# Patient Record
Sex: MALE | Race: OTHER | HISPANIC OR LATINO
[De-identification: names, ages, dates, MRNs, and addresses within clinical notes are randomized per-mention and may not be internally consistent; named-entity substitution may affect disease eponyms.]

---

## 2022-01-01 ENCOUNTER — NON-APPOINTMENT (OUTPATIENT)
Age: 82
End: 2022-01-01

## 2022-01-01 ENCOUNTER — APPOINTMENT (OUTPATIENT)
Dept: CT IMAGING | Facility: IMAGING CENTER | Age: 82
End: 2022-01-01

## 2022-01-01 ENCOUNTER — RESULT REVIEW (OUTPATIENT)
Age: 82
End: 2022-01-01

## 2022-01-01 ENCOUNTER — TRANSCRIPTION ENCOUNTER (OUTPATIENT)
Age: 82
End: 2022-01-01

## 2022-01-01 ENCOUNTER — APPOINTMENT (OUTPATIENT)
Dept: INTERNAL MEDICINE | Facility: CLINIC | Age: 82
End: 2022-01-01

## 2022-01-01 ENCOUNTER — APPOINTMENT (OUTPATIENT)
Dept: NEUROSURGERY | Facility: CLINIC | Age: 82
End: 2022-01-01

## 2022-01-01 ENCOUNTER — APPOINTMENT (OUTPATIENT)
Dept: NEUROLOGY | Facility: CLINIC | Age: 82
End: 2022-01-01

## 2022-01-01 ENCOUNTER — OUTPATIENT (OUTPATIENT)
Dept: OUTPATIENT SERVICES | Facility: HOSPITAL | Age: 82
LOS: 1 days | End: 2022-01-01
Payer: MEDICARE

## 2022-01-01 ENCOUNTER — INPATIENT (INPATIENT)
Facility: HOSPITAL | Age: 82
LOS: 4 days | DRG: 100 | End: 2022-11-04
Attending: INTERNAL MEDICINE | Admitting: PSYCHIATRY & NEUROLOGY
Payer: COMMERCIAL

## 2022-01-01 ENCOUNTER — INPATIENT (INPATIENT)
Facility: HOSPITAL | Age: 82
LOS: 11 days | Discharge: HOME | DRG: 25 | End: 2022-08-03
Attending: NEUROLOGICAL SURGERY | Admitting: NEUROLOGICAL SURGERY
Payer: COMMERCIAL

## 2022-01-01 ENCOUNTER — OUTPATIENT (OUTPATIENT)
Dept: OUTPATIENT SERVICES | Facility: HOSPITAL | Age: 82
LOS: 1 days | Discharge: HOME | End: 2022-01-01

## 2022-01-01 ENCOUNTER — APPOINTMENT (OUTPATIENT)
Dept: HEMATOLOGY ONCOLOGY | Facility: CLINIC | Age: 82
End: 2022-01-01

## 2022-01-01 ENCOUNTER — INPATIENT (INPATIENT)
Facility: HOSPITAL | Age: 82
LOS: 19 days | Discharge: NURSING, REHAB OR LONG-TERM CARE | End: 2022-10-28
Attending: HOSPITALIST | Admitting: HOSPITALIST

## 2022-01-01 ENCOUNTER — APPOINTMENT (OUTPATIENT)
Dept: ULTRASOUND IMAGING | Facility: IMAGING CENTER | Age: 82
End: 2022-01-01

## 2022-01-01 ENCOUNTER — APPOINTMENT (OUTPATIENT)
Dept: INFECTIOUS DISEASE | Facility: CLINIC | Age: 82
End: 2022-01-01

## 2022-01-01 ENCOUNTER — APPOINTMENT (OUTPATIENT)
Dept: MRI IMAGING | Facility: CLINIC | Age: 82
End: 2022-01-01

## 2022-01-01 ENCOUNTER — INPATIENT (INPATIENT)
Facility: HOSPITAL | Age: 82
LOS: 11 days | Discharge: ANOTHER HOSPITAL | DRG: 813 | End: 2022-10-08
Attending: HOSPITALIST | Admitting: STUDENT IN AN ORGANIZED HEALTH CARE EDUCATION/TRAINING PROGRAM
Payer: COMMERCIAL

## 2022-01-01 ENCOUNTER — APPOINTMENT (OUTPATIENT)
Dept: RADIATION ONCOLOGY | Facility: CLINIC | Age: 82
End: 2022-01-01

## 2022-01-01 VITALS
DIASTOLIC BLOOD PRESSURE: 87 MMHG | OXYGEN SATURATION: 99 % | HEART RATE: 80 BPM | TEMPERATURE: 98 F | RESPIRATION RATE: 19 BRPM | SYSTOLIC BLOOD PRESSURE: 138 MMHG

## 2022-01-01 VITALS
WEIGHT: 190.92 LBS | DIASTOLIC BLOOD PRESSURE: 76 MMHG | OXYGEN SATURATION: 96 % | RESPIRATION RATE: 20 BRPM | SYSTOLIC BLOOD PRESSURE: 132 MMHG | HEIGHT: 65 IN | HEART RATE: 78 BPM | TEMPERATURE: 98 F

## 2022-01-01 VITALS
HEART RATE: 76 BPM | BODY MASS INDEX: 31.82 KG/M2 | HEIGHT: 65 IN | WEIGHT: 191 LBS | OXYGEN SATURATION: 97 % | SYSTOLIC BLOOD PRESSURE: 99 MMHG | RESPIRATION RATE: 17 BRPM | TEMPERATURE: 98 F | DIASTOLIC BLOOD PRESSURE: 65 MMHG

## 2022-01-01 VITALS
DIASTOLIC BLOOD PRESSURE: 85 MMHG | OXYGEN SATURATION: 99 % | TEMPERATURE: 98.3 F | SYSTOLIC BLOOD PRESSURE: 127 MMHG | HEART RATE: 79 BPM | HEIGHT: 64 IN | WEIGHT: 162 LBS | BODY MASS INDEX: 27.66 KG/M2

## 2022-01-01 VITALS
DIASTOLIC BLOOD PRESSURE: 78 MMHG | HEART RATE: 76 BPM | OXYGEN SATURATION: 99 % | SYSTOLIC BLOOD PRESSURE: 124 MMHG | RESPIRATION RATE: 16 BRPM

## 2022-01-01 VITALS
TEMPERATURE: 98 F | OXYGEN SATURATION: 98 % | HEIGHT: 66 IN | SYSTOLIC BLOOD PRESSURE: 126 MMHG | HEART RATE: 62 BPM | DIASTOLIC BLOOD PRESSURE: 75 MMHG | WEIGHT: 156.53 LBS | RESPIRATION RATE: 18 BRPM

## 2022-01-01 VITALS
HEIGHT: 64.96 IN | OXYGEN SATURATION: 99 % | RESPIRATION RATE: 16 BRPM | TEMPERATURE: 98.3 F | WEIGHT: 172.62 LBS | SYSTOLIC BLOOD PRESSURE: 121 MMHG | DIASTOLIC BLOOD PRESSURE: 78 MMHG | HEART RATE: 67 BPM | BODY MASS INDEX: 28.76 KG/M2

## 2022-01-01 VITALS
SYSTOLIC BLOOD PRESSURE: 111 MMHG | HEART RATE: 84 BPM | TEMPERATURE: 99.1 F | OXYGEN SATURATION: 97 % | RESPIRATION RATE: 18 BRPM | DIASTOLIC BLOOD PRESSURE: 74 MMHG

## 2022-01-01 VITALS
HEART RATE: 82 BPM | DIASTOLIC BLOOD PRESSURE: 60 MMHG | SYSTOLIC BLOOD PRESSURE: 112 MMHG | OXYGEN SATURATION: 98 % | RESPIRATION RATE: 16 BRPM | HEIGHT: 64 IN

## 2022-01-01 VITALS
RESPIRATION RATE: 17 BRPM | DIASTOLIC BLOOD PRESSURE: 67 MMHG | TEMPERATURE: 98 F | OXYGEN SATURATION: 93 % | HEART RATE: 73 BPM | SYSTOLIC BLOOD PRESSURE: 110 MMHG

## 2022-01-01 VITALS
SYSTOLIC BLOOD PRESSURE: 77 MMHG | DIASTOLIC BLOOD PRESSURE: 30 MMHG | TEMPERATURE: 97 F | RESPIRATION RATE: 24 BRPM | HEART RATE: 107 BPM | OXYGEN SATURATION: 94 %

## 2022-01-01 VITALS
OXYGEN SATURATION: 99 % | DIASTOLIC BLOOD PRESSURE: 71 MMHG | SYSTOLIC BLOOD PRESSURE: 106 MMHG | TEMPERATURE: 98.6 F | HEART RATE: 94 BPM | RESPIRATION RATE: 18 BRPM

## 2022-01-01 VITALS
DIASTOLIC BLOOD PRESSURE: 77 MMHG | TEMPERATURE: 99 F | HEART RATE: 100 BPM | OXYGEN SATURATION: 99 % | SYSTOLIC BLOOD PRESSURE: 125 MMHG | RESPIRATION RATE: 18 BRPM

## 2022-01-01 VITALS
HEART RATE: 90 BPM | RESPIRATION RATE: 16 BRPM | HEIGHT: 65 IN | OXYGEN SATURATION: 97 % | DIASTOLIC BLOOD PRESSURE: 84 MMHG | SYSTOLIC BLOOD PRESSURE: 124 MMHG | WEIGHT: 149.91 LBS | TEMPERATURE: 99 F

## 2022-01-01 VITALS — HEIGHT: 66 IN

## 2022-01-01 DIAGNOSIS — E09.9 DRUG OR CHEMICAL INDUCED DIABETES MELLITUS WITHOUT COMPLICATIONS: ICD-10-CM

## 2022-01-01 DIAGNOSIS — Z29.9 ENCOUNTER FOR PROPHYLACTIC MEASURES, UNSPECIFIED: ICD-10-CM

## 2022-01-01 DIAGNOSIS — F41.9 ANXIETY DISORDER, UNSPECIFIED: ICD-10-CM

## 2022-01-01 DIAGNOSIS — Z71.89 OTHER SPECIFIED COUNSELING: ICD-10-CM

## 2022-01-01 DIAGNOSIS — G40.901 EPILEPSY, UNSPECIFIED, NOT INTRACTABLE, WITH STATUS EPILEPTICUS: ICD-10-CM

## 2022-01-01 DIAGNOSIS — R53.83 OTHER MALAISE: ICD-10-CM

## 2022-01-01 DIAGNOSIS — G40.909 EPILEPSY, UNSPECIFIED, NOT INTRACTABLE, WITHOUT STATUS EPILEPTICUS: ICD-10-CM

## 2022-01-01 DIAGNOSIS — Z98.890 OTHER SPECIFIED POSTPROCEDURAL STATES: Chronic | ICD-10-CM

## 2022-01-01 DIAGNOSIS — Z86.2 PERSONAL HISTORY OF DISEASES OF THE BLOOD AND BLOOD-FORMING ORGANS AND CERTAIN DISORDERS INVOLVING THE IMMUNE MECHANISM: ICD-10-CM

## 2022-01-01 DIAGNOSIS — Z78.9 OTHER SPECIFIED HEALTH STATUS: ICD-10-CM

## 2022-01-01 DIAGNOSIS — R26.2 DIFFICULTY IN WALKING, NOT ELSEWHERE CLASSIFIED: ICD-10-CM

## 2022-01-01 DIAGNOSIS — Z79.899 OTHER LONG TERM (CURRENT) DRUG THERAPY: ICD-10-CM

## 2022-01-01 DIAGNOSIS — C71.9 MALIGNANT NEOPLASM OF BRAIN, UNSPECIFIED: ICD-10-CM

## 2022-01-01 DIAGNOSIS — R29.898 OTHER SYMPTOMS AND SIGNS INVOLVING THE MUSCULOSKELETAL SYSTEM: ICD-10-CM

## 2022-01-01 DIAGNOSIS — N28.89 OTHER SPECIFIED DISORDERS OF KIDNEY AND URETER: ICD-10-CM

## 2022-01-01 DIAGNOSIS — D69.6 THROMBOCYTOPENIA, UNSPECIFIED: ICD-10-CM

## 2022-01-01 DIAGNOSIS — R53.81 OTHER MALAISE: ICD-10-CM

## 2022-01-01 DIAGNOSIS — B37.81 CANDIDAL ESOPHAGITIS: ICD-10-CM

## 2022-01-01 DIAGNOSIS — D17.1 BENIGN LIPOMATOUS NEOPLASM OF SKIN AND SUBCUTANEOUS TISSUE OF TRUNK: ICD-10-CM

## 2022-01-01 DIAGNOSIS — R77.8 OTHER SPECIFIED ABNORMALITIES OF PLASMA PROTEINS: ICD-10-CM

## 2022-01-01 DIAGNOSIS — I82.431 ACUTE EMBOLISM AND THROMBOSIS OF RIGHT POPLITEAL VEIN: ICD-10-CM

## 2022-01-01 DIAGNOSIS — R06.03 ACUTE RESPIRATORY DISTRESS: ICD-10-CM

## 2022-01-01 DIAGNOSIS — R78.81 BACTEREMIA: ICD-10-CM

## 2022-01-01 DIAGNOSIS — E78.5 HYPERLIPIDEMIA, UNSPECIFIED: ICD-10-CM

## 2022-01-01 DIAGNOSIS — K92.2 GASTROINTESTINAL HEMORRHAGE, UNSPECIFIED: ICD-10-CM

## 2022-01-01 DIAGNOSIS — R79.89 OTHER SPECIFIED ABNORMAL FINDINGS OF BLOOD CHEMISTRY: ICD-10-CM

## 2022-01-01 DIAGNOSIS — E11.9 TYPE 2 DIABETES MELLITUS WITHOUT COMPLICATIONS: ICD-10-CM

## 2022-01-01 DIAGNOSIS — I82.409 ACUTE EMBOLISM AND THROMBOSIS OF UNSPECIFIED DEEP VEINS OF UNSPECIFIED LOWER EXTREMITY: ICD-10-CM

## 2022-01-01 DIAGNOSIS — I10 ESSENTIAL (PRIMARY) HYPERTENSION: ICD-10-CM

## 2022-01-01 DIAGNOSIS — K42.9 UMBILICAL HERNIA W/OUT OBSTRUCTION OR GANGRENE: ICD-10-CM

## 2022-01-01 DIAGNOSIS — Z91.89 OTHER SPECIFIED PERSONAL RISK FACTORS, NOT ELSEWHERE CLASSIFIED: ICD-10-CM

## 2022-01-01 DIAGNOSIS — Z51.5 ENCOUNTER FOR PALLIATIVE CARE: ICD-10-CM

## 2022-01-01 DIAGNOSIS — Z82.49 FAMILY HISTORY OF ISCHEMIC HEART DISEASE AND OTHER DISEASES OF THE CIRCULATORY SYSTEM: ICD-10-CM

## 2022-01-01 DIAGNOSIS — Z00.00 ENCOUNTER FOR GENERAL ADULT MEDICAL EXAMINATION W/OUT ABNORMAL FINDINGS: ICD-10-CM

## 2022-01-01 DIAGNOSIS — U07.1 COVID-19: ICD-10-CM

## 2022-01-01 DIAGNOSIS — R63.4 ABNORMAL WEIGHT LOSS: ICD-10-CM

## 2022-01-01 DIAGNOSIS — R73.03 PREDIABETES: ICD-10-CM

## 2022-01-01 DIAGNOSIS — F32.A ANXIETY DISORDER, UNSPECIFIED: ICD-10-CM

## 2022-01-01 DIAGNOSIS — M79.89 OTHER SPECIFIED SOFT TISSUE DISORDERS: ICD-10-CM

## 2022-01-01 DIAGNOSIS — G93.40 ENCEPHALOPATHY, UNSPECIFIED: ICD-10-CM

## 2022-01-01 DIAGNOSIS — Z01.818 ENCOUNTER FOR OTHER PREPROCEDURAL EXAMINATION: ICD-10-CM

## 2022-01-01 DIAGNOSIS — E11.65 TYPE 2 DIABETES MELLITUS WITH HYPERGLYCEMIA: ICD-10-CM

## 2022-01-01 DIAGNOSIS — A41.81 SEPSIS DUE TO ENTEROCOCCUS: ICD-10-CM

## 2022-01-01 DIAGNOSIS — E53.8 DEFICIENCY OF OTHER SPECIFIED B GROUP VITAMINS: ICD-10-CM

## 2022-01-01 DIAGNOSIS — R32 UNSPECIFIED URINARY INCONTINENCE: ICD-10-CM

## 2022-01-01 DIAGNOSIS — G93.89 OTHER SPECIFIED DISORDERS OF BRAIN: ICD-10-CM

## 2022-01-01 DIAGNOSIS — R00.0 TACHYCARDIA, UNSPECIFIED: ICD-10-CM

## 2022-01-01 DIAGNOSIS — Z83.3 FAMILY HISTORY OF DIABETES MELLITUS: ICD-10-CM

## 2022-01-01 DIAGNOSIS — F32.2 MAJOR DEPRESSIVE DISORDER, SINGLE EPISODE, SEVERE W/OUT PSYCHOTIC FEATURES: ICD-10-CM

## 2022-01-01 DIAGNOSIS — Z00.8 ENCOUNTER FOR OTHER GENERAL EXAMINATION: ICD-10-CM

## 2022-01-01 DIAGNOSIS — C80.1 MALIGNANT (PRIMARY) NEOPLASM, UNSPECIFIED: ICD-10-CM

## 2022-01-01 DIAGNOSIS — R34 ANURIA AND OLIGURIA: ICD-10-CM

## 2022-01-01 DIAGNOSIS — N17.9 ACUTE KIDNEY FAILURE, UNSPECIFIED: ICD-10-CM

## 2022-01-01 DIAGNOSIS — I80.229 PHLEBITIS AND THROMBOPHLEBITIS OF UNSPECIFIED POPLITEAL VEIN: ICD-10-CM

## 2022-01-01 DIAGNOSIS — Z86.19 PERSONAL HISTORY OF OTHER INFECTIOUS AND PARASITIC DISEASES: ICD-10-CM

## 2022-01-01 DIAGNOSIS — R06.02 SHORTNESS OF BREATH: ICD-10-CM

## 2022-01-01 LAB
-  AMIKACIN: SIGNIFICANT CHANGE UP
-  AMOXICILLIN/CLAVULANIC ACID: SIGNIFICANT CHANGE UP
-  AMPICILLIN/SULBACTAM: SIGNIFICANT CHANGE UP
-  AMPICILLIN: SIGNIFICANT CHANGE UP
-  AZTREONAM: SIGNIFICANT CHANGE UP
-  CEFAZOLIN: SIGNIFICANT CHANGE UP
-  CEFEPIME: SIGNIFICANT CHANGE UP
-  CEFOXITIN: SIGNIFICANT CHANGE UP
-  CEFTRIAXONE: SIGNIFICANT CHANGE UP
-  CIPROFLOXACIN: SIGNIFICANT CHANGE UP
-  ERTAPENEM: SIGNIFICANT CHANGE UP
-  GENTAMICIN SYNERGY: SIGNIFICANT CHANGE UP
-  GENTAMICIN: SIGNIFICANT CHANGE UP
-  IMIPENEM: SIGNIFICANT CHANGE UP
-  LEVOFLOXACIN: SIGNIFICANT CHANGE UP
-  MEROPENEM: SIGNIFICANT CHANGE UP
-  NITROFURANTOIN: SIGNIFICANT CHANGE UP
-  PIPERACILLIN/TAZOBACTAM: SIGNIFICANT CHANGE UP
-  STAPHYLOCOCCUS EPIDERMIDIS, METHICILLIN RESISTANT: SIGNIFICANT CHANGE UP
-  STREPTOMYCIN SYNERGY: SIGNIFICANT CHANGE UP
-  TETRACYCLINE: SIGNIFICANT CHANGE UP
-  TETRACYCLINE: SIGNIFICANT CHANGE UP
-  TIGECYCLINE: SIGNIFICANT CHANGE UP
-  TOBRAMYCIN: SIGNIFICANT CHANGE UP
-  TRIMETHOPRIM/SULFAMETHOXAZOLE: SIGNIFICANT CHANGE UP
-  VANCOMYCIN: SIGNIFICANT CHANGE UP
25(OH)D3 SERPL-MCNC: 23.4 NG/ML
A1C WITH ESTIMATED AVERAGE GLUCOSE RESULT: 7 % — HIGH (ref 4–5.6)
A1C WITH ESTIMATED AVERAGE GLUCOSE RESULT: 9.3 % — HIGH (ref 4–5.6)
ALBUMIN SERPL ELPH-MCNC: 2 G/DL — LOW (ref 3.3–5)
ALBUMIN SERPL ELPH-MCNC: 2 G/DL — LOW (ref 3.3–5)
ALBUMIN SERPL ELPH-MCNC: 2.2 G/DL — LOW (ref 3.3–5)
ALBUMIN SERPL ELPH-MCNC: 2.4 G/DL — LOW (ref 3.3–5)
ALBUMIN SERPL ELPH-MCNC: 2.5 G/DL — LOW (ref 3.3–5)
ALBUMIN SERPL ELPH-MCNC: 2.5 G/DL — LOW (ref 3.3–5)
ALBUMIN SERPL ELPH-MCNC: 2.6 G/DL — LOW (ref 3.3–5)
ALBUMIN SERPL ELPH-MCNC: 2.7 G/DL — LOW (ref 3.3–5)
ALBUMIN SERPL ELPH-MCNC: 2.8 G/DL — LOW (ref 3.3–5)
ALBUMIN SERPL ELPH-MCNC: 2.8 G/DL — LOW (ref 3.3–5)
ALBUMIN SERPL ELPH-MCNC: 2.9 G/DL — LOW (ref 3.3–5)
ALBUMIN SERPL ELPH-MCNC: 3 G/DL — LOW (ref 3.3–5)
ALBUMIN SERPL ELPH-MCNC: 3 G/DL — LOW (ref 3.3–5)
ALBUMIN SERPL ELPH-MCNC: 3.1 G/DL — LOW (ref 3.3–5)
ALBUMIN SERPL ELPH-MCNC: 3.1 G/DL — LOW (ref 3.3–5)
ALBUMIN SERPL ELPH-MCNC: 3.2 G/DL — LOW (ref 3.3–5)
ALBUMIN SERPL ELPH-MCNC: 3.4 G/DL — SIGNIFICANT CHANGE UP (ref 3.3–5)
ALBUMIN SERPL ELPH-MCNC: 3.7 G/DL
ALBUMIN SERPL ELPH-MCNC: 3.8 G/DL
ALBUMIN SERPL ELPH-MCNC: 4 G/DL — SIGNIFICANT CHANGE UP (ref 3.3–5)
ALP BLD-CCNC: 119 U/L
ALP BLD-CCNC: 160 U/L
ALP SERPL-CCNC: 100 U/L — SIGNIFICANT CHANGE UP (ref 40–120)
ALP SERPL-CCNC: 104 U/L — SIGNIFICANT CHANGE UP (ref 40–120)
ALP SERPL-CCNC: 44 U/L — SIGNIFICANT CHANGE UP (ref 40–120)
ALP SERPL-CCNC: 67 U/L — SIGNIFICANT CHANGE UP (ref 40–120)
ALP SERPL-CCNC: 71 U/L — SIGNIFICANT CHANGE UP (ref 40–120)
ALP SERPL-CCNC: 74 U/L — SIGNIFICANT CHANGE UP (ref 40–120)
ALP SERPL-CCNC: 75 U/L — SIGNIFICANT CHANGE UP (ref 40–120)
ALP SERPL-CCNC: 76 U/L — SIGNIFICANT CHANGE UP (ref 40–120)
ALP SERPL-CCNC: 77 U/L — SIGNIFICANT CHANGE UP (ref 40–120)
ALP SERPL-CCNC: 77 U/L — SIGNIFICANT CHANGE UP (ref 40–120)
ALP SERPL-CCNC: 79 U/L — SIGNIFICANT CHANGE UP (ref 40–120)
ALP SERPL-CCNC: 80 U/L — SIGNIFICANT CHANGE UP (ref 40–120)
ALP SERPL-CCNC: 84 U/L — SIGNIFICANT CHANGE UP (ref 40–120)
ALP SERPL-CCNC: 85 U/L — SIGNIFICANT CHANGE UP (ref 40–120)
ALP SERPL-CCNC: 87 U/L — SIGNIFICANT CHANGE UP (ref 40–120)
ALP SERPL-CCNC: 92 U/L — SIGNIFICANT CHANGE UP (ref 40–120)
ALP SERPL-CCNC: 93 U/L — SIGNIFICANT CHANGE UP (ref 40–120)
ALP SERPL-CCNC: 98 U/L — SIGNIFICANT CHANGE UP (ref 40–120)
ALP SERPL-CCNC: 99 U/L — SIGNIFICANT CHANGE UP (ref 40–120)
ALP SERPL-CCNC: 99 U/L — SIGNIFICANT CHANGE UP (ref 40–120)
ALT FLD-CCNC: 30 U/L — SIGNIFICANT CHANGE UP (ref 4–41)
ALT FLD-CCNC: 37 U/L — SIGNIFICANT CHANGE UP (ref 4–41)
ALT FLD-CCNC: 38 U/L — SIGNIFICANT CHANGE UP (ref 10–45)
ALT FLD-CCNC: 40 U/L — SIGNIFICANT CHANGE UP (ref 10–45)
ALT FLD-CCNC: 40 U/L — SIGNIFICANT CHANGE UP (ref 4–41)
ALT FLD-CCNC: 41 U/L — SIGNIFICANT CHANGE UP (ref 10–45)
ALT FLD-CCNC: 41 U/L — SIGNIFICANT CHANGE UP (ref 4–41)
ALT FLD-CCNC: 42 U/L — SIGNIFICANT CHANGE UP (ref 10–45)
ALT FLD-CCNC: 44 U/L — SIGNIFICANT CHANGE UP (ref 10–45)
ALT FLD-CCNC: 46 U/L — HIGH (ref 10–45)
ALT FLD-CCNC: 46 U/L — HIGH (ref 10–45)
ALT FLD-CCNC: 48 U/L — HIGH (ref 10–45)
ALT FLD-CCNC: 51 U/L — HIGH (ref 10–45)
ALT FLD-CCNC: 53 U/L — HIGH (ref 10–45)
ALT FLD-CCNC: 54 U/L — HIGH (ref 10–45)
ALT FLD-CCNC: 57 U/L — HIGH (ref 4–41)
ALT FLD-CCNC: 60 U/L — HIGH (ref 10–45)
ALT FLD-CCNC: 61 U/L — HIGH (ref 4–41)
ALT FLD-CCNC: 62 U/L — HIGH (ref 10–45)
ALT FLD-CCNC: 62 U/L — HIGH (ref 10–45)
ALT FLD-CCNC: 74 U/L — HIGH (ref 10–45)
ALT FLD-CCNC: 9 U/L — SIGNIFICANT CHANGE UP (ref 4–41)
ALT SERPL-CCNC: 32 U/L
ALT SERPL-CCNC: 41 U/L
ANION GAP SERPL CALC-SCNC: 10 MMOL/L — SIGNIFICANT CHANGE UP (ref 5–17)
ANION GAP SERPL CALC-SCNC: 10 MMOL/L — SIGNIFICANT CHANGE UP (ref 7–14)
ANION GAP SERPL CALC-SCNC: 11 MMOL/L — SIGNIFICANT CHANGE UP (ref 5–17)
ANION GAP SERPL CALC-SCNC: 11 MMOL/L — SIGNIFICANT CHANGE UP (ref 7–14)
ANION GAP SERPL CALC-SCNC: 12 MMOL/L — SIGNIFICANT CHANGE UP (ref 5–17)
ANION GAP SERPL CALC-SCNC: 12 MMOL/L — SIGNIFICANT CHANGE UP (ref 5–17)
ANION GAP SERPL CALC-SCNC: 13 MMOL/L — SIGNIFICANT CHANGE UP (ref 5–17)
ANION GAP SERPL CALC-SCNC: 14 MMOL/L — SIGNIFICANT CHANGE UP (ref 5–17)
ANION GAP SERPL CALC-SCNC: 14 MMOL/L — SIGNIFICANT CHANGE UP (ref 5–17)
ANION GAP SERPL CALC-SCNC: 15 MMOL/L
ANION GAP SERPL CALC-SCNC: 15 MMOL/L — HIGH (ref 7–14)
ANION GAP SERPL CALC-SCNC: 16 MMOL/L — SIGNIFICANT CHANGE UP (ref 5–17)
ANION GAP SERPL CALC-SCNC: 17 MMOL/L
ANION GAP SERPL CALC-SCNC: 20 MMOL/L — HIGH (ref 5–17)
ANION GAP SERPL CALC-SCNC: 7 MMOL/L — SIGNIFICANT CHANGE UP (ref 5–17)
ANION GAP SERPL CALC-SCNC: 7 MMOL/L — SIGNIFICANT CHANGE UP (ref 7–14)
ANION GAP SERPL CALC-SCNC: 7 MMOL/L — SIGNIFICANT CHANGE UP (ref 7–14)
ANION GAP SERPL CALC-SCNC: 8 MMOL/L — SIGNIFICANT CHANGE UP (ref 5–17)
ANION GAP SERPL CALC-SCNC: 9 MMOL/L — SIGNIFICANT CHANGE UP (ref 5–17)
ANION GAP SERPL CALC-SCNC: 9 MMOL/L — SIGNIFICANT CHANGE UP (ref 7–14)
ANISOCYTOSIS BLD QL: SLIGHT — SIGNIFICANT CHANGE UP
APPEARANCE UR: CLEAR — SIGNIFICANT CHANGE UP
APPEARANCE: CLEAR
APTT BLD: 20.6 SEC — LOW (ref 27.5–35.5)
APTT BLD: 24.1 SEC — LOW (ref 27.5–35.5)
APTT BLD: 24.2 SEC — LOW (ref 27.5–35.5)
APTT BLD: 25.2 SEC — LOW (ref 27.5–35.5)
AST SERPL-CCNC: 17 U/L — SIGNIFICANT CHANGE UP (ref 4–40)
AST SERPL-CCNC: 19 U/L
AST SERPL-CCNC: 19 U/L — SIGNIFICANT CHANGE UP (ref 4–40)
AST SERPL-CCNC: 21 U/L — SIGNIFICANT CHANGE UP (ref 10–40)
AST SERPL-CCNC: 21 U/L — SIGNIFICANT CHANGE UP (ref 10–40)
AST SERPL-CCNC: 21 U/L — SIGNIFICANT CHANGE UP (ref 4–40)
AST SERPL-CCNC: 22 U/L — SIGNIFICANT CHANGE UP (ref 10–40)
AST SERPL-CCNC: 22 U/L — SIGNIFICANT CHANGE UP (ref 10–40)
AST SERPL-CCNC: 22 U/L — SIGNIFICANT CHANGE UP (ref 4–40)
AST SERPL-CCNC: 24 U/L
AST SERPL-CCNC: 25 U/L — SIGNIFICANT CHANGE UP (ref 10–40)
AST SERPL-CCNC: 25 U/L — SIGNIFICANT CHANGE UP (ref 10–40)
AST SERPL-CCNC: 28 U/L — SIGNIFICANT CHANGE UP (ref 10–40)
AST SERPL-CCNC: 29 U/L — SIGNIFICANT CHANGE UP (ref 10–40)
AST SERPL-CCNC: 30 U/L — SIGNIFICANT CHANGE UP (ref 4–40)
AST SERPL-CCNC: 32 U/L — SIGNIFICANT CHANGE UP (ref 10–40)
AST SERPL-CCNC: 34 U/L — SIGNIFICANT CHANGE UP (ref 10–40)
AST SERPL-CCNC: 34 U/L — SIGNIFICANT CHANGE UP (ref 4–40)
AST SERPL-CCNC: 35 U/L — SIGNIFICANT CHANGE UP (ref 10–40)
AST SERPL-CCNC: 35 U/L — SIGNIFICANT CHANGE UP (ref 4–40)
AST SERPL-CCNC: 43 U/L — HIGH (ref 10–40)
AST SERPL-CCNC: 44 U/L — HIGH (ref 10–40)
BACTERIA # UR AUTO: NEGATIVE — SIGNIFICANT CHANGE UP
BACTERIA: ABNORMAL
BASE EXCESS BLDV CALC-SCNC: -2.1 MMOL/L — LOW (ref -2–3)
BASE EXCESS BLDV CALC-SCNC: -6 MMOL/L — LOW (ref -2–3)
BASE EXCESS BLDV CALC-SCNC: 1.4 MMOL/L — SIGNIFICANT CHANGE UP (ref -2–3)
BASE EXCESS BLDV CALC-SCNC: 1.8 MMOL/L — SIGNIFICANT CHANGE UP (ref -2–3)
BASE EXCESS BLDV CALC-SCNC: 2.4 MMOL/L — SIGNIFICANT CHANGE UP (ref -2–3)
BASE EXCESS BLDV CALC-SCNC: 2.4 MMOL/L — SIGNIFICANT CHANGE UP (ref -2–3)
BASE EXCESS BLDV CALC-SCNC: 3 MMOL/L — SIGNIFICANT CHANGE UP (ref -2–3)
BASE EXCESS BLDV CALC-SCNC: 6 MMOL/L — HIGH (ref -2–2)
BASOPHILS # BLD AUTO: 0 K/UL — SIGNIFICANT CHANGE UP (ref 0–0.2)
BASOPHILS # BLD AUTO: 0.01 K/UL
BASOPHILS # BLD AUTO: 0.01 K/UL — SIGNIFICANT CHANGE UP (ref 0–0.2)
BASOPHILS # BLD AUTO: 0.02 K/UL — SIGNIFICANT CHANGE UP (ref 0–0.2)
BASOPHILS # BLD AUTO: 0.03 K/UL — SIGNIFICANT CHANGE UP (ref 0–0.2)
BASOPHILS # BLD AUTO: 0.03 K/UL — SIGNIFICANT CHANGE UP (ref 0–0.2)
BASOPHILS # BLD AUTO: 0.05 K/UL — SIGNIFICANT CHANGE UP (ref 0–0.2)
BASOPHILS NFR BLD AUTO: 0 % — SIGNIFICANT CHANGE UP (ref 0–2)
BASOPHILS NFR BLD AUTO: 0.1 %
BASOPHILS NFR BLD AUTO: 0.1 % — SIGNIFICANT CHANGE UP (ref 0–2)
BASOPHILS NFR BLD AUTO: 0.2 % — SIGNIFICANT CHANGE UP (ref 0–2)
BASOPHILS NFR BLD AUTO: 0.3 % — SIGNIFICANT CHANGE UP (ref 0–2)
BASOPHILS NFR BLD AUTO: 0.3 % — SIGNIFICANT CHANGE UP (ref 0–2)
BASOPHILS NFR BLD AUTO: 0.4 % — SIGNIFICANT CHANGE UP (ref 0–2)
BASOPHILS NFR BLD AUTO: 0.5 % — SIGNIFICANT CHANGE UP (ref 0–2)
BILIRUB DIRECT SERPL-MCNC: <0.2 MG/DL — SIGNIFICANT CHANGE UP (ref 0–0.3)
BILIRUB INDIRECT FLD-MCNC: >0.3 MG/DL — SIGNIFICANT CHANGE UP (ref 0–1)
BILIRUB SERPL-MCNC: 0.3 MG/DL — SIGNIFICANT CHANGE UP (ref 0.2–1.2)
BILIRUB SERPL-MCNC: 0.4 MG/DL
BILIRUB SERPL-MCNC: 0.4 MG/DL
BILIRUB SERPL-MCNC: 0.4 MG/DL — SIGNIFICANT CHANGE UP (ref 0.2–1.2)
BILIRUB SERPL-MCNC: 0.5 MG/DL — SIGNIFICANT CHANGE UP (ref 0.2–1.2)
BILIRUB SERPL-MCNC: 0.6 MG/DL — SIGNIFICANT CHANGE UP (ref 0.2–1.2)
BILIRUB SERPL-MCNC: 0.7 MG/DL — SIGNIFICANT CHANGE UP (ref 0.2–1.2)
BILIRUB SERPL-MCNC: 0.8 MG/DL — SIGNIFICANT CHANGE UP (ref 0.2–1.2)
BILIRUB UR-MCNC: NEGATIVE — SIGNIFICANT CHANGE UP
BILIRUBIN URINE: NEGATIVE
BLD GP AB SCN SERPL QL: NEGATIVE — SIGNIFICANT CHANGE UP
BLOOD GAS VENOUS - CREATININE: SIGNIFICANT CHANGE UP MG/DL (ref 0.5–1.3)
BLOOD GAS VENOUS - CREATININE: SIGNIFICANT CHANGE UP MG/DL (ref 0.5–1.3)
BLOOD URINE: NORMAL
BUN SERPL-MCNC: 10 MG/DL — SIGNIFICANT CHANGE UP (ref 7–23)
BUN SERPL-MCNC: 11 MG/DL — SIGNIFICANT CHANGE UP (ref 7–23)
BUN SERPL-MCNC: 12 MG/DL — SIGNIFICANT CHANGE UP (ref 7–23)
BUN SERPL-MCNC: 13 MG/DL — SIGNIFICANT CHANGE UP (ref 7–23)
BUN SERPL-MCNC: 14 MG/DL — SIGNIFICANT CHANGE UP (ref 7–23)
BUN SERPL-MCNC: 15 MG/DL — SIGNIFICANT CHANGE UP (ref 7–23)
BUN SERPL-MCNC: 16 MG/DL — SIGNIFICANT CHANGE UP (ref 7–23)
BUN SERPL-MCNC: 17 MG/DL — SIGNIFICANT CHANGE UP (ref 7–23)
BUN SERPL-MCNC: 17 MG/DL — SIGNIFICANT CHANGE UP (ref 7–23)
BUN SERPL-MCNC: 18 MG/DL — SIGNIFICANT CHANGE UP (ref 7–23)
BUN SERPL-MCNC: 18 MG/DL — SIGNIFICANT CHANGE UP (ref 7–23)
BUN SERPL-MCNC: 19 MG/DL — SIGNIFICANT CHANGE UP (ref 7–23)
BUN SERPL-MCNC: 19 MG/DL — SIGNIFICANT CHANGE UP (ref 7–23)
BUN SERPL-MCNC: 20 MG/DL — SIGNIFICANT CHANGE UP (ref 7–23)
BUN SERPL-MCNC: 20 MG/DL — SIGNIFICANT CHANGE UP (ref 7–23)
BUN SERPL-MCNC: 21 MG/DL — SIGNIFICANT CHANGE UP (ref 7–23)
BUN SERPL-MCNC: 22 MG/DL — SIGNIFICANT CHANGE UP (ref 7–23)
BUN SERPL-MCNC: 23 MG/DL
BUN SERPL-MCNC: 23 MG/DL — SIGNIFICANT CHANGE UP (ref 7–23)
BUN SERPL-MCNC: 24 MG/DL — HIGH (ref 7–23)
BUN SERPL-MCNC: 24 MG/DL — HIGH (ref 7–23)
BUN SERPL-MCNC: 26 MG/DL — HIGH (ref 7–23)
BUN SERPL-MCNC: 28 MG/DL — HIGH (ref 7–23)
BUN SERPL-MCNC: 29 MG/DL — HIGH (ref 7–23)
BUN SERPL-MCNC: 30 MG/DL — HIGH (ref 7–23)
BUN SERPL-MCNC: 32 MG/DL
BUN SERPL-MCNC: 32 MG/DL — HIGH (ref 7–23)
BUN SERPL-MCNC: 32 MG/DL — HIGH (ref 7–23)
BUN SERPL-MCNC: 33 MG/DL — HIGH (ref 7–23)
BUN SERPL-MCNC: 34 MG/DL — HIGH (ref 7–23)
BUN SERPL-MCNC: 5 MG/DL — LOW (ref 7–23)
BUN SERPL-MCNC: 9 MG/DL — SIGNIFICANT CHANGE UP (ref 7–23)
BURR CELLS BLD QL SMEAR: PRESENT — SIGNIFICANT CHANGE UP
BURR CELLS BLD QL SMEAR: PRESENT — SIGNIFICANT CHANGE UP
CA-I BLD-SCNC: 1.13 MMOL/L — LOW (ref 1.15–1.33)
CA-I SERPL-SCNC: 0.97 MMOL/L — LOW (ref 1.15–1.33)
CA-I SERPL-SCNC: 1.07 MMOL/L — LOW (ref 1.15–1.33)
CA-I SERPL-SCNC: 1.14 MMOL/L — LOW (ref 1.15–1.33)
CA-I SERPL-SCNC: 1.15 MMOL/L — SIGNIFICANT CHANGE UP (ref 1.15–1.33)
CA-I SERPL-SCNC: 1.17 MMOL/L — SIGNIFICANT CHANGE UP (ref 1.15–1.33)
CA-I SERPL-SCNC: 1.17 MMOL/L — SIGNIFICANT CHANGE UP (ref 1.15–1.33)
CA-I SERPL-SCNC: 1.21 MMOL/L — SIGNIFICANT CHANGE UP (ref 1.15–1.33)
CA-I SERPL-SCNC: 1.22 MMOL/L — SIGNIFICANT CHANGE UP (ref 1.15–1.33)
CALCIUM OXALATE CRYSTALS: ABNORMAL
CALCIUM SERPL-MCNC: 10.8 MG/DL
CALCIUM SERPL-MCNC: 4.8 MG/DL — CRITICAL LOW (ref 8.4–10.5)
CALCIUM SERPL-MCNC: 6.7 MG/DL — LOW (ref 8.4–10.5)
CALCIUM SERPL-MCNC: 6.7 MG/DL — LOW (ref 8.4–10.5)
CALCIUM SERPL-MCNC: 6.9 MG/DL — LOW (ref 8.4–10.5)
CALCIUM SERPL-MCNC: 7.3 MG/DL — LOW (ref 8.4–10.5)
CALCIUM SERPL-MCNC: 7.7 MG/DL — LOW (ref 8.4–10.5)
CALCIUM SERPL-MCNC: 7.8 MG/DL — LOW (ref 8.4–10.5)
CALCIUM SERPL-MCNC: 7.9 MG/DL — LOW (ref 8.4–10.5)
CALCIUM SERPL-MCNC: 8 MG/DL — LOW (ref 8.4–10.5)
CALCIUM SERPL-MCNC: 8 MG/DL — LOW (ref 8.4–10.5)
CALCIUM SERPL-MCNC: 8.1 MG/DL — LOW (ref 8.4–10.5)
CALCIUM SERPL-MCNC: 8.2 MG/DL — LOW (ref 8.4–10.5)
CALCIUM SERPL-MCNC: 8.3 MG/DL — LOW (ref 8.4–10.5)
CALCIUM SERPL-MCNC: 8.4 MG/DL — SIGNIFICANT CHANGE UP (ref 8.4–10.5)
CALCIUM SERPL-MCNC: 8.4 MG/DL — SIGNIFICANT CHANGE UP (ref 8.4–10.5)
CALCIUM SERPL-MCNC: 8.5 MG/DL — SIGNIFICANT CHANGE UP (ref 8.4–10.5)
CALCIUM SERPL-MCNC: 8.6 MG/DL — SIGNIFICANT CHANGE UP (ref 8.4–10.5)
CALCIUM SERPL-MCNC: 8.7 MG/DL — SIGNIFICANT CHANGE UP (ref 8.4–10.5)
CALCIUM SERPL-MCNC: 9.1 MG/DL — SIGNIFICANT CHANGE UP (ref 8.4–10.5)
CALCIUM SERPL-MCNC: 9.1 MG/DL — SIGNIFICANT CHANGE UP (ref 8.4–10.5)
CALCIUM SERPL-MCNC: 9.8 MG/DL
CHLORIDE BLDV-SCNC: 100 MMOL/L — SIGNIFICANT CHANGE UP (ref 96–108)
CHLORIDE BLDV-SCNC: 101 MMOL/L — SIGNIFICANT CHANGE UP (ref 96–108)
CHLORIDE BLDV-SCNC: 102 MMOL/L — SIGNIFICANT CHANGE UP (ref 96–108)
CHLORIDE BLDV-SCNC: 102 MMOL/L — SIGNIFICANT CHANGE UP (ref 96–108)
CHLORIDE BLDV-SCNC: 103 MMOL/L — SIGNIFICANT CHANGE UP (ref 96–108)
CHLORIDE BLDV-SCNC: 97 MMOL/L — SIGNIFICANT CHANGE UP (ref 96–108)
CHLORIDE BLDV-SCNC: 98 MMOL/L — SIGNIFICANT CHANGE UP (ref 96–108)
CHLORIDE BLDV-SCNC: 99 MMOL/L — SIGNIFICANT CHANGE UP (ref 96–108)
CHLORIDE SERPL-SCNC: 100 MMOL/L — SIGNIFICANT CHANGE UP (ref 96–108)
CHLORIDE SERPL-SCNC: 101 MMOL/L — SIGNIFICANT CHANGE UP (ref 96–108)
CHLORIDE SERPL-SCNC: 101 MMOL/L — SIGNIFICANT CHANGE UP (ref 98–107)
CHLORIDE SERPL-SCNC: 102 MMOL/L — SIGNIFICANT CHANGE UP (ref 96–108)
CHLORIDE SERPL-SCNC: 102 MMOL/L — SIGNIFICANT CHANGE UP (ref 98–107)
CHLORIDE SERPL-SCNC: 103 MMOL/L — SIGNIFICANT CHANGE UP (ref 96–108)
CHLORIDE SERPL-SCNC: 103 MMOL/L — SIGNIFICANT CHANGE UP (ref 98–107)
CHLORIDE SERPL-SCNC: 104 MMOL/L — SIGNIFICANT CHANGE UP (ref 96–108)
CHLORIDE SERPL-SCNC: 104 MMOL/L — SIGNIFICANT CHANGE UP (ref 96–108)
CHLORIDE SERPL-SCNC: 104 MMOL/L — SIGNIFICANT CHANGE UP (ref 98–107)
CHLORIDE SERPL-SCNC: 105 MMOL/L
CHLORIDE SERPL-SCNC: 105 MMOL/L — SIGNIFICANT CHANGE UP (ref 96–108)
CHLORIDE SERPL-SCNC: 105 MMOL/L — SIGNIFICANT CHANGE UP (ref 98–107)
CHLORIDE SERPL-SCNC: 106 MMOL/L — SIGNIFICANT CHANGE UP (ref 96–108)
CHLORIDE SERPL-SCNC: 106 MMOL/L — SIGNIFICANT CHANGE UP (ref 98–107)
CHLORIDE SERPL-SCNC: 107 MMOL/L — SIGNIFICANT CHANGE UP (ref 96–108)
CHLORIDE SERPL-SCNC: 107 MMOL/L — SIGNIFICANT CHANGE UP (ref 96–108)
CHLORIDE SERPL-SCNC: 107 MMOL/L — SIGNIFICANT CHANGE UP (ref 98–107)
CHLORIDE SERPL-SCNC: 108 MMOL/L — HIGH (ref 98–107)
CHLORIDE SERPL-SCNC: 108 MMOL/L — SIGNIFICANT CHANGE UP (ref 96–108)
CHLORIDE SERPL-SCNC: 108 MMOL/L — SIGNIFICANT CHANGE UP (ref 96–108)
CHLORIDE SERPL-SCNC: 109 MMOL/L — HIGH (ref 96–108)
CHLORIDE SERPL-SCNC: 110 MMOL/L — HIGH (ref 96–108)
CHLORIDE SERPL-SCNC: 110 MMOL/L — HIGH (ref 96–108)
CHLORIDE SERPL-SCNC: 111 MMOL/L — HIGH (ref 96–108)
CHLORIDE SERPL-SCNC: 118 MMOL/L — HIGH (ref 96–108)
CHLORIDE SERPL-SCNC: 97 MMOL/L — SIGNIFICANT CHANGE UP (ref 96–108)
CHLORIDE SERPL-SCNC: 99 MMOL/L
CHLORIDE SERPL-SCNC: 99 MMOL/L — SIGNIFICANT CHANGE UP (ref 98–107)
CHOLEST SERPL-MCNC: 218 MG/DL — HIGH
CHOLEST SERPL-MCNC: 276 MG/DL
CK MB CFR SERPL CALC: 2 NG/ML — SIGNIFICANT CHANGE UP (ref 0–6.7)
CK SERPL-CCNC: 104 U/L — SIGNIFICANT CHANGE UP (ref 30–200)
CK SERPL-CCNC: 36 U/L — SIGNIFICANT CHANGE UP (ref 30–200)
CK SERPL-CCNC: 88 U/L — SIGNIFICANT CHANGE UP (ref 30–200)
CLOSURE TME COLL+EPINEP BLD: 51 K/UL — LOW (ref 150–400)
CLOSURE TME COLL+EPINEP BLD: 66 K/UL — LOW (ref 150–400)
CLOSURE TME COLL+EPINEP BLD: 79 K/UL — LOW (ref 150–400)
CO2 BLDV-SCNC: 22 MMOL/L — SIGNIFICANT CHANGE UP (ref 22–26)
CO2 BLDV-SCNC: 26 MMOL/L — SIGNIFICANT CHANGE UP (ref 22–26)
CO2 BLDV-SCNC: 28 MMOL/L — HIGH (ref 22–26)
CO2 BLDV-SCNC: 29 MMOL/L — HIGH (ref 22–26)
CO2 BLDV-SCNC: 29 MMOL/L — HIGH (ref 22–26)
CO2 BLDV-SCNC: 30 MMOL/L — HIGH (ref 22–26)
CO2 BLDV-SCNC: 30 MMOL/L — HIGH (ref 22–26)
CO2 BLDV-SCNC: 34 MMOL/L — HIGH (ref 22–26)
CO2 SERPL-SCNC: 15 MMOL/L — LOW (ref 22–31)
CO2 SERPL-SCNC: 15 MMOL/L — LOW (ref 22–31)
CO2 SERPL-SCNC: 19 MMOL/L — LOW (ref 22–31)
CO2 SERPL-SCNC: 20 MMOL/L — LOW (ref 22–31)
CO2 SERPL-SCNC: 21 MMOL/L — LOW (ref 22–31)
CO2 SERPL-SCNC: 22 MMOL/L — SIGNIFICANT CHANGE UP (ref 22–31)
CO2 SERPL-SCNC: 23 MMOL/L
CO2 SERPL-SCNC: 23 MMOL/L — SIGNIFICANT CHANGE UP (ref 22–31)
CO2 SERPL-SCNC: 24 MMOL/L
CO2 SERPL-SCNC: 24 MMOL/L — SIGNIFICANT CHANGE UP (ref 22–31)
CO2 SERPL-SCNC: 25 MMOL/L — SIGNIFICANT CHANGE UP (ref 22–31)
CO2 SERPL-SCNC: 26 MMOL/L — SIGNIFICANT CHANGE UP (ref 22–31)
CO2 SERPL-SCNC: 28 MMOL/L — SIGNIFICANT CHANGE UP (ref 22–31)
COLOR SPEC: SIGNIFICANT CHANGE UP
COLOR SPEC: SIGNIFICANT CHANGE UP
COLOR SPEC: YELLOW — SIGNIFICANT CHANGE UP
COLOR: YELLOW
CREAT SERPL-MCNC: 0.35 MG/DL — LOW (ref 0.5–1.3)
CREAT SERPL-MCNC: 0.37 MG/DL — LOW (ref 0.5–1.3)
CREAT SERPL-MCNC: 0.37 MG/DL — LOW (ref 0.5–1.3)
CREAT SERPL-MCNC: 0.38 MG/DL — LOW (ref 0.5–1.3)
CREAT SERPL-MCNC: 0.4 MG/DL — LOW (ref 0.5–1.3)
CREAT SERPL-MCNC: 0.4 MG/DL — LOW (ref 0.5–1.3)
CREAT SERPL-MCNC: 0.42 MG/DL — LOW (ref 0.5–1.3)
CREAT SERPL-MCNC: 0.43 MG/DL — LOW (ref 0.5–1.3)
CREAT SERPL-MCNC: 0.43 MG/DL — LOW (ref 0.5–1.3)
CREAT SERPL-MCNC: 0.45 MG/DL — LOW (ref 0.5–1.3)
CREAT SERPL-MCNC: 0.45 MG/DL — LOW (ref 0.5–1.3)
CREAT SERPL-MCNC: 0.48 MG/DL — LOW (ref 0.5–1.3)
CREAT SERPL-MCNC: 0.49 MG/DL — LOW (ref 0.5–1.3)
CREAT SERPL-MCNC: 0.51 MG/DL — SIGNIFICANT CHANGE UP (ref 0.5–1.3)
CREAT SERPL-MCNC: 0.52 MG/DL — SIGNIFICANT CHANGE UP (ref 0.5–1.3)
CREAT SERPL-MCNC: 0.53 MG/DL — SIGNIFICANT CHANGE UP (ref 0.5–1.3)
CREAT SERPL-MCNC: 0.53 MG/DL — SIGNIFICANT CHANGE UP (ref 0.5–1.3)
CREAT SERPL-MCNC: 0.56 MG/DL — SIGNIFICANT CHANGE UP (ref 0.5–1.3)
CREAT SERPL-MCNC: 0.58 MG/DL — SIGNIFICANT CHANGE UP (ref 0.5–1.3)
CREAT SERPL-MCNC: 0.59 MG/DL — SIGNIFICANT CHANGE UP (ref 0.5–1.3)
CREAT SERPL-MCNC: 0.59 MG/DL — SIGNIFICANT CHANGE UP (ref 0.5–1.3)
CREAT SERPL-MCNC: 0.6 MG/DL — SIGNIFICANT CHANGE UP (ref 0.5–1.3)
CREAT SERPL-MCNC: 0.61 MG/DL — SIGNIFICANT CHANGE UP (ref 0.5–1.3)
CREAT SERPL-MCNC: 0.62 MG/DL — SIGNIFICANT CHANGE UP (ref 0.5–1.3)
CREAT SERPL-MCNC: 0.65 MG/DL — SIGNIFICANT CHANGE UP (ref 0.5–1.3)
CREAT SERPL-MCNC: 0.66 MG/DL — SIGNIFICANT CHANGE UP (ref 0.5–1.3)
CREAT SERPL-MCNC: 0.67 MG/DL — SIGNIFICANT CHANGE UP (ref 0.5–1.3)
CREAT SERPL-MCNC: 0.67 MG/DL — SIGNIFICANT CHANGE UP (ref 0.5–1.3)
CREAT SERPL-MCNC: 0.71 MG/DL — SIGNIFICANT CHANGE UP (ref 0.5–1.3)
CREAT SERPL-MCNC: 0.71 MG/DL — SIGNIFICANT CHANGE UP (ref 0.5–1.3)
CREAT SERPL-MCNC: 0.72 MG/DL — SIGNIFICANT CHANGE UP (ref 0.5–1.3)
CREAT SERPL-MCNC: 0.73 MG/DL — SIGNIFICANT CHANGE UP (ref 0.5–1.3)
CREAT SERPL-MCNC: 0.79 MG/DL — SIGNIFICANT CHANGE UP (ref 0.5–1.3)
CREAT SERPL-MCNC: 0.82 MG/DL — SIGNIFICANT CHANGE UP (ref 0.5–1.3)
CREAT SERPL-MCNC: 0.83 MG/DL — SIGNIFICANT CHANGE UP (ref 0.5–1.3)
CREAT SERPL-MCNC: 0.83 MG/DL — SIGNIFICANT CHANGE UP (ref 0.5–1.3)
CREAT SERPL-MCNC: 0.86 MG/DL — SIGNIFICANT CHANGE UP (ref 0.5–1.3)
CREAT SERPL-MCNC: 0.92 MG/DL — SIGNIFICANT CHANGE UP (ref 0.5–1.3)
CREAT SERPL-MCNC: 0.95 MG/DL — SIGNIFICANT CHANGE UP (ref 0.5–1.3)
CREAT SERPL-MCNC: 0.98 MG/DL — SIGNIFICANT CHANGE UP (ref 0.5–1.3)
CREAT SERPL-MCNC: 1.01 MG/DL — SIGNIFICANT CHANGE UP (ref 0.5–1.3)
CREAT SERPL-MCNC: 1.07 MG/DL
CREAT SERPL-MCNC: 1.08 MG/DL — SIGNIFICANT CHANGE UP (ref 0.5–1.3)
CREAT SERPL-MCNC: 1.12 MG/DL — SIGNIFICANT CHANGE UP (ref 0.5–1.3)
CREAT SERPL-MCNC: 1.14 MG/DL
CREAT SPEC-SCNC: 56 MG/DL
CULTURE RESULTS: NO GROWTH — SIGNIFICANT CHANGE UP
CULTURE RESULTS: SIGNIFICANT CHANGE UP
D DIMER BLD IA.RAPID-MCNC: 518 NG/ML DDU — HIGH
DIFF PNL FLD: ABNORMAL
DIFF PNL FLD: ABNORMAL
DIFF PNL FLD: NEGATIVE — SIGNIFICANT CHANGE UP
E FAECALIS DNA BLD POS QL NAA+NON-PROBE: SIGNIFICANT CHANGE UP
EGFR: 100 ML/MIN/1.73M2 — SIGNIFICANT CHANGE UP
EGFR: 101 ML/MIN/1.73M2 — SIGNIFICANT CHANGE UP
EGFR: 102 ML/MIN/1.73M2 — SIGNIFICANT CHANGE UP
EGFR: 104 ML/MIN/1.73M2 — SIGNIFICANT CHANGE UP
EGFR: 104 ML/MIN/1.73M2 — SIGNIFICANT CHANGE UP
EGFR: 106 ML/MIN/1.73M2 — SIGNIFICANT CHANGE UP
EGFR: 106 ML/MIN/1.73M2 — SIGNIFICANT CHANGE UP
EGFR: 107 ML/MIN/1.73M2 — SIGNIFICANT CHANGE UP
EGFR: 108 ML/MIN/1.73M2 — SIGNIFICANT CHANGE UP
EGFR: 108 ML/MIN/1.73M2 — SIGNIFICANT CHANGE UP
EGFR: 110 ML/MIN/1.73M2 — SIGNIFICANT CHANGE UP
EGFR: 111 ML/MIN/1.73M2 — SIGNIFICANT CHANGE UP
EGFR: 111 ML/MIN/1.73M2 — SIGNIFICANT CHANGE UP
EGFR: 113 ML/MIN/1.73M2 — SIGNIFICANT CHANGE UP
EGFR: 64 ML/MIN/1.73M2
EGFR: 65 ML/MIN/1.73M2 — SIGNIFICANT CHANGE UP
EGFR: 68 ML/MIN/1.73M2 — SIGNIFICANT CHANGE UP
EGFR: 69 ML/MIN/1.73M2
EGFR: 74 ML/MIN/1.73M2 — SIGNIFICANT CHANGE UP
EGFR: 77 ML/MIN/1.73M2 — SIGNIFICANT CHANGE UP
EGFR: 79 ML/MIN/1.73M2 — SIGNIFICANT CHANGE UP
EGFR: 83 ML/MIN/1.73M2 — SIGNIFICANT CHANGE UP
EGFR: 86 ML/MIN/1.73M2 — SIGNIFICANT CHANGE UP
EGFR: 87 ML/MIN/1.73M2 — SIGNIFICANT CHANGE UP
EGFR: 88 ML/MIN/1.73M2 — SIGNIFICANT CHANGE UP
EGFR: 90 ML/MIN/1.73M2 — SIGNIFICANT CHANGE UP
EGFR: 91 ML/MIN/1.73M2 — SIGNIFICANT CHANGE UP
EGFR: 93 ML/MIN/1.73M2 — SIGNIFICANT CHANGE UP
EGFR: 95 ML/MIN/1.73M2 — SIGNIFICANT CHANGE UP
EGFR: 95 ML/MIN/1.73M2 — SIGNIFICANT CHANGE UP
EGFR: 96 ML/MIN/1.73M2 — SIGNIFICANT CHANGE UP
EGFR: 97 ML/MIN/1.73M2 — SIGNIFICANT CHANGE UP
EGFR: 98 ML/MIN/1.73M2 — SIGNIFICANT CHANGE UP
EGFR: 99 ML/MIN/1.73M2 — SIGNIFICANT CHANGE UP
EGFR: 99 ML/MIN/1.73M2 — SIGNIFICANT CHANGE UP
EOSINOPHIL # BLD AUTO: 0 K/UL — SIGNIFICANT CHANGE UP (ref 0–0.5)
EOSINOPHIL # BLD AUTO: 0.01 K/UL
EOSINOPHIL # BLD AUTO: 0.01 K/UL — SIGNIFICANT CHANGE UP (ref 0–0.5)
EOSINOPHIL # BLD AUTO: 0.03 K/UL — SIGNIFICANT CHANGE UP (ref 0–0.5)
EOSINOPHIL # BLD AUTO: 0.04 K/UL — SIGNIFICANT CHANGE UP (ref 0–0.5)
EOSINOPHIL # BLD AUTO: 0.05 K/UL — SIGNIFICANT CHANGE UP (ref 0–0.5)
EOSINOPHIL # BLD AUTO: 0.15 K/UL — SIGNIFICANT CHANGE UP (ref 0–0.5)
EOSINOPHIL NFR BLD AUTO: 0 % — SIGNIFICANT CHANGE UP (ref 0–6)
EOSINOPHIL NFR BLD AUTO: 0.1 %
EOSINOPHIL NFR BLD AUTO: 0.1 % — SIGNIFICANT CHANGE UP (ref 0–6)
EOSINOPHIL NFR BLD AUTO: 0.4 % — SIGNIFICANT CHANGE UP (ref 0–6)
EOSINOPHIL NFR BLD AUTO: 0.5 % — SIGNIFICANT CHANGE UP (ref 0–6)
EOSINOPHIL NFR BLD AUTO: 0.5 % — SIGNIFICANT CHANGE UP (ref 0–6)
EOSINOPHIL NFR BLD AUTO: 0.7 % — SIGNIFICANT CHANGE UP (ref 0–6)
EOSINOPHIL NFR BLD AUTO: 1 % — SIGNIFICANT CHANGE UP (ref 0–6)
EOSINOPHIL NFR BLD AUTO: 3 % — SIGNIFICANT CHANGE UP (ref 0–6)
EPI CELLS # UR: 0 /HPF — SIGNIFICANT CHANGE UP
EPI CELLS # UR: 1 /HPF — SIGNIFICANT CHANGE UP
EPI CELLS # UR: 2 /HPF — SIGNIFICANT CHANGE UP
ESTIMATED AVERAGE GLUCOSE: 154 MG/DL — HIGH (ref 68–114)
ESTIMATED AVERAGE GLUCOSE: 200 MG/DL
ESTIMATED AVERAGE GLUCOSE: 220 MG/DL — HIGH (ref 68–114)
FLUAV AG NPH QL: SIGNIFICANT CHANGE UP
FLUBV AG NPH QL: SIGNIFICANT CHANGE UP
FOLATE SERPL-MCNC: 5.8 NG/ML
GAS PNL BLDA: SIGNIFICANT CHANGE UP
GAS PNL BLDA: SIGNIFICANT CHANGE UP
GAS PNL BLDV: 127 MMOL/L — LOW (ref 136–145)
GAS PNL BLDV: 128 MMOL/L — LOW (ref 136–145)
GAS PNL BLDV: 129 MMOL/L — LOW (ref 136–145)
GAS PNL BLDV: 133 MMOL/L — LOW (ref 136–145)
GAS PNL BLDV: 134 MMOL/L — LOW (ref 136–145)
GAS PNL BLDV: 135 MMOL/L — LOW (ref 136–145)
GAS PNL BLDV: SIGNIFICANT CHANGE UP
GLUCOSE BLDC GLUCOMTR-MCNC: 115 MG/DL — HIGH (ref 70–99)
GLUCOSE BLDC GLUCOMTR-MCNC: 120 MG/DL — HIGH (ref 70–99)
GLUCOSE BLDC GLUCOMTR-MCNC: 120 MG/DL — HIGH (ref 70–99)
GLUCOSE BLDC GLUCOMTR-MCNC: 122 MG/DL — HIGH (ref 70–99)
GLUCOSE BLDC GLUCOMTR-MCNC: 123 MG/DL — HIGH (ref 70–99)
GLUCOSE BLDC GLUCOMTR-MCNC: 127 MG/DL — HIGH (ref 70–99)
GLUCOSE BLDC GLUCOMTR-MCNC: 127 MG/DL — HIGH (ref 70–99)
GLUCOSE BLDC GLUCOMTR-MCNC: 128 MG/DL — HIGH (ref 70–99)
GLUCOSE BLDC GLUCOMTR-MCNC: 133 MG/DL — HIGH (ref 70–99)
GLUCOSE BLDC GLUCOMTR-MCNC: 137 MG/DL — HIGH (ref 70–99)
GLUCOSE BLDC GLUCOMTR-MCNC: 138 MG/DL — HIGH (ref 70–99)
GLUCOSE BLDC GLUCOMTR-MCNC: 138 MG/DL — HIGH (ref 70–99)
GLUCOSE BLDC GLUCOMTR-MCNC: 141 MG/DL — HIGH (ref 70–99)
GLUCOSE BLDC GLUCOMTR-MCNC: 143 MG/DL — HIGH (ref 70–99)
GLUCOSE BLDC GLUCOMTR-MCNC: 144 MG/DL — HIGH (ref 70–99)
GLUCOSE BLDC GLUCOMTR-MCNC: 144 MG/DL — HIGH (ref 70–99)
GLUCOSE BLDC GLUCOMTR-MCNC: 145 MG/DL — HIGH (ref 70–99)
GLUCOSE BLDC GLUCOMTR-MCNC: 147 MG/DL — HIGH (ref 70–99)
GLUCOSE BLDC GLUCOMTR-MCNC: 147 MG/DL — HIGH (ref 70–99)
GLUCOSE BLDC GLUCOMTR-MCNC: 148 MG/DL — HIGH (ref 70–99)
GLUCOSE BLDC GLUCOMTR-MCNC: 149 MG/DL — HIGH (ref 70–99)
GLUCOSE BLDC GLUCOMTR-MCNC: 152 MG/DL — HIGH (ref 70–99)
GLUCOSE BLDC GLUCOMTR-MCNC: 153 MG/DL — HIGH (ref 70–99)
GLUCOSE BLDC GLUCOMTR-MCNC: 154 MG/DL — HIGH (ref 70–99)
GLUCOSE BLDC GLUCOMTR-MCNC: 156 MG/DL — HIGH (ref 70–99)
GLUCOSE BLDC GLUCOMTR-MCNC: 156 MG/DL — HIGH (ref 70–99)
GLUCOSE BLDC GLUCOMTR-MCNC: 157 MG/DL — HIGH (ref 70–99)
GLUCOSE BLDC GLUCOMTR-MCNC: 158 MG/DL — HIGH (ref 70–99)
GLUCOSE BLDC GLUCOMTR-MCNC: 159 MG/DL — HIGH (ref 70–99)
GLUCOSE BLDC GLUCOMTR-MCNC: 160 MG/DL — HIGH (ref 70–99)
GLUCOSE BLDC GLUCOMTR-MCNC: 164 MG/DL — HIGH (ref 70–99)
GLUCOSE BLDC GLUCOMTR-MCNC: 164 MG/DL — HIGH (ref 70–99)
GLUCOSE BLDC GLUCOMTR-MCNC: 165 MG/DL — HIGH (ref 70–99)
GLUCOSE BLDC GLUCOMTR-MCNC: 166 MG/DL — HIGH (ref 70–99)
GLUCOSE BLDC GLUCOMTR-MCNC: 167 MG/DL — HIGH (ref 70–99)
GLUCOSE BLDC GLUCOMTR-MCNC: 167 MG/DL — HIGH (ref 70–99)
GLUCOSE BLDC GLUCOMTR-MCNC: 168 MG/DL — HIGH (ref 70–99)
GLUCOSE BLDC GLUCOMTR-MCNC: 169 MG/DL — HIGH (ref 70–99)
GLUCOSE BLDC GLUCOMTR-MCNC: 169 MG/DL — HIGH (ref 70–99)
GLUCOSE BLDC GLUCOMTR-MCNC: 171 MG/DL — HIGH (ref 70–99)
GLUCOSE BLDC GLUCOMTR-MCNC: 171 MG/DL — HIGH (ref 70–99)
GLUCOSE BLDC GLUCOMTR-MCNC: 172 MG/DL — HIGH (ref 70–99)
GLUCOSE BLDC GLUCOMTR-MCNC: 175 MG/DL — HIGH (ref 70–99)
GLUCOSE BLDC GLUCOMTR-MCNC: 176 MG/DL — HIGH (ref 70–99)
GLUCOSE BLDC GLUCOMTR-MCNC: 177 MG/DL — HIGH (ref 70–99)
GLUCOSE BLDC GLUCOMTR-MCNC: 178 MG/DL — HIGH (ref 70–99)
GLUCOSE BLDC GLUCOMTR-MCNC: 178 MG/DL — HIGH (ref 70–99)
GLUCOSE BLDC GLUCOMTR-MCNC: 179 MG/DL — HIGH (ref 70–99)
GLUCOSE BLDC GLUCOMTR-MCNC: 179 MG/DL — HIGH (ref 70–99)
GLUCOSE BLDC GLUCOMTR-MCNC: 181 MG/DL — HIGH (ref 70–99)
GLUCOSE BLDC GLUCOMTR-MCNC: 183 MG/DL — HIGH (ref 70–99)
GLUCOSE BLDC GLUCOMTR-MCNC: 184 MG/DL — HIGH (ref 70–99)
GLUCOSE BLDC GLUCOMTR-MCNC: 184 MG/DL — HIGH (ref 70–99)
GLUCOSE BLDC GLUCOMTR-MCNC: 186 MG/DL — HIGH (ref 70–99)
GLUCOSE BLDC GLUCOMTR-MCNC: 187 MG/DL — HIGH (ref 70–99)
GLUCOSE BLDC GLUCOMTR-MCNC: 189 MG/DL — HIGH (ref 70–99)
GLUCOSE BLDC GLUCOMTR-MCNC: 190 MG/DL — HIGH (ref 70–99)
GLUCOSE BLDC GLUCOMTR-MCNC: 191 MG/DL — HIGH (ref 70–99)
GLUCOSE BLDC GLUCOMTR-MCNC: 194 MG/DL — HIGH (ref 70–99)
GLUCOSE BLDC GLUCOMTR-MCNC: 194 MG/DL — HIGH (ref 70–99)
GLUCOSE BLDC GLUCOMTR-MCNC: 195 MG/DL — HIGH (ref 70–99)
GLUCOSE BLDC GLUCOMTR-MCNC: 195 MG/DL — HIGH (ref 70–99)
GLUCOSE BLDC GLUCOMTR-MCNC: 197 MG/DL — HIGH (ref 70–99)
GLUCOSE BLDC GLUCOMTR-MCNC: 200 MG/DL — HIGH (ref 70–99)
GLUCOSE BLDC GLUCOMTR-MCNC: 201 MG/DL — HIGH (ref 70–99)
GLUCOSE BLDC GLUCOMTR-MCNC: 201 MG/DL — HIGH (ref 70–99)
GLUCOSE BLDC GLUCOMTR-MCNC: 203 MG/DL — HIGH (ref 70–99)
GLUCOSE BLDC GLUCOMTR-MCNC: 204 MG/DL — HIGH (ref 70–99)
GLUCOSE BLDC GLUCOMTR-MCNC: 206 MG/DL — HIGH (ref 70–99)
GLUCOSE BLDC GLUCOMTR-MCNC: 208 MG/DL — HIGH (ref 70–99)
GLUCOSE BLDC GLUCOMTR-MCNC: 210 MG/DL — HIGH (ref 70–99)
GLUCOSE BLDC GLUCOMTR-MCNC: 214 MG/DL — HIGH (ref 70–99)
GLUCOSE BLDC GLUCOMTR-MCNC: 214 MG/DL — HIGH (ref 70–99)
GLUCOSE BLDC GLUCOMTR-MCNC: 215 MG/DL — HIGH (ref 70–99)
GLUCOSE BLDC GLUCOMTR-MCNC: 216 MG/DL — HIGH (ref 70–99)
GLUCOSE BLDC GLUCOMTR-MCNC: 217 MG/DL — HIGH (ref 70–99)
GLUCOSE BLDC GLUCOMTR-MCNC: 217 MG/DL — HIGH (ref 70–99)
GLUCOSE BLDC GLUCOMTR-MCNC: 218 MG/DL — HIGH (ref 70–99)
GLUCOSE BLDC GLUCOMTR-MCNC: 219 MG/DL — HIGH (ref 70–99)
GLUCOSE BLDC GLUCOMTR-MCNC: 220 MG/DL — HIGH (ref 70–99)
GLUCOSE BLDC GLUCOMTR-MCNC: 221 MG/DL — HIGH (ref 70–99)
GLUCOSE BLDC GLUCOMTR-MCNC: 223 MG/DL — HIGH (ref 70–99)
GLUCOSE BLDC GLUCOMTR-MCNC: 224 MG/DL — HIGH (ref 70–99)
GLUCOSE BLDC GLUCOMTR-MCNC: 224 MG/DL — HIGH (ref 70–99)
GLUCOSE BLDC GLUCOMTR-MCNC: 226 MG/DL — HIGH (ref 70–99)
GLUCOSE BLDC GLUCOMTR-MCNC: 227 MG/DL — HIGH (ref 70–99)
GLUCOSE BLDC GLUCOMTR-MCNC: 230 MG/DL — HIGH (ref 70–99)
GLUCOSE BLDC GLUCOMTR-MCNC: 231 MG/DL — HIGH (ref 70–99)
GLUCOSE BLDC GLUCOMTR-MCNC: 232 MG/DL — HIGH (ref 70–99)
GLUCOSE BLDC GLUCOMTR-MCNC: 233 MG/DL — HIGH (ref 70–99)
GLUCOSE BLDC GLUCOMTR-MCNC: 233 MG/DL — HIGH (ref 70–99)
GLUCOSE BLDC GLUCOMTR-MCNC: 234 MG/DL — HIGH (ref 70–99)
GLUCOSE BLDC GLUCOMTR-MCNC: 235 MG/DL — HIGH (ref 70–99)
GLUCOSE BLDC GLUCOMTR-MCNC: 235 MG/DL — HIGH (ref 70–99)
GLUCOSE BLDC GLUCOMTR-MCNC: 236 MG/DL — HIGH (ref 70–99)
GLUCOSE BLDC GLUCOMTR-MCNC: 236 MG/DL — HIGH (ref 70–99)
GLUCOSE BLDC GLUCOMTR-MCNC: 238 MG/DL — HIGH (ref 70–99)
GLUCOSE BLDC GLUCOMTR-MCNC: 239 MG/DL — HIGH (ref 70–99)
GLUCOSE BLDC GLUCOMTR-MCNC: 239 MG/DL — HIGH (ref 70–99)
GLUCOSE BLDC GLUCOMTR-MCNC: 240 MG/DL — HIGH (ref 70–99)
GLUCOSE BLDC GLUCOMTR-MCNC: 242 MG/DL — HIGH (ref 70–99)
GLUCOSE BLDC GLUCOMTR-MCNC: 243 MG/DL — HIGH (ref 70–99)
GLUCOSE BLDC GLUCOMTR-MCNC: 243 MG/DL — HIGH (ref 70–99)
GLUCOSE BLDC GLUCOMTR-MCNC: 245 MG/DL — HIGH (ref 70–99)
GLUCOSE BLDC GLUCOMTR-MCNC: 245 MG/DL — HIGH (ref 70–99)
GLUCOSE BLDC GLUCOMTR-MCNC: 249 MG/DL — HIGH (ref 70–99)
GLUCOSE BLDC GLUCOMTR-MCNC: 250 MG/DL — HIGH (ref 70–99)
GLUCOSE BLDC GLUCOMTR-MCNC: 251 MG/DL — HIGH (ref 70–99)
GLUCOSE BLDC GLUCOMTR-MCNC: 253 MG/DL — HIGH (ref 70–99)
GLUCOSE BLDC GLUCOMTR-MCNC: 255 MG/DL — HIGH (ref 70–99)
GLUCOSE BLDC GLUCOMTR-MCNC: 255 MG/DL — HIGH (ref 70–99)
GLUCOSE BLDC GLUCOMTR-MCNC: 257 MG/DL — HIGH (ref 70–99)
GLUCOSE BLDC GLUCOMTR-MCNC: 259 MG/DL — HIGH (ref 70–99)
GLUCOSE BLDC GLUCOMTR-MCNC: 259 MG/DL — HIGH (ref 70–99)
GLUCOSE BLDC GLUCOMTR-MCNC: 261 MG/DL — HIGH (ref 70–99)
GLUCOSE BLDC GLUCOMTR-MCNC: 264 MG/DL — HIGH (ref 70–99)
GLUCOSE BLDC GLUCOMTR-MCNC: 265 MG/DL — HIGH (ref 70–99)
GLUCOSE BLDC GLUCOMTR-MCNC: 266 MG/DL — HIGH (ref 70–99)
GLUCOSE BLDC GLUCOMTR-MCNC: 270 MG/DL — HIGH (ref 70–99)
GLUCOSE BLDC GLUCOMTR-MCNC: 270 MG/DL — HIGH (ref 70–99)
GLUCOSE BLDC GLUCOMTR-MCNC: 271 MG/DL — HIGH (ref 70–99)
GLUCOSE BLDC GLUCOMTR-MCNC: 272 MG/DL — HIGH (ref 70–99)
GLUCOSE BLDC GLUCOMTR-MCNC: 278 MG/DL — HIGH (ref 70–99)
GLUCOSE BLDC GLUCOMTR-MCNC: 278 MG/DL — HIGH (ref 70–99)
GLUCOSE BLDC GLUCOMTR-MCNC: 283 MG/DL — HIGH (ref 70–99)
GLUCOSE BLDC GLUCOMTR-MCNC: 283 MG/DL — HIGH (ref 70–99)
GLUCOSE BLDC GLUCOMTR-MCNC: 284 MG/DL — HIGH (ref 70–99)
GLUCOSE BLDC GLUCOMTR-MCNC: 286 MG/DL — HIGH (ref 70–99)
GLUCOSE BLDC GLUCOMTR-MCNC: 286 MG/DL — HIGH (ref 70–99)
GLUCOSE BLDC GLUCOMTR-MCNC: 288 MG/DL — HIGH (ref 70–99)
GLUCOSE BLDC GLUCOMTR-MCNC: 289 MG/DL — HIGH (ref 70–99)
GLUCOSE BLDC GLUCOMTR-MCNC: 291 MG/DL — HIGH (ref 70–99)
GLUCOSE BLDC GLUCOMTR-MCNC: 323 MG/DL — HIGH (ref 70–99)
GLUCOSE BLDC GLUCOMTR-MCNC: 341 MG/DL — HIGH (ref 70–99)
GLUCOSE BLDV-MCNC: 149 MG/DL — HIGH (ref 70–99)
GLUCOSE BLDV-MCNC: 172 MG/DL — HIGH (ref 70–99)
GLUCOSE BLDV-MCNC: 175 MG/DL — HIGH (ref 70–99)
GLUCOSE BLDV-MCNC: 213 MG/DL — HIGH (ref 70–99)
GLUCOSE BLDV-MCNC: 226 MG/DL — HIGH (ref 70–99)
GLUCOSE BLDV-MCNC: 257 MG/DL — HIGH (ref 70–99)
GLUCOSE BLDV-MCNC: 270 MG/DL — HIGH (ref 70–99)
GLUCOSE BLDV-MCNC: 74 MG/DL — SIGNIFICANT CHANGE UP (ref 70–99)
GLUCOSE QUALITATIVE U: ABNORMAL
GLUCOSE SERPL-MCNC: 100 MG/DL — HIGH (ref 70–99)
GLUCOSE SERPL-MCNC: 103 MG/DL — HIGH (ref 70–99)
GLUCOSE SERPL-MCNC: 105 MG/DL — HIGH (ref 70–99)
GLUCOSE SERPL-MCNC: 119 MG/DL — HIGH (ref 70–99)
GLUCOSE SERPL-MCNC: 126 MG/DL — HIGH (ref 70–99)
GLUCOSE SERPL-MCNC: 130 MG/DL — HIGH (ref 70–99)
GLUCOSE SERPL-MCNC: 132 MG/DL — HIGH (ref 70–99)
GLUCOSE SERPL-MCNC: 135 MG/DL — HIGH (ref 70–99)
GLUCOSE SERPL-MCNC: 141 MG/DL — HIGH (ref 70–99)
GLUCOSE SERPL-MCNC: 146 MG/DL — HIGH (ref 70–99)
GLUCOSE SERPL-MCNC: 157 MG/DL — HIGH (ref 70–99)
GLUCOSE SERPL-MCNC: 160 MG/DL — HIGH (ref 70–99)
GLUCOSE SERPL-MCNC: 166 MG/DL — HIGH (ref 70–99)
GLUCOSE SERPL-MCNC: 175 MG/DL — HIGH (ref 70–99)
GLUCOSE SERPL-MCNC: 177 MG/DL — HIGH (ref 70–99)
GLUCOSE SERPL-MCNC: 178 MG/DL — HIGH (ref 70–99)
GLUCOSE SERPL-MCNC: 183 MG/DL — HIGH (ref 70–99)
GLUCOSE SERPL-MCNC: 187 MG/DL — HIGH (ref 70–99)
GLUCOSE SERPL-MCNC: 194 MG/DL — HIGH (ref 70–99)
GLUCOSE SERPL-MCNC: 194 MG/DL — HIGH (ref 70–99)
GLUCOSE SERPL-MCNC: 197 MG/DL — HIGH (ref 70–99)
GLUCOSE SERPL-MCNC: 201 MG/DL — HIGH (ref 70–99)
GLUCOSE SERPL-MCNC: 201 MG/DL — HIGH (ref 70–99)
GLUCOSE SERPL-MCNC: 202 MG/DL — HIGH (ref 70–99)
GLUCOSE SERPL-MCNC: 205 MG/DL — HIGH (ref 70–99)
GLUCOSE SERPL-MCNC: 212 MG/DL — HIGH (ref 70–99)
GLUCOSE SERPL-MCNC: 221 MG/DL — HIGH (ref 70–99)
GLUCOSE SERPL-MCNC: 222 MG/DL — HIGH (ref 70–99)
GLUCOSE SERPL-MCNC: 232 MG/DL — HIGH (ref 70–99)
GLUCOSE SERPL-MCNC: 242 MG/DL — HIGH (ref 70–99)
GLUCOSE SERPL-MCNC: 245 MG/DL — HIGH (ref 70–99)
GLUCOSE SERPL-MCNC: 246 MG/DL — HIGH (ref 70–99)
GLUCOSE SERPL-MCNC: 249 MG/DL — HIGH (ref 70–99)
GLUCOSE SERPL-MCNC: 267 MG/DL
GLUCOSE SERPL-MCNC: 271 MG/DL — HIGH (ref 70–99)
GLUCOSE SERPL-MCNC: 275 MG/DL — HIGH (ref 70–99)
GLUCOSE SERPL-MCNC: 289 MG/DL — HIGH (ref 70–99)
GLUCOSE SERPL-MCNC: 291 MG/DL — HIGH (ref 70–99)
GLUCOSE SERPL-MCNC: 294 MG/DL — HIGH (ref 70–99)
GLUCOSE SERPL-MCNC: 305 MG/DL — HIGH (ref 70–99)
GLUCOSE SERPL-MCNC: 341 MG/DL
GLUCOSE SERPL-MCNC: 77 MG/DL — SIGNIFICANT CHANGE UP (ref 70–99)
GLUCOSE SERPL-MCNC: 96 MG/DL — SIGNIFICANT CHANGE UP (ref 70–99)
GLUCOSE UR QL: ABNORMAL
GLUCOSE UR QL: NEGATIVE — SIGNIFICANT CHANGE UP
GLUCOSE UR QL: NEGATIVE — SIGNIFICANT CHANGE UP
GRAM STN FLD: SIGNIFICANT CHANGE UP
HBA1C MFR BLD HPLC: 8.6 %
HCO3 BLDV-SCNC: 20 MMOL/L — LOW (ref 22–29)
HCO3 BLDV-SCNC: 24 MMOL/L — SIGNIFICANT CHANGE UP (ref 22–29)
HCO3 BLDV-SCNC: 27 MMOL/L — SIGNIFICANT CHANGE UP (ref 22–29)
HCO3 BLDV-SCNC: 27 MMOL/L — SIGNIFICANT CHANGE UP (ref 22–29)
HCO3 BLDV-SCNC: 28 MMOL/L — SIGNIFICANT CHANGE UP (ref 22–29)
HCO3 BLDV-SCNC: 29 MMOL/L — SIGNIFICANT CHANGE UP (ref 22–29)
HCO3 BLDV-SCNC: 29 MMOL/L — SIGNIFICANT CHANGE UP (ref 22–29)
HCO3 BLDV-SCNC: 32 MMOL/L — HIGH (ref 22–29)
HCT VFR BLD CALC: 31.4 % — LOW (ref 39–50)
HCT VFR BLD CALC: 35.1 % — LOW (ref 39–50)
HCT VFR BLD CALC: 35.3 % — LOW (ref 39–50)
HCT VFR BLD CALC: 35.7 % — LOW (ref 39–50)
HCT VFR BLD CALC: 35.8 % — LOW (ref 39–50)
HCT VFR BLD CALC: 35.9 % — LOW (ref 39–50)
HCT VFR BLD CALC: 35.9 % — LOW (ref 39–50)
HCT VFR BLD CALC: 36.1 % — LOW (ref 39–50)
HCT VFR BLD CALC: 36.3 % — LOW (ref 39–50)
HCT VFR BLD CALC: 36.6 % — LOW (ref 39–50)
HCT VFR BLD CALC: 37 % — LOW (ref 39–50)
HCT VFR BLD CALC: 37 % — LOW (ref 39–50)
HCT VFR BLD CALC: 37.4 % — LOW (ref 39–50)
HCT VFR BLD CALC: 37.8 % — LOW (ref 39–50)
HCT VFR BLD CALC: 37.9 % — LOW (ref 39–50)
HCT VFR BLD CALC: 37.9 % — LOW (ref 39–50)
HCT VFR BLD CALC: 38.2 % — LOW (ref 39–50)
HCT VFR BLD CALC: 38.3 % — LOW (ref 39–50)
HCT VFR BLD CALC: 38.6 % — LOW (ref 39–50)
HCT VFR BLD CALC: 38.8 % — LOW (ref 39–50)
HCT VFR BLD CALC: 38.8 % — LOW (ref 39–50)
HCT VFR BLD CALC: 39 % — SIGNIFICANT CHANGE UP (ref 39–50)
HCT VFR BLD CALC: 39.3 % — SIGNIFICANT CHANGE UP (ref 39–50)
HCT VFR BLD CALC: 39.4 % — SIGNIFICANT CHANGE UP (ref 39–50)
HCT VFR BLD CALC: 39.5 % — SIGNIFICANT CHANGE UP (ref 39–50)
HCT VFR BLD CALC: 39.6 % — SIGNIFICANT CHANGE UP (ref 39–50)
HCT VFR BLD CALC: 40.2 % — SIGNIFICANT CHANGE UP (ref 39–50)
HCT VFR BLD CALC: 40.9 % — SIGNIFICANT CHANGE UP (ref 39–50)
HCT VFR BLD CALC: 41.3 % — SIGNIFICANT CHANGE UP (ref 39–50)
HCT VFR BLD CALC: 41.9 % — SIGNIFICANT CHANGE UP (ref 39–50)
HCT VFR BLD CALC: 42 % — SIGNIFICANT CHANGE UP (ref 39–50)
HCT VFR BLD CALC: 43 % — SIGNIFICANT CHANGE UP (ref 39–50)
HCT VFR BLD CALC: 45.1 % — SIGNIFICANT CHANGE UP (ref 39–50)
HCT VFR BLD CALC: 45.5 % — SIGNIFICANT CHANGE UP (ref 39–50)
HCT VFR BLD CALC: 45.5 % — SIGNIFICANT CHANGE UP (ref 39–50)
HCT VFR BLD CALC: 46.8 % — SIGNIFICANT CHANGE UP (ref 39–50)
HCT VFR BLD CALC: 47.8 % — SIGNIFICANT CHANGE UP (ref 39–50)
HCT VFR BLD CALC: 48.4 % — SIGNIFICANT CHANGE UP (ref 39–50)
HCT VFR BLD CALC: 48.7 % — SIGNIFICANT CHANGE UP (ref 39–50)
HCT VFR BLD CALC: 48.7 % — SIGNIFICANT CHANGE UP (ref 39–50)
HCT VFR BLD CALC: 49.6 % — SIGNIFICANT CHANGE UP (ref 39–50)
HCT VFR BLD CALC: 50.2 % — HIGH (ref 39–50)
HCT VFR BLD CALC: 51.8 % — HIGH (ref 39–50)
HCT VFR BLD CALC: 54.6 %
HCT VFR BLDA CALC: 27 % — LOW (ref 39–51)
HCT VFR BLDA CALC: 33 % — LOW (ref 39–51)
HCT VFR BLDA CALC: 36 % — LOW (ref 39–51)
HCT VFR BLDA CALC: 38 % — LOW (ref 39–51)
HCT VFR BLDA CALC: 38 % — LOW (ref 39–51)
HCT VFR BLDA CALC: 40 % — SIGNIFICANT CHANGE UP (ref 39–51)
HCT VFR BLDA CALC: 42 % — SIGNIFICANT CHANGE UP (ref 39–51)
HCT VFR BLDA CALC: 48 % — SIGNIFICANT CHANGE UP (ref 39–51)
HCV RNA SERPL NAA+PROBE-LOG IU: NOT DETECTED LOGIU/ML
HDLC SERPL-MCNC: 57 MG/DL — SIGNIFICANT CHANGE UP
HDLC SERPL-MCNC: 67 MG/DL
HEPARIN-PF4 AB RESULT: 1 U/ML — HIGH (ref 0–0.9)
HEPARIN-PF4 AB RESULT: <0.6 U/ML — SIGNIFICANT CHANGE UP (ref 0–0.9)
HEPC RNA INTERP: NOT DETECTED
HGB BLD CALC-MCNC: 10.9 G/DL — LOW (ref 12.6–17.4)
HGB BLD CALC-MCNC: 12 G/DL — LOW (ref 12.6–17.4)
HGB BLD CALC-MCNC: 12.6 G/DL — SIGNIFICANT CHANGE UP (ref 12.6–17.4)
HGB BLD CALC-MCNC: 12.6 G/DL — SIGNIFICANT CHANGE UP (ref 12.6–17.4)
HGB BLD CALC-MCNC: 13.3 G/DL — SIGNIFICANT CHANGE UP (ref 12.6–17.4)
HGB BLD CALC-MCNC: 13.9 G/DL — SIGNIFICANT CHANGE UP (ref 12.6–17.4)
HGB BLD CALC-MCNC: 16.1 G/DL — SIGNIFICANT CHANGE UP (ref 12.6–17.4)
HGB BLD CALC-MCNC: 9.1 G/DL — LOW (ref 12.6–17.4)
HGB BLD-MCNC: 11.5 G/DL — LOW (ref 13–17)
HGB BLD-MCNC: 11.6 G/DL — LOW (ref 13–17)
HGB BLD-MCNC: 11.7 G/DL — LOW (ref 13–17)
HGB BLD-MCNC: 11.7 G/DL — LOW (ref 13–17)
HGB BLD-MCNC: 11.8 G/DL — LOW (ref 13–17)
HGB BLD-MCNC: 11.9 G/DL — LOW (ref 13–17)
HGB BLD-MCNC: 12.3 G/DL — LOW (ref 13–17)
HGB BLD-MCNC: 12.4 G/DL — LOW (ref 13–17)
HGB BLD-MCNC: 12.4 G/DL — LOW (ref 13–17)
HGB BLD-MCNC: 12.5 G/DL — LOW (ref 13–17)
HGB BLD-MCNC: 12.5 G/DL — LOW (ref 13–17)
HGB BLD-MCNC: 12.6 G/DL — LOW (ref 13–17)
HGB BLD-MCNC: 12.6 G/DL — LOW (ref 13–17)
HGB BLD-MCNC: 12.7 G/DL — LOW (ref 13–17)
HGB BLD-MCNC: 12.7 G/DL — LOW (ref 13–17)
HGB BLD-MCNC: 12.8 G/DL — LOW (ref 13–17)
HGB BLD-MCNC: 12.9 G/DL — LOW (ref 13–17)
HGB BLD-MCNC: 13 G/DL — SIGNIFICANT CHANGE UP (ref 13–17)
HGB BLD-MCNC: 13 G/DL — SIGNIFICANT CHANGE UP (ref 13–17)
HGB BLD-MCNC: 13.1 G/DL — SIGNIFICANT CHANGE UP (ref 13–17)
HGB BLD-MCNC: 13.4 G/DL — SIGNIFICANT CHANGE UP (ref 13–17)
HGB BLD-MCNC: 13.6 G/DL — SIGNIFICANT CHANGE UP (ref 13–17)
HGB BLD-MCNC: 13.7 G/DL — SIGNIFICANT CHANGE UP (ref 13–17)
HGB BLD-MCNC: 13.7 G/DL — SIGNIFICANT CHANGE UP (ref 13–17)
HGB BLD-MCNC: 13.8 G/DL — SIGNIFICANT CHANGE UP (ref 13–17)
HGB BLD-MCNC: 14 G/DL — SIGNIFICANT CHANGE UP (ref 13–17)
HGB BLD-MCNC: 14.2 G/DL — SIGNIFICANT CHANGE UP (ref 13–17)
HGB BLD-MCNC: 14.7 G/DL — SIGNIFICANT CHANGE UP (ref 13–17)
HGB BLD-MCNC: 14.8 G/DL — SIGNIFICANT CHANGE UP (ref 13–17)
HGB BLD-MCNC: 15 G/DL — SIGNIFICANT CHANGE UP (ref 13–17)
HGB BLD-MCNC: 15.2 G/DL — SIGNIFICANT CHANGE UP (ref 13–17)
HGB BLD-MCNC: 15.6 G/DL — SIGNIFICANT CHANGE UP (ref 13–17)
HGB BLD-MCNC: 15.6 G/DL — SIGNIFICANT CHANGE UP (ref 13–17)
HGB BLD-MCNC: 15.9 G/DL — SIGNIFICANT CHANGE UP (ref 13–17)
HGB BLD-MCNC: 16.1 G/DL — SIGNIFICANT CHANGE UP (ref 13–17)
HGB BLD-MCNC: 16.5 G/DL — SIGNIFICANT CHANGE UP (ref 13–17)
HGB BLD-MCNC: 16.7 G/DL — SIGNIFICANT CHANGE UP (ref 13–17)
HGB BLD-MCNC: 16.8 G/DL
HGB BLD-MCNC: 17 G/DL — SIGNIFICANT CHANGE UP (ref 13–17)
HGB BLD-MCNC: 9.8 G/DL — LOW (ref 13–17)
HYALINE CASTS # UR AUTO: 1 /LPF — SIGNIFICANT CHANGE UP (ref 0–2)
HYALINE CASTS # UR AUTO: 3 /LPF — HIGH (ref 0–2)
HYALINE CASTS # UR AUTO: ABNORMAL /LPF
HYALINE CASTS: 1 /LPF
IANC: 2.92 K/UL — SIGNIFICANT CHANGE UP (ref 1.8–7.4)
IANC: 4.45 K/UL — SIGNIFICANT CHANGE UP (ref 1.8–7.4)
IANC: 4.97 K/UL — SIGNIFICANT CHANGE UP (ref 1.8–7.4)
IANC: 5.28 K/UL — SIGNIFICANT CHANGE UP (ref 1.8–7.4)
IANC: 5.44 K/UL — SIGNIFICANT CHANGE UP (ref 1.8–7.4)
IANC: 6.04 K/UL — SIGNIFICANT CHANGE UP (ref 1.8–7.4)
IANC: 7.64 K/UL — HIGH (ref 1.8–7.4)
IANC: 9.2 K/UL — HIGH (ref 1.8–7.4)
IMM GRANULOCYTES NFR BLD AUTO: 0.4 %
IMM GRANULOCYTES NFR BLD AUTO: 1 % — HIGH (ref 0–0.9)
IMM GRANULOCYTES NFR BLD AUTO: 1.2 % — HIGH (ref 0–0.9)
IMM GRANULOCYTES NFR BLD AUTO: 1.2 % — HIGH (ref 0–0.9)
IMM GRANULOCYTES NFR BLD AUTO: 1.3 % — HIGH (ref 0–0.9)
IMM GRANULOCYTES NFR BLD AUTO: 1.3 % — HIGH (ref 0–0.9)
IMM GRANULOCYTES NFR BLD AUTO: 1.3 % — SIGNIFICANT CHANGE UP (ref 0–1.5)
IMM GRANULOCYTES NFR BLD AUTO: 1.7 % — HIGH (ref 0–0.9)
IMM GRANULOCYTES NFR BLD AUTO: 1.8 % — HIGH (ref 0–0.9)
IMM GRANULOCYTES NFR BLD AUTO: 2.2 % — HIGH (ref 0–0.9)
IMM GRANULOCYTES NFR BLD AUTO: 4.3 % — HIGH (ref 0–1.5)
INR BLD: 1.13 RATIO — SIGNIFICANT CHANGE UP (ref 0.88–1.16)
INR BLD: 1.15 RATIO — SIGNIFICANT CHANGE UP (ref 0.88–1.16)
INR BLD: 1.19 RATIO — HIGH (ref 0.88–1.16)
INR BLD: 1.6 RATIO — HIGH (ref 0.88–1.16)
KETONES UR-MCNC: NEGATIVE — SIGNIFICANT CHANGE UP
KETONES URINE: NORMAL
LACTATE BLDV-MCNC: 1.7 MMOL/L — SIGNIFICANT CHANGE UP (ref 0.5–2)
LACTATE BLDV-MCNC: 1.8 MMOL/L — SIGNIFICANT CHANGE UP (ref 0.7–2)
LACTATE BLDV-MCNC: 1.9 MMOL/L — SIGNIFICANT CHANGE UP (ref 0.5–2)
LACTATE BLDV-MCNC: 2.1 MMOL/L — HIGH (ref 0.5–2)
LACTATE BLDV-MCNC: 2.3 MMOL/L — HIGH (ref 0.5–2)
LACTATE BLDV-MCNC: 2.3 MMOL/L — HIGH (ref 0.5–2)
LACTATE BLDV-MCNC: 2.7 MMOL/L — HIGH (ref 0.5–2)
LACTATE BLDV-MCNC: 4.5 MMOL/L — CRITICAL HIGH (ref 0.5–2)
LACTATE SERPL-SCNC: 1.2 MMOL/L — SIGNIFICANT CHANGE UP (ref 0.5–2)
LACTATE SERPL-SCNC: 2.6 MMOL/L — HIGH (ref 0.5–2)
LDLC SERPL CALC-MCNC: 169 MG/DL
LEUKOCYTE ESTERASE UR-ACNC: NEGATIVE — SIGNIFICANT CHANGE UP
LEUKOCYTE ESTERASE URINE: NEGATIVE
LEVETIRACETAM SERPL-MCNC: 12.4 UG/ML — SIGNIFICANT CHANGE UP (ref 10–40)
LIPID PNL WITH DIRECT LDL SERPL: 145 MG/DL — HIGH
LYMPHOCYTES # BLD AUTO: 0.09 K/UL — LOW (ref 1–3.3)
LYMPHOCYTES # BLD AUTO: 0.34 K/UL — LOW (ref 1–3.3)
LYMPHOCYTES # BLD AUTO: 0.44 K/UL — LOW (ref 1–3.3)
LYMPHOCYTES # BLD AUTO: 0.46 K/UL — LOW (ref 1–3.3)
LYMPHOCYTES # BLD AUTO: 0.49 K/UL — LOW (ref 1–3.3)
LYMPHOCYTES # BLD AUTO: 0.49 K/UL — LOW (ref 1–3.3)
LYMPHOCYTES # BLD AUTO: 0.5 K/UL — LOW (ref 1–3.3)
LYMPHOCYTES # BLD AUTO: 0.6 K/UL — LOW (ref 1–3.3)
LYMPHOCYTES # BLD AUTO: 0.66 K/UL — LOW (ref 1–3.3)
LYMPHOCYTES # BLD AUTO: 0.82 K/UL — LOW (ref 1–3.3)
LYMPHOCYTES # BLD AUTO: 0.9 % — LOW (ref 13–44)
LYMPHOCYTES # BLD AUTO: 0.91 K/UL — LOW (ref 1–3.3)
LYMPHOCYTES # BLD AUTO: 1 K/UL — SIGNIFICANT CHANGE UP (ref 1–3.3)
LYMPHOCYTES # BLD AUTO: 1.02 K/UL — SIGNIFICANT CHANGE UP (ref 1–3.3)
LYMPHOCYTES # BLD AUTO: 1.14 K/UL
LYMPHOCYTES # BLD AUTO: 1.19 K/UL — SIGNIFICANT CHANGE UP (ref 1–3.3)
LYMPHOCYTES # BLD AUTO: 1.34 K/UL — SIGNIFICANT CHANGE UP (ref 1–3.3)
LYMPHOCYTES # BLD AUTO: 10.3 % — LOW (ref 13–44)
LYMPHOCYTES # BLD AUTO: 10.6 % — LOW (ref 13–44)
LYMPHOCYTES # BLD AUTO: 10.6 % — LOW (ref 13–44)
LYMPHOCYTES # BLD AUTO: 10.9 % — LOW (ref 13–44)
LYMPHOCYTES # BLD AUTO: 2.5 % — LOW (ref 13–44)
LYMPHOCYTES # BLD AUTO: 27 % — SIGNIFICANT CHANGE UP (ref 13–44)
LYMPHOCYTES # BLD AUTO: 5.6 % — LOW (ref 13–44)
LYMPHOCYTES # BLD AUTO: 5.9 % — LOW (ref 13–44)
LYMPHOCYTES # BLD AUTO: 6.6 % — LOW (ref 13–44)
LYMPHOCYTES # BLD AUTO: 7.6 % — LOW (ref 13–44)
LYMPHOCYTES # BLD AUTO: 7.6 % — LOW (ref 13–44)
LYMPHOCYTES # BLD AUTO: 8.5 % — LOW (ref 13–44)
LYMPHOCYTES # BLD AUTO: 8.7 % — LOW (ref 13–44)
LYMPHOCYTES # BLD AUTO: 9.6 % — LOW (ref 13–44)
LYMPHOCYTES NFR BLD AUTO: 8.3 %
MACROCYTES BLD QL: SLIGHT — SIGNIFICANT CHANGE UP
MAGNESIUM SERPL-MCNC: 1.4 MG/DL — LOW (ref 1.6–2.6)
MAGNESIUM SERPL-MCNC: 1.6 MG/DL — SIGNIFICANT CHANGE UP (ref 1.6–2.6)
MAGNESIUM SERPL-MCNC: 1.7 MG/DL — SIGNIFICANT CHANGE UP (ref 1.6–2.6)
MAGNESIUM SERPL-MCNC: 1.8 MG/DL — SIGNIFICANT CHANGE UP (ref 1.6–2.6)
MAGNESIUM SERPL-MCNC: 1.9 MG/DL — SIGNIFICANT CHANGE UP (ref 1.6–2.6)
MAGNESIUM SERPL-MCNC: 2 MG/DL — SIGNIFICANT CHANGE UP (ref 1.6–2.6)
MAGNESIUM SERPL-MCNC: 2.1 MG/DL — SIGNIFICANT CHANGE UP (ref 1.6–2.6)
MAGNESIUM SERPL-MCNC: 2.3 MG/DL — SIGNIFICANT CHANGE UP (ref 1.6–2.6)
MAN DIFF?: NORMAL
MANUAL SMEAR VERIFICATION: SIGNIFICANT CHANGE UP
MANUAL SMEAR VERIFICATION: SIGNIFICANT CHANGE UP
MCHC RBC-ENTMCNC: 28.2 PG — SIGNIFICANT CHANGE UP (ref 27–34)
MCHC RBC-ENTMCNC: 29.9 PG — SIGNIFICANT CHANGE UP (ref 27–34)
MCHC RBC-ENTMCNC: 30.4 PG — SIGNIFICANT CHANGE UP (ref 27–34)
MCHC RBC-ENTMCNC: 30.4 PG — SIGNIFICANT CHANGE UP (ref 27–34)
MCHC RBC-ENTMCNC: 30.5 PG — SIGNIFICANT CHANGE UP (ref 27–34)
MCHC RBC-ENTMCNC: 30.8 GM/DL
MCHC RBC-ENTMCNC: 30.8 GM/DL — LOW (ref 32–36)
MCHC RBC-ENTMCNC: 30.8 PG — SIGNIFICANT CHANGE UP (ref 27–34)
MCHC RBC-ENTMCNC: 30.9 PG — SIGNIFICANT CHANGE UP (ref 27–34)
MCHC RBC-ENTMCNC: 31 PG — SIGNIFICANT CHANGE UP (ref 27–34)
MCHC RBC-ENTMCNC: 31.2 GM/DL — LOW (ref 32–36)
MCHC RBC-ENTMCNC: 31.4 PG
MCHC RBC-ENTMCNC: 31.4 PG — SIGNIFICANT CHANGE UP (ref 27–34)
MCHC RBC-ENTMCNC: 31.4 PG — SIGNIFICANT CHANGE UP (ref 27–34)
MCHC RBC-ENTMCNC: 31.6 PG — SIGNIFICANT CHANGE UP (ref 27–34)
MCHC RBC-ENTMCNC: 31.7 PG — SIGNIFICANT CHANGE UP (ref 27–34)
MCHC RBC-ENTMCNC: 31.7 PG — SIGNIFICANT CHANGE UP (ref 27–34)
MCHC RBC-ENTMCNC: 31.8 PG — SIGNIFICANT CHANGE UP (ref 27–34)
MCHC RBC-ENTMCNC: 31.9 PG — SIGNIFICANT CHANGE UP (ref 27–34)
MCHC RBC-ENTMCNC: 32 GM/DL — SIGNIFICANT CHANGE UP (ref 32–36)
MCHC RBC-ENTMCNC: 32 PG — SIGNIFICANT CHANGE UP (ref 27–34)
MCHC RBC-ENTMCNC: 32.1 PG — SIGNIFICANT CHANGE UP (ref 27–34)
MCHC RBC-ENTMCNC: 32.2 GM/DL — SIGNIFICANT CHANGE UP (ref 32–36)
MCHC RBC-ENTMCNC: 32.2 GM/DL — SIGNIFICANT CHANGE UP (ref 32–36)
MCHC RBC-ENTMCNC: 32.2 PG — SIGNIFICANT CHANGE UP (ref 27–34)
MCHC RBC-ENTMCNC: 32.2 PG — SIGNIFICANT CHANGE UP (ref 27–34)
MCHC RBC-ENTMCNC: 32.3 GM/DL — SIGNIFICANT CHANGE UP (ref 32–36)
MCHC RBC-ENTMCNC: 32.3 PG — SIGNIFICANT CHANGE UP (ref 27–34)
MCHC RBC-ENTMCNC: 32.4 GM/DL — SIGNIFICANT CHANGE UP (ref 32–36)
MCHC RBC-ENTMCNC: 32.4 PG — SIGNIFICANT CHANGE UP (ref 27–34)
MCHC RBC-ENTMCNC: 32.5 G/DL — SIGNIFICANT CHANGE UP (ref 32–36)
MCHC RBC-ENTMCNC: 32.5 GM/DL — SIGNIFICANT CHANGE UP (ref 32–36)
MCHC RBC-ENTMCNC: 32.5 PG — SIGNIFICANT CHANGE UP (ref 27–34)
MCHC RBC-ENTMCNC: 32.6 GM/DL — SIGNIFICANT CHANGE UP (ref 32–36)
MCHC RBC-ENTMCNC: 32.6 PG — SIGNIFICANT CHANGE UP (ref 27–34)
MCHC RBC-ENTMCNC: 32.6 PG — SIGNIFICANT CHANGE UP (ref 27–34)
MCHC RBC-ENTMCNC: 32.7 PG — SIGNIFICANT CHANGE UP (ref 27–34)
MCHC RBC-ENTMCNC: 32.7 PG — SIGNIFICANT CHANGE UP (ref 27–34)
MCHC RBC-ENTMCNC: 32.8 GM/DL — SIGNIFICANT CHANGE UP (ref 32–36)
MCHC RBC-ENTMCNC: 32.9 GM/DL — SIGNIFICANT CHANGE UP (ref 32–36)
MCHC RBC-ENTMCNC: 33 GM/DL — SIGNIFICANT CHANGE UP (ref 32–36)
MCHC RBC-ENTMCNC: 33.1 GM/DL — SIGNIFICANT CHANGE UP (ref 32–36)
MCHC RBC-ENTMCNC: 33.2 GM/DL — SIGNIFICANT CHANGE UP (ref 32–36)
MCHC RBC-ENTMCNC: 33.3 GM/DL — SIGNIFICANT CHANGE UP (ref 32–36)
MCHC RBC-ENTMCNC: 33.5 GM/DL — SIGNIFICANT CHANGE UP (ref 32–36)
MCHC RBC-ENTMCNC: 33.5 GM/DL — SIGNIFICANT CHANGE UP (ref 32–36)
MCHC RBC-ENTMCNC: 33.8 GM/DL — SIGNIFICANT CHANGE UP (ref 32–36)
MCHC RBC-ENTMCNC: 33.8 GM/DL — SIGNIFICANT CHANGE UP (ref 32–36)
MCHC RBC-ENTMCNC: 33.9 G/DL — SIGNIFICANT CHANGE UP (ref 32–36)
MCHC RBC-ENTMCNC: 33.9 GM/DL — SIGNIFICANT CHANGE UP (ref 32–36)
MCHC RBC-ENTMCNC: 34.5 GM/DL — SIGNIFICANT CHANGE UP (ref 32–36)
MCHC RBC-ENTMCNC: 34.6 GM/DL — SIGNIFICANT CHANGE UP (ref 32–36)
MCV RBC AUTO: 100 FL — SIGNIFICANT CHANGE UP (ref 80–100)
MCV RBC AUTO: 100.3 FL — HIGH (ref 80–100)
MCV RBC AUTO: 100.8 FL — HIGH (ref 80–100)
MCV RBC AUTO: 102.1 FL
MCV RBC AUTO: 105.5 FL — HIGH (ref 80–100)
MCV RBC AUTO: 90.5 FL — SIGNIFICANT CHANGE UP (ref 80–100)
MCV RBC AUTO: 91.6 FL — SIGNIFICANT CHANGE UP (ref 80–100)
MCV RBC AUTO: 92.3 FL — SIGNIFICANT CHANGE UP (ref 80–100)
MCV RBC AUTO: 92.5 FL — SIGNIFICANT CHANGE UP (ref 80–100)
MCV RBC AUTO: 92.9 FL — SIGNIFICANT CHANGE UP (ref 80–100)
MCV RBC AUTO: 92.9 FL — SIGNIFICANT CHANGE UP (ref 80–100)
MCV RBC AUTO: 93 FL — SIGNIFICANT CHANGE UP (ref 80–100)
MCV RBC AUTO: 93 FL — SIGNIFICANT CHANGE UP (ref 80–100)
MCV RBC AUTO: 93.4 FL — SIGNIFICANT CHANGE UP (ref 80–100)
MCV RBC AUTO: 93.5 FL — SIGNIFICANT CHANGE UP (ref 80–100)
MCV RBC AUTO: 93.6 FL — SIGNIFICANT CHANGE UP (ref 80–100)
MCV RBC AUTO: 93.6 FL — SIGNIFICANT CHANGE UP (ref 80–100)
MCV RBC AUTO: 93.8 FL — SIGNIFICANT CHANGE UP (ref 80–100)
MCV RBC AUTO: 93.8 FL — SIGNIFICANT CHANGE UP (ref 80–100)
MCV RBC AUTO: 94.1 FL — SIGNIFICANT CHANGE UP (ref 80–100)
MCV RBC AUTO: 94.5 FL — SIGNIFICANT CHANGE UP (ref 80–100)
MCV RBC AUTO: 94.9 FL — SIGNIFICANT CHANGE UP (ref 80–100)
MCV RBC AUTO: 95.4 FL — SIGNIFICANT CHANGE UP (ref 80–100)
MCV RBC AUTO: 95.6 FL — SIGNIFICANT CHANGE UP (ref 80–100)
MCV RBC AUTO: 96.3 FL — SIGNIFICANT CHANGE UP (ref 80–100)
MCV RBC AUTO: 96.4 FL — SIGNIFICANT CHANGE UP (ref 80–100)
MCV RBC AUTO: 96.5 FL — SIGNIFICANT CHANGE UP (ref 80–100)
MCV RBC AUTO: 96.7 FL — SIGNIFICANT CHANGE UP (ref 80–100)
MCV RBC AUTO: 96.7 FL — SIGNIFICANT CHANGE UP (ref 80–100)
MCV RBC AUTO: 96.8 FL — SIGNIFICANT CHANGE UP (ref 80–100)
MCV RBC AUTO: 97 FL — SIGNIFICANT CHANGE UP (ref 80–100)
MCV RBC AUTO: 97.1 FL — SIGNIFICANT CHANGE UP (ref 80–100)
MCV RBC AUTO: 97.2 FL — SIGNIFICANT CHANGE UP (ref 80–100)
MCV RBC AUTO: 97.5 FL — SIGNIFICANT CHANGE UP (ref 80–100)
MCV RBC AUTO: 97.7 FL — SIGNIFICANT CHANGE UP (ref 80–100)
MCV RBC AUTO: 98.2 FL — SIGNIFICANT CHANGE UP (ref 80–100)
MCV RBC AUTO: 98.4 FL — SIGNIFICANT CHANGE UP (ref 80–100)
MCV RBC AUTO: 98.6 FL — SIGNIFICANT CHANGE UP (ref 80–100)
MCV RBC AUTO: 98.6 FL — SIGNIFICANT CHANGE UP (ref 80–100)
MCV RBC AUTO: 98.8 FL — SIGNIFICANT CHANGE UP (ref 80–100)
MCV RBC AUTO: 99.1 FL — SIGNIFICANT CHANGE UP (ref 80–100)
MCV RBC AUTO: 99.5 FL — SIGNIFICANT CHANGE UP (ref 80–100)
MCV RBC AUTO: 99.5 FL — SIGNIFICANT CHANGE UP (ref 80–100)
MCV RBC AUTO: 99.8 FL — SIGNIFICANT CHANGE UP (ref 80–100)
METAMYELOCYTES # FLD: 0.8 % — HIGH (ref 0–0)
METHOD TYPE: SIGNIFICANT CHANGE UP
MICROALBUMIN 24H UR DL<=1MG/L-MCNC: 4 MG/DL
MICROALBUMIN/CREAT 24H UR-RTO: 72 MG/G
MICROCYTES BLD QL: SLIGHT — SIGNIFICANT CHANGE UP
MICROSCOPIC-UA: NORMAL
MONOCYTES # BLD AUTO: 0.11 K/UL — SIGNIFICANT CHANGE UP (ref 0–0.9)
MONOCYTES # BLD AUTO: 0.11 K/UL — SIGNIFICANT CHANGE UP (ref 0–0.9)
MONOCYTES # BLD AUTO: 0.12 K/UL — SIGNIFICANT CHANGE UP (ref 0–0.9)
MONOCYTES # BLD AUTO: 0.14 K/UL — SIGNIFICANT CHANGE UP (ref 0–0.9)
MONOCYTES # BLD AUTO: 0.14 K/UL — SIGNIFICANT CHANGE UP (ref 0–0.9)
MONOCYTES # BLD AUTO: 0.16 K/UL — SIGNIFICANT CHANGE UP (ref 0–0.9)
MONOCYTES # BLD AUTO: 0.17 K/UL — SIGNIFICANT CHANGE UP (ref 0–0.9)
MONOCYTES # BLD AUTO: 0.2 K/UL — SIGNIFICANT CHANGE UP (ref 0–0.9)
MONOCYTES # BLD AUTO: 0.42 K/UL — SIGNIFICANT CHANGE UP (ref 0–0.9)
MONOCYTES # BLD AUTO: 0.45 K/UL — SIGNIFICANT CHANGE UP (ref 0–0.9)
MONOCYTES # BLD AUTO: 0.56 K/UL — SIGNIFICANT CHANGE UP (ref 0–0.9)
MONOCYTES # BLD AUTO: 0.58 K/UL — SIGNIFICANT CHANGE UP (ref 0–0.9)
MONOCYTES # BLD AUTO: 0.64 K/UL — SIGNIFICANT CHANGE UP (ref 0–0.9)
MONOCYTES # BLD AUTO: 0.74 K/UL
MONOCYTES # BLD AUTO: 0.94 K/UL — HIGH (ref 0–0.9)
MONOCYTES # BLD AUTO: 1.09 K/UL — HIGH (ref 0–0.9)
MONOCYTES NFR BLD AUTO: 1.3 % — LOW (ref 2–14)
MONOCYTES NFR BLD AUTO: 1.6 % — LOW (ref 2–14)
MONOCYTES NFR BLD AUTO: 1.7 % — LOW (ref 2–14)
MONOCYTES NFR BLD AUTO: 2 % — SIGNIFICANT CHANGE UP (ref 2–14)
MONOCYTES NFR BLD AUTO: 2.4 % — SIGNIFICANT CHANGE UP (ref 2–14)
MONOCYTES NFR BLD AUTO: 2.5 % — SIGNIFICANT CHANGE UP (ref 2–14)
MONOCYTES NFR BLD AUTO: 2.6 % — SIGNIFICANT CHANGE UP (ref 2–14)
MONOCYTES NFR BLD AUTO: 2.7 % — SIGNIFICANT CHANGE UP (ref 2–14)
MONOCYTES NFR BLD AUTO: 5.1 % — SIGNIFICANT CHANGE UP (ref 2–14)
MONOCYTES NFR BLD AUTO: 5.1 % — SIGNIFICANT CHANGE UP (ref 2–14)
MONOCYTES NFR BLD AUTO: 5.4 %
MONOCYTES NFR BLD AUTO: 5.4 % — SIGNIFICANT CHANGE UP (ref 2–14)
MONOCYTES NFR BLD AUTO: 5.6 % — SIGNIFICANT CHANGE UP (ref 2–14)
MONOCYTES NFR BLD AUTO: 6.5 % — SIGNIFICANT CHANGE UP (ref 2–14)
MONOCYTES NFR BLD AUTO: 8.2 % — SIGNIFICANT CHANGE UP (ref 2–14)
MONOCYTES NFR BLD AUTO: 9.1 % — SIGNIFICANT CHANGE UP (ref 2–14)
MRSA PCR RESULT.: SIGNIFICANT CHANGE UP
NEUTROPHILS # BLD AUTO: 10.94 K/UL — HIGH (ref 1.8–7.4)
NEUTROPHILS # BLD AUTO: 11.75 K/UL
NEUTROPHILS # BLD AUTO: 17.01 K/UL — HIGH (ref 1.8–7.4)
NEUTROPHILS # BLD AUTO: 2.92 K/UL — SIGNIFICANT CHANGE UP (ref 1.8–7.4)
NEUTROPHILS # BLD AUTO: 4.45 K/UL — SIGNIFICANT CHANGE UP (ref 1.8–7.4)
NEUTROPHILS # BLD AUTO: 4.97 K/UL — SIGNIFICANT CHANGE UP (ref 1.8–7.4)
NEUTROPHILS # BLD AUTO: 5.28 K/UL — SIGNIFICANT CHANGE UP (ref 1.8–7.4)
NEUTROPHILS # BLD AUTO: 5.44 K/UL — SIGNIFICANT CHANGE UP (ref 1.8–7.4)
NEUTROPHILS # BLD AUTO: 6.04 K/UL — SIGNIFICANT CHANGE UP (ref 1.8–7.4)
NEUTROPHILS # BLD AUTO: 6.32 K/UL — SIGNIFICANT CHANGE UP (ref 1.8–7.4)
NEUTROPHILS # BLD AUTO: 6.99 K/UL — SIGNIFICANT CHANGE UP (ref 1.8–7.4)
NEUTROPHILS # BLD AUTO: 7.64 K/UL — HIGH (ref 1.8–7.4)
NEUTROPHILS # BLD AUTO: 8.01 K/UL — HIGH (ref 1.8–7.4)
NEUTROPHILS # BLD AUTO: 8.62 K/UL — HIGH (ref 1.8–7.4)
NEUTROPHILS # BLD AUTO: 8.75 K/UL — HIGH (ref 1.8–7.4)
NEUTROPHILS # BLD AUTO: 9.84 K/UL — HIGH (ref 1.8–7.4)
NEUTROPHILS NFR BLD AUTO: 58.7 % — SIGNIFICANT CHANGE UP (ref 43–77)
NEUTROPHILS NFR BLD AUTO: 79.2 % — HIGH (ref 43–77)
NEUTROPHILS NFR BLD AUTO: 81.1 % — HIGH (ref 43–77)
NEUTROPHILS NFR BLD AUTO: 81.6 % — HIGH (ref 43–77)
NEUTROPHILS NFR BLD AUTO: 82.7 % — HIGH (ref 43–77)
NEUTROPHILS NFR BLD AUTO: 83.8 % — HIGH (ref 43–77)
NEUTROPHILS NFR BLD AUTO: 84.7 % — HIGH (ref 43–77)
NEUTROPHILS NFR BLD AUTO: 85.5 % — HIGH (ref 43–77)
NEUTROPHILS NFR BLD AUTO: 85.7 %
NEUTROPHILS NFR BLD AUTO: 86.5 % — HIGH (ref 43–77)
NEUTROPHILS NFR BLD AUTO: 89.1 % — HIGH (ref 43–77)
NEUTROPHILS NFR BLD AUTO: 90.4 % — HIGH (ref 43–77)
NEUTROPHILS NFR BLD AUTO: 90.4 % — HIGH (ref 43–77)
NEUTROPHILS NFR BLD AUTO: 91.3 % — HIGH (ref 43–77)
NEUTROPHILS NFR BLD AUTO: 92.4 % — HIGH (ref 43–77)
NEUTROPHILS NFR BLD AUTO: 95.6 % — HIGH (ref 43–77)
NITRITE UR-MCNC: NEGATIVE — SIGNIFICANT CHANGE UP
NITRITE URINE: NEGATIVE
NON HDL CHOLESTEROL: 160 MG/DL — HIGH
NONHDLC SERPL-MCNC: 209 MG/DL
NRBC # BLD: 0 /100 WBCS — SIGNIFICANT CHANGE UP (ref 0–0)
NRBC # BLD: 1 /100 WBCS — HIGH (ref 0–0)
NRBC # BLD: 1 /100 WBCS — HIGH (ref 0–0)
NRBC # BLD: 1 /100 — HIGH (ref 0–0)
NRBC # FLD: 0 K/UL — SIGNIFICANT CHANGE UP (ref 0–0)
NRBC # FLD: 0.02 K/UL — HIGH (ref 0–0)
NRBC # FLD: 0.03 K/UL — HIGH (ref 0–0)
NRBC # FLD: 0.03 K/UL — HIGH (ref 0–0)
NRBC # FLD: 0.04 K/UL — HIGH (ref 0–0)
NRBC # FLD: 0.07 K/UL — HIGH (ref 0–0)
NT-PROBNP SERPL-SCNC: 162 PG/ML — SIGNIFICANT CHANGE UP (ref 0–300)
NT-PROBNP SERPL-SCNC: 300 PG/ML — SIGNIFICANT CHANGE UP (ref 0–300)
OB PNL STL: POSITIVE
ORGANISM # SPEC MICROSCOPIC CNT: SIGNIFICANT CHANGE UP
OVALOCYTES BLD QL SMEAR: SLIGHT — SIGNIFICANT CHANGE UP
PCO2 BLDV: 42 MMHG — SIGNIFICANT CHANGE UP (ref 42–55)
PCO2 BLDV: 42 MMHG — SIGNIFICANT CHANGE UP (ref 42–55)
PCO2 BLDV: 45 MMHG — SIGNIFICANT CHANGE UP (ref 42–55)
PCO2 BLDV: 47 MMHG — SIGNIFICANT CHANGE UP (ref 42–55)
PCO2 BLDV: 49 MMHG — SIGNIFICANT CHANGE UP (ref 42–55)
PCO2 BLDV: 49 MMHG — SIGNIFICANT CHANGE UP (ref 42–55)
PCO2 BLDV: 51 MMHG — SIGNIFICANT CHANGE UP (ref 42–55)
PCO2 BLDV: 51 MMHG — SIGNIFICANT CHANGE UP (ref 42–55)
PF4 HEPARIN CMPLX AB SER-ACNC: NEGATIVE — SIGNIFICANT CHANGE UP
PF4 HEPARIN CMPLX AB SER-ACNC: POSITIVE — SIGNIFICANT CHANGE UP
PH BLDV: 7.29 — LOW (ref 7.32–7.43)
PH BLDV: 7.32 — SIGNIFICANT CHANGE UP (ref 7.32–7.43)
PH BLDV: 7.36 — SIGNIFICANT CHANGE UP (ref 7.32–7.43)
PH BLDV: 7.36 — SIGNIFICANT CHANGE UP (ref 7.32–7.43)
PH BLDV: 7.38 — SIGNIFICANT CHANGE UP (ref 7.32–7.43)
PH BLDV: 7.39 — SIGNIFICANT CHANGE UP (ref 7.32–7.43)
PH BLDV: 7.41 — SIGNIFICANT CHANGE UP (ref 7.32–7.43)
PH BLDV: 7.42 — SIGNIFICANT CHANGE UP (ref 7.32–7.43)
PH UR: 5 — SIGNIFICANT CHANGE UP (ref 5–8)
PH UR: 6 — SIGNIFICANT CHANGE UP (ref 5–8)
PH UR: 7.5 — SIGNIFICANT CHANGE UP (ref 5–8)
PH URINE: 6
PHOSPHATE SERPL-MCNC: 1.7 MG/DL — LOW (ref 2.5–4.5)
PHOSPHATE SERPL-MCNC: 1.8 MG/DL — LOW (ref 2.5–4.5)
PHOSPHATE SERPL-MCNC: 1.9 MG/DL — LOW (ref 2.5–4.5)
PHOSPHATE SERPL-MCNC: 1.9 MG/DL — LOW (ref 2.5–4.5)
PHOSPHATE SERPL-MCNC: 17.3 MG/DL — HIGH (ref 2.5–4.5)
PHOSPHATE SERPL-MCNC: 2 MG/DL — LOW (ref 2.5–4.5)
PHOSPHATE SERPL-MCNC: 2.1 MG/DL — LOW (ref 2.5–4.5)
PHOSPHATE SERPL-MCNC: 2.2 MG/DL — LOW (ref 2.5–4.5)
PHOSPHATE SERPL-MCNC: 2.3 MG/DL — LOW (ref 2.5–4.5)
PHOSPHATE SERPL-MCNC: 2.4 MG/DL — LOW (ref 2.5–4.5)
PHOSPHATE SERPL-MCNC: 2.4 MG/DL — LOW (ref 2.5–4.5)
PHOSPHATE SERPL-MCNC: 2.5 MG/DL — SIGNIFICANT CHANGE UP (ref 2.5–4.5)
PHOSPHATE SERPL-MCNC: 2.5 MG/DL — SIGNIFICANT CHANGE UP (ref 2.5–4.5)
PHOSPHATE SERPL-MCNC: 2.6 MG/DL — SIGNIFICANT CHANGE UP (ref 2.5–4.5)
PHOSPHATE SERPL-MCNC: 2.7 MG/DL — SIGNIFICANT CHANGE UP (ref 2.5–4.5)
PHOSPHATE SERPL-MCNC: 2.8 MG/DL — SIGNIFICANT CHANGE UP (ref 2.5–4.5)
PHOSPHATE SERPL-MCNC: 2.9 MG/DL — SIGNIFICANT CHANGE UP (ref 2.5–4.5)
PHOSPHATE SERPL-MCNC: 2.9 MG/DL — SIGNIFICANT CHANGE UP (ref 2.5–4.5)
PHOSPHATE SERPL-MCNC: 3 MG/DL — SIGNIFICANT CHANGE UP (ref 2.5–4.5)
PHOSPHATE SERPL-MCNC: 3 MG/DL — SIGNIFICANT CHANGE UP (ref 2.5–4.5)
PHOSPHATE SERPL-MCNC: 3.1 MG/DL — SIGNIFICANT CHANGE UP (ref 2.5–4.5)
PHOSPHATE SERPL-MCNC: 3.1 MG/DL — SIGNIFICANT CHANGE UP (ref 2.5–4.5)
PHOSPHATE SERPL-MCNC: 3.2 MG/DL — SIGNIFICANT CHANGE UP (ref 2.5–4.5)
PHOSPHATE SERPL-MCNC: 3.6 MG/DL — SIGNIFICANT CHANGE UP (ref 2.5–4.5)
PHOSPHATE SERPL-MCNC: 4.3 MG/DL — SIGNIFICANT CHANGE UP (ref 2.5–4.5)
PHOSPHATE SERPL-MCNC: 5.8 MG/DL — HIGH (ref 2.5–4.5)
PLAT MORPH BLD: NORMAL — SIGNIFICANT CHANGE UP
PLAT MORPH BLD: NORMAL — SIGNIFICANT CHANGE UP
PLATELET # BLD AUTO: 101 K/UL — LOW (ref 150–400)
PLATELET # BLD AUTO: 109 K/UL — LOW (ref 150–400)
PLATELET # BLD AUTO: 110 K/UL — LOW (ref 150–400)
PLATELET # BLD AUTO: 111 K/UL — LOW (ref 150–400)
PLATELET # BLD AUTO: 112 K/UL — LOW (ref 150–400)
PLATELET # BLD AUTO: 112 K/UL — LOW (ref 150–400)
PLATELET # BLD AUTO: 116 K/UL — LOW (ref 150–400)
PLATELET # BLD AUTO: 118 K/UL — LOW (ref 150–400)
PLATELET # BLD AUTO: 121 K/UL — LOW (ref 150–400)
PLATELET # BLD AUTO: 129 K/UL
PLATELET # BLD AUTO: 133 K/UL — LOW (ref 150–400)
PLATELET # BLD AUTO: 137 K/UL — LOW (ref 150–400)
PLATELET # BLD AUTO: 146 K/UL — LOW (ref 150–400)
PLATELET # BLD AUTO: 172 K/UL — SIGNIFICANT CHANGE UP (ref 150–400)
PLATELET # BLD AUTO: 209 K/UL — SIGNIFICANT CHANGE UP (ref 150–400)
PLATELET # BLD AUTO: 41 K/UL — LOW (ref 150–400)
PLATELET # BLD AUTO: 51 K/UL — LOW (ref 150–400)
PLATELET # BLD AUTO: 54 K/UL — LOW (ref 150–400)
PLATELET # BLD AUTO: 55 K/UL — LOW (ref 150–400)
PLATELET # BLD AUTO: 58 K/UL — LOW (ref 150–400)
PLATELET # BLD AUTO: 63 K/UL — LOW (ref 150–400)
PLATELET # BLD AUTO: 64 K/UL — LOW (ref 150–400)
PLATELET # BLD AUTO: 64 K/UL — LOW (ref 150–400)
PLATELET # BLD AUTO: 68 K/UL — LOW (ref 150–400)
PLATELET # BLD AUTO: 71 K/UL — LOW (ref 150–400)
PLATELET # BLD AUTO: 74 K/UL — LOW (ref 150–400)
PLATELET # BLD AUTO: 76 K/UL — LOW (ref 150–400)
PLATELET # BLD AUTO: 77 K/UL — LOW (ref 150–400)
PLATELET # BLD AUTO: 77 K/UL — LOW (ref 150–400)
PLATELET # BLD AUTO: 78 K/UL — LOW (ref 150–400)
PLATELET # BLD AUTO: 80 K/UL — LOW (ref 150–400)
PLATELET # BLD AUTO: 81 K/UL — LOW (ref 150–400)
PLATELET # BLD AUTO: 81 K/UL — LOW (ref 150–400)
PLATELET # BLD AUTO: 82 K/UL — LOW (ref 150–400)
PLATELET # BLD AUTO: 87 K/UL — LOW (ref 150–400)
PLATELET # BLD AUTO: 87 K/UL — LOW (ref 150–400)
PLATELET # BLD AUTO: 88 K/UL — LOW (ref 150–400)
PLATELET # BLD AUTO: 88 K/UL — LOW (ref 150–400)
PLATELET # BLD AUTO: 90 K/UL — LOW (ref 150–400)
PLATELET # BLD AUTO: 93 K/UL — LOW (ref 150–400)
PLATELET # BLD AUTO: 97 K/UL — LOW (ref 150–400)
PLATELET # BLD AUTO: 97 K/UL — LOW (ref 150–400)
PLATELET # BLD AUTO: 99 K/UL — LOW (ref 150–400)
PO2 BLDV: 11 MMHG — LOW (ref 25–45)
PO2 BLDV: 23 MMHG — LOW (ref 25–45)
PO2 BLDV: 25 MMHG — SIGNIFICANT CHANGE UP (ref 25–45)
PO2 BLDV: 27 MMHG — SIGNIFICANT CHANGE UP (ref 25–45)
PO2 BLDV: 27 MMHG — SIGNIFICANT CHANGE UP (ref 25–45)
PO2 BLDV: 28 MMHG — SIGNIFICANT CHANGE UP (ref 25–45)
PO2 BLDV: 35 MMHG — SIGNIFICANT CHANGE UP (ref 25–45)
PO2 BLDV: 56 MMHG — HIGH (ref 25–45)
POIKILOCYTOSIS BLD QL AUTO: SIGNIFICANT CHANGE UP
POIKILOCYTOSIS BLD QL AUTO: SLIGHT — SIGNIFICANT CHANGE UP
POLYCHROMASIA BLD QL SMEAR: SLIGHT — SIGNIFICANT CHANGE UP
POTASSIUM BLDV-SCNC: 3 MMOL/L — LOW (ref 3.5–5.1)
POTASSIUM BLDV-SCNC: 3.2 MMOL/L — LOW (ref 3.5–5.1)
POTASSIUM BLDV-SCNC: 3.7 MMOL/L — SIGNIFICANT CHANGE UP (ref 3.5–5.1)
POTASSIUM BLDV-SCNC: 4 MMOL/L — SIGNIFICANT CHANGE UP (ref 3.5–5.1)
POTASSIUM BLDV-SCNC: 4.1 MMOL/L — SIGNIFICANT CHANGE UP (ref 3.5–5.1)
POTASSIUM BLDV-SCNC: 4.3 MMOL/L — SIGNIFICANT CHANGE UP (ref 3.5–5.1)
POTASSIUM BLDV-SCNC: 4.5 MMOL/L — SIGNIFICANT CHANGE UP (ref 3.5–5.1)
POTASSIUM BLDV-SCNC: 4.6 MMOL/L — SIGNIFICANT CHANGE UP (ref 3.5–5.1)
POTASSIUM SERPL-MCNC: 3 MMOL/L — LOW (ref 3.5–5.3)
POTASSIUM SERPL-MCNC: 3.4 MMOL/L — LOW (ref 3.5–5.3)
POTASSIUM SERPL-MCNC: 3.5 MMOL/L — SIGNIFICANT CHANGE UP (ref 3.5–5.3)
POTASSIUM SERPL-MCNC: 3.6 MMOL/L — SIGNIFICANT CHANGE UP (ref 3.5–5.3)
POTASSIUM SERPL-MCNC: 3.8 MMOL/L — SIGNIFICANT CHANGE UP (ref 3.5–5.3)
POTASSIUM SERPL-MCNC: 3.9 MMOL/L — SIGNIFICANT CHANGE UP (ref 3.5–5.3)
POTASSIUM SERPL-MCNC: 4 MMOL/L — SIGNIFICANT CHANGE UP (ref 3.5–5.3)
POTASSIUM SERPL-MCNC: 4.1 MMOL/L — SIGNIFICANT CHANGE UP (ref 3.5–5.3)
POTASSIUM SERPL-MCNC: 4.2 MMOL/L — SIGNIFICANT CHANGE UP (ref 3.5–5.3)
POTASSIUM SERPL-MCNC: 4.3 MMOL/L — SIGNIFICANT CHANGE UP (ref 3.5–5.3)
POTASSIUM SERPL-MCNC: 4.4 MMOL/L — SIGNIFICANT CHANGE UP (ref 3.5–5.3)
POTASSIUM SERPL-MCNC: 4.5 MMOL/L — SIGNIFICANT CHANGE UP (ref 3.5–5.3)
POTASSIUM SERPL-MCNC: 4.6 MMOL/L — SIGNIFICANT CHANGE UP (ref 3.5–5.3)
POTASSIUM SERPL-MCNC: 4.7 MMOL/L — SIGNIFICANT CHANGE UP (ref 3.5–5.3)
POTASSIUM SERPL-MCNC: 4.8 MMOL/L — SIGNIFICANT CHANGE UP (ref 3.5–5.3)
POTASSIUM SERPL-MCNC: 4.9 MMOL/L — SIGNIFICANT CHANGE UP (ref 3.5–5.3)
POTASSIUM SERPL-SCNC: 3 MMOL/L — LOW (ref 3.5–5.3)
POTASSIUM SERPL-SCNC: 3.4 MMOL/L — LOW (ref 3.5–5.3)
POTASSIUM SERPL-SCNC: 3.5 MMOL/L — SIGNIFICANT CHANGE UP (ref 3.5–5.3)
POTASSIUM SERPL-SCNC: 3.6 MMOL/L — SIGNIFICANT CHANGE UP (ref 3.5–5.3)
POTASSIUM SERPL-SCNC: 3.8 MMOL/L — SIGNIFICANT CHANGE UP (ref 3.5–5.3)
POTASSIUM SERPL-SCNC: 3.9 MMOL/L — SIGNIFICANT CHANGE UP (ref 3.5–5.3)
POTASSIUM SERPL-SCNC: 4 MMOL/L — SIGNIFICANT CHANGE UP (ref 3.5–5.3)
POTASSIUM SERPL-SCNC: 4.1 MMOL/L — SIGNIFICANT CHANGE UP (ref 3.5–5.3)
POTASSIUM SERPL-SCNC: 4.2 MMOL/L — SIGNIFICANT CHANGE UP (ref 3.5–5.3)
POTASSIUM SERPL-SCNC: 4.3 MMOL/L — SIGNIFICANT CHANGE UP (ref 3.5–5.3)
POTASSIUM SERPL-SCNC: 4.4 MMOL/L — SIGNIFICANT CHANGE UP (ref 3.5–5.3)
POTASSIUM SERPL-SCNC: 4.5 MMOL/L — SIGNIFICANT CHANGE UP (ref 3.5–5.3)
POTASSIUM SERPL-SCNC: 4.6 MMOL/L — SIGNIFICANT CHANGE UP (ref 3.5–5.3)
POTASSIUM SERPL-SCNC: 4.7 MMOL/L — SIGNIFICANT CHANGE UP (ref 3.5–5.3)
POTASSIUM SERPL-SCNC: 4.8 MMOL/L
POTASSIUM SERPL-SCNC: 4.8 MMOL/L — SIGNIFICANT CHANGE UP (ref 3.5–5.3)
POTASSIUM SERPL-SCNC: 4.9 MMOL/L
POTASSIUM SERPL-SCNC: 4.9 MMOL/L — SIGNIFICANT CHANGE UP (ref 3.5–5.3)
PROCALCITONIN SERPL-MCNC: 0.1 NG/ML — SIGNIFICANT CHANGE UP (ref 0.02–0.1)
PROT SERPL-MCNC: 4.1 G/DL — LOW (ref 6–8.3)
PROT SERPL-MCNC: 4.3 G/DL — LOW (ref 6–8.3)
PROT SERPL-MCNC: 4.3 G/DL — LOW (ref 6–8.3)
PROT SERPL-MCNC: 4.4 G/DL — LOW (ref 6–8.3)
PROT SERPL-MCNC: 4.4 G/DL — LOW (ref 6–8.3)
PROT SERPL-MCNC: 4.5 G/DL — LOW (ref 6–8.3)
PROT SERPL-MCNC: 4.6 G/DL — LOW (ref 6–8.3)
PROT SERPL-MCNC: 4.7 G/DL — LOW (ref 6–8.3)
PROT SERPL-MCNC: 4.7 G/DL — LOW (ref 6–8.3)
PROT SERPL-MCNC: 4.8 G/DL — LOW (ref 6–8.3)
PROT SERPL-MCNC: 4.9 G/DL — LOW (ref 6–8.3)
PROT SERPL-MCNC: 5 G/DL — LOW (ref 6–8.3)
PROT SERPL-MCNC: 5.1 G/DL — LOW (ref 6–8.3)
PROT SERPL-MCNC: 5.3 G/DL — LOW (ref 6–8.3)
PROT SERPL-MCNC: 5.7 G/DL — LOW (ref 6–8.3)
PROT SERPL-MCNC: 6 G/DL — SIGNIFICANT CHANGE UP (ref 6–8.3)
PROT SERPL-MCNC: 6.4 G/DL
PROT SERPL-MCNC: 6.6 G/DL
PROT SERPL-MCNC: 6.8 G/DL — SIGNIFICANT CHANGE UP (ref 6–8.3)
PROT UR-MCNC: ABNORMAL
PROT UR-MCNC: NEGATIVE — SIGNIFICANT CHANGE UP
PROT UR-MCNC: SIGNIFICANT CHANGE UP
PROTEIN URINE: NORMAL
PROTHROM AB SERPL-ACNC: 13 SEC — SIGNIFICANT CHANGE UP (ref 10.5–13.4)
PROTHROM AB SERPL-ACNC: 13.2 SEC — SIGNIFICANT CHANGE UP (ref 10.5–13.4)
PROTHROM AB SERPL-ACNC: 13.7 SEC — HIGH (ref 10.5–13.4)
PROTHROM AB SERPL-ACNC: 18.5 SEC — HIGH (ref 10.5–13.4)
PSA SERPL-MCNC: 0.45 NG/ML
RAPID RVP RESULT: DETECTED
RAPID RVP RESULT: SIGNIFICANT CHANGE UP
RBC # BLD: 3.47 M/UL — LOW (ref 4.2–5.8)
RBC # BLD: 3.56 M/UL — LOW (ref 4.2–5.8)
RBC # BLD: 3.58 M/UL — LOW (ref 4.2–5.8)
RBC # BLD: 3.6 M/UL — LOW (ref 4.2–5.8)
RBC # BLD: 3.65 M/UL — LOW (ref 4.2–5.8)
RBC # BLD: 3.68 M/UL — LOW (ref 4.2–5.8)
RBC # BLD: 3.68 M/UL — LOW (ref 4.2–5.8)
RBC # BLD: 3.72 M/UL — LOW (ref 4.2–5.8)
RBC # BLD: 3.72 M/UL — LOW (ref 4.2–5.8)
RBC # BLD: 3.81 M/UL — LOW (ref 4.2–5.8)
RBC # BLD: 3.84 M/UL — LOW (ref 4.2–5.8)
RBC # BLD: 3.85 M/UL — LOW (ref 4.2–5.8)
RBC # BLD: 3.86 M/UL — LOW (ref 4.2–5.8)
RBC # BLD: 3.88 M/UL — LOW (ref 4.2–5.8)
RBC # BLD: 3.93 M/UL — LOW (ref 4.2–5.8)
RBC # BLD: 3.93 M/UL — LOW (ref 4.2–5.8)
RBC # BLD: 3.95 M/UL — LOW (ref 4.2–5.8)
RBC # BLD: 3.96 M/UL — LOW (ref 4.2–5.8)
RBC # BLD: 3.97 M/UL — LOW (ref 4.2–5.8)
RBC # BLD: 4 M/UL — LOW (ref 4.2–5.8)
RBC # BLD: 4 M/UL — LOW (ref 4.2–5.8)
RBC # BLD: 4.05 M/UL — LOW (ref 4.2–5.8)
RBC # BLD: 4.06 M/UL — LOW (ref 4.2–5.8)
RBC # BLD: 4.07 M/UL — LOW (ref 4.2–5.8)
RBC # BLD: 4.08 M/UL — LOW (ref 4.2–5.8)
RBC # BLD: 4.1 M/UL — LOW (ref 4.2–5.8)
RBC # BLD: 4.11 M/UL — LOW (ref 4.2–5.8)
RBC # BLD: 4.2 M/UL — SIGNIFICANT CHANGE UP (ref 4.2–5.8)
RBC # BLD: 4.21 M/UL — SIGNIFICANT CHANGE UP (ref 4.2–5.8)
RBC # BLD: 4.23 M/UL — SIGNIFICANT CHANGE UP (ref 4.2–5.8)
RBC # BLD: 4.31 M/UL — SIGNIFICANT CHANGE UP (ref 4.2–5.8)
RBC # BLD: 4.33 M/UL — SIGNIFICANT CHANGE UP (ref 4.2–5.8)
RBC # BLD: 4.33 M/UL — SIGNIFICANT CHANGE UP (ref 4.2–5.8)
RBC # BLD: 4.34 M/UL — SIGNIFICANT CHANGE UP (ref 4.2–5.8)
RBC # BLD: 4.77 M/UL — SIGNIFICANT CHANGE UP (ref 4.2–5.8)
RBC # BLD: 4.86 M/UL — SIGNIFICANT CHANGE UP (ref 4.2–5.8)
RBC # BLD: 4.86 M/UL — SIGNIFICANT CHANGE UP (ref 4.2–5.8)
RBC # BLD: 4.99 M/UL — SIGNIFICANT CHANGE UP (ref 4.2–5.8)
RBC # BLD: 5.13 M/UL — SIGNIFICANT CHANGE UP (ref 4.2–5.8)
RBC # BLD: 5.14 M/UL — SIGNIFICANT CHANGE UP (ref 4.2–5.8)
RBC # BLD: 5.19 M/UL — SIGNIFICANT CHANGE UP (ref 4.2–5.8)
RBC # BLD: 5.21 M/UL — SIGNIFICANT CHANGE UP (ref 4.2–5.8)
RBC # BLD: 5.21 M/UL — SIGNIFICANT CHANGE UP (ref 4.2–5.8)
RBC # BLD: 5.35 M/UL
RBC # BLD: 5.48 M/UL — SIGNIFICANT CHANGE UP (ref 4.2–5.8)
RBC # BLD: 5.6 M/UL — SIGNIFICANT CHANGE UP (ref 4.2–5.8)
RBC # FLD: 12.4 % — SIGNIFICANT CHANGE UP (ref 10.3–14.5)
RBC # FLD: 13.2 % — SIGNIFICANT CHANGE UP (ref 10.3–14.5)
RBC # FLD: 13.2 % — SIGNIFICANT CHANGE UP (ref 10.3–14.5)
RBC # FLD: 13.8 % — SIGNIFICANT CHANGE UP (ref 10.3–14.5)
RBC # FLD: 13.9 % — SIGNIFICANT CHANGE UP (ref 10.3–14.5)
RBC # FLD: 14.1 % — SIGNIFICANT CHANGE UP (ref 10.3–14.5)
RBC # FLD: 14.2 % — SIGNIFICANT CHANGE UP (ref 10.3–14.5)
RBC # FLD: 14.3 % — SIGNIFICANT CHANGE UP (ref 10.3–14.5)
RBC # FLD: 14.4 % — SIGNIFICANT CHANGE UP (ref 10.3–14.5)
RBC # FLD: 14.4 % — SIGNIFICANT CHANGE UP (ref 10.3–14.5)
RBC # FLD: 14.5 % — SIGNIFICANT CHANGE UP (ref 10.3–14.5)
RBC # FLD: 14.6 % — HIGH (ref 10.3–14.5)
RBC # FLD: 14.7 % — HIGH (ref 10.3–14.5)
RBC # FLD: 15.1 % — HIGH (ref 10.3–14.5)
RBC # FLD: 15.3 % — HIGH (ref 10.3–14.5)
RBC # FLD: 15.6 % — HIGH (ref 10.3–14.5)
RBC # FLD: 15.9 % — HIGH (ref 10.3–14.5)
RBC # FLD: 15.9 % — HIGH (ref 10.3–14.5)
RBC # FLD: 16 % — HIGH (ref 10.3–14.5)
RBC # FLD: 16.1 % — HIGH (ref 10.3–14.5)
RBC # FLD: 16.2 % — HIGH (ref 10.3–14.5)
RBC # FLD: 16.3 % — HIGH (ref 10.3–14.5)
RBC # FLD: 16.4 % — HIGH (ref 10.3–14.5)
RBC # FLD: 16.5 % — HIGH (ref 10.3–14.5)
RBC # FLD: 16.6 % — HIGH (ref 10.3–14.5)
RBC # FLD: 16.7 % — HIGH (ref 10.3–14.5)
RBC # FLD: 16.7 % — HIGH (ref 10.3–14.5)
RBC # FLD: 16.8 %
RBC # FLD: 17 % — HIGH (ref 10.3–14.5)
RBC BLD AUTO: ABNORMAL
RBC BLD AUTO: ABNORMAL
RBC CASTS # UR COMP ASSIST: 11 /HPF — HIGH (ref 0–4)
RBC CASTS # UR COMP ASSIST: 2 /HPF — SIGNIFICANT CHANGE UP (ref 0–4)
RBC CASTS # UR COMP ASSIST: 5 /HPF — HIGH (ref 0–4)
RED BLOOD CELLS URINE: 1 /HPF
RH IG SCN BLD-IMP: POSITIVE — SIGNIFICANT CHANGE UP
RSV RNA NPH QL NAA+NON-PROBE: SIGNIFICANT CHANGE UP
S AUREUS DNA NOSE QL NAA+PROBE: SIGNIFICANT CHANGE UP
SAO2 % BLDV: 26 % — LOW (ref 67–88)
SAO2 % BLDV: 33 % — LOW (ref 67–88)
SAO2 % BLDV: 34 % — LOW (ref 67–88)
SAO2 % BLDV: 34.6 % — LOW (ref 67–88)
SAO2 % BLDV: 35.9 % — LOW (ref 67–88)
SAO2 % BLDV: 38.7 % — LOW (ref 67–88)
SAO2 % BLDV: 54.5 % — LOW (ref 67–88)
SAO2 % BLDV: 85.6 % — SIGNIFICANT CHANGE UP (ref 67–88)
SARS-COV-2 RNA SPEC QL NAA+PROBE: DETECTED
SARS-COV-2 RNA SPEC QL NAA+PROBE: SIGNIFICANT CHANGE UP
SCHISTOCYTES BLD QL AUTO: SLIGHT — SIGNIFICANT CHANGE UP
SODIUM SERPL-SCNC: 131 MMOL/L — LOW (ref 135–145)
SODIUM SERPL-SCNC: 132 MMOL/L — LOW (ref 135–145)
SODIUM SERPL-SCNC: 133 MMOL/L — LOW (ref 135–145)
SODIUM SERPL-SCNC: 133 MMOL/L — LOW (ref 135–145)
SODIUM SERPL-SCNC: 134 MMOL/L — LOW (ref 135–145)
SODIUM SERPL-SCNC: 136 MMOL/L — SIGNIFICANT CHANGE UP (ref 135–145)
SODIUM SERPL-SCNC: 137 MMOL/L — SIGNIFICANT CHANGE UP (ref 135–145)
SODIUM SERPL-SCNC: 138 MMOL/L
SODIUM SERPL-SCNC: 138 MMOL/L — SIGNIFICANT CHANGE UP (ref 135–145)
SODIUM SERPL-SCNC: 139 MMOL/L — SIGNIFICANT CHANGE UP (ref 135–145)
SODIUM SERPL-SCNC: 140 MMOL/L — SIGNIFICANT CHANGE UP (ref 135–145)
SODIUM SERPL-SCNC: 141 MMOL/L — SIGNIFICANT CHANGE UP (ref 135–145)
SODIUM SERPL-SCNC: 142 MMOL/L — SIGNIFICANT CHANGE UP (ref 135–145)
SODIUM SERPL-SCNC: 143 MMOL/L — SIGNIFICANT CHANGE UP (ref 135–145)
SODIUM SERPL-SCNC: 143 MMOL/L — SIGNIFICANT CHANGE UP (ref 135–145)
SODIUM SERPL-SCNC: 144 MMOL/L — SIGNIFICANT CHANGE UP (ref 135–145)
SODIUM SERPL-SCNC: 145 MMOL/L
SODIUM SERPL-SCNC: 153 MMOL/L — HIGH (ref 135–145)
SP GR SPEC: 1.02 — SIGNIFICANT CHANGE UP (ref 1.01–1.02)
SP GR SPEC: 1.02 — SIGNIFICANT CHANGE UP (ref 1.01–1.02)
SP GR SPEC: 1.03 — HIGH (ref 1.01–1.02)
SPECIFIC GRAVITY URINE: 1.03
SPECIMEN SOURCE: SIGNIFICANT CHANGE UP
SQUAMOUS EPITHELIAL CELLS: 1 /HPF
SRA INTERP SER-IMP: SIGNIFICANT CHANGE UP
SRA INTERP SER-IMP: SIGNIFICANT CHANGE UP
SURGICAL PATHOLOGY STUDY: SIGNIFICANT CHANGE UP
SURGICAL PATHOLOGY STUDY: SIGNIFICANT CHANGE UP
TRIGL SERPL-MCNC: 201 MG/DL
TRIGL SERPL-MCNC: 80 MG/DL — SIGNIFICANT CHANGE UP
TROPONIN T, HIGH SENSITIVITY RESULT: 65 NG/L — HIGH (ref 0–51)
TROPONIN T, HIGH SENSITIVITY RESULT: 68 NG/L — HIGH (ref 0–51)
TROPONIN T, HIGH SENSITIVITY RESULT: 69 NG/L — HIGH (ref 0–51)
TROPONIN T, HIGH SENSITIVITY RESULT: 71 NG/L — HIGH (ref 0–51)
TSH SERPL-ACNC: 0.74 UIU/ML
UROBILINOGEN FLD QL: ABNORMAL
UROBILINOGEN FLD QL: NEGATIVE — SIGNIFICANT CHANGE UP
UROBILINOGEN FLD QL: NEGATIVE — SIGNIFICANT CHANGE UP
UROBILINOGEN URINE: NORMAL
VIT B12 SERPL-MCNC: 468 PG/ML
WBC # BLD: 10.1 K/UL — SIGNIFICANT CHANGE UP (ref 3.8–10.5)
WBC # BLD: 10.39 K/UL — SIGNIFICANT CHANGE UP (ref 3.8–10.5)
WBC # BLD: 10.44 K/UL — SIGNIFICANT CHANGE UP (ref 3.8–10.5)
WBC # BLD: 10.64 K/UL — HIGH (ref 3.8–10.5)
WBC # BLD: 10.68 K/UL — HIGH (ref 3.8–10.5)
WBC # BLD: 10.89 K/UL — HIGH (ref 3.8–10.5)
WBC # BLD: 11.79 K/UL — HIGH (ref 3.8–10.5)
WBC # BLD: 12.05 K/UL — HIGH (ref 3.8–10.5)
WBC # BLD: 13.23 K/UL — HIGH (ref 3.8–10.5)
WBC # BLD: 13.89 K/UL — HIGH (ref 3.8–10.5)
WBC # BLD: 14.78 K/UL — HIGH (ref 3.8–10.5)
WBC # BLD: 15.1 K/UL — HIGH (ref 3.8–10.5)
WBC # BLD: 15.72 K/UL — HIGH (ref 3.8–10.5)
WBC # BLD: 17.47 K/UL — HIGH (ref 3.8–10.5)
WBC # BLD: 18.41 K/UL — HIGH (ref 3.8–10.5)
WBC # BLD: 4.97 K/UL — SIGNIFICANT CHANGE UP (ref 3.8–10.5)
WBC # BLD: 5.2 K/UL — SIGNIFICANT CHANGE UP (ref 3.8–10.5)
WBC # BLD: 5.66 K/UL — SIGNIFICANT CHANGE UP (ref 3.8–10.5)
WBC # BLD: 5.75 K/UL — SIGNIFICANT CHANGE UP (ref 3.8–10.5)
WBC # BLD: 6.02 K/UL — SIGNIFICANT CHANGE UP (ref 3.8–10.5)
WBC # BLD: 6.23 K/UL — SIGNIFICANT CHANGE UP (ref 3.8–10.5)
WBC # BLD: 6.23 K/UL — SIGNIFICANT CHANGE UP (ref 3.8–10.5)
WBC # BLD: 6.58 K/UL — SIGNIFICANT CHANGE UP (ref 3.8–10.5)
WBC # BLD: 6.69 K/UL — SIGNIFICANT CHANGE UP (ref 3.8–10.5)
WBC # BLD: 6.81 K/UL — SIGNIFICANT CHANGE UP (ref 3.8–10.5)
WBC # BLD: 7.08 K/UL — SIGNIFICANT CHANGE UP (ref 3.8–10.5)
WBC # BLD: 7.63 K/UL — SIGNIFICANT CHANGE UP (ref 3.8–10.5)
WBC # BLD: 7.75 K/UL — SIGNIFICANT CHANGE UP (ref 3.8–10.5)
WBC # BLD: 7.85 K/UL — SIGNIFICANT CHANGE UP (ref 3.8–10.5)
WBC # BLD: 8.36 K/UL — SIGNIFICANT CHANGE UP (ref 3.8–10.5)
WBC # BLD: 8.49 K/UL — SIGNIFICANT CHANGE UP (ref 3.8–10.5)
WBC # BLD: 8.61 K/UL — SIGNIFICANT CHANGE UP (ref 3.8–10.5)
WBC # BLD: 8.77 K/UL — SIGNIFICANT CHANGE UP (ref 3.8–10.5)
WBC # BLD: 8.82 K/UL — SIGNIFICANT CHANGE UP (ref 3.8–10.5)
WBC # BLD: 8.87 K/UL — SIGNIFICANT CHANGE UP (ref 3.8–10.5)
WBC # BLD: 8.87 K/UL — SIGNIFICANT CHANGE UP (ref 3.8–10.5)
WBC # BLD: 9.03 K/UL — SIGNIFICANT CHANGE UP (ref 3.8–10.5)
WBC # BLD: 9.09 K/UL — SIGNIFICANT CHANGE UP (ref 3.8–10.5)
WBC # BLD: 9.18 K/UL — SIGNIFICANT CHANGE UP (ref 3.8–10.5)
WBC # BLD: 9.2 K/UL — SIGNIFICANT CHANGE UP (ref 3.8–10.5)
WBC # BLD: 9.29 K/UL — SIGNIFICANT CHANGE UP (ref 3.8–10.5)
WBC # BLD: 9.33 K/UL — SIGNIFICANT CHANGE UP (ref 3.8–10.5)
WBC # BLD: 9.52 K/UL — SIGNIFICANT CHANGE UP (ref 3.8–10.5)
WBC # BLD: 9.88 K/UL — SIGNIFICANT CHANGE UP (ref 3.8–10.5)
WBC # BLD: 9.93 K/UL — SIGNIFICANT CHANGE UP (ref 3.8–10.5)
WBC # FLD AUTO: 10.1 K/UL — SIGNIFICANT CHANGE UP (ref 3.8–10.5)
WBC # FLD AUTO: 10.39 K/UL — SIGNIFICANT CHANGE UP (ref 3.8–10.5)
WBC # FLD AUTO: 10.44 K/UL — SIGNIFICANT CHANGE UP (ref 3.8–10.5)
WBC # FLD AUTO: 10.64 K/UL — HIGH (ref 3.8–10.5)
WBC # FLD AUTO: 10.68 K/UL — HIGH (ref 3.8–10.5)
WBC # FLD AUTO: 10.89 K/UL — HIGH (ref 3.8–10.5)
WBC # FLD AUTO: 11.79 K/UL — HIGH (ref 3.8–10.5)
WBC # FLD AUTO: 12.05 K/UL — HIGH (ref 3.8–10.5)
WBC # FLD AUTO: 13.23 K/UL — HIGH (ref 3.8–10.5)
WBC # FLD AUTO: 13.71 K/UL
WBC # FLD AUTO: 13.89 K/UL — HIGH (ref 3.8–10.5)
WBC # FLD AUTO: 14.78 K/UL — HIGH (ref 3.8–10.5)
WBC # FLD AUTO: 15.1 K/UL — HIGH (ref 3.8–10.5)
WBC # FLD AUTO: 15.72 K/UL — HIGH (ref 3.8–10.5)
WBC # FLD AUTO: 17.47 K/UL — HIGH (ref 3.8–10.5)
WBC # FLD AUTO: 18.41 K/UL — HIGH (ref 3.8–10.5)
WBC # FLD AUTO: 4.97 K/UL — SIGNIFICANT CHANGE UP (ref 3.8–10.5)
WBC # FLD AUTO: 5.2 K/UL — SIGNIFICANT CHANGE UP (ref 3.8–10.5)
WBC # FLD AUTO: 5.66 K/UL — SIGNIFICANT CHANGE UP (ref 3.8–10.5)
WBC # FLD AUTO: 5.75 K/UL — SIGNIFICANT CHANGE UP (ref 3.8–10.5)
WBC # FLD AUTO: 6.02 K/UL — SIGNIFICANT CHANGE UP (ref 3.8–10.5)
WBC # FLD AUTO: 6.23 K/UL — SIGNIFICANT CHANGE UP (ref 3.8–10.5)
WBC # FLD AUTO: 6.23 K/UL — SIGNIFICANT CHANGE UP (ref 3.8–10.5)
WBC # FLD AUTO: 6.58 K/UL — SIGNIFICANT CHANGE UP (ref 3.8–10.5)
WBC # FLD AUTO: 6.69 K/UL — SIGNIFICANT CHANGE UP (ref 3.8–10.5)
WBC # FLD AUTO: 6.81 K/UL — SIGNIFICANT CHANGE UP (ref 3.8–10.5)
WBC # FLD AUTO: 7.08 K/UL — SIGNIFICANT CHANGE UP (ref 3.8–10.5)
WBC # FLD AUTO: 7.63 K/UL — SIGNIFICANT CHANGE UP (ref 3.8–10.5)
WBC # FLD AUTO: 7.75 K/UL — SIGNIFICANT CHANGE UP (ref 3.8–10.5)
WBC # FLD AUTO: 7.85 K/UL — SIGNIFICANT CHANGE UP (ref 3.8–10.5)
WBC # FLD AUTO: 8.36 K/UL — SIGNIFICANT CHANGE UP (ref 3.8–10.5)
WBC # FLD AUTO: 8.49 K/UL — SIGNIFICANT CHANGE UP (ref 3.8–10.5)
WBC # FLD AUTO: 8.61 K/UL — SIGNIFICANT CHANGE UP (ref 3.8–10.5)
WBC # FLD AUTO: 8.77 K/UL — SIGNIFICANT CHANGE UP (ref 3.8–10.5)
WBC # FLD AUTO: 8.82 K/UL — SIGNIFICANT CHANGE UP (ref 3.8–10.5)
WBC # FLD AUTO: 8.87 K/UL — SIGNIFICANT CHANGE UP (ref 3.8–10.5)
WBC # FLD AUTO: 8.87 K/UL — SIGNIFICANT CHANGE UP (ref 3.8–10.5)
WBC # FLD AUTO: 9.03 K/UL — SIGNIFICANT CHANGE UP (ref 3.8–10.5)
WBC # FLD AUTO: 9.09 K/UL — SIGNIFICANT CHANGE UP (ref 3.8–10.5)
WBC # FLD AUTO: 9.18 K/UL — SIGNIFICANT CHANGE UP (ref 3.8–10.5)
WBC # FLD AUTO: 9.2 K/UL — SIGNIFICANT CHANGE UP (ref 3.8–10.5)
WBC # FLD AUTO: 9.29 K/UL — SIGNIFICANT CHANGE UP (ref 3.8–10.5)
WBC # FLD AUTO: 9.33 K/UL — SIGNIFICANT CHANGE UP (ref 3.8–10.5)
WBC # FLD AUTO: 9.52 K/UL — SIGNIFICANT CHANGE UP (ref 3.8–10.5)
WBC # FLD AUTO: 9.88 K/UL — SIGNIFICANT CHANGE UP (ref 3.8–10.5)
WBC # FLD AUTO: 9.93 K/UL — SIGNIFICANT CHANGE UP (ref 3.8–10.5)
WBC UR QL: 1 /HPF — SIGNIFICANT CHANGE UP (ref 0–5)
WBC UR QL: 2 /HPF — SIGNIFICANT CHANGE UP (ref 0–5)
WBC UR QL: 3 /HPF — SIGNIFICANT CHANGE UP (ref 0–5)
WHITE BLOOD CELLS URINE: 3 /HPF

## 2022-01-01 PROCEDURE — 83880 ASSAY OF NATRIURETIC PEPTIDE: CPT

## 2022-01-01 PROCEDURE — 36415 COLL VENOUS BLD VENIPUNCTURE: CPT

## 2022-01-01 PROCEDURE — 95720 EEG PHY/QHP EA INCR W/VEEG: CPT

## 2022-01-01 PROCEDURE — 71045 X-RAY EXAM CHEST 1 VIEW: CPT | Mod: 26

## 2022-01-01 PROCEDURE — 99232 SBSQ HOSP IP/OBS MODERATE 35: CPT

## 2022-01-01 PROCEDURE — 70450 CT HEAD/BRAIN W/O DYE: CPT | Mod: 26

## 2022-01-01 PROCEDURE — 96375 TX/PRO/DX INJ NEW DRUG ADDON: CPT

## 2022-01-01 PROCEDURE — 76770 US EXAM ABDO BACK WALL COMP: CPT | Mod: 26

## 2022-01-01 PROCEDURE — 82947 ASSAY GLUCOSE BLOOD QUANT: CPT

## 2022-01-01 PROCEDURE — 93005 ELECTROCARDIOGRAM TRACING: CPT

## 2022-01-01 PROCEDURE — 99205 OFFICE O/P NEW HI 60 MIN: CPT

## 2022-01-01 PROCEDURE — 77427 RADIATION TX MANAGEMENT X5: CPT

## 2022-01-01 PROCEDURE — 99497 ADVNCD CARE PLAN 30 MIN: CPT | Mod: 25

## 2022-01-01 PROCEDURE — 37191 INS ENDOVAS VENA CAVA FILTR: CPT

## 2022-01-01 PROCEDURE — 88305 TISSUE EXAM BY PATHOLOGIST: CPT | Mod: 26

## 2022-01-01 PROCEDURE — 99233 SBSQ HOSP IP/OBS HIGH 50: CPT

## 2022-01-01 PROCEDURE — 99239 HOSP IP/OBS DSCHRG MGMT >30: CPT | Mod: GC

## 2022-01-01 PROCEDURE — 99231 SBSQ HOSP IP/OBS SF/LOW 25: CPT

## 2022-01-01 PROCEDURE — 70553 MRI BRAIN STEM W/O & W/DYE: CPT | Mod: 26

## 2022-01-01 PROCEDURE — 80177 DRUG SCRN QUAN LEVETIRACETAM: CPT

## 2022-01-01 PROCEDURE — 84132 ASSAY OF SERUM POTASSIUM: CPT

## 2022-01-01 PROCEDURE — 99285 EMERGENCY DEPT VISIT HI MDM: CPT | Mod: 25

## 2022-01-01 PROCEDURE — 99223 1ST HOSP IP/OBS HIGH 75: CPT | Mod: GC

## 2022-01-01 PROCEDURE — 45378 DIAGNOSTIC COLONOSCOPY: CPT | Mod: GC

## 2022-01-01 PROCEDURE — 93970 EXTREMITY STUDY: CPT | Mod: 26

## 2022-01-01 PROCEDURE — 99232 SBSQ HOSP IP/OBS MODERATE 35: CPT | Mod: GC

## 2022-01-01 PROCEDURE — 87641 MR-STAPH DNA AMP PROBE: CPT

## 2022-01-01 PROCEDURE — 84145 PROCALCITONIN (PCT): CPT

## 2022-01-01 PROCEDURE — U0005: CPT

## 2022-01-01 PROCEDURE — 82962 GLUCOSE BLOOD TEST: CPT

## 2022-01-01 PROCEDURE — 95816 EEG AWAKE AND DROWSY: CPT

## 2022-01-01 PROCEDURE — 99233 SBSQ HOSP IP/OBS HIGH 50: CPT | Mod: GC

## 2022-01-01 PROCEDURE — 99285 EMERGENCY DEPT VISIT HI MDM: CPT

## 2022-01-01 PROCEDURE — 88342 IMHCHEM/IMCYTCHM 1ST ANTB: CPT | Mod: 26,59

## 2022-01-01 PROCEDURE — 87040 BLOOD CULTURE FOR BACTERIA: CPT

## 2022-01-01 PROCEDURE — 77334 RADIATION TREATMENT AID(S): CPT | Mod: 26

## 2022-01-01 PROCEDURE — 86900 BLOOD TYPING SEROLOGIC ABO: CPT

## 2022-01-01 PROCEDURE — 85379 FIBRIN DEGRADATION QUANT: CPT

## 2022-01-01 PROCEDURE — U0003: CPT

## 2022-01-01 PROCEDURE — 93306 TTE W/DOPPLER COMPLETE: CPT | Mod: 26

## 2022-01-01 PROCEDURE — 88331 PATH CONSLTJ SURG 1 BLK 1SPC: CPT

## 2022-01-01 PROCEDURE — 87086 URINE CULTURE/COLONY COUNT: CPT

## 2022-01-01 PROCEDURE — 85014 HEMATOCRIT: CPT

## 2022-01-01 PROCEDURE — G6002: CPT | Mod: 26

## 2022-01-01 PROCEDURE — 88334 PATH CONSLTJ SURG CYTO XM EA: CPT

## 2022-01-01 PROCEDURE — 85018 HEMOGLOBIN: CPT

## 2022-01-01 PROCEDURE — 99498 ADVNCD CARE PLAN ADDL 30 MIN: CPT

## 2022-01-01 PROCEDURE — 93306 TTE W/DOPPLER COMPLETE: CPT

## 2022-01-01 PROCEDURE — 85027 COMPLETE CBC AUTOMATED: CPT

## 2022-01-01 PROCEDURE — 70450 CT HEAD/BRAIN W/O DYE: CPT | Mod: 26,MA

## 2022-01-01 PROCEDURE — 99239 HOSP IP/OBS DSCHRG MGMT >30: CPT

## 2022-01-01 PROCEDURE — 88360 TUMOR IMMUNOHISTOCHEM/MANUAL: CPT

## 2022-01-01 PROCEDURE — 43239 EGD BIOPSY SINGLE/MULTIPLE: CPT | Mod: GC

## 2022-01-01 PROCEDURE — 83735 ASSAY OF MAGNESIUM: CPT

## 2022-01-01 PROCEDURE — 82330 ASSAY OF CALCIUM: CPT

## 2022-01-01 PROCEDURE — 77300 RADIATION THERAPY DOSE PLAN: CPT | Mod: 26

## 2022-01-01 PROCEDURE — 87640 STAPH A DNA AMP PROBE: CPT

## 2022-01-01 PROCEDURE — 70450 CT HEAD/BRAIN W/O DYE: CPT | Mod: MA

## 2022-01-01 PROCEDURE — 99215 OFFICE O/P EST HI 40 MIN: CPT

## 2022-01-01 PROCEDURE — 82553 CREATINE MB FRACTION: CPT

## 2022-01-01 PROCEDURE — 93010 ELECTROCARDIOGRAM REPORT: CPT | Mod: 76

## 2022-01-01 PROCEDURE — C9254: CPT

## 2022-01-01 PROCEDURE — 0225U NFCT DS DNA&RNA 21 SARSCOV2: CPT

## 2022-01-01 PROCEDURE — C1713: CPT

## 2022-01-01 PROCEDURE — 95711 VEEG 2-12 HR UNMONITORED: CPT

## 2022-01-01 PROCEDURE — 85610 PROTHROMBIN TIME: CPT

## 2022-01-01 PROCEDURE — 99291 CRITICAL CARE FIRST HOUR: CPT

## 2022-01-01 PROCEDURE — 97530 THERAPEUTIC ACTIVITIES: CPT

## 2022-01-01 PROCEDURE — 99221 1ST HOSP IP/OBS SF/LOW 40: CPT

## 2022-01-01 PROCEDURE — 88331 PATH CONSLTJ SURG 1 BLK 1SPC: CPT | Mod: 26

## 2022-01-01 PROCEDURE — 84100 ASSAY OF PHOSPHORUS: CPT

## 2022-01-01 PROCEDURE — C1889: CPT

## 2022-01-01 PROCEDURE — 93308 TTE F-UP OR LMTD: CPT

## 2022-01-01 PROCEDURE — 99024 POSTOP FOLLOW-UP VISIT: CPT

## 2022-01-01 PROCEDURE — 88341 IMHCHEM/IMCYTCHM EA ADD ANTB: CPT

## 2022-01-01 PROCEDURE — 84295 ASSAY OF SERUM SODIUM: CPT

## 2022-01-01 PROCEDURE — 82435 ASSAY OF BLOOD CHLORIDE: CPT

## 2022-01-01 PROCEDURE — 61715 MRGFUS STRTCTC ABLT TRGT ICR: CPT

## 2022-01-01 PROCEDURE — 95718 EEG PHYS/QHP 2-12 HR W/VEEG: CPT

## 2022-01-01 PROCEDURE — 80048 BASIC METABOLIC PNL TOTAL CA: CPT

## 2022-01-01 PROCEDURE — 36600 WITHDRAWAL OF ARTERIAL BLOOD: CPT

## 2022-01-01 PROCEDURE — 97166 OT EVAL MOD COMPLEX 45 MIN: CPT

## 2022-01-01 PROCEDURE — 99223 1ST HOSP IP/OBS HIGH 75: CPT

## 2022-01-01 PROCEDURE — 97165 OT EVAL LOW COMPLEX 30 MIN: CPT

## 2022-01-01 PROCEDURE — 99205 OFFICE O/P NEW HI 60 MIN: CPT | Mod: 25,GC

## 2022-01-01 PROCEDURE — 88342 IMHCHEM/IMCYTCHM 1ST ANTB: CPT | Mod: 26

## 2022-01-01 PROCEDURE — 87077 CULTURE AEROBIC IDENTIFY: CPT

## 2022-01-01 PROCEDURE — 61751 BRAIN BIOPSY W/CT/MR GUIDE: CPT

## 2022-01-01 PROCEDURE — 97161 PT EVAL LOW COMPLEX 20 MIN: CPT

## 2022-01-01 PROCEDURE — 74018 RADEX ABDOMEN 1 VIEW: CPT | Mod: 26

## 2022-01-01 PROCEDURE — 88342 IMHCHEM/IMCYTCHM 1ST ANTB: CPT

## 2022-01-01 PROCEDURE — 93000 ELECTROCARDIOGRAM COMPLETE: CPT

## 2022-01-01 PROCEDURE — 97535 SELF CARE MNGMENT TRAINING: CPT

## 2022-01-01 PROCEDURE — 80061 LIPID PANEL: CPT

## 2022-01-01 PROCEDURE — 80053 COMPREHEN METABOLIC PANEL: CPT

## 2022-01-01 PROCEDURE — 93010 ELECTROCARDIOGRAM REPORT: CPT

## 2022-01-01 PROCEDURE — 87150 DNA/RNA AMPLIFIED PROBE: CPT

## 2022-01-01 PROCEDURE — 85025 COMPLETE CBC W/AUTO DIFF WBC: CPT

## 2022-01-01 PROCEDURE — 71045 X-RAY EXAM CHEST 1 VIEW: CPT

## 2022-01-01 PROCEDURE — 77301 RADIOTHERAPY DOSE PLAN IMRT: CPT | Mod: 26

## 2022-01-01 PROCEDURE — 82803 BLOOD GASES ANY COMBINATION: CPT

## 2022-01-01 PROCEDURE — 97162 PT EVAL MOD COMPLEX 30 MIN: CPT

## 2022-01-01 PROCEDURE — 88334 PATH CONSLTJ SURG CYTO XM EA: CPT | Mod: 26,59

## 2022-01-01 PROCEDURE — 71260 CT THORAX DX C+: CPT | Mod: MA

## 2022-01-01 PROCEDURE — 71260 CT THORAX DX C+: CPT | Mod: 26,MA

## 2022-01-01 PROCEDURE — 95700 EEG CONT REC W/VID EEG TECH: CPT

## 2022-01-01 PROCEDURE — 99214 OFFICE O/P EST MOD 30 MIN: CPT | Mod: 25

## 2022-01-01 PROCEDURE — 88307 TISSUE EXAM BY PATHOLOGIST: CPT | Mod: 26

## 2022-01-01 PROCEDURE — 0398T: CPT | Mod: 26

## 2022-01-01 PROCEDURE — 85730 THROMBOPLASTIN TIME PARTIAL: CPT

## 2022-01-01 PROCEDURE — 88360 TUMOR IMMUNOHISTOCHEM/MANUAL: CPT | Mod: 26

## 2022-01-01 PROCEDURE — 96374 THER/PROPH/DIAG INJ IV PUSH: CPT

## 2022-01-01 PROCEDURE — 88307 TISSUE EXAM BY PATHOLOGIST: CPT

## 2022-01-01 PROCEDURE — 77014: CPT | Mod: 26

## 2022-01-01 PROCEDURE — 93970 EXTREMITY STUDY: CPT

## 2022-01-01 PROCEDURE — 84484 ASSAY OF TROPONIN QUANT: CPT

## 2022-01-01 PROCEDURE — 83605 ASSAY OF LACTIC ACID: CPT

## 2022-01-01 PROCEDURE — 97116 GAIT TRAINING THERAPY: CPT

## 2022-01-01 PROCEDURE — 81001 URINALYSIS AUTO W/SCOPE: CPT

## 2022-01-01 PROCEDURE — 61510 CRNEC TREPH EXC BRN TUM STTL: CPT | Mod: 22

## 2022-01-01 PROCEDURE — C9399: CPT

## 2022-01-01 PROCEDURE — 99497 ADVNCD CARE PLAN 30 MIN: CPT

## 2022-01-01 PROCEDURE — 61737 LITT ICR MLT TRJ MLT/CPLX LS: CPT | Mod: RT

## 2022-01-01 PROCEDURE — 88341 IMHCHEM/IMCYTCHM EA ADD ANTB: CPT | Mod: 26,59

## 2022-01-01 PROCEDURE — 74177 CT ABD & PELVIS W/CONTRAST: CPT | Mod: MA

## 2022-01-01 PROCEDURE — 70553 MRI BRAIN STEM W/O & W/DYE: CPT

## 2022-01-01 PROCEDURE — 85049 AUTOMATED PLATELET COUNT: CPT

## 2022-01-01 PROCEDURE — 86850 RBC ANTIBODY SCREEN: CPT

## 2022-01-01 PROCEDURE — 77338 DESIGN MLC DEVICE FOR IMRT: CPT | Mod: 26

## 2022-01-01 PROCEDURE — 93971 EXTREMITY STUDY: CPT | Mod: 26,LT

## 2022-01-01 PROCEDURE — 82565 ASSAY OF CREATININE: CPT

## 2022-01-01 PROCEDURE — 86022 PLATELET ANTIBODIES: CPT

## 2022-01-01 PROCEDURE — 70450 CT HEAD/BRAIN W/O DYE: CPT

## 2022-01-01 PROCEDURE — 83036 HEMOGLOBIN GLYCOSYLATED A1C: CPT

## 2022-01-01 PROCEDURE — 82550 ASSAY OF CK (CPK): CPT

## 2022-01-01 PROCEDURE — 74177 CT ABD & PELVIS W/CONTRAST: CPT | Mod: 26,MA

## 2022-01-01 PROCEDURE — 72131 CT LUMBAR SPINE W/O DYE: CPT | Mod: 26

## 2022-01-01 PROCEDURE — G0439: CPT

## 2022-01-01 PROCEDURE — 95816 EEG AWAKE AND DROWSY: CPT | Mod: 26

## 2022-01-01 PROCEDURE — 86901 BLOOD TYPING SEROLOGIC RH(D): CPT

## 2022-01-01 PROCEDURE — A9585: CPT

## 2022-01-01 PROCEDURE — C1769: CPT

## 2022-01-01 PROCEDURE — 95714 VEEG EA 12-26 HR UNMNTR: CPT

## 2022-01-01 PROCEDURE — 77263 THER RADIOLOGY TX PLNG CPLX: CPT

## 2022-01-01 DEVICE — SCREW UN3 AXS SELF DRILL 1.5X4MM 5/PK: Type: IMPLANTABLE DEVICE | Site: RIGHT | Status: FUNCTIONAL

## 2022-01-01 DEVICE — SURGICEL FIBRILLAR 2 X 4": Type: IMPLANTABLE DEVICE | Site: RIGHT | Status: FUNCTIONAL

## 2022-01-01 DEVICE — SURGIFOAM PAD 8CM X 12.5CM X 10MM (100): Type: IMPLANTABLE DEVICE | Site: RIGHT | Status: FUNCTIONAL

## 2022-01-01 DEVICE — PIN SKULL RADIOLUCENT: Type: IMPLANTABLE DEVICE | Site: RIGHT | Status: FUNCTIONAL

## 2022-01-01 DEVICE — SURGICEL 2 X 14": Type: IMPLANTABLE DEVICE | Site: RIGHT | Status: FUNCTIONAL

## 2022-01-01 DEVICE — KIT A-LINE 1LUM 20G X 12CM SAFE KIT: Type: IMPLANTABLE DEVICE | Site: RIGHT | Status: FUNCTIONAL

## 2022-01-01 DEVICE — PLATE BONE MICRO 10MM 2H: Type: IMPLANTABLE DEVICE | Site: RIGHT | Status: FUNCTIONAL

## 2022-01-01 DEVICE — IMPLANTABLE DEVICE: Type: IMPLANTABLE DEVICE | Site: RIGHT | Status: FUNCTIONAL

## 2022-01-01 DEVICE — SURGIFLO MATRIX WITH THROMBIN KIT: Type: IMPLANTABLE DEVICE | Site: RIGHT | Status: FUNCTIONAL

## 2022-01-01 DEVICE — LASER ABLATION CORE FIBER 0.4X10MM TIP: Type: IMPLANTABLE DEVICE | Site: RIGHT | Status: FUNCTIONAL

## 2022-01-01 DEVICE — PLATE COVER BURRHOLE UN3 W/TAB 14MM: Type: IMPLANTABLE DEVICE | Site: RIGHT | Status: FUNCTIONAL

## 2022-01-01 RX ORDER — ACETAMINOPHEN 500 MG
1000 TABLET ORAL ONCE
Refills: 0 | Status: COMPLETED | OUTPATIENT
Start: 2022-01-01 | End: 2022-01-01

## 2022-01-01 RX ORDER — INSULIN LISPRO 100/ML
VIAL (ML) SUBCUTANEOUS EVERY 6 HOURS
Refills: 0 | Status: DISCONTINUED | OUTPATIENT
Start: 2022-01-01 | End: 2022-01-01

## 2022-01-01 RX ORDER — SODIUM CHLORIDE 9 MG/ML
1000 INJECTION INTRAMUSCULAR; INTRAVENOUS; SUBCUTANEOUS
Refills: 0 | Status: DISCONTINUED | OUTPATIENT
Start: 2022-01-01 | End: 2022-01-01

## 2022-01-01 RX ORDER — METFORMIN HYDROCHLORIDE 850 MG/1
1 TABLET ORAL
Qty: 60 | Refills: 0
Start: 2022-01-01

## 2022-01-01 RX ORDER — METOCLOPRAMIDE HCL 10 MG
10 TABLET ORAL ONCE
Refills: 0 | Status: COMPLETED | OUTPATIENT
Start: 2022-01-01 | End: 2022-01-01

## 2022-01-01 RX ORDER — SODIUM CHLORIDE 9 MG/ML
1000 INJECTION, SOLUTION INTRAVENOUS
Refills: 0 | Status: DISCONTINUED | OUTPATIENT
Start: 2022-01-01 | End: 2022-01-01

## 2022-01-01 RX ORDER — DEXTROSE 50 % IN WATER 50 %
15 SYRINGE (ML) INTRAVENOUS
Qty: 0 | Refills: 0 | DISCHARGE
Start: 2022-01-01

## 2022-01-01 RX ORDER — INSULIN GLARGINE 100 [IU]/ML
4 INJECTION, SOLUTION SUBCUTANEOUS AT BEDTIME
Refills: 0 | Status: DISCONTINUED | OUTPATIENT
Start: 2022-01-01 | End: 2022-01-01

## 2022-01-01 RX ORDER — LEVETIRACETAM 250 MG/1
1000 TABLET, FILM COATED ORAL
Refills: 0 | Status: DISCONTINUED | OUTPATIENT
Start: 2022-01-01 | End: 2022-01-01

## 2022-01-01 RX ORDER — AMPICILLIN TRIHYDRATE 250 MG
2 CAPSULE ORAL ONCE
Refills: 0 | Status: COMPLETED | OUTPATIENT
Start: 2022-01-01 | End: 2022-01-01

## 2022-01-01 RX ORDER — HYDROCHLOROTHIAZIDE 25 MG
12.5 TABLET ORAL DAILY
Refills: 0 | Status: DISCONTINUED | OUTPATIENT
Start: 2022-01-01 | End: 2022-01-01

## 2022-01-01 RX ORDER — INSULIN LISPRO 100/ML
3 VIAL (ML) SUBCUTANEOUS
Refills: 0 | Status: DISCONTINUED | OUTPATIENT
Start: 2022-01-01 | End: 2022-01-01

## 2022-01-01 RX ORDER — PIPERACILLIN AND TAZOBACTAM 4; .5 G/20ML; G/20ML
3.38 INJECTION, POWDER, LYOPHILIZED, FOR SOLUTION INTRAVENOUS EVERY 8 HOURS
Refills: 0 | Status: DISCONTINUED | OUTPATIENT
Start: 2022-01-01 | End: 2022-01-01

## 2022-01-01 RX ORDER — FLUOXETINE HYDROCHLORIDE 20 MG/1
20 TABLET ORAL
Qty: 30 | Refills: 1 | Status: DISCONTINUED | COMMUNITY
Start: 2022-01-01 | End: 2022-01-01

## 2022-01-01 RX ORDER — ENOXAPARIN SODIUM 120 MG/.8ML
120 INJECTION, SOLUTION SUBCUTANEOUS
Qty: 30 | Refills: 6 | Status: ACTIVE | COMMUNITY
Start: 2022-01-01 | End: 1900-01-01

## 2022-01-01 RX ORDER — POTASSIUM PHOSPHATE, MONOBASIC POTASSIUM PHOSPHATE, DIBASIC 236; 224 MG/ML; MG/ML
30 INJECTION, SOLUTION INTRAVENOUS ONCE
Refills: 0 | Status: COMPLETED | OUTPATIENT
Start: 2022-01-01 | End: 2022-01-01

## 2022-01-01 RX ORDER — INSULIN LISPRO 100/ML
VIAL (ML) SUBCUTANEOUS
Refills: 0 | Status: DISCONTINUED | OUTPATIENT
Start: 2022-01-01 | End: 2022-01-01

## 2022-01-01 RX ORDER — INSULIN GLARGINE 100 [IU]/ML
6 INJECTION, SOLUTION SUBCUTANEOUS ONCE
Refills: 0 | Status: COMPLETED | OUTPATIENT
Start: 2022-01-01 | End: 2022-01-01

## 2022-01-01 RX ORDER — DEXAMETHASONE 0.5 MG/5ML
4 ELIXIR ORAL EVERY 12 HOURS
Refills: 0 | Status: DISCONTINUED | OUTPATIENT
Start: 2022-01-01 | End: 2022-01-01

## 2022-01-01 RX ORDER — SODIUM,POTASSIUM PHOSPHATES 278-250MG
1 POWDER IN PACKET (EA) ORAL ONCE
Refills: 0 | Status: COMPLETED | OUTPATIENT
Start: 2022-01-01 | End: 2022-01-01

## 2022-01-01 RX ORDER — SOD SULF/SODIUM/NAHCO3/KCL/PEG
4000 SOLUTION, RECONSTITUTED, ORAL ORAL ONCE
Refills: 0 | Status: COMPLETED | OUTPATIENT
Start: 2022-01-01 | End: 2022-01-01

## 2022-01-01 RX ORDER — PANTOPRAZOLE SODIUM 20 MG/1
40 TABLET, DELAYED RELEASE ORAL
Refills: 0 | Status: DISCONTINUED | OUTPATIENT
Start: 2022-01-01 | End: 2022-01-01

## 2022-01-01 RX ORDER — ASCORBIC ACID 60 MG
1 TABLET,CHEWABLE ORAL
Qty: 30 | Refills: 0
Start: 2022-01-01 | End: 2022-11-26

## 2022-01-01 RX ORDER — POTASSIUM PHOSPHATE, MONOBASIC POTASSIUM PHOSPHATE, DIBASIC 236; 224 MG/ML; MG/ML
15 INJECTION, SOLUTION INTRAVENOUS ONCE
Refills: 0 | Status: COMPLETED | OUTPATIENT
Start: 2022-01-01 | End: 2022-01-01

## 2022-01-01 RX ORDER — LEVETIRACETAM 250 MG/1
750 TABLET, FILM COATED ORAL
Refills: 0 | Status: DISCONTINUED | OUTPATIENT
Start: 2022-01-01 | End: 2022-01-01

## 2022-01-01 RX ORDER — INSULIN GLARGINE 100 [IU]/ML
5 INJECTION, SOLUTION SUBCUTANEOUS AT BEDTIME
Refills: 0 | Status: DISCONTINUED | OUTPATIENT
Start: 2022-01-01 | End: 2022-01-01

## 2022-01-01 RX ORDER — DEXAMETHASONE 0.5 MG/5ML
1 ELIXIR ORAL
Qty: 0 | Refills: 0 | DISCHARGE
Start: 2022-01-01

## 2022-01-01 RX ORDER — GLUCAGON INJECTION, SOLUTION 0.5 MG/.1ML
1 INJECTION, SOLUTION SUBCUTANEOUS ONCE
Refills: 0 | Status: DISCONTINUED | OUTPATIENT
Start: 2022-01-01 | End: 2022-01-01

## 2022-01-01 RX ORDER — DEXTROSE 50 % IN WATER 50 %
25 SYRINGE (ML) INTRAVENOUS ONCE
Refills: 0 | Status: DISCONTINUED | OUTPATIENT
Start: 2022-01-01 | End: 2022-01-01

## 2022-01-01 RX ORDER — ACETAMINOPHEN 500 MG
650 TABLET ORAL EVERY 6 HOURS
Refills: 0 | Status: DISCONTINUED | OUTPATIENT
Start: 2022-01-01 | End: 2022-01-01

## 2022-01-01 RX ORDER — INSULIN LISPRO 100/ML
VIAL (ML) SUBCUTANEOUS AT BEDTIME
Refills: 0 | Status: DISCONTINUED | OUTPATIENT
Start: 2022-01-01 | End: 2022-01-01

## 2022-01-01 RX ORDER — BLOOD-GLUCOSE,RECEIVER,CONT
EACH MISCELLANEOUS
Qty: 1 | Refills: 0 | Status: ACTIVE | COMMUNITY
Start: 2022-01-01 | End: 1900-01-01

## 2022-01-01 RX ORDER — ATORVASTATIN CALCIUM 80 MG/1
40 TABLET, FILM COATED ORAL AT BEDTIME
Refills: 0 | Status: DISCONTINUED | OUTPATIENT
Start: 2022-01-01 | End: 2022-01-01

## 2022-01-01 RX ORDER — FLUOXETINE HCL 10 MG
20 CAPSULE ORAL DAILY
Refills: 0 | Status: DISCONTINUED | OUTPATIENT
Start: 2022-01-01 | End: 2022-01-01

## 2022-01-01 RX ORDER — POLYETHYLENE GLYCOL 3350 17 G/17G
17 POWDER, FOR SOLUTION ORAL DAILY
Refills: 0 | Status: DISCONTINUED | OUTPATIENT
Start: 2022-01-01 | End: 2022-01-01

## 2022-01-01 RX ORDER — LOSARTAN POTASSIUM 100 MG/1
0 TABLET, FILM COATED ORAL
Qty: 0 | Refills: 1 | DISCHARGE

## 2022-01-01 RX ORDER — INSULIN GLARGINE 100 [IU]/ML
12 INJECTION, SOLUTION SUBCUTANEOUS
Qty: 0 | Refills: 0 | DISCHARGE
Start: 2022-01-01

## 2022-01-01 RX ORDER — HYDRALAZINE HCL 50 MG
5 TABLET ORAL ONCE
Refills: 0 | Status: DISCONTINUED | OUTPATIENT
Start: 2022-01-01 | End: 2022-01-01

## 2022-01-01 RX ORDER — LEVETIRACETAM 250 MG/1
500 TABLET, FILM COATED ORAL EVERY 12 HOURS
Refills: 0 | Status: DISCONTINUED | OUTPATIENT
Start: 2022-01-01 | End: 2022-01-01

## 2022-01-01 RX ORDER — FONDAPARINUX SODIUM 2.5 MG/.5ML
2.5 INJECTION, SOLUTION SUBCUTANEOUS DAILY
Refills: 0 | Status: DISCONTINUED | OUTPATIENT
Start: 2022-01-01 | End: 2022-01-01

## 2022-01-01 RX ORDER — CHLORHEXIDINE GLUCONATE 213 G/1000ML
1 SOLUTION TOPICAL
Refills: 0 | Status: COMPLETED | OUTPATIENT
Start: 2022-01-01 | End: 2022-01-01

## 2022-01-01 RX ORDER — INSULIN GLARGINE 100 [IU]/ML
6 INJECTION, SOLUTION SUBCUTANEOUS AT BEDTIME
Refills: 0 | Status: DISCONTINUED | OUTPATIENT
Start: 2022-01-01 | End: 2022-01-01

## 2022-01-01 RX ORDER — DEXTROSE 50 % IN WATER 50 %
12.5 SYRINGE (ML) INTRAVENOUS ONCE
Refills: 0 | Status: DISCONTINUED | OUTPATIENT
Start: 2022-01-01 | End: 2022-01-01

## 2022-01-01 RX ORDER — FLUCONAZOLE 150 MG/1
400 TABLET ORAL DAILY
Refills: 0 | Status: DISCONTINUED | OUTPATIENT
Start: 2022-01-01 | End: 2022-01-01

## 2022-01-01 RX ORDER — LEVETIRACETAM 250 MG/1
750 TABLET, FILM COATED ORAL EVERY 12 HOURS
Refills: 0 | Status: DISCONTINUED | OUTPATIENT
Start: 2022-01-01 | End: 2022-01-01

## 2022-01-01 RX ORDER — ROSUVASTATIN CALCIUM 5 MG/1
0 TABLET ORAL
Qty: 0 | Refills: 0 | DISCHARGE

## 2022-01-01 RX ORDER — BLOOD-GLUCOSE SENSOR
EACH MISCELLANEOUS
Qty: 1 | Refills: 0 | Status: ACTIVE | COMMUNITY
Start: 2022-01-01 | End: 1900-01-01

## 2022-01-01 RX ORDER — SODIUM,POTASSIUM PHOSPHATES 278-250MG
1 POWDER IN PACKET (EA) ORAL EVERY 6 HOURS
Refills: 0 | Status: COMPLETED | OUTPATIENT
Start: 2022-01-01 | End: 2022-01-01

## 2022-01-01 RX ORDER — CALCIUM GLUCONATE 100 MG/ML
2 VIAL (ML) INTRAVENOUS ONCE
Refills: 0 | Status: COMPLETED | OUTPATIENT
Start: 2022-01-01 | End: 2022-01-01

## 2022-01-01 RX ORDER — INSULIN GLARGINE 100 [IU]/ML
8 INJECTION, SOLUTION SUBCUTANEOUS AT BEDTIME
Refills: 0 | Status: DISCONTINUED | OUTPATIENT
Start: 2022-01-01 | End: 2022-01-01

## 2022-01-01 RX ORDER — OXYCODONE HYDROCHLORIDE 5 MG/1
5 TABLET ORAL EVERY 4 HOURS
Refills: 0 | Status: DISCONTINUED | OUTPATIENT
Start: 2022-01-01 | End: 2022-01-01

## 2022-01-01 RX ORDER — SODIUM CHLORIDE 9 MG/ML
1000 INJECTION INTRAMUSCULAR; INTRAVENOUS; SUBCUTANEOUS ONCE
Refills: 0 | Status: COMPLETED | OUTPATIENT
Start: 2022-01-01 | End: 2022-01-01

## 2022-01-01 RX ORDER — ACETAMINOPHEN 500 MG
2 TABLET ORAL
Qty: 0 | Refills: 0 | DISCHARGE
Start: 2022-01-01

## 2022-01-01 RX ORDER — FLUCONAZOLE 150 MG/1
400 TABLET ORAL ONCE
Refills: 0 | Status: COMPLETED | OUTPATIENT
Start: 2022-01-01 | End: 2022-01-01

## 2022-01-01 RX ORDER — ROBINUL 0.2 MG/ML
0.4 INJECTION INTRAMUSCULAR; INTRAVENOUS EVERY 4 HOURS
Refills: 0 | Status: DISCONTINUED | OUTPATIENT
Start: 2022-01-01 | End: 2022-01-01

## 2022-01-01 RX ORDER — FLUCONAZOLE 150 MG/1
TABLET ORAL
Refills: 0 | Status: DISCONTINUED | OUTPATIENT
Start: 2022-01-01 | End: 2022-01-01

## 2022-01-01 RX ORDER — ROSUVASTATIN CALCIUM 5 MG/1
1 TABLET ORAL
Qty: 0 | Refills: 0 | DISCHARGE
Start: 2022-01-01

## 2022-01-01 RX ORDER — LEVETIRACETAM 250 MG/1
500 TABLET, FILM COATED ORAL
Refills: 0 | Status: DISCONTINUED | OUTPATIENT
Start: 2022-01-01 | End: 2022-01-01

## 2022-01-01 RX ORDER — INSULIN LISPRO 100/ML
4 VIAL (ML) SUBCUTANEOUS
Refills: 0 | Status: DISCONTINUED | OUTPATIENT
Start: 2022-01-01 | End: 2022-01-01

## 2022-01-01 RX ORDER — DEXAMETHASONE 0.5 MG/5ML
1 ELIXIR ORAL
Qty: 14 | Refills: 0
Start: 2022-01-01 | End: 2022-01-01

## 2022-01-01 RX ORDER — ISOPROPYL ALCOHOL, BENZOCAINE .7; .06 ML/ML; ML/ML
1 SWAB TOPICAL
Qty: 100 | Refills: 1
Start: 2022-01-01 | End: 2022-01-01

## 2022-01-01 RX ORDER — PANTOPRAZOLE SODIUM 20 MG/1
40 TABLET, DELAYED RELEASE ORAL EVERY 12 HOURS
Refills: 0 | Status: DISCONTINUED | OUTPATIENT
Start: 2022-01-01 | End: 2022-01-01

## 2022-01-01 RX ORDER — POLYETHYLENE GLYCOL 3350 17 G/17G
17 POWDER, FOR SOLUTION ORAL EVERY 12 HOURS
Refills: 0 | Status: DISCONTINUED | OUTPATIENT
Start: 2022-01-01 | End: 2022-01-01

## 2022-01-01 RX ORDER — LEVETIRACETAM 250 MG/1
1000 TABLET, FILM COATED ORAL EVERY 12 HOURS
Refills: 0 | Status: DISCONTINUED | OUTPATIENT
Start: 2022-01-01 | End: 2022-01-01

## 2022-01-01 RX ORDER — OXYCODONE HYDROCHLORIDE 5 MG/1
10 TABLET ORAL EVERY 6 HOURS
Refills: 0 | Status: DISCONTINUED | OUTPATIENT
Start: 2022-01-01 | End: 2022-01-01

## 2022-01-01 RX ORDER — ENOXAPARIN SODIUM 100 MG/ML
40 INJECTION SUBCUTANEOUS
Refills: 0 | Status: DISCONTINUED | OUTPATIENT
Start: 2022-01-01 | End: 2022-01-01

## 2022-01-01 RX ORDER — DEXTROSE 50 % IN WATER 50 %
15 SYRINGE (ML) INTRAVENOUS ONCE
Refills: 0 | Status: DISCONTINUED | OUTPATIENT
Start: 2022-01-01 | End: 2022-01-01

## 2022-01-01 RX ORDER — PHENYLEPHRINE HYDROCHLORIDE 10 MG/ML
1 INJECTION INTRAVENOUS ONCE
Refills: 0 | Status: DISCONTINUED | OUTPATIENT
Start: 2022-01-01 | End: 2022-01-01

## 2022-01-01 RX ORDER — INSULIN LISPRO 100/ML
2 VIAL (ML) SUBCUTANEOUS
Refills: 0 | Status: DISCONTINUED | OUTPATIENT
Start: 2022-01-01 | End: 2022-01-01

## 2022-01-01 RX ORDER — LEVETIRACETAM 250 MG/1
2250 TABLET, FILM COATED ORAL ONCE
Refills: 0 | Status: COMPLETED | OUTPATIENT
Start: 2022-01-01 | End: 2022-01-01

## 2022-01-01 RX ORDER — ONDANSETRON 8 MG/1
4 TABLET, FILM COATED ORAL EVERY 6 HOURS
Refills: 0 | Status: DISCONTINUED | OUTPATIENT
Start: 2022-01-01 | End: 2022-01-01

## 2022-01-01 RX ORDER — METOCLOPRAMIDE HCL 10 MG
10 TABLET ORAL ONCE
Refills: 0 | Status: DISCONTINUED | OUTPATIENT
Start: 2022-01-01 | End: 2022-01-01

## 2022-01-01 RX ORDER — VALPROIC ACID (AS SODIUM SALT) 250 MG/5ML
1500 SOLUTION, ORAL ORAL ONCE
Refills: 0 | Status: COMPLETED | OUTPATIENT
Start: 2022-01-01 | End: 2022-01-01

## 2022-01-01 RX ORDER — ATORVASTATIN CALCIUM 80 MG/1
80 TABLET, FILM COATED ORAL AT BEDTIME
Refills: 0 | Status: DISCONTINUED | OUTPATIENT
Start: 2022-01-01 | End: 2022-01-01

## 2022-01-01 RX ORDER — CHLORHEXIDINE GLUCONATE 213 G/1000ML
1 SOLUTION TOPICAL
Refills: 0 | Status: DISCONTINUED | OUTPATIENT
Start: 2022-01-01 | End: 2022-01-01

## 2022-01-01 RX ORDER — CHLORHEXIDINE GLUCONATE 213 G/1000ML
1 SOLUTION TOPICAL DAILY
Refills: 0 | Status: DISCONTINUED | OUTPATIENT
Start: 2022-01-01 | End: 2022-01-01

## 2022-01-01 RX ORDER — BLOOD SUGAR DIAGNOSTIC
STRIP MISCELLANEOUS DAILY
Qty: 1 | Refills: 0 | Status: ACTIVE | COMMUNITY
Start: 2022-01-01 | End: 1900-01-01

## 2022-01-01 RX ORDER — NAFCILLIN 10 G/100ML
2 INJECTION, POWDER, FOR SOLUTION INTRAVENOUS EVERY 4 HOURS
Refills: 0 | Status: COMPLETED | OUTPATIENT
Start: 2022-01-01 | End: 2022-01-01

## 2022-01-01 RX ORDER — LACOSAMIDE 50 MG/1
200 TABLET ORAL EVERY 12 HOURS
Refills: 0 | Status: DISCONTINUED | OUTPATIENT
Start: 2022-01-01 | End: 2022-01-01

## 2022-01-01 RX ORDER — PREDNISOLONE SODIUM PHOSPHATE 1 %
0 DROPS OPHTHALMIC (EYE)
Qty: 0 | Refills: 0 | DISCHARGE

## 2022-01-01 RX ORDER — CEFEPIME 1 G/1
1000 INJECTION, POWDER, FOR SOLUTION INTRAMUSCULAR; INTRAVENOUS ONCE
Refills: 0 | Status: COMPLETED | OUTPATIENT
Start: 2022-01-01 | End: 2022-01-01

## 2022-01-01 RX ORDER — TRAMADOL HYDROCHLORIDE 50 MG/1
50 TABLET ORAL EVERY 6 HOURS
Refills: 0 | Status: DISCONTINUED | OUTPATIENT
Start: 2022-01-01 | End: 2022-01-01

## 2022-01-01 RX ORDER — PANTOPRAZOLE SODIUM 20 MG/1
40 TABLET, DELAYED RELEASE ORAL DAILY
Refills: 0 | Status: DISCONTINUED | OUTPATIENT
Start: 2022-01-01 | End: 2022-01-01

## 2022-01-01 RX ORDER — PREGABALIN 225 MG/1
1 CAPSULE ORAL
Qty: 0 | Refills: 0 | DISCHARGE

## 2022-01-01 RX ORDER — LOSARTAN POTASSIUM 25 MG/1
25 TABLET, FILM COATED ORAL DAILY
Qty: 30 | Refills: 0 | Status: DISCONTINUED | COMMUNITY
Start: 2022-01-01 | End: 2022-01-01

## 2022-01-01 RX ORDER — SODIUM CHLORIDE 9 MG/ML
1000 INJECTION INTRAMUSCULAR; INTRAVENOUS; SUBCUTANEOUS
Refills: 0 | Status: COMPLETED | OUTPATIENT
Start: 2022-01-01 | End: 2022-01-01

## 2022-01-01 RX ORDER — SODIUM CHLORIDE 9 MG/ML
500 INJECTION INTRAMUSCULAR; INTRAVENOUS; SUBCUTANEOUS ONCE
Refills: 0 | Status: COMPLETED | OUTPATIENT
Start: 2022-01-01 | End: 2022-01-01

## 2022-01-01 RX ORDER — FONDAPARINUX SODIUM 5 MG/.4ML
5 INJECTION, SOLUTION SUBCUTANEOUS
Refills: 0 | Status: DISCONTINUED | COMMUNITY
Start: 2022-01-01 | End: 2022-01-01

## 2022-01-01 RX ORDER — SODIUM CHLORIDE 5 G/100ML
1000 INJECTION, SOLUTION INTRAVENOUS
Refills: 0 | Status: DISCONTINUED | OUTPATIENT
Start: 2022-01-01 | End: 2022-01-01

## 2022-01-01 RX ORDER — METFORMIN HYDROCHLORIDE 850 MG/1
1 TABLET ORAL
Qty: 0 | Refills: 0 | DISCHARGE

## 2022-01-01 RX ORDER — CIPROFLOXACIN LACTATE 400MG/40ML
400 VIAL (ML) INTRAVENOUS EVERY 12 HOURS
Refills: 0 | Status: COMPLETED | OUTPATIENT
Start: 2022-01-01 | End: 2022-01-01

## 2022-01-01 RX ORDER — LEVETIRACETAM 250 MG/1
500 TABLET, FILM COATED ORAL ONCE
Refills: 0 | Status: DISCONTINUED | OUTPATIENT
Start: 2022-01-01 | End: 2022-01-01

## 2022-01-01 RX ORDER — BLOOD-GLUCOSE TRANSMITTER
EACH MISCELLANEOUS
Qty: 1 | Refills: 0 | Status: ACTIVE | COMMUNITY
Start: 2022-01-01 | End: 1900-01-01

## 2022-01-01 RX ORDER — BENZOCAINE AND MENTHOL 5; 1 G/100ML; G/100ML
1 LIQUID ORAL THREE TIMES A DAY
Refills: 0 | Status: DISCONTINUED | OUTPATIENT
Start: 2022-01-01 | End: 2022-01-01

## 2022-01-01 RX ORDER — PIPERACILLIN AND TAZOBACTAM 4; .5 G/20ML; G/20ML
3.38 INJECTION, POWDER, LYOPHILIZED, FOR SOLUTION INTRAVENOUS ONCE
Refills: 0 | Status: COMPLETED | OUTPATIENT
Start: 2022-01-01 | End: 2022-01-01

## 2022-01-01 RX ORDER — PHENYLEPHRINE HYDROCHLORIDE 10 MG/ML
100 INJECTION INTRAVENOUS ONCE
Refills: 0 | Status: COMPLETED | OUTPATIENT
Start: 2022-01-01 | End: 2022-01-01

## 2022-01-01 RX ORDER — ASCORBIC ACID 60 MG
500 TABLET,CHEWABLE ORAL DAILY
Refills: 0 | Status: DISCONTINUED | OUTPATIENT
Start: 2022-01-01 | End: 2022-01-01

## 2022-01-01 RX ORDER — POLYETHYLENE GLYCOL 3350 17 G/17G
17 POWDER, FOR SOLUTION ORAL
Qty: 0 | Refills: 0 | DISCHARGE
Start: 2022-01-01

## 2022-01-01 RX ORDER — FONDAPARINUX SODIUM 2.5 MG/.5ML
2.5 INJECTION, SOLUTION SUBCUTANEOUS
Qty: 7 | Refills: 0
Start: 2022-01-01 | End: 2022-01-01

## 2022-01-01 RX ORDER — METFORMIN HYDROCHLORIDE 500 MG/1
500 TABLET, COATED ORAL
Qty: 60 | Refills: 0 | Status: DISCONTINUED | COMMUNITY
Start: 2022-01-01 | End: 2022-01-01

## 2022-01-01 RX ORDER — DEXAMETHASONE 4 MG/1
4 TABLET ORAL
Qty: 90 | Refills: 0 | Status: ACTIVE | COMMUNITY
Start: 2022-01-01

## 2022-01-01 RX ORDER — VANCOMYCIN HCL 1 G
1000 VIAL (EA) INTRAVENOUS EVERY 8 HOURS
Refills: 0 | Status: DISCONTINUED | OUTPATIENT
Start: 2022-01-01 | End: 2022-01-01

## 2022-01-01 RX ORDER — LOSARTAN POTASSIUM 100 MG/1
50 TABLET, FILM COATED ORAL DAILY
Refills: 0 | Status: DISCONTINUED | OUTPATIENT
Start: 2022-01-01 | End: 2022-01-01

## 2022-01-01 RX ORDER — ONDANSETRON 8 MG/1
4 TABLET, FILM COATED ORAL EVERY 8 HOURS
Refills: 0 | Status: DISCONTINUED | OUTPATIENT
Start: 2022-01-01 | End: 2022-01-01

## 2022-01-01 RX ORDER — LANOLIN ALCOHOL/MO/W.PET/CERES
3 CREAM (GRAM) TOPICAL AT BEDTIME
Refills: 0 | Status: DISCONTINUED | OUTPATIENT
Start: 2022-01-01 | End: 2022-01-01

## 2022-01-01 RX ORDER — DEXAMETHASONE 0.5 MG/5ML
4 ELIXIR ORAL EVERY 8 HOURS
Refills: 0 | Status: DISCONTINUED | OUTPATIENT
Start: 2022-01-01 | End: 2022-01-01

## 2022-01-01 RX ORDER — FLUCONAZOLE 150 MG/1
400 TABLET ORAL EVERY 24 HOURS
Refills: 0 | Status: DISCONTINUED | OUTPATIENT
Start: 2022-01-01 | End: 2022-01-01

## 2022-01-01 RX ORDER — FLUOXETINE HCL 10 MG
1 CAPSULE ORAL
Qty: 0 | Refills: 0 | DISCHARGE

## 2022-01-01 RX ORDER — MAGNESIUM SULFATE 500 MG/ML
1 VIAL (ML) INJECTION ONCE
Refills: 0 | Status: COMPLETED | OUTPATIENT
Start: 2022-01-01 | End: 2022-01-01

## 2022-01-01 RX ORDER — LEVETIRACETAM 250 MG/1
1 TABLET, FILM COATED ORAL
Qty: 0 | Refills: 0 | DISCHARGE
Start: 2022-01-01

## 2022-01-01 RX ORDER — DEXAMETHASONE 0.5 MG/5ML
4 ELIXIR ORAL EVERY 6 HOURS
Refills: 0 | Status: DISCONTINUED | OUTPATIENT
Start: 2022-01-01 | End: 2022-01-01

## 2022-01-01 RX ORDER — AMPICILLIN TRIHYDRATE 250 MG
2 CAPSULE ORAL EVERY 4 HOURS
Refills: 0 | Status: DISCONTINUED | OUTPATIENT
Start: 2022-01-01 | End: 2022-01-01

## 2022-01-01 RX ORDER — METFORMIN ER 500 MG 500 MG/1
500 TABLET ORAL
Qty: 360 | Refills: 1 | Status: ACTIVE | COMMUNITY
Start: 2022-01-01 | End: 1900-01-01

## 2022-01-01 RX ORDER — ACETAMINOPHEN 500 MG
1000 TABLET ORAL ONCE
Refills: 0 | Status: DISCONTINUED | OUTPATIENT
Start: 2022-01-01 | End: 2022-01-01

## 2022-01-01 RX ORDER — LEVETIRACETAM 250 MG/1
2250 TABLET, FILM COATED ORAL ONCE
Refills: 0 | Status: DISCONTINUED | OUTPATIENT
Start: 2022-01-01 | End: 2022-01-01

## 2022-01-01 RX ORDER — DEXAMETHASONE 0.5 MG/5ML
10 ELIXIR ORAL ONCE
Refills: 0 | Status: COMPLETED | OUTPATIENT
Start: 2022-01-01 | End: 2022-01-01

## 2022-01-01 RX ORDER — PANTOPRAZOLE SODIUM 20 MG/1
80 TABLET, DELAYED RELEASE ORAL ONCE
Refills: 0 | Status: COMPLETED | OUTPATIENT
Start: 2022-01-01 | End: 2022-01-01

## 2022-01-01 RX ORDER — FUROSEMIDE 40 MG
0 TABLET ORAL
Qty: 0 | Refills: 1 | DISCHARGE

## 2022-01-01 RX ORDER — SITAGLIPTIN 100 MG/1
100 TABLET, FILM COATED ORAL DAILY
Qty: 1 | Refills: 1 | Status: ACTIVE | COMMUNITY
Start: 2022-01-01 | End: 1900-01-01

## 2022-01-01 RX ORDER — SENNA PLUS 8.6 MG/1
2 TABLET ORAL AT BEDTIME
Refills: 0 | Status: DISCONTINUED | OUTPATIENT
Start: 2022-01-01 | End: 2022-01-01

## 2022-01-01 RX ORDER — HYDRALAZINE HCL 50 MG
5 TABLET ORAL ONCE
Refills: 0 | Status: COMPLETED | OUTPATIENT
Start: 2022-01-01 | End: 2022-01-01

## 2022-01-01 RX ORDER — MULTIVIT WITH MIN/MFOLATE/K2 340-15/3 G
1 POWDER (GRAM) ORAL ONCE
Refills: 0 | Status: COMPLETED | OUTPATIENT
Start: 2022-01-01 | End: 2022-01-01

## 2022-01-01 RX ORDER — ROSUVASTATIN CALCIUM 40 MG/1
40 TABLET, FILM COATED ORAL DAILY
Qty: 30 | Refills: 0 | Status: ACTIVE | COMMUNITY
Start: 2022-01-01

## 2022-01-01 RX ORDER — FUROSEMIDE 40 MG
10 TABLET ORAL DAILY
Refills: 0 | Status: DISCONTINUED | OUTPATIENT
Start: 2022-01-01 | End: 2022-01-01

## 2022-01-01 RX ORDER — INSULIN GLARGINE 100 [IU]/ML
10 INJECTION, SOLUTION SUBCUTANEOUS AT BEDTIME
Refills: 0 | Status: DISCONTINUED | OUTPATIENT
Start: 2022-01-01 | End: 2022-01-01

## 2022-01-01 RX ORDER — PANTOPRAZOLE 40 MG/1
40 TABLET, DELAYED RELEASE ORAL DAILY
Qty: 30 | Refills: 2 | Status: ACTIVE | COMMUNITY
Start: 2022-01-01 | End: 1900-01-01

## 2022-01-01 RX ORDER — PANTOPRAZOLE SODIUM 20 MG/1
1 TABLET, DELAYED RELEASE ORAL
Qty: 30 | Refills: 0
Start: 2022-01-01 | End: 2022-01-01

## 2022-01-01 RX ORDER — TRAMADOL HYDROCHLORIDE 50 MG/1
25 TABLET ORAL EVERY 6 HOURS
Refills: 0 | Status: DISCONTINUED | OUTPATIENT
Start: 2022-01-01 | End: 2022-01-01

## 2022-01-01 RX ORDER — SITAGLIPTIN 50 MG/1
1 TABLET, FILM COATED ORAL
Qty: 0 | Refills: 0 | DISCHARGE

## 2022-01-01 RX ORDER — POLYETHYLENE GLYCOL 3350 17 G/17G
17 POWDER, FOR SOLUTION ORAL
Refills: 0 | Status: COMPLETED | OUTPATIENT
Start: 2022-01-01 | End: 2022-01-01

## 2022-01-01 RX ORDER — INSULIN GLARGINE 100 [IU]/ML
12 INJECTION, SOLUTION SUBCUTANEOUS AT BEDTIME
Refills: 0 | Status: DISCONTINUED | OUTPATIENT
Start: 2022-01-01 | End: 2022-01-01

## 2022-01-01 RX ORDER — HYDROMORPHONE HYDROCHLORIDE 2 MG/ML
0.2 INJECTION INTRAMUSCULAR; INTRAVENOUS; SUBCUTANEOUS
Refills: 0 | Status: DISCONTINUED | OUTPATIENT
Start: 2022-01-01 | End: 2022-01-01

## 2022-01-01 RX ORDER — INSULIN GLARGINE 100 [IU]/ML
3 INJECTION, SOLUTION SUBCUTANEOUS AT BEDTIME
Refills: 0 | Status: DISCONTINUED | OUTPATIENT
Start: 2022-01-01 | End: 2022-01-01

## 2022-01-01 RX ORDER — SENNA PLUS 8.6 MG/1
2 TABLET ORAL
Qty: 0 | Refills: 0 | DISCHARGE
Start: 2022-01-01

## 2022-01-01 RX ORDER — LEVETIRACETAM 250 MG/1
1 TABLET, FILM COATED ORAL
Qty: 60 | Refills: 0
Start: 2022-01-01 | End: 2022-01-01

## 2022-01-01 RX ORDER — DEXAMETHASONE 0.5 MG/5ML
6 ELIXIR ORAL EVERY 6 HOURS
Refills: 0 | Status: DISCONTINUED | OUTPATIENT
Start: 2022-01-01 | End: 2022-01-01

## 2022-01-01 RX ORDER — INSULIN LISPRO 100/ML
1 VIAL (ML) SUBCUTANEOUS
Refills: 0 | Status: DISCONTINUED | OUTPATIENT
Start: 2022-01-01 | End: 2022-01-01

## 2022-01-01 RX ORDER — FLUOXETINE HYDROCHLORIDE 20 MG/1
20 CAPSULE ORAL
Qty: 30 | Refills: 3 | Status: ACTIVE | COMMUNITY
Start: 2022-01-01 | End: 1900-01-01

## 2022-01-01 RX ORDER — BENZOCAINE AND MENTHOL 5; 1 G/100ML; G/100ML
1 LIQUID ORAL
Refills: 0 | Status: DISCONTINUED | OUTPATIENT
Start: 2022-01-01 | End: 2022-01-01

## 2022-01-01 RX ORDER — LOSARTAN POTASSIUM 100 MG/1
25 TABLET, FILM COATED ORAL DAILY
Refills: 0 | Status: DISCONTINUED | OUTPATIENT
Start: 2022-01-01 | End: 2022-01-01

## 2022-01-01 RX ORDER — LEVETIRACETAM 500 MG/1
500 TABLET, FILM COATED ORAL
Qty: 60 | Refills: 6 | Status: ACTIVE | COMMUNITY
Start: 2022-01-01 | End: 1900-01-01

## 2022-01-01 RX ORDER — ASPIRIN/CALCIUM CARB/MAGNESIUM 324 MG
0 TABLET ORAL
Qty: 0 | Refills: 1 | DISCHARGE

## 2022-01-01 RX ORDER — DEXAMETHASONE 0.5 MG/5ML
4 ELIXIR ORAL
Refills: 0 | Status: DISCONTINUED | OUTPATIENT
Start: 2022-01-01 | End: 2022-01-01

## 2022-01-01 RX ORDER — CEFTRIAXONE 500 MG/1
2000 INJECTION, POWDER, FOR SOLUTION INTRAMUSCULAR; INTRAVENOUS ONCE
Refills: 0 | Status: COMPLETED | OUTPATIENT
Start: 2022-01-01 | End: 2022-01-01

## 2022-01-01 RX ORDER — VANCOMYCIN HCL 1 G
1000 VIAL (EA) INTRAVENOUS ONCE
Refills: 0 | Status: COMPLETED | OUTPATIENT
Start: 2022-01-01 | End: 2022-01-01

## 2022-01-01 RX ORDER — LOSARTAN POTASSIUM 100 MG/1
1 TABLET, FILM COATED ORAL
Qty: 0 | Refills: 0 | DISCHARGE
Start: 2022-01-01

## 2022-01-01 RX ORDER — INSULIN LISPRO 100/ML
4 VIAL (ML) SUBCUTANEOUS
Qty: 0 | Refills: 0 | DISCHARGE
Start: 2022-01-01

## 2022-01-01 RX ORDER — TEMOZOLOMIDE 140 MG/1
140 CAPSULE ORAL AT BEDTIME
Qty: 21 | Refills: 0 | Status: ACTIVE | COMMUNITY
Start: 2022-01-01 | End: 1900-01-01

## 2022-01-01 RX ORDER — LEVETIRACETAM 250 MG/1
1000 TABLET, FILM COATED ORAL ONCE
Refills: 0 | Status: DISCONTINUED | OUTPATIENT
Start: 2022-01-01 | End: 2022-01-01

## 2022-01-01 RX ORDER — LEVETIRACETAM 250 MG/1
500 TABLET, FILM COATED ORAL ONCE
Refills: 0 | Status: COMPLETED | OUTPATIENT
Start: 2022-01-01 | End: 2022-01-01

## 2022-01-01 RX ORDER — HYDROCHLOROTHIAZIDE 25 MG
0 TABLET ORAL
Qty: 0 | Refills: 1 | DISCHARGE

## 2022-01-01 RX ORDER — POLYETHYLENE GLYCOL 3350 17 G/17G
17 POWDER, FOR SOLUTION ORAL AT BEDTIME
Refills: 0 | Status: DISCONTINUED | OUTPATIENT
Start: 2022-01-01 | End: 2022-01-01

## 2022-01-01 RX ORDER — NAFCILLIN 10 G/100ML
2 INJECTION, POWDER, FOR SOLUTION INTRAVENOUS EVERY 4 HOURS
Refills: 0 | Status: DISCONTINUED | OUTPATIENT
Start: 2022-01-01 | End: 2022-01-01

## 2022-01-01 RX ORDER — ONDANSETRON 8 MG/1
8 TABLET ORAL
Qty: 30 | Refills: 2 | Status: ACTIVE | COMMUNITY
Start: 2022-01-01 | End: 1900-01-01

## 2022-01-01 RX ADMIN — SENNA PLUS 2 TABLET(S): 8.6 TABLET ORAL at 22:10

## 2022-01-01 RX ADMIN — INSULIN GLARGINE 5 UNIT(S): 100 INJECTION, SOLUTION SUBCUTANEOUS at 22:11

## 2022-01-01 RX ADMIN — LACOSAMIDE 140 MILLIGRAM(S): 50 TABLET ORAL at 21:06

## 2022-01-01 RX ADMIN — Medication 4 MILLIGRAM(S): at 18:00

## 2022-01-01 RX ADMIN — Medication 4: at 18:10

## 2022-01-01 RX ADMIN — Medication 20 MILLIGRAM(S): at 14:34

## 2022-01-01 RX ADMIN — PANTOPRAZOLE SODIUM 40 MILLIGRAM(S): 20 TABLET, DELAYED RELEASE ORAL at 05:49

## 2022-01-01 RX ADMIN — TRAMADOL HYDROCHLORIDE 25 MILLIGRAM(S): 50 TABLET ORAL at 06:21

## 2022-01-01 RX ADMIN — INSULIN GLARGINE 8 UNIT(S): 100 INJECTION, SOLUTION SUBCUTANEOUS at 22:19

## 2022-01-01 RX ADMIN — INSULIN GLARGINE 4 UNIT(S): 100 INJECTION, SOLUTION SUBCUTANEOUS at 23:47

## 2022-01-01 RX ADMIN — FLUCONAZOLE 100 MILLIGRAM(S): 150 TABLET ORAL at 18:05

## 2022-01-01 RX ADMIN — Medication 650 MILLIGRAM(S): at 21:52

## 2022-01-01 RX ADMIN — LEVETIRACETAM 500 MILLIGRAM(S): 250 TABLET, FILM COATED ORAL at 05:55

## 2022-01-01 RX ADMIN — Medication 4 MILLIGRAM(S): at 00:16

## 2022-01-01 RX ADMIN — Medication 1 TABLET(S): at 12:18

## 2022-01-01 RX ADMIN — Medication 2: at 08:34

## 2022-01-01 RX ADMIN — LEVETIRACETAM 400 MILLIGRAM(S): 250 TABLET, FILM COATED ORAL at 20:50

## 2022-01-01 RX ADMIN — ATORVASTATIN CALCIUM 80 MILLIGRAM(S): 80 TABLET, FILM COATED ORAL at 22:48

## 2022-01-01 RX ADMIN — Medication 1 DROP(S): at 22:00

## 2022-01-01 RX ADMIN — ATORVASTATIN CALCIUM 80 MILLIGRAM(S): 80 TABLET, FILM COATED ORAL at 22:22

## 2022-01-01 RX ADMIN — SENNA PLUS 2 TABLET(S): 8.6 TABLET ORAL at 21:52

## 2022-01-01 RX ADMIN — Medication 1 PACKET(S): at 14:15

## 2022-01-01 RX ADMIN — LEVETIRACETAM 400 MILLIGRAM(S): 250 TABLET, FILM COATED ORAL at 01:00

## 2022-01-01 RX ADMIN — Medication 4 MILLIGRAM(S): at 05:28

## 2022-01-01 RX ADMIN — Medication 650 MILLIGRAM(S): at 23:58

## 2022-01-01 RX ADMIN — POLYETHYLENE GLYCOL 3350 17 GRAM(S): 17 POWDER, FOR SOLUTION ORAL at 05:47

## 2022-01-01 RX ADMIN — Medication 1: at 22:30

## 2022-01-01 RX ADMIN — Medication 4: at 23:16

## 2022-01-01 RX ADMIN — Medication 216 GRAM(S): at 01:46

## 2022-01-01 RX ADMIN — Medication 216 GRAM(S): at 16:16

## 2022-01-01 RX ADMIN — Medication 216 GRAM(S): at 04:56

## 2022-01-01 RX ADMIN — Medication 4 UNIT(S): at 17:47

## 2022-01-01 RX ADMIN — Medication 4 MILLIGRAM(S): at 17:24

## 2022-01-01 RX ADMIN — Medication 2 GRAM(S): at 18:09

## 2022-01-01 RX ADMIN — Medication 216 GRAM(S): at 00:53

## 2022-01-01 RX ADMIN — POTASSIUM PHOSPHATE, MONOBASIC POTASSIUM PHOSPHATE, DIBASIC 62.5 MILLIMOLE(S): 236; 224 INJECTION, SOLUTION INTRAVENOUS at 04:46

## 2022-01-01 RX ADMIN — POLYETHYLENE GLYCOL 3350 17 GRAM(S): 17 POWDER, FOR SOLUTION ORAL at 18:17

## 2022-01-01 RX ADMIN — PANTOPRAZOLE SODIUM 40 MILLIGRAM(S): 20 TABLET, DELAYED RELEASE ORAL at 11:13

## 2022-01-01 RX ADMIN — INSULIN GLARGINE 12 UNIT(S): 100 INJECTION, SOLUTION SUBCUTANEOUS at 22:36

## 2022-01-01 RX ADMIN — Medication 2: at 11:35

## 2022-01-01 RX ADMIN — ENOXAPARIN SODIUM 40 MILLIGRAM(S): 100 INJECTION SUBCUTANEOUS at 17:12

## 2022-01-01 RX ADMIN — PANTOPRAZOLE SODIUM 40 MILLIGRAM(S): 20 TABLET, DELAYED RELEASE ORAL at 18:05

## 2022-01-01 RX ADMIN — Medication 1 DROP(S): at 02:34

## 2022-01-01 RX ADMIN — SODIUM CHLORIDE 500 MILLILITER(S): 9 INJECTION INTRAMUSCULAR; INTRAVENOUS; SUBCUTANEOUS at 22:02

## 2022-01-01 RX ADMIN — Medication 1: at 17:11

## 2022-01-01 RX ADMIN — ATORVASTATIN CALCIUM 80 MILLIGRAM(S): 80 TABLET, FILM COATED ORAL at 21:55

## 2022-01-01 RX ADMIN — Medication 650 MILLIGRAM(S): at 06:10

## 2022-01-01 RX ADMIN — Medication 4 MILLIGRAM(S): at 04:56

## 2022-01-01 RX ADMIN — Medication 20 MILLIGRAM(S): at 12:44

## 2022-01-01 RX ADMIN — Medication 4: at 08:25

## 2022-01-01 RX ADMIN — Medication 4 MILLIGRAM(S): at 18:15

## 2022-01-01 RX ADMIN — Medication 1 TABLET(S): at 12:58

## 2022-01-01 RX ADMIN — Medication 1 DROP(S): at 11:28

## 2022-01-01 RX ADMIN — Medication 20 MILLIGRAM(S): at 12:07

## 2022-01-01 RX ADMIN — SODIUM CHLORIDE 50 MILLILITER(S): 9 INJECTION INTRAMUSCULAR; INTRAVENOUS; SUBCUTANEOUS at 00:16

## 2022-01-01 RX ADMIN — HYDROMORPHONE HYDROCHLORIDE 0.2 MILLIGRAM(S): 2 INJECTION INTRAMUSCULAR; INTRAVENOUS; SUBCUTANEOUS at 14:01

## 2022-01-01 RX ADMIN — Medication 4 MILLIGRAM(S): at 18:05

## 2022-01-01 RX ADMIN — Medication 4 MILLIGRAM(S): at 05:30

## 2022-01-01 RX ADMIN — Medication 1 TABLET(S): at 12:54

## 2022-01-01 RX ADMIN — INSULIN GLARGINE 6 UNIT(S): 100 INJECTION, SOLUTION SUBCUTANEOUS at 21:59

## 2022-01-01 RX ADMIN — FLUCONAZOLE 100 MILLIGRAM(S): 150 TABLET ORAL at 19:34

## 2022-01-01 RX ADMIN — Medication 10 MILLIGRAM(S): at 05:30

## 2022-01-01 RX ADMIN — Medication 2: at 13:06

## 2022-01-01 RX ADMIN — Medication 20 MILLIGRAM(S): at 11:58

## 2022-01-01 RX ADMIN — LEVETIRACETAM 500 MILLIGRAM(S): 250 TABLET, FILM COATED ORAL at 05:27

## 2022-01-01 RX ADMIN — CEFTRIAXONE 100 MILLIGRAM(S): 500 INJECTION, POWDER, FOR SOLUTION INTRAMUSCULAR; INTRAVENOUS at 16:06

## 2022-01-01 RX ADMIN — PANTOPRAZOLE SODIUM 40 MILLIGRAM(S): 20 TABLET, DELAYED RELEASE ORAL at 18:22

## 2022-01-01 RX ADMIN — Medication 216 GRAM(S): at 12:32

## 2022-01-01 RX ADMIN — Medication 4: at 16:42

## 2022-01-01 RX ADMIN — ATORVASTATIN CALCIUM 80 MILLIGRAM(S): 80 TABLET, FILM COATED ORAL at 21:07

## 2022-01-01 RX ADMIN — Medication 2: at 12:15

## 2022-01-01 RX ADMIN — HYDROMORPHONE HYDROCHLORIDE 0.2 MILLIGRAM(S): 2 INJECTION INTRAMUSCULAR; INTRAVENOUS; SUBCUTANEOUS at 11:53

## 2022-01-01 RX ADMIN — Medication 4 MILLIGRAM(S): at 11:49

## 2022-01-01 RX ADMIN — Medication 4 UNIT(S): at 09:10

## 2022-01-01 RX ADMIN — Medication 1 DROP(S): at 05:47

## 2022-01-01 RX ADMIN — Medication 650 MILLIGRAM(S): at 05:41

## 2022-01-01 RX ADMIN — Medication 4 MILLIGRAM(S): at 06:14

## 2022-01-01 RX ADMIN — PANTOPRAZOLE SODIUM 40 MILLIGRAM(S): 20 TABLET, DELAYED RELEASE ORAL at 05:28

## 2022-01-01 RX ADMIN — Medication 1000 MILLIGRAM(S): at 18:09

## 2022-01-01 RX ADMIN — LEVETIRACETAM 400 MILLIGRAM(S): 250 TABLET, FILM COATED ORAL at 05:03

## 2022-01-01 RX ADMIN — Medication 4 MILLIGRAM(S): at 17:41

## 2022-01-01 RX ADMIN — LEVETIRACETAM 500 MILLIGRAM(S): 250 TABLET, FILM COATED ORAL at 05:33

## 2022-01-01 RX ADMIN — Medication 4 MILLIGRAM(S): at 11:13

## 2022-01-01 RX ADMIN — LEVETIRACETAM 500 MILLIGRAM(S): 250 TABLET, FILM COATED ORAL at 05:24

## 2022-01-01 RX ADMIN — Medication 216 GRAM(S): at 05:35

## 2022-01-01 RX ADMIN — Medication 4 MILLIGRAM(S): at 05:01

## 2022-01-01 RX ADMIN — Medication 216 GRAM(S): at 10:18

## 2022-01-01 RX ADMIN — LEVETIRACETAM 400 MILLIGRAM(S): 250 TABLET, FILM COATED ORAL at 17:57

## 2022-01-01 RX ADMIN — PANTOPRAZOLE SODIUM 40 MILLIGRAM(S): 20 TABLET, DELAYED RELEASE ORAL at 05:31

## 2022-01-01 RX ADMIN — Medication 2: at 12:19

## 2022-01-01 RX ADMIN — POLYETHYLENE GLYCOL 3350 17 GRAM(S): 17 POWDER, FOR SOLUTION ORAL at 05:33

## 2022-01-01 RX ADMIN — SODIUM CHLORIDE 50 MILLILITER(S): 9 INJECTION INTRAMUSCULAR; INTRAVENOUS; SUBCUTANEOUS at 05:33

## 2022-01-01 RX ADMIN — Medication 216 GRAM(S): at 09:35

## 2022-01-01 RX ADMIN — POLYETHYLENE GLYCOL 3350 17 GRAM(S): 17 POWDER, FOR SOLUTION ORAL at 21:18

## 2022-01-01 RX ADMIN — ATORVASTATIN CALCIUM 80 MILLIGRAM(S): 80 TABLET, FILM COATED ORAL at 22:03

## 2022-01-01 RX ADMIN — Medication 500 MILLIGRAM(S): at 11:20

## 2022-01-01 RX ADMIN — Medication 2: at 08:33

## 2022-01-01 RX ADMIN — INSULIN GLARGINE 6 UNIT(S): 100 INJECTION, SOLUTION SUBCUTANEOUS at 19:00

## 2022-01-01 RX ADMIN — Medication 400 MILLIGRAM(S): at 12:00

## 2022-01-01 RX ADMIN — Medication 4: at 22:36

## 2022-01-01 RX ADMIN — PIPERACILLIN AND TAZOBACTAM 200 GRAM(S): 4; .5 INJECTION, POWDER, LYOPHILIZED, FOR SOLUTION INTRAVENOUS at 19:42

## 2022-01-01 RX ADMIN — Medication 1 PACKET(S): at 12:41

## 2022-01-01 RX ADMIN — PANTOPRAZOLE SODIUM 40 MILLIGRAM(S): 20 TABLET, DELAYED RELEASE ORAL at 17:40

## 2022-01-01 RX ADMIN — Medication 4 MILLIGRAM(S): at 23:00

## 2022-01-01 RX ADMIN — PIPERACILLIN AND TAZOBACTAM 25 GRAM(S): 4; .5 INJECTION, POWDER, LYOPHILIZED, FOR SOLUTION INTRAVENOUS at 00:23

## 2022-01-01 RX ADMIN — Medication 4 MILLIGRAM(S): at 11:38

## 2022-01-01 RX ADMIN — Medication 2: at 12:34

## 2022-01-01 RX ADMIN — INSULIN GLARGINE 10 UNIT(S): 100 INJECTION, SOLUTION SUBCUTANEOUS at 21:59

## 2022-01-01 RX ADMIN — Medication 20 MILLIGRAM(S): at 12:33

## 2022-01-01 RX ADMIN — LEVETIRACETAM 400 MILLIGRAM(S): 250 TABLET, FILM COATED ORAL at 05:11

## 2022-01-01 RX ADMIN — Medication 216 GRAM(S): at 04:05

## 2022-01-01 RX ADMIN — Medication 2: at 21:59

## 2022-01-01 RX ADMIN — INSULIN GLARGINE 4 UNIT(S): 100 INJECTION, SOLUTION SUBCUTANEOUS at 22:36

## 2022-01-01 RX ADMIN — HYDROMORPHONE HYDROCHLORIDE 0.2 MILLIGRAM(S): 2 INJECTION INTRAMUSCULAR; INTRAVENOUS; SUBCUTANEOUS at 10:13

## 2022-01-01 RX ADMIN — Medication 216 GRAM(S): at 08:59

## 2022-01-01 RX ADMIN — FLUCONAZOLE 400 MILLIGRAM(S): 150 TABLET ORAL at 22:31

## 2022-01-01 RX ADMIN — Medication 4 UNIT(S): at 08:49

## 2022-01-01 RX ADMIN — ENOXAPARIN SODIUM 40 MILLIGRAM(S): 100 INJECTION SUBCUTANEOUS at 16:43

## 2022-01-01 RX ADMIN — Medication 500 MILLIGRAM(S): at 12:30

## 2022-01-01 RX ADMIN — LEVETIRACETAM 500 MILLIGRAM(S): 250 TABLET, FILM COATED ORAL at 05:47

## 2022-01-01 RX ADMIN — Medication 4000 MILLILITER(S): at 18:23

## 2022-01-01 RX ADMIN — Medication 4 MILLIGRAM(S): at 12:02

## 2022-01-01 RX ADMIN — PANTOPRAZOLE SODIUM 40 MILLIGRAM(S): 20 TABLET, DELAYED RELEASE ORAL at 17:59

## 2022-01-01 RX ADMIN — Medication 4 MILLIGRAM(S): at 00:15

## 2022-01-01 RX ADMIN — POTASSIUM PHOSPHATE, MONOBASIC POTASSIUM PHOSPHATE, DIBASIC 83.33 MILLIMOLE(S): 236; 224 INJECTION, SOLUTION INTRAVENOUS at 12:34

## 2022-01-01 RX ADMIN — Medication 3: at 21:52

## 2022-01-01 RX ADMIN — LEVETIRACETAM 400 MILLIGRAM(S): 250 TABLET, FILM COATED ORAL at 17:56

## 2022-01-01 RX ADMIN — INSULIN GLARGINE 12 UNIT(S): 100 INJECTION, SOLUTION SUBCUTANEOUS at 23:06

## 2022-01-01 RX ADMIN — Medication 4 MILLIGRAM(S): at 18:22

## 2022-01-01 RX ADMIN — LOSARTAN POTASSIUM 50 MILLIGRAM(S): 100 TABLET, FILM COATED ORAL at 05:08

## 2022-01-01 RX ADMIN — Medication 6: at 12:21

## 2022-01-01 RX ADMIN — Medication 200 MILLIGRAM(S): at 17:22

## 2022-01-01 RX ADMIN — LOSARTAN POTASSIUM 50 MILLIGRAM(S): 100 TABLET, FILM COATED ORAL at 05:32

## 2022-01-01 RX ADMIN — Medication 4 MILLIGRAM(S): at 18:02

## 2022-01-01 RX ADMIN — Medication 3 UNIT(S): at 12:29

## 2022-01-01 RX ADMIN — SENNA PLUS 2 TABLET(S): 8.6 TABLET ORAL at 21:26

## 2022-01-01 RX ADMIN — LOSARTAN POTASSIUM 25 MILLIGRAM(S): 100 TABLET, FILM COATED ORAL at 05:41

## 2022-01-01 RX ADMIN — ATORVASTATIN CALCIUM 80 MILLIGRAM(S): 80 TABLET, FILM COATED ORAL at 22:15

## 2022-01-01 RX ADMIN — PHENYLEPHRINE HYDROCHLORIDE 100 MICROGRAM(S): 10 INJECTION INTRAVENOUS at 19:10

## 2022-01-01 RX ADMIN — ENOXAPARIN SODIUM 40 MILLIGRAM(S): 100 INJECTION SUBCUTANEOUS at 16:50

## 2022-01-01 RX ADMIN — Medication 216 GRAM(S): at 09:34

## 2022-01-01 RX ADMIN — Medication 4 UNIT(S): at 07:48

## 2022-01-01 RX ADMIN — LACOSAMIDE 140 MILLIGRAM(S): 50 TABLET ORAL at 20:32

## 2022-01-01 RX ADMIN — PANTOPRAZOLE SODIUM 40 MILLIGRAM(S): 20 TABLET, DELAYED RELEASE ORAL at 05:57

## 2022-01-01 RX ADMIN — PANTOPRAZOLE SODIUM 40 MILLIGRAM(S): 20 TABLET, DELAYED RELEASE ORAL at 17:38

## 2022-01-01 RX ADMIN — Medication 4 MILLIGRAM(S): at 21:12

## 2022-01-01 RX ADMIN — Medication 4 UNIT(S): at 14:32

## 2022-01-01 RX ADMIN — Medication 2: at 16:07

## 2022-01-01 RX ADMIN — Medication 1: at 12:45

## 2022-01-01 RX ADMIN — NAFCILLIN 200 GRAM(S): 10 INJECTION, POWDER, FOR SOLUTION INTRAVENOUS at 16:10

## 2022-01-01 RX ADMIN — Medication 1: at 09:08

## 2022-01-01 RX ADMIN — Medication 216 GRAM(S): at 18:48

## 2022-01-01 RX ADMIN — Medication 4 UNIT(S): at 16:52

## 2022-01-01 RX ADMIN — Medication 200 GRAM(S): at 12:29

## 2022-01-01 RX ADMIN — Medication 650 MILLIGRAM(S): at 21:49

## 2022-01-01 RX ADMIN — INSULIN GLARGINE 10 UNIT(S): 100 INJECTION, SOLUTION SUBCUTANEOUS at 22:23

## 2022-01-01 RX ADMIN — Medication 4 MILLIGRAM(S): at 18:13

## 2022-01-01 RX ADMIN — Medication 1 DROP(S): at 08:18

## 2022-01-01 RX ADMIN — Medication 4 MILLIGRAM(S): at 05:37

## 2022-01-01 RX ADMIN — Medication 2: at 12:26

## 2022-01-01 RX ADMIN — Medication 6 MILLIGRAM(S): at 23:56

## 2022-01-01 RX ADMIN — SODIUM CHLORIDE 75 MILLILITER(S): 9 INJECTION INTRAMUSCULAR; INTRAVENOUS; SUBCUTANEOUS at 11:59

## 2022-01-01 RX ADMIN — Medication 4 UNIT(S): at 08:36

## 2022-01-01 RX ADMIN — Medication 3 UNIT(S): at 07:52

## 2022-01-01 RX ADMIN — Medication 216 GRAM(S): at 02:25

## 2022-01-01 RX ADMIN — PANTOPRAZOLE SODIUM 40 MILLIGRAM(S): 20 TABLET, DELAYED RELEASE ORAL at 05:01

## 2022-01-01 RX ADMIN — Medication 4 MILLIGRAM(S): at 12:00

## 2022-01-01 RX ADMIN — Medication 10 MILLIGRAM(S): at 14:06

## 2022-01-01 RX ADMIN — Medication 4 MILLIGRAM(S): at 17:50

## 2022-01-01 RX ADMIN — Medication 20 MILLIGRAM(S): at 11:47

## 2022-01-01 RX ADMIN — ATORVASTATIN CALCIUM 80 MILLIGRAM(S): 80 TABLET, FILM COATED ORAL at 23:20

## 2022-01-01 RX ADMIN — Medication 4: at 09:08

## 2022-01-01 RX ADMIN — Medication 4: at 17:34

## 2022-01-01 RX ADMIN — Medication 1 TABLET(S): at 08:21

## 2022-01-01 RX ADMIN — Medication 2: at 21:55

## 2022-01-01 RX ADMIN — Medication 500 MILLIGRAM(S): at 12:40

## 2022-01-01 RX ADMIN — Medication 4: at 12:45

## 2022-01-01 RX ADMIN — Medication 216 GRAM(S): at 16:36

## 2022-01-01 RX ADMIN — Medication 216 GRAM(S): at 00:35

## 2022-01-01 RX ADMIN — Medication 4 MILLIGRAM(S): at 17:21

## 2022-01-01 RX ADMIN — Medication 4 UNIT(S): at 12:18

## 2022-01-01 RX ADMIN — Medication 6 MILLIGRAM(S): at 11:33

## 2022-01-01 RX ADMIN — ATORVASTATIN CALCIUM 80 MILLIGRAM(S): 80 TABLET, FILM COATED ORAL at 21:26

## 2022-01-01 RX ADMIN — Medication 2: at 18:03

## 2022-01-01 RX ADMIN — LEVETIRACETAM 500 MILLIGRAM(S): 250 TABLET, FILM COATED ORAL at 18:15

## 2022-01-01 RX ADMIN — LEVETIRACETAM 500 MILLIGRAM(S): 250 TABLET, FILM COATED ORAL at 18:25

## 2022-01-01 RX ADMIN — LEVETIRACETAM 400 MILLIGRAM(S): 250 TABLET, FILM COATED ORAL at 17:38

## 2022-01-01 RX ADMIN — POLYETHYLENE GLYCOL 3350 17 GRAM(S): 17 POWDER, FOR SOLUTION ORAL at 12:00

## 2022-01-01 RX ADMIN — Medication 200 MILLIGRAM(S): at 05:13

## 2022-01-01 RX ADMIN — Medication 4 UNIT(S): at 08:56

## 2022-01-01 RX ADMIN — Medication 4 UNIT(S): at 17:33

## 2022-01-01 RX ADMIN — Medication 4 UNIT(S): at 17:18

## 2022-01-01 RX ADMIN — Medication 216 GRAM(S): at 16:30

## 2022-01-01 RX ADMIN — Medication 2: at 11:59

## 2022-01-01 RX ADMIN — Medication 216 GRAM(S): at 05:28

## 2022-01-01 RX ADMIN — Medication 6: at 13:20

## 2022-01-01 RX ADMIN — PANTOPRAZOLE SODIUM 40 MILLIGRAM(S): 20 TABLET, DELAYED RELEASE ORAL at 12:35

## 2022-01-01 RX ADMIN — Medication 216 GRAM(S): at 18:20

## 2022-01-01 RX ADMIN — Medication 1 PACKET(S): at 17:45

## 2022-01-01 RX ADMIN — Medication 6: at 22:37

## 2022-01-01 RX ADMIN — INSULIN GLARGINE 8 UNIT(S): 100 INJECTION, SOLUTION SUBCUTANEOUS at 22:29

## 2022-01-01 RX ADMIN — POLYETHYLENE GLYCOL 3350 17 GRAM(S): 17 POWDER, FOR SOLUTION ORAL at 17:11

## 2022-01-01 RX ADMIN — Medication 4 MILLIGRAM(S): at 05:54

## 2022-01-01 RX ADMIN — Medication 250 MILLIGRAM(S): at 21:09

## 2022-01-01 RX ADMIN — POLYETHYLENE GLYCOL 3350 17 GRAM(S): 17 POWDER, FOR SOLUTION ORAL at 05:42

## 2022-01-01 RX ADMIN — Medication 3 UNIT(S): at 07:42

## 2022-01-01 RX ADMIN — Medication 2: at 12:05

## 2022-01-01 RX ADMIN — PANTOPRAZOLE SODIUM 40 MILLIGRAM(S): 20 TABLET, DELAYED RELEASE ORAL at 17:23

## 2022-01-01 RX ADMIN — LEVETIRACETAM 500 MILLIGRAM(S): 250 TABLET, FILM COATED ORAL at 16:43

## 2022-01-01 RX ADMIN — Medication 2: at 12:47

## 2022-01-01 RX ADMIN — Medication 4: at 16:48

## 2022-01-01 RX ADMIN — LEVETIRACETAM 500 MILLIGRAM(S): 250 TABLET, FILM COATED ORAL at 18:02

## 2022-01-01 RX ADMIN — Medication 216 GRAM(S): at 13:20

## 2022-01-01 RX ADMIN — POLYETHYLENE GLYCOL 3350 17 GRAM(S): 17 POWDER, FOR SOLUTION ORAL at 11:49

## 2022-01-01 RX ADMIN — LEVETIRACETAM 500 MILLIGRAM(S): 250 TABLET, FILM COATED ORAL at 18:23

## 2022-01-01 RX ADMIN — LEVETIRACETAM 500 MILLIGRAM(S): 250 TABLET, FILM COATED ORAL at 17:41

## 2022-01-01 RX ADMIN — LEVETIRACETAM 400 MILLIGRAM(S): 250 TABLET, FILM COATED ORAL at 01:03

## 2022-01-01 RX ADMIN — Medication 10 MILLIGRAM(S): at 05:09

## 2022-01-01 RX ADMIN — ATORVASTATIN CALCIUM 80 MILLIGRAM(S): 80 TABLET, FILM COATED ORAL at 21:20

## 2022-01-01 RX ADMIN — Medication 1 DROP(S): at 02:48

## 2022-01-01 RX ADMIN — Medication 200 MILLIGRAM(S): at 17:16

## 2022-01-01 RX ADMIN — Medication 4 MILLIGRAM(S): at 20:31

## 2022-01-01 RX ADMIN — Medication 5 MILLIGRAM(S): at 21:05

## 2022-01-01 RX ADMIN — NAFCILLIN 200 GRAM(S): 10 INJECTION, POWDER, FOR SOLUTION INTRAVENOUS at 07:43

## 2022-01-01 RX ADMIN — Medication 200 GRAM(S): at 16:48

## 2022-01-01 RX ADMIN — HYDROMORPHONE HYDROCHLORIDE 0.2 MILLIGRAM(S): 2 INJECTION INTRAMUSCULAR; INTRAVENOUS; SUBCUTANEOUS at 23:08

## 2022-01-01 RX ADMIN — FLUCONAZOLE 400 MILLIGRAM(S): 150 TABLET ORAL at 18:17

## 2022-01-01 RX ADMIN — Medication 4 MILLIGRAM(S): at 17:08

## 2022-01-01 RX ADMIN — Medication 2: at 07:59

## 2022-01-01 RX ADMIN — Medication 4 MILLIGRAM(S): at 05:11

## 2022-01-01 RX ADMIN — Medication 4 MILLIGRAM(S): at 17:14

## 2022-01-01 RX ADMIN — Medication 1 TABLET(S): at 10:19

## 2022-01-01 RX ADMIN — Medication 4 UNIT(S): at 17:13

## 2022-01-01 RX ADMIN — Medication 216 GRAM(S): at 01:57

## 2022-01-01 RX ADMIN — LEVETIRACETAM 400 MILLIGRAM(S): 250 TABLET, FILM COATED ORAL at 05:02

## 2022-01-01 RX ADMIN — Medication 20 MILLIGRAM(S): at 12:18

## 2022-01-01 RX ADMIN — ATORVASTATIN CALCIUM 80 MILLIGRAM(S): 80 TABLET, FILM COATED ORAL at 21:19

## 2022-01-01 RX ADMIN — Medication 3: at 17:29

## 2022-01-01 RX ADMIN — INSULIN GLARGINE 8 UNIT(S): 100 INJECTION, SOLUTION SUBCUTANEOUS at 22:27

## 2022-01-01 RX ADMIN — Medication 4 MILLIGRAM(S): at 05:43

## 2022-01-01 RX ADMIN — LEVETIRACETAM 500 MILLIGRAM(S): 250 TABLET, FILM COATED ORAL at 05:28

## 2022-01-01 RX ADMIN — FLUCONAZOLE 100 MILLIGRAM(S): 150 TABLET ORAL at 18:26

## 2022-01-01 RX ADMIN — POTASSIUM PHOSPHATE, MONOBASIC POTASSIUM PHOSPHATE, DIBASIC 62.5 MILLIMOLE(S): 236; 224 INJECTION, SOLUTION INTRAVENOUS at 09:41

## 2022-01-01 RX ADMIN — PANTOPRAZOLE SODIUM 40 MILLIGRAM(S): 20 TABLET, DELAYED RELEASE ORAL at 05:14

## 2022-01-01 RX ADMIN — PANTOPRAZOLE SODIUM 40 MILLIGRAM(S): 20 TABLET, DELAYED RELEASE ORAL at 05:21

## 2022-01-01 RX ADMIN — Medication 4: at 23:46

## 2022-01-01 RX ADMIN — Medication 216 GRAM(S): at 09:46

## 2022-01-01 RX ADMIN — Medication 4 MILLIGRAM(S): at 06:42

## 2022-01-01 RX ADMIN — Medication 1: at 12:58

## 2022-01-01 RX ADMIN — SODIUM CHLORIDE 333.33 MILLILITER(S): 9 INJECTION INTRAMUSCULAR; INTRAVENOUS; SUBCUTANEOUS at 19:42

## 2022-01-01 RX ADMIN — Medication 4 UNIT(S): at 11:32

## 2022-01-01 RX ADMIN — Medication 4 MILLIGRAM(S): at 12:10

## 2022-01-01 RX ADMIN — Medication 4 MILLIGRAM(S): at 06:29

## 2022-01-01 RX ADMIN — Medication 6 MILLIGRAM(S): at 18:25

## 2022-01-01 RX ADMIN — PANTOPRAZOLE SODIUM 40 MILLIGRAM(S): 20 TABLET, DELAYED RELEASE ORAL at 18:27

## 2022-01-01 RX ADMIN — LEVETIRACETAM 750 MILLIGRAM(S): 250 TABLET, FILM COATED ORAL at 05:05

## 2022-01-01 RX ADMIN — INSULIN GLARGINE 5 UNIT(S): 100 INJECTION, SOLUTION SUBCUTANEOUS at 21:07

## 2022-01-01 RX ADMIN — Medication 4: at 22:26

## 2022-01-01 RX ADMIN — Medication 4 MILLIGRAM(S): at 05:27

## 2022-01-01 RX ADMIN — Medication 4 MILLIGRAM(S): at 05:13

## 2022-01-01 RX ADMIN — Medication 216 GRAM(S): at 04:20

## 2022-01-01 RX ADMIN — Medication 4 UNIT(S): at 12:47

## 2022-01-01 RX ADMIN — INSULIN GLARGINE 12 UNIT(S): 100 INJECTION, SOLUTION SUBCUTANEOUS at 22:34

## 2022-01-01 RX ADMIN — Medication 1: at 08:36

## 2022-01-01 RX ADMIN — Medication 4 UNIT(S): at 17:29

## 2022-01-01 RX ADMIN — Medication 4 MILLIGRAM(S): at 16:42

## 2022-01-01 RX ADMIN — Medication 650 MILLIGRAM(S): at 01:30

## 2022-01-01 RX ADMIN — LEVETIRACETAM 400 MILLIGRAM(S): 250 TABLET, FILM COATED ORAL at 17:34

## 2022-01-01 RX ADMIN — Medication 4 MILLIGRAM(S): at 17:39

## 2022-01-01 RX ADMIN — PANTOPRAZOLE SODIUM 40 MILLIGRAM(S): 20 TABLET, DELAYED RELEASE ORAL at 17:35

## 2022-01-01 RX ADMIN — Medication 20 MILLIGRAM(S): at 12:49

## 2022-01-01 RX ADMIN — Medication 3: at 18:08

## 2022-01-01 RX ADMIN — Medication 216 GRAM(S): at 22:22

## 2022-01-01 RX ADMIN — Medication 5 MILLIGRAM(S): at 11:38

## 2022-01-01 RX ADMIN — Medication 4 UNIT(S): at 11:48

## 2022-01-01 RX ADMIN — Medication 6: at 23:40

## 2022-01-01 RX ADMIN — INSULIN GLARGINE 12 UNIT(S): 100 INJECTION, SOLUTION SUBCUTANEOUS at 22:47

## 2022-01-01 RX ADMIN — LEVETIRACETAM 400 MILLIGRAM(S): 250 TABLET, FILM COATED ORAL at 05:55

## 2022-01-01 RX ADMIN — Medication 4: at 13:03

## 2022-01-01 RX ADMIN — LEVETIRACETAM 500 MILLIGRAM(S): 250 TABLET, FILM COATED ORAL at 05:32

## 2022-01-01 RX ADMIN — Medication 3: at 22:20

## 2022-01-01 RX ADMIN — LEVETIRACETAM 500 MILLIGRAM(S): 250 TABLET, FILM COATED ORAL at 17:19

## 2022-01-01 RX ADMIN — LEVETIRACETAM 400 MILLIGRAM(S): 250 TABLET, FILM COATED ORAL at 05:00

## 2022-01-01 RX ADMIN — Medication 1: at 17:51

## 2022-01-01 RX ADMIN — Medication 4 UNIT(S): at 12:35

## 2022-01-01 RX ADMIN — Medication 1 DROP(S): at 03:00

## 2022-01-01 RX ADMIN — SODIUM CHLORIDE 2000 MILLILITER(S): 9 INJECTION INTRAMUSCULAR; INTRAVENOUS; SUBCUTANEOUS at 15:54

## 2022-01-01 RX ADMIN — ATORVASTATIN CALCIUM 80 MILLIGRAM(S): 80 TABLET, FILM COATED ORAL at 22:26

## 2022-01-01 RX ADMIN — LEVETIRACETAM 400 MILLIGRAM(S): 250 TABLET, FILM COATED ORAL at 17:12

## 2022-01-01 RX ADMIN — Medication 4: at 22:25

## 2022-01-01 RX ADMIN — Medication 216 GRAM(S): at 04:33

## 2022-01-01 RX ADMIN — PANTOPRAZOLE SODIUM 40 MILLIGRAM(S): 20 TABLET, DELAYED RELEASE ORAL at 05:05

## 2022-01-01 RX ADMIN — LEVETIRACETAM 500 MILLIGRAM(S): 250 TABLET, FILM COATED ORAL at 05:08

## 2022-01-01 RX ADMIN — Medication 1 DROP(S): at 21:13

## 2022-01-01 RX ADMIN — NAFCILLIN 200 GRAM(S): 10 INJECTION, POWDER, FOR SOLUTION INTRAVENOUS at 00:00

## 2022-01-01 RX ADMIN — PANTOPRAZOLE SODIUM 40 MILLIGRAM(S): 20 TABLET, DELAYED RELEASE ORAL at 07:24

## 2022-01-01 RX ADMIN — Medication 2: at 17:46

## 2022-01-01 RX ADMIN — Medication 2: at 12:46

## 2022-01-01 RX ADMIN — ATORVASTATIN CALCIUM 80 MILLIGRAM(S): 80 TABLET, FILM COATED ORAL at 21:40

## 2022-01-01 RX ADMIN — HYDROMORPHONE HYDROCHLORIDE 0.2 MILLIGRAM(S): 2 INJECTION INTRAMUSCULAR; INTRAVENOUS; SUBCUTANEOUS at 10:33

## 2022-01-01 RX ADMIN — Medication 400 MILLIGRAM(S): at 15:54

## 2022-01-01 RX ADMIN — Medication 650 MILLIGRAM(S): at 06:02

## 2022-01-01 RX ADMIN — Medication 1: at 06:24

## 2022-01-01 RX ADMIN — PANTOPRAZOLE SODIUM 40 MILLIGRAM(S): 20 TABLET, DELAYED RELEASE ORAL at 17:30

## 2022-01-01 RX ADMIN — LACOSAMIDE 140 MILLIGRAM(S): 50 TABLET ORAL at 17:16

## 2022-01-01 RX ADMIN — Medication 2: at 08:14

## 2022-01-01 RX ADMIN — Medication 4 MILLIGRAM(S): at 17:10

## 2022-01-01 RX ADMIN — PANTOPRAZOLE SODIUM 40 MILLIGRAM(S): 20 TABLET, DELAYED RELEASE ORAL at 17:26

## 2022-01-01 RX ADMIN — LEVETIRACETAM 500 MILLIGRAM(S): 250 TABLET, FILM COATED ORAL at 17:33

## 2022-01-01 RX ADMIN — PANTOPRAZOLE SODIUM 40 MILLIGRAM(S): 20 TABLET, DELAYED RELEASE ORAL at 18:10

## 2022-01-01 RX ADMIN — LEVETIRACETAM 500 MILLIGRAM(S): 250 TABLET, FILM COATED ORAL at 06:37

## 2022-01-01 RX ADMIN — Medication 4 MILLIGRAM(S): at 23:45

## 2022-01-01 RX ADMIN — Medication 4 MILLIGRAM(S): at 22:31

## 2022-01-01 RX ADMIN — LEVETIRACETAM 400 MILLIGRAM(S): 250 TABLET, FILM COATED ORAL at 11:03

## 2022-01-01 RX ADMIN — Medication 2: at 07:52

## 2022-01-01 RX ADMIN — Medication 1: at 23:19

## 2022-01-01 RX ADMIN — Medication 200 MILLIGRAM(S): at 05:19

## 2022-01-01 RX ADMIN — Medication 1 TABLET(S): at 07:40

## 2022-01-01 RX ADMIN — Medication 2: at 16:34

## 2022-01-01 RX ADMIN — LACOSAMIDE 140 MILLIGRAM(S): 50 TABLET ORAL at 11:20

## 2022-01-01 RX ADMIN — Medication 1: at 07:31

## 2022-01-01 RX ADMIN — CHLORHEXIDINE GLUCONATE 1 APPLICATION(S): 213 SOLUTION TOPICAL at 12:28

## 2022-01-01 RX ADMIN — LEVETIRACETAM 500 MILLIGRAM(S): 250 TABLET, FILM COATED ORAL at 05:20

## 2022-01-01 RX ADMIN — Medication 3 UNIT(S): at 16:34

## 2022-01-01 RX ADMIN — ATORVASTATIN CALCIUM 80 MILLIGRAM(S): 80 TABLET, FILM COATED ORAL at 22:01

## 2022-01-01 RX ADMIN — LEVETIRACETAM 500 MILLIGRAM(S): 250 TABLET, FILM COATED ORAL at 06:47

## 2022-01-01 RX ADMIN — Medication 2: at 21:51

## 2022-01-01 RX ADMIN — Medication 4 MILLIGRAM(S): at 05:23

## 2022-01-01 RX ADMIN — Medication 4 MILLIGRAM(S): at 05:07

## 2022-01-01 RX ADMIN — Medication 216 GRAM(S): at 04:03

## 2022-01-01 RX ADMIN — Medication 4 MILLIGRAM(S): at 05:14

## 2022-01-01 RX ADMIN — Medication 4 UNIT(S): at 12:21

## 2022-01-01 RX ADMIN — SODIUM CHLORIDE 500 MILLILITER(S): 9 INJECTION INTRAMUSCULAR; INTRAVENOUS; SUBCUTANEOUS at 20:48

## 2022-01-01 RX ADMIN — LEVETIRACETAM 500 MILLIGRAM(S): 250 TABLET, FILM COATED ORAL at 18:27

## 2022-01-01 RX ADMIN — Medication 1000 MILLIGRAM(S): at 12:15

## 2022-01-01 RX ADMIN — Medication 4 UNIT(S): at 12:39

## 2022-01-01 RX ADMIN — Medication 2: at 17:05

## 2022-01-01 RX ADMIN — Medication 200 MILLIGRAM(S): at 05:58

## 2022-01-01 RX ADMIN — Medication 4 MILLIGRAM(S): at 17:57

## 2022-01-01 RX ADMIN — Medication 4 MILLIGRAM(S): at 17:23

## 2022-01-01 RX ADMIN — LEVETIRACETAM 400 MILLIGRAM(S): 250 TABLET, FILM COATED ORAL at 01:18

## 2022-01-01 RX ADMIN — INSULIN GLARGINE 8 UNIT(S): 100 INJECTION, SOLUTION SUBCUTANEOUS at 21:55

## 2022-01-01 RX ADMIN — Medication 4 MILLIGRAM(S): at 05:17

## 2022-01-01 RX ADMIN — Medication 4 MILLIGRAM(S): at 05:31

## 2022-01-01 RX ADMIN — Medication 6: at 09:19

## 2022-01-01 RX ADMIN — Medication 500 MILLIGRAM(S): at 12:58

## 2022-01-01 RX ADMIN — PANTOPRAZOLE SODIUM 40 MILLIGRAM(S): 20 TABLET, DELAYED RELEASE ORAL at 17:11

## 2022-01-01 RX ADMIN — POLYETHYLENE GLYCOL 3350 17 GRAM(S): 17 POWDER, FOR SOLUTION ORAL at 12:38

## 2022-01-01 RX ADMIN — HYDROMORPHONE HYDROCHLORIDE 0.2 MILLIGRAM(S): 2 INJECTION INTRAMUSCULAR; INTRAVENOUS; SUBCUTANEOUS at 01:03

## 2022-01-01 RX ADMIN — PANTOPRAZOLE SODIUM 40 MILLIGRAM(S): 20 TABLET, DELAYED RELEASE ORAL at 06:42

## 2022-01-01 RX ADMIN — Medication 4 UNIT(S): at 13:00

## 2022-01-01 RX ADMIN — Medication 216 GRAM(S): at 20:03

## 2022-01-01 RX ADMIN — Medication 3 UNIT(S): at 12:00

## 2022-01-01 RX ADMIN — Medication 650 MILLIGRAM(S): at 16:14

## 2022-01-01 RX ADMIN — Medication 1 DROP(S): at 05:45

## 2022-01-01 RX ADMIN — Medication 216 GRAM(S): at 18:02

## 2022-01-01 RX ADMIN — Medication 20 MILLIGRAM(S): at 12:21

## 2022-01-01 RX ADMIN — Medication 2: at 22:00

## 2022-01-01 RX ADMIN — ATORVASTATIN CALCIUM 80 MILLIGRAM(S): 80 TABLET, FILM COATED ORAL at 21:56

## 2022-01-01 RX ADMIN — PANTOPRAZOLE SODIUM 40 MILLIGRAM(S): 20 TABLET, DELAYED RELEASE ORAL at 06:12

## 2022-01-01 RX ADMIN — Medication 2: at 00:43

## 2022-01-01 RX ADMIN — Medication 216 GRAM(S): at 04:52

## 2022-01-01 RX ADMIN — Medication 216 GRAM(S): at 17:27

## 2022-01-01 RX ADMIN — Medication 250 MILLIGRAM(S): at 12:23

## 2022-01-01 RX ADMIN — LEVETIRACETAM 500 MILLIGRAM(S): 250 TABLET, FILM COATED ORAL at 05:46

## 2022-01-01 RX ADMIN — Medication 6 MILLIGRAM(S): at 05:47

## 2022-01-01 RX ADMIN — Medication 6 MILLIGRAM(S): at 13:11

## 2022-01-01 RX ADMIN — Medication 1 DROP(S): at 21:52

## 2022-01-01 RX ADMIN — Medication 2: at 22:13

## 2022-01-01 RX ADMIN — Medication 216 GRAM(S): at 11:20

## 2022-01-01 RX ADMIN — PANTOPRAZOLE SODIUM 40 MILLIGRAM(S): 20 TABLET, DELAYED RELEASE ORAL at 05:20

## 2022-01-01 RX ADMIN — Medication 4 MILLIGRAM(S): at 05:05

## 2022-01-01 RX ADMIN — FONDAPARINUX SODIUM 2.5 MILLIGRAM(S): 2.5 INJECTION, SOLUTION SUBCUTANEOUS at 12:06

## 2022-01-01 RX ADMIN — INSULIN GLARGINE 8 UNIT(S): 100 INJECTION, SOLUTION SUBCUTANEOUS at 21:12

## 2022-01-01 RX ADMIN — Medication 1 DROP(S): at 05:33

## 2022-01-01 RX ADMIN — Medication 4: at 17:13

## 2022-01-01 RX ADMIN — LEVETIRACETAM 400 MILLIGRAM(S): 250 TABLET, FILM COATED ORAL at 11:31

## 2022-01-01 RX ADMIN — Medication 10 MILLIGRAM(S): at 19:05

## 2022-01-01 RX ADMIN — PANTOPRAZOLE SODIUM 40 MILLIGRAM(S): 20 TABLET, DELAYED RELEASE ORAL at 17:14

## 2022-01-01 RX ADMIN — Medication 500 MILLIGRAM(S): at 12:38

## 2022-01-01 RX ADMIN — ATORVASTATIN CALCIUM 80 MILLIGRAM(S): 80 TABLET, FILM COATED ORAL at 21:22

## 2022-01-01 RX ADMIN — Medication 4: at 11:34

## 2022-01-01 RX ADMIN — Medication 3 UNIT(S): at 16:49

## 2022-01-01 RX ADMIN — Medication 500 MILLIGRAM(S): at 12:19

## 2022-01-01 RX ADMIN — Medication 216 GRAM(S): at 21:19

## 2022-01-01 RX ADMIN — Medication 2 MILLIGRAM(S): at 00:30

## 2022-01-01 RX ADMIN — ATORVASTATIN CALCIUM 80 MILLIGRAM(S): 80 TABLET, FILM COATED ORAL at 21:34

## 2022-01-01 RX ADMIN — FLUCONAZOLE 100 MILLIGRAM(S): 150 TABLET ORAL at 18:27

## 2022-01-01 RX ADMIN — Medication 4 MILLIGRAM(S): at 00:06

## 2022-01-01 RX ADMIN — Medication 2: at 00:35

## 2022-01-01 RX ADMIN — Medication 4 UNIT(S): at 17:50

## 2022-01-01 RX ADMIN — Medication 4 UNIT(S): at 17:32

## 2022-01-01 RX ADMIN — LEVETIRACETAM 400 MILLIGRAM(S): 250 TABLET, FILM COATED ORAL at 05:35

## 2022-01-01 RX ADMIN — LEVETIRACETAM 400 MILLIGRAM(S): 250 TABLET, FILM COATED ORAL at 17:33

## 2022-01-01 RX ADMIN — ATORVASTATIN CALCIUM 80 MILLIGRAM(S): 80 TABLET, FILM COATED ORAL at 22:10

## 2022-01-01 RX ADMIN — LEVETIRACETAM 500 MILLIGRAM(S): 250 TABLET, FILM COATED ORAL at 05:03

## 2022-01-01 RX ADMIN — CEFEPIME 100 MILLIGRAM(S): 1 INJECTION, POWDER, FOR SOLUTION INTRAMUSCULAR; INTRAVENOUS at 20:41

## 2022-01-01 RX ADMIN — Medication 4: at 12:22

## 2022-01-01 RX ADMIN — Medication 10 MILLIGRAM(S): at 05:02

## 2022-01-01 RX ADMIN — Medication 4: at 11:59

## 2022-01-01 RX ADMIN — Medication 6 MILLIGRAM(S): at 05:17

## 2022-01-01 RX ADMIN — Medication 4: at 18:29

## 2022-01-01 RX ADMIN — PANTOPRAZOLE SODIUM 40 MILLIGRAM(S): 20 TABLET, DELAYED RELEASE ORAL at 05:36

## 2022-01-01 RX ADMIN — ATORVASTATIN CALCIUM 80 MILLIGRAM(S): 80 TABLET, FILM COATED ORAL at 21:11

## 2022-01-01 RX ADMIN — Medication 20 MILLIGRAM(S): at 12:50

## 2022-01-01 RX ADMIN — Medication 216 GRAM(S): at 22:32

## 2022-01-01 RX ADMIN — Medication 650 MILLIGRAM(S): at 05:01

## 2022-01-01 RX ADMIN — Medication 216 GRAM(S): at 17:48

## 2022-01-01 RX ADMIN — Medication 1 PACKET(S): at 10:57

## 2022-01-01 RX ADMIN — Medication 4 UNIT(S): at 10:46

## 2022-01-01 RX ADMIN — Medication 216 GRAM(S): at 21:00

## 2022-01-01 RX ADMIN — INSULIN GLARGINE 12 UNIT(S): 100 INJECTION, SOLUTION SUBCUTANEOUS at 22:01

## 2022-01-01 RX ADMIN — Medication 4 MILLIGRAM(S): at 17:33

## 2022-01-01 RX ADMIN — Medication 2: at 11:48

## 2022-01-01 RX ADMIN — Medication 4 UNIT(S): at 12:27

## 2022-01-01 RX ADMIN — Medication 6 MILLIGRAM(S): at 00:00

## 2022-01-01 RX ADMIN — Medication 216 GRAM(S): at 23:21

## 2022-01-01 RX ADMIN — LEVETIRACETAM 400 MILLIGRAM(S): 250 TABLET, FILM COATED ORAL at 20:32

## 2022-01-01 RX ADMIN — SODIUM CHLORIDE 1000 MILLILITER(S): 9 INJECTION INTRAMUSCULAR; INTRAVENOUS; SUBCUTANEOUS at 18:09

## 2022-01-01 RX ADMIN — Medication 216 GRAM(S): at 08:33

## 2022-01-01 RX ADMIN — Medication 4 MILLIGRAM(S): at 05:33

## 2022-01-01 RX ADMIN — PANTOPRAZOLE SODIUM 40 MILLIGRAM(S): 20 TABLET, DELAYED RELEASE ORAL at 05:29

## 2022-01-01 RX ADMIN — Medication 2 UNIT(S): at 17:14

## 2022-01-01 RX ADMIN — Medication 216 GRAM(S): at 21:52

## 2022-01-01 RX ADMIN — ATORVASTATIN CALCIUM 80 MILLIGRAM(S): 80 TABLET, FILM COATED ORAL at 21:53

## 2022-01-01 RX ADMIN — Medication 216 GRAM(S): at 13:00

## 2022-01-01 RX ADMIN — Medication 2: at 17:35

## 2022-01-01 RX ADMIN — Medication 250 MILLIGRAM(S): at 17:20

## 2022-01-01 RX ADMIN — Medication 216 GRAM(S): at 00:43

## 2022-01-01 RX ADMIN — LEVETIRACETAM 400 MILLIGRAM(S): 250 TABLET, FILM COATED ORAL at 08:19

## 2022-01-01 RX ADMIN — Medication 4 UNIT(S): at 12:45

## 2022-01-01 RX ADMIN — PIPERACILLIN AND TAZOBACTAM 25 GRAM(S): 4; .5 INJECTION, POWDER, LYOPHILIZED, FOR SOLUTION INTRAVENOUS at 07:06

## 2022-01-01 RX ADMIN — Medication 3: at 09:38

## 2022-01-01 RX ADMIN — Medication 2: at 09:19

## 2022-01-01 RX ADMIN — Medication 500 MILLIGRAM(S): at 12:44

## 2022-01-01 RX ADMIN — Medication 4 MILLIGRAM(S): at 17:38

## 2022-01-01 RX ADMIN — PIPERACILLIN AND TAZOBACTAM 25 GRAM(S): 4; .5 INJECTION, POWDER, LYOPHILIZED, FOR SOLUTION INTRAVENOUS at 21:22

## 2022-01-01 RX ADMIN — PANTOPRAZOLE SODIUM 40 MILLIGRAM(S): 20 TABLET, DELAYED RELEASE ORAL at 05:42

## 2022-01-01 RX ADMIN — PANTOPRAZOLE SODIUM 40 MILLIGRAM(S): 20 TABLET, DELAYED RELEASE ORAL at 17:20

## 2022-01-01 RX ADMIN — Medication 1: at 00:10

## 2022-01-01 RX ADMIN — POTASSIUM PHOSPHATE, MONOBASIC POTASSIUM PHOSPHATE, DIBASIC 83.33 MILLIMOLE(S): 236; 224 INJECTION, SOLUTION INTRAVENOUS at 15:10

## 2022-01-01 RX ADMIN — Medication 1: at 18:17

## 2022-01-01 RX ADMIN — PANTOPRAZOLE SODIUM 40 MILLIGRAM(S): 20 TABLET, DELAYED RELEASE ORAL at 06:45

## 2022-01-01 RX ADMIN — LEVETIRACETAM 500 MILLIGRAM(S): 250 TABLET, FILM COATED ORAL at 05:42

## 2022-01-01 RX ADMIN — ATORVASTATIN CALCIUM 80 MILLIGRAM(S): 80 TABLET, FILM COATED ORAL at 23:36

## 2022-01-01 RX ADMIN — Medication 4 UNIT(S): at 08:23

## 2022-01-01 RX ADMIN — SODIUM CHLORIDE 50 MILLILITER(S): 5 INJECTION, SOLUTION INTRAVENOUS at 00:49

## 2022-01-01 RX ADMIN — Medication 216 GRAM(S): at 11:01

## 2022-01-01 RX ADMIN — Medication 1500 MILLIGRAM(S): at 18:09

## 2022-01-01 RX ADMIN — Medication 2: at 09:25

## 2022-01-01 RX ADMIN — ROBINUL 0.4 MILLIGRAM(S): 0.2 INJECTION INTRAMUSCULAR; INTRAVENOUS at 13:49

## 2022-01-01 RX ADMIN — Medication 4 UNIT(S): at 07:54

## 2022-01-01 RX ADMIN — INSULIN GLARGINE 8 UNIT(S): 100 INJECTION, SOLUTION SUBCUTANEOUS at 21:52

## 2022-01-01 RX ADMIN — Medication 2: at 11:31

## 2022-01-01 RX ADMIN — ATORVASTATIN CALCIUM 80 MILLIGRAM(S): 80 TABLET, FILM COATED ORAL at 21:21

## 2022-01-01 RX ADMIN — Medication 4: at 17:19

## 2022-01-01 RX ADMIN — LEVETIRACETAM 500 MILLIGRAM(S): 250 TABLET, FILM COATED ORAL at 05:57

## 2022-01-01 RX ADMIN — Medication 1 DROP(S): at 17:13

## 2022-01-01 RX ADMIN — LEVETIRACETAM 400 MILLIGRAM(S): 250 TABLET, FILM COATED ORAL at 17:22

## 2022-01-01 RX ADMIN — Medication 1: at 18:16

## 2022-01-01 RX ADMIN — LEVETIRACETAM 500 MILLIGRAM(S): 250 TABLET, FILM COATED ORAL at 05:17

## 2022-01-01 RX ADMIN — Medication 10 MILLIGRAM(S): at 21:19

## 2022-01-01 RX ADMIN — Medication 4 MILLIGRAM(S): at 18:23

## 2022-01-01 RX ADMIN — Medication 4 MILLIGRAM(S): at 11:30

## 2022-01-01 RX ADMIN — POLYETHYLENE GLYCOL 3350 17 GRAM(S): 17 POWDER, FOR SOLUTION ORAL at 17:16

## 2022-01-01 RX ADMIN — SODIUM CHLORIDE 50 MILLILITER(S): 9 INJECTION, SOLUTION INTRAVENOUS at 05:00

## 2022-01-01 RX ADMIN — Medication 216 GRAM(S): at 16:51

## 2022-01-01 RX ADMIN — Medication 200 GRAM(S): at 09:48

## 2022-01-01 RX ADMIN — ATORVASTATIN CALCIUM 80 MILLIGRAM(S): 80 TABLET, FILM COATED ORAL at 21:52

## 2022-01-01 RX ADMIN — Medication 216 GRAM(S): at 07:39

## 2022-01-01 RX ADMIN — PANTOPRAZOLE SODIUM 40 MILLIGRAM(S): 20 TABLET, DELAYED RELEASE ORAL at 17:08

## 2022-01-01 RX ADMIN — LEVETIRACETAM 400 MILLIGRAM(S): 250 TABLET, FILM COATED ORAL at 17:25

## 2022-01-01 RX ADMIN — FLUCONAZOLE 100 MILLIGRAM(S): 150 TABLET ORAL at 18:44

## 2022-01-01 RX ADMIN — Medication 2 MILLIGRAM(S): at 05:13

## 2022-01-01 RX ADMIN — TRAMADOL HYDROCHLORIDE 50 MILLIGRAM(S): 50 TABLET ORAL at 11:20

## 2022-01-01 RX ADMIN — PANTOPRAZOLE SODIUM 40 MILLIGRAM(S): 20 TABLET, DELAYED RELEASE ORAL at 17:33

## 2022-01-01 RX ADMIN — LEVETIRACETAM 500 MILLIGRAM(S): 250 TABLET, FILM COATED ORAL at 17:15

## 2022-01-01 RX ADMIN — INSULIN GLARGINE 12 UNIT(S): 100 INJECTION, SOLUTION SUBCUTANEOUS at 22:41

## 2022-01-01 RX ADMIN — Medication 1 DROP(S): at 04:56

## 2022-01-01 RX ADMIN — LACOSAMIDE 140 MILLIGRAM(S): 50 TABLET ORAL at 06:12

## 2022-01-01 RX ADMIN — SODIUM CHLORIDE 50 MILLILITER(S): 9 INJECTION INTRAMUSCULAR; INTRAVENOUS; SUBCUTANEOUS at 03:06

## 2022-01-01 RX ADMIN — Medication 216 GRAM(S): at 01:45

## 2022-01-01 RX ADMIN — Medication 4 MILLIGRAM(S): at 05:09

## 2022-01-01 RX ADMIN — Medication 4 MILLIGRAM(S): at 17:11

## 2022-01-01 RX ADMIN — Medication 2: at 07:42

## 2022-01-01 RX ADMIN — Medication 4 MILLIGRAM(S): at 05:50

## 2022-01-01 RX ADMIN — Medication 3: at 09:11

## 2022-01-01 RX ADMIN — INSULIN GLARGINE 4 UNIT(S): 100 INJECTION, SOLUTION SUBCUTANEOUS at 23:39

## 2022-01-01 RX ADMIN — PANTOPRAZOLE SODIUM 40 MILLIGRAM(S): 20 TABLET, DELAYED RELEASE ORAL at 05:22

## 2022-01-01 RX ADMIN — Medication 4: at 10:39

## 2022-01-01 RX ADMIN — Medication 216 GRAM(S): at 20:22

## 2022-01-01 RX ADMIN — Medication 4 UNIT(S): at 08:24

## 2022-01-01 RX ADMIN — FONDAPARINUX SODIUM 2.5 MILLIGRAM(S): 2.5 INJECTION, SOLUTION SUBCUTANEOUS at 12:31

## 2022-01-01 RX ADMIN — Medication 2: at 14:32

## 2022-01-01 RX ADMIN — HYDROMORPHONE HYDROCHLORIDE 0.2 MILLIGRAM(S): 2 INJECTION INTRAMUSCULAR; INTRAVENOUS; SUBCUTANEOUS at 22:03

## 2022-01-01 RX ADMIN — INSULIN GLARGINE 5 UNIT(S): 100 INJECTION, SOLUTION SUBCUTANEOUS at 21:40

## 2022-01-01 RX ADMIN — Medication 3: at 17:32

## 2022-01-01 RX ADMIN — Medication 4 MILLIGRAM(S): at 17:09

## 2022-01-01 RX ADMIN — FLUCONAZOLE 100 MILLIGRAM(S): 150 TABLET ORAL at 17:32

## 2022-01-01 RX ADMIN — Medication 650 MILLIGRAM(S): at 22:46

## 2022-01-01 RX ADMIN — NAFCILLIN 200 GRAM(S): 10 INJECTION, POWDER, FOR SOLUTION INTRAVENOUS at 12:28

## 2022-01-01 RX ADMIN — Medication 2 UNIT(S): at 12:14

## 2022-01-01 RX ADMIN — PANTOPRAZOLE SODIUM 40 MILLIGRAM(S): 20 TABLET, DELAYED RELEASE ORAL at 05:38

## 2022-01-01 RX ADMIN — Medication 4 MILLIGRAM(S): at 05:35

## 2022-01-01 RX ADMIN — PANTOPRAZOLE SODIUM 40 MILLIGRAM(S): 20 TABLET, DELAYED RELEASE ORAL at 05:06

## 2022-01-01 RX ADMIN — PIPERACILLIN AND TAZOBACTAM 25 GRAM(S): 4; .5 INJECTION, POWDER, LYOPHILIZED, FOR SOLUTION INTRAVENOUS at 14:02

## 2022-01-01 RX ADMIN — Medication 216 GRAM(S): at 05:07

## 2022-01-01 RX ADMIN — Medication 216 GRAM(S): at 20:52

## 2022-01-01 RX ADMIN — LEVETIRACETAM 500 MILLIGRAM(S): 250 TABLET, FILM COATED ORAL at 05:29

## 2022-01-01 RX ADMIN — Medication 1 PACKET(S): at 21:34

## 2022-01-01 RX ADMIN — Medication 1 UNIT(S): at 09:19

## 2022-01-01 RX ADMIN — Medication 10 MILLIGRAM(S): at 05:42

## 2022-01-01 RX ADMIN — Medication 4 MILLIGRAM(S): at 05:02

## 2022-01-01 RX ADMIN — Medication 2: at 17:25

## 2022-01-01 RX ADMIN — Medication 216 GRAM(S): at 09:10

## 2022-01-01 RX ADMIN — Medication 3 UNIT(S): at 08:14

## 2022-01-01 RX ADMIN — LEVETIRACETAM 500 MILLIGRAM(S): 250 TABLET, FILM COATED ORAL at 04:56

## 2022-01-01 RX ADMIN — LEVETIRACETAM 500 MILLIGRAM(S): 250 TABLET, FILM COATED ORAL at 05:02

## 2022-01-01 RX ADMIN — Medication 1: at 01:09

## 2022-01-01 RX ADMIN — Medication 216 GRAM(S): at 21:38

## 2022-01-01 RX ADMIN — LEVETIRACETAM 750 MILLIGRAM(S): 250 TABLET, FILM COATED ORAL at 17:01

## 2022-01-01 RX ADMIN — PANTOPRAZOLE SODIUM 40 MILLIGRAM(S): 20 TABLET, DELAYED RELEASE ORAL at 17:24

## 2022-01-01 RX ADMIN — Medication 3: at 18:23

## 2022-01-01 RX ADMIN — LEVETIRACETAM 500 MILLIGRAM(S): 250 TABLET, FILM COATED ORAL at 18:04

## 2022-01-01 RX ADMIN — Medication 216 GRAM(S): at 01:14

## 2022-01-01 RX ADMIN — Medication 6 MILLIGRAM(S): at 23:36

## 2022-01-01 RX ADMIN — Medication 4: at 06:53

## 2022-01-01 RX ADMIN — PANTOPRAZOLE SODIUM 40 MILLIGRAM(S): 20 TABLET, DELAYED RELEASE ORAL at 17:48

## 2022-01-01 RX ADMIN — Medication 20 MILLIGRAM(S): at 13:11

## 2022-01-01 RX ADMIN — LEVETIRACETAM 400 MILLIGRAM(S): 250 TABLET, FILM COATED ORAL at 05:13

## 2022-01-01 RX ADMIN — Medication 6 MILLIGRAM(S): at 05:43

## 2022-01-01 RX ADMIN — Medication 20 MILLIGRAM(S): at 13:09

## 2022-01-01 RX ADMIN — CHLORHEXIDINE GLUCONATE 1 APPLICATION(S): 213 SOLUTION TOPICAL at 12:59

## 2022-01-01 RX ADMIN — Medication 216 GRAM(S): at 15:08

## 2022-01-01 RX ADMIN — ENOXAPARIN SODIUM 40 MILLIGRAM(S): 100 INJECTION SUBCUTANEOUS at 17:39

## 2022-01-01 RX ADMIN — Medication 216 GRAM(S): at 20:41

## 2022-01-01 RX ADMIN — Medication 216 GRAM(S): at 21:35

## 2022-01-01 RX ADMIN — HYDROMORPHONE HYDROCHLORIDE 0.2 MILLIGRAM(S): 2 INJECTION INTRAMUSCULAR; INTRAVENOUS; SUBCUTANEOUS at 02:00

## 2022-01-01 RX ADMIN — SODIUM CHLORIDE 75 MILLILITER(S): 9 INJECTION INTRAMUSCULAR; INTRAVENOUS; SUBCUTANEOUS at 12:34

## 2022-01-01 RX ADMIN — Medication 2: at 08:03

## 2022-01-01 RX ADMIN — INSULIN GLARGINE 5 UNIT(S): 100 INJECTION, SOLUTION SUBCUTANEOUS at 21:12

## 2022-01-01 RX ADMIN — Medication 3 UNIT(S): at 18:24

## 2022-01-01 RX ADMIN — LEVETIRACETAM 600 MILLIGRAM(S): 250 TABLET, FILM COATED ORAL at 18:04

## 2022-01-01 RX ADMIN — Medication 216 GRAM(S): at 11:45

## 2022-01-01 RX ADMIN — Medication 4: at 22:22

## 2022-01-01 RX ADMIN — PANTOPRAZOLE SODIUM 40 MILLIGRAM(S): 20 TABLET, DELAYED RELEASE ORAL at 18:13

## 2022-01-01 RX ADMIN — Medication 100 GRAM(S): at 10:34

## 2022-01-01 RX ADMIN — Medication 216 GRAM(S): at 05:13

## 2022-01-01 RX ADMIN — Medication 4 MILLIGRAM(S): at 05:29

## 2022-01-01 RX ADMIN — ATORVASTATIN CALCIUM 80 MILLIGRAM(S): 80 TABLET, FILM COATED ORAL at 21:39

## 2022-01-01 RX ADMIN — Medication 4 UNIT(S): at 08:59

## 2022-01-01 RX ADMIN — Medication 216 GRAM(S): at 07:47

## 2022-01-01 RX ADMIN — Medication 6 MILLIGRAM(S): at 17:11

## 2022-01-01 RX ADMIN — Medication 4 MILLIGRAM(S): at 05:20

## 2022-01-01 RX ADMIN — OXYCODONE HYDROCHLORIDE 10 MILLIGRAM(S): 5 TABLET ORAL at 08:18

## 2022-01-01 RX ADMIN — Medication 4 UNIT(S): at 17:12

## 2022-01-01 RX ADMIN — SODIUM CHLORIDE 50 MILLILITER(S): 9 INJECTION, SOLUTION INTRAVENOUS at 05:32

## 2022-01-01 RX ADMIN — Medication 10 MILLIGRAM(S): at 18:57

## 2022-01-01 RX ADMIN — PANTOPRAZOLE SODIUM 40 MILLIGRAM(S): 20 TABLET, DELAYED RELEASE ORAL at 05:11

## 2022-01-01 RX ADMIN — LEVETIRACETAM 400 MILLIGRAM(S): 250 TABLET, FILM COATED ORAL at 07:11

## 2022-01-01 RX ADMIN — Medication 4 MILLIGRAM(S): at 07:06

## 2022-01-01 RX ADMIN — SENNA PLUS 2 TABLET(S): 8.6 TABLET ORAL at 21:07

## 2022-01-01 RX ADMIN — Medication 400 MILLIGRAM(S): at 18:00

## 2022-01-01 RX ADMIN — PIPERACILLIN AND TAZOBACTAM 25 GRAM(S): 4; .5 INJECTION, POWDER, LYOPHILIZED, FOR SOLUTION INTRAVENOUS at 23:20

## 2022-01-01 RX ADMIN — Medication 216 GRAM(S): at 00:58

## 2022-01-01 RX ADMIN — LEVETIRACETAM 400 MILLIGRAM(S): 250 TABLET, FILM COATED ORAL at 05:30

## 2022-01-01 RX ADMIN — PANTOPRAZOLE SODIUM 40 MILLIGRAM(S): 20 TABLET, DELAYED RELEASE ORAL at 05:56

## 2022-01-01 RX ADMIN — Medication 2 MILLIGRAM(S): at 01:00

## 2022-01-01 RX ADMIN — CHLORHEXIDINE GLUCONATE 1 APPLICATION(S): 213 SOLUTION TOPICAL at 21:14

## 2022-01-01 RX ADMIN — LEVETIRACETAM 400 MILLIGRAM(S): 250 TABLET, FILM COATED ORAL at 18:20

## 2022-01-01 RX ADMIN — Medication 650 MILLIGRAM(S): at 02:00

## 2022-01-01 RX ADMIN — Medication 216 GRAM(S): at 05:31

## 2022-01-01 RX ADMIN — Medication 216 GRAM(S): at 09:04

## 2022-01-01 RX ADMIN — Medication 4: at 22:42

## 2022-01-01 RX ADMIN — Medication 4 UNIT(S): at 17:41

## 2022-01-01 RX ADMIN — Medication 20 MILLIGRAM(S): at 13:01

## 2022-01-01 RX ADMIN — Medication 2 UNIT(S): at 17:55

## 2022-01-01 RX ADMIN — PANTOPRAZOLE SODIUM 40 MILLIGRAM(S): 20 TABLET, DELAYED RELEASE ORAL at 06:27

## 2022-01-01 RX ADMIN — Medication 4 UNIT(S): at 18:17

## 2022-01-01 RX ADMIN — Medication 4 MILLIGRAM(S): at 23:56

## 2022-01-01 RX ADMIN — Medication 216 GRAM(S): at 00:21

## 2022-01-01 RX ADMIN — NAFCILLIN 200 GRAM(S): 10 INJECTION, POWDER, FOR SOLUTION INTRAVENOUS at 04:46

## 2022-01-01 RX ADMIN — Medication 4 MILLIGRAM(S): at 18:21

## 2022-01-01 RX ADMIN — Medication 4 MILLIGRAM(S): at 05:21

## 2022-01-01 RX ADMIN — Medication 20 MILLIGRAM(S): at 18:28

## 2022-01-01 RX ADMIN — POLYETHYLENE GLYCOL 3350 17 GRAM(S): 17 POWDER, FOR SOLUTION ORAL at 12:43

## 2022-01-01 RX ADMIN — PANTOPRAZOLE SODIUM 40 MILLIGRAM(S): 20 TABLET, DELAYED RELEASE ORAL at 18:15

## 2022-01-01 RX ADMIN — CHLORHEXIDINE GLUCONATE 1 APPLICATION(S): 213 SOLUTION TOPICAL at 14:40

## 2022-01-01 RX ADMIN — Medication 3: at 12:35

## 2022-01-01 RX ADMIN — INSULIN GLARGINE 4 UNIT(S): 100 INJECTION, SOLUTION SUBCUTANEOUS at 23:56

## 2022-01-01 RX ADMIN — LEVETIRACETAM 400 MILLIGRAM(S): 250 TABLET, FILM COATED ORAL at 10:21

## 2022-01-01 RX ADMIN — Medication 4 MILLIGRAM(S): at 06:45

## 2022-01-01 RX ADMIN — SODIUM CHLORIDE 75 MILLILITER(S): 9 INJECTION INTRAMUSCULAR; INTRAVENOUS; SUBCUTANEOUS at 07:48

## 2022-01-01 RX ADMIN — POLYETHYLENE GLYCOL 3350 17 GRAM(S): 17 POWDER, FOR SOLUTION ORAL at 12:44

## 2022-01-01 RX ADMIN — Medication 4 MILLIGRAM(S): at 13:30

## 2022-01-01 RX ADMIN — Medication 4 MILLIGRAM(S): at 17:19

## 2022-01-01 RX ADMIN — Medication 4 UNIT(S): at 12:32

## 2022-01-01 RX ADMIN — POLYETHYLENE GLYCOL 3350 17 GRAM(S): 17 POWDER, FOR SOLUTION ORAL at 17:38

## 2022-01-01 RX ADMIN — Medication 2: at 17:13

## 2022-01-01 RX ADMIN — Medication 4 MILLIGRAM(S): at 13:11

## 2022-01-01 RX ADMIN — Medication 500 MILLIGRAM(S): at 12:54

## 2022-01-01 RX ADMIN — Medication 4 MILLIGRAM(S): at 23:02

## 2022-01-01 RX ADMIN — LEVETIRACETAM 500 MILLIGRAM(S): 250 TABLET, FILM COATED ORAL at 05:05

## 2022-01-01 RX ADMIN — PANTOPRAZOLE SODIUM 40 MILLIGRAM(S): 20 TABLET, DELAYED RELEASE ORAL at 18:02

## 2022-01-01 RX ADMIN — PIPERACILLIN AND TAZOBACTAM 25 GRAM(S): 4; .5 INJECTION, POWDER, LYOPHILIZED, FOR SOLUTION INTRAVENOUS at 13:27

## 2022-01-01 RX ADMIN — Medication 20 MILLIGRAM(S): at 11:44

## 2022-01-01 RX ADMIN — Medication 216 GRAM(S): at 05:20

## 2022-01-01 RX ADMIN — Medication 4 MILLIGRAM(S): at 17:26

## 2022-01-01 RX ADMIN — Medication 500 MILLIGRAM(S): at 14:33

## 2022-01-01 RX ADMIN — PANTOPRAZOLE SODIUM 40 MILLIGRAM(S): 20 TABLET, DELAYED RELEASE ORAL at 18:20

## 2022-01-01 RX ADMIN — Medication 20 MILLIGRAM(S): at 12:38

## 2022-01-01 RX ADMIN — SODIUM CHLORIDE 50 MILLILITER(S): 9 INJECTION INTRAMUSCULAR; INTRAVENOUS; SUBCUTANEOUS at 19:01

## 2022-01-01 RX ADMIN — Medication 2: at 08:52

## 2022-01-01 RX ADMIN — PANTOPRAZOLE SODIUM 40 MILLIGRAM(S): 20 TABLET, DELAYED RELEASE ORAL at 17:27

## 2022-01-01 RX ADMIN — Medication 4 MILLIGRAM(S): at 21:59

## 2022-01-01 RX ADMIN — Medication 500 MILLIGRAM(S): at 12:49

## 2022-01-01 RX ADMIN — Medication 200 MILLIGRAM(S): at 18:36

## 2022-01-01 RX ADMIN — LOSARTAN POTASSIUM 50 MILLIGRAM(S): 100 TABLET, FILM COATED ORAL at 05:17

## 2022-01-01 RX ADMIN — LEVETIRACETAM 400 MILLIGRAM(S): 250 TABLET, FILM COATED ORAL at 05:19

## 2022-01-01 RX ADMIN — INSULIN GLARGINE 12 UNIT(S): 100 INJECTION, SOLUTION SUBCUTANEOUS at 23:16

## 2022-01-01 RX ADMIN — Medication 20 MILLIGRAM(S): at 12:34

## 2022-01-01 RX ADMIN — ATORVASTATIN CALCIUM 80 MILLIGRAM(S): 80 TABLET, FILM COATED ORAL at 21:13

## 2022-01-01 RX ADMIN — LEVETIRACETAM 500 MILLIGRAM(S): 250 TABLET, FILM COATED ORAL at 17:21

## 2022-01-01 RX ADMIN — Medication 2: at 12:29

## 2022-01-01 RX ADMIN — LOSARTAN POTASSIUM 50 MILLIGRAM(S): 100 TABLET, FILM COATED ORAL at 05:42

## 2022-01-01 RX ADMIN — PANTOPRAZOLE SODIUM 40 MILLIGRAM(S): 20 TABLET, DELAYED RELEASE ORAL at 05:00

## 2022-01-01 RX ADMIN — Medication 500 MILLIGRAM(S): at 12:26

## 2022-01-01 RX ADMIN — Medication 4 MILLIGRAM(S): at 05:00

## 2022-01-01 RX ADMIN — Medication 8: at 22:23

## 2022-01-01 RX ADMIN — LEVETIRACETAM 400 MILLIGRAM(S): 250 TABLET, FILM COATED ORAL at 02:14

## 2022-01-01 RX ADMIN — Medication 4 UNIT(S): at 12:19

## 2022-01-01 RX ADMIN — Medication 4 MILLIGRAM(S): at 05:44

## 2022-01-01 RX ADMIN — LEVETIRACETAM 500 MILLIGRAM(S): 250 TABLET, FILM COATED ORAL at 17:08

## 2022-01-01 RX ADMIN — Medication 2: at 18:01

## 2022-01-01 RX ADMIN — ATORVASTATIN CALCIUM 80 MILLIGRAM(S): 80 TABLET, FILM COATED ORAL at 00:24

## 2022-01-01 RX ADMIN — Medication 216 GRAM(S): at 12:39

## 2022-01-01 RX ADMIN — INSULIN GLARGINE 8 UNIT(S): 100 INJECTION, SOLUTION SUBCUTANEOUS at 22:30

## 2022-01-01 RX ADMIN — PANTOPRAZOLE SODIUM 40 MILLIGRAM(S): 20 TABLET, DELAYED RELEASE ORAL at 06:14

## 2022-01-01 RX ADMIN — Medication 216 GRAM(S): at 20:01

## 2022-01-01 RX ADMIN — Medication 4 MILLIGRAM(S): at 12:08

## 2022-01-01 RX ADMIN — LEVETIRACETAM 500 MILLIGRAM(S): 250 TABLET, FILM COATED ORAL at 16:48

## 2022-01-01 RX ADMIN — Medication 1 TABLET(S): at 14:33

## 2022-01-01 RX ADMIN — Medication 200 MILLIGRAM(S): at 18:09

## 2022-01-01 RX ADMIN — LEVETIRACETAM 400 MILLIGRAM(S): 250 TABLET, FILM COATED ORAL at 18:09

## 2022-01-01 RX ADMIN — SENNA PLUS 2 TABLET(S): 8.6 TABLET ORAL at 22:26

## 2022-01-01 RX ADMIN — LEVETIRACETAM 750 MILLIGRAM(S): 250 TABLET, FILM COATED ORAL at 17:44

## 2022-01-01 RX ADMIN — Medication 6: at 22:46

## 2022-01-01 RX ADMIN — Medication 1 TABLET(S): at 12:51

## 2022-01-01 RX ADMIN — LEVETIRACETAM 500 MILLIGRAM(S): 250 TABLET, FILM COATED ORAL at 20:52

## 2022-01-01 RX ADMIN — Medication 1: at 05:55

## 2022-01-01 RX ADMIN — Medication 4 UNIT(S): at 17:38

## 2022-01-01 RX ADMIN — ATORVASTATIN CALCIUM 80 MILLIGRAM(S): 80 TABLET, FILM COATED ORAL at 21:01

## 2022-01-01 RX ADMIN — Medication 20 MILLIGRAM(S): at 13:07

## 2022-01-01 RX ADMIN — Medication 216 GRAM(S): at 12:49

## 2022-01-01 RX ADMIN — Medication 20 MILLIGRAM(S): at 12:26

## 2022-01-01 RX ADMIN — LEVETIRACETAM 400 MILLIGRAM(S): 250 TABLET, FILM COATED ORAL at 11:12

## 2022-01-01 RX ADMIN — ATORVASTATIN CALCIUM 80 MILLIGRAM(S): 80 TABLET, FILM COATED ORAL at 21:12

## 2022-01-01 RX ADMIN — LEVETIRACETAM 500 MILLIGRAM(S): 250 TABLET, FILM COATED ORAL at 05:50

## 2022-01-01 RX ADMIN — Medication 1 BOTTLE: at 16:42

## 2022-01-01 RX ADMIN — Medication 4 MILLIGRAM(S): at 18:27

## 2022-01-01 RX ADMIN — Medication 4: at 12:46

## 2022-01-01 RX ADMIN — Medication 1 DROP(S): at 17:18

## 2022-01-01 RX ADMIN — PANTOPRAZOLE SODIUM 40 MILLIGRAM(S): 20 TABLET, DELAYED RELEASE ORAL at 05:58

## 2022-01-01 RX ADMIN — LEVETIRACETAM 500 MILLIGRAM(S): 250 TABLET, FILM COATED ORAL at 06:45

## 2022-01-01 RX ADMIN — Medication 2: at 12:23

## 2022-01-01 RX ADMIN — ATORVASTATIN CALCIUM 80 MILLIGRAM(S): 80 TABLET, FILM COATED ORAL at 21:27

## 2022-01-01 RX ADMIN — Medication 4 MILLIGRAM(S): at 12:35

## 2022-01-01 RX ADMIN — Medication 250 MILLIGRAM(S): at 05:08

## 2022-01-01 RX ADMIN — TRAMADOL HYDROCHLORIDE 25 MILLIGRAM(S): 50 TABLET ORAL at 06:44

## 2022-01-01 RX ADMIN — LOSARTAN POTASSIUM 50 MILLIGRAM(S): 100 TABLET, FILM COATED ORAL at 05:46

## 2022-01-01 RX ADMIN — Medication 200 MILLIGRAM(S): at 05:04

## 2022-01-01 RX ADMIN — Medication 20 MILLIGRAM(S): at 13:21

## 2022-01-01 RX ADMIN — ENOXAPARIN SODIUM 40 MILLIGRAM(S): 100 INJECTION SUBCUTANEOUS at 18:15

## 2022-01-01 RX ADMIN — Medication 6 MILLIGRAM(S): at 12:30

## 2022-01-01 RX ADMIN — Medication 4 UNIT(S): at 17:06

## 2022-01-01 RX ADMIN — Medication 4 MILLIGRAM(S): at 00:12

## 2022-01-01 RX ADMIN — BENZOCAINE AND MENTHOL 1 LOZENGE: 5; 1 LIQUID ORAL at 13:00

## 2022-01-01 RX ADMIN — Medication 4 MILLIGRAM(S): at 00:37

## 2022-01-01 RX ADMIN — PANTOPRAZOLE SODIUM 40 MILLIGRAM(S): 20 TABLET, DELAYED RELEASE ORAL at 13:12

## 2022-01-01 RX ADMIN — PANTOPRAZOLE SODIUM 40 MILLIGRAM(S): 20 TABLET, DELAYED RELEASE ORAL at 05:24

## 2022-01-01 RX ADMIN — LEVETIRACETAM 2250 MILLIGRAM(S): 250 TABLET, FILM COATED ORAL at 18:29

## 2022-01-01 RX ADMIN — PANTOPRAZOLE SODIUM 40 MILLIGRAM(S): 20 TABLET, DELAYED RELEASE ORAL at 07:07

## 2022-01-01 RX ADMIN — Medication 4 UNIT(S): at 12:36

## 2022-01-01 RX ADMIN — ATORVASTATIN CALCIUM 80 MILLIGRAM(S): 80 TABLET, FILM COATED ORAL at 21:48

## 2022-01-01 RX ADMIN — Medication 500 MILLIGRAM(S): at 11:45

## 2022-01-01 RX ADMIN — Medication 216 GRAM(S): at 18:09

## 2022-01-01 RX ADMIN — Medication 4: at 12:35

## 2022-01-01 RX ADMIN — LEVETIRACETAM 500 MILLIGRAM(S): 250 TABLET, FILM COATED ORAL at 18:13

## 2022-01-01 RX ADMIN — PIPERACILLIN AND TAZOBACTAM 25 GRAM(S): 4; .5 INJECTION, POWDER, LYOPHILIZED, FOR SOLUTION INTRAVENOUS at 14:13

## 2022-01-01 RX ADMIN — INSULIN GLARGINE 12 UNIT(S): 100 INJECTION, SOLUTION SUBCUTANEOUS at 22:24

## 2022-01-01 RX ADMIN — Medication 3: at 12:19

## 2022-01-01 RX ADMIN — Medication 4: at 18:02

## 2022-01-01 RX ADMIN — PANTOPRAZOLE SODIUM 80 MILLIGRAM(S): 20 TABLET, DELAYED RELEASE ORAL at 20:05

## 2022-01-01 RX ADMIN — POLYETHYLENE GLYCOL 3350 17 GRAM(S): 17 POWDER, FOR SOLUTION ORAL at 12:26

## 2022-01-01 RX ADMIN — LEVETIRACETAM 400 MILLIGRAM(S): 250 TABLET, FILM COATED ORAL at 18:31

## 2022-01-01 RX ADMIN — LOSARTAN POTASSIUM 50 MILLIGRAM(S): 100 TABLET, FILM COATED ORAL at 04:56

## 2022-01-01 RX ADMIN — Medication 2: at 17:23

## 2022-01-01 RX ADMIN — Medication 2: at 17:12

## 2022-01-01 RX ADMIN — LACOSAMIDE 140 MILLIGRAM(S): 50 TABLET ORAL at 05:17

## 2022-01-01 RX ADMIN — LEVETIRACETAM 400 MILLIGRAM(S): 250 TABLET, FILM COATED ORAL at 11:05

## 2022-01-01 RX ADMIN — Medication 2: at 18:21

## 2022-01-01 RX ADMIN — ATORVASTATIN CALCIUM 80 MILLIGRAM(S): 80 TABLET, FILM COATED ORAL at 21:37

## 2022-01-01 RX ADMIN — Medication 4 MILLIGRAM(S): at 16:49

## 2022-01-01 RX ADMIN — Medication 3: at 17:18

## 2022-01-01 RX ADMIN — Medication 20 MILLIGRAM(S): at 12:40

## 2022-01-01 RX ADMIN — Medication 216 GRAM(S): at 12:21

## 2022-01-01 RX ADMIN — Medication 4 MILLIGRAM(S): at 05:10

## 2022-01-01 RX ADMIN — Medication 1: at 17:20

## 2022-01-01 RX ADMIN — PANTOPRAZOLE SODIUM 40 MILLIGRAM(S): 20 TABLET, DELAYED RELEASE ORAL at 11:31

## 2022-01-01 RX ADMIN — Medication 4 UNIT(S): at 17:34

## 2022-01-01 RX ADMIN — Medication 20 MILLIGRAM(S): at 12:35

## 2022-01-01 RX ADMIN — Medication 1 DROP(S): at 09:48

## 2022-01-01 RX ADMIN — Medication 650 MILLIGRAM(S): at 06:44

## 2022-01-01 RX ADMIN — ATORVASTATIN CALCIUM 80 MILLIGRAM(S): 80 TABLET, FILM COATED ORAL at 22:40

## 2022-01-01 RX ADMIN — PIPERACILLIN AND TAZOBACTAM 25 GRAM(S): 4; .5 INJECTION, POWDER, LYOPHILIZED, FOR SOLUTION INTRAVENOUS at 05:46

## 2022-01-01 RX ADMIN — Medication 216 GRAM(S): at 15:22

## 2022-01-01 RX ADMIN — Medication 6: at 17:47

## 2022-01-01 RX ADMIN — Medication 6: at 12:19

## 2022-01-01 RX ADMIN — Medication 2: at 22:56

## 2022-01-01 RX ADMIN — LEVETIRACETAM 400 MILLIGRAM(S): 250 TABLET, FILM COATED ORAL at 17:20

## 2022-01-01 RX ADMIN — Medication 500 MILLIGRAM(S): at 17:36

## 2022-01-01 RX ADMIN — Medication 216 GRAM(S): at 01:01

## 2022-01-01 RX ADMIN — PANTOPRAZOLE SODIUM 40 MILLIGRAM(S): 20 TABLET, DELAYED RELEASE ORAL at 05:09

## 2022-01-01 RX ADMIN — Medication 2: at 07:31

## 2022-01-01 RX ADMIN — NAFCILLIN 200 GRAM(S): 10 INJECTION, POWDER, FOR SOLUTION INTRAVENOUS at 04:44

## 2022-01-01 RX ADMIN — PANTOPRAZOLE SODIUM 40 MILLIGRAM(S): 20 TABLET, DELAYED RELEASE ORAL at 06:36

## 2022-01-01 RX ADMIN — INSULIN GLARGINE 12 UNIT(S): 100 INJECTION, SOLUTION SUBCUTANEOUS at 22:57

## 2022-01-01 RX ADMIN — Medication 216 GRAM(S): at 20:12

## 2022-01-01 RX ADMIN — Medication 4 MILLIGRAM(S): at 05:57

## 2022-01-01 RX ADMIN — NAFCILLIN 200 GRAM(S): 10 INJECTION, POWDER, FOR SOLUTION INTRAVENOUS at 22:09

## 2022-01-01 RX ADMIN — Medication 2: at 17:55

## 2022-01-01 RX ADMIN — LEVETIRACETAM 400 MILLIGRAM(S): 250 TABLET, FILM COATED ORAL at 17:11

## 2022-01-01 RX ADMIN — Medication 2: at 11:47

## 2022-01-01 RX ADMIN — LEVETIRACETAM 500 MILLIGRAM(S): 250 TABLET, FILM COATED ORAL at 17:16

## 2022-01-01 RX ADMIN — ATORVASTATIN CALCIUM 80 MILLIGRAM(S): 80 TABLET, FILM COATED ORAL at 05:42

## 2022-01-01 RX ADMIN — LEVETIRACETAM 500 MILLIGRAM(S): 250 TABLET, FILM COATED ORAL at 18:20

## 2022-01-01 RX ADMIN — LEVETIRACETAM 500 MILLIGRAM(S): 250 TABLET, FILM COATED ORAL at 17:52

## 2022-01-01 RX ADMIN — Medication 216 GRAM(S): at 12:46

## 2022-01-01 RX ADMIN — INSULIN GLARGINE 4 UNIT(S): 100 INJECTION, SOLUTION SUBCUTANEOUS at 22:14

## 2022-01-01 RX ADMIN — Medication 4 MILLIGRAM(S): at 17:01

## 2022-01-01 RX ADMIN — Medication 4 MILLIGRAM(S): at 05:41

## 2022-01-01 RX ADMIN — Medication 4 UNIT(S): at 08:29

## 2022-01-01 RX ADMIN — Medication 1 TABLET(S): at 13:19

## 2022-01-01 RX ADMIN — Medication 4 UNIT(S): at 17:46

## 2022-01-01 RX ADMIN — Medication 20 MILLIGRAM(S): at 13:46

## 2022-01-01 RX ADMIN — Medication 216 GRAM(S): at 23:05

## 2022-01-01 RX ADMIN — INSULIN GLARGINE 12 UNIT(S): 100 INJECTION, SOLUTION SUBCUTANEOUS at 22:37

## 2022-01-01 RX ADMIN — Medication 216 GRAM(S): at 01:16

## 2022-01-01 RX ADMIN — Medication 650 MILLIGRAM(S): at 16:50

## 2022-01-01 RX ADMIN — Medication 2: at 22:11

## 2022-01-01 RX ADMIN — LEVETIRACETAM 400 MILLIGRAM(S): 250 TABLET, FILM COATED ORAL at 06:13

## 2022-01-01 RX ADMIN — Medication 216 GRAM(S): at 15:56

## 2022-01-01 RX ADMIN — Medication 3: at 08:34

## 2022-01-01 RX ADMIN — Medication 216 GRAM(S): at 16:52

## 2022-01-01 RX ADMIN — Medication 4 UNIT(S): at 16:51

## 2022-01-01 RX ADMIN — LOSARTAN POTASSIUM 25 MILLIGRAM(S): 100 TABLET, FILM COATED ORAL at 05:29

## 2022-01-01 RX ADMIN — LEVETIRACETAM 500 MILLIGRAM(S): 250 TABLET, FILM COATED ORAL at 17:38

## 2022-01-01 RX ADMIN — Medication 2: at 06:12

## 2022-01-01 RX ADMIN — Medication 3: at 12:29

## 2022-01-01 RX ADMIN — PANTOPRAZOLE SODIUM 40 MILLIGRAM(S): 20 TABLET, DELAYED RELEASE ORAL at 05:34

## 2022-01-01 RX ADMIN — INSULIN GLARGINE 5 UNIT(S): 100 INJECTION, SOLUTION SUBCUTANEOUS at 21:32

## 2022-01-01 RX ADMIN — Medication 4 MILLIGRAM(S): at 17:16

## 2022-01-01 RX ADMIN — Medication 216 GRAM(S): at 20:10

## 2022-01-01 RX ADMIN — SODIUM CHLORIDE 50 MILLILITER(S): 9 INJECTION, SOLUTION INTRAVENOUS at 19:02

## 2022-01-01 RX ADMIN — Medication 4 UNIT(S): at 12:22

## 2022-01-01 RX ADMIN — Medication 216 GRAM(S): at 12:44

## 2022-01-01 RX ADMIN — Medication 6: at 21:59

## 2022-01-01 RX ADMIN — Medication 2 UNIT(S): at 18:24

## 2022-01-01 RX ADMIN — LEVETIRACETAM 400 MILLIGRAM(S): 250 TABLET, FILM COATED ORAL at 17:17

## 2022-01-01 RX ADMIN — CHLORHEXIDINE GLUCONATE 1 APPLICATION(S): 213 SOLUTION TOPICAL at 22:06

## 2022-01-01 RX ADMIN — Medication 4 UNIT(S): at 08:48

## 2022-01-01 RX ADMIN — Medication 4: at 07:15

## 2022-01-01 RX ADMIN — Medication 200 MILLIGRAM(S): at 17:48

## 2022-01-01 RX ADMIN — Medication 4 UNIT(S): at 10:38

## 2022-01-01 RX ADMIN — Medication 1 DROP(S): at 17:14

## 2022-01-01 RX ADMIN — Medication 2: at 08:10

## 2022-01-01 RX ADMIN — Medication 2: at 17:28

## 2022-01-01 RX ADMIN — LEVETIRACETAM 500 MILLIGRAM(S): 250 TABLET, FILM COATED ORAL at 07:24

## 2022-01-01 RX ADMIN — Medication 4 MILLIGRAM(S): at 17:36

## 2022-01-01 RX ADMIN — Medication 1 TABLET(S): at 08:56

## 2022-01-01 RX ADMIN — Medication 1 DROP(S): at 02:53

## 2022-01-01 RX ADMIN — Medication 2 UNIT(S): at 10:41

## 2022-01-01 RX ADMIN — LEVETIRACETAM 500 MILLIGRAM(S): 250 TABLET, FILM COATED ORAL at 22:30

## 2022-01-01 RX ADMIN — Medication 216 GRAM(S): at 12:26

## 2022-01-01 RX ADMIN — POLYETHYLENE GLYCOL 3350 17 GRAM(S): 17 POWDER, FOR SOLUTION ORAL at 13:47

## 2022-01-01 RX ADMIN — SODIUM CHLORIDE 50 MILLILITER(S): 9 INJECTION, SOLUTION INTRAVENOUS at 19:00

## 2022-01-01 RX ADMIN — LEVETIRACETAM 400 MILLIGRAM(S): 250 TABLET, FILM COATED ORAL at 18:01

## 2022-01-01 RX ADMIN — ENOXAPARIN SODIUM 40 MILLIGRAM(S): 100 INJECTION SUBCUTANEOUS at 17:11

## 2022-01-01 RX ADMIN — LEVETIRACETAM 500 MILLIGRAM(S): 250 TABLET, FILM COATED ORAL at 17:10

## 2022-01-01 RX ADMIN — Medication 4 UNIT(S): at 18:18

## 2022-01-01 RX ADMIN — PANTOPRAZOLE SODIUM 40 MILLIGRAM(S): 20 TABLET, DELAYED RELEASE ORAL at 12:01

## 2022-01-01 RX ADMIN — Medication 20 MILLIGRAM(S): at 15:10

## 2022-01-01 RX ADMIN — LEVETIRACETAM 400 MILLIGRAM(S): 250 TABLET, FILM COATED ORAL at 05:32

## 2022-01-01 RX ADMIN — Medication 20 MILLIGRAM(S): at 11:26

## 2022-01-01 RX ADMIN — Medication 3: at 22:15

## 2022-01-01 RX ADMIN — ATORVASTATIN CALCIUM 40 MILLIGRAM(S): 80 TABLET, FILM COATED ORAL at 21:48

## 2022-01-01 RX ADMIN — Medication 50 MILLIGRAM(S): at 16:38

## 2022-01-01 RX ADMIN — SODIUM CHLORIDE 1000 MILLILITER(S): 9 INJECTION INTRAMUSCULAR; INTRAVENOUS; SUBCUTANEOUS at 15:07

## 2022-01-01 RX ADMIN — Medication 216 GRAM(S): at 09:24

## 2022-01-01 RX ADMIN — INSULIN GLARGINE 3 UNIT(S): 100 INJECTION, SOLUTION SUBCUTANEOUS at 22:14

## 2022-01-01 RX ADMIN — ATORVASTATIN CALCIUM 80 MILLIGRAM(S): 80 TABLET, FILM COATED ORAL at 21:32

## 2022-01-01 RX ADMIN — LOSARTAN POTASSIUM 50 MILLIGRAM(S): 100 TABLET, FILM COATED ORAL at 05:53

## 2022-01-01 RX ADMIN — FLUCONAZOLE 100 MILLIGRAM(S): 150 TABLET ORAL at 17:09

## 2022-01-01 RX ADMIN — Medication 2: at 22:45

## 2022-01-01 RX ADMIN — ATORVASTATIN CALCIUM 80 MILLIGRAM(S): 80 TABLET, FILM COATED ORAL at 21:03

## 2022-01-01 RX ADMIN — PANTOPRAZOLE SODIUM 40 MILLIGRAM(S): 20 TABLET, DELAYED RELEASE ORAL at 18:03

## 2022-01-01 RX ADMIN — Medication 4 MILLIGRAM(S): at 18:10

## 2022-01-01 RX ADMIN — LEVETIRACETAM 400 MILLIGRAM(S): 250 TABLET, FILM COATED ORAL at 21:09

## 2022-01-01 RX ADMIN — CEFTRIAXONE 2000 MILLIGRAM(S): 500 INJECTION, POWDER, FOR SOLUTION INTRAMUSCULAR; INTRAVENOUS at 18:09

## 2022-01-01 RX ADMIN — Medication 216 GRAM(S): at 09:09

## 2022-01-01 RX ADMIN — Medication 4 MILLIGRAM(S): at 17:44

## 2022-01-01 RX ADMIN — LACOSAMIDE 140 MILLIGRAM(S): 50 TABLET ORAL at 05:13

## 2022-01-01 RX ADMIN — Medication 4 MILLIGRAM(S): at 06:36

## 2022-01-01 RX ADMIN — Medication 4 MILLIGRAM(S): at 17:12

## 2022-01-01 RX ADMIN — SODIUM CHLORIDE 75 MILLILITER(S): 9 INJECTION INTRAMUSCULAR; INTRAVENOUS; SUBCUTANEOUS at 18:15

## 2022-01-01 RX ADMIN — TRAMADOL HYDROCHLORIDE 50 MILLIGRAM(S): 50 TABLET ORAL at 12:30

## 2022-01-01 RX ADMIN — Medication 216 GRAM(S): at 13:09

## 2022-01-01 RX ADMIN — PANTOPRAZOLE SODIUM 40 MILLIGRAM(S): 20 TABLET, DELAYED RELEASE ORAL at 18:00

## 2022-01-01 RX ADMIN — LEVETIRACETAM 500 MILLIGRAM(S): 250 TABLET, FILM COATED ORAL at 18:17

## 2022-01-01 RX ADMIN — INSULIN GLARGINE 5 UNIT(S): 100 INJECTION, SOLUTION SUBCUTANEOUS at 22:17

## 2022-01-01 RX ADMIN — Medication 4: at 12:40

## 2022-01-01 RX ADMIN — Medication 216 GRAM(S): at 05:19

## 2022-01-01 RX ADMIN — PANTOPRAZOLE SODIUM 40 MILLIGRAM(S): 20 TABLET, DELAYED RELEASE ORAL at 17:10

## 2022-01-01 RX ADMIN — Medication 216 GRAM(S): at 15:10

## 2022-01-01 RX ADMIN — SODIUM CHLORIDE 50 MILLILITER(S): 9 INJECTION INTRAMUSCULAR; INTRAVENOUS; SUBCUTANEOUS at 10:55

## 2022-01-01 RX ADMIN — LEVETIRACETAM 400 MILLIGRAM(S): 250 TABLET, FILM COATED ORAL at 05:34

## 2022-01-01 RX ADMIN — LOSARTAN POTASSIUM 25 MILLIGRAM(S): 100 TABLET, FILM COATED ORAL at 05:47

## 2022-01-01 RX ADMIN — PIPERACILLIN AND TAZOBACTAM 25 GRAM(S): 4; .5 INJECTION, POWDER, LYOPHILIZED, FOR SOLUTION INTRAVENOUS at 08:10

## 2022-01-01 RX ADMIN — ATORVASTATIN CALCIUM 80 MILLIGRAM(S): 80 TABLET, FILM COATED ORAL at 23:10

## 2022-01-01 RX ADMIN — LEVETIRACETAM 400 MILLIGRAM(S): 250 TABLET, FILM COATED ORAL at 05:37

## 2022-01-01 RX ADMIN — Medication 216 GRAM(S): at 04:42

## 2022-01-01 RX ADMIN — Medication 650 MILLIGRAM(S): at 05:23

## 2022-01-01 RX ADMIN — PANTOPRAZOLE SODIUM 40 MILLIGRAM(S): 20 TABLET, DELAYED RELEASE ORAL at 05:12

## 2022-01-01 RX ADMIN — Medication 1: at 22:18

## 2022-01-01 RX ADMIN — Medication 5 MILLIGRAM(S): at 01:22

## 2022-01-01 RX ADMIN — Medication 4 MILLIGRAM(S): at 05:25

## 2022-01-01 RX ADMIN — Medication 1: at 08:49

## 2022-01-01 RX ADMIN — Medication 20 MILLIGRAM(S): at 11:51

## 2022-01-01 RX ADMIN — Medication 216 GRAM(S): at 18:51

## 2022-01-01 RX ADMIN — Medication 4 UNIT(S): at 09:25

## 2022-01-01 RX ADMIN — Medication 1 DROP(S): at 12:12

## 2022-01-01 RX ADMIN — Medication 4: at 18:08

## 2022-01-01 RX ADMIN — Medication 2: at 17:38

## 2022-01-01 RX ADMIN — PANTOPRAZOLE SODIUM 40 MILLIGRAM(S): 20 TABLET, DELAYED RELEASE ORAL at 05:33

## 2022-01-01 RX ADMIN — LEVETIRACETAM 400 MILLIGRAM(S): 250 TABLET, FILM COATED ORAL at 05:08

## 2022-01-01 RX ADMIN — Medication 216 GRAM(S): at 00:29

## 2022-01-01 RX ADMIN — SODIUM CHLORIDE 50 MILLILITER(S): 9 INJECTION, SOLUTION INTRAVENOUS at 01:30

## 2022-01-01 RX ADMIN — LEVETIRACETAM 500 MILLIGRAM(S): 250 TABLET, FILM COATED ORAL at 05:30

## 2022-01-01 RX ADMIN — Medication 4 UNIT(S): at 08:34

## 2022-01-01 RX ADMIN — OXYCODONE HYDROCHLORIDE 10 MILLIGRAM(S): 5 TABLET ORAL at 07:48

## 2022-01-01 RX ADMIN — Medication 216 GRAM(S): at 16:10

## 2022-01-01 RX ADMIN — PANTOPRAZOLE SODIUM 40 MILLIGRAM(S): 20 TABLET, DELAYED RELEASE ORAL at 22:31

## 2022-01-01 RX ADMIN — LACOSAMIDE 140 MILLIGRAM(S): 50 TABLET ORAL at 17:14

## 2022-01-01 RX ADMIN — Medication 1 PACKET(S): at 18:01

## 2022-01-01 RX ADMIN — Medication 650 MILLIGRAM(S): at 05:32

## 2022-01-01 RX ADMIN — LOSARTAN POTASSIUM 25 MILLIGRAM(S): 100 TABLET, FILM COATED ORAL at 05:30

## 2022-01-01 RX ADMIN — Medication 2: at 16:52

## 2022-01-01 RX ADMIN — Medication 1 TABLET(S): at 09:10

## 2022-01-01 RX ADMIN — Medication 6: at 23:06

## 2022-01-01 RX ADMIN — Medication 4 MILLIGRAM(S): at 12:41

## 2022-01-01 RX ADMIN — Medication 216 GRAM(S): at 05:02

## 2022-01-01 RX ADMIN — Medication 6: at 13:01

## 2022-01-01 RX ADMIN — Medication 216 GRAM(S): at 12:34

## 2022-01-01 RX ADMIN — POTASSIUM PHOSPHATE, MONOBASIC POTASSIUM PHOSPHATE, DIBASIC 62.5 MILLIMOLE(S): 236; 224 INJECTION, SOLUTION INTRAVENOUS at 16:50

## 2022-01-01 RX ADMIN — Medication 216 GRAM(S): at 08:55

## 2022-01-01 RX ADMIN — Medication 4 MILLIGRAM(S): at 01:03

## 2022-01-01 RX ADMIN — Medication 4: at 16:35

## 2022-01-01 RX ADMIN — LEVETIRACETAM 500 MILLIGRAM(S): 250 TABLET, FILM COATED ORAL at 17:12

## 2022-01-01 RX ADMIN — LEVETIRACETAM 400 MILLIGRAM(S): 250 TABLET, FILM COATED ORAL at 18:34

## 2022-01-01 RX ADMIN — Medication 4 MILLIGRAM(S): at 23:05

## 2022-01-01 RX ADMIN — Medication 4 MILLIGRAM(S): at 17:31

## 2022-01-01 RX ADMIN — Medication 4 UNIT(S): at 12:46

## 2022-01-01 RX ADMIN — LEVETIRACETAM 400 MILLIGRAM(S): 250 TABLET, FILM COATED ORAL at 17:48

## 2022-01-01 RX ADMIN — PANTOPRAZOLE SODIUM 40 MILLIGRAM(S): 20 TABLET, DELAYED RELEASE ORAL at 04:56

## 2022-01-01 RX ADMIN — Medication 200 MILLIGRAM(S): at 06:11

## 2022-01-01 RX ADMIN — PANTOPRAZOLE SODIUM 40 MILLIGRAM(S): 20 TABLET, DELAYED RELEASE ORAL at 05:54

## 2022-01-01 RX ADMIN — Medication 2: at 13:20

## 2022-01-01 RX ADMIN — HYDROMORPHONE HYDROCHLORIDE 0.2 MILLIGRAM(S): 2 INJECTION INTRAMUSCULAR; INTRAVENOUS; SUBCUTANEOUS at 11:24

## 2022-01-01 RX ADMIN — Medication 102 MILLIGRAM(S): at 18:26

## 2022-01-01 RX ADMIN — Medication 1 TABLET(S): at 12:40

## 2022-01-01 RX ADMIN — Medication 4 MILLIGRAM(S): at 07:24

## 2022-01-01 RX ADMIN — SODIUM CHLORIDE 50 MILLILITER(S): 9 INJECTION, SOLUTION INTRAVENOUS at 12:38

## 2022-01-01 RX ADMIN — Medication 200 GRAM(S): at 05:12

## 2022-01-01 RX ADMIN — INSULIN GLARGINE 8 UNIT(S): 100 INJECTION, SOLUTION SUBCUTANEOUS at 23:19

## 2022-01-01 RX ADMIN — Medication 4 UNIT(S): at 11:36

## 2022-01-01 RX ADMIN — Medication 2: at 00:38

## 2022-01-01 RX ADMIN — Medication 4 UNIT(S): at 08:33

## 2022-07-22 NOTE — ED ADULT NURSE NOTE - OBJECTIVE STATEMENT
81 y/o male coming to the ER with c/o headache. A&Ox4. Ambulatory. PMH HTN, newly dx brain tumor. German speaking, requesting the daughter interpret. As per daughter patient was Discharged today from [facility] today where he was dx with a brain tumor and dc home on steroids as per daughter they are not happy with the treatment plan. Patient endorses headaches, intermittent blurry vision, numbness/tingling in the b/l hands. PERRL. Patient moving all extremities. Patient endorses feeling depressed after receiving the new diagnosis. Patient endorses no SI/HI. Patient denies chest pain, fever, chills, n/v/d, urinary symptoms, abdominal pain. Safety measures maintained. Bed in the lowest position. Call bell within reach. No acute distress noted or further complaints at this time. 83 y/o male coming to the ER with c/o headache. A&Ox4. Ambulatory. PMH HTN, newly dx brain tumor, umbilical hernia. Azerbaijani speaking, requesting the daughter interpret. As per daughter patient was Discharged today from [facility] today where he was dx with a brain tumor and dc home on steroids as per daughter they are not happy with the treatment plan. Patient endorses headaches, intermittent blurry vision, numbness/tingling in the b/l hands. PERRL. Patient moving all extremities. Abdomen is soft, distended, non tender. Patient endorses feeling depressed after receiving the new diagnosis. Patient endorses no SI/HI. Patient denies chest pain, fever, chills, n/v/d, urinary symptoms, abdominal pain. Safety measures maintained. Bed in the lowest position. Call bell within reach. No acute distress noted or further complaints at this time.

## 2022-07-22 NOTE — ED ADULT NURSE NOTE - HISTORY OF COVID-19 VACCINATION
Pt contacted through Post Procedural Symptom Tracking. Denies any cough, fever, or difficulty breathing since procedure.       Reason for Disposition   Health Information question, no triage required and triager able to answer question    Additional Information   Negative: [1] Caller is not with the adult (patient) AND [2] reporting urgent symptoms   Negative: Lab result questions   Negative: Medication questions   Negative: Caller can't be reached by phone   Negative: Caller has already spoken to PCP or another triager   Negative: RN needs further essential information from caller in order to complete triage   Negative: Requesting regular office appointment   Negative: [1] Caller requesting NON-URGENT health information AND [2] PCP's office is the best resource    Protocols used: INFORMATION ONLY CALL-A-       Yes

## 2022-07-23 NOTE — PROGRESS NOTE ADULT - ASSESSMENT
82M hx HTN, HLD, R-handed, Kittitian speaking, p/w 2w confusion/disorientation and urinary incontinence. Recent hosp adm to [facility] where ca w/u was done. Report: MRI c/w GBM and neg CT CAP. CTH at [facility] showing 4.4 x 4.1 x 3.8 cm medial R frontal lobe mass c/w GBM.    Neuro:  monitor Q4  Cont decadron for cerebral edema  Cont Keppra for seizure PPX  MRI DTI sequence pending for OR planning this week    Cards:  -150  Cont Losartan / furosemide home meds  Cont lipitor  Hold ASA 81mg (home med, unknown why)    Pulm:  Incentive spirometry as tolerated  RA sats >92%    Renal:  IVL  Voiding    GI:  Tolerating diet  Bowel regimen    Endo:  Awaiting A1C, family reports pre-diabetic   ISS while on steroids    Heme:  DVT ppx SCDs SQL until OR planned    PT/OT pending post operation    Discussed with patient and family wound care, follow up plans, activity, and medications. Questions answered, and they verbalized understanding.    d/w Dr Mcneil  [id number]

## 2022-07-23 NOTE — PATIENT PROFILE ADULT - FALL HARM RISK - HARM RISK INTERVENTIONS

## 2022-07-23 NOTE — CONSULT NOTE ADULT - PROBLEM SELECTOR RECOMMENDATION 3
Continue home medication HCTZ, Losartan.   Monitor BP, HR    #Hold home medication Aspirin in anticipation of surgery Continue home medication HCTZ, Losartan.   Monitor BP, HR    #Hold home medication Aspirin in anticipation of surgery (patient's daughter and wife are not sure why he was started on aspirin and that he stopped taking it about 6 months ago, but it is still getting prescribed by PCP). Continue home medications HCTZ, Losartan.   Monitor BP, HR    #Hold home medication Aspirin in anticipation of surgery (patient's daughter and wife are not sure why he was started on aspirin and that he stopped taking it about 6 months ago, but it is still getting prescribed by PCP). Check A1C.   Continue ISS.   Monitor FS glu checks

## 2022-07-23 NOTE — H&P ADULT - ASSESSMENT
Jordon Hendrickson  82M hx HTN, HLD, R-handed, Upper sorbian speaking, p/w 2w confusion/disorientation and urinary incontinence. Recent hosp adm to [facility] where ca w/u was done. Report: MRI c/w GBM and neg CT CAP. CTH at [facility] showing 4.4 x 4.1 x 3.8 cm medial R frontal lobe mass c/w GBM. Exam: Sleepy, Ox1-2, PERRL, EOMI, no facial, no drift, COLES 5/5, SILT.  -Adm 4C, q4h neuro checks  -Per pt daughter, Dr. Mcneil has CDs of MRI and CT CAP  -Keppra 500bid  -Dex 4q6  -Na goal 140-150  -HOB 30 deg  -Cont pulse ox  -Will discuss w/ Dr. Mcneil whether needs further imaging  -Preop for rxn next week  -Call hospitalist in AM  -WBC 13.23 (recent steroid use)

## 2022-07-23 NOTE — CONSULT NOTE ADULT - PROBLEM SELECTOR RECOMMENDATION 6
DVT PPX: Lovenox subcu    Monitor bowel movements, Is/Os Medication reconciliation done per list provided by pharmacist: [facility] . Patient received 4 day supply of naproxen back in June, rest of the active and recent prescriptions have been entered in med review list.   Patient's daughter to bring in list to verify medications. Monitor for urinary retention, bladder scan periodically.    #constipation: reports being constipated past week, requiring laxatives. Miralax and Senna. Monitor stool count.

## 2022-07-23 NOTE — CONSULT NOTE ADULT - PROBLEM SELECTOR RECOMMENDATION 5
Medication reconciliation done per list provided by pharmacist: [facility] . Patient received 4 day supply of naproxen back in June, rest of the active and recent prescriptions have been entered in med review list.   Patient's daughter to bring in list to verify medications. At home takes rosuvastatin. Therapeautic interchange Lipitor while inpatient.   Check lipid panel At home takes rosuvastatin. Therapeutic interchange Lipitor while inpatient.   Check lipid panel

## 2022-07-23 NOTE — ED PROVIDER NOTE - PHYSICAL EXAMINATION
PHYSICAL EXAM:  CONSTITUTIONAL: Well appearing, awake, alert, oriented to person, place, time/situation and in no apparent distress.  HEAD: Atraumatic  EYES: Clear bilaterally, pupils equal, round and reactive to light.  ENMT: Airway patent, Nasal mucosa clear. Mouth with normal mucosa. Uvula is midline.   CARDIAC: Normal rate, regular rhythm. +S1/S2. No murmurs, rubs or gallops.  RESPIRATORY: Breathing unlabored. Breath sounds clear and equal bilaterally.  ABDOMEN:  Soft, nontender, nondistended. No rebound tenderness or guarding.  NEUROLOGICAL: Alert and oriented, no focal deficits, no motor or sensory deficits. CN2-12 intact. Sensation intact x4 extremities. Strength 5/5 of upper and lower extremities.   SKIN: Skin warm and dry. No evidence of rashes or lesions.

## 2022-07-23 NOTE — CONSULT NOTE ADULT - PROBLEM SELECTOR RECOMMENDATION 2
RCRI:      Check A1C Per patient's daughter and wife on the phone, he has no history of MI/CAD/abnormal stress test/no abnormal EKG/cardiac cath, HF, stroke, kidney disease  Reports that he is prediabetes not on any meds, never took insulin   History of Hep C, diagnosed 19 year ago, treated 2003.     RCRI:      Check A1C Per patient's daughter and wife on the phone, he has no history of MI/CAD/abnormal stress test/no abnormal EKG/cardiac cath, HF, stroke, kidney disease  Reports that he is prediabetes not on any meds, never took insulin   History of Hep C, diagnosed 19 year ago, treated 2003.     RCRI: Per patient's daughter and wife on the phone and at bedside, prior to recent events he was able to walk ~45 mins and climb 15 steps of stairs without getting dyspneic, he has no history of MI/CAD/abnormal stress test/no abnormal EKG/cardiac cath, HF, stroke, or kidney disease. They report that he is prediabetic, not on any meds, never took insulin.  Surgical history: Cataract surgery (left and right, May 2022)  Unclear if he ever had an echo done. Patient's daughter reports he has been having leg swelling since November 2021 and meds were switched and Lasix was started as a result. Denies HF. Will need to review records from PCP  No EKG in chart    METS >4 prior to recent events.    Check EKG  Would check Echo given suspicion for HF

## 2022-07-23 NOTE — CONSULT NOTE ADULT - SUBJECTIVE AND OBJECTIVE BOX
MEDICINE CONSULT NOTE      Patient is a 82y old  Male who presents with a chief complaint of Brain lesion (2022 01:34)      HPI:  (from previous notes)      ED Course:       PAST MEDICAL & SURGICAL HISTORY:  Brain tumor      FAMILY HISTORY:      SOCIAL HISTORY:       ALLERGIES:   No Known Allergies      COVID-19 Vaccine Information:  History of COVID-19 vaccination: Yes  Brand of COVID-19 vaccination: Moderna dose 1, 2, and 3  Date of last vaccination: 2022   Have you had a first COVID-19 booster?: Yes  Brand of first COVID-19 booster: Moderna  Date of first COVID-19 booster: 2022   Will the patient accept the COVID-19 vaccine if eligible and it is available?: Not applicable  COVID-19 Vaccine Administered this visit:--         Home Medications:  ASPIRIN 81MG EC LOW DOSE TABLETS: TAKE 1 TABLET BY MOUTH EVERY DAY (2022 12:04)  FUROSEMIDE 20MG TABLETS: TAKE 1/2 TABLET BY MOUTH DAILY (2022 12:04)  HYDROCHLOROTHIAZIDE 12.5MG CAPSULES:  (2022 12:04)  LOSARTAN 25MG TABLETS:  (2022 12:04)  PREDNISOLONE AC 1% OPHTH SUSP  5ML: SHAKE LIQUID AND INSTILL 1 DROP IN BOTH EYES FOUR TIMES DAILY (2022 12:04)  ROSUVASTATIN 40MG TABLETS:  (2022 12:04)        MEDICATIONS  (STANDING):  atorvastatin 40 milliGRAM(s) Oral at bedtime  dexAMETHasone  Injectable 4 milliGRAM(s) IV Push every 6 hours  dextrose 5%. 1000 milliLiter(s) (50 mL/Hr) IV Continuous <Continuous>  dextrose 5%. 1000 milliLiter(s) (100 mL/Hr) IV Continuous <Continuous>  dextrose 50% Injectable 25 Gram(s) IV Push once  dextrose 50% Injectable 12.5 Gram(s) IV Push once  dextrose 50% Injectable 25 Gram(s) IV Push once  enoxaparin Injectable 40 milliGRAM(s) SubCutaneous <User Schedule>  furosemide    Tablet 10 milliGRAM(s) Oral daily  glucagon  Injectable 1 milliGRAM(s) IntraMuscular once  hydrochlorothiazide 12.5 milliGRAM(s) Oral daily  insulin lispro (ADMELOG) corrective regimen sliding scale   SubCutaneous three times a day before meals  insulin lispro (ADMELOG) corrective regimen sliding scale   SubCutaneous at bedtime  levETIRAcetam 500 milliGRAM(s) Oral every 12 hours  senna 2 Tablet(s) Oral at bedtime  sodium chloride 0.9%. 1000 milliLiter(s) (75 mL/Hr) IV Continuous <Continuous>    MEDICATIONS  (PRN):  acetaminophen     Tablet .. 650 milliGRAM(s) Oral every 6 hours PRN Temp greater or equal to 38C (100.4F), Mild Pain (1 - 3)  dextrose Oral Gel 15 Gram(s) Oral once PRN Blood Glucose LESS THAN 70 milliGRAM(s)/deciliter  hydrALAZINE Injectable 5 milliGRAM(s) IV Push once PRN HTN  ondansetron Injectable 4 milliGRAM(s) IV Push every 6 hours PRN Nausea and/or Vomiting        REVIEW OF SYSTEMS:  CONSTITUTIONAL: No fever, weight loss, or fatigue  EYES: No eye pain, visual disturbances, or discharge  ENMT:  No difficulty hearing, tinnitus, vertigo; No sinus or throat pain  NECK: No pain or stiffness  RESPIRATORY: No cough, wheezing, chills or hemoptysis; No shortness of breath  CARDIOVASCULAR: No chest pain, palpitations, dizziness, or leg swelling  GASTROINTESTINAL: No abdominal or epigastric pain. No nausea, vomiting, or hematemesis; No diarrhea or constipation. No melena or hematochezia.  GENITOURINARY: No dysuria, frequency, hematuria, or incontinence  NEUROLOGICAL: No headaches, memory loss, loss of strength, numbness, or tremors  SKIN: No itching, burning, rashes, or lesions   LYMPH NODES: No enlarged glands  ENDOCRINE: No heat or cold intolerance; No hair loss  MUSCULOSKELETAL: No joint pain or swelling; No muscle, back, or extremity pain  PSYCHIATRIC: No depression, anxiety, mood swings, or difficulty sleeping  HEME/LYMPH: No easy bruising, or bleeding gums  ALLERY AND IMMUNOLOGIC: No hives or eczema    T(C): 36.4 (22 @ 09:34), Max: 37.3 (22 @ 04:09)  HR: 49 (22 @ 09:34) (49 - 78)  BP: 135/76 (22 @ 09:34) (132/76 - 175/73)  RR: 18 (22 @ 09:34) (14 - 20)  SpO2: 98% (22 @ 09:34) (95% - 99%)  Wt(kg): --Vital Signs Last 24 Hrs  T(C): 36.4 (2022 09:34), Max: 37.3 (2022 04:09)  T(F): 97.5 (2022 09:34), Max: 99.1 (2022 04:09)  HR: 49 (:34) (49 - 78)  BP: 135/76 (2022 09:34) (132/76 - 175/73)  BP(mean): --  RR: 18 (:34) (14 - 20)  SpO2: 98% (:34) (95% - 99%)    Parameters below as of 2022 09:34  Patient On (Oxygen Delivery Method): room air  Daily Height in cm: 165.1 (2022 17:01)        PHYSICAL EXAM:  GENERAL: NAD, well-groomed, well-developed  HEAD:  Atraumatic, Normocephalic  EYES: EOMI, PERRLA, conjunctiva and sclera clear  ENMT: No tonsillar erythema, exudates, or enlargement; Moist mucous membranes, Good dentition, No lesions  NECK: Supple, No JVD, Normal thyroid  CHEST/LUNG: Clear to auscultation bilaterally; No rales, rhonchi, wheezing, or rubs  HEART: Regular rate and rhythm; No murmurs, rubs, or gallops  ABDOMEN: Soft, Nontender, Nondistended; Bowel sounds present  EXTREMITIES:  2+ Peripheral Pulses, No clubbing, cyanosis, or edema  NERVOUS SYSTEM:  Alert & Oriented X3, Good concentration; Motor Strength 5/5 B/L upper and lower extremities; DTRs 2+ intact and symmetric  PSYCH:   LYMPH: No lymphadenopathy noted  SKIN: No rashes or lesions      LABS:  CBC Full  -  ( 2022 00:21 )  WBC Count : 13.23 K/uL  Hemoglobin : 15.9 g/dL  Hematocrit : 48.4 %  Platelet Count - Automated : 146 K/uL  Mean Cell Volume : 92.9 fl  Mean Cell Hemoglobin : 30.5 pg  Mean Cell Hemoglobin Concentration : 32.9 gm/dL  Auto Neutrophil # : 10.94 K/uL  Auto Lymphocyte # : 1.00 K/uL  Auto Monocyte # : 1.09 K/uL  Auto Eosinophil # : 0.00 K/uL  Auto Basophil # : 0.03 K/uL  Auto Neutrophil % : 82.7 %  Auto Lymphocyte % : 7.6 %  Auto Monocyte % : 8.2 %  Auto Eosinophil % : 0.0 %  Auto Basophil % : 0.2 %        143  |  106  |  32<H>  ----------------------------<  175<H>  4.7   |  28  |  1.12    Ca    8.6      2022 00:21    TPro  6.0  /  Alb  3.2<L>  /  TBili  0.4  /  DBili  x   /  AST  25  /  ALT  38  /  AlkPhos  77      LIVER FUNCTIONS - ( 2022 00:21 )  Alb: 3.2 g/dL / Pro: 6.0 g/dL / ALK PHOS: 77 U/L / ALT: 38 U/L / AST: 25 U/L / GGT: x           Urinalysis Basic - ( 2022 23:52 )    Color: Light Yellow / Appearance: Clear / S.019 / pH: x  Gluc: x / Ketone: Negative  / Bili: Negative / Urobili: Negative   Blood: x / Protein: Negative / Nitrite: Negative   Leuk Esterase: Negative / RBC: 5 /hpf / WBC 1 /HPF   Sq Epi: x / Non Sq Epi: 0 /hpf / Bacteria: Negative      PT/INR - ( 2022 00:21 )   PT: 13.7 sec;   INR: 1.19 ratio         PTT - ( 2022 00:21 )  PTT:24.1 sec    SARS-CoV-2: NotDetec (2022 23:51)        EKG:       RADIOLOGY & ADDITIONAL TESTS:    Imaging Personally Reviewed:  [ ] YES  [ ] NO  Care Discussed with Consultants/Other Providers [Y/N]: Y  Consultant(s) Notes Reviewed:  [x ] YES  [ ] NO  Care Discussed with Consultants/Other Providers [ x] YES  [ ] NO           MEDICINE CONSULT NOTE      Patient is a 82y old  Male who presents with a chief complaint of Brain lesion (2022 01:34)      HPI:  Patient is a 82 year old male with past medical history of hypertension, hyperlipidemia, recent workup at OSH (admitted 7/15/22-22) included brain MRI, diagnosed with glioblastoma, brought in by daughter due to worsening symptoms of confusion/disorientation (started few weeks ago), urinary incontinence (started 5-6 weeks ago) over the past two weeks, now also with worsening dyspnea on exertion (started ~ 2 weeks ago, worse since day prior to admission), admitted by neurosurgery team with possible plan for surgery coming week. Internal medicine team consulted for pre-op evaluation.     Information for HPI, PMH provided by patient's daughter and wife on the phone and at bedside.     ED Course:       PAST MEDICAL & SURGICAL HISTORY:  Hypertension  Hyperlipidemia  Pre-diabetes   Brain tumor    FAMILY HISTORY:  DM type 2, hypertension- mother      SOCIAL HISTORY:   Lives at home with wife  Retired, worked in [employer] dept in [place]  Quit smoking >40yrs ago. Per patient's daughter he stopped drinking alcohol after Hep C diagnosis.     ALLERGIES:   No Known Allergies      COVID-19 Vaccine Information:  History of COVID-19 vaccination: Yes  Brand of COVID-19 vaccination: Moderna dose 1, 2, and 3  Date of last vaccination: 2022   Have you had a first COVID-19 booster?: Yes  Brand of first COVID-19 booster: Moderna  Date of first COVID-19 booster: 2022   Will the patient accept the COVID-19 vaccine if eligible and it is available?: Not applicable  COVID-19 Vaccine Administered this visit:--         Home Medications:  ASPIRIN 81MG EC LOW DOSE TABLETS: TAKE 1 TABLET BY MOUTH EVERY DAY (2022 12:04)  FUROSEMIDE 20MG TABLETS: TAKE 1/2 TABLET BY MOUTH DAILY (2022 12:04)  HYDROCHLOROTHIAZIDE 12.5MG CAPSULES:  (2022 12:04)  LOSARTAN 25MG TABLETS:  (2022 12:04)  PREDNISOLONE AC 1% OPHTH SUSP  5ML: SHAKE LIQUID AND INSTILL 1 DROP IN BOTH EYES FOUR TIMES DAILY (2022 12:04)  ROSUVASTATIN 40MG TABLETS:  (2022 12:04)        MEDICATIONS  (STANDING):  atorvastatin 40 milliGRAM(s) Oral at bedtime  dexAMETHasone  Injectable 4 milliGRAM(s) IV Push every 6 hours  dextrose 5%. 1000 milliLiter(s) (50 mL/Hr) IV Continuous <Continuous>  dextrose 5%. 1000 milliLiter(s) (100 mL/Hr) IV Continuous <Continuous>  dextrose 50% Injectable 25 Gram(s) IV Push once  dextrose 50% Injectable 12.5 Gram(s) IV Push once  dextrose 50% Injectable 25 Gram(s) IV Push once  enoxaparin Injectable 40 milliGRAM(s) SubCutaneous <User Schedule>  furosemide    Tablet 10 milliGRAM(s) Oral daily  glucagon  Injectable 1 milliGRAM(s) IntraMuscular once  hydrochlorothiazide 12.5 milliGRAM(s) Oral daily  insulin lispro (ADMELOG) corrective regimen sliding scale   SubCutaneous three times a day before meals  insulin lispro (ADMELOG) corrective regimen sliding scale   SubCutaneous at bedtime  levETIRAcetam 500 milliGRAM(s) Oral every 12 hours  senna 2 Tablet(s) Oral at bedtime  sodium chloride 0.9%. 1000 milliLiter(s) (75 mL/Hr) IV Continuous <Continuous>    MEDICATIONS  (PRN):  acetaminophen     Tablet .. 650 milliGRAM(s) Oral every 6 hours PRN Temp greater or equal to 38C (100.4F), Mild Pain (1 - 3)  dextrose Oral Gel 15 Gram(s) Oral once PRN Blood Glucose LESS THAN 70 milliGRAM(s)/deciliter  hydrALAZINE Injectable 5 milliGRAM(s) IV Push once PRN HTN  ondansetron Injectable 4 milliGRAM(s) IV Push every 6 hours PRN Nausea and/or Vomiting        REVIEW OF SYSTEMS:  CONSTITUTIONAL: No fever, weight loss, or fatigue  EYES: No eye pain, visual disturbances, or discharge  ENMT:  No difficulty hearing, tinnitus, vertigo; No sinus or throat pain  NECK: No pain or stiffness  RESPIRATORY: No cough, wheezing, chills or hemoptysis; No shortness of breath  CARDIOVASCULAR: No chest pain, palpitations, dizziness, or leg swelling  GASTROINTESTINAL: No abdominal or epigastric pain. No nausea, vomiting, or hematemesis; No diarrhea or constipation. No melena or hematochezia.  GENITOURINARY: No dysuria, frequency, hematuria, or incontinence  NEUROLOGICAL: No headaches, memory loss, loss of strength, numbness, or tremors  SKIN: No itching, burning, rashes, or lesions   LYMPH NODES: No enlarged glands  ENDOCRINE: No heat or cold intolerance; No hair loss  MUSCULOSKELETAL: No joint pain or swelling; No muscle, back, or extremity pain  PSYCHIATRIC: No depression, anxiety, mood swings, or difficulty sleeping  HEME/LYMPH: No easy bruising, or bleeding gums  ALLERY AND IMMUNOLOGIC: No hives or eczema    T(C): 36.4 (22 @ 09:34), Max: 37.3 (22 @ 04:09)  HR: 49 (22 @ 09:34) (49 - 78)  BP: 135/76 (22 @ 09:34) (132/76 - 175/73)  RR: 18 (22 @ 09:34) (14 - 20)  SpO2: 98% (22 @ 09:34) (95% - 99%)  Wt(kg): --Vital Signs Last 24 Hrs  T(C): 36.4 (2022 09:34), Max: 37.3 (2022 04:09)  T(F): 97.5 (2022 09:34), Max: 99.1 (2022 04:09)  HR: 49 (:34) (49 - 78)  BP: 135/76 (2022 09:34) (132/76 - 175/73)  BP(mean): --  RR: 18 (:34) (14 - 20)  SpO2: 98% (2022 09:34) (95% - 99%)    Parameters below as of 2022 09:34  Patient On (Oxygen Delivery Method): room air  Daily Height in cm: 165.1 (2022 17:01)        PHYSICAL EXAM:  GENERAL: NAD, well-groomed, well-developed  HEAD:  Atraumatic, Normocephalic  EYES: EOMI, PERRLA, conjunctiva and sclera clear  ENMT: No tonsillar erythema, exudates, or enlargement; Moist mucous membranes, Good dentition, No lesions  NECK: Supple, No JVD, Normal thyroid  CHEST/LUNG: Clear to auscultation bilaterally; No rales, rhonchi, wheezing, or rubs  HEART: Regular rate and rhythm; No murmurs, rubs, or gallops  ABDOMEN: Soft, Nontender, Nondistended; Bowel sounds present  EXTREMITIES:  2+ Peripheral Pulses, No clubbing, cyanosis, or edema  NERVOUS SYSTEM:  Alert & Oriented X3, Good concentration; Motor Strength 5/5 B/L upper and lower extremities; DTRs 2+ intact and symmetric  PSYCH:   LYMPH: No lymphadenopathy noted  SKIN: No rashes or lesions      LABS:  CBC Full  -  ( 2022 00:21 )  WBC Count : 13.23 K/uL  Hemoglobin : 15.9 g/dL  Hematocrit : 48.4 %  Platelet Count - Automated : 146 K/uL  Mean Cell Volume : 92.9 fl  Mean Cell Hemoglobin : 30.5 pg  Mean Cell Hemoglobin Concentration : 32.9 gm/dL  Auto Neutrophil # : 10.94 K/uL  Auto Lymphocyte # : 1.00 K/uL  Auto Monocyte # : 1.09 K/uL  Auto Eosinophil # : 0.00 K/uL  Auto Basophil # : 0.03 K/uL  Auto Neutrophil % : 82.7 %  Auto Lymphocyte % : 7.6 %  Auto Monocyte % : 8.2 %  Auto Eosinophil % : 0.0 %  Auto Basophil % : 0.2 %    07-    143  |  106  |  32<H>  ----------------------------<  175<H>  4.7   |  28  |  1.12    Ca    8.6      2022 00:21    TPro  6.0  /  Alb  3.2<L>  /  TBili  0.4  /  DBili  x   /  AST  25  /  ALT  38  /  AlkPhos  77  07-23    LIVER FUNCTIONS - ( 2022 00:21 )  Alb: 3.2 g/dL / Pro: 6.0 g/dL / ALK PHOS: 77 U/L / ALT: 38 U/L / AST: 25 U/L / GGT: x           Urinalysis Basic - ( 2022 23:52 )    Color: Light Yellow / Appearance: Clear / S.019 / pH: x  Gluc: x / Ketone: Negative  / Bili: Negative / Urobili: Negative   Blood: x / Protein: Negative / Nitrite: Negative   Leuk Esterase: Negative / RBC: 5 /hpf / WBC 1 /HPF   Sq Epi: x / Non Sq Epi: 0 /hpf / Bacteria: Negative      PT/INR - ( 2022 00:21 )   PT: 13.7 sec;   INR: 1.19 ratio         PTT - ( 2022 00:21 )  PTT:24.1 sec    SARS-CoV-2: NotDetec (2022 23:51)        EKG:       RADIOLOGY & ADDITIONAL TESTS:    Imaging Personally Reviewed:  [ ] YES  [ ] NO  Care Discussed with Consultants/Other Providers [Y/N]: Y  Consultant(s) Notes Reviewed:  [x ] YES  [ ] NO  Care Discussed with Consultants/Other Providers [ x] YES  [ ] NO           MEDICINE CONSULT NOTE      Patient is a 82y old  Male who presents with a chief complaint of Brain lesion (2022 01:34)      HPI:  Patient is a 82 year old male with past medical history of hypertension, hyperlipidemia, recent workup at OSH (admitted 7/15/22-22) included brain MRI, diagnosed with glioblastoma, brought in by daughter due to worsening symptoms of confusion/disorientation (started few weeks ago), urinary incontinence (started 5-6 weeks ago) over the past two weeks, now also with worsening dyspnea on exertion (started ~ 2 weeks ago, worse since day prior to admission), admitted by neurosurgery team with possible plan for surgery coming week. Internal medicine team consulted for pre-op evaluation.     Information for HPI, PMH provided by patient's daughter and wife on the phone and at bedside.     At the time of exam, patient was awake and alert, sitting in bed, conversing normally. Patient's daughter helped translate Citizen of [place] at bedside. Patient knows he is in the hospital, but not city; he knows it is 2022. Patient reports feeling slightly better overall today. Still feels dyspneic when walking short distances e.g. to the bathroom. He has difficulty recalling recent events from past month, but remote memory is intact. He has a frontal, BL temporal headache, has intermittent hiccups with some chest discomfort, intermittent loss of balance when he gets up, intermittent urinary leakage and incontinence. Reports being constipated past week, requiring laxatives. Had felt left arm numbness, tingling last night which has now resolved. No longer having visual issues since cataract surgery.         PAST MEDICAL & SURGICAL HISTORY:  Hypertension  Hyperlipidemia  Pre-diabetes, never on insulin  History of Hep C, diagnosed 19 year ago, treated .   Brain tumor  Cataract surgery (right and left, May 2022)      FAMILY HISTORY:  DM type 2, hypertension- mother      SOCIAL HISTORY:   Lives at home with wife  Retired, worked in [employer] dept in [place]  Quit smoking >40yrs ago. Per patient's daughter he stopped drinking alcohol after Hep C diagnosis.     ALLERGIES:   No Known Allergies      COVID-19 Vaccine Information:  History of COVID-19 vaccination: Yes  Brand of COVID-19 vaccination: Moderna dose 1, 2, and 3  Date of last vaccination: 2022   Have you had a first COVID-19 booster?: Yes  Brand of first COVID-19 booster: Moderna  Date of first COVID-19 booster: 2022   Will the patient accept the COVID-19 vaccine if eligible and it is available?: Not applicable  COVID-19 Vaccine Administered this visit:--         Home Medications:  ASPIRIN 81MG EC LOW DOSE TABLETS: TAKE 1 TABLET BY MOUTH EVERY DAY (2022 13:11)  Cyanocobalamin 1000 mcg oral tablet: 1 tab(s) orally once a day (2022 13:11)  FUROSEMIDE 20MG TABLETS: TAKE 1/2 TABLET BY MOUTH DAILY (2022 13:11)  LOSARTAN 25MG TABLETS:  (2022 13:11)  ROSUVASTATIN 40MG TABLETS:  (2022 13:11)            MEDICATIONS  (STANDING):  atorvastatin 40 milliGRAM(s) Oral at bedtime  dexAMETHasone  Injectable 4 milliGRAM(s) IV Push every 6 hours  dextrose 5%. 1000 milliLiter(s) (50 mL/Hr) IV Continuous <Continuous>  dextrose 5%. 1000 milliLiter(s) (100 mL/Hr) IV Continuous <Continuous>  dextrose 50% Injectable 25 Gram(s) IV Push once  dextrose 50% Injectable 12.5 Gram(s) IV Push once  dextrose 50% Injectable 25 Gram(s) IV Push once  enoxaparin Injectable 40 milliGRAM(s) SubCutaneous <User Schedule>  furosemide    Tablet 10 milliGRAM(s) Oral daily  glucagon  Injectable 1 milliGRAM(s) IntraMuscular once  hydrochlorothiazide 12.5 milliGRAM(s) Oral daily  insulin lispro (ADMELOG) corrective regimen sliding scale   SubCutaneous three times a day before meals  insulin lispro (ADMELOG) corrective regimen sliding scale   SubCutaneous at bedtime  levETIRAcetam 500 milliGRAM(s) Oral every 12 hours  senna 2 Tablet(s) Oral at bedtime  sodium chloride 0.9%. 1000 milliLiter(s) (75 mL/Hr) IV Continuous <Continuous>    MEDICATIONS  (PRN):  acetaminophen     Tablet .. 650 milliGRAM(s) Oral every 6 hours PRN Temp greater or equal to 38C (100.4F), Mild Pain (1 - 3)  dextrose Oral Gel 15 Gram(s) Oral once PRN Blood Glucose LESS THAN 70 milliGRAM(s)/deciliter  hydrALAZINE Injectable 5 milliGRAM(s) IV Push once PRN HTN  ondansetron Injectable 4 milliGRAM(s) IV Push every 6 hours PRN Nausea and/or Vomiting        REVIEW OF SYSTEMS:  CONSTITUTIONAL: No fever, weight loss, or fatigue  EYES: No eye pain, visual disturbances, or discharge  ENMT:  No difficulty hearing, tinnitus; No sinus or throat pain +dizziness  NECK: No pain or stiffness  RESPIRATORY: No cough, wheezing, chills or hemoptysis. + shortness of breath  CARDIOVASCULAR: No chest pain, palpitations, dizziness. + leg swelling  GASTROINTESTINAL: No abdominal or epigastric pain. No nausea, vomiting, or hematemesis; No melena or hematochezia. + diarrhea  GENITOURINARY: No dysuria, frequency, hematuria. + incontinence  NEUROLOGICAL: + headaches, recent memory loss, disorientation; no loss of strength, numbness, or tremors  SKIN: No itching, burning, rashes, or lesions   LYMPH NODES: No enlarged glands  ENDOCRINE: No heat or cold intolerance; No hair loss  MUSCULOSKELETAL: No joint pain or swelling; No muscle, back, or extremity pain. + decreased activity  PSYCHIATRIC: No depression, anxiety, mood swings, or difficulty sleeping  HEME/LYMPH: No easy bruising, or bleeding gums  ALLERY AND IMMUNOLOGIC: No hives or eczema      T(C): 36.4 (22 @ 09:34), Max: 37.3 (22 @ 04:09)  HR: 49 (22 @ 09:34) (49 - 78)  BP: 135/76 (22 @ 09:34) (132/76 - 175/73)  RR: 18 (22 @ 09:34) (14 - 20)  SpO2: 98% (22 @ 09:34) (95% - 99%)  Wt(kg): --Vital Signs Last 24 Hrs  T(C): 36.4 (2022 09:34), Max: 37.3 (2022 04:09)  T(F): 97.5 (2022 09:34), Max: 99.1 (2022 04:09)  HR: 49 (2022 09:34) (49 - 78)  BP: 135/76 (2022 09:34) (132/76 - 175/73)  BP(mean): --  RR: 18 (2022 09:34) (14 - 20)  SpO2: 98% (2022 09:34) (95% - 99%)    Parameters below as of 2022 09:34  Patient On (Oxygen Delivery Method): room air  Daily Height in cm: 165.1 (2022 17:01)        PHYSICAL EXAM:  GENERAL: NAD, well-groomed, well-developed  HEAD:  Atraumatic, Normocephalic  EYES: EOMI, PERRLA, conjunctiva and sclera clear  ENMT: No tonsillar erythema, exudates, or enlargement; Moist mucous membranes  NECK: Supple, No JVD, Normal thyroid  CHEST/LUNG: Clear to auscultation bilaterally; No rales, rhonchi, wheezing, or rubs  HEART: Regular rate and rhythm; No murmurs, rubs, or gallops  ABDOMEN: Soft, Nontender, Nondistended; Bowel sounds present  EXTREMITIES:  2+ Peripheral Pulses, No clubbing, cyanosis. BL pitting pedal edema  NERVOUS SYSTEM:  Alert & Oriented X2, Motor Strength 5/5 B/L upper and lower extremities; remote memory intact, recent memory deficits  SKIN: No rashes or lesions      LABS:  CBC Full  -  ( 2022 00:21 )  WBC Count : 13.23 K/uL  Hemoglobin : 15.9 g/dL  Hematocrit : 48.4 %  Platelet Count - Automated : 146 K/uL  Mean Cell Volume : 92.9 fl  Mean Cell Hemoglobin : 30.5 pg  Mean Cell Hemoglobin Concentration : 32.9 gm/dL  Auto Neutrophil # : 10.94 K/uL  Auto Lymphocyte # : 1.00 K/uL  Auto Monocyte # : 1.09 K/uL  Auto Eosinophil # : 0.00 K/uL  Auto Basophil # : 0.03 K/uL  Auto Neutrophil % : 82.7 %  Auto Lymphocyte % : 7.6 %  Auto Monocyte % : 8.2 %  Auto Eosinophil % : 0.0 %  Auto Basophil % : 0.2 %        143  |  106  |  32<H>  ----------------------------<  175<H>  4.7   |  28  |  1.12    Ca    8.6      2022 00:21    TPro  6.0  /  Alb  3.2<L>  /  TBili  0.4  /  DBili  x   /  AST  25  /  ALT  38  /  AlkPhos  77  07-23    LIVER FUNCTIONS - ( 2022 00:21 )  Alb: 3.2 g/dL / Pro: 6.0 g/dL / ALK PHOS: 77 U/L / ALT: 38 U/L / AST: 25 U/L / GGT: x           Urinalysis Basic - ( 2022 23:52 )    Color: Light Yellow / Appearance: Clear / S.019 / pH: x  Gluc: x / Ketone: Negative  / Bili: Negative / Urobili: Negative   Blood: x / Protein: Negative / Nitrite: Negative   Leuk Esterase: Negative / RBC: 5 /hpf / WBC 1 /HPF   Sq Epi: x / Non Sq Epi: 0 /hpf / Bacteria: Negative      PT/INR - ( 2022 00:21 )   PT: 13.7 sec;   INR: 1.19 ratio         PTT - ( 2022 00:21 )  PTT:24.1 sec    SARS-CoV-2: NotDetec (2022 23:51)        EKG:       RADIOLOGY & ADDITIONAL TESTS:    Imaging Personally Reviewed:  [ ] YES  [ ] NO  Care Discussed with Consultants/Other Providers [Y/N]: Y  Consultant(s) Notes Reviewed:  [x ] YES  [ ] NO  Care Discussed with Consultants/Other Providers [ x] YES  [ ] NO

## 2022-07-23 NOTE — CONSULT NOTE ADULT - PROBLEM SELECTOR RECOMMENDATION 9
Recent workup at OSH for CA. Per notes, brain MRI indicated GBM.   CTH at [facility] showed 4.4 x 4.1 x 3.8 cm medial R frontal lobe mass GBM.     Continue Dexamethasone and Keppra   Continue care per neuro surgery team  Neuro checks  Monitor BMP Recent workup at OSH for CA. Per notes, brain MRI indicated glioblastoma.  CTH at [facility] showed 4.4 x 4.1 x 3.8 cm medial R frontal lobe mass GBM.     Continue Dexamethasone and Keppra   Continue care per neuro surgery team  Neuro checks  Monitor BMP

## 2022-07-23 NOTE — CONSULT NOTE ADULT - PROBLEM SELECTOR RECOMMENDATION 4
Continue home medication rosuvastatin  Check lipid panel At home takes rosuvastatin. Therapeautic interchange Lipitor while inpatient.   Check lipid panel Continue home medications HCTZ, Losartan.   Monitor BP, HR    #Hold home medication Aspirin in anticipation of surgery (patient's daughter and wife are not sure why he was started on aspirin and that he stopped taking it about 6 months ago, but it is still getting prescribed by PCP). Continue home medications Losartan, Lasix  Per patient's daughter, he was taking Lisinopril last year, however it was switched to HCTZ in Nov-Dec 2021 due to complaint of leg swelling, but as swelling worsened he was switched again to Losartan and Lasix was started in June with improvement in swelling.   Monitor BP, HR    #Hold home medication Aspirin in anticipation of surgery (patient's daughter and wife are not sure why he was started on aspirin, patient stopped taking it about 6 months ago, but it is still getting prescribed by PCP) > Will need clarification with patient's PCP on Monday.

## 2022-07-23 NOTE — ED PROVIDER NOTE - WR ORDER DATE AND TIME 1
Subjective:      Belkis Palm is a 20 y.o. female who presents with Sore Throat (x THURSDAY EVENING, taking nyquil, )            Pharyngitis   This is a new problem. The current episode started yesterday. The problem has been unchanged. The pain is worse on the right side. There has been no fever. Associated symptoms include swollen glands. Pertinent negatives include no congestion, coughing, diarrhea, ear pain, headaches, plugged ear sensation, shortness of breath, trouble swallowing or vomiting. She has had no exposure to strep or mono. She has tried acetaminophen for the symptoms. The treatment provided mild relief.       Review of Systems   Constitutional: Negative for chills and malaise/fatigue.   HENT: Negative for congestion, ear pain, nosebleeds and trouble swallowing.    Respiratory: Negative for cough and shortness of breath.    Gastrointestinal: Negative for diarrhea and vomiting.   Musculoskeletal: Negative for myalgias.   Skin: Negative for rash.   Neurological: Negative for headaches.   Endo/Heme/Allergies: Negative for environmental allergies.     Past Medical History:   Diagnosis Date   • Acne      History reviewed. No pertinent surgical history.   Allergy:  Patient has no known allergies.     Current Outpatient Medications:   •  ISOtretinoin (MYORISAN PO), Take  by mouth., Taking  •  Claravis, TAKE 1 CAPSULE BY MOUTH EVERY DAY (NEED TO COMPLETE QUESTIONS)  •  minocycline,   •  nitrofurantoin, TAKE 1 CAPSULE BY MOUTH TWICE A DAY FOR 7 DAYS  •  Blisovi 24 Fe, 1 Tab, Oral, QDAY  •  phenazopyridine, 200 mg, Oral, TID PRN  •  Norgestim-Eth Estrad Triphasic (ORTHO TRI-CYCLEN, 28, PO), Take  by mouth.   family history is not on file.   Social History     Tobacco Use   • Smoking status: Never Smoker   • Smokeless tobacco: Never Used   • Tobacco comment: nonsmoking home   Substance Use Topics   • Alcohol use: No   • Drug use: No         Objective:     /68 (BP Location: Right arm, Patient  "Position: Sitting, BP Cuff Size: Adult)   Pulse 96   Temp 36.6 °C (97.8 °F) (Temporal)   Resp 12   Ht 1.676 m (5' 6\")   Wt 52.2 kg (115 lb)   SpO2 96%   BMI 18.56 kg/m²      Physical Exam  Constitutional:       General: She is not in acute distress.     Appearance: She is well-developed. She is not toxic-appearing.   HENT:      Head: Normocephalic.      Right Ear: Tympanic membrane and ear canal normal.      Left Ear: Tympanic membrane and ear canal normal.      Nose: No mucosal edema or rhinorrhea.      Mouth/Throat:      Mouth: Mucous membranes are moist.      Pharynx: Posterior oropharyngeal erythema present. No oropharyngeal exudate or uvula swelling.      Tonsils: Tonsillar exudate present. No tonsillar abscesses. 2+ on the right. 1+ on the left.   Eyes:      Conjunctiva/sclera: Conjunctivae normal.   Neck:      Musculoskeletal: Neck supple.      Trachea: Trachea normal.   Cardiovascular:      Rate and Rhythm: Normal rate and regular rhythm.      Heart sounds: Normal heart sounds.   Pulmonary:      Effort: Pulmonary effort is normal.      Breath sounds: Normal breath sounds.   Lymphadenopathy:      Cervical: Cervical adenopathy present.      Right cervical: Superficial cervical adenopathy present.      Left cervical: No superficial cervical adenopathy.   Skin:     General: Skin is warm and dry.      Findings: No rash.   Neurological:      Mental Status: She is alert.   Psychiatric:         Behavior: Behavior is cooperative.            Advised consideration of testing for COVID-19 if additional symptoms develop, Monospot testing for sore throat symptoms not resolving after 1 week.     Assessment/Plan:        1. Tonsillitis  Recommended supportive care measures, including rest, increasing oral fluid intake and use of over-the-counter medications for relief of symptoms.    2. Sore throat  negative- POCT Rapid Strep A    " 22-Jul-2022 23:11

## 2022-07-23 NOTE — H&P ADULT - HISTORY OF PRESENT ILLNESS
82M hx HTN, HLD, R-handed, Japanese speaking, p/w 2w confusion/disorientation and urinary incontinence. Recent hosp adm to [facility] where ca w/u was done. Report: MRI c/w GBM and neg CT CAP. CTH at [facility] showing 4.4 x 4.1 x 3.8 cm medial R frontal lobe mass c/w GBM. Exam: Sleepy, Ox1-2, PERRL, EOMI, no facial, no drift, COLES 5/5, SILT.    ICU Vital Signs Last 24 Hrs  T(C): 36.8 (22 Jul 2022 17:01), Max: 36.8 (22 Jul 2022 17:01)  T(F): 98.2 (22 Jul 2022 17:01), Max: 98.2 (22 Jul 2022 17:01)  HR: 51 (23 Jul 2022 01:28) (51 - 78)  BP: 175/73 (23 Jul 2022 01:28) (132/76 - 175/73)  BP(mean): --  ABP: --  ABP(mean): --  RR: 14 (23 Jul 2022 01:28) (14 - 20)  SpO2: 95% (23 Jul 2022 01:28) (95% - 96%)    O2 Parameters below as of 23 Jul 2022 01:28  Patient On (Oxygen Delivery Method): room air                          15.9   13.23 )-----------( 146      ( 23 Jul 2022 00:21 )             48.4   07-23    143  |  106  |  32<H>  ----------------------------<  175<H>  4.7   |  28  |  1.12    Ca    8.6      23 Jul 2022 00:21    TPro  6.0  /  Alb  3.2<L>  /  TBili  0.4  /  DBili  x   /  AST  25  /  ALT  38  /  AlkPhos  77  07-23

## 2022-07-23 NOTE — CONSULT NOTE ADULT - PROBLEM SELECTOR RECOMMENDATION 7
DVT PPX: Lovenox subcu    Monitor bowel movements, Is/Os Medication reconciliation done per list provided by pharmacist: Justus . Patient received 4 day supply of naproxen back in June, rest of the active and recent prescriptions have been entered in med review list.   List verified by daughter as well > patient is no longer on HCTZ, prednisolone ophthalmic solution.    Patient's daughter and wife are not sure why he was started on aspirin, patient stopped taking it about 6 months ago, but it is still getting prescribed by PCP > Will need clarification with patient's PCP on Monday.

## 2022-07-23 NOTE — PROGRESS NOTE ADULT - SUBJECTIVE AND OBJECTIVE BOX
SUBJECTIVE: Patient seen and examined with wife and daughter at bedside to translate.  No acute complaints.  Family up to date with plan.     OVERNIGHT EVENTS: Admitted to 4 [facility], brain lesion    Vital Signs Last 24 Hrs  T(C): 36.4 (23 Jul 2022 13:26), Max: 37.3 (23 Jul 2022 04:09)  T(F): 97.5 (23 Jul 2022 13:26), Max: 99.1 (23 Jul 2022 04:09)  HR: 50 (23 Jul 2022 13:26) (49 - 78)  BP: 145/83 (23 Jul 2022 13:26) (132/76 - 175/73)  BP(mean): --  RR: 18 (23 Jul 2022 13:26) (14 - 20)  SpO2: 98% (23 Jul 2022 13:26) (95% - 99%)    Parameters below as of 23 Jul 2022 13:26  Patient On (Oxygen Delivery Method): room air      PHYSICAL EXAM:    General: No Acute Distress     Neurological: More alert than in ED , oriented to person, place and time, Following Commands, PERRL, EOMI, Face Symmetrical, Speech Fluent, Moving all extremities, Muscle Strength normal in all four extremities, No Drift, Sensation to Light Touch Intact    Pulmonary: Clear to Auscultation, No Rales, No Rhonchi, No Wheezes   Cardiovascular: S1, S2, Regular Rate and Rhythm   Gastrointestinal: Soft, Nontender, Nondistended     Incision:     LABS:                        15.9   13.23 )-----------( 146      ( 23 Jul 2022 00:21 )             48.4    07-23    143  |  106  |  32<H>  ----------------------------<  175<H>  4.7   |  28  |  1.12    Ca    8.6      23 Jul 2022 00:21    TPro  6.0  /  Alb  3.2<L>  /  TBili  0.4  /  DBili  x   /  AST  25  /  ALT  38  /  AlkPhos  77  07-23  PT/INR - ( 23 Jul 2022 00:21 )   PT: 13.7 sec;   INR: 1.19 ratio         PTT - ( 23 Jul 2022 00:21 )  PTT:24.1 sec      07-23 @ 07:01  -  07-23 @ 14:16  --------------------------------------------------------  IN: 480 mL / OUT: 0 mL / NET: 480 mL      DRAINS:     MEDICATIONS:  Antibiotics:    Neuro:  acetaminophen     Tablet .. 650 milliGRAM(s) Oral every 6 hours PRN Temp greater or equal to 38C (100.4F), Mild Pain (1 - 3)  levETIRAcetam 500 milliGRAM(s) Oral every 12 hours  ondansetron Injectable 4 milliGRAM(s) IV Push every 6 hours PRN Nausea and/or Vomiting    Cardiac:  furosemide    Tablet 10 milliGRAM(s) Oral daily  hydrALAZINE Injectable 5 milliGRAM(s) IV Push once PRN HTN  losartan 25 milliGRAM(s) Oral daily    Pulm:    GI/:  senna 2 Tablet(s) Oral at bedtime    Other:   atorvastatin 40 milliGRAM(s) Oral at bedtime  dexAMETHasone  Injectable 4 milliGRAM(s) IV Push every 6 hours  dextrose 5%. 1000 milliLiter(s) IV Continuous <Continuous>  dextrose 5%. 1000 milliLiter(s) IV Continuous <Continuous>  dextrose 50% Injectable 25 Gram(s) IV Push once  dextrose 50% Injectable 12.5 Gram(s) IV Push once  dextrose 50% Injectable 25 Gram(s) IV Push once  dextrose Oral Gel 15 Gram(s) Oral once PRN Blood Glucose LESS THAN 70 milliGRAM(s)/deciliter  enoxaparin Injectable 40 milliGRAM(s) SubCutaneous <User Schedule>  glucagon  Injectable 1 milliGRAM(s) IntraMuscular once  insulin lispro (ADMELOG) corrective regimen sliding scale   SubCutaneous three times a day before meals  insulin lispro (ADMELOG) corrective regimen sliding scale   SubCutaneous at bedtime  sodium chloride 0.9%. 1000 milliLiter(s) IV Continuous <Continuous>      IMAGING:     < from: CT Head No Cont (07.23.22 @ 00:03) >  IMPRESSION:  Approximately 4.4 x 4.1 x 3.8 cm mass centered in the medial right   frontal lobe is compatible with glioblastoma.  The mass appears to cross   the midline, likely reflecting spread through the anterior corpus   callosum.  Follow-up MRI may be obtained as clinically warranted..    < end of copied text >

## 2022-07-23 NOTE — ED PROVIDER NOTE - OBJECTIVE STATEMENT
82 year old male with PMH HLD, recently diagnosed with brain tumor at [facility], discharged this morning presents with episodes of shortness of breath with exertion and increased confusion since arriving home. Brought in by daughter who states that she feels patient is unsafe at home with increasing confusion, worried he will fall, is unsteady. Has also been evaluated by Dr. Enrike Burton ([facility]) and would like to transition care to [facility]. Denies headache, chest pain, shortness of breath, nausea, vomiting, diarrhea, cough.

## 2022-07-23 NOTE — ED PROVIDER NOTE - CLINICAL SUMMARY MEDICAL DECISION MAKING FREE TEXT BOX
Darling Mcmanus DO PGY-2  82 year old male with PMH HLD, recently diagnosed with brain tumor at [facility], discharged this morning presents with episodes of shortness of breath with exertion and increased confusion since arriving home. Neuro intact, vitals stable, lungs clear. No abdominal ttp. WIll check labs, cxr, ua, EKG, CT head, consult neurosurgery, and patient will likely require admission. Darling Mcmanus DO PGY-2  82 year old male with PMH HLD, recently diagnosed with brain tumor at [facility], discharged this morning presents with episodes of shortness of breath with exertion and increased confusion since arriving home. Neuro intact, vitals stable, lungs clear. No abdominal ttp. WIll check labs, cxr, ua, EKG, CT head, consult neurosurgery, and patient will likely require admission.    TIGRE Bryson MD: Agree with resident/ACP MDM, assessment and plan as above.

## 2022-07-23 NOTE — CONSULT NOTE ADULT - NSCONSULTADDITIONALINFOA_GEN_ALL_CORE
Case and plan discussed with ACP: Wale Case and plan discussed with ACP: Bill    Patient's daughter updated on the phone and at bedside.

## 2022-07-23 NOTE — CONSULT NOTE ADULT - ASSESSMENT
Patient is a 82 year old male with past medical history of hypertension, hyperlipidemia, recent workup at OSH included brain MRI and was diagnosed with Glioblastoma, brought by daughter due to worsening confusion/disorientation, urinary incontinence, dyspnea on exertion, admitted by neurosurgery team with possible plan for surgery coming week. Internal medicine team consulted for pre-op evaluation.  Patient is a 82 year old male with past medical history of hypertension, hyperlipidemia, recent workup at OSH (7/15-7/22) included brain MRI and was diagnosed with glioblastoma, brought by daughter due to worsening over the past two weeks confusion/disorientation (started few weeks ago), urinary incontinence (started 5-6 weeks ago), now also with dyspnea on exertion (started ~ 2 weeks ago, worse since day prior to admission), admitted by neurosurgery team with possible plan for surgery coming week. Internal medicine team consulted for pre-op evaluation.  Patient is a 82 year old male with past medical history of hypertension, hyperlipidemia, recent workup at OSH (7/15-7/22) included brain MRI and was diagnosed with glioblastoma, brought by daughter due to worsening over the past two weeks confusion/disorientation (started few weeks ago), urinary incontinence (started 5-6 weeks ago), now also with dyspnea on exertion (started ~ 2 weeks ago, worse since day prior to admission), admitted by neurosurgery team with possible plan for surgery coming week. Internal medicine team consulted for pre-op evaluation.     PMD: Dr. uRst ([facility]) however, patient is no longer going there, and his daughter wants to switch PCP to a [facility] provider close by.

## 2022-07-24 NOTE — PROGRESS NOTE ADULT - ASSESSMENT
HPI:  82M hx HTN, HLD, R-handed, Romanian speaking, p/w 2w confusion/disorientation and urinary incontinence. Recent hosp adm to [facility] where ca w/u was done. Report: MRI c/w GBM and neg CT CAP. CTH at [facility] showing 4.4 x 4.1 x 3.8 cm medial R frontal lobe mass c/w GBM. Exam: Sleepy, Ox1-2, PERRL, EOMI, no facial, no drift, COLES 5/5, SILT.      PLAN:  Neuro:   - neuro checks q 4 hours  - plan per Dr Mcneil  - continue keppra 500 q 12  - cerebral edema- continue decadron 4q6  - Hospitalist following for Medical Clearance  Respiratory:   - satting well on room air  CV:  - HTN- continue losartan   - on furosemide for leg swelling  - on statin  - hold asa  - Echo completed  Endocrine:   - DMT2- a1c 7  - continue fingersticks with sliding scale coverage   DVT ppx:   - venodynes, sq lovenox  GI:    - on reg diet  - bowel regimen  PT/OT:   - TBD      [facility] # [id number]

## 2022-07-24 NOTE — PROGRESS NOTE ADULT - SUBJECTIVE AND OBJECTIVE BOX
[facility] Division of Hospital Medicine  Mega Fernández MD M-F, 8A-5P: MS Teams, Pager: [phone]  Other Times: Ext: [phone], Pager: [phone]      Patient is a 82y old  Male who presents with a chief complaint of Brain lesion (2022 11:20)      SUBJECTIVE / OVERNIGHT EVENTS:  Patient was examined this morning. His wife is present at bedside and his daughter on the phone to help translate. He reports feeling the same as yesterday. He is still "feeling confused", knows he is in hospital and the month, year. He continues to have dyspnea on exertion, headache. He has been using the bed pan and his wife has been helping him.     ADDITIONAL REVIEW OF SYSTEMS:    MEDICATIONS  (STANDING):  atorvastatin 80 milliGRAM(s) Oral at bedtime  dexAMETHasone  Injectable 4 milliGRAM(s) IV Push every 6 hours  dextrose 5%. 1000 milliLiter(s) (50 mL/Hr) IV Continuous <Continuous>  dextrose 5%. 1000 milliLiter(s) (100 mL/Hr) IV Continuous <Continuous>  dextrose 50% Injectable 25 Gram(s) IV Push once  dextrose 50% Injectable 12.5 Gram(s) IV Push once  dextrose 50% Injectable 25 Gram(s) IV Push once  enoxaparin Injectable 40 milliGRAM(s) SubCutaneous <User Schedule>  furosemide    Tablet 10 milliGRAM(s) Oral daily  glucagon  Injectable 1 milliGRAM(s) IntraMuscular once  insulin lispro (ADMELOG) corrective regimen sliding scale   SubCutaneous three times a day before meals  insulin lispro (ADMELOG) corrective regimen sliding scale   SubCutaneous at bedtime  levETIRAcetam 500 milliGRAM(s) Oral every 12 hours  losartan 25 milliGRAM(s) Oral daily  senna 2 Tablet(s) Oral at bedtime  sodium chloride 0.9%. 1000 milliLiter(s) (75 mL/Hr) IV Continuous <Continuous>    MEDICATIONS  (PRN):  acetaminophen     Tablet .. 650 milliGRAM(s) Oral every 6 hours PRN Temp greater or equal to 38C (100.4F), Mild Pain (1 - 3)  dextrose Oral Gel 15 Gram(s) Oral once PRN Blood Glucose LESS THAN 70 milliGRAM(s)/deciliter  hydrALAZINE Injectable 5 milliGRAM(s) IV Push once PRN HTN  ondansetron Injectable 4 milliGRAM(s) IV Push every 6 hours PRN Nausea and/or Vomiting      CAPILLARY BLOOD GLUCOSE      POCT Blood Glucose.: 208 mg/dL (2022 11:27)  POCT Blood Glucose.: 181 mg/dL (2022 07:50)  POCT Blood Glucose.: 171 mg/dL (2022 21:43)  POCT Blood Glucose.: 217 mg/dL (2022 16:02)      I&O's Summary    2022 07:01  -  2022 07:00  --------------------------------------------------------  IN: 1080 mL / OUT: 1225 mL / NET: -145 mL        Daily     Daily     PHYSICAL EXAM:  Vital Signs Last 24 Hrs  T(C): 37.1 (2022 13:00), Max: 37.1 (2022 13:00)  T(F): 98.8 (2022 13:00), Max: 98.8 (2022 13:00)  HR: 58 (2022 13:00) (50 - 61)  BP: 143/78 (2022 13:00) (128/71 - 161/81)  BP(mean): --  RR: 18 (2022 13:00) (18 - 18)  SpO2: 97% (2022 13:00) (94% - 98%)    Parameters below as of 2022 13:00  Patient On (Oxygen Delivery Method): room air      GENERAL: NAD, well-groomed, well-developed  HEAD:  Atraumatic, Normocephalic  EYES: EOMI, PERRLA, conjunctiva and sclera clear  ENMT: No tonsillar erythema, exudates, or enlargement; Moist mucous membranes, poor dentition   NECK: Supple, No JVD, Normal thyroid  CHEST/LUNG: Clear to auscultation bilaterally; No rales, rhonchi, wheezing, or rubs  HEART: Regular rate and rhythm; No murmurs, rubs, or gallops  ABDOMEN: Soft, Nontender, Nondistended; Bowel sounds present  EXTREMITIES:  2+ Peripheral Pulses, No clubbing, cyanosis. BL pitting pedal edema  NERVOUS SYSTEM:  Alert & Oriented X2, Motor Strength 5/5 B/L upper and lower extremities; remote memory intact, recent memory deficits  SKIN: No rashes or lesions      LABS:                        16.7   14.78 )-----------( 137      ( 2022 09:40 )             50.2     07-24    140  |  106  |  30<H>  ----------------------------<  246<H>  4.5   |  24  |  0.95    Ca    8.1<L>      2022 09:40    TPro  6.0  /  Alb  3.2<L>  /  TBili  0.4  /  DBili  x   /  AST  25  /  ALT  38  /  AlkPhos  77  07-23    LIVER FUNCTIONS - ( 2022 00:21 )  Alb: 3.2 g/dL / Pro: 6.0 g/dL / ALK PHOS: 77 U/L / ALT: 38 U/L / AST: 25 U/L / GGT: x           PT/INR - ( 2022 00:21 )   PT: 13.7 sec;   INR: 1.19 ratio         PTT - ( 2022 00:21 )  PTT:24.1 sec      Urinalysis Basic - ( 2022 23:52 )    Color: Light Yellow / Appearance: Clear / S.019 / pH: x  Gluc: x / Ketone: Negative  / Bili: Negative / Urobili: Negative   Blood: x / Protein: Negative / Nitrite: Negative   Leuk Esterase: Negative / RBC: 5 /hpf / WBC 1 /HPF   Sq Epi: x / Non Sq Epi: 0 /hpf / Bacteria: Negative        Culture - Urine (collected 2022 23:52)  Source: Clean Catch Clean Catch (Midstream)  Final Report (2022 07:07):    <10,000 CFU/mL Normal Urogenital Yashira      SARS-CoV-2: NotDetec (2022 23:51)      RADIOLOGY & ADDITIONAL TESTS:  Results Reviewed:   Imaging Personally Reviewed:  Electrocardiogram Personally Reviewed:    COORDINATION OF CARE:  Care Discussed with Consultants/Other Providers [Y/N]:  Prior or Outpatient Records Reviewed [Y/N]:

## 2022-07-24 NOTE — PROGRESS NOTE ADULT - SUBJECTIVE AND OBJECTIVE BOX
SUBJECTIVE: Pt seen and examined, resting comfortably in bed, no complaints, wife at bedside.    OVERNIGHT EVENTS: none    Vital Signs Last 24 Hrs  T(C): 36.9 (24 Jul 2022 09:00), Max: 36.9 (24 Jul 2022 09:00)  T(F): 98.4 (24 Jul 2022 09:00), Max: 98.4 (24 Jul 2022 09:00)  HR: 61 (24 Jul 2022 09:00) (50 - 61)  BP: 143/74 (24 Jul 2022 09:00) (128/71 - 161/81)  BP(mean): --  RR: 18 (24 Jul 2022 09:00) (18 - 18)  SpO2: 96% (24 Jul 2022 09:00) (94% - 98%)    Parameters below as of 24 Jul 2022 09:00  Patient On (Oxygen Delivery Method): room air        PHYSICAL EXAM:    General: No Acute Distress     Neurological: Awake, alert oriented to person, place and time, Following Commands, EOMI, Face Symmetrical, Speech Fluent, Moving all extremities, Muscle Strength normal in all four extremities, No Drift, Sensation to Light Touch Intact    Pulmonary: Clear to Auscultation, No Rales, No Rhonchi, No Wheezes     Cardiovascular: S1, S2, Regular Rate and Rhythm     Gastrointestinal: Soft, Nontender, Nondistended     Incision: none    LABS:                        16.7   14.78 )-----------( 137      ( 24 Jul 2022 09:40 )             50.2    07-24    140  |  106  |  30<H>  ----------------------------<  246<H>  4.5   |  24  |  0.95    Ca    8.1<L>      24 Jul 2022 09:40    TPro  6.0  /  Alb  3.2<L>  /  TBili  0.4  /  DBili  x   /  AST  25  /  ALT  38  /  AlkPhos  77  07-23  PT/INR - ( 23 Jul 2022 00:21 )   PT: 13.7 sec;   INR: 1.19 ratio         PTT - ( 23 Jul 2022 00:21 )  PTT:24.1 sec      07-23 @ 07:01  -  07-24 @ 07:00  --------------------------------------------------------  IN: 1080 mL / OUT: 1225 mL / NET: -145 mL          MEDICATIONS:  Antibiotics:    Neuro:  acetaminophen     Tablet .. 650 milliGRAM(s) Oral every 6 hours PRN Temp greater or equal to 38C (100.4F), Mild Pain (1 - 3)  levETIRAcetam 500 milliGRAM(s) Oral every 12 hours  ondansetron Injectable 4 milliGRAM(s) IV Push every 6 hours PRN Nausea and/or Vomiting    Cardiac:  furosemide    Tablet 10 milliGRAM(s) Oral daily  hydrALAZINE Injectable 5 milliGRAM(s) IV Push once PRN HTN  losartan 25 milliGRAM(s) Oral daily    Pulm:    GI/:  senna 2 Tablet(s) Oral at bedtime    Other:   atorvastatin 80 milliGRAM(s) Oral at bedtime  dexAMETHasone  Injectable 4 milliGRAM(s) IV Push every 6 hours  dextrose 5%. 1000 milliLiter(s) IV Continuous <Continuous>  dextrose 5%. 1000 milliLiter(s) IV Continuous <Continuous>  dextrose 50% Injectable 25 Gram(s) IV Push once  dextrose 50% Injectable 12.5 Gram(s) IV Push once  dextrose 50% Injectable 25 Gram(s) IV Push once  dextrose Oral Gel 15 Gram(s) Oral once PRN Blood Glucose LESS THAN 70 milliGRAM(s)/deciliter  enoxaparin Injectable 40 milliGRAM(s) SubCutaneous <User Schedule>  glucagon  Injectable 1 milliGRAM(s) IntraMuscular once  insulin lispro (ADMELOG) corrective regimen sliding scale   SubCutaneous three times a day before meals  insulin lispro (ADMELOG) corrective regimen sliding scale   SubCutaneous at bedtime  sodium chloride 0.9%. 1000 milliLiter(s) IV Continuous <Continuous>    DIET: [] Regular [x] CCD [] Renal [] Puree [] Dysphagia [] Tube Feeds:     IMAGING:   < from: CT Head No Cont (07.23.22 @ 00:03) >    IMPRESSION:  Approximately 4.4 x 4.1 x 3.8 cm mass centered in the medial right   frontal lobe is compatible with glioblastoma.  The mass appears to cross   the midline, likely reflecting spread through the anterior corpus   callosum.  Follow-up MRI may be obtained as clinically warranted..    --- End of Report ---    < end of copied text >  < from: TTE with Doppler (w/Cont) (07.23.22 @ 15:23) >  Conclusions:  1. Normal mitral valve. Minimal mitral regurgitation.  2. Aortic valve not well visualized; appears to be a  calcified trileaflet valve with normal opening. No aortic  valve regurgitation seen.  3. Endocardial visualization enhanced with intravenous  injection of Ultrasonic Enhancing Agent (Lumason).  Hyperdynamic left ventricular systolic function.  4. Normal right ventricular size and function.  *** No previous Echo exam.    < end of copied text >

## 2022-07-24 NOTE — PROGRESS NOTE ADULT - ASSESSMENT
Patient is a 82 year old male with past medical history of hypertension, hyperlipidemia, recent workup at OSH (7/15-7/22) included brain MRI and was diagnosed with glioblastoma, brought by daughter due to worsening over the past two weeks confusion/disorientation (started few weeks ago), urinary incontinence (started 5-6 weeks ago), now also with dyspnea on exertion (started ~ 2 weeks ago, worse since day prior to admission), admitted by neurosurgery team with possible plan for surgery coming week. Internal medicine team consulted for pre-op evaluation.     PMD: Dr. Rust ([facility]) however, patient is no longer going there, and his daughter wants to switch PCP to a [facility] provider close by.

## 2022-07-24 NOTE — PROGRESS NOTE ADULT - PROBLEM SELECTOR PLAN 2
Per patient's daughter and wife on the phone and at bedside, prior to recent events he was able to walk ~45 mins and climb 15 steps of stairs without getting dyspneic, he has no history of MI/CAD/abnormal stress test/no abnormal EKG/cardiac cath, HF, stroke, or kidney disease. They report that he is prediabetic, not on any meds, never took insulin.  Surgical history: Cataract surgery (left and right, May 2022)  Unclear if he ever had an echo done in the past.    METS >4 prior to recent events.    Check EKG  Echo done 7/23- with EF 75% hyperdynamic LV fx, nl RV fx, calcified aortic v, no regurg   Patient's daughter reports he has been having leg swelling since November 2021 and meds were switched and Lasix was started as a result. Denies HF history. Will need to review records from PCP

## 2022-07-24 NOTE — CHART NOTE - NSCHARTNOTEFT_GEN_A_CORE
CAPRINI SCORE [CLOT] Score on Admission for     AGE RELATED RISK FACTORS                                                       MOBILITY RELATED FACTORS  [ ] Age 41-60 years                                            (1 Point)                  [ ] Bed rest                                                        (1 Point)  [ ] Age: 61-74 years                                           (2 Points)                 [ ] Plaster cast                                                   (2 Points)  [ x] Age= 75 years                                              (3 Points)                 [ ] Bed bound for more than 72 hours                 (2 Points)    DISEASE RELATED RISK FACTORS                                               GENDER SPECIFIC FACTORS  [ ] Edema in the lower extremities                       (1 Point)                  [ ] Pregnancy                                                     (1 Point)  [ ] Varicose veins                                               (1 Point)                  [ ] Post-partum < 6 weeks                                   (1 Point)             [ x] BMI > 25 Kg/m2                                            (1 Point)                  [ ] Hormonal therapy  or oral contraception          (1 Point)                 [ ] Sepsis (in the previous month)                        (1 Point)                  [ ] History of pregnancy complications                 (1 point)  [ ] Pneumonia or serious lung disease                                               [ ] Unexplained or recurrent                     (1 Point)           (in the previous month)                               (1 Point)  [ ] Abnormal pulmonary function test                     (1 Point)                 SURGERY RELATED RISK FACTORS (include planned surgeries)  [ ] Acute myocardial infarction                              (1 Point)                 [ ]  Section                                             (1 Point)  [ ] Congestive heart failure (in the previous month)  (1 Point)         [ ] Minor surgery                                                  (1 Point)   [ ] Inflammatory bowel disease                             (1 Point)                 [ ] Arthroscopic surgery                                        (2 Points)  [ ] Central venous access                                      (2 Points)                [ ] General surgery lasting more than 45 minutes   (2 Points)       [ ] Stroke (in the previous month)                          (5 Points)               [ ] Elective arthroplasty                                         (5 Points)            [ ] current or past malignancy                              (2 Points)                                                                                                       HEMATOLOGY RELATED FACTORS                                                 TRAUMA RELATED RISK FACTORS  [ ] Prior episodes of VTE                                     (3 Points)                [ ] Fracture of the hip, pelvis, or leg                       (5 Points)  [ ] Positive family history for VTE                         (3 Points)                 [ ] Acute spinal cord injury (in the previous month)  (5 Points)  [ ] Prothrombin 80324 A                                     (3 Points)                 [ ] Paralysis  (less than 1 month)                             (5 Points)  [ ] Factor V Leiden                                             (3 Points)                  [ ] Multiple Trauma within 1 month                        (5 Points)  [ ] Lupus anticoagulants                                     (3 Points)                                                           [ ] Anticardiolipin antibodies                               (3 Points)                                                       [ ] High homocysteine in the blood                      (3 Points)                                             [ ] Other congenital or acquired thrombophilia      (3 Points)                                                [ ] Heparin induced thrombocytopenia                  (3 Points)                                          Total Score [         4 ]    Risk:  Very low 0   Low 1 to 2   Moderate 3 to 4   High =5       VTE Prophylasix Recommednations:  [ x] mechanical pneumatic compression devices                                      [ ] contraindicated: _____________________  [ x] chemo prophylasix                                                                                   [ ] contraindicated _____________________    **** HIGH LIKELIHOOD DVT PRESENT ON ADMISSION  [ x] (please order LE dopplers within 24 hours of admission)

## 2022-07-25 NOTE — PHYSICAL THERAPY INITIAL EVALUATION ADULT - ADDITIONAL COMMENTS
Pt lives with spouse in an apartment +24 steps to enter, all needs met on main level. Pt was amb (I) without an AD and (I) with all ADLs PTA.

## 2022-07-25 NOTE — OCCUPATIONAL THERAPY INITIAL EVALUATION ADULT - LEVEL OF INDEPENDENCE: STAND/SIT, REHAB EVAL
Subjective:      Patient ID: Kishor De Leon is a [de-identified] y.o. female. Hypertension   This is a chronic problem. The current episode started more than 1 year ago. There are no associated agents to hypertension. Risk factors for coronary artery disease include obesity, post-menopausal state, sedentary lifestyle and stress. Past treatments include diuretics. The current treatment provides moderate improvement. Compliance problems include diet and exercise. Review of Systems   Constitutional: Negative. HENT: Negative. Eyes: Negative. Respiratory: Negative. Cardiovascular: Negative. Gastrointestinal: Negative. Psychiatric/Behavioral: The patient is nervous/anxious. Vitals:    05/06/19 1022   BP: 124/62   Pulse: 99   SpO2: 96%     Wt Readings from Last 3 Encounters:   05/06/19 185 lb (83.9 kg)   04/01/19 186 lb 3.2 oz (84.5 kg)   02/05/19 186 lb (84.4 kg)     BP Readings from Last 3 Encounters:   05/06/19 124/62   04/01/19 (!) 113/96   02/05/19 138/70     Objective:   Physical Exam   Constitutional: She is oriented to person, place, and time. She appears well-developed and well-nourished. HENT:   Head: Normocephalic. Cardiovascular: Normal rate, regular rhythm and normal heart sounds. Pulmonary/Chest: Effort normal and breath sounds normal.   Neurological: She is alert and oriented to person, place, and time. Skin: Skin is warm and dry. Assessment:      Hypertension: stable   MDD: states the medicine is helping and she feels better. will need to file bankruptcy, allowed her to voice concerns. Plan:      Take sleeping medicine prn  Continue to exercise as needed  Keep room as dark as possible          JOHN Thomas CNP  On the basis of positive falls risk screening, assessment and plan is as follows: home safety tips provided. independent

## 2022-07-25 NOTE — PROGRESS NOTE ADULT - ASSESSMENT
Patient is a 82 year old male with past medical history of hypertension, hyperlipidemia, recent workup at OSH (7/15-7/22) included brain MRI and was diagnosed with glioblastoma, brought by daughter due to worsening over the past two weeks confusion/disorientation (started few weeks ago), urinary incontinence (started 5-6 weeks ago), now also with dyspnea on exertion (started ~ 2 weeks ago, worse since day prior to admission), admitted by neurosurgery team with possible plan for surgery coming week. Internal medicine team consulted for pre-op evaluation.     PMD: Dr. Rust ([facility]) however, patient is no longer going there, and his daughter wants to switch PCP to a [facility] provider close by.

## 2022-07-25 NOTE — OCCUPATIONAL THERAPY INITIAL EVALUATION ADULT - PRECAUTIONS/LIMITATIONS, REHAB EVAL
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

## 2022-07-25 NOTE — PHYSICAL THERAPY INITIAL EVALUATION ADULT - PLANNED THERAPY INTERVENTIONS, PT EVAL
GOAL: Pt will ascend/descend 24 steps (I) with U HR and step over step pattern in 4 weeks./balance training/gait training/strengthening

## 2022-07-25 NOTE — PROGRESS NOTE ADULT - SUBJECTIVE AND OBJECTIVE BOX
[facility] Division of Hospital Medicine  Cheyanne Dunn DO   Available via Microsoft Teams      Patient is a 82y old  Male who presents with a chief complaint of Brain lesion (25 Jul 2022 10:22)      SUBJECTIVE / OVERNIGHT EVENTS:  Seen with wife and daughter at bedside  Daughter translated per patient preference   Endorses some constipation but no other complaints. No abd pain, CP, SOB, dizziness    MEDICATIONS  (STANDING):  atorvastatin 80 milliGRAM(s) Oral at bedtime  dexAMETHasone  Injectable 4 milliGRAM(s) IV Push every 6 hours  dextrose 5%. 1000 milliLiter(s) (50 mL/Hr) IV Continuous <Continuous>  dextrose 5%. 1000 milliLiter(s) (100 mL/Hr) IV Continuous <Continuous>  dextrose 50% Injectable 25 Gram(s) IV Push once  dextrose 50% Injectable 12.5 Gram(s) IV Push once  dextrose 50% Injectable 25 Gram(s) IV Push once  enoxaparin Injectable 40 milliGRAM(s) SubCutaneous <User Schedule>  furosemide    Tablet 10 milliGRAM(s) Oral daily  glucagon  Injectable 1 milliGRAM(s) IntraMuscular once  insulin glargine Injectable (LANTUS) 5 Unit(s) SubCutaneous at bedtime  insulin lispro (ADMELOG) corrective regimen sliding scale   SubCutaneous three times a day before meals  insulin lispro (ADMELOG) corrective regimen sliding scale   SubCutaneous at bedtime  levETIRAcetam 500 milliGRAM(s) Oral every 12 hours  losartan 25 milliGRAM(s) Oral daily  magnesium citrate Oral Solution 1 Bottle Oral once  metoclopramide Injectable 10 milliGRAM(s) IV Push once  polyethylene glycol 3350 17 Gram(s) Oral daily  senna 2 Tablet(s) Oral at bedtime    MEDICATIONS  (PRN):  acetaminophen     Tablet .. 650 milliGRAM(s) Oral every 6 hours PRN Temp greater or equal to 38C (100.4F), Mild Pain (1 - 3)  dextrose Oral Gel 15 Gram(s) Oral once PRN Blood Glucose LESS THAN 70 milliGRAM(s)/deciliter  hydrALAZINE Injectable 5 milliGRAM(s) IV Push once PRN HTN  ondansetron Injectable 4 milliGRAM(s) IV Push every 6 hours PRN Nausea and/or Vomiting      CAPILLARY BLOOD GLUCOSE      POCT Blood Glucose.: 183 mg/dL (25 Jul 2022 11:45)  POCT Blood Glucose.: 154 mg/dL (25 Jul 2022 07:43)  POCT Blood Glucose.: 217 mg/dL (24 Jul 2022 21:15)  POCT Blood Glucose.: 187 mg/dL (24 Jul 2022 16:07)    I&O's Summary    24 Jul 2022 07:01  -  25 Jul 2022 07:00  --------------------------------------------------------  IN: 0 mL / OUT: 1060 mL / NET: -1060 mL        PHYSICAL EXAM:  Vital Signs Last 24 Hrs  T(C): 37.2 (25 Jul 2022 13:23), Max: 37.2 (24 Jul 2022 20:34)  T(F): 99 (25 Jul 2022 13:23), Max: 99 (25 Jul 2022 13:23)  HR: 61 (25 Jul 2022 13:23) (56 - 77)  BP: 146/87 (25 Jul 2022 13:23) (128/82 - 167/89)  BP(mean): --  RR: 18 (25 Jul 2022 13:23) (18 - 18)  SpO2: 98% (25 Jul 2022 13:23) (95% - 98%)    Parameters below as of 25 Jul 2022 13:23  Patient On (Oxygen Delivery Method): room air      CONSTITUTIONAL: NAD, well-developed, well-groomed, sitting in the chair   EYES: conjunctiva and sclera clear  ENMT: Moist oral mucosa  RESPIRATORY: Normal respiratory effort; lungs are clear to auscultation bilaterally  CARDIOVASCULAR: Regular rate and rhythm, normal S1 and S2; trace lower extremity edema  ABDOMEN: Nontender to palpation, normoactive bowel sounds, no rebound/guarding  MUSCULOSKELETAL: no clubbing or cyanosis of digits; no joint swelling or tenderness to palpation  PSYCH: A+O to person, place; affect appropriate  NEUROLOGY: moving all extremities, can follow commands   SKIN: No rashes; no palpable lesions    LABS:                        16.7   14.78 )-----------( 137      ( 24 Jul 2022 09:40 )             50.2     07-24    140  |  106  |  30<H>  ----------------------------<  246<H>  4.5   |  24  |  0.95    Ca    8.1<L>      24 Jul 2022 09:40                Culture - Urine (collected 22 Jul 2022 23:52)  Source: Clean Catch Clean Catch (Midstream)  Final Report (24 Jul 2022 07:07):    <10,000 CFU/mL Normal Urogenital Yashira        RADIOLOGY & ADDITIONAL TESTS:  Results Reviewed:   Imaging Personally Reviewed:  Electrocardiogram Personally Reviewed:    COORDINATION OF CARE:  Care Discussed with Consultants/Other Providers [Y/N]: Neurosurgery   Prior or Outpatient Records Reviewed [Y/N]:

## 2022-07-25 NOTE — PROGRESS NOTE ADULT - PROBLEM SELECTOR PLAN 2
Per family, prior to recent events he was able to walk ~45 mins and climb 15 steps of stairs without getting dyspneic, he has no history of MI/CAD/abnormal stress test/no abnormal EKG/cardiac cath, HF, stroke, or kidney disease. They report that he is prediabetic, not on any meds, never took insulin.  Surgical history: Cataract surgery (left and right, May 2022)    METS >4 prior to recent events.    EKG reviewed: NSR, nonspecific TW changes. No ST changes, CP, SOB  Patient's daughter reports he has been having leg swelling since November 2021 and meds were switched and Lasix was started as a result. Denies HF history.   Echo done 7/23- with EF 75% hyperdynamic LV fx, nl RV fx, calcified aortic v, no regurg. No WMA    Appears euvolemic. No acute medical contraindication to proceed to OR.

## 2022-07-25 NOTE — PROGRESS NOTE ADULT - SUBJECTIVE AND OBJECTIVE BOX
SUBJECTIVE: Patient examined at bedside resting comfortably, in no acute distress. Daughter at bedside.     Vital Signs Last 24 Hrs  T(C): 36.8 (07-25-22 @ 05:19), Max: 37.2 (07-24-22 @ 20:34)  T(F): 98.3 (07-25-22 @ 05:19), Max: 98.9 (07-24-22 @ 20:34)  HR: 56 (07-25-22 @ 05:19) (56 - 77)  BP: 154/77 (07-25-22 @ 05:19) (128/82 - 167/89)  BP(mean): --  RR: 18 (07-25-22 @ 05:19) (18 - 18)  SpO2: 95% (07-25-22 @ 05:19) (95% - 97%)    PHYSICAL EXAM:    Constitutional: No Acute Distress     Neurological: AOx3, place and time, speech clear and fluent, face equal, briskly following commands, no drift, moves all extremities with 5/5 strength, sensation intact to light touch throughout, pupils 4mm and reactive bilaterally, EOMI, no nystagmus    Pulmonary: Clear to Auscultation, No rales, No rhonchi, No wheezes     Cardiovascular: S1, S2, Regular rate and rhythm     Gastrointestinal: Soft, Non-tender, Non-distended, +bowel sounds x 4    Extremities: No calf tenderness bilaterally, no cyanosis, clubbing or edema    LABS:             16.7   14.78 )-----------( 137      ( 24 Jul 2022 09:40 )             50.2    07-24    140  |  106  |  30<H>  ----------------------------<  246<H>  4.5   |  24  |  0.95    Ca    8.1<L>      24 Jul 2022 09:40    07-24 @ 07:01  -  07-25 @ 07:00  --------------------------------------------------------  IN: 0 mL / OUT: 1060 mL / NET: -1060 mL      IMAGING:   ACC: [id number] EXAM:  MR BRAIN WAW IC                          PROCEDURE DATE:  07/24/2022   INTERPRETATION:  MR BRAIN WITHOUT AND WITH IV CONTRAST  IMPRESSION:  4.7 x 4.2 x 3.6 cm right frontal lobe vascular rim enhancing necrotic   malignant appearing tumor crossing the midline with surrounding vasogenic   edema and suspected microscopic tumor invasion through the genu of the   corpus callosum. There appears to be slightly decreased vasogenic edema   compared to the previous MRI 7/16/2022.    EKG   Ventricular Rate 56 BPM    Atrial Rate 56 BPM    P-R Interval 148 ms    QRS Duration 94 ms    Q-T Interval 396 ms    QTC Calculation(Bazett) 382 ms    P Axis 53 degrees    R Axis -16 degrees    T Axis 39 degrees    Diagnosis Line SINUS BRADYCARDIA  NONSPECIFIC T WAVE ABNORMALITY  ABNORMAL ECG  WHEN COMPARED WITH ECG OF 23-JUL-2022 01:41, (UNCONFIRMED)  NO SIGNIFICANT CHANGE WAS FOUND  Confirmed by ENDER HOOKS ADAM [id number]) on 7/25/2022 10:19:17 AM    MEDICATIONS  (STANDING):  atorvastatin 80 milliGRAM(s) Oral at bedtime  dexAMETHasone  Injectable 4 milliGRAM(s) IV Push every 6 hours  dextrose 5%. 1000 milliLiter(s) (50 mL/Hr) IV Continuous <Continuous>  dextrose 5%. 1000 milliLiter(s) (100 mL/Hr) IV Continuous <Continuous>  dextrose 50% Injectable 25 Gram(s) IV Push once  dextrose 50% Injectable 12.5 Gram(s) IV Push once  dextrose 50% Injectable 25 Gram(s) IV Push once  enoxaparin Injectable 40 milliGRAM(s) SubCutaneous <User Schedule>  furosemide    Tablet 10 milliGRAM(s) Oral daily  glucagon  Injectable 1 milliGRAM(s) IntraMuscular once  insulin glargine Injectable (LANTUS) 5 Unit(s) SubCutaneous at bedtime  insulin lispro (ADMELOG) corrective regimen sliding scale   SubCutaneous three times a day before meals  insulin lispro (ADMELOG) corrective regimen sliding scale   SubCutaneous at bedtime  levETIRAcetam 500 milliGRAM(s) Oral every 12 hours  losartan 25 milliGRAM(s) Oral daily  metoclopramide Injectable 10 milliGRAM(s) IV Push once  polyethylene glycol 3350 17 Gram(s) Oral daily  senna 2 Tablet(s) Oral at bedtime  sodium chloride 0.9%. 1000 milliLiter(s) (75 mL/Hr) IV Continuous <Continuous>    MEDICATIONS  (PRN):  acetaminophen     Tablet .. 650 milliGRAM(s) Oral every 6 hours PRN Temp greater or equal to 38C (100.4F), Mild Pain (1 - 3)  dextrose Oral Gel 15 Gram(s) Oral once PRN Blood Glucose LESS THAN 70 milliGRAM(s)/deciliter  hydrALAZINE Injectable 5 milliGRAM(s) IV Push once PRN HTN  ondansetron Injectable 4 milliGRAM(s) IV Push every 6 hours PRN Nausea and/or Vomiting      DIET: CCD  SUBJECTIVE: Patient examined at bedside resting comfortably, in no acute distress. Daughter and wife updated at bedside. Pt Frisian speaking assisting with translation.      Vital Signs Last 24 Hrs  T(C): 36.8 (07-25-22 @ 05:19), Max: 37.2 (07-24-22 @ 20:34)  T(F): 98.3 (07-25-22 @ 05:19), Max: 98.9 (07-24-22 @ 20:34)  HR: 56 (07-25-22 @ 05:19) (56 - 77)  BP: 154/77 (07-25-22 @ 05:19) (128/82 - 167/89)  BP(mean): --  RR: 18 (07-25-22 @ 05:19) (18 - 18)  SpO2: 95% (07-25-22 @ 05:19) (95% - 97%)    PHYSICAL EXAM:    Constitutional: No Acute Distress     Neurological: AOx3, place and time, speech clear and fluent, face symmetric, briskly following commands, no drift, moves all extremities with 5/5 strength, sensation intact to light touch throughout, pupils 4mm and reactive bilaterally, EOMI, no nystagmus     Pulmonary: Clear to Auscultation, No rales, No rhonchi, No wheezes     Cardiovascular: S1, S2, Regular rate and rhythm     Gastrointestinal: Soft, Non-tender, Non-distended, +bowel sounds x 4    Extremities: No calf tenderness bilaterally, no cyanosis, clubbing or edema    LABS:             16.7   14.78 )-----------( 137      ( 24 Jul 2022 09:40 )             50.2    07-24    140  |  106  |  30<H>  ----------------------------<  246<H>  4.5   |  24  |  0.95    Ca    8.1<L>      24 Jul 2022 09:40    07-24 @ 07:01  -  07-25 @ 07:00  --------------------------------------------------------  IN: 0 mL / OUT: 1060 mL / NET: -1060 mL      IMAGING:   ACC: [id number] EXAM:  MR BRAIN WAW IC                          PROCEDURE DATE:  07/24/2022   INTERPRETATION:  MR BRAIN WITHOUT AND WITH IV CONTRAST  IMPRESSION:  4.7 x 4.2 x 3.6 cm right frontal lobe vascular rim enhancing necrotic   malignant appearing tumor crossing the midline with surrounding vasogenic   edema and suspected microscopic tumor invasion through the genu of the   corpus callosum. There appears to be slightly decreased vasogenic edema   compared to the previous MRI 7/16/2022.    EKG   Ventricular Rate 56 BPM    Atrial Rate 56 BPM    P-R Interval 148 ms    QRS Duration 94 ms    Q-T Interval 396 ms    QTC Calculation(Bazett) 382 ms    P Axis 53 degrees    R Axis -16 degrees    T Axis 39 degrees    Diagnosis Line SINUS BRADYCARDIA  NONSPECIFIC T WAVE ABNORMALITY  ABNORMAL ECG  WHEN COMPARED WITH ECG OF 23-JUL-2022 01:41, (UNCONFIRMED)  NO SIGNIFICANT CHANGE WAS FOUND  Confirmed by ENDER HOOKS ADAM ([id number]) on 7/25/2022 10:19:17 AM    MEDICATIONS  (STANDING):  atorvastatin 80 milliGRAM(s) Oral at bedtime  dexAMETHasone  Injectable 4 milliGRAM(s) IV Push every 6 hours  dextrose 5%. 1000 milliLiter(s) (50 mL/Hr) IV Continuous <Continuous>  dextrose 5%. 1000 milliLiter(s) (100 mL/Hr) IV Continuous <Continuous>  dextrose 50% Injectable 25 Gram(s) IV Push once  dextrose 50% Injectable 12.5 Gram(s) IV Push once  dextrose 50% Injectable 25 Gram(s) IV Push once  enoxaparin Injectable 40 milliGRAM(s) SubCutaneous <User Schedule>  furosemide    Tablet 10 milliGRAM(s) Oral daily  glucagon  Injectable 1 milliGRAM(s) IntraMuscular once  insulin glargine Injectable (LANTUS) 5 Unit(s) SubCutaneous at bedtime  insulin lispro (ADMELOG) corrective regimen sliding scale   SubCutaneous three times a day before meals  insulin lispro (ADMELOG) corrective regimen sliding scale   SubCutaneous at bedtime  levETIRAcetam 500 milliGRAM(s) Oral every 12 hours  losartan 25 milliGRAM(s) Oral daily  metoclopramide Injectable 10 milliGRAM(s) IV Push once  polyethylene glycol 3350 17 Gram(s) Oral daily  senna 2 Tablet(s) Oral at bedtime  sodium chloride 0.9%. 1000 milliLiter(s) (75 mL/Hr) IV Continuous <Continuous>    MEDICATIONS  (PRN):  acetaminophen     Tablet .. 650 milliGRAM(s) Oral every 6 hours PRN Temp greater or equal to 38C (100.4F), Mild Pain (1 - 3)  dextrose Oral Gel 15 Gram(s) Oral once PRN Blood Glucose LESS THAN 70 milliGRAM(s)/deciliter  hydrALAZINE Injectable 5 milliGRAM(s) IV Push once PRN HTN  ondansetron Injectable 4 milliGRAM(s) IV Push every 6 hours PRN Nausea and/or Vomiting      DIET: CCD  SUBJECTIVE: Patient examined at bedside resting comfortably, in no acute distress. Daughter and wife updated at bedside. Pt Yoruba speaking, daughter assisting with translation.      Vital Signs Last 24 Hrs  T(C): 36.8 (07-25-22 @ 05:19), Max: 37.2 (07-24-22 @ 20:34)  T(F): 98.3 (07-25-22 @ 05:19), Max: 98.9 (07-24-22 @ 20:34)  HR: 56 (07-25-22 @ 05:19) (56 - 77)  BP: 154/77 (07-25-22 @ 05:19) (128/82 - 167/89)  BP(mean): --  RR: 18 (07-25-22 @ 05:19) (18 - 18)  SpO2: 95% (07-25-22 @ 05:19) (95% - 97%)    PHYSICAL EXAM:    Constitutional: No Acute Distress     Neurological: AOx3, place and time, speech clear and fluent, face symmetric, briskly following commands, no drift, moves all extremities with 5/5 strength, sensation intact to light touch throughout, pupils 4mm and reactive bilaterally, EOMI, no nystagmus     Pulmonary: Clear to Auscultation, No rales, No rhonchi, No wheezes     Cardiovascular: S1, S2, Regular rate and rhythm     Gastrointestinal: Soft, Non-tender, Non-distended, +bowel sounds x 4    Extremities: No calf tenderness bilaterally, no cyanosis, clubbing or edema    LABS:             16.7   14.78 )-----------( 137      ( 24 Jul 2022 09:40 )             50.2    07-24    140  |  106  |  30<H>  ----------------------------<  246<H>  4.5   |  24  |  0.95    Ca    8.1<L>      24 Jul 2022 09:40    07-24 @ 07:01  -  07-25 @ 07:00  --------------------------------------------------------  IN: 0 mL / OUT: 1060 mL / NET: -1060 mL      IMAGING:   ACC: [id number] EXAM:  MR BRAIN WAW IC                          PROCEDURE DATE:  07/24/2022   INTERPRETATION:  MR BRAIN WITHOUT AND WITH IV CONTRAST  IMPRESSION:  4.7 x 4.2 x 3.6 cm right frontal lobe vascular rim enhancing necrotic   malignant appearing tumor crossing the midline with surrounding vasogenic   edema and suspected microscopic tumor invasion through the genu of the   corpus callosum. There appears to be slightly decreased vasogenic edema   compared to the previous MRI 7/16/2022.    EKG   Ventricular Rate 56 BPM    Atrial Rate 56 BPM    P-R Interval 148 ms    QRS Duration 94 ms    Q-T Interval 396 ms    QTC Calculation(Bazett) 382 ms    P Axis 53 degrees    R Axis -16 degrees    T Axis 39 degrees    Diagnosis Line SINUS BRADYCARDIA  NONSPECIFIC T WAVE ABNORMALITY  ABNORMAL ECG  WHEN COMPARED WITH ECG OF 23-JUL-2022 01:41, (UNCONFIRMED)  NO SIGNIFICANT CHANGE WAS FOUND  Confirmed by ENDER HOOKS ADAM ([id number]) on 7/25/2022 10:19:17 AM    MEDICATIONS  (STANDING):  atorvastatin 80 milliGRAM(s) Oral at bedtime  dexAMETHasone  Injectable 4 milliGRAM(s) IV Push every 6 hours  dextrose 5%. 1000 milliLiter(s) (50 mL/Hr) IV Continuous <Continuous>  dextrose 5%. 1000 milliLiter(s) (100 mL/Hr) IV Continuous <Continuous>  dextrose 50% Injectable 25 Gram(s) IV Push once  dextrose 50% Injectable 12.5 Gram(s) IV Push once  dextrose 50% Injectable 25 Gram(s) IV Push once  enoxaparin Injectable 40 milliGRAM(s) SubCutaneous <User Schedule>  furosemide    Tablet 10 milliGRAM(s) Oral daily  glucagon  Injectable 1 milliGRAM(s) IntraMuscular once  insulin glargine Injectable (LANTUS) 5 Unit(s) SubCutaneous at bedtime  insulin lispro (ADMELOG) corrective regimen sliding scale   SubCutaneous three times a day before meals  insulin lispro (ADMELOG) corrective regimen sliding scale   SubCutaneous at bedtime  levETIRAcetam 500 milliGRAM(s) Oral every 12 hours  losartan 25 milliGRAM(s) Oral daily  metoclopramide Injectable 10 milliGRAM(s) IV Push once  polyethylene glycol 3350 17 Gram(s) Oral daily  senna 2 Tablet(s) Oral at bedtime  sodium chloride 0.9%. 1000 milliLiter(s) (75 mL/Hr) IV Continuous <Continuous>    MEDICATIONS  (PRN):  acetaminophen     Tablet .. 650 milliGRAM(s) Oral every 6 hours PRN Temp greater or equal to 38C (100.4F), Mild Pain (1 - 3)  dextrose Oral Gel 15 Gram(s) Oral once PRN Blood Glucose LESS THAN 70 milliGRAM(s)/deciliter  hydrALAZINE Injectable 5 milliGRAM(s) IV Push once PRN HTN  ondansetron Injectable 4 milliGRAM(s) IV Push every 6 hours PRN Nausea and/or Vomiting      DIET: CCD

## 2022-07-25 NOTE — PHYSICAL THERAPY INITIAL EVALUATION ADULT - PERTINENT HX OF CURRENT PROBLEM, REHAB EVAL
Pt is a 81 y/o R-handed M with PMH: HTN, HLD, p/w 2w confusion/disorientation and urinary incontinence. Recent admission to [facility]  +: MRI c/w GBM and neg CT CAP. CT Head at [facility] showing 4.4 x 4.1 x 3.8 cm medial R frontal lobe mass c/w GBM. Hospital Course: XRAY CHEST (7/22): (-). CONT BELOW

## 2022-07-25 NOTE — PHYSICAL THERAPY INITIAL EVALUATION ADULT - PRECAUTIONS/LIMITATIONS, REHAB EVAL
MRI HEAD (7/23): 4.7 x 4.2 x 3.6 cm right frontal lobe vascular rim enhancing necrotic malignant appearing tumor crossing the midline with surrounding vasogenic edema and suspected microscopic tumor invasion through the genu of the corpus callosum.

## 2022-07-25 NOTE — PROGRESS NOTE ADULT - ASSESSMENT
ASSESSMENT: HPI:  82M hx HTN, HLD, R-handed, Uzbek speaking, p/w 2w confusion/disorientation and urinary incontinence. Recent hosp adm to [facility] where ca w/u was done. Report: MRI c/w GBM and neg CT CAP. CTH at [facility] showing 4.4 x 4.1 x 3.8 cm medial R frontal lobe mass c/w GBM. Exam: AOx3, PERRL, EOMI, no facial, no drift, COLES 5/5, SILT.     NEURO:  - Neuro checks q4H   - Plan per Dr. Ewa Wright 500 q12 H seizure ppx   - continue decadron 4q6H - cerebral edema     PULM:  Incentive spirometry, mobilize as tolerated    CV:  Keep -150mmHg, d/c a-line in AM    RENAL:  IVF until good PO intake, d/c villagran in AM    GI:  Diet: Dysphagia screen and then advance diet as tolerated  GI prophylaxis [] not indicated [] PPI [] other:  Bowel regimen [] colace [] senna [] other:    ENDO:   Goal euglycemia (-180)    HEME/ONC:  VTE prophylaxis: [] SCDs [] chemoprophylaxis [] hold chemoprophylaxis due to: [] high risk of DVT/PE on admission due to:    ID:  Zandra-op antibiotics    MISC:    SOCIAL/FAMILY:  [] awaiting [] updated at bedside [] family meeting    CODE STATUS:  [] Full Code [] DNR [] DNI [] Palliative/Comfort Care    DISPOSITION:  [] ICU [] Stroke Unit [] Floor [] EMU [] RCU [] PCU    [] Patient is at high risk of neurologic deterioration/death due to:     Time seen:  Time spent: ___ [] critical care minutes    Contact: [phone] ASSESSMENT: HPI:  82M hx HTN, HLD, R-handed, Hungarian speaking, p/w 2w confusion/disorientation and urinary incontinence. Recent hosp adm to [facility] where ca w/u was done. Report: MRI c/w GBM and neg CT CAP. CTH at [facility] showing 4.4 x 4.1 x 3.8 cm medial R frontal lobe mass c/w GBM. Exam: AOx3, PERRL, EOMI, no facial, no drift, COLES 5/5, SILT.     NEURO:  - Neuro checks q4H   - Keppra 500 q12 H seizure ppx   - continue decadron 4q6H - cerebral edema   - Plan per Dr. Mcneil   - hospitalist following for medical clearance     PULM:  - saturation on RA 95-97%     CV:  - Hx HTN continue home med losartan 25 mg daily  - Hx HLD continue lipitor   - Lasix for b/l LE swelling   - hold home aspirin   - Echo done 7/23- with EF 75% hyperdynamic LV fx, nl RV fx, calcified aortic v, no regurg     RENAL:  - voiding. IVL     GI:  -consistent carb diet   -LBM as per pt. 7/22 - inc bowel regimen   - GI PPX PPI while on decadron      ENDO:   - A1C 7   - Euglycemic goal, continue monitoring fingersticks   - Lantus 5 U qhs and sliding scale coverage     HEME/ONC:  - SQL   - B/L LE dopplers -p     ID:  - afebrile     PT/OT: pending recommendations     Spectra [phone]     ASSESSMENT:   82M hx HTN, HLD, R-handed, Dominican speaking, p/w 2w confusion/disorientation and urinary incontinence. Recent hosp adm to [facility] where ca w/u was done. Report: MRI c/w GBM and neg CT CAP. CTH at [facility] showing 4.4 x 4.1 x 3.8 cm medial R frontal lobe mass c/w GBM. Exam: AOx3, PERRL, EOMI, no facial, no drift, COLES 5/5, SILT.     NEURO:  - Neuro checks q4H   - Keppra 500 q12 H seizure ppx   - continue decadron 4q6H - cerebral edema   - pre-op TIMOTHY Thursday 7/28   - hospitalist following for medical clearance     PULM:  - saturation on RA 95-97%     CV:  - Hx HTN continue home med losartan 25 mg daily  - Hx HLD continue lipitor   - Lasix for b/l LE swelling   - hold home aspirin   - Echo done 7/23- with EF 75% hyperdynamic LV fx, nl RV fx, calcified aortic v, no regurg     RENAL:  - voiding. IVL     GI:  -consistent carb diet   -LBM as per pt. 7/22 - inc bowel regimen   - GI PPX PPI while on decadron      ENDO:   - A1C 7   - Euglycemic goal, continue monitoring fingersticks   - Lantus 5 U qhs and sliding scale coverage     HEME/ONC:  - SQL   - B/L LE dopplers -p     ID:  - afebrile     PT/OT: pending recommendations     Spectra [phone]     ASSESSMENT:   82M hx HTN, HLD, R-handed, Portuguese speaking, p/w 2w confusion/disorientation and urinary incontinence. Recent hosp adm to [facility] where ca w/u was done. Report: MRI c/w GBM and neg CT CAP. CTH at [facility] showing 4.4 x 4.1 x 3.8 cm medial R frontal lobe mass c/w GBM. Exam: AOx3, PERRL, EOMI, no facial, no drift, COLES 5/5, SILT.     NEURO:  - Neuro checks q4H   - Keppra 500 q12 H seizure ppx   - continue decadron 4q6H - cerebral edema   - pre-op TIMOTHY Thursday 7/28   - hospitalist following for medical clearance     PULM:  - saturation on RA 95-97%     CV:  - Hx HTN continue home med losartan 25 mg daily  - Hx HLD continue lipitor   - Lasix for b/l LE swelling   - hold home aspirin   - Echo done 7/23- with EF 75% hyperdynamic LV fx, nl RV fx, calcified aortic v, no regurg     RENAL:  - voiding. IVL     GI:  -consistent carb diet   -LBM as per pt. 7/22 - inc bowel regimen   - GI PPX PPI while on decadron      ENDO:   - A1C 7   - Euglycemic goal, continue monitoring fingersticks   - Lantus 5 U qhs and sliding scale coverage     HEME/ONC:  - SQL   - B/L LE dopplers -p     ID:  - afebrile     PT/OT: pending recommendations     Will d/w Dr. Mcneil  Spectra [phone]

## 2022-07-25 NOTE — OCCUPATIONAL THERAPY INITIAL EVALUATION ADULT - PERTINENT HX OF CURRENT PROBLEM, REHAB EVAL
Pt is a 83 y/o R-handed M with PMH: HTN, HLD, p/w 2w confusion/disorientation and urinary incontinence. Recent admission to [facility]  +: MRI c/w GBM and neg CT CAP. CT Head at [facility] showing 4.4 x 4.1 x 3.8 cm medial R frontal lobe mass c/w GBM. Hospital Course: XRAY CHEST (7/22): (-).

## 2022-07-26 NOTE — PROGRESS NOTE ADULT - SUBJECTIVE AND OBJECTIVE BOX
[facility]  Cheyanne Dunn DO   Available via [employer] Teams  Spectra [phone]      Patient is a 82y old  Male who presents with a chief complaint of Shortness of breath    "82M hx HTN, HLD, R-handed, Irish speaking, p/w 2w confusion/disorientation and urinary incontinence. Recent hosp adm to [facility] where ca w/u was done. Report: MRI c/w GBM and neg CT CAP. CTH at [facility] showing 4.4 x 4.1 x 3.8 cm medial R frontal lobe mass c/w GBM." (26 Jul 2022 11:42)      SUBJECTIVE / OVERNIGHT EVENTS:  Daughter at bedside provided translation.  Patient endorses some mild HA but otherwise no vision changes, CP, SOB  Per RN had BM this AM     MEDICATIONS  (STANDING):  atorvastatin 80 milliGRAM(s) Oral at bedtime  dexAMETHasone  Injectable 4 milliGRAM(s) IV Push every 6 hours  dextrose 5%. 1000 milliLiter(s) (50 mL/Hr) IV Continuous <Continuous>  dextrose 5%. 1000 milliLiter(s) (100 mL/Hr) IV Continuous <Continuous>  dextrose 50% Injectable 25 Gram(s) IV Push once  dextrose 50% Injectable 12.5 Gram(s) IV Push once  dextrose 50% Injectable 25 Gram(s) IV Push once  enoxaparin Injectable 40 milliGRAM(s) SubCutaneous <User Schedule>  furosemide    Tablet 10 milliGRAM(s) Oral daily  glucagon  Injectable 1 milliGRAM(s) IntraMuscular once  insulin glargine Injectable (LANTUS) 5 Unit(s) SubCutaneous at bedtime  insulin lispro (ADMELOG) corrective regimen sliding scale   SubCutaneous three times a day before meals  insulin lispro (ADMELOG) corrective regimen sliding scale   SubCutaneous at bedtime  insulin lispro Injectable (ADMELOG) 3 Unit(s) SubCutaneous three times a day before meals  levETIRAcetam 500 milliGRAM(s) Oral every 12 hours  metoclopramide Injectable 10 milliGRAM(s) IV Push once  polyethylene glycol 3350 17 Gram(s) Oral daily  senna 2 Tablet(s) Oral at bedtime    MEDICATIONS  (PRN):  acetaminophen     Tablet .. 650 milliGRAM(s) Oral every 6 hours PRN Temp greater or equal to 38C (100.4F), Mild Pain (1 - 3)  dextrose Oral Gel 15 Gram(s) Oral once PRN Blood Glucose LESS THAN 70 milliGRAM(s)/deciliter  hydrALAZINE Injectable 5 milliGRAM(s) IV Push once PRN HTN  ondansetron Injectable 4 milliGRAM(s) IV Push every 6 hours PRN Nausea and/or Vomiting      CAPILLARY BLOOD GLUCOSE      POCT Blood Glucose.: 221 mg/dL (26 Jul 2022 11:56)  POCT Blood Glucose.: 176 mg/dL (26 Jul 2022 07:24)  POCT Blood Glucose.: 223 mg/dL (25 Jul 2022 21:05)  POCT Blood Glucose.: 206 mg/dL (25 Jul 2022 16:31)    I&O's Summary    25 Jul 2022 07:01  -  26 Jul 2022 07:00  --------------------------------------------------------  IN: 480 mL / OUT: 400 mL / NET: 80 mL        PHYSICAL EXAM:  Vital Signs Last 24 Hrs  T(C): 36.7 (26 Jul 2022 11:02), Max: 37.2 (25 Jul 2022 13:23)  T(F): 98 (26 Jul 2022 11:02), Max: 99 (25 Jul 2022 13:23)  HR: 57 (26 Jul 2022 11:02) (50 - 81)  BP: 128/81 (26 Jul 2022 11:02) (128/81 - 168/84)  BP(mean): --  RR: 18 (26 Jul 2022 11:02) (18 - 18)  SpO2: 96% (26 Jul 2022 11:02) (94% - 98%)    Parameters below as of 26 Jul 2022 05:00  Patient On (Oxygen Delivery Method): room air      CONSTITUTIONAL: NAD, well-developed, well-groomed, sitting in the chair   EYES: conjunctiva and sclera clear  ENMT: Moist oral mucosa  RESPIRATORY: Normal respiratory effort; lungs are clear to auscultation bilaterally  CARDIOVASCULAR: Regular rate and rhythm, normal S1 and S2; trace pitting lower extremity edema  ABDOMEN: Nontender to palpation, normoactive bowel sounds, no rebound/guarding  MUSCULOSKELETAL: no clubbing or cyanosis of digits; no joint swelling or tenderness to palpation  PSYCH: A+O to person, place, time, intermittent confusion/memory impairment; affect appropriate  NEUROLOGY: moving all extremities, can follow commands   SKIN: No rashes; no palpable lesions    LABS:    07-26    139  |  106  |  32<H>  ----------------------------<  222<H>  4.9   |  26  |  0.98    Ca    8.3<L>      26 Jul 2022 05:40                  RADIOLOGY & ADDITIONAL TESTS:  Results Reviewed:   Imaging Personally Reviewed:  Electrocardiogram Personally Reviewed:    COORDINATION OF CARE:  Care Discussed with Consultants/Other Providers [Y/N]: Neurosurgery   Prior or Outpatient Records Reviewed [Y/N]:

## 2022-07-26 NOTE — PROGRESS NOTE ADULT - ASSESSMENT
ASSESSMENT:   82M hx HTN, HLD, R-handed, Icelandic speaking, p/w 2w confusion/disorientation and urinary incontinence. Recent hosp adm to [facility] where ca w/u was done. Report: MRI c/w GBM and neg CT CAP. CTH at [facility] showing 4.4 x 4.1 x 3.8 cm medial R frontal lobe mass c/w GBM. Exam: AOx3, PERRL, EOMI, no facial, no drift, COLES 5/5, SILT.     PLAN  - neuro and vital check Q4hrs  - MRI showed Rt frontal lesion, pre-op for TIMOTHY this thursday, cleared by hospitalist  - continue decadron 4Q6hrs for cerebral edema  - continue keppra for seizure ppx  - Na goal 140-150 for cerebral edema, NA this am 139, but 141 corrected with hyperglycemia  - pain control with tylenol prn  - HTN, continue losartan  - IVL given good oral intake, continue home lasix  - HLD, continue lipitor  - T2DM, continue lantus and ISS  - tolerating regular diet  - BM yesterday after mg citrate  - activity increase as tolerated  - incentive spirometer  - DVT ppx: b/l SCDs and SQL, screening LED yesterday negative for DVTs  - Disposition: Pt/OT eval pending after OR    will discuss above with Dr. Ewa kitchen [id number] ASSESSMENT:   82M hx HTN, HLD, R-handed, Uzbek speaking, p/w 2w confusion/disorientation and urinary incontinence. Recent hosp adm to [facility] where ca w/u was done. Report: MRI c/w GBM and neg CT CAP. CTH at [facility] showing 4.4 x 4.1 x 3.8 cm medial R frontal lobe mass c/w GBM. Exam: AOx3, PERRL, EOMI, no facial, no drift, COLES 5/5, SILT.     PLAN  - neuro and vital check Q4hrs  - MRI showed Rt frontal lesion, pre-op for TIMOTHY this thursday, cleared by hospitalist  - continue decadron 4Q6hrs for cerebral edema  - continue keppra for seizure ppx  - Na goal 140-150 for cerebral edema, NA this am 139, but 141 corrected with hyperglycemia  - pain control with tylenol prn  - HTN, mild elevated SBP 160s, losartan increased, hospitalist following appreciated  - IVL given good oral intake, continue home lasix  - HLD, continue lipitor  - T2DM, premeal insulin added, continue lantus and ISS  - tolerating regular diet  - BM yesterday after mg citrate  - activity increase as tolerated  - incentive spirometer  - DVT ppx: b/l SCDs and SQL, screening LED yesterday negative for DVTs  - Disposition: Pt/OT eval pending after OR    will discuss above with Dr. Ewa burgessink [id number]

## 2022-07-26 NOTE — DIETITIAN INITIAL EVALUATION ADULT - PERTINENT MEDS FT
MEDICATIONS  (STANDING):  atorvastatin 80 milliGRAM(s) Oral at bedtime  dexAMETHasone  Injectable 4 milliGRAM(s) IV Push every 6 hours  dextrose 5%. 1000 milliLiter(s) (50 mL/Hr) IV Continuous <Continuous>  dextrose 5%. 1000 milliLiter(s) (100 mL/Hr) IV Continuous <Continuous>  dextrose 50% Injectable 25 Gram(s) IV Push once  dextrose 50% Injectable 12.5 Gram(s) IV Push once  dextrose 50% Injectable 25 Gram(s) IV Push once  enoxaparin Injectable 40 milliGRAM(s) SubCutaneous <User Schedule>  furosemide    Tablet 10 milliGRAM(s) Oral daily  glucagon  Injectable 1 milliGRAM(s) IntraMuscular once  insulin glargine Injectable (LANTUS) 5 Unit(s) SubCutaneous at bedtime  insulin lispro (ADMELOG) corrective regimen sliding scale   SubCutaneous three times a day before meals  insulin lispro (ADMELOG) corrective regimen sliding scale   SubCutaneous at bedtime  levETIRAcetam 500 milliGRAM(s) Oral every 12 hours  losartan 25 milliGRAM(s) Oral daily  metoclopramide Injectable 10 milliGRAM(s) IV Push once  polyethylene glycol 3350 17 Gram(s) Oral daily  senna 2 Tablet(s) Oral at bedtime    MEDICATIONS  (PRN):  acetaminophen     Tablet .. 650 milliGRAM(s) Oral every 6 hours PRN Temp greater or equal to 38C (100.4F), Mild Pain (1 - 3)  dextrose Oral Gel 15 Gram(s) Oral once PRN Blood Glucose LESS THAN 70 milliGRAM(s)/deciliter  hydrALAZINE Injectable 5 milliGRAM(s) IV Push once PRN HTN  ondansetron Injectable 4 milliGRAM(s) IV Push every 6 hours PRN Nausea and/or Vomiting

## 2022-07-26 NOTE — DIETITIAN INITIAL EVALUATION ADULT - PERTINENT LABORATORY DATA
07-26    139  |  106  |  32<H>  ----------------------------<  222<H>  4.9   |  26  |  0.98    Ca    8.3<L>      26 Jul 2022 05:40    CAPILLARY BLOOD GLUCOSE  POCT Blood Glucose.: 176 mg/dL (26 Jul 2022 07:24)  POCT Blood Glucose.: 223 mg/dL (25 Jul 2022 21:05)  POCT Blood Glucose.: 206 mg/dL (25 Jul 2022 16:31)  POCT Blood Glucose.: 176 mg/dL (07-26-22 @ 07:24)    A1C with Estimated Average Glucose Result: 7.0 % (07-24-22 @ 06:23)

## 2022-07-26 NOTE — DIETITIAN INITIAL EVALUATION ADULT - REASON FOR ADMISSION
Shortness of breath    "82M hx HTN, HLD, R-handed, Latvian speaking, p/w 2w confusion/disorientation and urinary incontinence. Recent hosp adm to [facility] where ca w/u was done. Report: MRI c/w GBM and neg CT CAP. CTH at [facility] showing 4.4 x 4.1 x 3.8 cm medial R frontal lobe mass c/w GBM."

## 2022-07-26 NOTE — DIETITIAN INITIAL EVALUATION ADULT - OTHER INFO
Per chart, pt currently ordered for lantus, admelog SSI, atorvastatin, reglan, decadron, and lasix in-house.    Reports  lbs. Endorses ~20 lb wt gain x 2 months, now 191 lbs.  Dosing wt: 191 lbs (07-22, stated)  No additional weights noted per chart. RD to continue to monitor weight trends as able/available.

## 2022-07-26 NOTE — DIETITIAN INITIAL EVALUATION ADULT - ORAL INTAKE PTA/DIET HISTORY
Pt interviewed at bedside, family offered to translate in [place]. Reports since May, pt has had a greatly increased appetite/"has become an anxious eater," with associated weight gain (see below for details). Was not following any therapeutic diet. Confirms NKFA.

## 2022-07-26 NOTE — DIETITIAN INITIAL EVALUATION ADULT - REASON
Nutrition-focused physical examination deferred at this time. No overt signs of fat/muscle wasting visually observed.

## 2022-07-26 NOTE — DIETITIAN INITIAL EVALUATION ADULT - NUTRITIONGOAL OUTCOME1
Pt to achieve A1c and BG WNL by means of adherence to therapeutic diet/medication regimen as appropriate.

## 2022-07-26 NOTE — PROGRESS NOTE ADULT - SUBJECTIVE AND OBJECTIVE BOX
Patient was seen at bedside this am. Patient felt well. Mild headache well controlled by pain meds. Denied any cp, sob, n/v, or abd pain.    OVERNIGHT EVENTS: No acute event overnight    Vital Signs Last 24 Hrs  T(C): 36.8 (26 Jul 2022 05:00), Max: 37.2 (25 Jul 2022 13:23)  T(F): 98.3 (26 Jul 2022 05:00), Max: 99 (25 Jul 2022 13:23)  HR: 50 (26 Jul 2022 05:00) (50 - 81)  BP: 135/82 (26 Jul 2022 05:00) (130/67 - 168/84)  BP(mean): --  RR: 18 (26 Jul 2022 05:00) (18 - 18)  SpO2: 96% (26 Jul 2022 05:00) (94% - 98%)    Parameters below as of 26 Jul 2022 05:00  Patient On (Oxygen Delivery Method): room air        I&O's Detail    25 Jul 2022 07:01  -  26 Jul 2022 07:00  --------------------------------------------------------  IN:    Oral Fluid: 480 mL  Total IN: 480 mL    OUT:    Voided (mL): 400 mL  Total OUT: 400 mL    Total NET: 80 mL        I&O's Summary    25 Jul 2022 07:01  -  26 Jul 2022 07:00  --------------------------------------------------------  IN: 480 mL / OUT: 400 mL / NET: 80 mL        PHYSICAL EXAM:  Neurological:  awake, alert, oriented to person, place, and year/month, PERRL, EOM intact, face symmetrical, speech clear and fluent, following commands, moving all extremities 5/5, no drifts appreciated, sensation intact to light touch  Cardiovascular: +s1, s2  Respiratory: clear to auscultation b/l  Gastrointestinal: soft, non-distended, non-tender  Genitourinary: voiding  Extremities: warm, dry      LABS:    07-26    139  |  106  |  32<H>  ----------------------------<  222<H>  4.9   |  26  |  0.98    Ca    8.3<L>      26 Jul 2022 05:40              CAPILLARY BLOOD GLUCOSE      POCT Blood Glucose.: 176 mg/dL (26 Jul 2022 07:24)  POCT Blood Glucose.: 223 mg/dL (25 Jul 2022 21:05)  POCT Blood Glucose.: 206 mg/dL (25 Jul 2022 16:31)  POCT Blood Glucose.: 183 mg/dL (25 Jul 2022 11:45)      Drug Levels: [] N/A    CSF Analysis: [] N/A      Allergies    No Known Allergies    Intolerances      MEDICATIONS:  Antibiotics:    Neuro:  acetaminophen     Tablet .. 650 milliGRAM(s) Oral every 6 hours PRN  levETIRAcetam 500 milliGRAM(s) Oral every 12 hours  metoclopramide Injectable 10 milliGRAM(s) IV Push once  ondansetron Injectable 4 milliGRAM(s) IV Push every 6 hours PRN    Anticoagulation:  enoxaparin Injectable 40 milliGRAM(s) SubCutaneous <User Schedule>    OTHER:  atorvastatin 80 milliGRAM(s) Oral at bedtime  dexAMETHasone  Injectable 4 milliGRAM(s) IV Push every 6 hours  dextrose 50% Injectable 25 Gram(s) IV Push once  dextrose 50% Injectable 12.5 Gram(s) IV Push once  dextrose 50% Injectable 25 Gram(s) IV Push once  dextrose Oral Gel 15 Gram(s) Oral once PRN  furosemide    Tablet 10 milliGRAM(s) Oral daily  glucagon  Injectable 1 milliGRAM(s) IntraMuscular once  hydrALAZINE Injectable 5 milliGRAM(s) IV Push once PRN  insulin glargine Injectable (LANTUS) 5 Unit(s) SubCutaneous at bedtime  insulin lispro (ADMELOG) corrective regimen sliding scale   SubCutaneous three times a day before meals  insulin lispro (ADMELOG) corrective regimen sliding scale   SubCutaneous at bedtime  losartan 25 milliGRAM(s) Oral daily  polyethylene glycol 3350 17 Gram(s) Oral daily  senna 2 Tablet(s) Oral at bedtime    IVF:  dextrose 5%. 1000 milliLiter(s) IV Continuous <Continuous>  dextrose 5%. 1000 milliLiter(s) IV Continuous <Continuous>    CULTURES:  Culture Results:   <10,000 CFU/mL Normal Urogenital Yashira (07-22 @ 23:52)    RADIOLOGY & ADDITIONAL TESTS:

## 2022-07-26 NOTE — DIETITIAN INITIAL EVALUATION ADULT - REASON INDICATOR FOR ASSESSMENT
RD consult for assessment/education.   Source: pt, pt's family members at bedside, medical record.  Chart reviewed, events noted.

## 2022-07-26 NOTE — DIETITIAN INITIAL EVALUATION ADULT - LITERATURE/VIDEOS GIVEN
Carbohydrate Counting for People with Diabetes (Fijian), Using Nutrition Labels: Carbohydrates, and Diabetes Label Reading Tips (Fijian) handouts.

## 2022-07-26 NOTE — PROGRESS NOTE ADULT - ASSESSMENT
Patient is a 82 year old male with past medical history of hypertension, hyperlipidemia, recent workup at OSH (7/15-7/22) included brain MRI and was diagnosed with glioblastoma, brought by daughter due to worsening over the past two weeks confusion/disorientation (started few weeks ago), urinary incontinence (started 5-6 weeks ago), now also with dyspnea on exertion (started ~ 2 weeks ago, worse since day prior to admission), admitted by neurosurgery team with possible plan for surgery coming week. Internal medicine team consulted for pre-op evaluation.     PMD: Dr. Rust ([facility]) however, patient is no longer going there, and his daughter wants to switch PCP to a [facility] provider close by.

## 2022-07-26 NOTE — DIETITIAN INITIAL EVALUATION ADULT - PERSON TAUGHT/METHOD
Provided education on Nutrition Therapy for Diabetes. Emphasis on what foods contain carbohydrates, importance of pairing carbohydrates with protein for glycemic control; choosing whole grains vs refined carbohydrates; limiting refined sugars, portion sizes. Good comprehension noted. Pt and family made aware RD to remain available for any questions./verbal instruction/written material/patient instructed/spouse instructed/daughter instructed

## 2022-07-26 NOTE — DIETITIAN INITIAL EVALUATION ADULT - NSFNSGIIOFT_GEN_A_CORE
Denies nausea, vomiting, diarrhea. States chronic constipation. Reports last BM 7/26. Pt currently on bowel regimen (miralax, senna).

## 2022-07-26 NOTE — DIETITIAN INITIAL EVALUATION ADULT - ADD RECOMMEND
1) Continue Consistent Carbohydrate diet.   2) Recommend Glucerna 1x/day to assist pt in meeting estimated needs.  3) Monitor PO intake, GI tolerance, skin integrity, labs, weight, and bowel movement regularity.   4) Honor food preferences as feasible. Assist with meals PRN and encourage PO intake.  5) RD remains available upon request and will follow-up per protocol.

## 2022-07-27 NOTE — PROGRESS NOTE ADULT - PROBLEM SELECTOR PLAN 2
Per family, prior to recent events he was able to walk ~45 mins and climb 15 steps of stairs without getting dyspneic, he has no history of MI/CAD/abnormal stress test/no abnormal EKG/cardiac cath, HF, stroke, or kidney disease. They report that he is prediabetic, not on any meds, never took insulin.  Surgical history: Cataract surgery (left and right, May 2022)    METS >4 prior to recent events.    EKG reviewed: NSR, nonspecific TW changes. No ST changes, CP, SOB  Patient's daughter reports he has been having leg swelling since November 2021 and meds were switched and Lasix was started as a result. Denies HF history.   Echo done 7/23- with EF 75% hyperdynamic LV fx, nl RV fx, calcified aortic v, no regurg. No WMA    Appears euvolemic. No acute medical contraindication to proceed to OR.    Some PAT on telemetry with activity, EKG looks overall unchanged but can have cardiology evaluate prior to the OR.

## 2022-07-27 NOTE — CONSULT NOTE ADULT - ASSESSMENT
82M hx HTN, HLD, R-handed, Botswanan speaking, p/w 2w confusion/disorientation and urinary incontinence. Recent hosp adm to [facility] where ca w/u was done. Report: MRI c/w GBM and neg CT CAP. CTH at [facility] showing 4.4 x 4.1 x 3.8 cm medial R frontal lobe mass c/w GBM.

## 2022-07-27 NOTE — CONSULT NOTE ADULT - PROBLEM SELECTOR RECOMMENDATION 9
CTH showing 4.4 x 4.1 x 3.8 cm medial R frontal lobe mass c/w GBM    - plan for TIMOTHY tomorrow 7/27  TTE - EF 75%, minimal MR  RCRI Class I - no anginal symptoms, METS >4 - acceptable cardiac risk to proceed.  neuro checks  management as per NSx

## 2022-07-27 NOTE — CONSULT NOTE ADULT - PROBLEM SELECTOR RECOMMENDATION 2
PAT on tele  EKG reviewed - no acute ischemic STT changes   currently denies chest pain, SOB, palpitations  continue to monitor on tele

## 2022-07-27 NOTE — PROGRESS NOTE ADULT - SUBJECTIVE AND OBJECTIVE BOX
[facility] Division of Hospital Medicine  Cheyanne Dunn DO   Available via Microsoft Teams  Spectra [phone]      Patient is a 82y old  Male who presents with a chief complaint of Brain lesion (27 Jul 2022 09:37)      SUBJECTIVE / OVERNIGHT EVENTS:  Some PAT on telemetry  No CP, palpitations, SOB, fever, chills     MEDICATIONS  (STANDING):  atorvastatin 80 milliGRAM(s) Oral at bedtime  bisacodyl Suppository 10 milliGRAM(s) Rectal once  chlorhexidine 4% Liquid 1 Application(s) Topical <User Schedule>  dexAMETHasone  Injectable 4 milliGRAM(s) IV Push every 6 hours  dextrose 5%. 1000 milliLiter(s) (50 mL/Hr) IV Continuous <Continuous>  dextrose 5%. 1000 milliLiter(s) (100 mL/Hr) IV Continuous <Continuous>  dextrose 50% Injectable 25 Gram(s) IV Push once  dextrose 50% Injectable 12.5 Gram(s) IV Push once  dextrose 50% Injectable 25 Gram(s) IV Push once  furosemide    Tablet 10 milliGRAM(s) Oral daily  glucagon  Injectable 1 milliGRAM(s) IntraMuscular once  insulin glargine Injectable (LANTUS) 5 Unit(s) SubCutaneous at bedtime  insulin lispro (ADMELOG) corrective regimen sliding scale   SubCutaneous three times a day before meals  insulin lispro (ADMELOG) corrective regimen sliding scale   SubCutaneous at bedtime  insulin lispro Injectable (ADMELOG) 3 Unit(s) SubCutaneous three times a day before meals  levETIRAcetam 500 milliGRAM(s) Oral every 12 hours  losartan 50 milliGRAM(s) Oral daily  metoclopramide Injectable 10 milliGRAM(s) IV Push once  polyethylene glycol 3350 17 Gram(s) Oral daily  senna 2 Tablet(s) Oral at bedtime    MEDICATIONS  (PRN):  acetaminophen     Tablet .. 650 milliGRAM(s) Oral every 6 hours PRN Temp greater or equal to 38C (100.4F), Mild Pain (1 - 3)  dextrose Oral Gel 15 Gram(s) Oral once PRN Blood Glucose LESS THAN 70 milliGRAM(s)/deciliter  hydrALAZINE Injectable 5 milliGRAM(s) IV Push once PRN HTN  ondansetron Injectable 4 milliGRAM(s) IV Push every 6 hours PRN Nausea and/or Vomiting      CAPILLARY BLOOD GLUCOSE      POCT Blood Glucose.: 189 mg/dL (27 Jul 2022 11:51)  POCT Blood Glucose.: 166 mg/dL (27 Jul 2022 07:34)  POCT Blood Glucose.: 169 mg/dL (26 Jul 2022 21:30)  POCT Blood Glucose.: 208 mg/dL (26 Jul 2022 16:20)    I&O's Summary      PHYSICAL EXAM:  Vital Signs Last 24 Hrs  T(C): 36.8 (27 Jul 2022 11:11), Max: 36.9 (26 Jul 2022 21:06)  T(F): 98.3 (27 Jul 2022 11:11), Max: 98.5 (26 Jul 2022 21:06)  HR: 67 (27 Jul 2022 11:11) (60 - 67)  BP: 147/771 (27 Jul 2022 11:11) (121/72 - 151/80)  BP(mean): --  RR: 18 (27 Jul 2022 11:11) (18 - 18)  SpO2: 97% (27 Jul 2022 11:11) (94% - 97%)    Parameters below as of 27 Jul 2022 01:00  Patient On (Oxygen Delivery Method): room air      CONSTITUTIONAL: NAD, well-developed, well-groomed  EYES: conjunctiva and sclera clear  ENMT: Moist oral mucosa  RESPIRATORY: Normal respiratory effort; lungs are clear to auscultation bilaterally  CARDIOVASCULAR: Regular rate and rhythm, normal S1 and S2; trace pitting lower extremity edema  ABDOMEN: Nontender to palpation, normoactive bowel sounds, no rebound/guarding  MUSCULOSKELETAL: no clubbing or cyanosis of digits; no joint swelling or tenderness to palpation  PSYCH: A+O to person, place, time; affect appropriate  NEUROLOGY: moving all extremities, can follow commands   SKIN: No rashes; no palpable lesions      LABS:                        17.0   15.72 )-----------( 133      ( 27 Jul 2022 06:32 )             51.8     07-27    140  |  105  |  34<H>  ----------------------------<  201<H>  4.8   |  24  |  1.01    Ca    8.3<L>      27 Jul 2022 06:34      PT/INR - ( 27 Jul 2022 06:32 )   PT: 13.0 sec;   INR: 1.13 ratio         PTT - ( 27 Jul 2022 06:32 )  PTT:24.2 sec            RADIOLOGY & ADDITIONAL TESTS:  Results Reviewed:   Imaging Personally Reviewed:  Electrocardiogram Personally Reviewed:    COORDINATION OF CARE:  Care Discussed with Consultants/Other Providers [Y/N]: Neurosurgery   Prior or Outpatient Records Reviewed [Y/N]:

## 2022-07-27 NOTE — CONSULT NOTE ADULT - SUBJECTIVE AND OBJECTIVE BOX
CHIEF COMPLAINT:  AMS    HISTORY OF PRESENT ILLNESS:  82M hx HTN, HLD, R-handed, Hungarian speaking, p/w 2w confusion/disorientation and urinary incontinence. Recent hosp adm to [facility] where ca w/u was done. Report: MRI c/w GBM and neg CT CAP. CTH at [facility] showing 4.4 x 4.1 x 3.8 cm medial R frontal lobe mass c/w GBM.    Cardiology consulted for tachycardia on tele.  Daughter at bedside translating.  Pt reports history of HTN which is managed by PCP.  Does not see a cardiologist.  Denies chest pain, SOB, palpitations.    PAST MEDICAL & SURGICAL HISTORY:  Brain tumor    MEDICATIONS:  furosemide    Tablet 10 milliGRAM(s) Oral daily  hydrALAZINE Injectable 5 milliGRAM(s) IV Push once PRN  losartan 50 milliGRAM(s) Oral daily    acetaminophen     Tablet .. 650 milliGRAM(s) Oral every 6 hours PRN  levETIRAcetam 500 milliGRAM(s) Oral every 12 hours  metoclopramide Injectable 10 milliGRAM(s) IV Push once  ondansetron Injectable 4 milliGRAM(s) IV Push every 6 hours PRN    polyethylene glycol 3350 17 Gram(s) Oral daily  senna 2 Tablet(s) Oral at bedtime    atorvastatin 80 milliGRAM(s) Oral at bedtime  dexAMETHasone  Injectable 4 milliGRAM(s) IV Push every 6 hours  dextrose 50% Injectable 25 Gram(s) IV Push once  dextrose 50% Injectable 12.5 Gram(s) IV Push once  dextrose 50% Injectable 25 Gram(s) IV Push once  dextrose Oral Gel 15 Gram(s) Oral once PRN  glucagon  Injectable 1 milliGRAM(s) IntraMuscular once  insulin glargine Injectable (LANTUS) 5 Unit(s) SubCutaneous at bedtime  insulin lispro (ADMELOG) corrective regimen sliding scale   SubCutaneous three times a day before meals  insulin lispro (ADMELOG) corrective regimen sliding scale   SubCutaneous at bedtime  insulin lispro Injectable (ADMELOG) 3 Unit(s) SubCutaneous three times a day before meals    chlorhexidine 4% Liquid 1 Application(s) Topical <User Schedule>  dextrose 5%. 1000 milliLiter(s) IV Continuous <Continuous>  dextrose 5%. 1000 milliLiter(s) IV Continuous <Continuous>    FAMILY HISTORY:    SOCIAL HISTORY:    [ ] Non-smoker  [ ] Smoker  [ ] Alcohol    Allergies    No Known Allergies    Intolerances    REVIEW OF SYSTEMS:  CONSTITUTIONAL: No fever, weight loss, + fatigue  EYES: No eye pain, visual disturbances, or discharge  ENMT:  No difficulty hearing, tinnitus, vertigo; No sinus or throat pain  NECK: No pain or stiffness  RESPIRATORY: No cough, wheezing, chills or hemoptysis; No Shortness of Breath  CARDIOVASCULAR: No chest pain, palpitations, passing out, dizziness, or leg swelling  GASTROINTESTINAL: No abdominal or epigastric pain. No nausea, vomiting, or hematemesis; No diarrhea or constipation. No melena or hematochezia.  GENITOURINARY: No dysuria, frequency, hematuria, or incontinence  NEUROLOGICAL: No headaches, memory loss, loss of strength, numbness, or tremors  SKIN: No itching, burning, rashes, or lesions   LYMPH Nodes: No enlarged glands  ENDOCRINE: No heat or cold intolerance; No hair loss  MUSCULOSKELETAL: No joint pain or swelling; No muscle, back, or extremity pain  PSYCHIATRIC: No depression, anxiety, mood swings, or difficulty sleeping  HEME/LYMPH: No easy bruising, or bleeding gums  ALLERY AND IMMUNOLOGIC: No hives or eczema	    [ ] All others negative	  [ ] Unable to obtain    PHYSICAL EXAM:  T(C): 36.8 (07-27-22 @ 11:11), Max: 36.9 (07-26-22 @ 21:06)  HR: 67 (07-27-22 @ 11:11) (56 - 67)  BP: 147/77 (07-27-22 @ 11:11) (121/72 - 151/80)  RR: 18 (07-27-22 @ 11:11) (18 - 18)  SpO2: 97% (07-27-22 @ 11:11) (94% - 97%)  Wt(kg): --  I&O's Summary    27 Jul 2022 07:01  -  27 Jul 2022 15:17  --------------------------------------------------------  IN: 840 mL / OUT: 0 mL / NET: 840 mL    Appearance: NAD	  HEENT: Normal oral mucosa, PERRL, EOMI	  Lymphatic: No lymphadenopathy  Cardiovascular: Normal S1 S2, No JVD, No murmurs, No edema  Respiratory: Lungs clear to auscultation	  Psychiatry: A & O x 3, Mood & affect appropriate  Gastrointestinal:  Soft, Non-tender, + BS	  Skin: No rashes, No ecchymoses, No cyanosis	  Neurologic: awake, alert, oriented to person, place, and year/month, PERRL, EOM intact, face symmetrical, speech clear and fluent, following commands, moving all extremities 5/5, no drifts appreciated, sensation intact to light touch  Vascular: Peripheral pulses palpable 2+ bilaterally    TELEMETRY: SR	    ECG:  SB - no acute ischemic changes 	  RADIOLOGY: < from: TTE with Doppler (w/Cont) (07.23.22 @ 15:23) >  Patient name: ANA LOVE  YOB: 1940   Age: 82 (M)   MR#: [id number]  Study Date: 7/23/2022  Location: [address] [place], [place] [id number]: BETTE Barron  Study quality: Technically difficult  Referring Physician: Enrike Mcneil MD  Blood Pressure: 145/83 mmHg  Height: 165 cm  Weight: 87 kg  BSA: 1.9 m2  ------------------------------------------------------------------------  PROCEDURE: Transthoracic echocardiogram with 2-D, M-Mode  and complete spectral and color flow Doppler.  INDICATION: Edema, unspecified (R60.9)  ------------------------------------------------------------------------  Dimensions:    Normal Values:  LA:     3.1    2.0 - 4.0 cm  Ao:     3.6    2.0 - 3.8 cm  SEPTUM: 1.2    0.6 - 1.2 cm  PWT:    0.9    0.6 -1.1 cm  LVIDd:  4.4    3.0 - 5.6 cm  LVIDs:  2.6    1.8 - 4.0 cm  Derived variables:  LVMI: 82 g/m2  RWT: 0.40  Fractional short: 41 %  EF (Visual Estimate):  %  EF (Hicks Rule): 75 %Doppler Peak Velocity (m/sec):  AoV=1.8  ------------------------------------------------------------------------  Observations:  Mitral Valve: Normal mitral valve. Minimal mitral  regurgitation.  Aortic Valve/Aorta: Aortic valve not well visualized;  appears to be a calcified trileaflet valve with normal  opening.Peak transaortic valve gradient equals 13 mm Hg,  mean transaortic valve gradient equals 6 mm Hg, aortic  valve velocity time integral equals 34 cm. No aortic valve  regurgitation seen. Peak left ventricular outflow tract  gradient equals 4 mm Hg, mean gradient is equal to 2 mm Hg,  LVOT velocity time integral equals 22 cm.  Aortic Root: 3.6 cm.  LVOT diameter: 2.3 cm.  Left Atrium: Normal left atrium.  LA volume index = 16  cc/m2.  Left Ventricle: Endocardial visualization enhanced with  intravenous injection of Ultrasonic Enhancing Agent  (Lumason). Hyperdynamic left ventricular systolic function.  Normal left ventricular internal dimensions and wall  thicknesses. Normal diastolic function.  Right Heart: Normal right atrium. Normal right ventricular  size and function. Tricuspid valve not well visualized,  probably normal. Minimal tricuspid regurgitation. Pulmonic  valve not well visualized. No pulmonic regurgitation.  Pericardium/Pleura: Normal pericardium with no pericardial  effusion.  Hemodynamic: Inadequate tricuspid regurgitation Doppler  envelope precludes estimation of RVSP.  ------------------------------------------------------------------------  Conclusions:  1. Normal mitral valve. Minimal mitral regurgitation.  2. Aortic valve not well visualized; appears to be a  calcified trileaflet valve with normal opening. No aortic  valve regurgitation seen.  3. Endocardial visualization enhanced with intravenous  injection of Ultrasonic Enhancing Agent (Lumason).  Hyperdynamic left ventricular systolic function.  4. Normal right ventricular size and function.  *** No previous Echo exam.  ------------------------------------------------------------------------  Confirmed on  7/23/2022 - 18:23:38 b    < end of copied text >    OTHER: 	  	  LABS:	 	    CARDIAC MARKERS:                      17.0   15.72 )-----------( 133      ( 27 Jul 2022 06:32 )             51.8     07-27    140  |  105  |  34<H>  ----------------------------<  201<H>  4.8   |  24  |  1.01    Ca    8.3<L>      27 Jul 2022 06:34    proBNP: Serum Pro-Brain Natriuretic Peptide: 162 pg/mL (07.25.22 @ 06:40)   Lipid Profile: Lipid Profile in AM (07.24.22 @ 06:23)   Cholesterol, Serum: 218 mg/dL   Triglycerides, Serum: 80 mg/dL   HDL Cholesterol, Serum: 57 mg/dL   Non HDL Cholesterol: 160 LDL Cholesterol Calculated: 145 mg/dL (07.24.22 @ 06:23)   HgA1c: A1C with Estimated Average Glucose Result: 7.0: Method: Immunoassay   TSH:

## 2022-07-27 NOTE — PROGRESS NOTE ADULT - PROBLEM SELECTOR PLAN 8
DVT PPX: Lovenox subcu suspended for OR, SCDs  Constipation - Last BM: 7/26  LE duplex neg 7/25    Activity as tolerated.

## 2022-07-27 NOTE — PROGRESS NOTE ADULT - ASSESSMENT
82M hx HTN, HLD, R-handed, Swedish speaking, p/w 2w confusion/disorientation and urinary incontinence. Recent hosp adm to [facility] where ca w/u was done. Report: MRI c/w GBM and neg CT CAP. CTH at [facility] showing 4.4 x 4.1 x 3.8 cm medial R frontal lobe mass c/w GBM. Exam: AOx3, PERRL, EOMI, no facial, no drift, COLES 5/5, SILT.     PLAN  - neuro and vital check Q4hrs  - MRI showed Rt frontal lesion, pre-op for TIMOTHY tomorrow, pre-op labs reviewed wnl, cleared by hospitalist  - continue decadron 4Q6hrs for cerebral edema  - continue keppra for seizure ppx  - Na goal 140-150 for cerebral edema, NA this am 140  - pain control with tylenol prn  - asymptomatic brief episode of PATs on tele, no change in ECG, cards consult pending for clearance  - HTN, blood pressure improved, continue losartan hospitalist following appreciated  - HLD, continue lipitor  - T2DM, ok to give lantus 5u QHS tonight while npo for OR tomorrow, continue premeal and ISS  - tolerating regular diet, NPO after midnight for OR  - IVF while NPO, will continue lasix given trace LE edema  - BM yesterday per patient, will give dulcolax ME for abd distension  - activity increase as tolerated  - incentive spirometer  - DVT ppx: b/l SCDs and hold SQL tonight given OR tomorrow; screening LED negative for DVTs  - Disposition: Pt/OT eval pending after OR    will discuss above with Dr. Ewa kitchen [id number]

## 2022-07-27 NOTE — PROGRESS NOTE ADULT - SUBJECTIVE AND OBJECTIVE BOX
Patient was seen at bedside this am. Patient felt tired as he did not sleep well last  night due to frequent neuro checks. Patient denied any cp, sob, n/v, or abd pain.    OVERNIGHT EVENTS: No overnight event    Vital Signs Last 24 Hrs  T(C): 36.8 (27 Jul 2022 04:43), Max: 36.9 (26 Jul 2022 21:06)  T(F): 98.2 (27 Jul 2022 04:43), Max: 98.5 (26 Jul 2022 21:06)  HR: 60 (27 Jul 2022 04:43) (57 - 65)  BP: 121/72 (27 Jul 2022 04:43) (121/72 - 151/80)  BP(mean): --  RR: 18 (27 Jul 2022 04:43) (18 - 18)  SpO2: 97% (27 Jul 2022 04:43) (94% - 97%)    Parameters below as of 27 Jul 2022 01:00  Patient On (Oxygen Delivery Method): room air        I&O's Detail    I&O's Summary      PHYSICAL EXAM:  Neurological:  awake, alert, oriented to person, place, and year/month, PERRL, EOM intact, face symmetrical, speech clear and fluent, following commands, moving all extremities 5/5, no drifts appreciated, sensation intact to light touch  Cardiovascular: +s1, s2  Respiratory: clear to auscultation b/l  Gastrointestinal: distended, but soft, non-tender  Genitourinary: voiding  Extremities: warm, dry    LABS:                        17.0   15.72 )-----------( 133      ( 27 Jul 2022 06:32 )             51.8     07-27    140  |  105  |  34<H>  ----------------------------<  201<H>  4.8   |  24  |  1.01    Ca    8.3<L>      27 Jul 2022 06:34      PT/INR - ( 27 Jul 2022 06:32 )   PT: 13.0 sec;   INR: 1.13 ratio         PTT - ( 27 Jul 2022 06:32 )  PTT:24.2 sec        CAPILLARY BLOOD GLUCOSE      POCT Blood Glucose.: 166 mg/dL (27 Jul 2022 07:34)  POCT Blood Glucose.: 169 mg/dL (26 Jul 2022 21:30)  POCT Blood Glucose.: 208 mg/dL (26 Jul 2022 16:20)  POCT Blood Glucose.: 221 mg/dL (26 Jul 2022 11:56)      Drug Levels: [] N/A    CSF Analysis: [] N/A      Allergies    No Known Allergies    Intolerances      MEDICATIONS:  Antibiotics:    Neuro:  acetaminophen     Tablet .. 650 milliGRAM(s) Oral every 6 hours PRN  levETIRAcetam 500 milliGRAM(s) Oral every 12 hours  metoclopramide Injectable 10 milliGRAM(s) IV Push once  ondansetron Injectable 4 milliGRAM(s) IV Push every 6 hours PRN    Anticoagulation:    OTHER:  atorvastatin 80 milliGRAM(s) Oral at bedtime  chlorhexidine 4% Liquid 1 Application(s) Topical <User Schedule>  dexAMETHasone  Injectable 4 milliGRAM(s) IV Push every 6 hours  dextrose 50% Injectable 25 Gram(s) IV Push once  dextrose 50% Injectable 12.5 Gram(s) IV Push once  dextrose 50% Injectable 25 Gram(s) IV Push once  dextrose Oral Gel 15 Gram(s) Oral once PRN  furosemide    Tablet 10 milliGRAM(s) Oral daily  glucagon  Injectable 1 milliGRAM(s) IntraMuscular once  hydrALAZINE Injectable 5 milliGRAM(s) IV Push once PRN  insulin glargine Injectable (LANTUS) 5 Unit(s) SubCutaneous at bedtime  insulin lispro (ADMELOG) corrective regimen sliding scale   SubCutaneous three times a day before meals  insulin lispro (ADMELOG) corrective regimen sliding scale   SubCutaneous at bedtime  insulin lispro Injectable (ADMELOG) 3 Unit(s) SubCutaneous three times a day before meals  losartan 50 milliGRAM(s) Oral daily  polyethylene glycol 3350 17 Gram(s) Oral daily  senna 2 Tablet(s) Oral at bedtime    IVF:  dextrose 5%. 1000 milliLiter(s) IV Continuous <Continuous>  dextrose 5%. 1000 milliLiter(s) IV Continuous <Continuous>    CULTURES:  Culture Results:   <10,000 CFU/mL Normal Urogenital Yashira (07-22 @ 23:52)    RADIOLOGY & ADDITIONAL TESTS:

## 2022-07-27 NOTE — PROGRESS NOTE ADULT - ASSESSMENT
Patient is a 82 year old male with past medical history of hypertension, hyperlipidemia, recent workup at OSH (7/15-7/22) included brain MRI and was diagnosed with glioblastoma, brought by daughter due to worsening over the past two weeks confusion/disorientation (started few weeks ago), urinary incontinence (started 5-6 weeks ago), now also with dyspnea on exertion (started ~ 2 weeks ago, worse since day prior to admission), admitted by neurosurgery team with possible plan for surgery coming week. Internal medicine team consulted for pre-op evaluation.     PMD: Dr. Rust ([facility]) however, patient is no longer going there, and his daughter wants to switch PCP to a [facility] provider close by.

## 2022-07-28 NOTE — PRE-ANESTHESIA EVALUATION ADULT - NSANTHPMHFT_GEN_ALL_CORE
82M hx HTN, HLD, R-handed, Burkinan speaking, p/w 2w confusion/disorientation and urinary incontinence. Recent hosp adm to [facility] where ca w/u was done. Report: MRI c/w GBM and neg CT CAP. CTH at [facility] showing 4.4 x 4.1 x 3.8 cm medial R frontal lobe mass c/w GBM. Exam: Sleepy, Ox1-2, PERRL, EOMI, no facial, no drift, COLES 5/5, SILT.

## 2022-07-28 NOTE — PROGRESS NOTE ADULT - ASSESSMENT
82M hx HTN, HLD, R-handed, Kenyan speaking, p/w 2w confusion/disorientation and urinary incontinence. Recent hosp adm to [facility] where ca w/u was done. Report: MRI c/w GBM and neg CT CAP. CTH at [facility] showing 4.4 x 4.1 x 3.8 cm medial R frontal lobe mass c/w GBM.

## 2022-07-28 NOTE — BRIEF OPERATIVE NOTE - OPERATION/FINDINGS
R TIMOTHY and stereo biopsy (LITTx2), TIMOTHY done in 3T MRI, then bolts removed and closed, R craniotomy for resection with tubular retractor.   Frozen: necrosis

## 2022-07-28 NOTE — PRE-ANESTHESIA EVALUATION ADULT - NSANTHOSAYNRD_GEN_A_CORE
No. LEONARDA screening performed.  STOP BANG Legend: 0-2 = LOW Risk; 3-4 = INTERMEDIATE Risk; 5-8 = HIGH Risk

## 2022-07-28 NOTE — PROGRESS NOTE ADULT - ASSESSMENT
NEURO:  CT Head in AM, MRI post-op, Surgical drains per NSGY, Pain control  Activity: [] OOB as tolerated [] Bedrest [] PT [] OT [] PMNR    PULM:  Incentive spirometry, mobilize as tolerated    CV:  -150mmHg, d/c a-line in AM    RENAL:  IVF until good PO intake, d/c villagran in AM    GI:  Diet: Dysphagia screen and then advance diet as tolerated  GI prophylaxis [] not indicated [] PPI [] other:  Bowel regimen [] colace [] senna [] other:    ENDO:   Goal euglycemia (-180)    HEME/ONC:  VTE prophylaxis: [] SCDs [] chemoprophylaxis [] hold chemoprophylaxis due to: [] high risk of DVT/PE on admission due to:    ID:  Zandra-op antibiotics    MISC:    SOCIAL/FAMILY:  [] awaiting [] updated at bedside [] family meeting    CODE STATUS:  [] Full Code [] DNR [] DNI [] Palliative/Comfort Care    DISPOSITION:  [] ICU [] Stroke Unit [] Floor [] EMU [] RCU [] PCU    [] Patient is at high risk of neurologic deterioration/death due to:     Time seen:  Time spent: ___ [] critical care minutes    Contact: [phone] POD0 R stereo biopsy, TIMOTHY x2, R crani for tumor debulking    NEURO:  CT Head in AM, MRI post-op, Pain control  decadron for cerebral edema  keppra for seizure ppx  Activity: [x] OOB as tolerated [] Bedrest [x] PT [x] OT [] PMNR    PULM:  Incentive spirometry, mobilize as tolerated    CV:  -150mmHg, d/c a-line in AM  continue losartan 50mg  on home lasix hold for now    RENAL:  IVL  d/c villagran in am    GI:  Diet: Dysphagia screen and then advance diet as tolerated  GI prophylaxis [] not indicated [x] PPI [] other:  Bowel regimen [] colace [x] senna [x] other: miralax     ENDO:   Goal euglycemia (-180)  hyperglycemic   lantus 5U, premeals, DREAD    HEME/ONC:  VTE prophylaxis: [x] SCDs [] chemoprophylaxis [x] hold chemoprophylaxis due to: fresh post op [x] high risk of DVT/PE on admission due to: tumor     ID:  Zandra-op antibiotics    MISC:    SOCIAL/FAMILY:  [] awaiting [x] updated at bedside [] family meeting    CODE STATUS:  [x] Full Code [] DNR [] DNI [] Palliative/Comfort Care    DISPOSITION:  [x] ICU [] Stroke Unit [] Floor [] EMU [] RCU [] PCU    [x] Patient is at high risk of neurologic deterioration/death due to: cerebral edema, post op hemorrhage, brain compression   Contact: [phone]

## 2022-07-28 NOTE — PROGRESS NOTE ADULT - SUBJECTIVE AND OBJECTIVE BOX
SUMMARY:    OVERNIGHT EVENTS:     ADMISSION SCORES:   GCS: HH: MF: NIHSS: ICH Score:    SEDATION SCORES:  RASS: CAM-ICU:     REVIEW OF SYSTEMS:    VITALS: [] Reviewed    IMAGING/DATA: [] Reviewed    IVF FLUIDS/MEDICATIONS: [] Reviewed    ALLERGIES: Allergies    No Known Allergies    Intolerances        DEVICES:   [] Restraints [] PIVs [] ET tube [] central line [] PICC [] arterial line [] villagran [] NGT/OGT [] EVD [] LD [] KENDELL/HMV [] LiCOX [] ICP monitor [] Trach [] PEG [] Chest Tube [] other:    EXAMINATION:  General: No acute distress  HEENT: Anicteric sclerae  Cardiac: [id number]  Lungs: Clear  Abdomen: Soft, non-tender, +BS  Extremities: No c/c/e  Skin/Incision Site: Clean, dry and intact  Neurologic: Awake, alert, fully oriented, follows commands, PERRL, VFFtc, EOMI, face symmetric, tongue midline, no drift, full strength SUMMARY: 83yo man adm on 7/23 for increasing confusion and disorientation. PMH of hypertension, hyperlipidemia, recent workup at OSH (7/15-7/22) included brain MRI and was diagnosed with glioblastoma, brought by daughter due to worsening over the past two weeks confusion/disorientation (started few weeks ago), urinary incontinence (started 5-6 weeks ago), now also with dyspnea on exertion (started ~ 2 weeks ago, worse since day prior to admission). CTH at [facility] showing 4.4 x 4.1 x 3.8 cm medial R frontal lobe mass c/w GBM.    OVERNIGHT EVENTS: Adm NSCU s/p R stereo biopsy, TIMOTHY x2, R crani for tumor debulking    REVIEW OF SYSTEMS: [x] Unable to Assess due to neurologic exam   [ ] All ROS addressed below are non-contributory, except:  Neuro: [ ] Headache [ ] Back pain [ ] Numbness [ ] Weakness [ ] Ataxia [ ] Dizziness [ ] Aphasia [ ] Dysarthria [ ] Visual disturbance  Resp: [ ] Shortness of breath/dyspnea [ ] Orthopnea [ ] Cough  CV: [ ] Chest pain [ ] Palpitation [ ] Lightheadedness [ ] Syncope  Renal: [ ] Thirst [ ] Edema  GI: [ ] Nausea [ ] Emesis [ ] Abdominal pain [ ] Constipation [ ] Diarrhea  Hem: [ ] Hematemesis [ ] bBright red blood per rectum  ID: [ ] Fever [ ] Chills [ ] Dysuria  ENT: [ ] Rhinorrhea  VITALS: [] Reviewed    IMAGING/DATA: [x] Reviewed    IVF FLUIDS/MEDICATIONS: [x] Reviewed    ALLERGIES: Allergies    No Known Allergies    Intolerances      DEVICES:   [] Restraints [x PIVs [] ET tube [] central line [] PICC [x] arterial line [x] villagran [] NGT/OGT [] EVD [] LD [] KENDELL/HMV [] LiCOX [] ICP monitor [] Trach [] PEG [] Chest Tube [] other:    EXAMINATION:  General: No acute distress  HEENT: Anicteric sclerae  Cardiac: [id number]  Lungs: Clear  Abdomen: Soft, non-tender, +BS  Extremities: No c/c/e  Skin/Incision Site: Clean, dry and intact  Neurologic: minimal EO to noxious, lethargic, nonverbal in Bruneian, no FC, pupils 3mm reactive b/l, requires much stimulation for exam, but able to lift all four extremities AG with prompt

## 2022-07-28 NOTE — PROGRESS NOTE ADULT - SUBJECTIVE AND OBJECTIVE BOX
Subjective: Patient seen and examined. No new events except as noted.   Seen prior to OR.     REVIEW OF SYSTEMS:    CONSTITUTIONAL: + weakness, fevers or chills  EYES/ENT: No visual changes;  No vertigo or throat pain   NECK: No pain or stiffness  RESPIRATORY: No cough, wheezing, hemoptysis; No shortness of breath  CARDIOVASCULAR: No chest pain or palpitations  GASTROINTESTINAL: No abdominal or epigastric pain. No nausea, vomiting, or hematemesis; No diarrhea or constipation. No melena or hematochezia.  GENITOURINARY: No dysuria, frequency or hematuria  NEUROLOGICAL: No numbness or weakness  SKIN: No itching, burning, rashes, or lesions   All other review of systems is negative unless indicated above.    MEDICATIONS:  MEDICATIONS  (STANDING):    PHYSICAL EXAM:  T(C): 36.8 (07-28-22 @ 07:07), Max: 37 (07-27-22 @ 17:17)  HR: 59 (07-28-22 @ 07:07) (59 - 73)  BP: 145/77 (07-28-22 @ 07:07) (129/79 - 147/81)  RR: 18 (07-28-22 @ 07:07) (18 - 18)  SpO2: 97% (07-28-22 @ 07:07) (96% - 98%)  Wt(kg): --  I&O's Summary    27 Jul 2022 07:01  -  28 Jul 2022 07:00  --------------------------------------------------------  IN: 1840 mL / OUT: 0 mL / NET: 1840 mL    Height (cm): 165.1 (07-28 @ 07:07)  Weight (kg): 86.6 (07-28 @ 07:07)  BMI (kg/m2): 31.8 (07-28 @ 07:07)  BSA (m2): 1.94 (07-28 @ 07:07)    Appearance: NAD	  HEENT: Normal oral mucosa, PERRL, EOMI	  Lymphatic: No lymphadenopathy , no edema  Cardiovascular: Normal S1 S2, No JVD, No murmurs , Peripheral pulses palpable 2+ bilaterally  Respiratory: Lungs clear to auscultation, normal effort 	  Gastrointestinal:  Soft, Non-tender, + BS	  Skin: No rashes, No ecchymoses, No cyanosis, warm to touch  Musculoskeletal: Normal range of motion, normal strength  Psychiatry:  Mood & affect appropriate  Ext: No edema    LABS:    CARDIAC MARKERS:                        17.0   15.72 )-----------( 133      ( 27 Jul 2022 06:32 )             51.8     07-27    140  |  105  |  34<H>  ----------------------------<  201<H>  4.8   |  24  |  1.01    Ca    8.3<L>      27 Jul 2022 06:34    proBNP:   Lipid Profile:   HgA1c:   TSH:     TELEMETRY: 	    ECG:  	  RADIOLOGY:   DIAGNOSTIC TESTING:  [ ] Echocardiogram:  [ ]  Catheterization:  [ ] Stress Test:    OTHER:

## 2022-07-28 NOTE — BRIEF OPERATIVE NOTE - NSICDXBRIEFPROCEDURE_GEN_ALL_CORE_FT
PROCEDURES:  Craniotomy for resection of lesion 28-Jul-2022 20:22:33 R TIMOTHY x2, craniotomy for resection of lesion with tubular retractors Valentina Hopkins

## 2022-07-28 NOTE — CHART NOTE - NSCHARTNOTEFT_GEN_A_CORE
ANKUR LOVE  MRN [id number]    Pre-operative Note for OR today with Dr. Mcneil for Right stereotactic Varioguide biopsy, TIMOTHY, followed by craniotomy for transtubular debulking of lesion.     HPI: 82M PMHx HLD, HTN recently diagnosed at OSH with bifrontal brain mass after presenting with increasing confusion, gait instability, intermittent incontinence, re-presented initially with daughter who felt she couldn’t care for him at home and desire to transition care to [facility]. Initial CTH demonstrated a R paramedian frontal lobe mass with central hypodensity and R frontal edema, frontal mass effect on corpus callosum/lateral ventricles R>L with likely contralateral spread. MRI brain showing rim-enhancing necrotic anterior CC R>L 4.7x4.2cm mass with high vascularity and cellularity with microscopic tumor invasion of more posterior CC. ACAs/A2s ascend within the L posterolateral wall of the lesion seen on T2 thins.  He was started on 4q6 of decadron with mild improvement in confusion, and keppra for seizure ppx.     Exam: AOx3 but slow to respond, intermittently inappropriate affect, memory issues/confusion apparent on prolonged conversation, PERRL, EOMI, no facial, COLES 5/5, no drift.     Pre-operative testing: BLE duplex 7/25 negative, cardiology cleared 7/27 after some brief atrial tachy on telemetry, medically cleared 7/27 with echo EF 75%.  Mild leukocytosis 2/2 steroid use otherwise preoperative labs wnl. COVID neg 7/27, MRSA/MSSA neg 7/23. ANKUR LOVE  MRN [id number]    Pre-operative Note for OR today with Dr. Mcneil for Right stereotactic Varioguide biopsy, TIMOTHY, followed by craniotomy for transtubular debulking of lesion.     HPI: 82M PMHx HLD, HTN recently diagnosed at OSH with bifrontal brain mass after presenting with increasing confusion, gait instability, intermittent incontinence, re-presented initially with daughter who felt she couldn’t care for him at home and desire to transition care to [facility]. Initial CTH demonstrated a R paramedian frontal lobe mass with central hypodensity and R frontal edema, frontal mass effect on corpus callosum/lateral ventricles R>L with likely contralateral spread. MRI brain showing rim-enhancing necrotic anterior CC R>L 4.7x4.2cm mass with high vascularity and cellularity with microscopic tumor invasion of more posterior CC. ACAs/A2s ascend within the L posterolateral wall of the lesion seen on T2 thins.  He was started on 4q6 of decadron with mild improvement in confusion, and keppra for seizure ppx.     Exam: AOx3 but slow to respond, intermittently inappropriate affect, memory issues/confusion apparent on prolonged conversation, PERRL, EOMI, no facial, COLES 5/5, no drift.     Pre-operative testing: BLE duplex 7/25 negative, cardiology (Juanito) cleared 7/27 after some brief atrial tachy on telemetry, medically cleared 7/27 with echo EF 75%.  Mild leukocytosis 2/2 steroid use otherwise preoperative labs wnl. COVID neg 7/27, MRSA/MSSA neg 7/23.

## 2022-07-29 NOTE — PROVIDER CONTACT NOTE (CHANGE IN STATUS NOTIFICATION) - ACTION/TREATMENT ORDERED:
per PA, assessment findings consistent with PA assessment this AM. variation in neuro exam possibly by tramadol given 1hr prior. no new orders, continue to monitor

## 2022-07-29 NOTE — PROGRESS NOTE ADULT - ASSESSMENT
POD0 R stereo biopsy, TIMOTHY x2, R crani for tumor debulking    NEURO:  CT Head in AM, MRI post-op, Pain control  decadron for cerebral edema  keppra for seizure ppx  Activity: [x] OOB as tolerated [] Bedrest [x] PT [x] OT [] PMNR    PULM:  Incentive spirometry, mobilize as tolerated    CV:  -150mmHg, d/c a-line in AM  continue losartan 50mg  on home lasix hold for now    RENAL:  IVL  d/c villagran in am    GI:  Diet: Dysphagia screen and then advance diet as tolerated  GI prophylaxis [] not indicated [x] PPI [] other:  Bowel regimen [] colace [x] senna [x] other: miralax     ENDO:   Goal euglycemia (-180)  hyperglycemic   lantus 5U, premeals, DREAD    HEME/ONC:  VTE prophylaxis: [x] SCDs [] chemoprophylaxis [x] hold chemoprophylaxis due to: fresh post op [x] high risk of DVT/PE on admission due to: tumor     ID:  Zandra-op antibiotics    MISC:    SOCIAL/FAMILY:  [] awaiting [x] updated at bedside [] family meeting    CODE STATUS:  [x] Full Code [] DNR [] DNI [] Palliative/Comfort Care    DISPOSITION:  [x] ICU [] Stroke Unit [] Floor [] EMU [] RCU [] PCU    [x] Patient is at high risk of neurologic deterioration/death due to: cerebral edema, post op hemorrhage, brain compression   Contact: [phone] POD1 R stereo biopsy, TIMOTHY x2, R crani for tumor debulking    NEURO:  neuro checks q 2 hr   CT Head small intra-op bed hemorrhage   MRI brain wwo pending   decadron 6 mg q 6 hr for cerebral edema  keppra for seizure ppx  Activity: [x] OOB as tolerated [] Bedrest [x] PT [x] OT [] PMNR    PULM:  RA     CV:  -150mmHg   HTN continue losartan 50mg  on home lasix hold for now doesnt look volume overloaded    RENAL:  IVL    GI:  Diet: diabetic diet   GI prophylaxis [] not indicated [x] PPI [] other:  Bowel regimen [] colace [x] senna [x] other: miralax     ENDO:   Goal euglycemia (-180)  hyperglycemic   lantus 5U, premeals, DREAD    HEME/ONC:  VTE prophylaxis: [x] SCDs [] chemoprophylaxis [x] hold chemoprophylaxis due to: small hem on CT head [x] high risk of DVT/PE on admission due to: tumor   venous dopplers     ID:  afebrile     MISC:    SOCIAL/FAMILY:  [] awaiting [x] updated at bedside [] family meeting    CODE STATUS:  [x] Full Code [] DNR [] DNI [] Palliative/Comfort Care    DISPOSITION:  [x] ICU [] Stroke Unit [] Floor [] EMU [] RCU [] PCU    not critical  POD1 R stereo biopsy, TIMOTHY x2, R crani for tumor debulking    NEURO:  neuro checks q 2 hr   CT Head small intra-op bed hemorrhage   MRI brain wwo pending   decadron 6 mg q 6 hr for vasogenic edema  keppra for seizure ppx  Activity: [x] OOB as tolerated [] Bedrest [x] PT [x] OT [] PMNR    PULM:  RA     CV:  -150mmHg   HTN continue losartan 50mg  on home lasix hold for now doesnt look volume overloaded    RENAL:  IVL    GI:  Diet: diabetic diet   GI prophylaxis [] not indicated [x] PPI [] other:  Bowel regimen [] colace [x] senna [x] other: miralax     ENDO:   Goal euglycemia (-180)  hyperglycemic   lantus 5U, premeals, DREAD    HEME/ONC:  VTE prophylaxis: [x] SCDs [] chemoprophylaxis [x] hold chemoprophylaxis due to: small hem on CT head [x] high risk of DVT/PE on admission due to: tumor   venous dopplers     ID:  afebrile     MISC:    SOCIAL/FAMILY:  [] awaiting [x] updated at bedside [] family meeting    CODE STATUS:  [x] Full Code [] DNR [] DNI [] Palliative/Comfort Care    DISPOSITION:  [x] ICU [] Stroke Unit [] Floor [] EMU [] RCU [] PCU    35 critical care time, at risk for ICH and brain herniation

## 2022-07-29 NOTE — PROGRESS NOTE ADULT - SUBJECTIVE AND OBJECTIVE BOX
SUMMARY: 81yo man adm on 7/23 for increasing confusion and disorientation. PMH of hypertension, hyperlipidemia, recent workup at OSH (7/15-7/22) included brain MRI and was diagnosed with glioblastoma, brought by daughter due to worsening over the past two weeks confusion/disorientation (started few weeks ago), urinary incontinence (started 5-6 weeks ago), now also with dyspnea on exertion (started ~ 2 weeks ago, worse since day prior to admission). CTH at [facility] showing 4.4 x 4.1 x 3.8 cm medial R frontal lobe mass c/w GBM.    OVERNIGHT EVENTS: Adm NSCU s/p R stereo biopsy, TIMOTHY x2, R crani for tumor debulking    REVIEW OF SYSTEMS: [x] Unable to Assess due to neurologic exam   [ ] All ROS addressed below are non-contributory, except:  Neuro: [ ] Headache [ ] Back pain [ ] Numbness [ ] Weakness [ ] Ataxia [ ] Dizziness [ ] Aphasia [ ] Dysarthria [ ] Visual disturbance  Resp: [ ] Shortness of breath/dyspnea [ ] Orthopnea [ ] Cough  CV: [ ] Chest pain [ ] Palpitation [ ] Lightheadedness [ ] Syncope  Renal: [ ] Thirst [ ] Edema  GI: [ ] Nausea [ ] Emesis [ ] Abdominal pain [ ] Constipation [ ] Diarrhea  Hem: [ ] Hematemesis [ ] bBright red blood per rectum  ID: [ ] Fever [ ] Chills [ ] Dysuria  ENT: [ ] Rhinorrhea  VITALS: [] Reviewed    IMAGING/DATA: [x] Reviewed    IVF FLUIDS/MEDICATIONS: [x] Reviewed    ALLERGIES: Allergies    No Known Allergies    Intolerances      DEVICES:   [] Restraints [x PIVs [] ET tube [] central line [] PICC [x] arterial line [x] villagran [] NGT/OGT [] EVD [] LD [] KENDELL/HMV [] LiCOX [] ICP monitor [] Trach [] PEG [] Chest Tube [] other:    EXAMINATION:  General: No acute distress  HEENT: Anicteric sclerae  Cardiac: [id number]  Lungs: Clear  Abdomen: Soft, non-tender, +BS  Extremities: No c/c/e  Skin/Incision Site: Clean, dry and intact  Neurologic: minimal EO to noxious, lethargic, nonverbal in Vincentian, no FC, pupils 3mm reactive b/l, requires much stimulation for exam, but able to lift all four extremities AG with prompt  SUMMARY: 83yo man adm on 7/23 for increasing confusion and disorientation. PMH of hypertension, hyperlipidemia, recent workup at OSH (7/15-7/22) included brain MRI and was diagnosed with glioblastoma, brought by daughter due to worsening over the past two weeks confusion/disorientation (started few weeks ago), urinary incontinence (started 5-6 weeks ago), now also with dyspnea on exertion (started ~ 2 weeks ago, worse since day prior to admission). CTH at [facility] showing 4.4 x 4.1 x 3.8 cm medial R frontal lobe mass c/w GBM.    OVERNIGHT EVENTS: Adm NSCU s/p R stereo biopsy, TIMOTHY x2, R crani for tumor debulking    REVIEW OF SYSTEMS: [] Unable to Assess due to neurologic exam   [x ] All ROS addressed below are non-contributory, except:  Neuro: [ ] Headache [ ] Back pain [ ] Numbness [ ] Weakness [ ] Ataxia [ ] Dizziness [ ] Aphasia [ ] Dysarthria [ ] Visual disturbance  Resp: [ ] Shortness of breath/dyspnea [ ] Orthopnea [ ] Cough  CV: [ ] Chest pain [ ] Palpitation [ ] Lightheadedness [ ] Syncope  Renal: [ ] Thirst [ ] Edema  GI: [ ] Nausea [ ] Emesis [ ] Abdominal pain [ ] Constipation [ ] Diarrhea  Hem: [ ] Hematemesis [ ] bBright red blood per rectum  ID: [ ] Fever [ ] Chills [ ] Dysuria  ENT: [ ] Rhinorrhea  VITALS: [] Reviewed    IMAGING/DATA: [x] Reviewed    IVF FLUIDS/MEDICATIONS: [x] Reviewed    ALLERGIES: Allergies    No Known Allergies    Intolerances    T(C): 36.9 (07-29-22 @ 08:00), Max: 43 (07-28-22 @ 20:50)  HR: 75 (07-29-22 @ 09:00) (62 - 88)  BP: --  RR: 14 (07-29-22 @ 09:00) (12 - 25)  SpO2: 100% (07-29-22 @ 09:00) (94% - 100%)  07-28-22 @ 07:01  -  07-29-22 @ 07:00  --------------------------------------------------------  IN: 200 mL / OUT: 855 mL / NET: -655 mL    07-29-22 @ 07:01  - 07-29-22 @ 09:47  --------------------------------------------------------  IN: 100 mL / OUT: 0 mL / NET: 100 mL    acetaminophen     Tablet .. 650 milliGRAM(s) Oral every 6 hours PRN  atorvastatin 80 milliGRAM(s) Oral at bedtime  chlorhexidine 4% Liquid 1 Application(s) Topical daily  dexAMETHasone  Injectable 6 milliGRAM(s) IV Push every 6 hours  dextrose 50% Injectable 25 Gram(s) IV Push once  dextrose 50% Injectable 12.5 Gram(s) IV Push once  glucagon  Injectable 1 milliGRAM(s) IntraMuscular once  insulin glargine Injectable (LANTUS) 5 Unit(s) SubCutaneous at bedtime  insulin lispro (ADMELOG) corrective regimen sliding scale   SubCutaneous Before meals and at bedtime  insulin lispro Injectable (ADMELOG) 3 Unit(s) SubCutaneous three times a day before meals  levETIRAcetam 500 milliGRAM(s) Oral every 12 hours  losartan 50 milliGRAM(s) Oral daily  nafcillin  IVPB 2 Gram(s) IV Intermittent every 4 hours  ondansetron Injectable 4 milliGRAM(s) IV Push every 6 hours PRN  pantoprazole  Injectable 40 milliGRAM(s) IV Push daily  polyethylene glycol 3350 17 Gram(s) Oral every 12 hours  senna 2 Tablet(s) Oral at bedtime  traMADol 25 milliGRAM(s) Oral every 6 hours PRN      DEVICES:   [] Restraints [x PIVs [] ET tube [] central line [] PICC [x] arterial line [x] villagran [] NGT/OGT [] EVD [] LD [] KENDELL/HMV [] LiCOX [] ICP monitor [] Trach [] PEG [] Chest Tube [] other:    EXAMINATION:  General: No acute distress  HEENT: Anicteric sclerae  Cardiac: [id number]  Lungs: Clear  Abdomen: Soft, non-tender, +BS  Extremities: No c/c/e  Skin/Incision Site: Clean, dry and intact  Neurologic: awake, alert oriented x 3 , nonverbal in Hungarian, no FC, pupils 3mm reactive b/l,  5/5 all over except for a right UE drift       LABS:  Na: 142 (07-28 @ 23:54), 153 (07-28 @ 22:44), 140 (07-27 @ 06:34)  K: 4.3 (07-28 @ 23:54), 3.0 (07-28 @ 22:44), 4.8 (07-27 @ 06:34)  Cl: 110 (07-28 @ 23:54), 118 (07-28 @ 22:44), 105 (07-27 @ 06:34)  CO2: 20 (07-28 @ 23:54), 15 (07-28 @ 22:44), 24 (07-27 @ 06:34)  BUN: 26 (07-28 @ 23:54), 21 (07-28 @ 22:44), 34 (07-27 @ 06:34)  Cr: 0.79 (07-28 @ 23:54), 0.59 (07-28 @ 22:44), 1.01 (07-27 @ 06:34)  Glu: 242(07-28 @ 23:54), 205(07-28 @ 22:44), 201(07-27 @ 06:34)    Hgb: 15.6 (07-28 @ 22:44), 17.0 (07-27 @ 06:32)  Hct: 47.8 (07-28 @ 22:44), 51.8 (07-27 @ 06:32)  WBC: 18.41 (07-28 @ 22:44), 15.72 (07-27 @ 06:32)  Plt: 116 (07-28 @ 22:44), 133 (07-27 @ 06:32)    INR: 1.13 07-27-22 @ 06:32  PTT: 24.2 07-27-22 @ 06:32

## 2022-07-29 NOTE — PROGRESS NOTE ADULT - ASSESSMENT
POD1 R stereo biopsy, TIMOTHY x2, R crani for tumor debulking    NEURO:  CT Head in AM, MRI post-op, Pain control  decadron 6q6 for cerebral edema  keppra for seizure ppx  -150

## 2022-07-29 NOTE — PROGRESS NOTE ADULT - ASSESSMENT
82M hx HTN, HLD, R-handed, Ugandan speaking, p/w 2w confusion/disorientation and urinary incontinence. Recent hosp adm to [facility] where ca w/u was done. Report: MRI c/w GBM and neg CT CAP. CTH at [facility] showing 4.4 x 4.1 x 3.8 cm medial R frontal lobe mass c/w GBM.

## 2022-07-29 NOTE — PROGRESS NOTE ADULT - SUBJECTIVE AND OBJECTIVE BOX
Subjective: Patient seen and examined. No new events except as noted.   remains in ICU   s/p R stereo biopsy, TIMOTHY x2, R crani for tumor debulking      REVIEW OF SYSTEMS:    Unable to obtain       MEDICATIONS:  MEDICATIONS  (STANDING):  atorvastatin 80 milliGRAM(s) Oral at bedtime  chlorhexidine 4% Liquid 1 Application(s) Topical daily  dexAMETHasone  Injectable 6 milliGRAM(s) IV Push every 6 hours  dextrose 50% Injectable 25 Gram(s) IV Push once  dextrose 50% Injectable 12.5 Gram(s) IV Push once  glucagon  Injectable 1 milliGRAM(s) IntraMuscular once  insulin glargine Injectable (LANTUS) 5 Unit(s) SubCutaneous at bedtime  insulin lispro (ADMELOG) corrective regimen sliding scale   SubCutaneous Before meals and at bedtime  insulin lispro Injectable (ADMELOG) 3 Unit(s) SubCutaneous three times a day before meals  levETIRAcetam 500 milliGRAM(s) Oral every 12 hours  losartan 50 milliGRAM(s) Oral daily  nafcillin  IVPB 2 Gram(s) IV Intermittent every 4 hours  pantoprazole  Injectable 40 milliGRAM(s) IV Push daily  polyethylene glycol 3350 17 Gram(s) Oral every 12 hours  senna 2 Tablet(s) Oral at bedtime      PHYSICAL EXAM:  T(C): 36.9 (07-29-22 @ 08:00), Max: 43 (07-28-22 @ 20:50)  HR: 69 (07-29-22 @ 08:00) (62 - 88)  BP: --  RR: 18 (07-29-22 @ 08:00) (12 - 25)  SpO2: 97% (07-29-22 @ 08:00) (94% - 100%)  Wt(kg): --  I&O's Summary    28 Jul 2022 07:01  -  29 Jul 2022 07:00  --------------------------------------------------------  IN: 200 mL / OUT: 855 mL / NET: -655 mL    29 Jul 2022 07:01  -  29 Jul 2022 08:25  --------------------------------------------------------  IN: 100 mL / OUT: 0 mL / NET: 100 mL          Appearance: NAD nonverbal 	  HEENT:  dry  oral mucosa, PERRL, EOMI	  Lymphatic: No lymphadenopathy , no edema  Cardiovascular: Normal S1 S2, No JVD, No murmurs , Peripheral pulses palpable 2+ bilaterally  Respiratory: decreased bs  	  Gastrointestinal:  Soft, Non-tender, + BS	  Skin: No rashes, No ecchymoses, No cyanosis, warm to touch  Musculoskeletal: Normal range of motion, normal strength  Psychiatry: sleepy   Ext: No edema  Neurologic: minimal EO to noxious, lethargic, nonverbal in Japanese, no FC, pupils 3mm reactive b/l, requires much stimulation for exam, but able to lift all four extremities AG with prompt       LABS:    CARDIAC MARKERS:                                15.6   18.41 )-----------( 116      ( 28 Jul 2022 22:44 )             47.8     07-28    142  |  110<H>  |  26<H>  ----------------------------<  242<H>  4.3   |  20<L>  |  0.79    Ca    6.7<L>      28 Jul 2022 23:54  Phos  5.8     07-28  Mg     2.0     07-28      proBNP:   Lipid Profile:   HgA1c:   TSH:             TELEMETRY: 	SR     ECG:  	  RADIOLOGY: c< from: MR Guided FUS Intracranial Stereotactic Ablation (07.28.22 @ 15:33) >    ACC: [id number] EXAM:  MR GFUS ABLAT JOSIAH CRANIAL SISC                          PROCEDURE DATE:  07/28/2022          INTERPRETATION:  Clinical indications: Laser ablation procedure, midline   frontal glioblastoma    Axial SPGR and post contrast axial,coronal and sagittal T1 weighted   images were obtained on a 3.0 Christina magnet. 8 cc of Gadavist are   intravenously injected, discarded.    Comparison is made with the prior MRI of 7/23/2022.        A right lateral frontal ruth hole is identified through which a catheter   extends into the midline frontal enhancing mass. A second smaller   catheter extends into the mass from a more midline frontal approach.   There is ring enhancement around the mass. The mass measures   approximately 3.5 cm in AP diameter by 4.1 cm transversely by 3.3 cm in   craniocaudal diameter and extends into the genu of the corpus callosum.   The pattern of contrast enhancement is similar to the exam with a small   amount of air within the substance of the mass. There is no change in   right frontal vasogenic edema. There is splaying of the frontal horns   which is unchanged. There is no hydrocephalus.    IMPRESSION: MRI brain laser ablation procedure of midline frontal   glioblastoma.    --- End of Report ---            YVES BURT MD; Attending Radiologist  This document has been electronically signed. Jul 28 2022  5:28PM    < end of copied text >    DIAGNOSTIC TESTING:  [ ] Echocardiogram:  [ ]  Catheterization:  [ ] Stress Test:    OTHER:

## 2022-07-29 NOTE — PROGRESS NOTE ADULT - SUBJECTIVE AND OBJECTIVE BOX
Patient seen and examined at bedside.    --Anticoagulation--    T(C): 37 (07-28-22 @ 23:00), Max: 43 (07-28-22 @ 20:50)  HR: 80 (07-29-22 @ 00:00) (59 - 88)  BP: 145/77 (07-28-22 @ 07:07) (145/77 - 145/82)  RR: 18 (07-29-22 @ 00:00) (13 - 25)  SpO2: 100% (07-29-22 @ 00:00) (94% - 100%)  Wt(kg): --    Exam:  minimal eo to nox, lethargic, nonverbal, no fc, pupils 3mmr bilateral,  atleast AG x 4 extremities.  Patient seen and examined at bedside.    --Anticoagulation--    T(C): 37 (07-28-22 @ 23:00), Max: 43 (07-28-22 @ 20:50)  HR: 80 (07-29-22 @ 00:00) (59 - 88)  BP: 145/77 (07-28-22 @ 07:07) (145/77 - 145/82)  RR: 18 (07-29-22 @ 00:00) (13 - 25)  SpO2: 100% (07-29-22 @ 00:00) (94% - 100%)  Wt(kg): --    Exam:  Indonesian speaking, Ox3, PERRL, EOMI, no facial, no drift, COLES 5/5, SILT

## 2022-07-30 NOTE — PROGRESS NOTE ADULT - SUBJECTIVE AND OBJECTIVE BOX
Ruby Flynn MD  Division of Hospital Medicine  [facility]  Spectra: [phone]      Patient is a 82y old  Male who presents with a chief complaint of Brain lesion (30 Jul 2022 10:43)      SUBJECTIVE / OVERNIGHT EVENTS: Daughter Anh provided Turkish translation via speakerphone per patient and wife preference. No acute events overnight. no fever, chills, headache, dizziness, chest pain. occasional exertional dyspnea but which has been ongoing since prior to admission but otherwise doing okay. +constipation with no flatus today but was passing flatus day prior per daughter.  ADDITIONAL REVIEW OF SYSTEMS:    MEDICATIONS  (STANDING):  atorvastatin 80 milliGRAM(s) Oral at bedtime  dexAMETHasone  Injectable 6 milliGRAM(s) IV Push every 6 hours  dextrose 50% Injectable 25 Gram(s) IV Push once  dextrose 50% Injectable 12.5 Gram(s) IV Push once  glucagon  Injectable 1 milliGRAM(s) IntraMuscular once  insulin glargine Injectable (LANTUS) 8 Unit(s) SubCutaneous at bedtime  insulin lispro (ADMELOG) corrective regimen sliding scale   SubCutaneous Before meals and at bedtime  insulin lispro Injectable (ADMELOG) 4 Unit(s) SubCutaneous three times a day before meals  levETIRAcetam 500 milliGRAM(s) Oral every 12 hours  losartan 50 milliGRAM(s) Oral daily  pantoprazole    Tablet 40 milliGRAM(s) Oral before breakfast  polyethylene glycol 3350 17 Gram(s) Oral every 12 hours  senna 2 Tablet(s) Oral at bedtime  sodium chloride 0.9%. 1000 milliLiter(s) (75 mL/Hr) IV Continuous <Continuous>    MEDICATIONS  (PRN):  acetaminophen     Tablet .. 650 milliGRAM(s) Oral every 6 hours PRN Temp greater or equal to 38C (100.4F), Mild Pain (1 - 3)  bisacodyl 5 milliGRAM(s) Oral every 12 hours PRN Constipation  ondansetron Injectable 4 milliGRAM(s) IV Push every 6 hours PRN Nausea and/or Vomiting  oxyCODONE    IR 5 milliGRAM(s) Oral every 4 hours PRN Moderate Pain (4 - 6)  oxyCODONE    IR 10 milliGRAM(s) Oral every 6 hours PRN Severe Pain (7 - 10)      CAPILLARY BLOOD GLUCOSE      POCT Blood Glucose.: 200 mg/dL (30 Jul 2022 07:46)  POCT Blood Glucose.: 156 mg/dL (29 Jul 2022 21:58)  POCT Blood Glucose.: 234 mg/dL (29 Jul 2022 18:07)  POCT Blood Glucose.: 156 mg/dL (29 Jul 2022 12:20)    I&O's Summary    29 Jul 2022 07:01  -  30 Jul 2022 07:00  --------------------------------------------------------  IN: 1030 mL / OUT: 0 mL / NET: 1030 mL        PHYSICAL EXAM:  Vital Signs Last 24 Hrs  T(C): 36.2 (30 Jul 2022 05:00), Max: 36.8 (29 Jul 2022 16:00)  T(F): 97.1 (30 Jul 2022 05:00), Max: 98.3 (29 Jul 2022 17:00)  HR: 70 (30 Jul 2022 09:02) (60 - 89)  BP: 128/74 (30 Jul 2022 09:02) (128/74 - 144/80)  BP(mean): --  RR: 16 (30 Jul 2022 09:02) (14 - 23)  SpO2: 96% (30 Jul 2022 09:02) (92% - 100%)    Parameters below as of 30 Jul 2022 09:02  Patient On (Oxygen Delivery Method): nasal cannula        CONSTITUTIONAL: NAD, well-developed, well-groomed  EYES: PERRLA; conjunctiva and sclera clear  ENMT: Moist oral mucosa, no pharyngeal injection or exudates; normal dentition  NECK: Supple, no palpable masses; no thyromegaly  RESPIRATORY: Normal respiratory effort; lungs are clear to auscultation bilaterally, no wheeze nor crackles  CARDIOVASCULAR: Regular rate and rhythm, normal S1 and S2, no murmur/rub/gallop; No lower extremity edema; Peripheral pulses are 2+ bilaterally  ABDOMEN: Soft, Nondistended, Nontender to palpation, normoactive bowel sounds  MUSCULOSKELETAL:  No clubbing or cyanosis of digits; no joint swelling or tenderness to palpation  PSYCH: A+O to person, place, and time; affect appropriate  NEUROLOGY: no gross sensory deficits, 5/5 strength throughout  SKIN: +R crani site c/d/i    LABS:                        15.6   17.47 )-----------( 101      ( 30 Jul 2022 05:58 )             48.7     07-30    143  |  110<H>  |  33<H>  ----------------------------<  232<H>  4.5   |  23  |  1.08    Ca    7.8<L>      30 Jul 2022 05:59  Phos  2.8     07-30  Mg     2.1     07-30        RADIOLOGY & ADDITIONAL TESTS:  Results Reviewed: leukocytosis likely steroid-induced/reactive, BUN/Cr uptrending  Imaging Personally Reviewed:  Electrocardiogram Personally Reviewed:    COORDINATION OF CARE:  Care Discussed with Consultants/Other Providers [Y]: neurosurgery resident Pedro  Prior or Outpatient Records Reviewed [Y/N]:

## 2022-07-30 NOTE — PROGRESS NOTE ADULT - ASSESSMENT
82M hx HTN, HLD, R-handed, Mosotho speaking, p/w 2w confusion/disorientation and urinary incontinence. Recent hosp adm to [facility] where ca w/u was done. Report: MRI c/w GBM and neg CT CAP. CTH at [facility] showing 4.4 x 4.1 x 3.8 cm medial R frontal lobe mass c/w GBM.

## 2022-07-30 NOTE — PROGRESS NOTE ADULT - SUBJECTIVE AND OBJECTIVE BOX
SUMMARY: 83yo man adm on 7/23 for increasing confusion and disorientation. PMH of hypertension, hyperlipidemia, recent workup at OSH (7/15-7/22) included brain MRI and was diagnosed with glioblastoma, brought by daughter due to worsening over the past two weeks confusion/disorientation (started few weeks ago), urinary incontinence (started 5-6 weeks ago), now also with dyspnea on exertion (started ~ 2 weeks ago, worse since day prior to admission). CTH at [facility] showing 4.4 x 4.1 x 3.8 cm medial R frontal lobe mass c/w GBM.    OVERNIGHT EVENTS: Adm [facility] s/p R stereo biopsy, TIMOTHY x2, R crani for tumor debulking    Exam:     Vital Signs Last 24 Hrs  T(C): 36.2 (30 Jul 2022 05:00), Max: 36.8 (29 Jul 2022 16:00)  T(F): 97.1 (30 Jul 2022 05:00), Max: 98.3 (29 Jul 2022 17:00)  HR: 70 (30 Jul 2022 09:02) (60 - 89)  BP: 128/74 (30 Jul 2022 09:02) (128/74 - 144/80)  BP(mean): --  RR: 16 (30 Jul 2022 09:02) (12 - 23)  SpO2: 96% (30 Jul 2022 09:02) (92% - 100%)    Parameters below as of 30 Jul 2022 09:02  Patient On (Oxygen Delivery Method): nasal cannula

## 2022-07-30 NOTE — PROGRESS NOTE ADULT - PROBLEM SELECTOR PLAN 2
uptrending BUN/Cr. meets criteria for JANETH.  suspect pre-renal etiology from volume depletion  - trial of NS @ 75cc/hr for total 1L  - check bladder scan to r/o urinary retention  - monitor Cr on BMP

## 2022-07-30 NOTE — PROGRESS NOTE ADULT - SUBJECTIVE AND OBJECTIVE BOX
Subjective: Patient seen and examined. No new events except as noted.   Wife at bedside, pt sleeping but arousable.    REVIEW OF SYSTEMS:    CONSTITUTIONAL: + weakness, fevers or chills  EYES/ENT: No visual changes;  No vertigo or throat pain   NECK: No pain or stiffness  RESPIRATORY: No cough, wheezing, hemoptysis; No shortness of breath  CARDIOVASCULAR: No chest pain or palpitations  GASTROINTESTINAL: No abdominal or epigastric pain. No nausea, vomiting, or hematemesis; No diarrhea or constipation. No melena or hematochezia.  GENITOURINARY: No dysuria, frequency or hematuria  NEUROLOGICAL: No numbness or weakness  SKIN: No itching, burning, rashes, or lesions   All other review of systems is negative unless indicated above.    MEDICATIONS:  MEDICATIONS  (STANDING):  atorvastatin 80 milliGRAM(s) Oral at bedtime  dexAMETHasone  Injectable 6 milliGRAM(s) IV Push every 6 hours  dextrose 50% Injectable 25 Gram(s) IV Push once  dextrose 50% Injectable 12.5 Gram(s) IV Push once  glucagon  Injectable 1 milliGRAM(s) IntraMuscular once  insulin glargine Injectable (LANTUS) 8 Unit(s) SubCutaneous at bedtime  insulin lispro (ADMELOG) corrective regimen sliding scale   SubCutaneous Before meals and at bedtime  insulin lispro Injectable (ADMELOG) 4 Unit(s) SubCutaneous three times a day before meals  levETIRAcetam 500 milliGRAM(s) Oral every 12 hours  losartan 50 milliGRAM(s) Oral daily  pantoprazole    Tablet 40 milliGRAM(s) Oral before breakfast  polyethylene glycol 3350 17 Gram(s) Oral every 12 hours  senna 2 Tablet(s) Oral at bedtime    PHYSICAL EXAM:  T(C): 36.2 (07-30-22 @ 05:00), Max: 36.8 (07-29-22 @ 16:00)  HR: 70 (07-30-22 @ 09:02) (60 - 89)  BP: 128/74 (07-30-22 @ 09:02) (128/74 - 144/80)  RR: 16 (07-30-22 @ 09:02) (12 - 23)  SpO2: 96% (07-30-22 @ 09:02) (92% - 100%)  Wt(kg): --  I&O's Summary    29 Jul 2022 07:01 - 30 Jul 2022 07:00  --------------------------------------------------------  IN: 1030 mL / OUT: 0 mL / NET: 1030 mL    Appearance: NAD  HEENT: dry  oral mucosa, PERRL, EOMI	  Lymphatic: No lymphadenopathy , no edema  Cardiovascular: Normal S1 S2, No JVD, No murmurs , Peripheral pulses palpable 2+ bilaterally  Respiratory: Lungs clear to auscultation, normal effort 	  Gastrointestinal:  Soft, Non-tender, + BS	  Skin: No rashes, No ecchymoses, No cyanosis, warm to touch - incision dressing c/d/i  Musculoskeletal: Normal range of motion, normal strength  Psychiatry:  Calm, Sleepy   Ext: No edema    LABS:    CARDIAC MARKERS:                       15.6   17.47 )-----------( 101      ( 30 Jul 2022 05:58 )             48.7     07-30    143  |  110<H>  |  33<H>  ----------------------------<  232<H>  4.5   |  23  |  1.08    Ca    7.8<L>      30 Jul 2022 05:59  Phos  2.8     07-30  Mg     2.1     07-30    proBNP:   Lipid Profile:   HgA1c:   TSH:    TELEMETRY: 	    ECG:  	  RADIOLOGY:   DIAGNOSTIC TESTING:  [ ] Echocardiogram:  [ ]  Catheterization:  [ ] Stress Test:    OTHER:

## 2022-07-30 NOTE — PROGRESS NOTE ADULT - PROBLEM SELECTOR PLAN 2
PAT on tele  EKG reviewed - no acute ischemic STT changes   currently denies chest pain, SOB, palpitations  TTE - EF 75%, minimal MR, minimal TR  continue to monitor on tele

## 2022-07-30 NOTE — PROGRESS NOTE ADULT - ASSESSMENT
82 year old male with past medical history of hypertension, hyperlipidemia, recent workup at OSH (7/15-7/22) included brain MRI and was diagnosed with glioblastoma, brought by daughter due to worsening over the past two weeks confusion/disorientation (started few weeks ago), urinary incontinence (started 5-6 weeks ago), now also with dyspnea on exertion (started ~ 2 weeks ago, worse since day prior to admission). Now s/p R stereo biopsy, TIMOTHY x2, R crani for tumor debulking on 7/28/22. Transferred out of [facility] on 7/29.    PMD: Dr. Rust ([facility]) however, patient is no longer going there, and his daughter wants to switch PCP to a [facility] provider close by.

## 2022-07-30 NOTE — PROGRESS NOTE ADULT - PROBLEM SELECTOR PLAN 8
DVT ppx: resume chemical VTE ppx when clear form neurosurgery standpoint  Constipation - Last BM: 7/27. c/w bowel regimen. dulcolax suppository x 1 today  LE duplex neg 7/25    PT recs: outpatient PT

## 2022-07-30 NOTE — PROGRESS NOTE ADULT - ASSESSMENT
POD2 R stereo biopsy, TIMOTHY x2, R crani for tumor debulking    NEURO:  neuro checks q4  CT Head small intra-op bed hemorrhage   MRI brain wwo pending   decadron 6 mg q 6 hr for vasogenic edema  keppra for seizure ppx  Activity: [x] OOB as tolerated [] Bedrest [x] PT [x] OT [] PMNR    PULM:  RA     CV:  -150mmHg   HTN continue losartan 50mg  on home lasix hold for now doesnt look volume overloaded    RENAL:  IVL    GI:  Diet: diabetic diet   GI prophylaxis [] not indicated [x] PPI [] other:  Bowel regimen [] colace [x] senna [x] other: miralax     ENDO:   Goal euglycemia (-180)  hyperglycemic   lantus 5U, premeals, DREAD    HEME/ONC:  VTE prophylaxis: [x] SCDs [] chemoprophylaxis [x] hold chemoprophylaxis due to: small hem on CT head     ID:  [id number]

## 2022-07-31 NOTE — PROGRESS NOTE ADULT - PROBLEM SELECTOR PLAN 8
Medication reconciliation done per list provided by pharmacist by previous provider: Justus . Patient received 4 day supply of naproxen back in June, rest of the active and recent prescriptions have been entered in med review list.   List verified by daughter as well > patient is no longer on HCTZ, prednisolone ophthalmic solution.    Patient's daughter and wife are not sure why he was started on aspirin, patient stopped taking it about 6 months ago, but it is still getting prescribed by PCP

## 2022-07-31 NOTE — PROGRESS NOTE ADULT - PROBLEM SELECTOR PLAN 2
noted to have worsening thrombocytopenia that has been steadily downtrending since admission.  - repeat PLT count in blue top ordered - 79  - stopped loveonx  - ordered HIT Ab + CARRIE to r/o HIT  - SCDs in interim  - monitor PLT closely on CBC daily

## 2022-07-31 NOTE — PROGRESS NOTE ADULT - NSPROGADDITIONALINFOA_GEN_ALL_CORE
.  Ruby Flynn MD  Division of Hospital Medicine  [facility]   Spectra: [phone]    Plan discussed with patient and wife bedside with daughter Anh on speakerphone who provided [place] translation, and neurosurgery resident Pedro.
Case and plan discussed with ACP: Osiris    Patient's wife updated at bedside, his daughter updated on the phone.
.  Ruby Flynn MD  Division of Hospital Medicine  [facility]   Spectra: [phone]    Plan discussed with patient, daughter Anh (provided Czech translation) + wife bedside, and Neurosurgery NP Osiris.

## 2022-07-31 NOTE — PROGRESS NOTE ADULT - ASSESSMENT
HPI:  82M hx HTN, HLD, R-handed, Ukrainian speaking, p/w 2w confusion/disorientation and urinary incontinence. Recent hosp adm to [facility] where ca w/u was done. Report: MRI c/w GBM and neg CT CAP. CTH at [facility] showing 4.4 x 4.1 x 3.8 cm medial R frontal lobe mass c/w GBM. Exam: Sleepy, Ox1-2, PERRL, EOMI, no facial, no drift, COLES 5/5, SILT.  Now s/p R stereo biopsy, TIMOTHY x2, R crani for tumor debulking on 7/28/22. [facility].        PLAN:  Neuro:   - s/p R stereo biopsy, TIMOTHY x2, R crani for tumor debulking on 7/28/22  - c/w dexamethasone for cerebral edema tapered to 4q6  - c/w keppra for seizure ppx.  Respiratory:   - satting well on room air  CV:  - HTN- continue losartan  - on statin  Endocrine:   - DMT2- continue fingersticks with insulin coverage  Heme/Onc:             - thrombocytopenia- hold sq lovenox for now  - f/u HIT labs    DVT ppx:   Renal:   - Cr trending down  GI:    -no BM since admission- give SMOG enema  PT/OT:   - Home PT, needs stair for clearance to home    BCNX # [id number]

## 2022-07-31 NOTE — PROGRESS NOTE ADULT - SUBJECTIVE AND OBJECTIVE BOX
Ruby Flynn MD  Division of Hospital Medicine  [facility]  Spectra: [phone]      Patient is a 82y old  Male who presents with a chief complaint of Brain lesion (31 Jul 2022 10:26)      SUBJECTIVE / OVERNIGHT EVENTS: Daughter Anh bedside provided Tajik translation. no acute events overnight. had transient headache resolved now. reminded patient he has PRN available and should let staff know if having headache. no fever, chills, chest pain, sob, abd pain, n/v. still no BM, last flatus yesterday. no dysuria, voiding well with no issues. +throat irritation likely from prior intubation, no dysphagia/odynophagia.  ADDITIONAL REVIEW OF SYSTEMS:    MEDICATIONS  (STANDING):  artificial  tears Solution 1 Drop(s) Both EYES every 4 hours  atorvastatin 80 milliGRAM(s) Oral at bedtime  dexAMETHasone  Injectable 6 milliGRAM(s) IV Push every 6 hours  dextrose 50% Injectable 25 Gram(s) IV Push once  dextrose 50% Injectable 12.5 Gram(s) IV Push once  glucagon  Injectable 1 milliGRAM(s) IntraMuscular once  insulin glargine Injectable (LANTUS) 8 Unit(s) SubCutaneous at bedtime  insulin lispro (ADMELOG) corrective regimen sliding scale   SubCutaneous Before meals and at bedtime  insulin lispro Injectable (ADMELOG) 4 Unit(s) SubCutaneous three times a day before meals  levETIRAcetam 500 milliGRAM(s) Oral every 12 hours  losartan 50 milliGRAM(s) Oral daily  pantoprazole    Tablet 40 milliGRAM(s) Oral before breakfast  polyethylene glycol 3350 17 Gram(s) Oral every 12 hours  senna 2 Tablet(s) Oral at bedtime  sorbitol 70%/mineral oil/magnesium hydroxide/glycerin Enema 120 milliLiter(s) Rectal once    MEDICATIONS  (PRN):  acetaminophen     Tablet .. 650 milliGRAM(s) Oral every 6 hours PRN Temp greater or equal to 38C (100.4F), Mild Pain (1 - 3)  bisacodyl 5 milliGRAM(s) Oral every 12 hours PRN Constipation  ondansetron Injectable 4 milliGRAM(s) IV Push every 6 hours PRN Nausea and/or Vomiting  oxyCODONE    IR 5 milliGRAM(s) Oral every 4 hours PRN Moderate Pain (4 - 6)  oxyCODONE    IR 10 milliGRAM(s) Oral every 6 hours PRN Severe Pain (7 - 10)      CAPILLARY BLOOD GLUCOSE      POCT Blood Glucose.: 147 mg/dL (31 Jul 2022 07:31)  POCT Blood Glucose.: 203 mg/dL (30 Jul 2022 21:46)  POCT Blood Glucose.: 181 mg/dL (30 Jul 2022 16:21)  POCT Blood Glucose.: 167 mg/dL (30 Jul 2022 12:33)    I&O's Summary    30 Jul 2022 07:01  -  31 Jul 2022 07:00  --------------------------------------------------------  IN: 960 mL / OUT: 1000 mL / NET: -40 mL    31 Jul 2022 07:01  -  31 Jul 2022 11:16  --------------------------------------------------------  IN: 480 mL / OUT: 0 mL / NET: 480 mL        PHYSICAL EXAM:  Vital Signs Last 24 Hrs  T(C): 36.7 (31 Jul 2022 07:03), Max: 37.3 (30 Jul 2022 14:18)  T(F): 98.1 (31 Jul 2022 07:03), Max: 99.1 (30 Jul 2022 14:18)  HR: 62 (31 Jul 2022 07:03) (62 - 69)  BP: 150/81 (31 Jul 2022 07:03) (135/77 - 161/81)  BP(mean): --  RR: 18 (31 Jul 2022 07:03) (18 - 18)  SpO2: 94% (31 Jul 2022 07:03) (94% - 95%)    Parameters below as of 31 Jul 2022 07:03  Patient On (Oxygen Delivery Method): room air      CONSTITUTIONAL: NAD, well-developed, well-groomed  EYES: PERRLA; conjunctiva and sclera clear  ENMT: Moist oral mucosa, no pharyngeal injection or exudates; normal dentition  NECK: Supple, no palpable masses; no thyromegaly  RESPIRATORY: Normal respiratory effort; lungs are clear to auscultation bilaterally, no wheeze nor crackles  CARDIOVASCULAR: Regular rate and rhythm, normal S1 and S2, no murmur/rub/gallop; No lower extremity edema; Peripheral pulses are 2+ bilaterally  ABDOMEN: +mildly distended but soft, Nontender to palpation, normoactive bowel sounds  MUSCULOSKELETAL:  No clubbing or cyanosis of digits; no joint swelling or tenderness to palpation  PSYCH: A+O to person, place, and time; affect appropriate  NEUROLOGY: no gross sensory deficits, 5/5 strength throughout  SKIN: +R crani site c/d/i    LABS:                        14.7   15.10 )-----------( 82       ( 31 Jul 2022 06:39 )             45.1     07-31    141  |  109<H>  |  29<H>  ----------------------------<  183<H>  4.3   |  24  |  0.86    Ca    7.8<L>      31 Jul 2022 06:40  Phos  2.8     07-30  Mg     1.9     07-31        RADIOLOGY & ADDITIONAL TESTS:  Results Reviewed: worsening thrombocytopenia, hypocalcemia and hypomagnesemia repleted, Cr improved  Imaging Personally Reviewed:  Electrocardiogram Personally Reviewed:    COORDINATION OF CARE:  Care Discussed with Consultants/Other Providers [Y]: neurosurgery NHAN Newell  Prior or Outpatient Records Reviewed [Y/N]:

## 2022-07-31 NOTE — PROGRESS NOTE ADULT - SUBJECTIVE AND OBJECTIVE BOX
Subjective: Patient seen and examined. No new events except as noted.   out of ICU     REVIEW OF SYSTEMS:    CONSTITUTIONAL:+ weakness, fevers or chills  EYES/ENT: No visual changes;  No vertigo or throat pain   NECK: No pain or stiffness  RESPIRATORY: No cough, wheezing, hemoptysis; No shortness of breath  CARDIOVASCULAR: No chest pain or palpitations  GASTROINTESTINAL: No abdominal or epigastric pain. No nausea, vomiting, or hematemesis; No diarrhea or constipation. No melena or hematochezia.  GENITOURINARY: No dysuria, frequency or hematuria  NEUROLOGICAL: No numbness or weakness  SKIN: No itching, burning, rashes, or lesions   All other review of systems is negative unless indicated above.    MEDICATIONS:  MEDICATIONS  (STANDING):  artificial  tears Solution 1 Drop(s) Both EYES every 4 hours  atorvastatin 80 milliGRAM(s) Oral at bedtime  dexAMETHasone  Injectable 6 milliGRAM(s) IV Push every 6 hours  dextrose 50% Injectable 25 Gram(s) IV Push once  dextrose 50% Injectable 12.5 Gram(s) IV Push once  enoxaparin Injectable 40 milliGRAM(s) SubCutaneous <User Schedule>  glucagon  Injectable 1 milliGRAM(s) IntraMuscular once  insulin glargine Injectable (LANTUS) 8 Unit(s) SubCutaneous at bedtime  insulin lispro (ADMELOG) corrective regimen sliding scale   SubCutaneous Before meals and at bedtime  insulin lispro Injectable (ADMELOG) 4 Unit(s) SubCutaneous three times a day before meals  levETIRAcetam 500 milliGRAM(s) Oral every 12 hours  losartan 50 milliGRAM(s) Oral daily  magnesium sulfate  IVPB 1 Gram(s) IV Intermittent once  pantoprazole    Tablet 40 milliGRAM(s) Oral before breakfast  polyethylene glycol 3350 17 Gram(s) Oral every 12 hours  senna 2 Tablet(s) Oral at bedtime  sorbitol 70%/mineral oil/magnesium hydroxide/glycerin Enema 120 milliLiter(s) Rectal once      PHYSICAL EXAM:  T(C): 36.7 (07-31-22 @ 07:03), Max: 37.3 (07-30-22 @ 14:18)  HR: 62 (07-31-22 @ 07:03) (62 - 69)  BP: 150/81 (07-31-22 @ 07:03) (135/77 - 161/81)  RR: 18 (07-31-22 @ 07:03) (18 - 18)  SpO2: 94% (07-31-22 @ 07:03) (94% - 95%)  Wt(kg): --  I&O's Summary    30 Jul 2022 07:01  -  31 Jul 2022 07:00  --------------------------------------------------------  IN: 960 mL / OUT: 1000 mL / NET: -40 mL        Appearance: NAD  HEENT: dry  oral mucosa, PERRL, EOMI	  Lymphatic: No lymphadenopathy , no edema  Cardiovascular: regular  S1 S2, No JVD, No murmurs , Peripheral pulses palpable 2+ bilaterally  Respiratory: decreased bs  	  Gastrointestinal:  Soft, Non-tender, + BS	  Skin: No rashes, No ecchymoses, No cyanosis, warm to touch - incision dressing c/d/i  Musculoskeletal: Normal range of motion, normal strength  Psychiatry:  Calm, Sleepy   Ext: No edema      LABS:    CARDIAC MARKERS:                                14.7   15.10 )-----------( 82       ( 31 Jul 2022 06:39 )             45.1     07-31    141  |  109<H>  |  29<H>  ----------------------------<  183<H>  4.3   |  24  |  0.86    Ca    7.8<L>      31 Jul 2022 06:40  Phos  2.8     07-30  Mg     1.9     07-31      proBNP:   Lipid Profile:   HgA1c:   TSH:             TELEMETRY:   ECG:  	  RADIOLOGY:   DIAGNOSTIC TESTING:  [ ] Echocardiogram:  [ ]  Catheterization:  [ ] Stress Test:    OTHER:

## 2022-07-31 NOTE — PROGRESS NOTE ADULT - ASSESSMENT
82M hx HTN, HLD, R-handed, Finnish speaking, p/w 2w confusion/disorientation and urinary incontinence. Recent hosp adm to [facility] where ca w/u was done. Report: MRI c/w GBM and neg CT CAP. CTH at [facility] showing 4.4 x 4.1 x 3.8 cm medial R frontal lobe mass c/w GBM.

## 2022-07-31 NOTE — PROGRESS NOTE ADULT - PROBLEM SELECTOR PLAN 9
DVT ppx: SCDs  Constipation - Last BM: 7/27. c/w bowel regimen. SMOG enema x 1 today.  LE duplex neg 7/25    PT recs: outpatient PT

## 2022-07-31 NOTE — PROGRESS NOTE ADULT - ASSESSMENT
82 year old male with past medical history of hypertension, hyperlipidemia, recent workup at OSH (7/15-7/22) included brain MRI and was diagnosed with glioblastoma, brought by daughter due to worsening over the past two weeks confusion/disorientation (started few weeks ago), urinary incontinence (started 5-6 weeks ago), now also with dyspnea on exertion (started ~ 2 weeks ago, worse since day prior to admission). Now s/p R stereo biopsy, TIMOTHY x2, R crani for tumor debulking on 7/28/22. Transferred out of [facility] on 7/29.    PMD: Dr. Rust ([facility]) however, patient is no longer going there, and his daughter wants to switch PCP to a [facility] provider close by.

## 2022-07-31 NOTE — PROGRESS NOTE ADULT - SUBJECTIVE AND OBJECTIVE BOX
SUBJECTIVE: Pt seen and examined. Pt resting comfortably in bed. Pt prefers to have daughter translate in Amharic.     OVERNIGHT EVENTS: none    Vital Signs Last 24 Hrs  T(C): 36.7 (31 Jul 2022 07:03), Max: 37.3 (30 Jul 2022 14:18)  T(F): 98.1 (31 Jul 2022 07:03), Max: 99.1 (30 Jul 2022 14:18)  HR: 62 (31 Jul 2022 07:03) (62 - 69)  BP: 150/81 (31 Jul 2022 07:03) (135/77 - 161/81)  BP(mean): --  RR: 18 (31 Jul 2022 07:03) (18 - 18)  SpO2: 94% (31 Jul 2022 07:03) (94% - 95%)    Parameters below as of 31 Jul 2022 07:03  Patient On (Oxygen Delivery Method): room air        PHYSICAL EXAM:    General: No Acute Distress     Neurological: Awake, alert oriented to person, place and time, Following Commands, Speech Fluent, Moving all extremities 5/5, No Drift, Sensation to Light Touch Intact    Pulmonary: Clear to Auscultation, No Rales, No Rhonchi, No Wheezes     Cardiovascular: S1, S2, Regular Rate and Rhythm     Gastrointestinal: Soft, Nontender, Nondistended     Incision: cranial staples c/d/i    LABS:                        14.7   15.10 )-----------( 82       ( 31 Jul 2022 06:39 )             45.1    07-31    141  |  109<H>  |  29<H>  ----------------------------<  183<H>  4.3   |  24  |  0.86    Ca    7.8<L>      31 Jul 2022 06:40  Phos  2.8     07-30  Mg     1.9     07-31 07-30 @ 07:01  -  07-31 @ 07:00  --------------------------------------------------------  IN: 960 mL / OUT: 1000 mL / NET: -40 mL    07-31 @ 07:01 - 07-31 @ 12:31  --------------------------------------------------------  IN: 480 mL / OUT: 0 mL / NET: 480 mL        MEDICATIONS:  Antibiotics:    Neuro:  acetaminophen     Tablet .. 650 milliGRAM(s) Oral every 6 hours PRN Temp greater or equal to 38C (100.4F), Mild Pain (1 - 3)  levETIRAcetam 500 milliGRAM(s) Oral every 12 hours  ondansetron Injectable 4 milliGRAM(s) IV Push every 6 hours PRN Nausea and/or Vomiting  oxyCODONE    IR 5 milliGRAM(s) Oral every 4 hours PRN Moderate Pain (4 - 6)  oxyCODONE    IR 10 milliGRAM(s) Oral every 6 hours PRN Severe Pain (7 - 10)    Cardiac:  losartan 50 milliGRAM(s) Oral daily    Pulm:    GI/:  bisacodyl 5 milliGRAM(s) Oral every 12 hours PRN Constipation  pantoprazole    Tablet 40 milliGRAM(s) Oral before breakfast  polyethylene glycol 3350 17 Gram(s) Oral every 12 hours  senna 2 Tablet(s) Oral at bedtime    Other:   artificial  tears Solution 1 Drop(s) Both EYES every 4 hours  atorvastatin 80 milliGRAM(s) Oral at bedtime  benzocaine 15 mG/menthol 3.6 mG Lozenge 1 Lozenge Oral four times a day PRN Sore Throat  dexAMETHasone  Injectable 6 milliGRAM(s) IV Push every 6 hours  dextrose 50% Injectable 25 Gram(s) IV Push once  dextrose 50% Injectable 12.5 Gram(s) IV Push once  glucagon  Injectable 1 milliGRAM(s) IntraMuscular once  insulin glargine Injectable (LANTUS) 8 Unit(s) SubCutaneous at bedtime  insulin lispro (ADMELOG) corrective regimen sliding scale   SubCutaneous Before meals and at bedtime  insulin lispro Injectable (ADMELOG) 4 Unit(s) SubCutaneous three times a day before meals  sorbitol 70%/mineral oil/magnesium hydroxide/glycerin Enema 120 milliLiter(s) Rectal once    DIET: [] Regular [x] CCD [] Renal [] Puree [] Dysphagia [] Tube Feeds:     IMAGING:   < from: CT Head No Cont (07.29.22 @ 08:05) >  IMPRESSION:  Postoperative follow-up examination for tumor debulking and stereotactic   biopsy with interval decrease in right frontal pneumocephalus and   resolution of midline shift. Continued compression of anterior horn of   right lateral ventricle with some sulcal effacement. Areas of focal   hemorrhage seen within region previously occupied by tumor and within   operative tract stable in size compared to prior. No new areas of   hemorrhage.    < end of copied text >  < from: VA Duplex Lower Ext Vein Scan, Bilat (07.25.22 @ 21:55) >  IMPRESSION:  No evidence of deep venous thrombosis in either lower extremity.    < end of copied text >  < from: MR Brain Stereotactic w/wo IV Cont (07.30.22 @ 12:05) >    Since the prior examination the laser catheter has been removed. There is   heterogeneous enhancement midline the frontal lobe and genu of the corpus   callosum which is unchanged neoplasm and post operative changes. There is   similar splaying of the frontal horns. A small amountof postsurgical   material and hemorrhage is unchanged. No acute infarcts are seen.    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    < end of copied text >  < from: TTE with Doppler (w/Cont) (07.23.22 @ 15:23) >  Conclusions:  1. Normal mitral valve. Minimal mitral regurgitation.  2. Aortic valve not well visualized; appears to be a  calcified trileaflet valve with normal opening. No aortic  valve regurgitation seen.  3. Endocardial visualization enhanced with intravenous  injection of Ultrasonic Enhancing Agent (Lumason).  Hyperdynamic left ventricular systolic function.  4. Normal right ventricular size and function.  *** No previous Echo exam.    < end of copied text >

## 2022-08-01 NOTE — PROGRESS NOTE ADULT - ASSESSMENT
HPI:  82M hx HTN, HLD, R-handed, Syriac speaking, p/w 2w confusion/disorientation and urinary incontinence. Recent hosp adm to [facility] where ca w/u was done. Report: MRI c/w GBM and neg CT CAP. CTH at [facility] showing 4.4 x 4.1 x 3.8 cm medial R frontal lobe mass c/w GBM. Exam: Sleepy, Ox1-2, PERRL, EOMI, no facial, no drift, COLES 5/5, SILT.  Now s/p R stereo biopsy, TIMOTHY x2, R crani for tumor debulking on 7/28/22. [facility].        PLAN:  Neuro:   - s/p R stereo biopsy, TIMOTHY x2, R crani for tumor debulking on 7/28/22  - c/w dexamethasone for cerebral edema tapered to 4q8  - c/w keppra for seizure ppx.  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

## 2022-08-01 NOTE — DISCHARGE NOTE PROVIDER - CARE PROVIDERS DIRECT ADDRESSES
[email or web address]

## 2022-08-01 NOTE — PROGRESS NOTE ADULT - PROBLEM SELECTOR PLAN 8
Medication reconciliation done per list provided by pharmacist by previous provider: Justus . Patient received 4 day supply of naproxen back in June, rest of the active and recent prescriptions have been entered in med review list.   List previously verified by daughter as well > patient is no longer on HCTZ, prednisolone ophthalmic solution.    Patient's daughter and wife are not sure why he was started on aspirin, patient stopped taking it about 6 months ago, but it is still getting prescribed by PCP

## 2022-08-01 NOTE — PROGRESS NOTE ADULT - PROBLEM SELECTOR PLAN 9
DVT ppx: SCDs  Constipation resolved. last BM 7/31.  LE duplex neg 7/25    d/c planning home with outpatient PT when medically stable.

## 2022-08-01 NOTE — DISCHARGE NOTE PROVIDER - CARE PROVIDER_API CALL
Enrike Mcneil)  Neurosurgery  [address] [place] [address]  Phone: [phone]  Fax: [phone]  Follow Up Time:     Cindy Borja)  Neurology  [address] [place] [address]  Phone: [phone]  Fax: [phone]  Follow Up Time:     Mitesh Silva)  Hematology; HospicePalliative Medicine; Internal Medicine; Medical Oncology  [address] [place] [address]  Phone: [phone]  Fax: [phone]  Follow Up Time:

## 2022-08-01 NOTE — DISCHARGE NOTE PROVIDER - NSDCFUADDINST_GEN_ALL_CORE_FT
Return to ER if develops fevers, bleeding , wound drainage, uncontrolled pain, weakness of extremities, lethargy or sluggishness..  Follow up pathology results at Dr Norton office . Follow up serotonin assay results   Do not take Aspirin Ibuprofen  (Motrin)  , Naproxen ( aleve)  or Meloxicam  for pain.

## 2022-08-01 NOTE — DISCHARGE NOTE PROVIDER - NSDCFUSCHEDAPPT_GEN_ALL_CORE_FT
Enrike Mcneil [facility]  NEUROSURG [address] [place] [address]  Scheduled Appointment: 08/10/2022

## 2022-08-01 NOTE — PROGRESS NOTE ADULT - ASSESSMENT
82 year old male with past medical history of hypertension, hyperlipidemia, recent workup at OSH (7/15-7/22) included brain MRI and was diagnosed with glioblastoma, brought by daughter due to worsening confusion/disorientation, urinary incontinence, s/p R stereo biopsy, TIMOTHY x2, R crani for tumor debulking on 7/28/22. Transferred out of [facility] on 7/29.    PMD: Dr. Rust ([facility]) however, patient is no longer going there, and his daughter wants to switch PCP to a [facility] provider close by.

## 2022-08-01 NOTE — PROGRESS NOTE ADULT - SUBJECTIVE AND OBJECTIVE BOX
Subjective: Patient seen and examined. No new events except as noted.     REVIEW OF SYSTEMS:    CONSTITUTIONAL: + weakness, fevers or chills  EYES/ENT: No visual changes;  No vertigo or throat pain   NECK: No pain or stiffness  RESPIRATORY: No cough, wheezing, hemoptysis; No shortness of breath  CARDIOVASCULAR: No chest pain or palpitations  GASTROINTESTINAL: No abdominal or epigastric pain. No nausea, vomiting, or hematemesis; No diarrhea or constipation. No melena or hematochezia.  GENITOURINARY: No dysuria, frequency or hematuria  NEUROLOGICAL: No numbness or weakness  SKIN: No itching, burning, rashes, or lesions   All other review of systems is negative unless indicated above.    MEDICATIONS:  MEDICATIONS  (STANDING):  artificial  tears Solution 1 Drop(s) Both EYES every 4 hours  atorvastatin 80 milliGRAM(s) Oral at bedtime  dexAMETHasone  Injectable 4 milliGRAM(s) IV Push every 6 hours  dextrose 50% Injectable 25 Gram(s) IV Push once  dextrose 50% Injectable 12.5 Gram(s) IV Push once  glucagon  Injectable 1 milliGRAM(s) IntraMuscular once  insulin glargine Injectable (LANTUS) 8 Unit(s) SubCutaneous at bedtime  insulin lispro (ADMELOG) corrective regimen sliding scale   SubCutaneous Before meals and at bedtime  insulin lispro Injectable (ADMELOG) 4 Unit(s) SubCutaneous three times a day before meals  levETIRAcetam 500 milliGRAM(s) Oral every 12 hours  losartan 50 milliGRAM(s) Oral daily  pantoprazole    Tablet 40 milliGRAM(s) Oral before breakfast  polyethylene glycol 3350 17 Gram(s) Oral every 12 hours  senna 2 Tablet(s) Oral at bedtime      PHYSICAL EXAM:  T(C): 36.6 (08-01-22 @ 08:00), Max: 37 (07-31-22 @ 13:15)  HR: 95 (08-01-22 @ 08:00) (60 - 95)  BP: 123/70 (08-01-22 @ 08:00) (117/69 - 146/80)  RR: 16 (08-01-22 @ 08:00) (16 - 18)  SpO2: 95% (08-01-22 @ 08:00) (95% - 96%)  Wt(kg): --  I&O's Summary    31 Jul 2022 07:01  -  01 Aug 2022 07:00  --------------------------------------------------------  IN: 1280 mL / OUT: 1200 mL / NET: 80 mL            Appearance: NAD  HEENT: dry  oral mucosa, PERRL, EOMI	  Lymphatic: No lymphadenopathy , no edema  Cardiovascular: regular  S1 S2, No JVD, No murmurs , Peripheral pulses palpable 2+ bilaterally  Respiratory: decreased bs  	  Gastrointestinal:  Soft, Non-tender, + BS	  Skin: No rashes, No ecchymoses, No cyanosis, warm to touch - incision dressing c/d/i  Musculoskeletal: Normal range of motion, normal strength  Psychiatry:  Calm, Sleepy   Ext: No edema      LABS:    CARDIAC MARKERS:                                15.0   11.79 )-----------( 90       ( 01 Aug 2022 07:20 )             45.5     08-01    138  |  108  |  30<H>  ----------------------------<  166<H>  4.3   |  21<L>  |  0.92    Ca    8.2<L>      01 Aug 2022 07:17  Mg     1.9     07-31      proBNP:   Lipid Profile:   HgA1c:   TSH:             TELEMETRY: 	    ECG:  	  RADIOLOGY:   DIAGNOSTIC TESTING:  [ ] Echocardiogram:  [ ]  Catheterization:  [ ] Stress Test:    OTHER:

## 2022-08-01 NOTE — PROGRESS NOTE ADULT - SUBJECTIVE AND OBJECTIVE BOX
[facility] Division of Hospital Medicine  Anjana Webb MD  Available via MS Teams  Spectra [phone]    SUBJECTIVE / OVERNIGHT EVENTS: patient seen and examined this morning. patient's daughter (Faith) helped to translate. Patient with intermittent headache and sore throat from recent intubation but otherwise doing ok. he was able to ambulate yesterday. denies any difficulty with urination or bowel movement.     ADDITIONAL REVIEW OF SYSTEMS:    MEDICATIONS  (STANDING):  artificial  tears Solution 1 Drop(s) Both EYES every 4 hours  atorvastatin 80 milliGRAM(s) Oral at bedtime  benzocaine 15 mG/menthol 3.6 mG Lozenge 1 Lozenge Oral three times a day  dexAMETHasone  Injectable 4 milliGRAM(s) IV Push every 6 hours  dextrose 50% Injectable 25 Gram(s) IV Push once  dextrose 50% Injectable 12.5 Gram(s) IV Push once  glucagon  Injectable 1 milliGRAM(s) IntraMuscular once  insulin glargine Injectable (LANTUS) 8 Unit(s) SubCutaneous at bedtime  insulin lispro (ADMELOG) corrective regimen sliding scale   SubCutaneous Before meals and at bedtime  insulin lispro Injectable (ADMELOG) 4 Unit(s) SubCutaneous three times a day before meals  levETIRAcetam 500 milliGRAM(s) Oral every 12 hours  losartan 50 milliGRAM(s) Oral daily  pantoprazole    Tablet 40 milliGRAM(s) Oral before breakfast  polyethylene glycol 3350 17 Gram(s) Oral every 12 hours  senna 2 Tablet(s) Oral at bedtime    MEDICATIONS  (PRN):  acetaminophen     Tablet .. 650 milliGRAM(s) Oral every 6 hours PRN Temp greater or equal to 38C (100.4F), Mild Pain (1 - 3)  bisacodyl 5 milliGRAM(s) Oral every 12 hours PRN Constipation  ondansetron Injectable 4 milliGRAM(s) IV Push every 6 hours PRN Nausea and/or Vomiting  oxyCODONE    IR 5 milliGRAM(s) Oral every 4 hours PRN Moderate Pain (4 - 6)  oxyCODONE    IR 10 milliGRAM(s) Oral every 6 hours PRN Severe Pain (7 - 10)      I&O's Summary    31 Jul 2022 07:01  -  01 Aug 2022 07:00  --------------------------------------------------------  IN: 1280 mL / OUT: 1200 mL / NET: 80 mL        PHYSICAL EXAM:  Vital Signs Last 24 Hrs  T(C): 36.6 (01 Aug 2022 08:00), Max: 37 (31 Jul 2022 13:15)  T(F): 97.9 (01 Aug 2022 08:00), Max: 98.6 (31 Jul 2022 13:15)  HR: 95 (01 Aug 2022 08:00) (60 - 95)  BP: 123/70 (01 Aug 2022 08:00) (117/69 - 146/80)  BP(mean): --  RR: 16 (01 Aug 2022 08:00) (16 - 18)  SpO2: 95% (01 Aug 2022 08:00) (95% - 96%)    Parameters below as of 01 Aug 2022 08:00  Patient On (Oxygen Delivery Method): room air      CONSTITUTIONAL: NAD, well-groomed  EYES: conjunctiva and sclera clear  NECK: Supple, no palpable masses; no thyromegaly  RESPIRATORY: Normal respiratory effort; lungs are clear to auscultation bilaterally  CARDIOVASCULAR: normal S1 and S2, no murmur/rub/gallop; No lower extremity edema  ABDOMEN: Nontender to palpation, normoactive bowel sounds, no rebound/guarding; No hepatosplenomegaly  MUSCULOSKELETAL:  no clubbing or cyanosis of digits; no joint swelling or tenderness to palpation  PSYCH: A+O to person, place, and time; affect appropriate  NEUROLOGY: moves all extremities, follows commands, no gross sensory deficits   SKIN: No rashes; no palpable lesions    LABS:                        15.0   11.79 )-----------( 90       ( 01 Aug 2022 07:20 )             45.5     08-01    138  |  108  |  30<H>  ----------------------------<  166<H>  4.3   |  21<L>  |  0.92    Ca    8.2<L>      01 Aug 2022 07:17  Mg     1.9     07-31                COVID-19 PCR: Ovidiote (27 Jul 2022 06:29)  SARS-CoV-2: Estelita (22 Jul 2022 23:51)          COMMUNICATION:  Care Discussed with Consultants/Other Providers and Details of Discussion: neurosurgery PA(Osiris)  Discussions with Patient/Family: yes

## 2022-08-01 NOTE — DISCHARGE NOTE PROVIDER - NSDCCPCAREPLAN_GEN_ALL_CORE_FT
PRINCIPAL DISCHARGE DIAGNOSIS  Diagnosis: Brain mass  Assessment and Plan of Treatment: Found to have Rt frontal brain mass found on imaging at OSH. P/w confusion and unsteady gait.   S/p Rt TIMOTHY and stereo biopsy followed by Rt craniotomy for resection on 7/28   Please follow up with Neurosurgeon 1-2 weeks after discharge   Please follow up with oncology 1-2 weeks after discharge          SECONDARY DISCHARGE DIAGNOSES  Diagnosis: Hypertension  Assessment and Plan of Treatment: Please follow up wt PCP 1-2 weeks after discharge    Diagnosis: Sinus tachycardia seen on cardiac monitor  Assessment and Plan of Treatment: Please follow up with PCP 1-2 weeks after discharge     PRINCIPAL DISCHARGE DIAGNOSIS  Diagnosis: Brain mass  Assessment and Plan of Treatment: Found to have Rt frontal brain mass found on imaging at OSH. P/w confusion and unsteady gait.   S/p Right Laser Interstitial Thermal Therapy and stereo biopsy followed by Right  craniotomy for resection on 7/28/22   Keep Incision Clean and Dry. May shower  pat dry after. no creams or lotions on incision. No immersion baths..  No strenous activity. No heavy lifting. Do not return to work until cleared by physician. No driving until cleared by physician.  Please follow up with Neurosurgeon 1 weeks after discharge   Please follow up with Neuro oncology 1-2 weeks after discharge          SECONDARY DISCHARGE DIAGNOSES  Diagnosis: Hypertension  Assessment and Plan of Treatment: Please follow up [facility] PCP 1-2 weeks after discharge. 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 if serotonin assay negative Dr Mcneil or Hematologist to stop Arixtra. If positive to be switched to full dose anticoagulation 8/11/22    Diagnosis: Type II diabetes mellitus, well controlled  Assessment and Plan of Treatment: Now hyperglycemia on steroids . Will improve as steroids tapered   Check premeal blood sugar twice daily. If blood sugars > 250 mg/dl consecutively . Call primary care   Take metformin twice daily starting 8/4/22

## 2022-08-01 NOTE — CONSULT NOTE ADULT - SUBJECTIVE AND OBJECTIVE BOX
HPI:   82 year old male with past medical history of hypertension, hyperlipidemia, recent workup at outside hospital(7/15-7/22) with brain MRI showing findings consistent with glioblastoma, brought by daughter due to worsening confusion/disorientation, urinary incontinence. He received  R stereo biopsy, Laser interstitial thermal therapy x2, R craniectomy for tumor debulking on 7/28/22.  Patient has been on DVT ppx with lovenox since 7/23 which was stopped on 7/30 due to concerns for HIT given that platelets were downtrending. 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      ICU Vital Signs Last 24 Hrs  T(C): 36.8 (22 Jul 2022 17:01), Max: 36.8 (22 Jul 2022 17:01)  T(F): 98.2 (22 Jul 2022 17:01), Max: 98.2 (22 Jul 2022 17:01)  HR: 51 (23 Jul 2022 01:28) (51 - 78)  BP: 175/73 (23 Jul 2022 01:28) (132/76 - 175/73)  BP(mean): --  ABP: --  ABP(mean): --  RR: 14 (23 Jul 2022 01:28) (14 - 20)  SpO2: 95% (23 Jul 2022 01:28) (95% - 96%)    O2 Parameters below as of 23 Jul 2022 01:28  Patient On (Oxygen Delivery Method): room air                          15.9   13.23 )-----------( 146      ( 23 Jul 2022 00:21 )             48.4   07-23    143  |  106  |  32<H>  ----------------------------<  175<H>  4.7   |  28  |  1.12    Ca    8.6      23 Jul 2022 00:21    TPro  6.0  /  Alb  3.2<L>  /  TBili  0.4  /  DBili  x   /  AST  25  /  ALT  38  /  AlkPhos  77  07-23       (23 Jul 2022 01:34)      PAST MEDICAL & SURGICAL HISTORY:  Brain tumor          Allergies    heparin containing compounds (Other)    Intolerances        MEDICATIONS  (STANDING):  artificial  tears Solution 1 Drop(s) Both EYES every 4 hours  atorvastatin 80 milliGRAM(s) Oral at bedtime  benzocaine 15 mG/menthol 3.6 mG Lozenge 1 Lozenge Oral three times a day  dexAMETHasone     Tablet 4 milliGRAM(s) Oral every 8 hours  dextrose 50% Injectable 25 Gram(s) IV Push once  dextrose 50% Injectable 12.5 Gram(s) IV Push once  glucagon  Injectable 1 milliGRAM(s) IntraMuscular once  insulin glargine Injectable (LANTUS) 8 Unit(s) SubCutaneous at bedtime  insulin lispro (ADMELOG) corrective regimen sliding scale   SubCutaneous Before meals and at bedtime  insulin lispro Injectable (ADMELOG) 4 Unit(s) SubCutaneous three times a day before meals  levETIRAcetam 500 milliGRAM(s) Oral every 12 hours  losartan 50 milliGRAM(s) Oral daily  pantoprazole    Tablet 40 milliGRAM(s) Oral before breakfast  polyethylene glycol 3350 17 Gram(s) Oral every 12 hours  senna 2 Tablet(s) Oral at bedtime    MEDICATIONS  (PRN):  acetaminophen     Tablet .. 650 milliGRAM(s) Oral every 6 hours PRN Temp greater or equal to 38C (100.4F), Mild Pain (1 - 3)  bisacodyl 5 milliGRAM(s) Oral every 12 hours PRN Constipation  ondansetron Injectable 4 milliGRAM(s) IV Push every 6 hours PRN Nausea and/or Vomiting  oxyCODONE    IR 5 milliGRAM(s) Oral every 4 hours PRN Moderate Pain (4 - 6)  oxyCODONE    IR 10 milliGRAM(s) Oral every 6 hours PRN Severe Pain (7 - 10)      FAMILY HISTORY:      SOCIAL HISTORY: No EtOH, no tobacco    REVIEW OF SYSTEMS:    CONSTITUTIONAL: No weakness, fevers or chills  EYES/ENT: No visual changes;  No vertigo or throat pain   NECK: No pain or stiffness  RESPIRATORY: No cough, wheezing, hemoptysis; No shortness of breath  CARDIOVASCULAR: No chest pain or palpitations  GASTROINTESTINAL: No abdominal or epigastric pain. No nausea, vomiting, or hematemesis; No diarrhea or constipation. No melena or hematochezia.  GENITOURINARY: No dysuria, frequency or hematuria  NEUROLOGICAL: No numbness or weakness  SKIN: No itching, burning, rashes, or lesions   All other review of systems is negative unless indicated above.        T(F): 98 (08-01-22 @ 12:00), Max: 98.5 (07-31-22 @ 21:15)  HR: 76 (08-01-22 @ 12:00)  BP: 112/78 (08-01-22 @ 12:00)  RR: 16 (08-01-22 @ 12:00)  SpO2: 98% (08-01-22 @ 12:00)  Wt(kg): --    GENERAL: NAD, well-developed  HEAD:  Atraumatic, Staples over the right scalp  EYES: EOMI, PERRLA, conjunctiva and sclera clear  NECK: Supple, No JVD  CHEST/LUNG: Clear to auscultation bilaterally; No wheeze  HEART: Regular rate and rhythm; No murmurs, rubs, or gallops  ABDOMEN: Soft, Nontender, Nondistended; Bowel sounds present  EXTREMITIES:  2+ Peripheral Pulses, No clubbing, cyanosis, or edema, strength 4/5 in both UE, 3/5 in LEs   NEUROLOGY: non-focal  SKIN: No rashes or lesions                          15.0   11.79 )-----------( 90       ( 01 Aug 2022 07:20 )             45.5       08-01    138  |  108  |  30<H>  ----------------------------<  166<H>  4.3   |  21<L>  |  0.92    Ca    8.2<L>      01 Aug 2022 07:17  Mg     1.9     07-31                Clean Catch Clean Catch (Midstream)  07-22 @ 23:52   <10,000 CFU/mL Normal Urogenital Yashira  --  --

## 2022-08-01 NOTE — PROGRESS NOTE ADULT - PROBLEM SELECTOR PLAN 2
noted to have worsening thrombocytopenia since admission  - 4T score: 4 points (intermediate probability for HIT)  - stopped loveonx  - HIT Ab +, f/u [facility]   - check repeat LE duplex to r/o DVT  - heme consult regarding further management and AC (patient is POD#4 s/p crani)

## 2022-08-01 NOTE — PROGRESS NOTE ADULT - ASSESSMENT
82M hx HTN, HLD, R-handed, Fijian speaking, p/w 2w confusion/disorientation and urinary incontinence. Recent hosp adm to [facility] where ca w/u was done. Report: MRI c/w GBM and neg CT CAP. CTH at [facility] showing 4.4 x 4.1 x 3.8 cm medial R frontal lobe mass c/w GBM.

## 2022-08-01 NOTE — DISCHARGE NOTE PROVIDER - NSDCMRMEDTOKEN_GEN_ALL_CORE_FT
ASPIRIN 81MG EC LOW DOSE TABLETS: TAKE 1 TABLET BY MOUTH EVERY DAY  cyanocobalamin 1000 mcg oral tablet: 1 tab(s) orally once a day  FUROSEMIDE 20MG TABLETS: TAKE 1/2 TABLET BY MOUTH DAILY x leg swelling   LOSARTAN 25MG TABLETS:   ROSUVASTATIN 40MG TABLETS:    acetaminophen 500 mg oral tablet: 2 tab(s) orally every 6 hours, As Needed - for headache  alcohol swabs : Apply topically to affected area 4 times a day   Artificial Tears ophthalmic solution: 1 drop(s) to each affected eye 2 times a day   bisacodyl 5 mg oral delayed release tablet: 1 tab(s) orally every 12 hours, As needed, Constipation  dexamethasone 4 mg oral tablet: 1 tab(s) orally every 12 hours  until 8/6/22  then 1 tab am  until follow up with Dr Mcneil on 8/10/22   fondaparinux 2.5 mg/0.5 mL subcutaneous solution: 2.5 milligram(s) subcutaneously once a day   glucometer (per patient&#x27;s insurance): Test blood sugars four times a day. Dispense #1 glucometer.  lancets: 1 application subcutaneously 4 times a day   levETIRAcetam 500 mg oral tablet: 1 tab(s) orally every 12 hours  losartan 25 mg oral tablet: 1 tab(s) orally once a day  metFORMIN 500 mg oral tablet: 1 tab(s) orally 2 times a day (with meals) for one week. If tolerating on day 8, increase to two tablets (1000 mG) twice a day (with meals).  pantoprazole 40 mg oral delayed release tablet: 1 tab(s) orally once a day (before a meal)  rosuvastatin 40 mg oral tablet: 1 tab(s) orally once a day (at bedtime)  test strips (per patient&#x27;s insurance): 1 application subcutaneously 4 times a day. ** Compatible with patient&#x27;s glucometer **

## 2022-08-01 NOTE — CONSULT NOTE ADULT - ASSESSMENT
82 year old male with past medical history of hypertension, hyperlipidemia, recent workup at outside hospital(7/15-7/22) with brain MRI showing findings consistent with glioblastoma, brought by daughter due to worsening confusion/disorientation, urinary incontinence. He received  R stereo biopsy, Laser interstitial thermal therapy x2, R craniectomy for tumor debulking on 7/28/22.  Patient has been on DVT ppx with lovenox since 7/23 which was stopped on 7/30 due to concerns for HIT given that platelets were downtrending. 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  - s/p R stereo biopsy, TIMOTHY x2, R crani for tumor debulking on 7/28/22  - If ok from neuroSx standpoint, would recommend starting eliquis 5mg bid for now, while awaiting CARRIE. Lower dose recommended given recent neuro Sx  - If serotonin release assay is negative, patient can be off eliquis completely 82 year old male with past medical history of hypertension, hyperlipidemia, recent workup at outside hospital(7/15-7/22) with brain MRI showing findings consistent with glioblastoma, brought by daughter due to worsening confusion/disorientation, urinary incontinence. He received  R stereo biopsy, Laser interstitial thermal therapy x2, R craniectomy for tumor debulking on 7/28/22.  Patient has been on DVT ppx with lovenox since 7/23 which was stopped on 7/30 due to concerns for HIT given that platelets were downtrending. 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  - s/p R stereo biopsy, TIMOTHY x2, R crani for tumor debulking on 7/28/22  - Given that patients with HIT are at risk for thrombosis, if ok from neuroSx standpoint, would recommend starting eliquis 5mg bid for now, while awaiting CARRIE. Lower dose recommended given recent neuro Sx.  - If serotonin release assay is negative, patient can be off eliquis completely 82 year old male with past medical history of hypertension, hyperlipidemia, recent workup at outside hospital(7/15-7/22) with brain MRI showing findings consistent with glioblastoma, brought by daughter due to worsening confusion/disorientation, urinary incontinence. He received  R stereo biopsy, Laser interstitial thermal therapy x2, R craniectomy for tumor debulking on 7/28/22.  Patient has been on DVT ppx with lovenox since 7/23 which was stopped on 7/30 due to concerns for HIT given that platelets were downtrending. 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  - s/p R stereo biopsy, TIMOTHY x2, R crani for tumor debulking on 7/28/22  - Given that patients with HIT are at risk for thrombosis, if ok from neuroSx standpoint, would recommend starting fondaparinux or eliquis 5mg bid for now, while awaiting CARRIE. Lower dose recommended given recent neuro Sx.  - If serotonin release assay is negative, patient can be off eliquis completely 82 year old male with past medical history of hypertension, hyperlipidemia, recent workup at outside hospital(7/15-7/22) with brain MRI showing findings consistent with glioblastoma, brought by daughter due to worsening confusion/disorientation, urinary incontinence. He received  R stereo biopsy, Laser interstitial thermal therapy x2, R craniectomy for tumor debulking on 7/28/22.  Patient has been on DVT ppx with lovenox since 7/23 which was stopped on 7/30 due to concerns for HIT given that platelets were downtrending. 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  - s/p R stereo biopsy, TIMOTHY x2, R crani for tumor debulking on 7/28/22  - Given that patients with HIT are at risk for thrombosis, if ok from neuroSx standpoint, would recommend starting fondaparinux 7.5 or eliquis 5mg bid for now, while awaiting CARRIE. Lower dose recommended given recent neuro Sx.  - If serotonin release assay is negative, patient can be off eliquis completely

## 2022-08-01 NOTE — CONSULT NOTE ADULT - ATTENDING COMMENTS
82-yr-old man with new diagnosis of GBM s/p excision was on DVT ppx with LMWH developed progressive thrombocytopenia prompting PF4 lab that returned mildly positive. LMWH has been discontinued. Would suggest to maintain the patient on Fondaparinux until CARRIE results. If CARRIE positive, patient will need 30-90 days of AC with any DOAC.  May follow up at the Hematology outpatient clinic.

## 2022-08-01 NOTE — DISCHARGE NOTE PROVIDER - HOSPITAL COURSE
82 year old male with PMHx of HTN, HLD, preDM, dx Hep C (treated 2003), presented after being recently diagnosed with brain tumor at [facility], discharged the   morning of admission. Pt presented with episodes of dyspnea on exertion and increased confusion since arriving home from OSH. Brought in by daughter who stated that she felt patient was unsafe at home with increasing confusion, worried he will fall, is unsteady. Has also been evaluated by Dr. Enrike Mcneil ([facility] [place]) and requested to transition care to [facility]. Pt had negative CT C/A/P at OSH. PT was seen by hospitalist team and cardiology for tachycardia and monitored on telemetry. On 7/28 patient underwent Rt TIMOTHY and stereo biopsy then Rt craniotomy for resection.  Pt was monitored in the ICU postoperatively.  Postop imaging was reviewed by the neurosurgical team. 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 Also seen by hematology and oncology. Eventually transferred to the floor for further discharge planning.  Pt was seen by physical and occupational therapy and was recommended to go home with home PT. 82 year old male with PMHx of HTN, HLD, preDM, dx Hep C (treated 2003), presented after being recently diagnosed with brain tumor at [facility], discharged the   morning of admission. Pt presented with episodes of dyspnea on exertion and increased confusion since arriving home from OSH. Brought in by daughter who stated that she felt patient was unsafe at home with increasing confusion, worried he will fall, is unsteady. Has also been evaluated by Dr. Enrike Mcneil ([facility] [place]) and requested to transition care to [facility]. Pt had negative CT C/A/P at OSH. PT was seen by hospitalist team and cardiology for tachycardia and monitored on telemetry. On 7/28 patient underwent Rt TIMOTHY and stereo biopsy then Rt craniotomy for resection.  Pt was monitored in the ICU postoperatively.  Postop imaging was reviewed by the neurosurgical team. 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 Also seen by hematology . Heparin induced platelet antibody positive . Serotonin assay send 7/31/22 pending results . On Arixtra prophylactic dose while awaiting results. Medicine following while inpatient . On slow steroid taper. Hyperglycemia related to steroids/ Type 2 DM . On low dose insulin . Discharged on metformin.  Patient  was seen by physical and occupational therapy and was recommended to go home with home PT. Discharged to rehab in stable condition.

## 2022-08-01 NOTE — PROGRESS NOTE ADULT - SUBJECTIVE AND OBJECTIVE BOX
SUBJECTIVE: Pt seen and examined, resting in bed, wife at bedside. Pt and wife request daughter Caitlin to be called on the phone to translate.    OVERNIGHT EVENTS: HIT labs positive    Vital Signs Last 24 Hrs  T(C): 36.6 (01 Aug 2022 08:00), Max: 36.9 (31 Jul 2022 21:15)  T(F): 97.9 (01 Aug 2022 08:00), Max: 98.5 (31 Jul 2022 21:15)  HR: 95 (01 Aug 2022 08:00) (60 - 95)  BP: 123/70 (01 Aug 2022 08:00) (117/69 - 146/80)  BP(mean): --  RR: 16 (01 Aug 2022 08:00) (16 - 18)  SpO2: 95% (01 Aug 2022 08:00) (95% - 96%)    Parameters below as of 01 Aug 2022 08:00  Patient On (Oxygen Delivery Method): room air        PHYSICAL EXAM:    General: No Acute Distress     Neurological: Awake, alert oriented to person, place and time, Following Commands, Speech Fluent, Moving all extremities 5/5, No Drift, Sensation to Light Touch Intact    Pulmonary: Clear to Auscultation, No Rales, No Rhonchi, No Wheezes     Cardiovascular: S1, S2, Regular Rate and Rhythm     Gastrointestinal: Soft, Nontender, Nondistended     Incision: cranial staples c/d/i    LABS:                        15.0   11.79 )-----------( 90       ( 01 Aug 2022 07:20 )             45.5    08-01    138  |  108  |  30<H>  ----------------------------<  166<H>  4.3   |  21<L>  |  0.92    Ca    8.2<L>      01 Aug 2022 07:17  Mg     1.9     07-31 07-31 @ 07:01  -  08-01 @ 07:00  --------------------------------------------------------  IN: 1280 mL / OUT: 1200 mL / NET: 80 mL      DRAINS:     MEDICATIONS:  Antibiotics:    Neuro:  acetaminophen     Tablet .. 650 milliGRAM(s) Oral every 6 hours PRN Temp greater or equal to 38C (100.4F), Mild Pain (1 - 3)  levETIRAcetam 500 milliGRAM(s) Oral every 12 hours  ondansetron Injectable 4 milliGRAM(s) IV Push every 6 hours PRN Nausea and/or Vomiting  oxyCODONE    IR 5 milliGRAM(s) Oral every 4 hours PRN Moderate Pain (4 - 6)  oxyCODONE    IR 10 milliGRAM(s) Oral every 6 hours PRN Severe Pain (7 - 10)    Cardiac:  losartan 50 milliGRAM(s) Oral daily    Pulm:    GI/:  bisacodyl 5 milliGRAM(s) Oral every 12 hours PRN Constipation  pantoprazole    Tablet 40 milliGRAM(s) Oral before breakfast  polyethylene glycol 3350 17 Gram(s) Oral every 12 hours  senna 2 Tablet(s) Oral at bedtime    Other:   artificial  tears Solution 1 Drop(s) Both EYES every 4 hours  atorvastatin 80 milliGRAM(s) Oral at bedtime  benzocaine 15 mG/menthol 3.6 mG Lozenge 1 Lozenge Oral three times a day  dexAMETHasone  Injectable 4 milliGRAM(s) IV Push every 6 hours  dextrose 50% Injectable 25 Gram(s) IV Push once  dextrose 50% Injectable 12.5 Gram(s) IV Push once  glucagon  Injectable 1 milliGRAM(s) IntraMuscular once  insulin glargine Injectable (LANTUS) 8 Unit(s) SubCutaneous at bedtime  insulin lispro (ADMELOG) corrective regimen sliding scale   SubCutaneous Before meals and at bedtime  insulin lispro Injectable (ADMELOG) 4 Unit(s) SubCutaneous three times a day before meals    DIET: [] Regular [x] CCD [] Renal [] Puree [] Dysphagia [] Tube Feeds:     IMAGING:   < from: CT Head No Cont (07.29.22 @ 08:05) >  IMPRESSION:  Postoperative follow-up examination for tumor debulking and stereotactic   biopsy with interval decrease in right frontal pneumocephalus and   resolution of midline shift. Continued compression of anterior horn of   right lateral ventricle with some sulcal effacement. Areas of focal   hemorrhage seen within region previously occupied by tumor and within   operative tract stable in size compared to prior. No new areas of   hemorrhage.    < end of copied text >  < from: VA Duplex Lower Ext Vein Scan, Bilat (07.25.22 @ 21:55) >  IMPRESSION:  No evidence of deep venous thrombosis in either lower extremity.    < end of copied text >  < from: MR Brain Stereotactic w/wo IV Cont (07.30.22 @ 12:05) >    Since the prior examination the laser catheter has been removed. There is   heterogeneous enhancement midline the frontal lobe and genu of the corpus   callosum which is unchanged neoplasm and post operative changes. There is   similar splaying of the frontal horns. A small amountof postsurgical   material and hemorrhage is unchanged. No acute infarcts are seen.    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    < end of copied text >  < from: TTE with Doppler (w/Cont) (07.23.22 @ 15:23) >  Conclusions:  1. Normal mitral valve. Minimal mitral regurgitation.  2. Aortic valve not well visualized; appears to be a  calcified trileaflet valve with normal opening. No aortic  valve regurgitation seen.  3. Endocardial visualization enhanced with intravenous  injection of Ultrasonic Enhancing Agent (Lumason).  Hyperdynamic left ventricular systolic function.  4. Normal right ventricular size and function.  *** No previous Echo exam.    < end of copied text >

## 2022-08-01 NOTE — DISCHARGE NOTE PROVIDER - NSDCCPTREATMENT_GEN_ALL_CORE_FT
PRINCIPAL PROCEDURE  Procedure: Right craniotomy for tumor  Findings and Treatment: S/p Rt TIMOTHY and stero biopsy and right craniotomy for tumor resection on 7/28  Please follow up with Neurosurgeon Dr. Mcneil 1-2 weeks after discharge   Please follow up with Oncology 1-2 weeks after discharge

## 2022-08-02 NOTE — PROGRESS NOTE ADULT - ASSESSMENT
82M hx HTN, HLD, R-handed, Turkmen speaking, p/w 2w confusion/disorientation and urinary incontinence. Recent hosp adm to [facility] where ca w/u was done. Report: MRI c/w GBM and neg CT CAP. CTH at [facility] showing 4.4 x 4.1 x 3.8 cm medial R frontal lobe mass c/w GBM.

## 2022-08-02 NOTE — PROGRESS NOTE ADULT - ASSESSMENT
82M hx HTN, HLD, R-handed, Qatari speaking, recent workup at OSH (7/15-7/22) included brain MRI and was diagnosed with glioblastoma, p/w 2w confusion/disorientation and urinary incontinence. Recent hosp adm to [facility] where ca w/u was done. Report: MRI c/w GBM and neg CT CAP. CT Head  at [facility] showing 4.4 x 4.1 x 3.8 cm medial R frontal lobe mass c/w GBM. Exam: Sleepy, Ox1-2, PERRL, EOMI, no facial, no drift, COLES 5/5, SILT.  Now s/p Right  stereo biopsy, TIMOTHY x2, R crani for tumor debulking on 7/28/22. pod # 5 Transferred out of NSCU on 7/29.Course c/b thrombocytopenia work up concerning for possible HIT. CARRIE 7/31 pending.   LE duplex 8/1 negative     Plan      Neuro stable Slow steroid taper. On  Dex 4 q8 8/1. Continue Keppra 500 q12   Thrombocytopenia Heparin induced platelet AB  positive- On Arixtra for DVT ppx. F/U CARRIE. Will need a/c if confirmed HIT. Not cleared for a/c now   Type2 DM/ Hyperglycemia on steroids- On Lantus 6U & premeal Humalog 4. Metformin for discharge. Will need to send prescription for Glucometer/ supplies   DVT ppx- Arixtra 2.5mg QD  PT- Home PT. Possible d/c home in am

## 2022-08-02 NOTE — PROGRESS NOTE ADULT - SUBJECTIVE AND OBJECTIVE BOX
SUBJECTIVE:   Patient seen &  examined   OVERNIGHT EVENTS: none    Vital Signs Last 24 Hrs  T(C): 37 (02 Aug 2022 13:27), Max: 37.1 (02 Aug 2022 05:06)  T(F): 98.6 (02 Aug 2022 13:27), Max: 98.7 (02 Aug 2022 05:06)  HR: 81 (02 Aug 2022 13:27) (58 - 81)  BP: 102/71 (02 Aug 2022 13:27) (102/71 - 128/79)  BP(mean): --  RR: 18 (02 Aug 2022 13:27) (16 - 18)  SpO2: 94% (02 Aug 2022 13:27) (94% - 96%)    Parameters below as of 02 Aug 2022 00:39  Patient On (Oxygen Delivery Method): room air        PHYSICAL EXAM:    Neurological: Awake, alert oriented to person, place and time, Following Commands, Speech clear. Delayed response  Moving all extremities 5/5, No Drift, Sensation to Light Touch Intact    Pulmonary: Clear to Auscultation,    Cardiovascular: S1, S2, Regular Rate and Rhythm     Gastrointestinal: Soft, Nontender, Nondistended     Incision: cranial staples c/d/i    LABS:                        15.2   12.05 )-----------( 99       ( 02 Aug 2022 10:49 )             46.8    08-02    140  |  108  |  30<H>  ----------------------------<  221<H>  4.4   |  21<L>  |  0.83    Ca    7.9<L>      02 Aug 2022 10:49          IMAGING:         MEDICATIONS:    acetaminophen     Tablet .. 650 milliGRAM(s) Oral every 6 hours PRN Temp greater or equal to 38C (100.4F), Mild Pain (1 - 3)  levETIRAcetam 500 milliGRAM(s) Oral every 12 hours  ondansetron Injectable 4 milliGRAM(s) IV Push every 6 hours PRN Nausea and/or Vomiting  oxyCODONE    IR 5 milliGRAM(s) Oral every 4 hours PRN Moderate Pain (4 - 6)  oxyCODONE    IR 10 milliGRAM(s) Oral every 6 hours PRN Severe Pain (7 - 10)  bisacodyl 5 milliGRAM(s) Oral every 12 hours PRN Constipation  pantoprazole    Tablet 40 milliGRAM(s) Oral before breakfast  polyethylene glycol 3350 17 Gram(s) Oral every 12 hours  senna 2 Tablet(s) Oral at bedtime  artificial  tears Solution 1 Drop(s) Both EYES every 4 hours  atorvastatin 80 milliGRAM(s) Oral at bedtime  benzocaine 15 mG/menthol 3.6 mG Lozenge 1 Lozenge Oral three times a day  dexAMETHasone     Tablet 4 milliGRAM(s) Oral every 8 hours  fondaparinux Injectable 2.5 milliGRAM(s) SubCutaneous daily  glucagon  Injectable 1 milliGRAM(s) IntraMuscular once  insulin glargine Injectable (LANTUS) 6 Unit(s) SubCutaneous at bedtime  insulin lispro (ADMELOG) corrective regimen sliding scale   SubCutaneous Before meals and at bedtime  insulin lispro Injectable (ADMELOG) 4 Unit(s) SubCutaneous before breakfast  insulin lispro Injectable (ADMELOG) 2 Unit(s) SubCutaneous before lunch  insulin lispro Injectable (ADMELOG) 2 Unit(s) SubCutaneous before dinner      DIET:

## 2022-08-02 NOTE — PROGRESS NOTE ADULT - ASSESSMENT
82 year old male with past medical history of hypertension, hyperlipidemia, recent workup at OSH (7/15-7/22) included brain MRI and was diagnosed with glioblastoma, brought by daughter due to worsening confusion/disorientation, urinary incontinence, s/p R stereo biopsy, TIMOTHY x2, R crani for tumor debulking on 7/28/22. Transferred out of NSCU on 7/29. 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     PMD: Dr. Rust ([facility]) however, patient is no longer going there, and his daughter wants to switch PCP to a [facility] provider close by.

## 2022-08-02 NOTE — PROGRESS NOTE ADULT - SUBJECTIVE AND OBJECTIVE BOX
Subjective: Patient seen and examined. No new events except as noted.     REVIEW OF SYSTEMS:    CONSTITUTIONAL:+ weakness, fevers or chills  EYES/ENT: No visual changes;  No vertigo or throat pain   NECK: No pain or stiffness  RESPIRATORY: No cough, wheezing, hemoptysis; No shortness of breath  CARDIOVASCULAR: No chest pain or palpitations  GASTROINTESTINAL: No abdominal or epigastric pain. No nausea, vomiting, or hematemesis; No diarrhea or constipation. No melena or hematochezia.  GENITOURINARY: No dysuria, frequency or hematuria  NEUROLOGICAL: No numbness or weakness  SKIN: No itching, burning, rashes, or lesions   All other review of systems is negative unless indicated above.    MEDICATIONS:  MEDICATIONS  (STANDING):  artificial  tears Solution 1 Drop(s) Both EYES every 4 hours  atorvastatin 80 milliGRAM(s) Oral at bedtime  benzocaine 15 mG/menthol 3.6 mG Lozenge 1 Lozenge Oral three times a day  dexAMETHasone     Tablet 4 milliGRAM(s) Oral every 8 hours  dextrose 50% Injectable 25 Gram(s) IV Push once  dextrose 50% Injectable 12.5 Gram(s) IV Push once  fondaparinux Injectable 2.5 milliGRAM(s) SubCutaneous daily  glucagon  Injectable 1 milliGRAM(s) IntraMuscular once  insulin glargine Injectable (LANTUS) 8 Unit(s) SubCutaneous at bedtime  insulin lispro (ADMELOG) corrective regimen sliding scale   SubCutaneous Before meals and at bedtime  insulin lispro Injectable (ADMELOG) 4 Unit(s) SubCutaneous three times a day before meals  levETIRAcetam 500 milliGRAM(s) Oral every 12 hours  losartan 50 milliGRAM(s) Oral daily  pantoprazole    Tablet 40 milliGRAM(s) Oral before breakfast  polyethylene glycol 3350 17 Gram(s) Oral every 12 hours  senna 2 Tablet(s) Oral at bedtime      PHYSICAL EXAM:  T(C): 36.8 (08-02-22 @ 09:00), Max: 37.1 (08-02-22 @ 05:06)  HR: 68 (08-02-22 @ 09:00) (58 - 76)  BP: 115/72 (08-02-22 @ 09:00) (112/78 - 128/79)  RR: 18 (08-02-22 @ 09:00) (16 - 18)  SpO2: 96% (08-02-22 @ 09:00) (94% - 98%)  Wt(kg): --  I&O's Summary    01 Aug 2022 07:01  -  02 Aug 2022 07:00  --------------------------------------------------------  IN: 1280 mL / OUT: 1300 mL / NET: -20 mL          Appearance: NAD  HEENT: dry  oral mucosa, PERRL, EOMI	  Lymphatic: No lymphadenopathy , no edema  Cardiovascular: regular  S1 S2, No JVD, No murmurs , Peripheral pulses palpable 2+ bilaterally  Respiratory: decreased bs  	  Gastrointestinal:  Soft, Non-tender, + BS	  Skin: No rashes, No ecchymoses, No cyanosis, warm to touch - incision dressing c/d/i  Musculoskeletal: Normal range of motion, normal strength  Psychiatry:  Calm, Sleepy   Ext: No edema    LABS:    CARDIAC MARKERS:                                15.2   12.05 )-----------( 99       ( 02 Aug 2022 10:49 )             46.8     08-01    138  |  108  |  30<H>  ----------------------------<  166<H>  4.3   |  21<L>  |  0.92    Ca    8.2<L>      01 Aug 2022 07:17      proBNP:   Lipid Profile:   HgA1c:   TSH:             TELEMETRY: 	    ECG:  	  RADIOLOGY:   DIAGNOSTIC TESTING:  [ ] Echocardiogram:  [ ]  Catheterization:  [ ] Stress Test:    OTHER:

## 2022-08-02 NOTE — PROGRESS NOTE ADULT - PROBLEM SELECTOR PLAN 2
noted to have worsening thrombocytopenia since admission  - 4T score: 4 points (intermediate probability for HIT)  - stopped lovenox  - HIT Ab +, f/u CARRIE   - repeat LE duplex 8/1 negative for DVT  - appreciate heme input- recommend to start eliquis 5mg or arixtra 7.5mg SQ daily while awaiting CARRIE result. If CARRIE negative, can stop AC but if CARRIE is positive, will need to continue AC with DOAC for 30-90 days. however, given recent neurosurgery, patient is yet to be cleared for full AC and started on prophylactic dose of arixtra. noted to have worsening thrombocytopenia since admission  - 4T score: 4 points (intermediate probability for HIT)  - stopped lovenox  - HIT Ab +, f/u CARRIE   - repeat LE duplex 8/1 negative for DVT  - appreciate heme input- recommend to start eliquis 5mg or arixtra 7.5mg SQ daily while awaiting CARRIE result. If CARRIE negative, can stop AC but if CARRIE is positive, will need to continue AC with DOAC for 30-90 days. however, given recent neurosurgery, patient is yet to be cleared for full AC and started on prophylactic dose of arixtra.   - if patient is discharged home, hematology will follow the result of CARRIE and offer further guidance to the patient/family.

## 2022-08-02 NOTE — PROGRESS NOTE ADULT - PROBLEM SELECTOR PLAN 9
DVT ppx: on arixtra  Constipation resolved. patient reportedly having regular bowel movement per daughter.  LE duplex neg 8/1    d/c planning home with outpatient PT when medically stable DVT ppx: on arixtra  Constipation resolved. patient reportedly having regular bowel movement per daughter.  LE duplex neg 8/1    d/c planning home with outpatient PT when medically stable. will follow periodically. call with questions.

## 2022-08-02 NOTE — PROGRESS NOTE ADULT - SUBJECTIVE AND OBJECTIVE BOX
[facility] Division of Hospital Medicine  Anjana Webb MD  Available via MS Teams  Spectra [phone]    SUBJECTIVE / OVERNIGHT EVENTS: patient seen and examined this morning. daughter (Faith) at bedside helped to translate. patient denies any headache, sore throat is improving. he is having regular bowel movement.    ADDITIONAL REVIEW OF SYSTEMS:    MEDICATIONS  (STANDING):  artificial  tears Solution 1 Drop(s) Both EYES every 4 hours  atorvastatin 80 milliGRAM(s) Oral at bedtime  benzocaine 15 mG/menthol 3.6 mG Lozenge 1 Lozenge Oral three times a day  dexAMETHasone     Tablet 4 milliGRAM(s) Oral every 8 hours  dextrose 50% Injectable 25 Gram(s) IV Push once  dextrose 50% Injectable 12.5 Gram(s) IV Push once  fondaparinux Injectable 2.5 milliGRAM(s) SubCutaneous daily  glucagon  Injectable 1 milliGRAM(s) IntraMuscular once  insulin glargine Injectable (LANTUS) 6 Unit(s) SubCutaneous at bedtime  insulin lispro (ADMELOG) corrective regimen sliding scale   SubCutaneous Before meals and at bedtime  insulin lispro Injectable (ADMELOG) 4 Unit(s) SubCutaneous before breakfast  insulin lispro Injectable (ADMELOG) 2 Unit(s) SubCutaneous before lunch  insulin lispro Injectable (ADMELOG) 2 Unit(s) SubCutaneous before dinner  levETIRAcetam 500 milliGRAM(s) Oral every 12 hours  losartan 50 milliGRAM(s) Oral daily  pantoprazole    Tablet 40 milliGRAM(s) Oral before breakfast  polyethylene glycol 3350 17 Gram(s) Oral every 12 hours  senna 2 Tablet(s) Oral at bedtime    MEDICATIONS  (PRN):  acetaminophen     Tablet .. 650 milliGRAM(s) Oral every 6 hours PRN Temp greater or equal to 38C (100.4F), Mild Pain (1 - 3)  bisacodyl 5 milliGRAM(s) Oral every 12 hours PRN Constipation  ondansetron Injectable 4 milliGRAM(s) IV Push every 6 hours PRN Nausea and/or Vomiting  oxyCODONE    IR 5 milliGRAM(s) Oral every 4 hours PRN Moderate Pain (4 - 6)  oxyCODONE    IR 10 milliGRAM(s) Oral every 6 hours PRN Severe Pain (7 - 10)      I&O's Summary    01 Aug 2022 07:01  -  02 Aug 2022 07:00  --------------------------------------------------------  IN: 1280 mL / OUT: 1300 mL / NET: -20 mL        PHYSICAL EXAM:  Vital Signs Last 24 Hrs  T(C): 36.8 (02 Aug 2022 09:00), Max: 37.1 (02 Aug 2022 05:06)  T(F): 98.3 (02 Aug 2022 09:00), Max: 98.7 (02 Aug 2022 05:06)  HR: 68 (02 Aug 2022 09:00) (58 - 75)  BP: 115/72 (02 Aug 2022 09:00) (115/72 - 128/79)  BP(mean): --  RR: 18 (02 Aug 2022 09:00) (16 - 18)  SpO2: 96% (02 Aug 2022 09:00) (94% - 96%)    Parameters below as of 02 Aug 2022 00:39  Patient On (Oxygen Delivery Method): room air      CONSTITUTIONAL: NAD, well-groomed  EYES: conjunctiva and sclera clear  NECK: Supple, no palpable masses; no thyromegaly  RESPIRATORY: Normal respiratory effort; lungs are clear to auscultation bilaterally  CARDIOVASCULAR: normal S1 and S2, no murmur/rub/gallop; No lower extremity edema  ABDOMEN: Nontender to palpation, normoactive bowel sounds, no rebound/guarding; No hepatosplenomegaly  MUSCULOSKELETAL:  no clubbing or cyanosis of digits; no joint swelling or tenderness to palpation  PSYCH: A+O to person, place, and time; affect appropriate  NEUROLOGY: moves all extremities, follows commands, no gross sensory deficits   SKIN: No rashes; no palpable lesions    LABS:                        15.2   12.05 )-----------( 99       ( 02 Aug 2022 10:49 )             46.8     08-02    140  |  108  |  30<H>  ----------------------------<  221<H>  4.4   |  21<L>  |  0.83    Ca    7.9<L>      02 Aug 2022 10:49                COVID-19 PCR: NotDetec (27 Jul 2022 06:29)  SARS-CoV-2: [facility] (22 Jul 2022 23:51)        RADIOLOGY & ADDITIONAL TESTS:  New Results Reviewed Today:     < from: [facility] Duplex Lower Ext Vein Scan, Bilat (08.01.22 @ 19:55) >  IMPRESSION:    No evidence of bilateral lower extremity deep venous thrombosis.    < end of copied text >      COMMUNICATION:  Care Discussed with Consultants/Other Providers and Details of Discussion: neurosurgery PA(Mckenzie)  Discussions with Patient/Family: yes

## 2022-08-03 NOTE — PROGRESS NOTE ADULT - PROBLEM SELECTOR PLAN 4
Continue home medications Losartan, Lasix  Per patient's daughter, he was taking Lisinopril last year, however it was switched to HCTZ in Nov-Dec 2021 due to complaint of leg swelling, but as swelling worsened he was switched again to Losartan and Lasix was started in June with improvement in swelling.   - c/w losartan 50mg daily (increased from 25mg) for now. may need to hold if Cr worsens    #Hold home medication Aspirin in anticipation of surgery (patient's daughter and wife are not sure why he was started on aspirin, patient stopped taking it about 6 months ago, but it is still getting prescribed by PCP)
Per patient's daughter, he was taking Lisinopril last year, however it was switched to HCTZ in Nov-Dec 2021 due to complaint of leg swelling, but as swelling worsened he was switched again to Losartan and Lasix was started in June with improvement in swelling.   - continue losartan 25mg and if SBP remains marginal, can stop losartan on discharge  - hold lasix while inpatient and on discharge as well    #Hold home medication Aspirin in anticipation of surgery (patient's daughter and wife are not sure why he was started on aspirin, patient stopped taking it about 6 months ago, but it is still getting prescribed by PCP)
Patient reports being diagnosed with pre diabetes and not on any meds at home.  - HbA1C 7.0   - steroid induced hyperglycemia   - c/w lantus to 8 units qHS  - c/w premeal admelog to 4 units TID qAC  - c/w sliding scale, monitor FS qAC + qHS  - will adjust basal/bolus as needed  - if patient will remain on steroid on discharge, may need to d/c on metformin 500mg BID while on steroid  - diabetes teaching
Patient reports being diagnosed with pre diabetes and not on any meds at home.  - A1C checked 7/23: 7.0> Diabetic range  - steroid induced hyperglycemia - better controlled today  - c/w lantus to 8 units qHS  - c/w premeal admelog to 4 units TID qAC  - c/w sliding scale, monitor FS qAC + qHS  - will adjust basal/bolus as needed  - Nutrition consult
Continue home medications Losartan, Lasix  Per patient's daughter, he was taking Lisinopril last year, however it was switched to HCTZ in Nov-Dec 2021 due to complaint of leg swelling, but as swelling worsened he was switched again to Losartan and Lasix was started in June with improvement in swelling.   Monitor BP, HR    #Hold home medication Aspirin in anticipation of surgery (patient's daughter and wife are not sure why he was started on aspirin, patient stopped taking it about 6 months ago, but it is still getting prescribed by PCP) > Will need clarification with patient's PCP on Monday.
Continue home medications Losartan, Lasix  Per patient's daughter, he was taking Lisinopril last year, however it was switched to HCTZ in Nov-Dec 2021 due to complaint of leg swelling, but as swelling worsened he was switched again to Losartan and Lasix was started in June with improvement in swelling.     BP intermittently elevated, uptitrated losartan dose to 50mg daily. Monitor K level, has not been eating high potassium food    #Hold home medication Aspirin in anticipation of surgery (patient's daughter and wife are not sure why he was started on aspirin, patient stopped taking it about 6 months ago, but it is still getting prescribed by PCP)
Continue home medications Losartan, Lasix  Per patient's daughter, he was taking Lisinopril last year, however it was switched to HCTZ in Nov-Dec 2021 due to complaint of leg swelling, but as swelling worsened he was switched again to Losartan and Lasix was started in June with improvement in swelling.     c/w losartan 50mg daily (increased from 25mg)    #Hold home medication Aspirin in anticipation of surgery (patient's daughter and wife are not sure why he was started on aspirin, patient stopped taking it about 6 months ago, but it is still getting prescribed by PCP)
Per patient's daughter, he was taking Lisinopril last year, however it was switched to HCTZ in Nov-Dec 2021 due to complaint of leg swelling, but as swelling worsened he was switched again to Losartan and Lasix was started in June with improvement in swelling.   - decrease losartan back to 25mg  - hold lasix while inpatient and on discharge as well    #Hold home medication Aspirin in anticipation of surgery (patient's daughter and wife are not sure why he was started on aspirin, patient stopped taking it about 6 months ago, but it is still getting prescribed by PCP)
Continue home medications Losartan, Lasix  Per patient's daughter, he was taking Lisinopril last year, however it was switched to HCTZ in Nov-Dec 2021 due to complaint of leg swelling, but as swelling worsened he was switched again to Losartan and Lasix was started in June with improvement in swelling.   Monitor BP, HR    #Hold home medication Aspirin in anticipation of surgery (patient's daughter and wife are not sure why he was started on aspirin, patient stopped taking it about 6 months ago, but it is still getting prescribed by PCP)

## 2022-08-03 NOTE — PROGRESS NOTE ADULT - REASON FOR ADMISSION
Brain lesion
Brain tumor
Brain lesion

## 2022-08-03 NOTE — PROGRESS NOTE ADULT - PROBLEM SELECTOR PLAN 7
Medication reconciliation done per list provided by pharmacist by previous provider: [facility] . Patient received 4 day supply of naproxen back in June, rest of the active and recent prescriptions have been entered in med review list.   List verified by daughter as well > patient is no longer on HCTZ, prednisolone ophthalmic solution.    Patient's daughter and wife are not sure why he was started on aspirin, patient stopped taking it about 6 months ago, but it is still getting prescribed by PCP
Monitor for urinary retention, bladder scan periodically.
Medication reconciliation done per list provided by pharmacist by previous provider: [facility] . Patient received 4 day supply of naproxen back in June, rest of the active and recent prescriptions have been entered in med review list.   List verified by daughter as well > patient is no longer on HCTZ, prednisolone ophthalmic solution.    Patient's daughter and wife are not sure why he was started on aspirin, patient stopped taking it about 6 months ago, but it is still getting prescribed by PCP
Medication reconciliation done per list provided by pharmacist by previous provider: [facility] . Patient received 4 day supply of naproxen back in June, rest of the active and recent prescriptions have been entered in med review list.   List verified by daughter as well > patient is no longer on HCTZ, prednisolone ophthalmic solution.    Patient's daughter and wife are not sure why he was started on aspirin, patient stopped taking it about 6 months ago, but it is still getting prescribed by PCP
Monitor for urinary retention, bladder scan periodically.
Medication reconciliation done per list provided by pharmacist: [facility] . Patient received 4 day supply of naproxen back in June, rest of the active and recent prescriptions have been entered in med review list.   List verified by daughter as well > patient is no longer on HCTZ, prednisolone ophthalmic solution.    Patient's daughter and wife are not sure why he was started on aspirin, patient stopped taking it about 6 months ago, but it is still getting prescribed by PCP > Will need clarification with patient's PCP on Monday.
Monitor for urinary retention, bladder scan periodically.
Medication reconciliation done per list provided by pharmacist by previous provider: [facility] . Patient received 4 day supply of naproxen back in June, rest of the active and recent prescriptions have been entered in med review list.   List verified by daughter as well > patient is no longer on HCTZ, prednisolone ophthalmic solution.    Patient's daughter and wife are not sure why he was started on aspirin, patient stopped taking it about 6 months ago, but it is still getting prescribed by PCP > Will need clarification with patient's PCP on Monday.
Monitor for urinary retention, bladder scan periodically.

## 2022-08-03 NOTE — PROGRESS NOTE ADULT - PROBLEM SELECTOR PLAN 9
DVT ppx: on arixtra  Constipation resolved. patient reportedly having regular bowel movement per daughter (last BM 8/2).  LE duplex neg 8/1    d/c planning home with outpatient PT when medically stable. will follow periodically. call with questions.

## 2022-08-03 NOTE — PROGRESS NOTE ADULT - ASSESSMENT
82M hx HTN, HLD, R-handed, [place] speaking, recent workup at OSH (7/15-7/22) included brain MRI and was diagnosed with glioblastoma, p/w 2w confusion/disorientation and urinary incontinence. Recent hosp adm to [facility] where ca w/u was done. Report: MRI c/w GBM and neg CT CAP. CT Head  at [facility] showing 4.4 x 4.1 x 3.8 cm medial R frontal lobe mass c/w GBM. Exam: Sleepy, Ox1-2, PERRL, EOMI, no facial, no drift, COLES 5/5, SILT.  Now s/p Right  stereo biopsy, TIMOTHY x2, R crani for tumor debulking on 7/28/22. pod # 6 Transferred out of [facility] on 7/29.Course c/b thrombocytopenia work up concerning for possible HIT. CARRIE 7/31 pending.   LE duplex 8/1 negative     Plan      Neuro stable Slow steroid taper. DEX 4 q 12 today Continue Keppra 500 q12   Thrombocytopenia Resolving  Heparin induced platelet AB  positive- On Arixtra 2.5mg QD  for DVT ppx. F/U CARRIE. Will need a/c timing & Eliquis vs Therapeutic arixtra  confirmed if  CARRIE ++/ Not cleared for a/c yet   Hematology follow up outpatient  Type2 DM/ Hyperglycemia on steroids- On Lantus 6U & premeal Humalog 4. Metformin for discharge   send prescription for Glucometer/ supplies to [facility]    DVT ppx- Arixtra 2.5mg QD  PT- Home PT 82M hx HTN, HLD, R-handed, Japanese speaking, recent workup at OSH (7/15-7/22) included brain MRI and was diagnosed with glioblastoma, p/w 2w confusion/disorientation and urinary incontinence. Recent hosp adm to [facility] where ca w/u was done. Report: MRI c/w GBM and neg CT CAP. CT Head  at [facility] showing 4.4 x 4.1 x 3.8 cm medial R frontal lobe mass c/w GBM. Exam: Sleepy, Ox1-2, PERRL, EOMI, no facial, no drift, COLES 5/5, SILT.  Now s/p Right  stereo biopsy, TIMOTHY x2, R crani for tumor debulking on 7/28/22. pod # 6 Transferred out of NSCU on 7/29.Course c/b thrombocytopenia work up concerning for possible HIT. CARRIE 7/31 pending.   LE duplex 8/1 negative     Plan      Neuro stable Slow steroid taper. DEX 4 q 12 today Continue Keppra 500 q12   Thrombocytopenia Resolving  Heparin induced platelet AB  positive- On Arixtra 2.5mg QD  for DVT ppx. F/U CARRIE. Will need a/c timing & Eliquis vs Therapeutic arixtra  confirmed if  CARRIE ++/ Not cleared for a/c yet   Hematology follow up outpatient  Type2 DM/ Hyperglycemia on steroids- On Lantus 6U & premeal Humalog 4. Metformin for discharge   send prescription for Glucometer/ supplies to [facility]    DVT ppx- Arixtra 2.5mg QD  PT- Home PT    Addendum    D/w Dr Mcneil. Discharged on Arixtra 2.5mg QD . If CARRIE positive full dose A/c pod #14. All plans discussed with patient & daughter

## 2022-08-03 NOTE — PROGRESS NOTE ADULT - PROBLEM SELECTOR PROBLEM 6
Urinary incontinence
Hyperlipidemia
Urinary incontinence
Hyperlipidemia
Urinary incontinence
Urinary incontinence
Hyperlipidemia
Urinary incontinence
Hyperlipidemia

## 2022-08-03 NOTE — PROGRESS NOTE ADULT - PROBLEM SELECTOR PLAN 2
Health Maintenance Due   Topic Date Due   • COVID-19 Vaccine (1) Never done   • Diabetes Eye Exam  Never done   • Diabetes Foot Exam  04/19/2020   • DM/CKD GFR  01/17/2022       Patient is due for the topics as listed above and wishes to proceed with them.    noted to have worsening thrombocytopenia since admission  - 4T score: 4 points (intermediate probability for HIT)  - stopped lovenox and thrombocytopenia gradually improving  - HIT Ab +, CARRIE pending  - repeat LE duplex 8/1 negative for DVT  - appreciate heme input- recommend to start eliquis 5mg or arixtra 7.5mg SQ daily while awaiting CARRIE result. If CARRIE negative, can stop AC but if CARRIE is positive, will need to continue AC with DOAC for 30-90 days. however, given recent neurosurgery, patient is yet to be cleared for full AC and started on prophylactic dose of arixtra.   - if patient is discharged home, hematology will follow the result of CARRIE and offer further guidance to the patient/family.

## 2022-08-03 NOTE — PROGRESS NOTE ADULT - PROBLEM SELECTOR PROBLEM 2
Thrombocytopenia
Tachycardia
Thrombocytopenia
JANETH (acute kidney injury)
Tachycardia
Pre-op exam
Pre-op exam
Tachycardia
Pre-op exam
Tachycardia
Tachycardia
Thrombocytopenia
Pre-op exam
Thrombocytopenia

## 2022-08-03 NOTE — PROGRESS NOTE ADULT - PROBLEM SELECTOR PLAN 5
At home takes rosuvastatin. Therapeutic interchange Lipitor while inpatient.   Lipid panel w/ , elevated total cholesterol
At home takes rosuvastatin. Therapeutic interchange Lipitor while inpatient.   Lipid panel w/ , elevated total cholesterol
Per patient's daughter, he was taking Lisinopril last year, however it was switched to HCTZ in Nov-Dec 2021 due to complaint of leg swelling, but as swelling worsened he was switched again to Losartan and Lasix was started in June with improvement in swelling.   - c/w losartan 50mg daily (increased from 25mg) for now. may need to hold if Cr worsens  - hold lasix while inpatient and on discharge as well    #Hold home medication Aspirin in anticipation of surgery (patient's daughter and wife are not sure why he was started on aspirin, patient stopped taking it about 6 months ago, but it is still getting prescribed by PCP)
Patient reports being diagnosed with pre diabetes and not on any meds at home.  - HbA1C 7.0   - steroid induced hyperglycemia improved  - decrease lantus to 6 units qHS  - c/w premeal admelog to 4 units with breakfast, decrease to 2 units with lunch/dinner  - c/w sliding scale, monitor FS qAC + qHS  - will adjust basal/bolus as needed  - patient will remain on steroid taper on discharge, anticipate discharge on metformin 500mg BID while on steroid  - diabetes teaching and patient will need glucometer and other materials to be able to check FS glucose at home (1 to 2 time a day)
At home takes rosuvastatin. Therapeutic interchange Lipitor while inpatient.   Lipid panel w/ , elevated total cholesterol
At home takes rosuvastatin. Therapeutic interchange Lipitor while inpatient.   F/U lipid panel
Patient reports being diagnosed with pre diabetes and not on any meds at home.  - HbA1C 7.0   - steroid induced hyperglycemia overall improved  - c/w lantus to 6 units qHS  - c/w premeal admelog to 4 units with breakfast, 2 units with lunch/dinner  - c/w sliding scale, monitor FS qAC + qHS  - will adjust basal/bolus as needed  - patient will remain on steroid taper on discharge, anticipate discharge on metformin 500mg BID while on steroid  - diabetes teaching and patient will need glucometer and other materials to be able to check FS glucose at home (1 to 2 time a day)
At home takes rosuvastatin. Therapeutic interchange Lipitor while inpatient.   Lipid panel w/ , elevated total cholesterol
Continue home medications Losartan, Lasix  Per patient's daughter, he was taking Lisinopril last year, however it was switched to HCTZ in Nov-Dec 2021 due to complaint of leg swelling, but as swelling worsened he was switched again to Losartan and Lasix was started in June with improvement in swelling.   - c/w losartan 50mg daily (increased from 25mg) for now. may need to hold if Cr worsens    #Hold home medication Aspirin in anticipation of surgery (patient's daughter and wife are not sure why he was started on aspirin, patient stopped taking it about 6 months ago, but it is still getting prescribed by PCP)

## 2022-08-03 NOTE — PROGRESS NOTE ADULT - PROBLEM SELECTOR PROBLEM 5
Hyperlipidemia
Diabetes mellitus
Hypertension
Hypertension
Hyperlipidemia
Diabetes mellitus
Hyperlipidemia

## 2022-08-03 NOTE — PROGRESS NOTE ADULT - PROBLEM SELECTOR PLAN 3
SBP goal 100-150 as per Nsx  c/w meds as ordered  continue to monitor BPs
uptrending BUN/Cr on 7/30 meeting criteria for JANETH.  suspect pre-renal etiology from volume depletion  - improved s/p gentle IV hydration on 7/30  - encouraged PO intake of fluids  - bladder scan w/o retention  - monitor Cr on BMP periodically
c/w meds as ordered  continue to monitor BPs
Patient reports being diagnosed with pre diabetes and not on any meds at home.  A1C checked 7/23: 7.0> Diabetic range.   Hyperglycemia, also on decadron - cw Lantus 5 HS, and insulin scale. Added 3U premeal today, hold if NPO  Nutritionist consult
SBP goal 100-150 as per Nsx  c/w meds as ordered  continue to monitor BPs
SBP goal 100-150 as per Nsx  c/w meds as ordered  continue to monitor BPs
suspect pre-renal etiology from volume depletion  - improved s/p gentle IV hydration on 7/30  - encouraged PO intake of fluids  - bladder scan w/o retention  - monitor Cr on BMP periodically
Patient reports being diagnosed with pre diabetes and not on any meds at home.  A1C checked 7/23: 7> Diabetic range.   Continue ISS  Monitor FS glu checks  Hyperglycemia, also on decadron - will start Lantus 5 HS, re-asses tomorrow to adjust insulin   Nutritionist consult
SBP goal 100-150 as per Nsx  c/w meds as ordered  continue to monitor BPs
SBP goal 100-150 as per Nsx  c/w meds as ordered  continue to monitor BPs
Patient reports being diagnosed with pre diabetes and not on any meds at home.  - A1C checked 7/23: 7.0> Diabetic range  - steroid induced hyperglycemia  - increase lantus to 8 units qHS  - increase premeal admelog to 4 units TID qAC  - c/w sliding scale, monitor FS qAC + qHS  - will adjust basal/bolus as needed  - Nutrition consult
c/w meds as ordered  continue to monitor BPs
suspect pre-renal etiology from volume depletion  - improved s/p gentle IV hydration on 7/30  - encouraged PO intake of fluids  - bladder scan w/o retention  - monitor Cr on BMP periodically
Patient reports being diagnosed with pre diabetes and not on any meds at home.  A1C checked 7/23: 7.0> Diabetic range.   Hyperglycemia, also on decadron - cw Lantus 5 HS, and insulin scale. cw 3U premeal today, hold when NPO tomorrow. Can continue lantus 5U tonight if steroids still being continued at current dose   Nutritionist consult
suspect pre-renal etiology from volume depletion  - improved s/p gentle IV hydration on 7/30  - encouraged PO intake of fluids  - bladder scan w/o retention  - monitor Cr on BMP periodically
Patient reports being diagnosed with pre diabetes and not on any meds at home.  A1C checked 7/23: 7> Diabetic range.   Continue ISS  Monitor FS glu checks  Hyperglycemia, also on decadron - cw Lantus 5 HS, and insulin scale   Nutritionist consult

## 2022-08-03 NOTE — DISCHARGE NOTE NURSING/CASE MANAGEMENT/SOCIAL WORK - PATIENT PORTAL LINK FT
You can access the FollowMyHealth Patient Portal offered by [facility] by registering at the following website: [email or web address]. By joining [employer]’s FollowMyHealth portal, you will also be able to view your health information using other applications (apps) compatible with our system.

## 2022-08-03 NOTE — PROGRESS NOTE ADULT - SUBJECTIVE AND OBJECTIVE BOX
Subjective: Patient seen and examined. No new events except as noted.     REVIEW OF SYSTEMS:    CONSTITUTIONAL: No weakness, fevers or chills  EYES/ENT: No visual changes;  No vertigo or throat pain   NECK: No pain or stiffness  RESPIRATORY: No cough, wheezing, hemoptysis; No shortness of breath  CARDIOVASCULAR: No chest pain or palpitations  GASTROINTESTINAL: No abdominal or epigastric pain. No nausea, vomiting, or hematemesis; No diarrhea or constipation. No melena or hematochezia.  GENITOURINARY: No dysuria, frequency or hematuria  NEUROLOGICAL: No numbness or weakness  SKIN: No itching, burning, rashes, or lesions   All other review of systems is negative unless indicated above.    MEDICATIONS:  MEDICATIONS  (STANDING):  artificial  tears Solution 1 Drop(s) Both EYES every 4 hours  atorvastatin 80 milliGRAM(s) Oral at bedtime  benzocaine 15 mG/menthol 3.6 mG Lozenge 1 Lozenge Oral three times a day  dexAMETHasone     Tablet 4 milliGRAM(s) Oral every 8 hours  dextrose 50% Injectable 25 Gram(s) IV Push once  dextrose 50% Injectable 12.5 Gram(s) IV Push once  fondaparinux Injectable 2.5 milliGRAM(s) SubCutaneous daily  glucagon  Injectable 1 milliGRAM(s) IntraMuscular once  insulin glargine Injectable (LANTUS) 6 Unit(s) SubCutaneous at bedtime  insulin lispro (ADMELOG) corrective regimen sliding scale   SubCutaneous Before meals and at bedtime  insulin lispro Injectable (ADMELOG) 4 Unit(s) SubCutaneous before breakfast  insulin lispro Injectable (ADMELOG) 2 Unit(s) SubCutaneous before lunch  insulin lispro Injectable (ADMELOG) 2 Unit(s) SubCutaneous before dinner  levETIRAcetam 500 milliGRAM(s) Oral every 12 hours  losartan 25 milliGRAM(s) Oral daily  pantoprazole    Tablet 40 milliGRAM(s) Oral before breakfast  polyethylene glycol 3350 17 Gram(s) Oral every 12 hours  senna 2 Tablet(s) Oral at bedtime      PHYSICAL EXAM:  T(C): 36.6 (08-03-22 @ 04:53), Max: 37 (08-02-22 @ 13:27)  HR: 61 (08-03-22 @ 04:53) (61 - 82)  BP: 101/61 (08-03-22 @ 04:53) (101/61 - 120/66)  RR: 18 (08-03-22 @ 04:53) (18 - 18)  SpO2: 95% (08-03-22 @ 04:53) (94% - 96%)  Wt(kg): --  I&O's Summary    02 Aug 2022 07:01  -  03 Aug 2022 07:00  --------------------------------------------------------  IN: 480 mL / OUT: 1300 mL / NET: -820 mL          Appearance: NAD  HEENT: dry  oral mucosa, PERRL, EOMI	  Lymphatic: No lymphadenopathy , no edema  Cardiovascular: regular  S1 S2, No JVD, No murmurs , Peripheral pulses palpable 2+ bilaterally  Respiratory: decreased bs  	  Gastrointestinal:  Soft, Non-tender, + BS	  Skin: No rashes, No ecchymoses, No cyanosis, warm to touch - incision dressing c/d/i  Musculoskeletal: Normal range of motion, normal strength  Psychiatry:  Calm, Sleepy   Ext: No edema    LABS:    CARDIAC MARKERS:                                15.2   12.05 )-----------( 99       ( 02 Aug 2022 10:49 )             46.8     08-02    140  |  108  |  30<H>  ----------------------------<  221<H>  4.4   |  21<L>  |  0.83    Ca    7.9<L>      02 Aug 2022 10:49      proBNP:   Lipid Profile:   HgA1c:   TSH:             TELEMETRY: 	    ECG:  	  RADIOLOGY:   DIAGNOSTIC TESTING:  [ ] Echocardiogram:  [ ]  Catheterization:  [ ] Stress Test:    OTHER:

## 2022-08-03 NOTE — PROGRESS NOTE ADULT - ASSESSMENT
82M hx HTN, HLD, R-handed, [place] speaking, p/w 2w confusion/disorientation and urinary incontinence. Recent hosp adm to [facility] where ca w/u was done. Report: MRI c/w GBM and neg CT CAP. CTH at [facility] showing 4.4 x 4.1 x 3.8 cm medial R frontal lobe mass c/w GBM.

## 2022-08-03 NOTE — PROGRESS NOTE ADULT - PROBLEM SELECTOR PROBLEM 3
Hypertension
JANETH (acute kidney injury)
Hypertension
Pre-diabetes
Hypertension
Hypertension
Pre-diabetes
JANETH (acute kidney injury)
Pre-diabetes
Pre-diabetes
JANETH (acute kidney injury)
JANETH (acute kidney injury)
Pre-diabetes

## 2022-08-03 NOTE — PROGRESS NOTE ADULT - PROBLEM SELECTOR PLAN 6
Monitor for urinary retention, bladder scan periodically.    #constipation: reports being constipated past week, requiring laxatives. Miralax and Senna.  x1 mg citrate today
Monitor for urinary retention, bladder scan periodically.
Monitor for urinary retention, bladder scan periodically.
Monitor for urinary retention, bladder scan periodically.    #constipation: reports being constipated past week, requiring laxatives. Miralax and Senna. Monitor stool count.
At home takes rosuvastatin. Therapeutic interchange Lipitor while inpatient.   Lipid panel w/ , elevated total cholesterol
Monitor for urinary retention, bladder scan periodically.
At home takes rosuvastatin. Therapeutic interchange Lipitor while inpatient.   Lipid panel w/ , elevated total cholesterol

## 2022-08-03 NOTE — PROGRESS NOTE ADULT - PROVIDER SPECIALTY LIST ADULT
Neurosurgery
Cardiology
Neurosurgery
NSICU
Neurosurgery
NSICU
Neurosurgery
Neurosurgery
Cardiology
Internal Medicine
Cardiology
Hospitalist
Internal Medicine
Cardiology
Cardiology
Hospitalist
Internal Medicine
Hospitalist
Hospitalist

## 2022-08-03 NOTE — PROGRESS NOTE ADULT - PROBLEM SELECTOR PROBLEM 8
Medication management
Need for prophylactic measure
Medication management
Medication management
Need for prophylactic measure
Need for prophylactic measure
Medication management

## 2022-08-03 NOTE — PROGRESS NOTE ADULT - SUBJECTIVE AND OBJECTIVE BOX
[facility] Division of Hospital Medicine  Anjana Webb MD  Available via MS Teams  Spectra [phone]    SUBJECTIVE / OVERNIGHT EVENTS: patient seen and examined. per daughter, he is doing ok, denies any pain.     ADDITIONAL REVIEW OF SYSTEMS:    MEDICATIONS  (STANDING):  artificial  tears Solution 1 Drop(s) Both EYES every 4 hours  atorvastatin 80 milliGRAM(s) Oral at bedtime  benzocaine 15 mG/menthol 3.6 mG Lozenge 1 Lozenge Oral three times a day  dexAMETHasone     Tablet 4 milliGRAM(s) Oral every 8 hours  dextrose 50% Injectable 25 Gram(s) IV Push once  dextrose 50% Injectable 12.5 Gram(s) IV Push once  fondaparinux Injectable 2.5 milliGRAM(s) SubCutaneous daily  glucagon  Injectable 1 milliGRAM(s) IntraMuscular once  insulin glargine Injectable (LANTUS) 6 Unit(s) SubCutaneous at bedtime  insulin lispro (ADMELOG) corrective regimen sliding scale   SubCutaneous Before meals and at bedtime  insulin lispro Injectable (ADMELOG) 4 Unit(s) SubCutaneous before breakfast  insulin lispro Injectable (ADMELOG) 2 Unit(s) SubCutaneous before lunch  insulin lispro Injectable (ADMELOG) 2 Unit(s) SubCutaneous before dinner  levETIRAcetam 500 milliGRAM(s) Oral every 12 hours  losartan 25 milliGRAM(s) Oral daily  pantoprazole    Tablet 40 milliGRAM(s) Oral before breakfast  polyethylene glycol 3350 17 Gram(s) Oral every 12 hours  senna 2 Tablet(s) Oral at bedtime    MEDICATIONS  (PRN):  acetaminophen     Tablet .. 650 milliGRAM(s) Oral every 6 hours PRN Temp greater or equal to 38C (100.4F), Mild Pain (1 - 3)  bisacodyl 5 milliGRAM(s) Oral every 12 hours PRN Constipation  ondansetron Injectable 4 milliGRAM(s) IV Push every 6 hours PRN Nausea and/or Vomiting  oxyCODONE    IR 5 milliGRAM(s) Oral every 4 hours PRN Moderate Pain (4 - 6)      I&O's Summary    02 Aug 2022 07:01  -  03 Aug 2022 07:00  --------------------------------------------------------  IN: 480 mL / OUT: 1300 mL / NET: -820 mL        PHYSICAL EXAM:  Vital Signs Last 24 Hrs  T(C): 36.6 (03 Aug 2022 04:53), Max: 37 (02 Aug 2022 13:27)  T(F): 97.8 (03 Aug 2022 04:53), Max: 98.6 (02 Aug 2022 13:27)  HR: 61 (03 Aug 2022 04:53) (61 - 82)  BP: 101/61 (03 Aug 2022 04:53) (101/61 - 120/66)  BP(mean): --  RR: 18 (03 Aug 2022 04:53) (18 - 18)  SpO2: 95% (03 Aug 2022 04:53) (94% - 96%)    Parameters below as of 03 Aug 2022 04:53  Patient On (Oxygen Delivery Method): room air      CONSTITUTIONAL: NAD, well-groomed  EYES: conjunctiva and sclera clear  NECK: Supple, no palpable masses; no thyromegaly  RESPIRATORY: Normal respiratory effort; lungs are clear to auscultation bilaterally  CARDIOVASCULAR: normal S1 and S2, no murmur/rub/gallop; No lower extremity edema  ABDOMEN: Nontender to palpation, normoactive bowel sounds, no rebound/guarding; No hepatosplenomegaly  MUSCULOSKELETAL:  no clubbing or cyanosis of digits; no joint swelling or tenderness to palpation  PSYCH: A+O to person, place, and time; affect appropriate  NEUROLOGY: moves all extremities, follows commands, no gross sensory deficits   SKIN: No rashes; no palpable lesions    LABS:                        15.2   12.05 )-----------( 99       ( 02 Aug 2022 10:49 )             46.8     08-02    140  |  108  |  30<H>  ----------------------------<  221<H>  4.4   |  21<L>  |  0.83    Ca    7.9<L>      02 Aug 2022 10:49                COVID-19 PCR: Ovidiotec (27 Jul 2022 06:29)  SARS-CoV-2: Ovidiote (22 Jul 2022 23:51)          COMMUNICATION:  Care Discussed with Consultants/Other Providers and Details of Discussion: neurosurgery PA(Mckenzie)

## 2022-08-03 NOTE — PROGRESS NOTE ADULT - PROBLEM SELECTOR PROBLEM 7
Medication management
Urinary incontinence
Medication management
Urinary incontinence

## 2022-08-03 NOTE — PROGRESS NOTE ADULT - ASSESSMENT
82 year old male with past medical history of hypertension, hyperlipidemia, recent workup at OSH (7/15-7/22) included brain MRI and was diagnosed with glioblastoma, brought by daughter due to worsening confusion/disorientation, urinary incontinence, s/p R stereo biopsy, TIMOTHY x2, R crani for tumor debulking on 7/28/22. Transferred out of [facility] on 7/29. 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     PMD: Dr. Rust ([facility]) however, patient is no longer going there, and his daughter wants to switch PCP to a [facility] provider close by.

## 2022-08-03 NOTE — PROGRESS NOTE ADULT - THIS PATIENT HAS THE FOLLOWING CONDITION(S)/DIAGNOSES ON THIS ADMISSION:
Cerebral Edema
None
brain mass/Cerebral Edema
Cerebral Edema
in setting of brain mass/Cerebral Edema
Cerebral Edema
None
Cerebral Edema
None
None
None/Cerebral Edema

## 2022-08-03 NOTE — DISCHARGE NOTE NURSING/CASE MANAGEMENT/SOCIAL WORK - NSDCDMETYPESERV_GEN_ALL_CORE_FT
For rolling walker to be delivered to bedside prior to discharge. For shower tub bench to be delivered to home pending approval.

## 2022-08-03 NOTE — PROGRESS NOTE ADULT - PROBLEM SELECTOR PLAN 1
CTH showing 4.4 x 4.1 x 3.8 cm medial R frontal lobe mass c/w GBM  s/p R stereo biopsy, TIMOTHY x2, R crani for tumor debulking  IV abx  decadron   Keppra   aspiration precautions
Recent workup at OSH for CA. Per notes, brain MRI indicated glioblastoma.  CT Head at [facility] showed 4.4 x 4.1 x 3.8 cm medial R frontal lobe mass concerning for GBM with cerebral edema  s/p R stereo biopsy, TIMOTHY x2, R crani for tumor debulking on 7/28/22  - c/w dexamethasone 4mg iv q6h for cerebral edema  - c/w keppra for seizure ppx  - f/u path result  - post-op care as per neurosurgery team
CTH showing 4.4 x 4.1 x 3.8 cm medial R frontal lobe mass c/w GBM  s/p R stereo biopsy, TIMOTHY x2, R crani for tumor debulking  IV abx  decadron   Keppra   aspiration precautions
Recent workup at OSH for CA. Per notes, brain MRI indicated glioblastoma.  CTH at [facility] showed 4.4 x 4.1 x 3.8 cm medial R frontal lobe mass GBM.     Continue Dexamethasone and Keppra   Continue care per neuro surgery team  Neuro checks  Monitor BMP.  Plan for OR thursday
CTH showing 4.4 x 4.1 x 3.8 cm medial R frontal lobe mass c/w GBM    - plan for TIMOTHY tomorrow 7/27  TTE - EF 75%, minimal MR  RCRI Class I - no anginal symptoms, METS >4 - acceptable cardiac risk to proceed.  neuro checks  management as per NSx
Recent workup at OSH for CA. Per notes, brain MRI indicated glioblastoma.  CTH at [facility] showed 4.4 x 4.1 x 3.8 cm medial R frontal lobe mass GBM.     Continue Dexamethasone and Keppra   Continue care per neuro surgery team  Neuro checks  Monitor BMP.  Plan for OR thursday
Recent workup at OSH for CA. Per notes, brain MRI indicated glioblastoma.  CT Head at [facility] showed 4.4 x 4.1 x 3.8 cm medial R frontal lobe mass concerning for GBM with cerebral edema  s/p R stereo biopsy, TIMOTHY x2, R crani for tumor debulking on 7/28/22  - c/w dexamethasone 4mg iv q6h for cerebral edema  - c/w keppra for seizure ppx  - f/u path result  - post-op care as per neurosurgery team
CTH showing 4.4 x 4.1 x 3.8 cm medial R frontal lobe mass c/w GBM  s/p R stereo biopsy, TIMOTHY x2, R crani for tumor debulking  IV abx  decadron   Keppra   aspiration precautions
CTH showing 4.4 x 4.1 x 3.8 cm medial R frontal lobe mass c/w GBM  s/p R stereo biopsy, TIMOTHY x2, R crani for tumor debulking  completed IV abx  decadron   Keppra   aspiration precautions
Recent workup at OSH for CA. Per notes, brain MRI indicated glioblastoma.  CTH at [facility] showed 4.4 x 4.1 x 3.8 cm medial R frontal lobe mass GBM  s/p R stereo biopsy, TIMOTHY x2, R crani for tumor debulking on 7/28/22  - c/w dexamethasone for cerebral edema  - c/w keppra for seizure ppx  - post-op care as per neurosurgery team
Recent workup at OSH for CA. Per notes, brain MRI indicated glioblastoma.  CTH at [facility] showed 4.4 x 4.1 x 3.8 cm medial R frontal lobe mass GBM  s/p R stereo biopsy, TIMOTHY x2, R crani for tumor debulking on 7/28/22  - c/w dexamethasone for cerebral edema  - c/w keppra for seizure ppx  - post-op care as per neurosurgery team
CTH showing 4.4 x 4.1 x 3.8 cm medial R frontal lobe mass c/w GBM  s/p R stereo biopsy, ITMOTHY x2, R crani for tumor debulking  completed IV abx  decadron   Keppra   aspiration precautions
CTH showing 4.4 x 4.1 x 3.8 cm medial R frontal lobe mass c/w GBM  s/p R stereo biopsy, TIMOTHY x2, R crani for tumor debulking  completed IV abx  decadron   Keppra   aspiration precautions
Recent workup at OSH for CA. Per notes, brain MRI indicated glioblastoma.  CT Head at [facility] showed 4.4 x 4.1 x 3.8 cm medial R frontal lobe mass concerning for GBM with cerebral edema  s/p R stereo biopsy, TIMOTHY x2, R crani for tumor debulking on 7/28/22  - c/w dexamethasone 4mg q8h for cerebral edema  - c/w keppra for seizure ppx  - f/u path result  - post-op care as per neurosurgery team
Recent workup at OSH for CA. Per notes, brain MRI indicated glioblastoma.  CTH at [facility] showed 4.4 x 4.1 x 3.8 cm medial R frontal lobe mass GBM.     Continue Dexamethasone and Keppra   Continue care per neuro surgery team  Neuro checks  Monitor BMP.
Recent workup at OSH for CA. Per notes, brain MRI indicated glioblastoma.  CTH at [facility] showed 4.4 x 4.1 x 3.8 cm medial R frontal lobe mass GBM.     Continue Dexamethasone and Keppra   Continue care per neuro surgery team  Neuro checks  Monitor BMP.  Plan for OR thursday

## 2022-08-03 NOTE — PROGRESS NOTE ADULT - SUBJECTIVE AND OBJECTIVE BOX
SUBJECTIVE:   Ambulated with PT   OVERNIGHT EVENTS: none    Vital Signs Last 24 Hrs  T(C): 36.7 (03 Aug 2022 13:00), Max: 37 (02 Aug 2022 21:31)  T(F): 98 (03 Aug 2022 13:00), Max: 98.6 (02 Aug 2022 21:31)  HR: 69 (03 Aug 2022 13:00) (61 - 82)  BP: 110/78 (03 Aug 2022 13:00) (101/61 - 124/78)  BP(mean): --  RR: 18 (03 Aug 2022 13:00) (18 - 18)  SpO2: 97% (03 Aug 2022 13:00) (95% - 98%)    Parameters below as of 03 Aug 2022 04:53  Patient On (Oxygen Delivery Method): room air        PHYSICAL EXAM:     Neurological: Awake, alert oriented to person, place and time, Following Commands, Speech clear. Delayed response  Moving all extremities 5/5, No Drift, Sensation to Light Touch Intact    Pulmonary: Clear to Auscultation,    Cardiovascular: S1, S2, Regular Rate and Rhythm     Gastrointestinal: Soft, Nontender, Nondistended     Incision: cranial staples c/d/i      LABS:                        14.8   13.89 )-----------( 111      ( 03 Aug 2022 11:29 )             45.5    08-02    140  |  108  |  30<H>  ----------------------------<  221<H>  4.4   |  21<L>  |  0.83    Ca    7.9<L>      02 Aug 2022 10:49      IMAGING:         MEDICATIONS:    acetaminophen     Tablet .. 650 milliGRAM(s) Oral every 6 hours PRN Temp greater or equal to 38C (100.4F), Mild Pain (1 - 3)  levETIRAcetam 500 milliGRAM(s) Oral every 12 hours  ondansetron Injectable 4 milliGRAM(s) IV Push every 6 hours PRN Nausea and/or Vomiting  oxyCODONE    IR 5 milliGRAM(s) Oral every 4 hours PRN Moderate Pain (4 - 6)  losartan 25 milliGRAM(s) Oral daily  bisacodyl 5 milliGRAM(s) Oral every 12 hours PRN Constipation  pantoprazole    Tablet 40 milliGRAM(s) Oral before breakfast  polyethylene glycol 3350 17 Gram(s) Oral every 12 hours  senna 2 Tablet(s) Oral at bedtime  artificial  tears Solution 1 Drop(s) Both EYES every 4 hours  atorvastatin 80 milliGRAM(s) Oral at bedtime  benzocaine 15 mG/menthol 3.6 mG Lozenge 1 Lozenge Oral three times a day  dexAMETHasone     Tablet 4 milliGRAM(s) Oral every 8 hours  dextrose 50% Injectable 25 Gram(s) IV Push once  dextrose 50% Injectable 12.5 Gram(s) IV Push once  fondaparinux Injectable 2.5 milliGRAM(s) SubCutaneous daily  glucagon  Injectable 1 milliGRAM(s) IntraMuscular once  insulin glargine Injectable (LANTUS) 6 Unit(s) SubCutaneous at bedtime  insulin lispro (ADMELOG) corrective regimen sliding scale   SubCutaneous Before meals and at bedtime  insulin lispro Injectable (ADMELOG) 4 Unit(s) SubCutaneous before breakfast  insulin lispro Injectable (ADMELOG) 2 Unit(s) SubCutaneous before lunch  insulin lispro Injectable (ADMELOG) 2 Unit(s) SubCutaneous before dinner      DIET:

## 2022-08-03 NOTE — PROGRESS NOTE ADULT - TIME BILLING
Advanced care planning was discussed with patient and family.  Advanced care planning forms were reviewed and discussed as appropriate.  Differential diagnosis and plan of care discussed with patient after the evaluation.   Pain assessed and judicious use of narcotics when appropriate was discussed.  Importance of Fall prevention discussed.  Counseling on Smoking and Alcohol cessation was offered when appropriate.  Counseling on Diet, exercise, and medication compliance was done.
POD1 R stereo biopsy, TIMOTHY x2, R crani for tumor debulking  neuro checks q 2 hr   CT Head small intra-op bed hemorrhage   MRI brain wwo pending   decadron 6 mg q 6 hr for cerebral edema  keppra for seizure ppx  Activity: [x] OOB as tolerated [] Bedrest [x] PT [x] OT [] PMNR   follow official pathology
Advanced care planning was discussed with patient and family.  Advanced care planning forms were reviewed and discussed as appropriate.  Differential diagnosis and plan of care discussed with patient after the evaluation.   Pain assessed and judicious use of narcotics when appropriate was discussed.  Importance of Fall prevention discussed.  Counseling on Smoking and Alcohol cessation was offered when appropriate.  Counseling on Diet, exercise, and medication compliance was done.

## 2022-08-04 PROBLEM — Z00.00 ENCOUNTER FOR PREVENTIVE HEALTH EXAMINATION: Noted: 2022-01-01

## 2022-08-04 NOTE — CHART NOTE - NSCHARTNOTEFT_GEN_A_CORE
Patient was discharged on fondaparinux yesterday. Patient's serotonin release assay came back today and is negative. We recommended stopping anticoagulation if there's no other indication from neurosurgery standpoint. We tried to reach the NP, Karlee who was taking care of him yesterday on teams. We also spoke to neurosurgery resident on call today who agreed to relay this message to the patient.

## 2022-08-10 NOTE — REASON FOR VISIT
[de-identified] : Surgical biopsy and TIMOTHY [de-identified] : 7/28/2022 [de-identified] : Pathology: GBM (WHO Grade IV)

## 2022-08-10 NOTE — REASON FOR VISIT
[de-identified] : Surgical biopsy and TIMOTHY [de-identified] : 7/28/2022 [de-identified] : Pathology: GBM (WHO Grade IV)

## 2022-08-15 NOTE — HISTORY OF PRESENT ILLNESS
[FreeTextEntry1] : brain tumor [de-identified] : 82M hx HTN, HLD, R-handed, Italian speaking, recent workup at OSH (7/15-7/22) for confusion/disorientation and urinary incontinence included brain MRI and was diagnosed with large bifrontal lesion. Recent hosp adm to [facility] where ca w/u was done, CT CAP w/o abnormal findings. s/p Right  stereo biopsy, TIMOTHY x2, R crani for tumor debulking via tubular retractor on 7/28/22. Today presents for 2 week follow up. Staples removed. c/o generalized fatigue, mild SOB, abulia, HA that is improving since surgery. Denies N/V/LOC/seizure, or other concerning neurologic complaints.\par \par Neuro Exam: \par Neurologically nonfocal in Jamaican

## 2022-08-15 NOTE — ASSESSMENT
[FreeTextEntry1] : 82M hx of bifrontal GBM, s/p LITTx2 and resection, here for follow up visit. \par \par Staples removed\par social work follow up to arrange home PT\par Rolling walker\par Refer to radiation oncology and medical oncology for follow up\par \par Patient seen and discussed with Dr. Mcneil\par Spent total of 45 minutes with patient

## 2022-08-23 PROBLEM — Z83.3 FAMILY HISTORY OF DIABETES MELLITUS: Status: ACTIVE | Noted: 2022-01-01

## 2022-08-23 PROBLEM — Z78.9 CURRENT NON-DRINKER OF ALCOHOL: Status: ACTIVE | Noted: 2022-01-01

## 2022-08-23 PROBLEM — Z82.49 FAMILY HISTORY OF HYPERTENSION: Status: ACTIVE | Noted: 2022-01-01

## 2022-08-23 PROBLEM — R26.2 DIFFICULTY WALKING: Status: ACTIVE | Noted: 2022-01-01

## 2022-08-23 NOTE — DISCUSSION/SUMMARY
[FreeTextEntry1] : We discussed nature of disease and risks and benefits of treatment - recommending a 3 week course of RT with Temodar. Baseline bloodwork to be obtained today.\par Will see patient in follow up once they received the Temodar.\par Restart Keppra 500 mg bid.\par Call if any interim issues.

## 2022-08-23 NOTE — VITALS
[Maximal Pain Intensity: 0/10] : 0/10 [Least Pain Intensity: 0/10] : 0/10 [60: Requires occasional assistance, but is able to care for most of his/her needs] : 60: Requires occasional assistance, but is able to care for most of his/her needs [60: Up and around, but minimal active play; keeps busy with quieter activities.] : 60: Up and around, but minimal active play; keeps busy with quieter activities. [Date: ____________] : Patient's last distress assessment performed on [unfilled]. [0 - No Distress] : Distress Level: 0

## 2022-08-23 NOTE — HISTORY OF PRESENT ILLNESS
[FreeTextEntry1] : Patient referred by Dr. Mcneil for evaluation and management of recent diagnosis of brain tumor.\par \par \par Patient is an 83 yo right handed man whose family had noted a clinical decline over a few month period   - he has become more forgetful, was eating obsessively, and had urinary incontinence.\par \par MRI 7/24/2022 - 4.7 x 4.2 x 3.6 cm rim-enhancing necrotic mass in the midline right frontal lobe with edema.\par \par Pathology 7/28/2022 - Glioblastoma, WHO 4 - IDH wt\par \par MRI on 7/30/2022  "no significant change since 7/28/2022 after laser ablation of a midline frontal glioma."\par \par He was on DVT prophylaxis with LMWH and at that time developed progressive thrombocytopenia - He was evaluated by hematology - serotonin release assay was ok and he is no longer on anticoagulation.\par \par Post discharge, he had a decline cognitively with steroid taper and is doing much better on Decadron 4 mg bid.\par \par PMH notable for HTN, HLD, and h/o hep C treated in 2003.\par He is here with his daughter - lives with his wife - 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

## 2022-08-23 NOTE — PHYSICAL EXAM
[FreeTextEntry1] : Exam done with daughter translating Setswana to English per her father's wishes.\par He is awake and alert - naming is intact - he can follow commands across the midline.\par Oriented to month, year, President\par Memory is 3/3 at 5 minutes\par EOMI\par VFF\par Face symmetric and tongue midline\par No drift\par Strength full in UE and LE

## 2022-08-24 NOTE — HISTORY OF PRESENT ILLNESS
[de-identified] : 82 year old Thai speaking male who was noted to have thrombocytopenia after heparin exposure. 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 after stereotactic He was originally sen at [facility] and was transferred to [facility] for stereotactic biopsy of a brain frontal lobe lesion showing glioblastoma.biopsy of a frontal lobe lesion [FreeTextEntry1] : brain tumor [de-identified] : 82M hx HTN, HLD, R-handed, Lebanese speaking, recent workup at OSH (7/15-7/22) for confusion/disorientation and urinary incontinence included brain MRI and was diagnosed with large bifrontal lesion. Recent hosp adm to [facility] where ca w/u was done, CT CAP w/o abnormal findings. s/p Right  stereo biopsy, TIMOTHY x2, R crani for tumor debulking via tubular retractor on 7/28/22. Today presents for 2 week follow up. Staples removed. c/o generalized fatigue, mild SOB, abulia, HA that is improving since surgery. Denies N/V/LOC/seizure, or other concerning neurologic complaints.\par \par Neuro Exam: \par Neurologically nonfocal in Singaporean

## 2022-08-24 NOTE — REASON FOR VISIT
[Initial Consultation] : an initial consultation for [Blood Count Assessment] : blood count assessment [Coagulopathy] : coagulopathy [FreeTextEntry2] : thrombocytopenia after heparin therapy

## 2022-08-25 NOTE — HISTORY OF PRESENT ILLNESS
[Date: ____________] : Patient's last distress assessment performed on [unfilled]. [0 - No Distress] : Distress Level: 0 [60: Requires occasional assistance, but is able to care for most of his/her needs] : 60: Requires occasional assistance, but is able to care for most of his/her needs [ECOG Performance Status: 3 - Capable of only limited self care, confined to bed or chair more than 50% of waking hours] : Performance Status: 3 - Capable of only limited self care, confined to bed or chair more than 50% of waking hours [Disease:__________________________] : Disease: [unfilled] [de-identified] : 82 year male with a history of hepatitis C treated at [facility] in 2003 with  weekly injection and oral tablets for 52 weeks. \par The patient developed forgetfulness, incontinence and confusion in January 2022; he was living [place]; he was evaluated at [facility]. MR of brain performed midline.He was advised to have palliative care 07/18/2022. No paralysis\par Patient was brought to [facility] ; biopsy performed by Dr Horvath with laser and LIT;Partial resection 07/28/2022; \par diagnosis glioblastoma. He remained in the hospital for one week and discharge 08/03/2022; He developed a low platelet count after surgery ; he was on SC heparin and no bleeding was noted. He had serological testing with PF 4. There si no known exposure to heparin.He was treated with SC fondaparinux 2.5 mg and has been off therapy for now 3 weeks. \par He has not developed blood clots in his legs. He has had swelling bilaterally in his legs for 3 month. He has less walking since his craniotomy.\par He has seen Dr Borja for consideration temozolomide and  he is referred to Dr Plunkett for 15 days of radiation therapy.\par \par no history of prior cancer or of chemotherapy or radiation\par \par He has a history of hypertension 1012\par Diabetes never treated until steroid therapy 07/22\par Hypercholesterolemia 2012 [de-identified] : 4 [de-identified] : glioblastoma  [FreeTextEntry1] : fondaparinux 2.5 once daily discontinued 20 days ago 08/03/2022 [de-identified] : The patient has been sitting at home for the majority of the time with less inclination to stand; he uses a walker ; when he stands he holds on to furniture. His diet is family based low sodium. His wife cooks

## 2022-08-25 NOTE — REVIEW OF SYSTEMS
[Fatigue] : fatigue [Recent Change In Weight] : ~T recent weight change [Vision Problems] : vision problems [Lower Ext Edema] : lower extremity edema [Shortness Of Breath] : shortness of breath [SOB on Exertion] : shortness of breath during exertion [Incontinence] : incontinence [Joint Stiffness] : joint stiffness [Confused] : confusion [Dizziness] : dizziness [Difficulty Walking] : difficulty walking [Depression] : depression [Fever] : no fever [Chills] : no chills [Night Sweats] : no night sweats [Eye Pain] : no eye pain [Red Eyes] : eyes not red [Dry Eyes] : no dryness of the eyes [Dysphagia] : no dysphagia [Loss of Hearing] : no loss of hearing [Nosebleeds] : no nosebleeds [Hoarseness] : no hoarseness [Odynophagia] : no odynophagia [Mucosal Pain] : no mucosal pain [Chest Pain] : no chest pain [Palpitations] : no palpitations [Leg Claudication] : no intermittent leg claudication [Wheezing] : no wheezing [Abdominal Pain] : no abdominal pain [Vomiting] : no vomiting [Constipation] : no constipation [Diarrhea] : no diarrhea [Dysuria] : no dysuria [Joint Pain] : no joint pain [Muscle Pain] : no muscle pain [Skin Rash] : no skin rash [Skin Wound] : no skin wound [Fainting] : no fainting [Suicidal] : not suicidal [Insomnia] : no insomnia [Anxiety] : no anxiety [Proptosis] : no proptosis [Hot Flashes] : no hot flashes [Muscle Weakness] : no muscle weakness [Deepening Of The Voice] : no deepening of the voice [Easy Bleeding] : no tendency for easy bleeding [Easy Bruising] : no tendency for easy bruising [Swollen Glands] : no swollen glands [FreeTextEntry2] : lost 15 lbs [FreeTextEntry3] : blurred vision; catarracts [FreeTextEntry6] : limited mobility [de-identified] : patient is aware of the tumor

## 2022-08-25 NOTE — ASSESSMENT
[Palliative Care Plan] : not applicable at this time [FreeTextEntry1] : ART Hendrickson is a 82 year old male with a recent diagnosis of glioblastoma multiforme. He has a past history of hypercholesterolemia hypertension and while on dexamethasone to treat cerebral edema he is scheduled for treatment with temozolomide and external beam radiotherapy. There was a recent history of post operative associated heparin associated PF  seropositivity and mild lowering of platelet count. He was given low doses of fondaparinux (2.5 mg SC) daily and he is now off all anticoagulation for over 21 days. He has chronic lower extremity edema. Johnson not see venous cords nor palpate masses; I would keep him off anticoagulation at this time while brain radiation therapy is planned. He does not need doac therapy nor oral warfarin. Management discussed with the patient and the daughter. Recommend follow up in 6 weeks after period of chemotherapy radiation for glioblastoma

## 2022-08-25 NOTE — REASON FOR VISIT
[Initial Consultation] : an initial consultation for [Blood Count Assessment] : blood count assessment [Family Member] : family member [Other: _____] : [unfilled] [FreeTextEntry2] : heparin associated thrombocytopenia

## 2022-08-25 NOTE — PHYSICAL EXAM
[Capable of only limited self care, confined to bed or chair more than 50% of waking hours] : Status 3- Capable of only limited self care, confined to bed or chair more than 50% of waking hours [Thrush] : thrush [Normal] : affect appropriate [Ulcers] : no ulcers [Mucositis] : no mucositis [Vesicles] : no vesicles [de-identified] : chronic ill appearing [de-identified] : lens clouding OS [de-identified] : pulses 1 [de-identified] : right of mid line craniotomy scar healed; not shaven face. no ecchymosis no petechiae [de-identified] : able to stand with nearby help; mild dysmetria; normal finger to nose; sensation grossly normal. mild expressive aphasia

## 2022-08-25 NOTE — RESULTS/DATA
[FreeTextEntry1] : review of information in the E M HR\par 08/23/2022 review of peripheral blood smear : shows normal platelet size and count; mild neutrophilia due to use of dexamethasone\par CBC WBC 10.89 HGB 16.1  000
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
## 2022-08-26 NOTE — HISTORY OF PRESENT ILLNESS
[FreeTextEntry1] : Mr. Jordon Hendrickson is a R-handed, Serbian-speaking M who was recently admitted to an OSH from 7/15-7/22 fro confusion/disorientation and urinary incontinence. MRI brain performed and revealed a large bifrontal lesion. CT CAP was negative for abnormal findings. Patient was discharged and brought to [facility] for further management. He was treated by TIMOTHY followed by surgical resection.  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 At the hospital, he was started on steroids 4mg BID.\par \par 8/23/2022: Patient presents today for initial consultation. Video  [id number] used for visit. After completing the surgery, patient states that the symptoms are improved but continues to have symptoms. After the surgery, the steroids were discontinued due to improvement of symptoms but then he had worsening of symptoms 1 week ago including confusion and urinary incontinence. Of note, patient did have cramping L leg pain and took tylenol as well as icy/hot gel with improvement. He also had an episode of chest pain a few days after, comes and goes. He also endorses frontal headaches, comes and goes, 7/10. Also noted to have occasional resting tremor of R arm. Also notes generalized weakness. After the worsening of symptoms, he was restarted on the dexamethasone 4mg BID. He was on keppra but stopped at last visit with Dr. Mcneil. \par

## 2022-08-26 NOTE — REVIEW OF SYSTEMS
[SOB on Exertion] : shortness of breath during exertion [Negative] : ENT [Fever] : no fever [Chills] : no chills [Night Sweats] : no night sweats [Dizziness] : no dizziness [Fainting] : no fainting [FreeTextEntry3] : constant blurry vision, both eyes [FreeTextEntry5] : had chest pain this weekend while lying down [FreeTextEntry7] : denies fecal incontinence [FreeTextEntry8] : urinary incontinence [de-identified] : headaches persistent, on and off

## 2022-09-08 PROBLEM — R53.81 MALAISE AND FATIGUE: Status: ACTIVE | Noted: 2022-01-01

## 2022-09-08 PROBLEM — D17.1 LIPOMA OF BACK: Status: ACTIVE | Noted: 2022-01-01

## 2022-09-08 PROBLEM — E78.5 HYPERLIPIDEMIA: Status: ACTIVE | Noted: 2022-01-01

## 2022-09-08 PROBLEM — F32.2 SEVERE MAJOR DEPRESSION: Status: ACTIVE | Noted: 2022-01-01

## 2022-09-08 PROBLEM — Z86.2 HISTORY OF HEPARIN-INDUCED THROMBOCYTOPENIA: Status: RESOLVED | Noted: 2022-01-01 | Resolved: 2022-01-01

## 2022-09-08 PROBLEM — R63.4 WEIGHT LOSS: Status: ACTIVE | Noted: 2022-01-01

## 2022-09-08 PROBLEM — E53.8 LOW VITAMIN B12 LEVEL: Status: ACTIVE | Noted: 2022-01-01

## 2022-09-08 PROBLEM — K42.9 UMBILICAL HERNIA: Status: ACTIVE | Noted: 2022-01-01

## 2022-09-08 PROBLEM — E11.65 TYPE 2 DIABETES MELLITUS WITH HYPERGLYCEMIA, WITHOUT LONG-TERM CURRENT USE OF INSULIN: Status: ACTIVE | Noted: 2022-01-01

## 2022-09-08 PROBLEM — M79.89 LEG SWELLING: Status: ACTIVE | Noted: 2022-01-01

## 2022-09-08 PROBLEM — Z00.00 ENCOUNTER FOR PREVENTIVE HEALTH EXAMINATION: Status: ACTIVE | Noted: 2022-01-01

## 2022-09-08 PROBLEM — Z86.19 HISTORY OF HEPATITIS C VIRUS INFECTION: Status: RESOLVED | Noted: 2022-01-01 | Resolved: 2022-01-01

## 2022-09-08 PROBLEM — I10 BENIGN ESSENTIAL HYPERTENSION: Status: ACTIVE | Noted: 2022-01-01

## 2022-09-08 NOTE — HISTORY OF PRESENT ILLNESS
[de-identified] : 82 year M with PMH GBM, HTN, T2DM (drug induced), low B12, and depression presents for initial CPE.

## 2022-09-08 NOTE — PHYSICAL EXAM
[No Acute Distress] : no acute distress [Well Nourished] : well nourished [Well Developed] : well developed [Well-Appearing] : well-appearing [Normal Sclera/Conjunctiva] : normal sclera/conjunctiva [PERRL] : pupils equal round and reactive to light [EOMI] : extraocular movements intact [Normal Outer Ear/Nose] : the outer ears and nose were normal in appearance [Normal Oropharynx] : the oropharynx was normal [Normal TMs] : both tympanic membranes were normal [No JVD] : no jugular venous distention [No Lymphadenopathy] : no lymphadenopathy [Supple] : supple [Thyroid Normal, No Nodules] : the thyroid was normal and there were no nodules present [No Respiratory Distress] : no respiratory distress  [No Accessory Muscle Use] : no accessory muscle use [Clear to Auscultation] : lungs were clear to auscultation bilaterally [Normal Rate] : normal rate  [Regular Rhythm] : with a regular rhythm [Normal S1, S2] : normal S1 and S2 [No Murmur] : no murmur heard [No Abdominal Bruit] : a ~M bruit was not heard ~T in the abdomen [No Varicosities] : no varicosities [No Palpable Aorta] : no palpable aorta [Soft] : abdomen soft [Non Tender] : non-tender [Non-distended] : non-distended [No Masses] : no abdominal mass palpated [No HSM] : no HSM [Normal Bowel Sounds] : normal bowel sounds [Normal Posterior Cervical Nodes] : no posterior cervical lymphadenopathy [Normal Anterior Cervical Nodes] : no anterior cervical lymphadenopathy [No CVA Tenderness] : no CVA  tenderness [No Spinal Tenderness] : no spinal tenderness [No Joint Swelling] : no joint swelling [Grossly Normal Strength/Tone] : grossly normal strength/tone [No Rash] : no rash [Coordination Grossly Intact] : coordination grossly intact [No Focal Deficits] : no focal deficits [Normal Affect] : the affect was normal [Normal Insight/Judgement] : insight and judgment were intact [de-identified] : b/l LE edema +2 pitting [de-identified] : walker

## 2022-09-08 NOTE — HEALTH RISK ASSESSMENT
[Good] : ~his/her~  mood as  good [Never] : Never [No] : In the past 12 months have you used drugs other than those required for medical reasons? No [No falls in past year] : Patient reported no falls in the past year [Assistive Device] : Patient uses an assistive device [3] : 2) Feeling down, depressed, or hopeless for nearly every day (3) [PHQ-2 Positive] : PHQ-2 Positive [PHQ-9 Positive] : PHQ-9 Positive [Patient reported colonoscopy was normal] : Patient reported colonoscopy was normal [None] : None [With Family] : lives with family [Retired] : retired [] :  [# Of Children ___] : has [unfilled] children [Reports normal functional visual acuity (ie: able to read med bottle)] : Reports normal functional visual acuity [With Patient/Caregiver] : , with patient/caregiver [Designated Healthcare Proxy] : Designated healthcare proxy [Name: ___] : Health Care Proxy's Name: [unfilled]  [Relationship: ___] : Relationship: [unfilled] [Audit-CScore] : 0 [de-identified] : Royce [[id number]] : 6 [Reports changes in hearing] : Reports no changes in hearing [Reports changes in vision] : Reports no changes in vision [ColonoscopyDate] : 01/14 [de-identified] : Assistance needed [de-identified] : Assistance needed [AdvancecareDate] : 09/22

## 2022-09-08 NOTE — PLAN
[FreeTextEntry1] : Routine blood work and urine today. Check ECG today. c/w heme/onc plan. Treat comorbid conditions. Pt advised to sign up for [facility] portal to review labs and communicate any questions or concerns directly. Yearly physical and return as needed for illness, medication refills, and new or existing complaints. f/u 3 months.

## 2022-09-08 NOTE — INTERPRETER SERVICES
[Ad Hoc ] : provided by an ad hoc  [Interpreters_Relationshiptopatient] : Daughter [TWNoteComboBox1] : Martiniquais

## 2022-09-14 PROBLEM — F41.9 ANXIETY AND DEPRESSION: Status: ACTIVE | Noted: 2022-01-01

## 2022-09-14 NOTE — DISCUSSION/SUMMARY
[FreeTextEntry1] : Patient seen and examined\par GLIOMA - Neurologically not worse. Will raise steroids to 8-4 mg. Patient to start ChemoRT on 9/16/2022.\par DEPRESSION - Will start on Fluoxetine 20 mg daily . Risk and benefits explained.\par FOLLOW UP - WIll do a clinical follow up on 9/21 at 9:30 am. In the interim, if any concerns patient knows to call our office

## 2022-09-14 NOTE — HISTORY OF PRESENT ILLNESS
[FreeTextEntry1] : Mr. Hendrickson is a 81 yo male who is currenlt undergoing treatments for his newly diagnosed GBM. \par \par In brief\par \par Patient is an 81 yo right handed man whose family had noted a clinical decline over a few month period - he has become more forgetful, was eating obsessively, and had urinary incontinence.\par  7/24/2022 MRI revealed  - 4.7 x 4.2 x 3.6 cm rim-enhancing necrotic mass in the midline right frontal lobe with edema.\par \par 7/28/2022 -Pathology - Glioblastoma, WHO 4 - IDH wt\par \par  7/30/2022 - MRI revealed no significant change since 7/28/2022 after laser ablation of a midline frontal glioma."\par \par He was on DVT prophylaxis with LMWH and at that time developed progressive thrombocytopenia - He was evaluated by hematology - serotonin release assay was ok and he is no longer on anticoagulation.\par \par 8/23/2022 - Recommended 3 week of ChemoRT\par 9/14/2022- Here for a follow up. Daughter reports for the past few days she noted he has generalized weakness. She states that he do not have interest in participating in any activities at home nor engaging in conversations. He spends most of the time in bed. He was given an extra 4 mg of dexamethasone on Monday with minimal effectiveness. He is now mostly in wheel chair as he can't really get up without help. \par  \par

## 2022-09-14 NOTE — PHYSICAL EXAM
[General Appearance - Alert] : alert [General Appearance - In No Acute Distress] : in no acute distress [] : no respiratory distress [Respiration, Rhythm And Depth] : normal respiratory rhythm and effort [Exaggerated Use Of Accessory Muscles For Inspiration] : no accessory muscle use [FreeTextEntry1] : Exam done with daughter translating Italian to English per her father's wishes.\par He is awake and alert , appears anxious \par Speech clear in his native language\par Oriented to month, year, President\par EOMI, VFF\par Face symmetric and tongue midline\par COLES X 4, LE weak - But with effort able to lift and hold his leg\par Heel tap ok bilaterally

## 2022-09-20 NOTE — PHYSICAL EXAM
[] : no respiratory distress [Heart Rate And Rhythm] : heart rate and rhythm were normal [Heart Sounds] : normal S1 and S2 [Motor Tone] : muscle strength and tone were normal [Normal] : oriented to person, place and time, the affect was normal, the mood was normal and not anxious [de-identified] : no calf tenderness [de-identified] : LE pitting edema present

## 2022-09-20 NOTE — REVIEW OF SYSTEMS
[SOB on Exertion] : shortness of breath during exertion [Negative] : ENT [Fever] : no fever [Chills] : no chills [Night Sweats] : no night sweats [Dizziness] : no dizziness [Fainting] : no fainting [FreeTextEntry3] : constant blurry vision, both eyes [FreeTextEntry7] : one episode of fecal incontinence this week [FreeTextEntry8] : urinary incontinence worsening [de-identified] : denies headaches. sometimes says things that make him seem confused.

## 2022-09-20 NOTE — DISEASE MANAGEMENT
[Clinical] : TNM Stage: c [N/A] : Currently not applicable [TTNM] : x [NTNM] : x [MTNM] : x [de-identified] : 534 cgy [de-identified] : 4005 cgy [de-identified] : right partial brain

## 2022-09-20 NOTE — HISTORY OF PRESENT ILLNESS
[FreeTextEntry1] : Mr. Jordon Hendrickson is a R-handed, Malay-speaking M who was recently admitted to an OSH from 7/15-7/22 fro confusion/disorientation and urinary incontinence. MRI brain performed and revealed a large bifrontal lesion. CT CAP was negative for abnormal findings. Patient was discharged and brought to [facility] for further management. He was treated by TIMOTHY followed by surgical resection.  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 At the hospital, he was started on steroids 4mg BID.\par \par 8/23/2022: Patient presents today for initial consultation. Video  [id number] used for visit. After completing the surgery, patient states that the symptoms are improved but continues to have symptoms. After the surgery, the steroids were discontinued due to improvement of symptoms but then he had worsening of symptoms 1 week ago including confusion and urinary incontinence. Of note, patient did have cramping L leg pain and took tylenol as well as icy/hot gel with improvement. He also had an episode of chest pain a few days after, comes and goes. He also endorses frontal headaches, comes and goes, 7/10. Also noted to have occasional resting tremor of R arm. Also notes generalized weakness. After the worsening of symptoms, he was restarted on the dexamethasone 4mg BID. He was on keppra but stopped at last visit with Dr. Mcneil. \par \par \par 9/20/2022- Mr. Hendrickson presents today for on treatment visit. he has completed 534/4005 cgy of radiation to the right partial brain. he i son dexamethasone 8mg in AM, 4mg in PM. His daughter is present and assists with translation. no headaches. generally he has been slower since early september. he moves more slowly and he sometimes seems more confused. no headaches, nausea, or vomiting, has some urinary incontinence which is worsening. bilateral legs are swollen.

## 2022-09-21 PROBLEM — C71.9 GBM (GLIOBLASTOMA MULTIFORME): Status: ACTIVE | Noted: 2022-01-01

## 2022-09-21 PROBLEM — Z78.9 NON-SMOKER: Status: ACTIVE | Noted: 2022-01-01

## 2022-09-21 NOTE — PHYSICAL EXAM
[General Appearance - Alert] : alert [Affect] : the affect was normal [Person] : oriented to person [Time] : oriented to time [Motor Handedness Right-Handed] : the patient is right hand dominant [] : no respiratory distress [Respiration, Rhythm And Depth] : normal respiratory rhythm and effort [Exaggerated Use Of Accessory Muscles For Inspiration] : no accessory muscle use [Heart Rate And Rhythm] : heart rate was normal and rhythm regular [Place] : disoriented to place [FreeTextEntry1] : bilateral pedal edema +4

## 2022-09-21 NOTE — DISCUSSION/SUMMARY
[FreeTextEntry1] : Patient seen and examined\par GLIOMA - Neurologically not worse. Continue dexamethasone 4 mg bid. Continue ChemoRT. Reinforced weekly blood works\par LE SWELLING - Patient to get LE doppler today. Follow up results\par FOLLOW UP - WIll do a clinical follow up on 10/6/2022 . In the interim, if any concerns patient knows to call our office. \par

## 2022-09-21 NOTE — HISTORY OF PRESENT ILLNESS
[FreeTextEntry1] : Jordon Hendrickson is an 82-year-old man with recent decline in mental status and gait.  He was found to have a large partially enhancing lesion  primarily in his medial right frontal lobe, but with involvement of the corpus callosum.  The appearance was consistent with a high-grade glioma.  He improved clinically on steroids.  The proposed surgical plan, which was accepted by the patient and his  family was to perform a stereotactic biopsy and also TIMOTHY of the lesion with an immediate return to the operating room to remove the now treated tumor.\par \par On 7/28/22 he underwent a right frontal craniotomy and removal of lesion with microsurgery and stereotactic volumetric guidance.  WHO Grade 4.  Right frontal incision is well healed.\par \par Today he presents for a post op visit with daughter and wife.  Reports that over Labor Day weekend patient started feeling weak and has not been walking independently since then.  He also has periods of confusion, bladder and bowel incontinence.  Pt started taking Temozolomide on 9/15/22 - under the care of Dr. Borja.  \par \par Pt has +4 pedal edema bilaterally - pending ultrasound of BLE's today.

## 2022-09-21 NOTE — ASSESSMENT
[FreeTextEntry1] : IMPRESSION:\par \par \par \par PLAN:\par 1. Pending ultrasound of LE's and brain MRI.\par

## 2022-09-21 NOTE — PHYSICAL EXAM
[General Appearance - Alert] : alert [General Appearance - In No Acute Distress] : in no acute distress [General Appearance - Well Nourished] : well nourished [Oriented To Time, Place, And Person] : oriented to person, place, and time [Impaired Insight] : insight and judgment were intact [Affect] : the affect was normal [] : no respiratory distress [Respiration, Rhythm And Depth] : normal respiratory rhythm and effort [Exaggerated Use Of Accessory Muscles For Inspiration] : no accessory muscle use [___ +] : bilateral [unfilled]+ pitting edema to the ankles [FreeTextEntry1] : LE weakness

## 2022-09-21 NOTE — HISTORY OF PRESENT ILLNESS
[FreeTextEntry1] : Mr. Hendrickson is a 83 yo male who is currently undergoing treatments for his newly diagnosed GBM. \par \par In brief,\par \par Patient is an 83 yo right handed man whose family had noted a clinical decline over a few month period - he has become more forgetful, was eating obsessively, and had urinary incontinence.\par  7/24/2022 MRI revealed - 4.7 x 4.2 x 3.6 cm rim-enhancing necrotic mass in the midline right frontal lobe with edema.\par \par 7/28/2022 -Pathology - Glioblastoma, WHO 4 - IDH wt\par \par  7/30/2022 - MRI revealed no significant change since 7/28/2022 after laser ablation of a midline frontal glioma."\par \par He was on DVT prophylaxis with LMWH and at that time developed progressive thrombocytopenia - He was evaluated by hematology - serotonin release assay was ok and he is no longer on anticoagulation.\par \par 8/23/2022 - Recommended 3 week of ChemoRT\par 9/14/2022 - Increased fatigue - Raised dexamethasone to 8-4 mg and started on Fluoxetine 20 mg\par 9/16/2022 - STarted ChemoRT\par 9/21/2022 - Patient here for a follow up. Reports raising steroids to 8-4 mg didn’t make a difference , so they started on Dexamethasone 4-4 mg starting today. He requires 2 person assist to ambulate. His LE are swollen and is scheduled for an ultrasound today. According to daughter he has little or no interest in participating in any activities or conversations\par

## 2022-09-26 NOTE — H&P ADULT - PROBLEM SELECTOR PLAN 10
Diet: NPO for now, pending GI eval  DVT ppx: hold chemical ppx given GIB, SCD's in LLE (not in RLE given DVT)  Dispo: pending clinical course

## 2022-09-26 NOTE — H&P ADULT - ASSESSMENT
82M, Indonesian-speaking, with PMH Glioblastoma (diagnosed 7/2022, s/p R stereo biopsy, TIMOTHY x2, R crani for tumor debulking 7/28/22, on temozolomide (last dose 4 days ago)), HLD, steroid-induced diabetes, and R popliteal DVT (found 9/21/22, not on AC due to thrombocytopenia) presenting with worsening weakness i/s/o dark stools x 5 days.  82M, Japanese-speaking, with PMH Glioblastoma (diagnosed 7/18/22, s/p R stereo biopsy, TIMOTHY x2, R crani for tumor debulking 7/28/22, on temozolomide (last dose 4 days ago)), HLD, steroid-induced diabetes, and R popliteal DVT (found 9/21/22, not on AC due to thrombocytopenia) presenting with worsening LE weakness i/s/o dark stools x 5 days.  82M, Irish-speaking, with PMH Glioblastoma (diagnosed 7/18/22, s/p R stereo biopsy, TIMOTHY x2, R crani for tumor debulking 7/28/22, on temozolomide (last dose 4 days ago)), HLD, steroid-induced diabetes, and R popliteal DVT (found 9/21/22, not on AC due to thrombocytopenia) presenting with worsening LE weakness i/s/o dark stools x 5 days. Admitted to medicine for further workup.  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 Admitted to medicine for further workup.

## 2022-09-26 NOTE — ED ADULT NURSE NOTE - OBJECTIVE STATEMENT
Pt is an 81 yo M who was brought to the ED from [facility] via EMS c/o abd pain and bloody stools x 2 days. Pt went for radiation therapy today, treatment not done due to distended abd and bloody stools. Pt dx with RLE DVT on 9/21 and was started on Lovenox. Pt took Lovenox for 2 days only  then started having bloody stools. Denies dizziness/lightheadedness, no cp/sob, no n/v/d. Pt Lao speaking, daughter interprets, states pt "doesn't feel like talking today". Pt is an 81 yo M who was brought to the ED from [facility] via EMS c/o abd pain and bloody stools x 2 days. Pt went for radiation therapy today, treatment not done due to distended abd and bloody stools. Pt dx with RLE DVT on 9/21 and was started on Lovenox. Pt took Lovenox for 2 days only  then started having bloody stools. Denies dizziness/lightheadedness, no cp/sob, no n/v/d. Pt Vietnamese speaking, daughter interprets, states pt "doesn't feel like talking today". A/O to person, place.

## 2022-09-26 NOTE — H&P ADULT - PROBLEM SELECTOR PLAN 6
diagnosed 7/18/22, s/p R stereo biopsy, TIMOTHY x2, R crani for tumor debulking 7/28/22, on temozolomide (last dose 4 days ago)  - follows Dr. Mcneil and Dr. Borja outpt diagnosed 7/18/22, s/p R stereo biopsy, TIMOTHY x2, R crani for tumor debulking 7/28/22, on temozolomide (last dose 4 days ago)  - follows Dr. Mcneil and Dr. Borja outpt  - CT here showing possible residual or remanent tumor. Pt was originally planned for MRI brain outpt, will obtain here   - neuro surg following here, appreciate recs   - c/w dexamethasone 4mg BID, keppra 500mg BID troponin mildly elevated to 65. No CP, EKG showing ____  - repeat q6h until downtrend troponin mildly elevated to 65. No CP, EKG showing NSR, HR 85  - TTE (7/23) EF 75%, hyperdynamic LV sysfxn, nl RV size/function   - repeat q6h until downtrend

## 2022-09-26 NOTE — ED PROVIDER NOTE - NS ED ROS FT
GENERAL: No fever or chills  EYES: No change in vision  HEENT: No trouble swallowing or speaking  CARDIAC: No chest pain  PULMONARY: No cough  GI: No abdominal pain, no nausea or no vomiting, no diarrhea or constipation  : No changes in urination  SKIN: No rashes  NEURO: No headache, no numbness  MSK: No joint pain  Otherwise as HPI or negative.

## 2022-09-26 NOTE — CONSULT NOTE ADULT - SUBJECTIVE AND OBJECTIVE BOX
82M Micronesian speaking s/p R stereo biopsy, TIMOTHY x2, R crani for tumor debulking of GBM (Dr. Mcneil 7.28.22) p/w worsening weakness since Sept 5, most notably over the past week. He's had 6 rounds of radiation and was on temozolomide (last dose 4 days ago; stopped due to low PLT count). He was started on lovenox for RLE DVT (above knee popliteal) but this was also stopped due to low PLT count. Also has dark stools; positive occult blood test here in ED. Patient was planned to get an MRI this week. Head CT shows c/f possible area of residual or recurrent tumor.    --Anticoagulation--    T(C): 37.2 (09-26-22 @ 14:30), Max: 37.2 (09-26-22 @ 14:30)  HR: 90 (09-26-22 @ 13:07) (90 - 90)  BP: 124/84 (09-26-22 @ 13:07) (124/84 - 124/84)  RR: 16 (09-26-22 @ 13:07) (16 - 16)  SpO2: 97% (09-26-22 @ 13:07) (97% - 97%)  Wt(kg): --    Exam: AO2 (knows year, not month), PERRL, EOMI, no droop/drift, BUE 5/5, b/l swollen, b/l HF 2/5, R KE 4/5 w/assistance, L KE 2/5, b/l DF/PF 5/5

## 2022-09-26 NOTE — CONSULT NOTE ADULT - ASSESSMENT
Madhavi Jordon  82M Telugu speaking s/p R stereo biopsy, TIMOTHY x2, R crani for tumor debulking of GBM (Dr. Mcneil 7.28.22) p/w worsening weakness since Sept 5, most notably over the past week. He's had 6 rounds of radiation and was on temozolomide (last dose 4 days ago; stopped due to low PLT count). He was started on lovenox for RLE DVT (above knee popliteal) but this was also stopped due to low PLT count. Also has dark stools; positive occult blood test here in ED. Patient was planned to get an MRI this week. Head CT shows c/f possible area of residual or recurrent tumor. Exam: AO2 (knows year, not month), PERRL, EOMI, no droop/drift, BUE 5/5, b/l swollen, b/l HF 2/5, R KE 4/5 w/assistance, L KE 2/5, b/l DF/PF 5/5     - Admit to medicine for w/u of hematochezia  - Heme/Onc consult for thrombocytopenia and AC recs for above knee DVT  - MRI brain w/wo   - CT Lumbar spine w/con to r/o spinal pathology

## 2022-09-26 NOTE — ED PROVIDER NOTE - ATTENDING CONTRIBUTION TO CARE
Attending MD Monisha Ma:  I personally have seen and examined this patient.  Resident note reviewed and agree on plan of care and except where noted.  See HPI, PE, and MDM for details.

## 2022-09-26 NOTE — ED PROVIDER NOTE - PHYSICAL EXAMINATION
Gen: NAD. A&Ox4. Non-toxic appearing.  HEENT: Normocephalic and atraumatic. PERRL, EOMI, no nasal discharge, mucous membranes moist, no scleral icterus.  CV: Regular rate and rhythm, +S1/S2, no M/R/G. No significant lower extremity edema. Radial and DP pulses present and symmetrical. Capillary refill less than 2 seconds.  Resp: Normal effort and rate. CTAB, no rales, rhonchi, or wheezes.  GI: Abdomen soft, non-distended, non tender to palpation. No masses appreciated.   MSK: 2/5 strength of b/l hips. no joint tenderness.  Neuro: Following commands, speaking in full sentences, moving extremities spontaneously. CN II-XII intact. Strength and sensation symmetric and intact throughout. Gait normal.  Psych: Appropriate mood, cooperative Gen: NAD. A&Ox4. Non-toxic appearing.  HEENT: Normocephalic and atraumatic. PERRL, EOMI, no nasal discharge, mucous membranes moist, no scleral icterus.  CV: Regular rate and rhythm, +S1/S2, no M/R/G. No significant lower extremity edema. Radial and DP pulses present and symmetrical. Capillary refill less than 2 seconds.  Resp: Normal effort and rate. CTAB, no rales, rhonchi, or wheezes.  GI: Abdomen soft, non-distended, non tender to palpation. No masses appreciated.   Rectal: Chaperoned by ELSA Brandt. Good rectal tone. No sarah beth blood. Dark stool present.  MSK: 2/5 strength of b/l hips. no joint tenderness.  Neuro: Following commands, speaking in full sentences, moving extremities spontaneously. CN II-XII intact. Strength and sensation symmetric and intact throughout. Gait normal.  Psych: Appropriate mood, cooperative Gen: NAD. A&Ox4. Non-toxic appearing.  HEENT: Normocephalic and atraumatic. PERRL, EOMI, no nasal discharge, mucous membranes moist, no scleral icterus.  CV: Regular rate and rhythm, +S1/S2, no M/R/G. No significant lower extremity edema. Radial and DP pulses present and symmetrical. Capillary refill less than 2 seconds.  Resp: Normal effort and rate. CTAB, no rales, rhonchi, or wheezes.  GI: Abdomen soft, non-distended, non tender to palpation. No masses appreciated.   Rectal: Chaperoned by ELSA Brandt. Good rectal tone. No sarah beth blood. Dark stool present.  MSK: 2/5 strength of b/l hips. no joint tenderness.  Neuro: Following commands, speaking in full sentences, moving extremities spontaneously. CN II-XII intact. Strength and sensation symmetric and intact throughout. Gait normal.  Psych: Appropriate mood, cooperative  Attending Monisha Ma: Gen: NAD, heent: atrauamtic, eomi, perrla, mmm, op pink, , neck; nttp, no nuchal rigidity, chest: nttp, no crepitus, cv: rrr, , lungs: ctab, abd: soft, nontender, nondistended, no peritoneal signs,  no guarding, ext: wwp, neg homans, skin: no rash, neuro: awake and alert, following commands,moving lower extremities but does endorse feeling weak, sensation intact

## 2022-09-26 NOTE — ED PROVIDER NOTE - CLINICAL SUMMARY MEDICAL DECISION MAKING FREE TEXT BOX
81 y/o M PMHx of glioblastoma s/p 7 rounds of radiation and chemo on temozolomide last dose 4 days ago, RLE DVT not on anticoagulants presents with worsening b/l lower extremity weakness. Concern for growth in glioblastoma. Patient has good rectal tone and is no urinary retention, lower suspicion for cauda equina. Will r/o electrolyte abnormality, infection. Labs, imaging, fluids, neurosurg consult. 83 y/o M PMHx of glioblastoma s/p 7 rounds of radiation and chemo on temozolomide last dose 4 days ago, RLE DVT not on anticoagulants presents with worsening b/l lower extremity weakness. Concern for growth in glioblastoma. Patient has good rectal tone and is no urinary retention, lower suspicion for cauda equina. Will r/o electrolyte abnormality, infection. Labs, imaging, fluids, neurosurg consult.  Attending Monisha Ma: 83 yo male with multiple medical isubrought in by es brought in by family for concern for worsing lower extremity weakness and dark stools. pt was previously diagnosed with a dvt. has been having increased weakness since labor day. no falls or trauma. family concern as noticed stools were darker. upon arrival pt hemodynamically stable. on exam pt with intact sensation. but does endorse some weakness. rectal exam with preserved tone and no evidence of urinary retention on pocus. no fevers or chills. with h/o malignancy consider worsening or progression, will obtain ct head, consider ct of spine to further evaluate. pt is due for MRI which hewill also prob need. will d/w neurosrugery as has been following him. additionally with dark stools, will check guiacm hemoglobin, consider PPI.

## 2022-09-26 NOTE — H&P ADULT - PROBLEM SELECTOR PLAN 3
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 plts 101 on admission, improved from 77 on 9/22.   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 Per daughter only took 1 or 2 doses of the lovenox, so thrombocytopenia more likely 2/ temozolomide, which was also discontinued at that time  - Plts now improved after stopping AC and chemo   - cont to monitor pt w/ h/o thrombocytopenia 2/2 HIT, then likely AC and chemotherapy. plts 101 on admission, improved from 77 on 9/22.   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 Silva)  - On 9/21, he was found to have a DVT and was started on Lovenox, which was quickly discontinued after outpt labs showed plt of 77 < 172 (8/2022). Per daughter only took 1 or 2 doses of the lovenox, so thrombocytopenia more likely 2/ temozolomide, which was also discontinued at that time  - Plts now improved after stopping AC and chemo   - cont to monitor

## 2022-09-26 NOTE — H&P ADULT - NSHPLABSRESULTS_GEN_ALL_CORE
.  LABS:                         13.7   10.44 )-----------( 101      ( 26 Sep 2022 14:31 )             41.3     09-26    140  |  101  |  28<H>  ----------------------------<  245<H>  4.6   |  26  |  0.83    Ca    9.1      26 Sep 2022 14:31  Phos  2.4     09-26  Mg     1.7     09-26    TPro  5.7<L>  /  Alb  3.4  /  TBili  0.5  /  DBili  x   /  AST  43<H>  /  ALT  74<H>  /  AlkPhos  93  09-26    PT/INR - ( 26 Sep 2022 14:31 )   PT: 13.2 sec;   INR: 1.15 ratio         PTT - ( 26 Sep 2022 14:31 )  PTT:20.6 sec        RADIOLOGY, EKG & ADDITIONAL TESTS: Reviewed.   < from: CT Head No Cont (09.26.22 @ 16:10) >      ACC: [id number] EXAM:  CT BRAIN                          PROCEDURE DATE:  09/26/2022          INTERPRETATION:  Clinical indication: Brain cancer.    Multiple axial sections were performed from base of vertex without   contrast enhancement. Coronal and sagittal destructions were performed as   well    This exam is compared with prior head CT performed on August 23, 2022.    Postoperative changes compatible with a high right frontal craniotomy is   again seen. There is also evidence of low-attenuation in the postop bed   region which could be compatible with area of fat packing. Just medial to   the postop bed there is peripheral area of high attenuation identified   which could be compatible with residual recurrent tumor. This finding   measures approximately 2.0 x 2.0 cm. This area is slightly more   conspicuous when compared with the prior exam. Surrounding edema is   identified. Contrast enhanced MRI can be done to better evaluate this   finding if there are no contraindications. Localized mass effect is seen.   No some shift or herniation is seen.    Tiny extra-axial area of high attenuation seen in the right frontal   cortex (postoperative region) this could be compatible with a small   osseous fragment.    The visualized paranasal sinuses mastoid and middle ear regions appear   clear.    IMPRESSION: Postop changes as described above.    Possible area of residual or recurrent tumor suspected. Contrast enhanced   MRI of the brain is recommended for further evaluation. No   contraindications.    --- End of Report ---            SBAINA WHARTON MD; Attending Radiologist  This document has been electronically signed. Sep 26 2022  4:20PM    < end of copied text >    < from: Xray Chest 1 View AP/PA (09.26.22 @ 14:44) >        < end of copied text >    < from: Xray Chest 1 View AP/PA (09.26.22 @ 14:44) >    IMPRESSION:  Left basilar patchy atelectasis. Low lung volumes.    --- End of Report ---          CHELO ANDERSON MD; Resident Radiologist  This document has been electronically signed.  LINDEN JUDGE MD; Attending Radiologist  This document has been electronically signed. Sep 26 2022  3:23P LABS:                       13.7   10.44 )-----------( 101      ( 26 Sep 2022 14:31 )             41.3     09-26    140  |  101  |  28<H>  ----------------------------<  245<H>  4.6   |  26  |  0.83    Ca    9.1      26 Sep 2022 14:31  Phos  2.4     09-26  Mg     1.7     09-26    TPro  5.7<L>  /  Alb  3.4  /  TBili  0.5  /  DBili  x   /  AST  43<H>  /  ALT  74<H>  /  AlkPhos  93  09-26    PT/INR - ( 26 Sep 2022 14:31 )   PT: 13.2 sec;   INR: 1.15 ratio    PTT - ( 26 Sep 2022 14:31 )  PTT:20.6 sec    RADIOLOGY, EKG & ADDITIONAL TESTS: Reviewed.   CT BRAIN:  IMPRESSION:   - Postop changes as described in full report  - Possible area of residual or recurrent tumor suspected. Contrast enhanced MRI of the brain is recommended for further evaluation. No contraindications.    Xray Chest 1 View AP/PA (09.26.22 @ 14:44)  IMPRESSION:  - Left basilar patchy atelectasis. Low lung volumes.    [X] Imaging personally reviewed

## 2022-09-26 NOTE — H&P ADULT - HISTORY OF PRESENT ILLNESS
HPI: AD is a 81 y/o M PMHx of PMH Glioblastoma (diagnosed 7/18/22, s/p R stereo biopsy, TIMOTHY x2, R crani for tumor debulking 7/28/22, on temozolomide (last dose 4 days ago)), HLD, steroid-induced diabetes, and R popliteal DVT (found 9/21/22, not on AC due to thrombocytopenia) presenting with worsening weakness and dark stools x 5 days. Patient's daughter states he has had loose dark brown stools for the past 4 weeks intermittently, and that they noticed the stool became darker yesterday. Patient denies stomach pain, nausea, or vomiting, but endorses he has had little appetite for food or fluids. Patient also reported bilateral leg swelling, weakness with moving legs, and difficulty ambulating for the past 3 weeks, acutely worsening a week ago. Patient was found to have a DVT in his RLE 6 days ago at his radiation clinic, confirmed with doppler. Patient denies leg pain, but endorses SOB, especially on exertion and orthopnea. Patient was started on Lovenox and took 2 doses, but stopped anticoagulation along with the Temozolomide when his platelets were found to be low at 77, 4 days ago. Patient endorses that he has noticed bruising on his arms and stomach (where Lovenox was injected), but denies any trauma. Patient was urged to present to the ED by nurse practitioner at his radiation clinic given worsening weakness, decreased PO intake, and dark stools. Patient denies chest pain, fever, nausea, vomiting, abdominal pain, back pain. Patient is followed by Dr. Mcneil and Dr. Borja.    In ED, VS: afebrile, /84, HR 90, RR 16, SpO2 97%  Labs significant for hgb 13.7 (baseline 16.5 9/22/22), thrombocytopenia 101 (improved from 9/22  HPI: AD is a 81 y/o M PMHx of PMH Glioblastoma (diagnosed 7/18/22, s/p R stereo biopsy, TIMOTHY x2, R crani for tumor debulking 7/28/22, on temozolomide (last dose 4 days ago)), HLD, steroid-induced diabetes, and R popliteal DVT (found 9/21/22, not on AC due to thrombocytopenia) presenting with worsening weakness and dark stools x 5 days. Patient's daughter states he has had loose dark brown stools for the past 4 weeks intermittently, and that they noticed the stool became darker yesterday. Patient denies stomach pain, nausea, or vomiting, but endorses he has had little appetite for food or fluids. Patient also reported bilateral leg swelling, weakness with moving legs, and difficulty ambulating for the past 3 weeks, acutely worsening a week ago. Patient was found to have a DVT in his RLE 6 days ago at his radiation clinic, confirmed with doppler. Patient denies leg pain, but endorses SOB, especially on exertion and orthopnea. Patient was started on Lovenox and took 2 doses, but stopped anticoagulation along with the Temozolomide when his platelets were found to be low at 77, 4 days ago. Patient endorses that he has noticed bruising on his arms and stomach (where Lovenox was injected), but denies any trauma. Patient was urged to present to the ED by nurse practitioner at his radiation clinic given worsening weakness, decreased PO intake, and dark stools. Patient denies chest pain, fever, nausea, vomiting, abdominal pain, back pain. Patient is followed by Dr. Mcneil and Dr. Borja.    In ED, VS: afebrile, /84, HR 90, RR 16, SpO2 97%  Labs significant for hgb 13.7 (baseline 16.5 9/22/22), thrombocytopenia 101 (improved from 9/22, 77), mild transaminitis, elevated lactate 4.5   Meds: given 1L NS, PPI 80mg IV x1  HPI: AD is a 83 y/o M PMHx of PMH Glioblastoma (diagnosed 7/18/22, s/p R stereo biopsy, TIMOTHY x2, R crani for tumor debulking 7/28/22, on temozolomide (last dose 4 days ago)), HLD, steroid-induced diabetes, and R popliteal DVT (found 9/21/22, not on AC due to thrombocytopenia) presenting with weakness x 3 weeks, acutely worsening over 3-5 days with dark brown stools x 1-2 days. Patient's daughter states he has had loose dark brown stools for the past 4 weeks intermittently, and that they noticed the stool became darker yesterday, denies black stools. Was told by her mom there was some scant red blood on wiping but wasn't sure if it was from external irritation. Patient denies stomach pain, nausea, or vomiting, but endorses he has had little appetite for food or fluids. Patient also reported bilateral leg swelling, weakness with moving legs, and difficulty ambulating for the past 3 weeks, acutely worsening a week ago. Patient was found to have a DVT in his RLE 6 days ago at his radiation clinic, confirmed with doppler. Patient denies leg pain, but endorses SOB, especially on exertion and orthopnea. Patient was started on Lovenox and took 2 doses, but stopped anticoagulation along with the Temozolomide when his platelets were found to be low at 77, 4 days ago. Patient endorses that he has noticed bruising on his arms and stomach (where Lovenox was injected), but denies any trauma. Patient was urged to present to the ED by nurse practitioner at his radiation clinic given worsening weakness, decreased PO intake, and dark stools. Patient denies chest pain, fever, nausea, vomiting, abdominal pain, back pain. Patient is followed by Dr. Mcneil and Dr. Borja.    In ED, VS: afebrile, /84, HR 90, RR 16, SpO2 97%  Labs significant for hgb 13.7 (baseline 16.5 9/22/22), thrombocytopenia 101 (improved from 9/22, 77), mild transaminitis, elevated lactate 4.5   Meds: given 1L NS, PPI 80mg IV x1  HPI: AD is a 81 y/o M PMHx of PMH Glioblastoma (diagnosed 7/18/22, s/p R stereo biopsy, TIMOTHY x2, R crani for tumor debulking 7/28/22, on temozolomide (last dose 4 days ago)), HLD, steroid-induced diabetes, and R popliteal DVT (found 9/21/22, not on AC due to thrombocytopenia) presenting with weakness x 3 weeks, acutely worsening over 3-5 days with dark brown stools x 1-2 days. Patient's daughter states he has had loose dark brown stools for the past 4 weeks intermittently, and that they noticed the stool became darker yesterday, denies black stools. Was told by her mom there was some scant red blood on wiping but wasn't sure if it was from external irritation. Patient denies stomach pain, nausea, or vomiting, but endorses he has had little appetite for food or fluids. Patient also reported bilateral leg swelling, weakness with moving legs, and difficulty ambulating for the past 3 weeks, acutely worsening a week ago. Patient was found to have a DVT in his RLE 6 days ago at his radiation clinic, confirmed with doppler. Patient denies leg pain, but endorses SOB, especially on exertion and orthopnea. Patient was started on Lovenox and took 2 doses, but stopped anticoagulation along with the Temozolomide when his platelets were found to be low at 77, 4 days ago. Patient endorses that he has noticed bruising on his arms and stomach (where Lovenox was injected), but denies any trauma. Patient was urged to present to the ED by nurse practitioner at his radiation clinic given worsening weakness, decreased PO intake, and dark stools. Patient denies chest pain, fever, nausea, vomiting, abdominal pain, back pain. Patient is followed by Dr. Mcneil and Dr. Borja.    In ED, VS: afebrile, /84, HR 90, RR 16, SpO2 97%  Labs significant for hgb 13.7 (baseline 16.5 9/22/22), thrombocytopenia 101 (improved from 9/22, 77), mild transaminitis, elevated lactate 4.5   Meds: given 1L NS, PPI 80mg IV x1   Consults: nephro consulted in ED

## 2022-09-26 NOTE — H&P ADULT - PROBLEM SELECTOR PLAN 9
Diet: NPO for now, pending GI eval  DVT ppx: hold chemical ppx given GIB, SCD's in LLE (not in RLE given DVT)  Dispo: pending clinical course on home rosuvastatin 40mg qd  - c/w atorvastatin 80mg while inpatient

## 2022-09-26 NOTE — H&P ADULT - PROBLEM SELECTOR PLAN 4
Found to have R popliteal DVT on outpt duplex for BLE pitting edema. Was briefly on Lovenox (1-2 doses) but then discontinued due to thrombocytopenia as above  - daughter reports some KHAN and orthopnea at home over past few weeks, which is concerning for PE. Although pt saturating well on RA here, no tachycardia. No SOB at rest, no CP  - AC also c/b current concern for GIB   - consider heme/onc c/s Found to have R popliteal DVT on outpt duplex for BLE pitting edema. Was briefly on Lovenox (1-2 doses) but then discontinued due to thrombocytopenia as above  - daughter reports some KHAN and orthopnea at home over past few weeks, which is concerning for PE. Although pt saturating well on RA here, no tachycardia. No SOB at rest, no CP  - AC also c/b current concern for GIB   - consider heme/onc c/s for guidance re: AC, was seeing Dr. Silva outpt

## 2022-09-26 NOTE — H&P ADULT - PROBLEM SELECTOR PLAN 8
on home rosuvastatin 40mg qd  - c/w atorvastatin 80mg while inpatient a1c 7% (7/24/22), likely 2/2 steroid-induced DM  - holding home oral agents while inpatient (januvia 100mg qd, metformin ER 500mg BID)  - low dose correctional scale  - FS TID qAC and qhs or q6h while NPO

## 2022-09-26 NOTE — H&P ADULT - PROBLEM SELECTOR PLAN 1
p/w dark stools x 1-2 days with some blood on wiping i/s/o being started on lovenox outpt for R DVT. Here found to have +FOBT and acute downtrend in hgb from 16.5 (9/22) to 13.7, concerning for GI bleed.   - giving timing, likely 2/2 AC use which has now been discontinued. HD stable. ddx also includes PUD given steroid use and h/o decreased PO intake, although lower suspicion given no abd pain   - last colonoscopy 2014, results unknown but was told to repeat in 10 yrs so likely unremarkable   - s/p PPI 80mg IV x1  - can c/w PPI 40mg BID  - consider GI c/sgiven above the knee DVT and need for AC  - NPO for now p/w dark brown stools x 1-2 days i/s/o lovenox use outpt (1-2 doses) for R DVT. Here found to have +FOBT and acute downtrend in hgb from 16.5 (9/22) to 13.7, concerning for GI bleed. HD stable   - giving timing, likely 2/2 AC use which has now been discontinued, possibly LGIB, ?diverticulosis. ddx also includes PUD given steroid use, anorexia, BUN/Cr ratio ~34, although lower suspicion given no abd pain, no black stools.  - last colonoscopy 2014, results unknown but was told to repeat in 10 yrs   - s/p PPI 80mg IV x1  - can c/w PPI 40mg BID  - GI c/s for further eval given need for AC for above knee DVT  - NPO overnight, resume diet p/w dark brown stools x 1-2 days i/s/o lovenox use outpt (1-2 doses) for R DVT. Here found to have +FOBT and acute downtrend in hgb from 16.5 (9/22) to 13.7, concerning for GI bleed. HD stable   - giving timing, likely 2/2 AC use which has now been discontinued, possibly LGIB, ?diverticulosis. ddx also includes PUD given steroid use, anorexia, BUN/Cr ratio ~34, although lower suspicion given no abd pain, no black stools.  - last colonoscopy 2014, results unknown but was told to repeat in 10 yrs   - s/p PPI 80mg IV x1  - can c/w PPI 40mg BID  - GI c/s for further eval given need for AC for above knee DVT  - NPO overnight, resume diet in AM if no GI intervention

## 2022-09-26 NOTE — H&P ADULT - NSHPPHYSICALEXAM_GEN_ALL_CORE
VITALS:   Vital Signs Last 24 Hrs  T(C): 36.8 (26 Sep 2022 18:00), Max: 37.2 (26 Sep 2022 14:30)  T(F): 98.3 (26 Sep 2022 18:00), Max: 99 (26 Sep 2022 14:30)  HR: 82 (26 Sep 2022 18:00) (82 - 90)  BP: 115/70 (26 Sep 2022 18:00) (115/70 - 124/84)  BP(mean): --  RR: 18 (26 Sep 2022 18:00) (16 - 18)  SpO2: 95% (26 Sep 2022 18:00) (95% - 97%)    Parameters below as of 26 Sep 2022 18:00  Patient On (Oxygen Delivery Method): room air      I&O's Summary    CAPILLARY BLOOD GLUCOSE          Physical Exam:  General: NAD, non-toxic appearing   HEENT: PERRLA, EOMi, no scleral icterus  CV: RRR, normal S1 and S2, no m/r/g  Lungs: normal respiratory effort. CTAB, no wheezes, rales, or rhonchi  Abd: soft, nontender, nondistended  Ext: no edema, 2+ peripheral pulses   Pysch: AAOx3, appropriate affect   Neuro: grossly non-focal, moving all extremities spontaneously   Skin: no rashes or lesions VITALS:   Vital Signs Last 24 Hrs  T(C): 36.8 (26 Sep 2022 18:00), Max: 37.2 (26 Sep 2022 14:30)  T(F): 98.3 (26 Sep 2022 18:00), Max: 99 (26 Sep 2022 14:30)  HR: 82 (26 Sep 2022 18:00) (82 - 90)  BP: 115/70 (26 Sep 2022 18:00) (115/70 - 124/84)  BP(mean): --  RR: 18 (26 Sep 2022 18:00) (16 - 18)  SpO2: 95% (26 Sep 2022 18:00) (95% - 97%)    Parameters below as of 26 Sep 2022 18:00  Patient On (Oxygen Delivery Method): room air    I&O's Summary    CAPILLARY BLOOD GLUCOSE      Physical Exam:  General: NAD, non-toxic appearing   HEENT: PERRL, EOMi, no scleral icterus  CV: RRR, normal S1 and S2, no m/r/g  Lungs: normal respiratory effort on RA, CTAB  Abd: soft, nontender, distended   Ext: 3+ pitting BLE edema, 2+ peripheral pulses  Pysch: AAOx2, appropriate affect   Neuro: CN2-12 grossly intact; BLE strength 2/2, BUE strength 4/5   Skin: localized bruising and petechiae over abdomen where lovenox injections reportedly given Vital Signs Last 24 Hrs  T(C): 36.8 (26 Sep 2022 18:00), Max: 37.2 (26 Sep 2022 14:30)  T(F): 98.3 (26 Sep 2022 18:00), Max: 99 (26 Sep 2022 14:30)  HR: 82 (26 Sep 2022 18:00) (82 - 90)  BP: 115/70 (26 Sep 2022 18:00) (115/70 - 124/84)  RR: 18 (26 Sep 2022 18:00) (16 - 18)  SpO2: 95% (26 Sep 2022 18:00) (95% - 97%)    Physical Exam:  General: NAD, non-toxic appearing   HEENT: PERRL, EOMi, no scleral icterus  CV: RRR, normal S1 and S2, no m/r/g  Lungs: normal respiratory effort on RA, CTAB  Abd: soft, nontender, distended   Ext: 3+ pitting BLE edema, 2+ peripheral pulses  Pysch: AAOx2, appropriate affect   Neuro: CN2-12 grossly intact; BLE strength 2/2, BUE strength 4/5   Skin: localized bruising and petechiae over abdomen where lovenox injections reportedly given

## 2022-09-26 NOTE — H&P ADULT - PROBLEM SELECTOR PLAN 5
lactate elevated to 4.5 on VBG on admission, improved to 2.7 after 1L NS. Likely 2/2 dehydration i/s/o decreased PO intake  - will give another 1L NS and repeat

## 2022-09-26 NOTE — ED PROVIDER NOTE - OBJECTIVE STATEMENT
81 y/o M PMHx of glioblastoma s/p 7 rounds of radiation and chemo on temozolomide last dose 4 days ago, RLE DVT not on anticoagulants 81 y/o M PMHx of glioblastoma s/p 7 rounds of radiation and chemo on temozolomide last dose 4 days ago, RLE DVT not on anticoagulants presents with lower extremity weakness for the past five days. Per daughter, patient has had generalized weakness since Labor Day. Patient was initially able to ambulate but has had progressively worsening weakness mainly in b/l lower extremities. Patient has also had diarrhea with darker stools. Denies fever, N/V, chest pain. Endorses SOB. Patient stopped taking his temozolomide and a/c for his DVT due to his low platelets. Patient followed by Dr. Mcneil and Dr. Borja.

## 2022-09-26 NOTE — H&P ADULT - PROBLEM SELECTOR PLAN 7
a1c 7% (7/24/22), likely 2/2 steroid-induced DM  - holding home oral agents while inpatient (januvia 100mg qd, metformin ER 500mg BID)  - low dose correctional scale  - FS TID qAC and qhs or q6h while NPO diagnosed 7/18/22, s/p R stereo biopsy, TIMOTHY x2, R crani for tumor debulking 7/28/22, on temozolomide (last dose 4 days ago)  - follows Dr. Mcneil and Dr. Borja outpt  - CT here showing possible residual or remanent tumor. Pt was originally planned for MRI brain outpt, will obtain here   - neuro surg following here, appreciate recs   - c/w dexamethasone 4mg BID, keppra 500mg BID

## 2022-09-26 NOTE — H&P ADULT - NSHPREVIEWOFSYSTEMS_GEN_ALL_CORE
REVIEW OF SYSTEMS:  CONSTITUTIONAL: No fevers or chills  EYES/ENT: No visual changes;  No vertigo or throat pain   NECK: No pain or stiffness  RESPIRATORY: No cough, wheezing, hemoptysis; No shortness of breath  CARDIOVASCULAR: No chest pain or palpitations  GASTROINTESTINAL: No abdominal or epigastric pain. No nausea, vomiting; No diarrhea or constipation  GENITOURINARY: No dysuria, frequency or hematuria  NEUROLOGICAL: No syncope or dizziness  SKIN: No itching, rashes CONSTITUTIONAL: No fever, weight loss  EYES: No eye pain, visual disturbances, or discharge  ENMT:  No difficulty hearing, tinnitus, vertigo; No sinus or throat pain  RESPIRATORY: No SOB. No cough, wheezing, chills or hemoptysis  CARDIOVASCULAR: LE edema. No chest pain, palpitations, dizziness  GASTROINTESTINAL: No abdominal or epigastric pain. No nausea, vomiting, or hematemesis; No diarrhea or constipation. No melena or hematochezia.  GENITOURINARY: No dysuria, frequency, hematuria, or incontinence  NEUROLOGICAL: No headaches, memory loss, loss of strength, numbness, or tremors  SKIN: No itching, burning, rashes, or lesions   LYMPH NODES: No enlarged glands  ENDOCRINE: No heat or cold intolerance; No hair loss  MUSCULOSKELETAL: No joint pain or swelling; No muscle, back pain. Weakness b/l LE  PSYCHIATRIC: No depression, anxiety, mood swings, or difficulty sleeping  HEME/LYMPH: No easy bruising, or bleeding gums

## 2022-09-26 NOTE — ED ADULT NURSE NOTE - NSIMPLEMENTINTERV_GEN_ALL_ED
Implemented All Fall with Harm Risk Interventions:  [place] to call system. Call bell, personal items and telephone within reach. Instruct patient to call for assistance. Room bathroom lighting operational. Non-slip footwear when patient is off stretcher. Physically safe environment: no spills, clutter or unnecessary equipment. Stretcher in lowest position, wheels locked, appropriate side rails in place. Provide visual cue, wrist band, yellow gown, etc. Monitor gait and stability. Monitor for mental status changes and reorient to person, place, and time. Review medications for side effects contributing to fall risk. Reinforce activity limits and safety measures with patient and family. Provide visual clues: red socks.

## 2022-09-26 NOTE — ED PROVIDER NOTE - PROGRESS NOTE DETAILS
Patient with positive fecal occult blood, thrombocytopenia. seen by neurosurgery. will admit for gi work up, heme/onc eval, and neurosurgery eval. no evidence of urinary retention on pocus

## 2022-09-26 NOTE — H&P ADULT - PROBLEM SELECTOR PLAN 2
generalized weakness x 3 weeks with worsening LE weakness x 5 days w/ difficulty ambulating. Here with 2/5 strength in BLE, 4/5 in UE, rectal tone intact, no changes in urinary sxs (although w/ history of urinary incontinence)  - given diproprionate weakness, concerning for spinal pathology ?mets, ?cauda equina   - generalized weakness due to malignancy, decreased PO intake, and relative drop in hgb may also be contributing  - CTH here showing post-op changes, possible residual or remanent tumor   - evaluated by neurosurg in ED, MRI head and CT lumbar spine ordered, recs appreciated  -- CT l-spine performed, f/u official read  - PT/OT generalized weakness x 3 weeks with worsening LE weakness x 5 days w/ difficulty ambulating. Here with 2/5 strength in BLE, 4/5 in UE, rectal tone intact, no changes in urinary sxs (although w/ history of urinary incontinence). no acute changes in mental status (AAOx2, mild confusion since GBM dx)  - given diproprionate weakness, concerning for spinal pathology ?mets, ?cauda equina   - generalized weakness due to malignancy, decreased PO intake, and relative drop in hgb may also be contributing  - CTH here showing post-op changes, possible residual or remanent tumor   - evaluated by neurosurg in ED, MRI head and CT lumbar spine ordered, recs appreciated  -- CT l-spine performed, f/u official read  - PT/OT

## 2022-09-26 NOTE — H&P ADULT - ATTENDING COMMENTS
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 Suspect weakness i/s/o underlying disease vs lumbar pathology vs d/t hgb drop i/s/o GIB. GI eval given c/f bleeding w/ need for AC given DVT and underlying malignancy. 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 Suspect weakness i/s/o underlying disease vs lumbar pathology vs d/t hgb drop i/s/o GIB. GI eval given c/f bleeding w/ need for AC given DVT and underlying malignancy. Heme/onc eval in AM re  DVT and temozolomide as contributor to LE edema? and f/u imaging as ordered

## 2022-09-26 NOTE — H&P ADULT - NSICDXPASTMEDICALHX_GEN_ALL_CORE_FT
PAST MEDICAL HISTORY:  Glioblastoma multiforme     Hyperlipidemia     Steroid-induced diabetes mellitus

## 2022-09-27 NOTE — CONSULT NOTE ADULT - ATTENDING COMMENTS
GI consulted for concern for GI bleed in setting of anticoagulation for DVT.   Will plan for egd/colonoscopy tomorrow for intraluminal evaluation.   GI to follow, please call with questions.

## 2022-09-27 NOTE — PROGRESS NOTE ADULT - ASSESSMENT
Madhavi Jordon  82M Turkmen speaking s/p R stereo biopsy, TIMOTHY x2, R crani for tumor debulking of GBM (Dr. Mcneil 7.28.22) p/w worsening weakness since Sept 5, most notably over the past week. He's had 6 rounds of radiation and was on temozolomide (last dose 4 days ago; stopped due to low PLT count). He was started on lovenox for RLE DVT (above knee popliteal) but this was also stopped due to low PLT count. Also has dark stools; positive occult blood test here in ED. Patient was planned to get an MRI this week. Head CT shows c/f possible area of residual or recurrent tumor. Exam: AO2 (knows year, not month), PERRL, EOMI, no droop/drift, BUE 5/5, b/l swollen, b/l HF 2/5, R KE 4/5 w/assistance, L KE 2/5, b/l DF/PF 5/5     - Admit to medicine for w/u of hematochezia  - Heme/Onc consult for thrombocytopenia and AC recs for above knee DVT  - MRI brain w/wo pending  - CT Lumbar spine w/con to r/o spinal pathology: no obvious signs of cord compression

## 2022-09-27 NOTE — PROGRESS NOTE ADULT - PROBLEM SELECTOR PLAN 5
lactate elevated to 4.5 on VBG on admission, improved to 2.7 after 1L NS. Likely 2/2 dehydration i/s/o decreased PO intake  - will give another 1L NS and repeat lactate elevated to 4.5 on VBG on admission, improved to 2.7 after 1L NS. Likely 2/2 dehydration i/s/o decreased PO intake  - another 1L NS given, lactate downtrend 2.3     PLAN:  - f/u AM lactate

## 2022-09-27 NOTE — PROGRESS NOTE ADULT - PROBLEM SELECTOR PLAN 6
troponin mildly elevated to 65. No CP, EKG showing NSR, HR 85  - TTE (7/23) EF 75%, hyperdynamic LV sysfxn, nl RV size/function   - repeat q6h until downtrend

## 2022-09-27 NOTE — CONSULT NOTE ADULT - ASSESSMENT
Interventional Radiology  Evaluate for Procedure: IVC filter    HPI: 82y Male with glioblastoma multiforme diagnosed 7/18/22 s/p debulking 7/28/22, hyperlipidemia, right above-the-knee popliteal DVT, concern for HIT admitted for workup of gastrointestinal bleeding.  IR consulted for placement of IVC filter.    Allergies: heparin containing compounds (Other)    Medications (Abx/Cardiac/Anticoagulation/Blood Products)      Data:    T(C): 37.1  HR: 70  BP: 111/69  RR: 18  SpO2: 96%    -WBC 7.75 / HgB 12.4 / Hct 35.8 / Plt 93  -Na 139 / Cl 104 / BUN 19 / Glucose 160  -K 4.4 / CO2 26 / Cr 0.72  -ALT 62 / Alk Phos 77 / T.Bili 0.6  -INR 1.15 / PTT 20.6    Assessment/Plan:     82y Male with glioblastoma multiforme diagnosed 7/18/22 s/p debulking 7/28/22, hyperlipidemia, right above-the-knee popliteal DVT, concern for HIT admitted for workup of gastrointestinal bleeding.  IR consulted for placement of IVC filter.    -- reviewed case IR attending Dr. Macias  -- repeat LE venous duplex to evaluate status of DVT; last U/S was on 9/21 and showed popliteal DVT  -- IR to follow-up imaging; if DVT still present or propagated can place IVC filter Interventional Radiology  Evaluate for Procedure: IVC filter    HPI: 82y Male with glioblastoma multiforme diagnosed 7/18/22 s/p debulking 7/28/22, hyperlipidemia, right above-the-knee popliteal DVT, concern for HIT admitted for workup of gastrointestinal bleeding.  IR consulted for placement of IVC filter.    Allergies: heparin containing compounds (Other)    Medications (Abx/Cardiac/Anticoagulation/Blood Products)      Data:    T(C): 37.1  HR: 70  BP: 111/69  RR: 18  SpO2: 96%    -WBC 7.75 / HgB 12.4 / Hct 35.8 / Plt 93  -Na 139 / Cl 104 / BUN 19 / Glucose 160  -K 4.4 / CO2 26 / Cr 0.72  -ALT 62 / Alk Phos 77 / T.Bili 0.6  -INR 1.15 / PTT 20.6    Assessment/Plan:     82y Male with glioblastoma multiforme diagnosed 7/18/22 s/p debulking 7/28/22, hyperlipidemia, right above-the-knee popliteal DVT, concern for HIT admitted for workup of gastrointestinal bleeding.  IR consulted for placement of IVC filter.    -- reviewed case IR attending Dr. Macias  -- repeat LE venous duplex to evaluate status of DVT; last U/S was on 9/21 and showed popliteal DVT  -- IR to follow-up imaging; if DVT still present or propagated can place IVC filter

## 2022-09-27 NOTE — CONSULT NOTE ADULT - PROBLEM SELECTOR RECOMMENDATION 9
Evidence for HIT on prior labs is not strong. PF4 antibodies are only mildly elevated, and serotonin release assay negative. At that time, T4 score of 4, indicating intermediate probability. In the setting of DVT, most serious complication is pulmonary embolism. However, taking into account his glioblastoma, gastrointestinal bleeding, and overall prognosis would recommend continued discontinuation of anticoagulation.  - Recommend sending repeat HIT assay  - IR consult for placement of IVC filter for prevention of pulmonary embolism    Recommendations incomplete pending final attending attestation.

## 2022-09-27 NOTE — CONSULT NOTE ADULT - SUBJECTIVE AND OBJECTIVE BOX
Hematology/Oncology Consult Note    HPI:    AD is a 81 y/o M PMHx of PMH Glioblastoma (diagnosed 7/18/22, s/p R stereo biopsy, TIMOTHY x2, R crani for tumor debulking 7/28/22, on temozolomide (last dose 4 days ago)), HLD, steroid-induced diabetes, and R popliteal DVT (found 9/21/22, not on AC due to thrombocytopenia) presenting with weakness x 3 weeks, acutely worsening over 3-5 days with dark brown stools x 1-2 days. Patient's daughter states he has had loose dark brown stools for the past 4 weeks intermittently, and that they noticed the stool became darker yesterday, denies black stools. Was told by her mom there was some scant red blood on wiping but wasn't sure if it was from external irritation. Patient denies stomach pain, nausea, or vomiting, but endorses he has had little appetite for food or fluids. Patient also reported bilateral leg swelling, weakness with moving legs, and difficulty ambulating for the past 3 weeks, acutely worsening a week ago. Patient was found to have a DVT in his RLE 6 days ago at his radiation clinic, confirmed with doppler. Patient denies leg pain, but endorses SOB, especially on exertion and orthopnea. Patient was started on Lovenox and took 2 doses, but stopped anticoagulation along with the Temozolomide when his platelets were found to be low at 77, 4 days ago. Patient endorses that he has noticed bruising on his arms and stomach (where Lovenox was injected), but denies any trauma. Patient was urged to present to the ED by nurse practitioner at his radiation clinic given worsening weakness, decreased PO intake, and dark stools. Patient denies chest pain, fever, nausea, vomiting, abdominal pain, back pain. Patient is followed by Dr. Mcneil and Dr. Borja.    82M with glioblastoma multiforme diagnosed 7/18/22 s/p debulking 7/28/22 and temozolomide (last dose 4 d ago), hyperlipidemia, right above-the-knee popliteal DVT, concern for HIT. Patient is followed by Dr. Silva outpatient with regard to management of his malignancy and Dr. Plunkett for radiotherapy (undergoing 15 fractions of 4005 cGy). During a recent admission in July-August, was found to have PF4 antibodies     In ED, VS: afebrile, /84, HR 90, RR 16, SpO2 97%  Labs significant for hgb 13.7 (baseline 16.5 9/22/22), thrombocytopenia 101 (improved from 9/22, 77), mild transaminitis, elevated lactate 4.5   Meds: given 1L NS, PPI 80mg IV x1   Consults: nephro consulted in ED (26 Sep 2022 20:54)      Oncology Hx:      Allergies    heparin containing compounds (Other)    Intolerances        MEDICATIONS  (STANDING):  atorvastatin 80 milliGRAM(s) Oral at bedtime  dexAMETHasone     Tablet 4 milliGRAM(s) Oral every 12 hours  dextrose 5% + lactated ringers. 1000 milliLiter(s) (75 mL/Hr) IV Continuous <Continuous>  dextrose 5%. 1000 milliLiter(s) (100 mL/Hr) IV Continuous <Continuous>  dextrose 5%. 1000 milliLiter(s) (50 mL/Hr) IV Continuous <Continuous>  dextrose 50% Injectable 25 Gram(s) IV Push once  dextrose 50% Injectable 12.5 Gram(s) IV Push once  dextrose 50% Injectable 25 Gram(s) IV Push once  FLUoxetine 20 milliGRAM(s) Oral daily  glucagon  Injectable 1 milliGRAM(s) IntraMuscular once  insulin lispro (ADMELOG) corrective regimen sliding scale   SubCutaneous every 6 hours  levETIRAcetam 500 milliGRAM(s) Oral two times a day  pantoprazole  Injectable 40 milliGRAM(s) IV Push two times a day    MEDICATIONS  (PRN):  acetaminophen     Tablet .. 650 milliGRAM(s) Oral every 6 hours PRN Temp greater or equal to 38C (100.4F), Mild Pain (1 - 3)  aluminum hydroxide/magnesium hydroxide/simethicone Suspension 30 milliLiter(s) Oral every 4 hours PRN Dyspepsia  dextrose Oral Gel 15 Gram(s) Oral once PRN Blood Glucose LESS THAN 70 milliGRAM(s)/deciliter  melatonin 3 milliGRAM(s) Oral at bedtime PRN Insomnia      PAST MEDICAL & SURGICAL HISTORY:  Glioblastoma multiforme      Steroid-induced diabetes mellitus      Hyperlipidemia      S/P craniotomy  R craniotomy for debulking with Dr. Mcneil (7/28/22)          FAMILY HISTORY:  FH: type 2 diabetes    FH: hyperlipidemia        SOCIAL HISTORY: No EtOH, no tobacco    REVIEW OF SYSTEMS:    CONSTITUTIONAL: No weakness, fevers or chills  EYES/ENT: No visual changes;  No vertigo or throat pain   NECK: No pain or stiffness  RESPIRATORY: No cough, wheezing, hemoptysis; No shortness of breath  CARDIOVASCULAR: No chest pain or palpitations  GASTROINTESTINAL: No abdominal or epigastric pain. No nausea, vomiting, or hematemesis; No diarrhea or constipation. No melena or hematochezia.  GENITOURINARY: No dysuria, frequency or hematuria  NEUROLOGICAL: No numbness or weakness  SKIN: No itching, burning, rashes, or lesions   All other review of systems is negative unless indicated above.    Height (cm): 165.1 (09-26 @ 13:07)  Weight (kg): 68 (09-26 @ 13:07)  BMI (kg/m2): 24.9 (09-26 @ 13:07)  BSA (m2): 1.75 (09-26 @ 13:07)    T(F): 98.1 (09-27-22 @ 03:41), Max: 99 (09-26-22 @ 14:30)  HR: 73 (09-27-22 @ 03:41)  BP: 113/68 (09-27-22 @ 03:41)  RR: 17 (09-27-22 @ 03:41)  SpO2: 96% (09-27-22 @ 03:41)  Wt(kg): --    GENERAL: NAD, well-developed  HEAD:  Atraumatic, Normocephalic  EYES: EOMI, PERRLA, conjunctiva and sclera clear  NECK: Supple, No JVD  CHEST/LUNG: Clear to auscultation bilaterally; No wheeze  HEART: Regular rate and rhythm; No murmurs, rubs, or gallops  ABDOMEN: Soft, Nontender, Nondistended; Bowel sounds present  EXTREMITIES:  2+ Peripheral Pulses, No clubbing, cyanosis, or edema  NEUROLOGY: non-focal  SKIN: No rashes or lesions                          13.7   10.44 )-----------( 101      ( 26 Sep 2022 14:31 )             41.3       09-26    140  |  101  |  28<H>  ----------------------------<  245<H>  4.6   |  26  |  0.83    Ca    9.1      26 Sep 2022 14:31  Phos  2.4     09-26  Mg     1.7     09-26    TPro  5.7<L>  /  Alb  3.4  /  TBili  0.5  /  DBili  x   /  AST  43<H>  /  ALT  74<H>  /  AlkPhos  93  09-26      Magnesium, Serum: 1.7 mg/dL (09-26 @ 14:31)  Phosphorus Level, Serum: 2.4 mg/dL (09-26 @ 14:31)       Hematology/Oncology Consult Note    HPI:    82M with glioblastoma multiforme diagnosed 7/18/22 s/p debulking 7/28/22 and temozolomide (last dose 4 d ago), hyperlipidemia, right above-the-knee popliteal DVT, concern for HIT. Patient is followed by Dr. Silva outpatient with regard to management of his malignancy and Dr. Plunkett for radiotherapy (undergoing 15 fractions of 4005 cGy). During a recent admission in July-August, patient was on prophylactic subcutaneous Lovenox, had drop in platelets, and was found to have PF4 antibodies (at level of 1). Patient was discharged on fondaparinux which was later discontinued after it was found that serotonin release assay was negative. Most recently, patient was found to have right above-the-knee popliteal DVT on 9/21 on ultrasonography and was placed on Lovenox. He had only taken 1-2 doses of Lovenox per the patient's daughter and repeat platelet count was measured at 77 (drop from 172 measured in August) after which Lovenox and temozolomide were discontinued. Patient was referred to seek emergency medical care at [facility] after the patient's daughter had called in to Dr. Plunkett's office for dark stool and worsening mental status. He is currently admitted for workup of gastrointestinal bleed and is pending endoscopic evaluation. Patient is not currently on anticoagulation, platelet count is around 100.    Heme/onc consulted with regard to anticoagulation management in the setting of right DVT and concern for HIT.    In ED, VS: afebrile, /84, HR 90, RR 16, SpO2 97%  Labs significant for hgb 13.7 (baseline 16.5 9/22/22), thrombocytopenia 101 (improved from 9/22, 77), mild transaminitis, elevated lactate 4.5   Meds: given 1L NS, PPI 80mg IV x1   Consults: nephro consulted in ED (26 Sep 2022 20:54)      Oncology Hx: Glioblastoma multiforme diagnosed 7/18/22. Status-post right stereotactic biopsy      Allergies    heparin containing compounds (Other)    Intolerances        MEDICATIONS  (STANDING):  atorvastatin 80 milliGRAM(s) Oral at bedtime  dexAMETHasone     Tablet 4 milliGRAM(s) Oral every 12 hours  dextrose 5% + lactated ringers. 1000 milliLiter(s) (75 mL/Hr) IV Continuous <Continuous>  dextrose 5%. 1000 milliLiter(s) (100 mL/Hr) IV Continuous <Continuous>  dextrose 5%. 1000 milliLiter(s) (50 mL/Hr) IV Continuous <Continuous>  dextrose 50% Injectable 25 Gram(s) IV Push once  dextrose 50% Injectable 12.5 Gram(s) IV Push once  dextrose 50% Injectable 25 Gram(s) IV Push once  FLUoxetine 20 milliGRAM(s) Oral daily  glucagon  Injectable 1 milliGRAM(s) IntraMuscular once  insulin lispro (ADMELOG) corrective regimen sliding scale   SubCutaneous every 6 hours  levETIRAcetam 500 milliGRAM(s) Oral two times a day  pantoprazole  Injectable 40 milliGRAM(s) IV Push two times a day    MEDICATIONS  (PRN):  acetaminophen     Tablet .. 650 milliGRAM(s) Oral every 6 hours PRN Temp greater or equal to 38C (100.4F), Mild Pain (1 - 3)  aluminum hydroxide/magnesium hydroxide/simethicone Suspension 30 milliLiter(s) Oral every 4 hours PRN Dyspepsia  dextrose Oral Gel 15 Gram(s) Oral once PRN Blood Glucose LESS THAN 70 milliGRAM(s)/deciliter  melatonin 3 milliGRAM(s) Oral at bedtime PRN Insomnia      PAST MEDICAL & SURGICAL HISTORY:  Glioblastoma multiforme      Steroid-induced diabetes mellitus      Hyperlipidemia      S/P craniotomy  R craniotomy for debulking with Dr. Mcneil (7/28/22)          FAMILY HISTORY:  FH: type 2 diabetes    FH: hyperlipidemia        SOCIAL HISTORY: No EtOH, no tobacco    REVIEW OF SYSTEMS:    CONSTITUTIONAL: No weakness, fevers or chills  EYES/ENT: No visual changes;  No vertigo or throat pain   NECK: No pain or stiffness  RESPIRATORY: No cough, wheezing, hemoptysis; No shortness of breath  CARDIOVASCULAR: No chest pain or palpitations  GASTROINTESTINAL: No abdominal or epigastric pain. No nausea, vomiting, or hematemesis; No diarrhea or constipation. No melena or hematochezia.  GENITOURINARY: No dysuria, frequency or hematuria  NEUROLOGICAL: No numbness or weakness  SKIN: No itching, burning, rashes, or lesions   All other review of systems is negative unless indicated above.    Height (cm): 165.1 (09-26 @ 13:07)  Weight (kg): 68 (09-26 @ 13:07)  BMI (kg/m2): 24.9 (09-26 @ 13:07)  BSA (m2): 1.75 (09-26 @ 13:07)    T(F): 98.1 (09-27-22 @ 03:41), Max: 99 (09-26-22 @ 14:30)  HR: 73 (09-27-22 @ 03:41)  BP: 113/68 (09-27-22 @ 03:41)  RR: 17 (09-27-22 @ 03:41)  SpO2: 96% (09-27-22 @ 03:41)  Wt(kg): --    GENERAL: NAD, well-developed  HEAD:  Atraumatic, Normocephalic  EYES: EOMI, PERRLA, conjunctiva and sclera clear  NECK: Supple, No JVD  CHEST/LUNG: Clear to auscultation bilaterally; No wheeze  HEART: Regular rate and rhythm; No murmurs, rubs, or gallops  ABDOMEN: Soft, Nontender, Nondistended; Bowel sounds present  EXTREMITIES:  2+ Peripheral Pulses, No clubbing, cyanosis, or edema  NEUROLOGY: non-focal  SKIN: No rashes or lesions                          13.7   10.44 )-----------( 101      ( 26 Sep 2022 14:31 )             41.3       09-26    140  |  101  |  28<H>  ----------------------------<  245<H>  4.6   |  26  |  0.83    Ca    9.1      26 Sep 2022 14:31  Phos  2.4     09-26  Mg     1.7     09-26    TPro  5.7<L>  /  Alb  3.4  /  TBili  0.5  /  DBili  x   /  AST  43<H>  /  ALT  74<H>  /  AlkPhos  93  09-26      Magnesium, Serum: 1.7 mg/dL (09-26 @ 14:31)  Phosphorus Level, Serum: 2.4 mg/dL (09-26 @ 14:31)       Hematology/Oncology Consult Note    HPI:    82M with glioblastoma multiforme diagnosed 7/18/22 s/p debulking 7/28/22 and temozolomide (last dose 4 d ago), hyperlipidemia, right above-the-knee popliteal DVT, concern for HIT. Patient is followed by Dr. Silva outpatient with regard to management of his malignancy and Dr. Plunkett for radiotherapy (undergoing 15 fractions of 4005 cGy). During a recent admission in July-August, patient was on prophylactic subcutaneous Lovenox, had drop in platelets, and was found to have PF4 antibodies (at level of 1). Patient was discharged on fondaparinux which was later discontinued after it was found that serotonin release assay was negative. Most recently, patient was found to have right above-the-knee popliteal DVT on 9/21 on ultrasonography and was placed on Lovenox. He had only taken 1-2 doses of Lovenox per the patient's daughter and repeat platelet count was measured at 77 (drop from 172 measured in August) after which Lovenox and temozolomide were discontinued. Patient was referred to seek emergency medical care at [facility] after the patient's daughter had called in to Dr. Plunkett's office for dark stool and worsening mental status. He is currently admitted for workup of gastrointestinal bleed and is pending endoscopic evaluation. Patient is not currently on anticoagulation, platelet count is around 100.    Heme/onc consulted with regard to anticoagulation management in the setting of right DVT and concern for HIT.    In ED, VS: afebrile, /84, HR 90, RR 16, SpO2 97%  Labs significant for hgb 13.7 (baseline 16.5 9/22/22), thrombocytopenia 101 (improved from 9/22, 77), mild transaminitis, elevated lactate 4.5   Meds: given 1L NS, PPI 80mg IV x1   Consults: nephro consulted in ED (26 Sep 2022 20:54)      Oncology Hx: Glioblastoma multiforme diagnosed 7/18/22. Status-post right stereotactic biopsy, TIMOTHY twice, tumor debulking 7/28/22. On temozolomide (last given 4 days ago). Followed by Dr. Silva and Kiarra. Dr. Mcneil for neurosurgery.      Allergies    heparin containing compounds (Other)    Intolerances        MEDICATIONS  (STANDING):  atorvastatin 80 milliGRAM(s) Oral at bedtime  dexAMETHasone     Tablet 4 milliGRAM(s) Oral every 12 hours  dextrose 5% + lactated ringers. 1000 milliLiter(s) (75 mL/Hr) IV Continuous <Continuous>  dextrose 5%. 1000 milliLiter(s) (100 mL/Hr) IV Continuous <Continuous>  dextrose 5%. 1000 milliLiter(s) (50 mL/Hr) IV Continuous <Continuous>  dextrose 50% Injectable 25 Gram(s) IV Push once  dextrose 50% Injectable 12.5 Gram(s) IV Push once  dextrose 50% Injectable 25 Gram(s) IV Push once  FLUoxetine 20 milliGRAM(s) Oral daily  glucagon  Injectable 1 milliGRAM(s) IntraMuscular once  insulin lispro (ADMELOG) corrective regimen sliding scale   SubCutaneous every 6 hours  levETIRAcetam 500 milliGRAM(s) Oral two times a day  pantoprazole  Injectable 40 milliGRAM(s) IV Push two times a day    MEDICATIONS  (PRN):  acetaminophen     Tablet .. 650 milliGRAM(s) Oral every 6 hours PRN Temp greater or equal to 38C (100.4F), Mild Pain (1 - 3)  aluminum hydroxide/magnesium hydroxide/simethicone Suspension 30 milliLiter(s) Oral every 4 hours PRN Dyspepsia  dextrose Oral Gel 15 Gram(s) Oral once PRN Blood Glucose LESS THAN 70 milliGRAM(s)/deciliter  melatonin 3 milliGRAM(s) Oral at bedtime PRN Insomnia      PAST MEDICAL & SURGICAL HISTORY:  Glioblastoma multiforme      Steroid-induced diabetes mellitus      Hyperlipidemia      S/P craniotomy  R craniotomy for debulking with Dr. Mcneil (7/28/22)          FAMILY HISTORY:  FH: type 2 diabetes    FH: hyperlipidemia        SOCIAL HISTORY: No EtOH, no tobacco    REVIEW OF SYSTEMS:    CONSTITUTIONAL: No weakness, fevers or chills  EYES/ENT: No visual changes;  No vertigo or throat pain   NECK: No pain or stiffness  RESPIRATORY: No cough, wheezing, hemoptysis; No shortness of breath  CARDIOVASCULAR: No chest pain or palpitations  GASTROINTESTINAL: No abdominal or epigastric pain. No nausea, vomiting, or hematemesis; No diarrhea or constipation. No melena or hematochezia.  GENITOURINARY: No dysuria, frequency or hematuria  NEUROLOGICAL: No numbness or weakness  SKIN: No itching, burning, rashes, or lesions   All other review of systems is negative unless indicated above.    Height (cm): 165.1 (09-26 @ 13:07)  Weight (kg): 68 (09-26 @ 13:07)  BMI (kg/m2): 24.9 (09-26 @ 13:07)  BSA (m2): 1.75 (09-26 @ 13:07)    T(F): 98.1 (09-27-22 @ 03:41), Max: 99 (09-26-22 @ 14:30)  HR: 73 (09-27-22 @ 03:41)  BP: 113/68 (09-27-22 @ 03:41)  RR: 17 (09-27-22 @ 03:41)  SpO2: 96% (09-27-22 @ 03:41)  Wt(kg): --    GENERAL: NAD, well-developed  HEAD:  Atraumatic, Normocephalic  EYES: EOMI, PERRLA, conjunctiva and sclera clear  CHEST/LUNG: Clear to auscultation bilaterally; No wheeze  HEART: Regular rate and rhythm; No murmurs, rubs, or gallops  ABDOMEN: Soft, Nontender, Nondistended; Bowel sounds present  EXTREMITIES:  2+ Peripheral Pulses, bilateral pitting edema  NEUROLOGY: non-focal  SKIN: No rashes or lesions                          13.7   10.44 )-----------( 101      ( 26 Sep 2022 14:31 )             41.3       09-26    140  |  101  |  28<H>  ----------------------------<  245<H>  4.6   |  26  |  0.83    Ca    9.1      26 Sep 2022 14:31  Phos  2.4     09-26  Mg     1.7     09-26    TPro  5.7<L>  /  Alb  3.4  /  TBili  0.5  /  DBili  x   /  AST  43<H>  /  ALT  74<H>  /  AlkPhos  93  09-26      Magnesium, Serum: 1.7 mg/dL (09-26 @ 14:31)  Phosphorus Level, Serum: 2.4 mg/dL (09-26 @ 14:31)       Hematology/Oncology Consult Note    HPI:    82M with glioblastoma multiforme diagnosed 7/18/22 s/p debulking 7/28/22 and temozolomide (last dose 4 d ago), hyperlipidemia, right above-the-knee popliteal DVT, concern for HIT. Patient is followed by Dr. Silva outpatient with regard to management of his malignancy and Dr. Plunkett for radiotherapy (undergoing 15 fractions of 4005 cGy). During a recent admission in July-August, patient was on prophylactic subcutaneous Lovenox, had drop in platelets, and was found to have PF4 antibodies (at level of 1). Patient was discharged on fondaparinux which was later discontinued after it was found that serotonin release assay was negative. Most recently, patient was found to have right above-the-knee popliteal DVT on 9/21 on ultrasonography and was placed on Lovenox. He had only taken 1-2 doses of Lovenox per the patient's daughter and repeat platelet count was measured at 77 (drop from 172 measured in August) after which Lovenox and temozolomide were discontinued. Patient was referred to seek emergency medical care at [facility] after the patient's daughter had called in to Dr. Plunkett's office for dark stool and worsening mental status. He is currently admitted for workup of gastrointestinal bleed and is pending endoscopic evaluation. Patient is not currently on anticoagulation, platelet count is around 100.    Heme/onc consulted with regard to anticoagulation management in the setting of right DVT and concern for HIT.      Oncology Hx: Glioblastoma multiforme diagnosed 7/18/22. Status-post right stereotactic biopsy, TIMOTHY twice, tumor debulking 7/28/22. On temozolomide (last given 4 days ago). Followed by Dr. Silva and Kiarra. Dr. Mcneil for neurosurgery.      Allergies    heparin containing compounds (Other)    Intolerances        MEDICATIONS  (STANDING):  atorvastatin 80 milliGRAM(s) Oral at bedtime  dexAMETHasone     Tablet 4 milliGRAM(s) Oral every 12 hours  dextrose 5% + lactated ringers. 1000 milliLiter(s) (75 mL/Hr) IV Continuous <Continuous>  dextrose 5%. 1000 milliLiter(s) (100 mL/Hr) IV Continuous <Continuous>  dextrose 5%. 1000 milliLiter(s) (50 mL/Hr) IV Continuous <Continuous>  dextrose 50% Injectable 25 Gram(s) IV Push once  dextrose 50% Injectable 12.5 Gram(s) IV Push once  dextrose 50% Injectable 25 Gram(s) IV Push once  FLUoxetine 20 milliGRAM(s) Oral daily  glucagon  Injectable 1 milliGRAM(s) IntraMuscular once  insulin lispro (ADMELOG) corrective regimen sliding scale   SubCutaneous every 6 hours  levETIRAcetam 500 milliGRAM(s) Oral two times a day  pantoprazole  Injectable 40 milliGRAM(s) IV Push two times a day    MEDICATIONS  (PRN):  acetaminophen     Tablet .. 650 milliGRAM(s) Oral every 6 hours PRN Temp greater or equal to 38C (100.4F), Mild Pain (1 - 3)  aluminum hydroxide/magnesium hydroxide/simethicone Suspension 30 milliLiter(s) Oral every 4 hours PRN Dyspepsia  dextrose Oral Gel 15 Gram(s) Oral once PRN Blood Glucose LESS THAN 70 milliGRAM(s)/deciliter  melatonin 3 milliGRAM(s) Oral at bedtime PRN Insomnia      PAST MEDICAL & SURGICAL HISTORY:  Glioblastoma multiforme      Steroid-induced diabetes mellitus      Hyperlipidemia      S/P craniotomy  R craniotomy for debulking with Dr. Mcneil (7/28/22)          FAMILY HISTORY:  FH: type 2 diabetes    FH: hyperlipidemia        SOCIAL HISTORY: No EtOH, no tobacco    REVIEW OF SYSTEMS:    CONSTITUTIONAL: No weakness, fevers or chills  EYES/ENT: No visual changes;  No vertigo or throat pain   NECK: No pain or stiffness  RESPIRATORY: No cough, wheezing, hemoptysis; No shortness of breath  CARDIOVASCULAR: No chest pain or palpitations  GASTROINTESTINAL: No abdominal or epigastric pain. No nausea, vomiting, or hematemesis; No diarrhea or constipation. Positive melena  GENITOURINARY: No dysuria, frequency or hematuria  NEUROLOGICAL: Positive weakness of bilateral lower extremities  SKIN: No itching, burning, rashes, or lesions   All other review of systems is negative unless indicated above.    Height (cm): 165.1 (09-26 @ 13:07)  Weight (kg): 68 (09-26 @ 13:07)  BMI (kg/m2): 24.9 (09-26 @ 13:07)  BSA (m2): 1.75 (09-26 @ 13:07)    T(F): 98.1 (09-27-22 @ 03:41), Max: 99 (09-26-22 @ 14:30)  HR: 73 (09-27-22 @ 03:41)  BP: 113/68 (09-27-22 @ 03:41)  RR: 17 (09-27-22 @ 03:41)  SpO2: 96% (09-27-22 @ 03:41)  Wt(kg): --    GENERAL: NAD, well-developed  HEAD:  Atraumatic, Normocephalic  EYES: EOMI, PERRLA, conjunctiva and sclera clear  CHEST/LUNG: Clear to auscultation bilaterally; No wheeze  HEART: Regular rate and rhythm; No murmurs, rubs, or gallops  ABDOMEN: Soft, Nontender, Nondistended; Bowel sounds present  EXTREMITIES:  2+ Peripheral Pulses, bilateral pitting edema  NEUROLOGY: non-focal  SKIN: No rashes or lesions                          13.7   10.44 )-----------( 101      ( 26 Sep 2022 14:31 )             41.3       09-26    140  |  101  |  28<H>  ----------------------------<  245<H>  4.6   |  26  |  0.83    Ca    9.1      26 Sep 2022 14:31  Phos  2.4     09-26  Mg     1.7     09-26    TPro  5.7<L>  /  Alb  3.4  /  TBili  0.5  /  DBili  x   /  AST  43<H>  /  ALT  74<H>  /  AlkPhos  93  09-26      Magnesium, Serum: 1.7 mg/dL (09-26 @ 14:31)  Phosphorus Level, Serum: 2.4 mg/dL (09-26 @ 14:31)

## 2022-09-27 NOTE — PROGRESS NOTE ADULT - PROBLEM SELECTOR PLAN 10
Diet: NPO for now, pending GI eval  DVT ppx: hold chemical ppx given GIB, SCD's in LLE (not in RLE given DVT)  Dispo: pending clinical course Diet: CLD, NPO at midnight for EGD/colonoscopy tomorrow   DVT ppx: hold chemical ppx given GIB, SCD's in LLE (not in RLE given DVT)  Dispo: pending clinical course

## 2022-09-27 NOTE — CONSULT NOTE ADULT - ASSESSMENT
Patient is a 82 year old M with a history of glioblastoma on chemo and radiation therapy and chronic steroids who presented with LE weakness and dark stools x 2 days. 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      Patient is a 82 year old M with a history of glioblastoma on chemo and radiation therapy and chronic steroids who presented with LE weakness and dark stools x 2 days. GI consulted for possible GI bleed and need for continued anticoagulation    #Dark stool: LGIB vs UGIB  - patient's family reports multiple episodes of dark brown stools with specs of red blood when wiping  - resultant 2 point drop in hgb from baseline, concern for LGIB > UGIB, however UGIB cannot be ruled out  - LGIB DDx includes: diverticulosis,   - UGIB DDx includes: peptic ulcer disease, angiectasias,   #Need for AC:      Recommendations:  - trend vitals, clinical signs.  - c/w IV PPI BID  - maintain 2 large bore IVs, maintain active T&S, transfuse as needed for Hgb < 7  -  Patient is a 82 year old M with a history of glioblastoma on chemo and radiation therapy and chronic steroids who presented with LE weakness and dark stools x 2 days. GI consulted for possible GI bleed and need for continued anticoagulation    #Dark stool: LGIB vs UGIB  #Need for AC  - patient's family reports multiple episodes of dark brown stools with specs of red blood when wiping  - resultant 2 point drop in hgb from baseline, concern for LGIB > UGIB, however UGIB cannot be ruled out  - LGIB DDx includes: diverticulosis with hemorrhage, angiectasias, malignancy, hemorrhoids, or colitis  - UGIB DDx includes: peptic ulcer disease, esophagitis, gastritis, duodenitis, small bowel angiectasias, dieulafoy, kortney lesions      Recommendations:  - trend vitals, clinical signs.  - c/w IV PPI BID  - maintain 2 large bore IVs, maintain active T&S, transfuse as needed for Hgb < 7  - plan for EGD + colonoscopy      all recommendations are tentative until attested by attending Patient is a 82 year old M with a history of glioblastoma on chemo and radiation therapy and chronic steroids who presented with LE weakness and dark stools x 2 days. GI consulted for possible GI bleed and need for continued anticoagulation    #Dark stool: LGIB vs UGIB  #Need for AC  - patient's family reports multiple episodes of dark brown stools with specs of red blood when wiping  - resultant 2 point drop in hgb from baseline, concern for LGIB > UGIB, however UGIB cannot be ruled out  - LGIB DDx includes: diverticulosis with hemorrhage, angiectasias, malignancy, hemorrhoids, colitis  - UGIB DDx includes: peptic ulcer disease, esophagitis, gastritis, duodenitis, small bowel angiectasias, dieulafoy lesion, kortney lesion  - patient requires anticoagulation for RLE DVT in the setting of underlying malignancy      Recommendations:  -   - plan for EGD + colonoscopy  - f/u hem onc recs regarding AC given history of HIT and recent thrombocytopenia (though it seems it may be chemo induced given timeline)   - trend CBC q12h hours, transfuse for goal>7, can space apart if stable   - c/w IV PPI 40 BID  - maintain 2 large bore IVs, maintain active T&S  - NPO     all recommendations are tentative until attested by attending Patient is a 82 year old M with a history of glioblastoma on chemo and radiation therapy and chronic steroids who presented with LE weakness and dark stools x 2 days. GI consulted for possible GI bleed and need for continued anticoagulation    #Dark stool: UGIB vs LGIB  #Need for AC  - patient's family reports multiple episodes of dark brown stools with specks of red blood when wiping  - resultant 2 point drop in hgb from baseline, concern for UGIB > LGIB given chronic steroid use, clinical presentation, however LGIB cannot be ruled out  - UGIB DDx includes: peptic ulcer disease, esophagitis, gastritis, duodenitis, small bowel angiectasias, dieulafoy lesion, kortney lesion  - LGIB DDx includes: diverticulosis with hemorrhage, angiectasias, malignancy, hemorrhoids, colitis  - patient requires anticoagulation for RLE DVT in the setting of underlying malignancy      Recommendations:  - plan for EGD + colonoscopy tomorrow, hold AC until then  - please ensure adequate prep today, NPO after midnight   - trend CBC q12h hours, transfuse for goal>7, can space apart if stable   - c/w IV PPI 40 BID  - maintain 2 large bore IVs, maintain active T&S  - f/u hem onc recs regarding AC given history of HIT and acute thrombocytopenia (though it seems it may be chemo induced given timeline)   - remaining care per primary team    all recommendations are tentative until attested by attending Patient is a 82 year old M with a history of glioblastoma on chemo and radiation therapy and chronic steroids who presented with LE weakness and dark stools x 2 days. GI consulted for possible GI bleed and need for continued anticoagulation    #Dark stool: UGIB vs LGIB  #Need for AC  - patient's family reports multiple episodes of dark brown stools with specks of red blood when wiping  - resultant 2 point drop in hgb from baseline, concern for UGIB > LGIB given chronic steroid use, clinical presentation, however LGIB cannot be ruled out  - UGIB DDx includes: peptic ulcer disease, esophagitis, gastritis, duodenitis, small bowel angiectasias, dieulafoy lesion, kortney lesion  - LGIB DDx includes: diverticulosis with hemorrhage, angiectasias, malignancy, hemorrhoids, colitis  - patient requires anticoagulation for RLE DVT in the setting of underlying malignancy      Recommendations:  - plan for EGD + colonoscopy tomorrow, hold AC until then  - please ensure adequate prep today, NPO after midnight   - trend CBC q12h hours, transfuse for goal>7, can space apart if stable   - c/w IV PPI 40 BID  - maintain 2 large bore IVs, maintain active T&S  - f/u hem onc recs regarding AC given history of HIT and acute thrombocytopenia (though it seems it may be chemo induced given timeline)   - remaining care per primary team    Discussed with Dr. Storey Patient is a 82 year old M with a history of glioblastoma on chemo and radiation therapy and chronic steroids who presented with LE weakness and dark stools x 2 days. GI consulted for possible GI bleed and need for continued anticoagulation    #Dark stool: UGIB vs LGIB  #Need for AC  - patient's family reports multiple episodes of dark brown stools with specks of red blood when wiping  - resultant 2-3 point drop in hgb from baseline, concern for UGIB > LGIB given chronic steroid use, clinical presentation, however LGIB cannot be ruled out  - UGIB DDx includes: peptic ulcer disease, esophagitis, gastritis, duodenitis, small bowel angiectasias, dieulafoy lesion, kortney lesion  - LGIB DDx includes: diverticulosis with hemorrhage, angiectasias, malignancy, hemorrhoids, colitis  - patient requires anticoagulation for RLE DVT in the setting of underlying malignancy      Recommendations:  - plan for EGD + colonoscopy tomorrow, hold AC until then  - please ensure adequate prep today, NPO after midnight  - trend CBC q12h hours, transfuse for goal>7, can space apart if stable   - c/w IV PPI 40 BID  - maintain 2 large bore IVs, maintain active T&S  - f/u hem onc recs regarding AC given history of HIT and acute thrombocytopenia (though it seems it may be chemo induced given timeline)   - remaining care per primary team    Discussed with Dr. Storey

## 2022-09-27 NOTE — PROGRESS NOTE ADULT - PROBLEM SELECTOR PLAN 3
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 plts 101 on admission, improved from 77 on 9/22.   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 Silva)  - On 9/21, he was found to have a DVT and was started on Lovenox, which was quickly discontinued after outpt labs showed plt of 77 < 172 (8/2022). Per daughter only took 1 or 2 doses of the lovenox, so thrombocytopenia more likely 2/ temozolomide, which was also discontinued at that time  - Plts now improved after stopping AC and chemo   - cont to monitor pt w/ h/o thrombocytopenia 2/2 HIT, then likely AC and chemotherapy. plts 101 on admission, improved from 77 on 9/22.   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 Silva)  - On 9/21, he was found to have a DVT and was started on Lovenox, which was quickly discontinued after outpt labs showed plt of 77 < 172 (8/2022). Per daughter only took 1 or 2 doses of the lovenox, so thrombocytopenia more likely 2/ temozolomide, which was also discontinued at that time      PLAN:  - Plts now improved after stopping AC and chemo   - cont to monitor

## 2022-09-27 NOTE — PROGRESS NOTE ADULT - PROBLEM SELECTOR PLAN 2
generalized weakness x 3 weeks with worsening LE weakness x 5 days w/ difficulty ambulating. Here with 2/5 strength in BLE, 4/5 in UE, rectal tone intact, no changes in urinary sxs (although w/ history of urinary incontinence). no acute changes in mental status (AAOx2, mild confusion since GBM dx)  - given diproprionate weakness, concerning for spinal pathology ?mets, ?cauda equina   - generalized weakness due to malignancy, decreased PO intake, and relative drop in hgb may also be contributing  - CTH here showing post-op changes, possible residual or remanent tumor   - evaluated by neurosurg in ED, MRI head and CT lumbar spine ordered, recs appreciated  -- CT l-spine performed, f/u official read  - PT/OT generalized weakness x 3 weeks with worsening LE weakness x 5 days w/ difficulty ambulating. Here with 2/5 strength in BLE, 4/5 in UE, rectal tone intact, no changes in urinary sxs (although w/ history of urinary incontinence). no acute changes in mental status (AAOx2, mild confusion since GBM dx)  - given diproprionate weakness, concerning for spinal pathology ?mets, ?cauda equina   - generalized weakness due to malignancy, decreased PO intake, and relative drop in hgb may also be contributing  - CTH here showing post-op changes, possible residual or remanent tumor   - CT L-spine showed degenerative changes resulting in mild multilevel spinal canal stenosis or neural foraminal narrowing.    PLAN:  - evaluated by neurosurg in ED, MRI head pending, recs appreciated  - PT/OT

## 2022-09-27 NOTE — PATIENT PROFILE ADULT - FALL HARM RISK - HARM RISK INTERVENTIONS

## 2022-09-27 NOTE — PROGRESS NOTE ADULT - SUBJECTIVE AND OBJECTIVE BOX
%%%%INCOMPLETE NOTE%%%%%     Dr. Liliana Tejeda, PGY-1    ANA LOVE  82y  MRN: [id number]    Subjective:    Patient is a 82y old  Male who presents with a chief complaint of GIB, weakness (27 Sep 2022 05:28)      MEDICATIONS  (STANDING):  atorvastatin 80 milliGRAM(s) Oral at bedtime  dexAMETHasone     Tablet 4 milliGRAM(s) Oral every 12 hours  dextrose 5% + lactated ringers. 1000 milliLiter(s) (75 mL/Hr) IV Continuous <Continuous>  dextrose 5%. 1000 milliLiter(s) (100 mL/Hr) IV Continuous <Continuous>  dextrose 5%. 1000 milliLiter(s) (50 mL/Hr) IV Continuous <Continuous>  dextrose 50% Injectable 25 Gram(s) IV Push once  dextrose 50% Injectable 12.5 Gram(s) IV Push once  dextrose 50% Injectable 25 Gram(s) IV Push once  FLUoxetine 20 milliGRAM(s) Oral daily  glucagon  Injectable 1 milliGRAM(s) IntraMuscular once  insulin lispro (ADMELOG) corrective regimen sliding scale   SubCutaneous every 6 hours  levETIRAcetam 500 milliGRAM(s) Oral two times a day  pantoprazole  Injectable 40 milliGRAM(s) IV Push two times a day    MEDICATIONS  (PRN):  acetaminophen     Tablet .. 650 milliGRAM(s) Oral every 6 hours PRN Temp greater or equal to 38C (100.4F), Mild Pain (1 - 3)  aluminum hydroxide/magnesium hydroxide/simethicone Suspension 30 milliLiter(s) Oral every 4 hours PRN Dyspepsia  dextrose Oral Gel 15 Gram(s) Oral once PRN Blood Glucose LESS THAN 70 milliGRAM(s)/deciliter  melatonin 3 milliGRAM(s) Oral at bedtime PRN Insomnia        Objective:    Vitals: Vital Signs Last 24 Hrs  T(C): 36.7 (22 @ 03:41), Max: 37.2 (22 @ 14:30)  T(F): 98.1 (22 @ 03:41), Max: 99 (22 @ 14:30)  HR: 73 (22 @ 03:41) (73 - 90)  BP: 113/68 (22 @ 03:41) (113/68 - 131/72)  BP(mean): 92 (22 @ 19:30) (92 - 92)  RR: 17 (22 @ 03:41) (16 - 18)  SpO2: 96% (22 @ 03:41) (95% - 98%)            I&O's Summary      PHYSICAL EXAM:  GENERAL: NAD, well-groomed, well-developed  HEAD:  Atraumatic, Normocephalic  EYES: EOMI, PERRLA, conjunctiva and sclera clear  ENMT: Moist mucous membranes  NECK: Supple, No JVD  CHEST/LUNG: Clear to auscultation bilaterally  HEART: Regular rate and rhythm  ABDOMEN: Soft, Nontender, Nondistended; Bowel sounds present  EXTREMITIES:  2+ Peripheral Pulses, No LE edema  SKIN: No rashes or lesions  NERVOUS SYSTEM:  Alert & Oriented X3, moving all extremities   PSYCH: Speaking in Full Sentences. Laying in bed comfortably; not agitated     LABS:                        13.7   10.44 )-----------( 101      ( 26 Sep 2022 14:31 )             41.3     Hgb Trend: 13.7<--, 16.5<--      140  |  101  |  28<H>  ----------------------------<  245<H>  4.6   |  26  |  0.83    Ca    9.1      26 Sep 2022 14:31  Phos  2.4       Mg     1.7         TPro  5.7<L>  /  Alb  3.4  /  TBili  0.5  /  DBili  x   /  AST  43<H>  /  ALT  74<H>  /  AlkPhos  93      Creatinine Trend: 0.83<--  PT/INR - ( 26 Sep 2022 14:31 )   PT: 13.2 sec;   INR: 1.15 ratio         PTT - ( 26 Sep 2022 14:31 )  PTT:20.6 sec              Urinalysis Basic - ( 27 Sep 2022 05:07 )    Color: Light Yellow / Appearance: Clear / S.020 / pH: x  Gluc: x / Ketone: Negative  / Bili: Negative / Urobili: Negative   Blood: x / Protein: Trace / Nitrite: Negative   Leuk Esterase: Negative / RBC: 2 /hpf / WBC 3 /HPF   Sq Epi: x / Non Sq Epi: 2 /hpf / Bacteria: Negative        CAPILLARY BLOOD GLUCOSE      POCT Blood Glucose.: 206 mg/dL (27 Sep 2022 00:22)     Dr. Liliana Tejeda, PGY-1    ANA LOVE  82y  MRN: [id number]    Subjective:    Patient is a 82y old  Male who presents with a chief complaint of GIB, weakness (27 Sep 2022 05:28)    Spoke with daughter at bedside, who also provided interpretation. Gave daughter updates, informed about possible colonoscopy/EGD tomorrow and plan for MRI.       MEDICATIONS  (STANDING):  atorvastatin 80 milliGRAM(s) Oral at bedtime  dexAMETHasone     Tablet 4 milliGRAM(s) Oral every 12 hours  dextrose 5% + lactated ringers. 1000 milliLiter(s) (75 mL/Hr) IV Continuous <Continuous>  dextrose 5%. 1000 milliLiter(s) (100 mL/Hr) IV Continuous <Continuous>  dextrose 5%. 1000 milliLiter(s) (50 mL/Hr) IV Continuous <Continuous>  dextrose 50% Injectable 25 Gram(s) IV Push once  dextrose 50% Injectable 12.5 Gram(s) IV Push once  dextrose 50% Injectable 25 Gram(s) IV Push once  FLUoxetine 20 milliGRAM(s) Oral daily  glucagon  Injectable 1 milliGRAM(s) IntraMuscular once  insulin lispro (ADMELOG) corrective regimen sliding scale   SubCutaneous every 6 hours  levETIRAcetam 500 milliGRAM(s) Oral two times a day  pantoprazole  Injectable 40 milliGRAM(s) IV Push two times a day    MEDICATIONS  (PRN):  acetaminophen     Tablet .. 650 milliGRAM(s) Oral every 6 hours PRN Temp greater or equal to 38C (100.4F), Mild Pain (1 - 3)  aluminum hydroxide/magnesium hydroxide/simethicone Suspension 30 milliLiter(s) Oral every 4 hours PRN Dyspepsia  dextrose Oral Gel 15 Gram(s) Oral once PRN Blood Glucose LESS THAN 70 milliGRAM(s)/deciliter  melatonin 3 milliGRAM(s) Oral at bedtime PRN Insomnia        Objective:    Vitals: Vital Signs Last 24 Hrs  T(C): 36.7 (22 @ 03:41), Max: 37.2 (22 @ 14:30)  T(F): 98.1 (22 @ 03:41), Max: 99 (22 @ 14:30)  HR: 73 (22 @ 03:41) (73 - 90)  BP: 113/68 (22 @ 03:41) (113/68 - 131/72)  BP(mean): 92 (22 @ 19:30) (92 - 92)  RR: 17 (22 @ 03:41) (16 - 18)  SpO2: 96% (22 @ 03:41) (95% - 98%)            I&O's Summary      PHYSICAL EXAM:  GENERAL: NAD  EYES: EOMI, no scleral icterus  ENMT: Moist mucous membranes  NECK: Supple  CHEST/LUNG: Clear to auscultation bilaterally on anterior chest.   HEART: Regular rate and rhythm  ABDOMEN: Soft, Nontender, Nondistended; Bowel sounds present  EXTREMITIES:  Pitting edema in b/l LE. Gross sensation intact in b/l LE. Able to move toes. B/l UE strength 4/5. B/l LE strength 2/5, unable to resist gravity.  SKIN: Darker discoloration on forehead noted.  NERVOUS SYSTEM:  Alert & Oriented X2-3 ([facility], year , name).     LABS:                        13.7   10.44 )-----------( 101      ( 26 Sep 2022 14:31 )             41.3     Hgb Trend: 13.7<--, 16.5<--      140  |  101  |  28<H>  ----------------------------<  245<H>  4.6   |  26  |  0.83    Ca    9.1      26 Sep 2022 14:31  Phos  2.4       Mg     1.7         TPro  5.7<L>  /  Alb  3.4  /  TBili  0.5  /  DBili  x   /  AST  43<H>  /  ALT  74<H>  /  AlkPhos  93      Creatinine Trend: 0.83<--  PT/INR - ( 26 Sep 2022 14:31 )   PT: 13.2 sec;   INR: 1.15 ratio         PTT - ( 26 Sep 2022 14:31 )  PTT:20.6 sec              Urinalysis Basic - ( 27 Sep 2022 05:07 )    Color: Light Yellow / Appearance: Clear / S.020 / pH: x  Gluc: x / Ketone: Negative  / Bili: Negative / Urobili: Negative   Blood: x / Protein: Trace / Nitrite: Negative   Leuk Esterase: Negative / RBC: 2 /hpf / WBC 3 /HPF   Sq Epi: x / Non Sq Epi: 2 /hpf / Bacteria: Negative        CAPILLARY BLOOD GLUCOSE      POCT Blood Glucose.: 206 mg/dL (27 Sep 2022 00:22)     Dr. Liliana Tejeda, PGY-1    ANA LOVE  82y  MRN: [id number]    Subjective:    Patient is a 82y old  Male who presents with a chief complaint of GIB, weakness (27 Sep 2022 05:28)    Spoke with daughter Faith at bedside, who also provided interpretation. Gave family member updates, informed about possible colonoscopy/EGD tomorrow and plan for MRI.       MEDICATIONS  (STANDING):  atorvastatin 80 milliGRAM(s) Oral at bedtime  dexAMETHasone     Tablet 4 milliGRAM(s) Oral every 12 hours  dextrose 5% + lactated ringers. 1000 milliLiter(s) (75 mL/Hr) IV Continuous <Continuous>  dextrose 5%. 1000 milliLiter(s) (100 mL/Hr) IV Continuous <Continuous>  dextrose 5%. 1000 milliLiter(s) (50 mL/Hr) IV Continuous <Continuous>  dextrose 50% Injectable 25 Gram(s) IV Push once  dextrose 50% Injectable 12.5 Gram(s) IV Push once  dextrose 50% Injectable 25 Gram(s) IV Push once  FLUoxetine 20 milliGRAM(s) Oral daily  glucagon  Injectable 1 milliGRAM(s) IntraMuscular once  insulin lispro (ADMELOG) corrective regimen sliding scale   SubCutaneous every 6 hours  levETIRAcetam 500 milliGRAM(s) Oral two times a day  pantoprazole  Injectable 40 milliGRAM(s) IV Push two times a day    MEDICATIONS  (PRN):  acetaminophen     Tablet .. 650 milliGRAM(s) Oral every 6 hours PRN Temp greater or equal to 38C (100.4F), Mild Pain (1 - 3)  aluminum hydroxide/magnesium hydroxide/simethicone Suspension 30 milliLiter(s) Oral every 4 hours PRN Dyspepsia  dextrose Oral Gel 15 Gram(s) Oral once PRN Blood Glucose LESS THAN 70 milliGRAM(s)/deciliter  melatonin 3 milliGRAM(s) Oral at bedtime PRN Insomnia        Objective:    Vitals: Vital Signs Last 24 Hrs  T(C): 36.7 (22 @ 03:41), Max: 37.2 (22 @ 14:30)  T(F): 98.1 (22 @ 03:41), Max: 99 (22 @ 14:30)  HR: 73 (22 @ 03:41) (73 - 90)  BP: 113/68 (22 @ 03:41) (113/68 - 131/72)  BP(mean): 92 (22 @ 19:30) (92 - 92)  RR: 17 (22 @ 03:41) (16 - 18)  SpO2: 96% (22 @ 03:41) (95% - 98%)            I&O's Summary      PHYSICAL EXAM:  GENERAL: NAD  EYES: EOMI, no scleral icterus  ENMT: Moist mucous membranes  NECK: Supple  CHEST/LUNG: Clear to auscultation bilaterally on anterior chest. No increased work of breathing.  HEART: Regular rate and rhythm  ABDOMEN: Soft, Nontender, distended; Bowel sounds appreciated  EXTREMITIES:  Pitting edema in b/l LE. Gross sensation intact in b/l LE. Able to move toes. B/l UE strength 4/5. B/l LE strength 2/5, unable to resist gravity.  SKIN: Darker discoloration on forehead noted.  NERVOUS SYSTEM:  Alert & Oriented X2-3 ([facility], year , name).     LABS:                        13.7   10.44 )-----------( 101      ( 26 Sep 2022 14:31 )             41.3     Hgb Trend: 13.7<--, 16.5<--      140  |  101  |  28<H>  ----------------------------<  245<H>  4.6   |  26  |  0.83    Ca    9.1      26 Sep 2022 14:31  Phos  2.4       Mg     1.7         TPro  5.7<L>  /  Alb  3.4  /  TBili  0.5  /  DBili  x   /  AST  43<H>  /  ALT  74<H>  /  AlkPhos  93      Creatinine Trend: 0.83<--  PT/INR - ( 26 Sep 2022 14:31 )   PT: 13.2 sec;   INR: 1.15 ratio         PTT - ( 26 Sep 2022 14:31 )  PTT:20.6 sec              Urinalysis Basic - ( 27 Sep 2022 05:07 )    Color: Light Yellow / Appearance: Clear / S.020 / pH: x  Gluc: x / Ketone: Negative  / Bili: Negative / Urobili: Negative   Blood: x / Protein: Trace / Nitrite: Negative   Leuk Esterase: Negative / RBC: 2 /hpf / WBC 3 /HPF   Sq Epi: x / Non Sq Epi: 2 /hpf / Bacteria: Negative        CAPILLARY BLOOD GLUCOSE      POCT Blood Glucose.: 206 mg/dL (27 Sep 2022 00:22)

## 2022-09-27 NOTE — CONSULT NOTE ADULT - SUBJECTIVE AND OBJECTIVE BOX
HPI:  HPI: AD is a 81 y/o M PMHx of PMH Glioblastoma (diagnosed 7/18/22, s/p R stereo biopsy, TIMOTHY x2, R crani for tumor debulking 7/28/22, on temozolomide (last dose 4 days ago)), HLD, steroid-induced diabetes, and R popliteal DVT (found 9/21/22, not on AC due to thrombocytopenia) presenting with weakness x 3 weeks, acutely worsening over 3-5 days with dark brown stools x 1-2 days. Patient's daughter states he has had loose dark brown stools for the past 4 weeks intermittently, and that they noticed the stool became darker yesterday, denies black stools. Was told by her mom there was some scant red blood on wiping but wasn't sure if it was from external irritation. Patient denies stomach pain, nausea, or vomiting, but endorses he has had little appetite for food or fluids. Patient also reported bilateral leg swelling, weakness with moving legs, and difficulty ambulating for the past 3 weeks, acutely worsening a week ago. Patient was found to have a DVT in his RLE 6 days ago at his radiation clinic, confirmed with doppler. Patient denies leg pain, but endorses SOB, especially on exertion and orthopnea. Patient was started on Lovenox and took 2 doses, but stopped anticoagulation along with the Temozolomide when his platelets were found to be low at 77, 4 days ago. Patient endorses that he has noticed bruising on his arms and stomach (where Lovenox was injected), but denies any trauma. Patient was urged to present to the ED by nurse practitioner at his radiation clinic given worsening weakness, decreased PO intake, and dark stools. Patient denies chest pain, fever, nausea, vomiting, abdominal pain, back pain. Patient is followed by Dr. Mcneil and Dr. Borja.    In ED, VS: afebrile, /84, HR 90, RR 16, SpO2 97%  Labs significant for hgb 13.7 (baseline 16.5 9/22/22), thrombocytopenia 101 (improved from 9/22, 77), mild transaminitis, elevated lactate 4.5   Meds: given 1L NS, PPI 80mg IV x1     GI History:  History obtained from the patient's daughter as the patient is not a reliable historian. She reports that he has multiple episodes of loose dark brown stools starting 9/25 AM. She also reports seeing some specks of red blood on the toilet paper when he wiped. She reports that he often has days of diarrhea since he began his chemotherapy. The patient was found to have a RLE DVT on 9/21 and was started on Lovenox. The daughter reports administering 2 doses of lovenox on 9/21 and 9/22, but was told to stop both lovenox and temozolomide on 9/23 because of new onset thrombocytopenia (PLT 77). The patient denies any nausea, vomiting, or bright red blood per rectum.    Outpatient GI Provider: N/A  Last Colonoscopy: 2014, does not know results but told to return in 10 years  Last EGD: denies    PAST MEDICAL & SURGICAL HISTORY:  Glioblastoma multiforme      Steroid-induced diabetes mellitus      Hyperlipidemia      S/P craniotomy  R craniotomy for debulking with Dr. Mcneil (7/28/22)          REVIEW OF SYSTEMS: negative except as per the HPI.    MEDICATIONS  (STANDING):  atorvastatin 80 milliGRAM(s) Oral at bedtime  dexAMETHasone     Tablet 4 milliGRAM(s) Oral every 12 hours  dextrose 5% + lactated ringers. 1000 milliLiter(s) (75 mL/Hr) IV Continuous <Continuous>  dextrose 5%. 1000 milliLiter(s) (100 mL/Hr) IV Continuous <Continuous>  dextrose 5%. 1000 milliLiter(s) (50 mL/Hr) IV Continuous <Continuous>  dextrose 50% Injectable 25 Gram(s) IV Push once  dextrose 50% Injectable 12.5 Gram(s) IV Push once  dextrose 50% Injectable 25 Gram(s) IV Push once  FLUoxetine 20 milliGRAM(s) Oral daily  glucagon  Injectable 1 milliGRAM(s) IntraMuscular once  insulin lispro (ADMELOG) corrective regimen sliding scale   SubCutaneous every 6 hours  levETIRAcetam 500 milliGRAM(s) Oral two times a day  pantoprazole  Injectable 40 milliGRAM(s) IV Push two times a day    MEDICATIONS  (PRN):  acetaminophen     Tablet .. 650 milliGRAM(s) Oral every 6 hours PRN Temp greater or equal to 38C (100.4F), Mild Pain (1 - 3)  aluminum hydroxide/magnesium hydroxide/simethicone Suspension 30 milliLiter(s) Oral every 4 hours PRN Dyspepsia  dextrose Oral Gel 15 Gram(s) Oral once PRN Blood Glucose LESS THAN 70 milliGRAM(s)/deciliter  melatonin 3 milliGRAM(s) Oral at bedtime PRN Insomnia      ALLERGIES:      SOCIAL HISTORY:  - Alcohol: denies  - Recreational drug use: denies    FAMILY HISTORY:  FH: type 2 diabetes    FH: hyperlipidemia        Vital Signs Last 24 Hrs  T(C): 36.7 (27 Sep 2022 03:41), Max: 37.2 (26 Sep 2022 14:30)  T(F): 98.1 (27 Sep 2022 03:41), Max: 99 (26 Sep 2022 14:30)  HR: 73 (27 Sep 2022 03:41) (73 - 90)  BP: 113/68 (27 Sep 2022 03:41) (113/68 - 131/72)  BP(mean): 92 (26 Sep 2022 19:30) (92 - 92)  RR: 17 (27 Sep 2022 03:41) (16 - 18)  SpO2: 96% (27 Sep 2022 03:41) (95% - 98%)    Parameters below as of 27 Sep 2022 03:41  Patient On (Oxygen Delivery Method): room air        PHYSICAL EXAM:  Constitutional: no acute distress  Eyes: no icterus  Neck: no masses, no LAD  Respiratory: normal inspiratory effort; no wheezing or crackles  Cardiovascular: RRR, normal S1/S2, no murmurs/rubs/gallops  Gastrointestinal: distended, nontender, +BS  Rectal: patient refused rectal exam, per ED provider rectal exam with dark brown stool  Extremities: + LE edema  Neurological: AAOx2, no asterixis  Skin: no rashes, bruises, petechiae    LABS:                        13.7   10.44 )-----------( 101      ( 26 Sep 2022 14:31 )             41.3     09-26    140  |  101  |  28<H>  ----------------------------<  245<H>  4.6   |  26  |  0.83    Ca    9.1      26 Sep 2022 14:31  Phos  2.4     09-26  Mg     1.7     09-26    TPro  5.7<L>  /  Alb  3.4  /  TBili  0.5  /  DBili  x   /  AST  43<H>  /  ALT  74<H>  /  AlkPhos  93  09-26    PT/INR - ( 26 Sep 2022 14:31 )   PT: 13.2 sec;   INR: 1.15 ratio         PTT - ( 26 Sep 2022 14:31 )  PTT:20.6 sec  LIVER FUNCTIONS - ( 26 Sep 2022 14:31 )  Alb: 3.4 g/dL / Pro: 5.7 g/dL / ALK PHOS: 93 U/L / ALT: 74 U/L / AST: 43 U/L / GGT: x          HPI:  HPI: AD is a 81 y/o M PMHx of PMH Glioblastoma (diagnosed 7/18/22, s/p R stereo biopsy, TIMOTHY x2, R crani for tumor debulking 7/28/22, on temozolomide (last dose 4 days ago)), HLD, steroid-induced diabetes, and R popliteal DVT (found 9/21/22, not on AC due to thrombocytopenia) presenting with weakness x 3 weeks, acutely worsening over 3-5 days with dark brown stools x 1-2 days. Patient's daughter states he has had loose dark brown stools for the past 4 weeks intermittently, and that they noticed the stool became darker yesterday, denies black stools. Was told by her mom there was some scant red blood on wiping but wasn't sure if it was from external irritation. Patient denies stomach pain, nausea, or vomiting, but endorses he has had little appetite for food or fluids. Patient also reported bilateral leg swelling, weakness with moving legs, and difficulty ambulating for the past 3 weeks, acutely worsening a week ago. Patient was found to have a DVT in his RLE 6 days ago at his radiation clinic, confirmed with doppler. Patient denies leg pain, but endorses SOB, especially on exertion and orthopnea. Patient was started on Lovenox and took 2 doses, but stopped anticoagulation along with the Temozolomide when his platelets were found to be low at 77, 4 days ago. Patient endorses that he has noticed bruising on his arms and stomach (where Lovenox was injected), but denies any trauma. Patient was urged to present to the ED by nurse practitioner at his radiation clinic given worsening weakness, decreased PO intake, and dark stools. Patient denies chest pain, fever, nausea, vomiting, abdominal pain, back pain. Patient is followed by Dr. Mcneil and Dr. Borja.    In ED, VS: afebrile, /84, HR 90, RR 16, SpO2 97%  Labs significant for hgb 13.7 (baseline 16.5 9/22/22), thrombocytopenia 101 (improved from 9/22, 77), mild transaminitis, elevated lactate 4.5   Meds: given 1L NS, PPI 80mg IV x1     GI History:  History obtained from the patient's daughter. She reports that he has multiple episodes of loose dark brown stools starting 9/25 AM. She also reports seeing some specks of red blood on the toilet paper when he wiped. She reports that he often has days of diarrhea since he began his chemotherapy. The patient was found to have a RLE DVT on 9/21 and was started on Lovenox. The daughter reports administering 2 doses of lovenox on 9/21 and 9/22, but was told to stop both lovenox and temozolomide on 9/23 because of new onset thrombocytopenia (PLT 77). The patient denies any nausea, vomiting, or bright red blood per rectum.    Outpatient GI Provider: N/A  Last Colonoscopy: 2014, does not know results but told to return in 10 years  Last EGD: denies    PAST MEDICAL & SURGICAL HISTORY:  Glioblastoma multiforme      Steroid-induced diabetes mellitus      Hyperlipidemia      S/P craniotomy  R craniotomy for debulking with Dr. Mcneil (7/28/22)          REVIEW OF SYSTEMS: negative except as per the HPI.    MEDICATIONS  (STANDING):  atorvastatin 80 milliGRAM(s) Oral at bedtime  dexAMETHasone     Tablet 4 milliGRAM(s) Oral every 12 hours  dextrose 5% + lactated ringers. 1000 milliLiter(s) (75 mL/Hr) IV Continuous <Continuous>  dextrose 5%. 1000 milliLiter(s) (100 mL/Hr) IV Continuous <Continuous>  dextrose 5%. 1000 milliLiter(s) (50 mL/Hr) IV Continuous <Continuous>  dextrose 50% Injectable 25 Gram(s) IV Push once  dextrose 50% Injectable 12.5 Gram(s) IV Push once  dextrose 50% Injectable 25 Gram(s) IV Push once  FLUoxetine 20 milliGRAM(s) Oral daily  glucagon  Injectable 1 milliGRAM(s) IntraMuscular once  insulin lispro (ADMELOG) corrective regimen sliding scale   SubCutaneous every 6 hours  levETIRAcetam 500 milliGRAM(s) Oral two times a day  pantoprazole  Injectable 40 milliGRAM(s) IV Push two times a day    MEDICATIONS  (PRN):  acetaminophen     Tablet .. 650 milliGRAM(s) Oral every 6 hours PRN Temp greater or equal to 38C (100.4F), Mild Pain (1 - 3)  aluminum hydroxide/magnesium hydroxide/simethicone Suspension 30 milliLiter(s) Oral every 4 hours PRN Dyspepsia  dextrose Oral Gel 15 Gram(s) Oral once PRN Blood Glucose LESS THAN 70 milliGRAM(s)/deciliter  melatonin 3 milliGRAM(s) Oral at bedtime PRN Insomnia      ALLERGIES:      SOCIAL HISTORY:  - Alcohol: denies  - Recreational drug use: denies    FAMILY HISTORY:  FH: type 2 diabetes    FH: hyperlipidemia        Vital Signs Last 24 Hrs  T(C): 36.7 (27 Sep 2022 03:41), Max: 37.2 (26 Sep 2022 14:30)  T(F): 98.1 (27 Sep 2022 03:41), Max: 99 (26 Sep 2022 14:30)  HR: 73 (27 Sep 2022 03:41) (73 - 90)  BP: 113/68 (27 Sep 2022 03:41) (113/68 - 131/72)  BP(mean): 92 (26 Sep 2022 19:30) (92 - 92)  RR: 17 (27 Sep 2022 03:41) (16 - 18)  SpO2: 96% (27 Sep 2022 03:41) (95% - 98%)    Parameters below as of 27 Sep 2022 03:41  Patient On (Oxygen Delivery Method): room air        PHYSICAL EXAM:  Constitutional: no acute distress  Eyes: no icterus  Neck: no masses, no LAD  Respiratory: normal inspiratory effort; no wheezing or crackles  Cardiovascular: RRR, normal S1/S2, no murmurs/rubs/gallops  Gastrointestinal: distended, nontender, +BS  Rectal: patient refused rectal exam, per ED provider rectal exam with dark brown stool  Extremities: + LE edema  Neurological: AAOx2, no asterixis  Skin: no rashes, bruises, petechiae    LABS:                        13.7   10.44 )-----------( 101      ( 26 Sep 2022 14:31 )             41.3     09-26    140  |  101  |  28<H>  ----------------------------<  245<H>  4.6   |  26  |  0.83    Ca    9.1      26 Sep 2022 14:31  Phos  2.4     09-26  Mg     1.7     09-26    TPro  5.7<L>  /  Alb  3.4  /  TBili  0.5  /  DBili  x   /  AST  43<H>  /  ALT  74<H>  /  AlkPhos  93  09-26    PT/INR - ( 26 Sep 2022 14:31 )   PT: 13.2 sec;   INR: 1.15 ratio         PTT - ( 26 Sep 2022 14:31 )  PTT:20.6 sec  LIVER FUNCTIONS - ( 26 Sep 2022 14:31 )  Alb: 3.4 g/dL / Pro: 5.7 g/dL / ALK PHOS: 93 U/L / ALT: 74 U/L / AST: 43 U/L / GGT: x

## 2022-09-27 NOTE — PROGRESS NOTE ADULT - ATTENDING COMMENTS
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 Suspect weakness likely multifactorial 2/2 underlying disease and Hgb drop possibly 2/2 GIB. CT lumbar spine reviewed, mild multilevel spinal canal stenosis. MRI brain pending. Nsx following. GI consulted, plan for EGD and c-scope tomorrow. Will place on liquid diet, NPO after midnight and prep overnight. Hold AC for now. Heme consulted given history of possible HIT and need for AC.

## 2022-09-27 NOTE — PROGRESS NOTE ADULT - SUBJECTIVE AND OBJECTIVE BOX
Patient seen and examined at bedside.    --Anticoagulation--    T(C): 36.7 (09-27-22 @ 03:41), Max: 37.2 (09-26-22 @ 14:30)  HR: 73 (09-27-22 @ 03:41) (73 - 90)  BP: 113/68 (09-27-22 @ 03:41) (113/68 - 131/72)  RR: 17 (09-27-22 @ 03:41) (16 - 18)  SpO2: 96% (09-27-22 @ 03:41) (95% - 98%)  Wt(kg): --    Exam: AO2 (knows year, not month), PERRL, EOMI, no droop/drift, BUE 5/5, b/l swollen, b/l HF 2/5, R KE 4/5 w/assistance, L KE 2/5, b/l DF/PF 5/5

## 2022-09-27 NOTE — PROGRESS NOTE ADULT - PROBLEM SELECTOR PLAN 1
p/w dark brown stools x 1-2 days i/s/o lovenox use outpt (1-2 doses) for R DVT. Here found to have +FOBT and acute downtrend in hgb from 16.5 (9/22) to 13.7, concerning for GI bleed. HD stable   - giving timing, likely 2/2 AC use which has now been discontinued, possibly LGIB, ?diverticulosis. ddx also includes PUD given steroid use, anorexia, BUN/Cr ratio ~34, although lower suspicion given no abd pain, no black stools.  - last colonoscopy 2014, results unknown but was told to repeat in 10 yrs   - s/p PPI 80mg IV x1  - can c/w PPI 40mg BID  - GI c/s for further eval given need for AC for above knee DVT  - NPO overnight, resume diet in AM if no GI intervention p/w dark brown stools x 1-2 days i/s/o lovenox use outpt (1-2 doses) for R DVT. Here found to have +FOBT and acute downtrend in hgb from 16.5 (9/22) to 13.7, concerning for GI bleed. HD stable   - giving timing, likely 2/2 AC use which has now been discontinued, possibly LGIB, ?diverticulosis. ddx also includes PUD given steroid use, anorexia, BUN/Cr ratio ~34, although lower suspicion given no abd pain, no black stools.  - last colonoscopy 2014, results unknown but was told to repeat in 10 yrs   - s/p PPI 80mg IV x1    PLAN:   - c/w PPI 40mg BID  - GI consulted; plan for EGD/colonoscopy tmmrw, repeat CBC at 12AM  - NPO at midnight p/w dark brown stools x 1-2 days i/s/o lovenox use outpt (1-2 doses) for R DVT. Here found to have +FOBT and acute downtrend in hgb from 16.5 (9/22) to 13.7, concerning for GI bleed. HD stable   - giving timing, likely 2/2 AC use which has now been discontinued, possibly LGIB, ?diverticulosis. ddx also includes PUD given steroid use, anorexia, BUN/Cr ratio ~34, although lower suspicion given no abd pain, no black stools.  - last colonoscopy 2014, results unknown but was told to repeat in 10 yrs   - s/p PPI 80mg IV x1    PLAN:   - GI consulted; plan for EGD/colonoscopy tmmrw, repeat CBC at 12AM  - c/w PPI 40mg BID  - NPO at midnight

## 2022-09-27 NOTE — PROGRESS NOTE ADULT - PROBLEM SELECTOR PLAN 4
Found to have R popliteal DVT on outpt duplex for BLE pitting edema. Was briefly on Lovenox (1-2 doses) but then discontinued due to thrombocytopenia as above  - daughter reports some KHAN and orthopnea at home over past few weeks, which is concerning for PE. Although pt saturating well on RA here, no tachycardia. No SOB at rest, no CP  - AC also c/b current concern for GIB   - consider heme/onc c/s for guidance re: AC, was seeing Dr. Silva outpt Found to have R popliteal DVT on outpt duplex for BLE pitting edema. Was briefly on Lovenox (1-2 doses) but then discontinued due to thrombocytopenia as above  - daughter reports some KHAN and orthopnea at home over past few weeks, which is concerning for PE. Although pt saturating well on RA here, no tachycardia. No SOB at rest, no CP  - AC also c/b current concern for GIB     PLAN:  - Heme/onc consulted; appreciated recs, was seeing Dr. Silva outpt

## 2022-09-27 NOTE — PROGRESS NOTE ADULT - PROBLEM SELECTOR PLAN 8
a1c 7% (7/24/22), likely 2/2 steroid-induced DM  - holding home oral agents while inpatient (januvia 100mg qd, metformin ER 500mg BID)  - low dose correctional scale  - FS TID qAC and qhs or q6h while NPO a1c 7% (7/24/22), likely 2/2 steroid-induced DM  - holding home oral agents while inpatient (januvia 100mg qd, metformin ER 500mg BID)    PLAN:   - low dose correctional scale  - FS TID qAC and qhs or q6h while NPO

## 2022-09-27 NOTE — CONSULT NOTE ADULT - ASSESSMENT
82M with glioblastoma multiforme diagnosed 7/18/22 s/p debulking 7/28/22, hyperlipidemia, right above-the-knee popliteal DVT, concern for HIT admitted for workup of gastrointestinal bleeding. Hematology/oncology consulted for management of anticoagulation in the setting of thrombus and HIT.

## 2022-09-27 NOTE — PROGRESS NOTE ADULT - PROBLEM SELECTOR PLAN 7
diagnosed 7/18/22, s/p R stereo biopsy, TIMOTHY x2, R crani for tumor debulking 7/28/22, on temozolomide (last dose 4 days ago)  - follows Dr. Mcneil and Dr. Borja outpt  - CT here showing possible residual or remanent tumor. Pt was originally planned for MRI brain outpt, will obtain here   - neuro surg following here, appreciate recs   - c/w dexamethasone 4mg BID, keppra 500mg BID diagnosed 7/18/22, s/p R stereo biopsy, TIMOTHY x2, R crani for tumor debulking 7/28/22, on temozolomide (last dose 4 days ago)  - follows Dr. Mcneil and Dr. Borja outpt  - CT here showing possible residual or remanent tumor     PLAN:   - neuro surg following here, pending MRI brain, appreciate recs   - c/w dexamethasone 4mg BID, keppra 500mg BID

## 2022-09-27 NOTE — PROGRESS NOTE ADULT - ASSESSMENT
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 Admitted to medicine for further workup.

## 2022-09-28 NOTE — PROGRESS NOTE ADULT - ASSESSMENT
Madhavi Jordon  82M Polish speaking s/p R stereo biopsy, TIMOTHY x2, R crani for tumor debulking of GBM (Dr. Mcneil 7.28.22) p/w worsening weakness since Sept 5, most notably over the past week. He's had 6 rounds of radiation and was on temozolomide (last dose 4 days ago; stopped due to low PLT count). He was started on lovenox for RLE DVT (above knee popliteal) but this was also stopped due to low PLT count. Also has dark stools; positive occult blood test here in ED. Patient was planned to get an MRI this week. Head CT shows c/f possible area of residual or recurrent tumor. Exam: AO2 (knows year, not month), PERRL, EOMI, no droop/drift, BUE 5/5, b/l swollen, b/l HF 2/5, R KE 4/5 w/assistance, L KE 2/5, b/l DF/PF 5/5     - MRI completed- posttreatment changes vs residual/recurrence; to be discussed  - Colonoscopy with GI today  - Possible IVC filter placement with IR

## 2022-09-28 NOTE — PROGRESS NOTE ADULT - ATTENDING COMMENTS
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 Suspect weakness likely multifactorial 2/2 underlying disease and Hgb drop possibly 2/2 GIB. CT lumbar spine reviewed, mild multilevel spinal canal stenosis. MRI brain results reviewed, can not rule out neoplasm. Nsx following, f/u recommendations. GI consulted, was not able to prep last night. Will plan for prep again today and EGD and c-scope tomorrow. c/w liquid diet, NPO after midnight and prep overnight. Holding AC for now. Heme consulted do not recommend AC. Plan for IVF filter given above the knee DVT today

## 2022-09-28 NOTE — PROGRESS NOTE ADULT - PROBLEM SELECTOR PLAN 6
troponin mildly elevated to 65. No CP, EKG showing NSR, HR 85  - TTE (7/23) EF 75%, hyperdynamic LV sysfxn, nl RV size/function   - repeat q6h until downtrend troponin mildly elevated to 65. No CP, EKG showing NSR, HR 85  - troponin lateral trend 65 -> 69 -> 68, will stop trending   - TTE (7/23) EF 75%, hyperdynamic LV sys fxn, nl RV size/function

## 2022-09-28 NOTE — PROGRESS NOTE ADULT - PROBLEM SELECTOR PLAN 4
Found to have R popliteal DVT on outpt duplex for BLE pitting edema. Was briefly on Lovenox (1-2 doses) but then discontinued due to thrombocytopenia as above  - daughter reports some KHAN and orthopnea at home over past few weeks, which is concerning for PE. Although pt saturating well on RA here, no tachycardia. No SOB at rest, no CP  - AC also c/b current concern for GIB     PLAN:  - Heme/onc consulted; appreciated recs, was seeing Dr. Silva outpt Found to have R popliteal DVT on outpt duplex for BLE pitting edema. Was briefly on Lovenox (1-2 doses) but then discontinued due to thrombocytopenia as above  - daughter reports some KHAN and orthopnea at home over past few weeks, which is concerning for PE. Although pt saturating well on RA here, no tachycardia. No SOB at rest, no CP  - AC also c/b current concern for GIB     PLAN:  - Heme/onc consulted (saw Dr. Silva outpt); recommend continuing to hold AC, IVC filter w/ IR  - IR to place IVC filter today

## 2022-09-28 NOTE — PHYSICAL THERAPY INITIAL EVALUATION ADULT - PLANNED THERAPY INTERVENTIONS, PT EVAL
GOAL: Pt will negotiate up/down 14 steps with bilateral handrail ascending min assist in 2 weeks./balance training/bed mobility training/gait training/strengthening/transfer training

## 2022-09-28 NOTE — PROGRESS NOTE ADULT - PROBLEM SELECTOR PLAN 3
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 plts 101 on admission, improved from 77 on 9/22.   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 Silva)  - On 9/21, he was found to have a DVT and was started on Lovenox, which was quickly discontinued after outpt labs showed plt of 77 < 172 (8/2022). Per daughter only took 1 or 2 doses of the lovenox, so thrombocytopenia more likely 2/ temozolomide, which was also discontinued at that time      PLAN:  - Plts now improved after stopping AC and chemo   - cont to monitor pt w/ h/o thrombocytopenia 2/2 HIT, then likely AC and chemotherapy. plts 101 on admission, improved from 77 on 9/22.   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 Silva)  - On 9/21, he was found to have a DVT and was started on Lovenox, which was quickly discontinued after outpt labs showed plt of 77 < 172 (8/2022). Per daughter only took 1 or 2 doses of the lovenox, so thrombocytopenia more likely 2/ temozolomide, which was also discontinued at that time  - 9/28 HIT assay (heparin pf4 ab) negative    PLAN:  - continue to monitor plts on CBC

## 2022-09-28 NOTE — PROGRESS NOTE ADULT - PROBLEM SELECTOR PLAN 2
generalized weakness x 3 weeks with worsening LE weakness x 5 days w/ difficulty ambulating. Here with 2/5 strength in BLE, 4/5 in UE, rectal tone intact, no changes in urinary sxs (although w/ history of urinary incontinence). no acute changes in mental status (AAOx2, mild confusion since GBM dx)  - given diproprionate weakness, concerning for spinal pathology ?mets, ?cauda equina   - generalized weakness due to malignancy, decreased PO intake, and relative drop in hgb may also be contributing  - CTH here showing post-op changes, possible residual or remanent tumor   - CT L-spine showed degenerative changes resulting in mild multilevel spinal canal stenosis or neural foraminal narrowing.    PLAN:  - evaluated by neurosurg in ED, MRI head pending, recs appreciated  - PT/OT generalized weakness x 3 weeks with worsening LE weakness x 5 days w/ difficulty ambulating. Here with 2/5 strength in BLE, 4/5 in UE, rectal tone intact, no changes in urinary sxs (although w/ history of urinary incontinence). no acute changes in mental status (AAOx2, mild confusion since GBM dx)  - given diproprionate weakness, concerning for spinal pathology ?mets, ?cauda equina   - generalized weakness due to malignancy, decreased PO intake, and relative drop in hgb may also be contributing  - CTH here showing post-op changes, possible residual or remnant tumor   - CT L-spine showed degenerative changes resulting in mild multilevel spinal canal stenosis or neural foraminal narrowing.    PLAN:  - neurosurgery following; appreciate recs  - PT recommends [facility], OT generalized weakness x 3 weeks with worsening LE weakness x 5 days w/ difficulty ambulating. Here with 2/5 strength in BLE, 4/5 in UE, rectal tone intact, no changes in urinary sxs (although w/ history of urinary incontinence). no acute changes in mental status (AAOx2, mild confusion since GBM dx)  - given diproprionate weakness, concerning for spinal pathology ?mets, ?cauda equina   - generalized weakness due to malignancy, decreased PO intake, and relative drop in hgb may also be contributing  - CTH here showing post-op changes, possible residual or remnant tumor   - CT L-spine showed degenerative changes resulting in mild multilevel spinal canal stenosis or neural foraminal narrowing.    PLAN:  - neurosurgery following; appreciate recs  - PT recommends [facility]\  - OT

## 2022-09-28 NOTE — PROGRESS NOTE ADULT - PROBLEM SELECTOR PLAN 8
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

## 2022-09-28 NOTE — PHYSICAL THERAPY INITIAL EVALUATION ADULT - IMPAIRMENTS FOUND, PT EVAL
Impression: S/P Orbitotomy OS - 7 Days. Encounter for other specified surgical aftercare  Z48.89. Plan: healing well. biopsy was benign but will touch base with pathologists to discuss further. patient has not been using abx carmen. STart maxitrol carmen TID to OS. RTC 1 month or sooner PRN. aerobic capacity/endurance/gait, locomotion, and balance/muscle strength

## 2022-09-28 NOTE — PHYSICAL THERAPY INITIAL EVALUATION ADULT - ADDITIONAL COMMENTS
pt has recently been wheelchair bound ~3 weeks due to decline in functional strength, requiring incr assist with ADLs; prior to this, pt was ambulating short distances at home with RW and CGAx1, able to negotiate 14 stairs with bilateral HR to enter apartment.

## 2022-09-28 NOTE — PHYSICAL THERAPY INITIAL EVALUATION ADULT - PERTINENT HX OF CURRENT PROBLEM, REHAB EVAL
82M with a history of glioblastoma on chemo and radiation therapy and chronic steroids who presented with LE weakness and dark stools x2 days. GI consulted for possible GI bleed and need for continued anticoagulation

## 2022-09-28 NOTE — PROGRESS NOTE ADULT - ASSESSMENT
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 Admitted to medicine for further workup.

## 2022-09-28 NOTE — PROGRESS NOTE ADULT - PROBLEM SELECTOR PLAN 1
p/w dark brown stools x 1-2 days i/s/o lovenox use outpt (1-2 doses) for R DVT. Here found to have +FOBT and acute downtrend in hgb from 16.5 (9/22) to 13.7, concerning for GI bleed. HD stable   - giving timing, likely 2/2 AC use which has now been discontinued, possibly LGIB, ?diverticulosis. ddx also includes PUD given steroid use, anorexia, BUN/Cr ratio ~34, although lower suspicion given no abd pain, no black stools.  - last colonoscopy 2014, results unknown but was told to repeat in 10 yrs   - s/p PPI 80mg IV x1    PLAN:   - GI consulted; plan for EGD/colonoscopy tmmrw, repeat CBC at 12AM  - c/w PPI 40mg BID  - NPO at midnight p/w dark brown stools x 1-2 days i/s/o lovenox use outpt (1-2 doses) for R DVT. Here found to have +FOBT and acute downtrend in hgb from 16.5 (9/22) to 13.7, concerning for GI bleed. HD stable   - giving timing, likely 2/2 AC use which has now been discontinued, possibly LGIB, ?diverticulosis. ddx also includes PUD given steroid use, anorexia, BUN/Cr ratio ~34, although lower suspicion given no abd pain, no black stools.  - last colonoscopy 2014, results unknown but was told to repeat in 10 yrs   - s/p PPI 80mg IV x1    PLAN:   - GI consulted; plan for EGD/colonoscopy tmmrw, pt did not finish bowel prep overnight   - c/w PPI 40mg BID  - NPO at midnight

## 2022-09-28 NOTE — PROGRESS NOTE ADULT - SUBJECTIVE AND OBJECTIVE BOX
Pt seen and examined at bedside.    REVIEW OF SYSTEMS:  Constitutional: No fever, weight loss or fatigue  Cardiovascular: No chest pain, palpitations, dizziness or leg swelling  Gastrointestinal: No abdominal or epigastric pain. No nausea, vomiting or hematemesis; No diarrhea or constipation. No melena or hematochezia.  Skin: No itching, burning, rashes or lesions     Allergies    heparin containing compounds (Other)    Intolerances        MEDICATIONS:  MEDICATIONS  (STANDING):  atorvastatin 80 milliGRAM(s) Oral at bedtime  dexAMETHasone     Tablet 4 milliGRAM(s) Oral every 12 hours  dextrose 5%. 1000 milliLiter(s) (100 mL/Hr) IV Continuous <Continuous>  dextrose 5%. 1000 milliLiter(s) (50 mL/Hr) IV Continuous <Continuous>  dextrose 50% Injectable 25 Gram(s) IV Push once  dextrose 50% Injectable 12.5 Gram(s) IV Push once  dextrose 50% Injectable 25 Gram(s) IV Push once  FLUoxetine 20 milliGRAM(s) Oral daily  glucagon  Injectable 1 milliGRAM(s) IntraMuscular once  insulin lispro (ADMELOG) corrective regimen sliding scale   SubCutaneous every 6 hours  levETIRAcetam 500 milliGRAM(s) Oral two times a day  pantoprazole  Injectable 40 milliGRAM(s) IV Push two times a day    MEDICATIONS  (PRN):  acetaminophen     Tablet .. 650 milliGRAM(s) Oral every 6 hours PRN Temp greater or equal to 38C (100.4F), Mild Pain (1 - 3)  aluminum hydroxide/magnesium hydroxide/simethicone Suspension 30 milliLiter(s) Oral every 4 hours PRN Dyspepsia  dextrose Oral Gel 15 Gram(s) Oral once PRN Blood Glucose LESS THAN 70 milliGRAM(s)/deciliter  melatonin 3 milliGRAM(s) Oral at bedtime PRN Insomnia      Vital Signs Last 24 Hrs  T(C): 36.3 (28 Sep 2022 04:35), Max: 37.2 (27 Sep 2022 19:45)  T(F): 97.4 (28 Sep 2022 04:35), Max: 98.9 (27 Sep 2022 19:45)  HR: 74 (28 Sep 2022 04:35) (70 - 78)  BP: 126/75 (28 Sep 2022 04:35) (104/66 - 126/75)  BP(mean): --  RR: 18 (28 Sep 2022 04:35) (18 - 18)  SpO2: 96% (28 Sep 2022 04:35) (95% - 98%)    Parameters below as of 28 Sep 2022 04:35  Patient On (Oxygen Delivery Method): room air         @ 07:01  -   @ 07:00  --------------------------------------------------------  IN: 350 mL / OUT: 250 mL / NET: 100 mL        PHYSICAL EXAM:    General: Well developed; well nourished; in no acute distress  HEENT: MMM, conjunctiva and sclera clear  Gastrointestinal: Soft non-tender non-distended; Normal bowel sounds; No hepatosplenomegaly. No rebound or guarding  Skin: Warm and dry. No obvious rash    LABS:  CBC Full  -  ( 28 Sep 2022 06:04 )  WBC Count : 7.63 K/uL  RBC Count : 4.10 M/uL  Hemoglobin : 13.0 g/dL  Hematocrit : 38.3 %  Platelet Count - Automated : 87 K/uL  Mean Cell Volume : 93.4 fl  Mean Cell Hemoglobin : 31.7 pg  Mean Cell Hemoglobin Concentration : 33.9 gm/dL  Auto Neutrophil # : x  Auto Lymphocyte # : x  Auto Monocyte # : x  Auto Eosinophil # : x  Auto Basophil # : x  Auto Neutrophil % : x  Auto Lymphocyte % : x  Auto Monocyte % : x  Auto Eosinophil % : x  Auto Basophil % : x        138  |  102  |  15  ----------------------------<  183<H>  4.1   |  23  |  0.71    Ca    8.5      28 Sep 2022 06:04  Phos  3.1     -  Mg     1.6         TPro  5.1<L>  /  Alb  3.1<L>  /  TBili  0.6  /  DBili  x   /  AST  34  /  ALT  62<H>  /  AlkPhos  76      PT/INR - ( 26 Sep 2022 14:31 )   PT: 13.2 sec;   INR: 1.15 ratio         PTT - ( 26 Sep 2022 14:31 )  PTT:20.6 sec      Urinalysis Basic - ( 27 Sep 2022 05:07 )    Color: Light Yellow / Appearance: Clear / S.020 / pH: x  Gluc: x / Ketone: Negative  / Bili: Negative / Urobili: Negative   Blood: x / Protein: Trace / Nitrite: Negative   Leuk Esterase: Negative / RBC: 2 /hpf / WBC 3 /HPF   Sq Epi: x / Non Sq Epi: 2 /hpf / Bacteria: Negative         Pt seen and examined at bedside, has no acute complaints. Patient still has 2/3 bottle of golytely prep at bedside. Per nursing team, he has not yet had a BM. Had a dip in hgb down to 12.4, now uptrending again to 13.    REVIEW OF SYSTEMS:  Constitutional: No fever, weight loss or fatigue  Cardiovascular: No chest pain, palpitations, dizziness or leg swelling  Gastrointestinal: No abdominal or epigastric pain. No nausea, vomiting or hematemesis; No diarrhea or constipation. No melena or hematochezia.  Skin: No itching, burning, rashes or lesions     Allergies    heparin containing compounds (Other)    Intolerances        MEDICATIONS:  MEDICATIONS  (STANDING):  atorvastatin 80 milliGRAM(s) Oral at bedtime  dexAMETHasone     Tablet 4 milliGRAM(s) Oral every 12 hours  dextrose 5%. 1000 milliLiter(s) (100 mL/Hr) IV Continuous <Continuous>  dextrose 5%. 1000 milliLiter(s) (50 mL/Hr) IV Continuous <Continuous>  dextrose 50% Injectable 25 Gram(s) IV Push once  dextrose 50% Injectable 12.5 Gram(s) IV Push once  dextrose 50% Injectable 25 Gram(s) IV Push once  FLUoxetine 20 milliGRAM(s) Oral daily  glucagon  Injectable 1 milliGRAM(s) IntraMuscular once  insulin lispro (ADMELOG) corrective regimen sliding scale   SubCutaneous every 6 hours  levETIRAcetam 500 milliGRAM(s) Oral two times a day  pantoprazole  Injectable 40 milliGRAM(s) IV Push two times a day    MEDICATIONS  (PRN):  acetaminophen     Tablet .. 650 milliGRAM(s) Oral every 6 hours PRN Temp greater or equal to 38C (100.4F), Mild Pain (1 - 3)  aluminum hydroxide/magnesium hydroxide/simethicone Suspension 30 milliLiter(s) Oral every 4 hours PRN Dyspepsia  dextrose Oral Gel 15 Gram(s) Oral once PRN Blood Glucose LESS THAN 70 milliGRAM(s)/deciliter  melatonin 3 milliGRAM(s) Oral at bedtime PRN Insomnia      Vital Signs Last 24 Hrs  T(C): 36.3 (28 Sep 2022 04:35), Max: 37.2 (27 Sep 2022 19:45)  T(F): 97.4 (28 Sep 2022 04:35), Max: 98.9 (27 Sep 2022 19:45)  HR: 74 (28 Sep 2022 04:35) (70 - 78)  BP: 126/75 (28 Sep 2022 04:35) (104/66 - 126/75)  BP(mean): --  RR: 18 (28 Sep 2022 04:35) (18 - 18)  SpO2: 96% (28 Sep 2022 04:35) (95% - 98%)    Parameters below as of 28 Sep 2022 04:35  Patient On (Oxygen Delivery Method): room air         @ 07:01  -   @ 07:00  --------------------------------------------------------  IN: 350 mL / OUT: 250 mL / NET: 100 mL        PHYSICAL EXAM:    General: Well developed; well nourished; in no acute distress  HEENT: MMM, conjunctiva and sclera clear  Gastrointestinal: Distended, non-tender; Normal bowel sounds; No hepatosplenomegaly. No rebound or guarding  Skin: Warm and dry. No obvious rash    LABS:  CBC Full  -  ( 28 Sep 2022 06:04 )  WBC Count : 7.63 K/uL  RBC Count : 4.10 M/uL  Hemoglobin : 13.0 g/dL  Hematocrit : 38.3 %  Platelet Count - Automated : 87 K/uL  Mean Cell Volume : 93.4 fl  Mean Cell Hemoglobin : 31.7 pg  Mean Cell Hemoglobin Concentration : 33.9 gm/dL  Auto Neutrophil # : x  Auto Lymphocyte # : x  Auto Monocyte # : x  Auto Eosinophil # : x  Auto Basophil # : x  Auto Neutrophil % : x  Auto Lymphocyte % : x  Auto Monocyte % : x  Auto Eosinophil % : x  Auto Basophil % : x        138  |  102  |  15  ----------------------------<  183<H>  4.1   |  23  |  0.71    Ca    8.5      28 Sep 2022 06:04  Phos  3.1       Mg     1.6         TPro  5.1<L>  /  Alb  3.1<L>  /  TBili  0.6  /  DBili  x   /  AST  34  /  ALT  62<H>  /  AlkPhos  76      PT/INR - ( 26 Sep 2022 14:31 )   PT: 13.2 sec;   INR: 1.15 ratio         PTT - ( 26 Sep 2022 14:31 )  PTT:20.6 sec      Urinalysis Basic - ( 27 Sep 2022 05:07 )    Color: Light Yellow / Appearance: Clear / S.020 / pH: x  Gluc: x / Ketone: Negative  / Bili: Negative / Urobili: Negative   Blood: x / Protein: Trace / Nitrite: Negative   Leuk Esterase: Negative / RBC: 2 /hpf / WBC 3 /HPF   Sq Epi: x / Non Sq Epi: 2 /hpf / Bacteria: Negative

## 2022-09-28 NOTE — PROGRESS NOTE ADULT - PROBLEM SELECTOR PLAN 7
diagnosed 7/18/22, s/p R stereo biopsy, TIMOTHY x2, R crani for tumor debulking 7/28/22, on temozolomide (last dose 4 days ago)  - follows Dr. Mcneil and Dr. Borja outpt  - CT here showing possible residual or remanent tumor     PLAN:   - neuro surg following here, pending MRI brain, appreciate recs   - c/w dexamethasone 4mg BID, keppra 500mg BID - diagnosed 7/18/22, s/p R stereo biopsy, TIMOTHY x2, R crani for tumor debulking 7/28/22, on temozolomide (last dose 4 days ago)  - follows Dr. Mcneil and Dr. Borja outpt  - CT here showing possible residual or remanent tumor   - MRI brain showed findings representing residual/recurrent neoplasm and/or posttreatment changes. presence of neoplasm is not excluded    PLAN:   - neuro surg following here; appreciate recs   - c/w dexamethasone 4mg BID, keppra 500mg BID

## 2022-09-28 NOTE — PROCEDURE NOTE - PLAN
-lay flat with RLE extended 3 hours  -care per primary team  -if patient is deemed in the future to be a candidate for A/C or is no longer requiring A/C, please contact IR for filter retrieval

## 2022-09-28 NOTE — PROGRESS NOTE ADULT - ASSESSMENT
Patient is a 82 year old M with a history of glioblastoma on chemo and radiation therapy and chronic steroids who presented with LE weakness and dark stools x 2 days. GI consulted for possible GI bleed and need for continued anticoagulation    #Dark stool: UGIB vs LGIB  #Need for AC  - patient's family reports multiple episodes of dark brown stools with specks of red blood when wiping  - resultant 2-3 point drop in hgb from baseline, concern for UGIB > LGIB given chronic steroid use, clinical presentation, however LGIB cannot be ruled out  - UGIB DDx includes: peptic ulcer disease, esophagitis, gastritis, duodenitis, small bowel angiectasias, dieulafoy lesion, kortney lesion  - LGIB DDx includes: diverticulosis with hemorrhage, angiectasias, malignancy, hemorrhoids, colitis  - patient requires anticoagulation for RLE DVT in the setting of underlying malignancy      Recommendations:  - patient has not adequately prepped; patient's daughter will come by later today and try to encourage intake  - if patient still does not tolerate prep today, will plan for only EGD tomorrow or may need NGT to ensure adequate prep  - plan for EGD + colonoscopy tomorrow, hold AC until then  - NPO after midnight  - trend CBC q12h hours, transfuse for goal>7, can space apart if stable   - c/w IV PPI 40 BID  - maintain 2 large bore IVs, maintain active T&S  - remaining care per primary team    All recommendations tentative until attested by attending Patient is a 82 year old M with a history of glioblastoma on chemo and radiation therapy and chronic steroids who presented with LE weakness and dark stools x 2 days. GI consulted for possible GI bleed and need for continued anticoagulation    #Dark stool: UGIB vs LGIB  #Need for AC  - patient's family reports multiple episodes of dark brown stools with specks of red blood when wiping  - resultant 2-3 point drop in hgb from baseline, concern for UGIB > LGIB given chronic steroid use, clinical presentation, however LGIB cannot be ruled out  - UGIB DDx includes: peptic ulcer disease, esophagitis, gastritis, duodenitis, small bowel angiectasias, dieulafoy lesion, kortney lesion  - LGIB DDx includes: diverticulosis with hemorrhage, angiectasias, malignancy, hemorrhoids, colitis  - patient requires anticoagulation for RLE DVT in the setting of underlying malignancy      Recommendations:  - patient has not adequately prepped; patient's daughter will come by later today and try to encourage intake  - if patient still does not tolerate prep today, may need NGT to ensure adequate prep  - plan for EGD + colonoscopy tomorrow  - NPO after midnight  - trend CBC q12h hours, transfuse for goal>7, can space apart if stable   - c/w IV PPI 40 BID  - maintain 2 large bore IVs, maintain active T&S  - remaining care per primary team    All recommendations tentative until attested by attending Patient is a 82 year old M with a history of glioblastoma on chemo and radiation therapy and chronic steroids who presented with LE weakness and dark stools x 2 days. GI consulted for possible GI bleed and need for continued anticoagulation    #Dark stool: UGIB vs LGIB  #Need for AC  - patient's family reports multiple episodes of dark brown stools with specks of red blood when wiping  - resultant 2-3 point drop in hgb from baseline, concern for UGIB > LGIB given chronic steroid use, clinical presentation, however LGIB cannot be ruled out  - UGIB DDx includes: peptic ulcer disease, esophagitis, gastritis, duodenitis, small bowel angiectasias, dieulafoy lesion, kortney lesion  - LGIB DDx includes: diverticulosis with hemorrhage, angiectasias, malignancy, hemorrhoids, colitis  - patient requires anticoagulation for RLE DVT in the setting of underlying malignancy      Recommendations:  - patient has not adequately prepped; patient's daughter will come by later today and try to encourage intake  - if patient still does not tolerate prep today, may need NGT to ensure adequate prep  - plan for EGD + colonoscopy tomorrow  - NPO after midnight  - trend CBC q12h hours, transfuse for goal>7, can space apart if stable   - c/w IV PPI 40 BID  - maintain 2 large bore IVs, maintain active T&S  - remaining care per primary team    discussed with Dr. Storey

## 2022-09-28 NOTE — PHYSICAL THERAPY INITIAL EVALUATION ADULT - TRANSFER TRAINING, PT EVAL
GOAL: Patient will perform sit to stand transfers supervision at rolling walker with proper hand placement

## 2022-09-28 NOTE — PROGRESS NOTE ADULT - ATTENDING COMMENTS
GI consulted for concern for GI bleed in setting of anticoagulation for DVT.   EGD/Colonsocopy cancelled today due to incomplete prep.   Will reschedule for tomorrow.   Hgb 13 today, continue to trend.   Monitor bowel movements.   Rest of care per primary team.   GI to follow, please call with questions.

## 2022-09-28 NOTE — PROGRESS NOTE ADULT - SUBJECTIVE AND OBJECTIVE BOX
%%%%INCOMPLETE NOTE%%%%%     Dr. Liliana Tejeda, PGY-1    ANA LOVE  82y  MRN: [id number]    Subjective:    Patient is a 82y old  Male who presents with a chief complaint of GIB, weakness (28 Sep 2022 05:33)      MEDICATIONS  (STANDING):  atorvastatin 80 milliGRAM(s) Oral at bedtime  dexAMETHasone     Tablet 4 milliGRAM(s) Oral every 12 hours  dextrose 5%. 1000 milliLiter(s) (100 mL/Hr) IV Continuous <Continuous>  dextrose 5%. 1000 milliLiter(s) (50 mL/Hr) IV Continuous <Continuous>  dextrose 50% Injectable 25 Gram(s) IV Push once  dextrose 50% Injectable 12.5 Gram(s) IV Push once  dextrose 50% Injectable 25 Gram(s) IV Push once  FLUoxetine 20 milliGRAM(s) Oral daily  glucagon  Injectable 1 milliGRAM(s) IntraMuscular once  insulin lispro (ADMELOG) corrective regimen sliding scale   SubCutaneous every 6 hours  levETIRAcetam 500 milliGRAM(s) Oral two times a day  pantoprazole  Injectable 40 milliGRAM(s) IV Push two times a day    MEDICATIONS  (PRN):  acetaminophen     Tablet .. 650 milliGRAM(s) Oral every 6 hours PRN Temp greater or equal to 38C (100.4F), Mild Pain (1 - 3)  aluminum hydroxide/magnesium hydroxide/simethicone Suspension 30 milliLiter(s) Oral every 4 hours PRN Dyspepsia  dextrose Oral Gel 15 Gram(s) Oral once PRN Blood Glucose LESS THAN 70 milliGRAM(s)/deciliter  melatonin 3 milliGRAM(s) Oral at bedtime PRN Insomnia        Objective:    Vitals: Vital Signs Last 24 Hrs  T(C): 36.3 (22 @ 04:35), Max: 37.2 (22 @ 19:45)  T(F): 97.4 (22 @ 04:35), Max: 98.9 (22 @ 19:45)  HR: 74 (22 @ 04:35) (70 - 78)  BP: 126/75 (22 @ 04:35) (104/66 - 126/75)  BP(mean): --  RR: 18 (22 @ 04:35) (18 - 18)  SpO2: 96% (22 @ 04:35) (95% - 98%)            I&O's Summary    27 Sep 2022 07:01  -  28 Sep 2022 06:28  --------------------------------------------------------  IN: 350 mL / OUT: 250 mL / NET: 100 mL        PHYSICAL EXAM:  GENERAL: NAD, well-groomed, well-developed  HEAD:  Atraumatic, Normocephalic  EYES: EOMI, PERRLA, conjunctiva and sclera clear  ENMT: Moist mucous membranes  NECK: Supple, No JVD  CHEST/LUNG: Clear to auscultation bilaterally  HEART: Regular rate and rhythm  ABDOMEN: Soft, Nontender, Nondistended; Bowel sounds present  EXTREMITIES:  2+ Peripheral Pulses, No LE edema  SKIN: No rashes or lesions  NERVOUS SYSTEM:  Alert & Oriented X3, moving all extremities   PSYCH: Speaking in Full Sentences. Laying in bed comfortably; not agitated     LABS:                        12.4   7.75  )-----------( 93       ( 27 Sep 2022 13:14 )             35.8                         13.7   10.44 )-----------( 101      ( 26 Sep 2022 14:31 )             41.3     Hgb Trend: 12.4<--, 13.7<--, 16.5<--  09    139  |  104  |  19  ----------------------------<  160<H>  4.4   |  26  |  0.72      140  |  101  |  28<H>  ----------------------------<  245<H>  4.6   |  26  |  0.83    Ca    8.4      27 Sep 2022 13:14  Ca    9.1      26 Sep 2022 14:31  Phos  3.6       Mg     1.6         TPro  4.9<L>  /  Alb  3.1<L>  /  TBili  0.6  /  DBili  x   /  AST  35  /  ALT  62<H>  /  AlkPhos  77    TPro  5.7<L>  /  Alb  3.4  /  TBili  0.5  /  DBili  x   /  AST  43<H>  /  ALT  74<H>  /  AlkPhos  93      Creatinine Trend: 0.72<--, 0.83<--  PT/INR - ( 26 Sep 2022 14:31 )   PT: 13.2 sec;   INR: 1.15 ratio         PTT - ( 26 Sep 2022 14:31 )  PTT:20.6 sec              Urinalysis Basic - ( 27 Sep 2022 05:07 )    Color: Light Yellow / Appearance: Clear / S.020 / pH: x  Gluc: x / Ketone: Negative  / Bili: Negative / Urobili: Negative   Blood: x / Protein: Trace / Nitrite: Negative   Leuk Esterase: Negative / RBC: 2 /hpf / WBC 3 /HPF   Sq Epi: x / Non Sq Epi: 2 /hpf / Bacteria: Negative        CAPILLARY BLOOD GLUCOSE      POCT Blood Glucose.: 165 mg/dL (28 Sep 2022 05:30)  POCT Blood Glucose.: 189 mg/dL (28 Sep 2022 00:06)  POCT Blood Glucose.: 165 mg/dL (27 Sep 2022 21:42)  POCT Blood Glucose.: 137 mg/dL (27 Sep 2022 17:49)  POCT Blood Glucose.: 148 mg/dL (27 Sep 2022 12:24)  POCT Blood Glucose.: 148 mg/dL (27 Sep 2022 08:05)     Dr. Liliana Tejeda, PGY-1    ANA LOVE  82y  MRN: [id number]    Subjective:    Patient is a 82y old  Male who presents with a chief complaint of GIB, weakness (28 Sep 2022 05:33)     [id number] Ike    Pt states he is feeling about the same. Denies chest pain, sob, abd pain, headache.     Spoke with daughter, Faith and wife at bedside around 12:40PM. Family has been updated by IR and neurosurgery service. Communicated updates about plan for colonoscopy/EGD tomorrow and to encourage pt to continue drinking bowel prep.     MEDICATIONS  (STANDING):  atorvastatin 80 milliGRAM(s) Oral at bedtime  dexAMETHasone     Tablet 4 milliGRAM(s) Oral every 12 hours  dextrose 5%. 1000 milliLiter(s) (100 mL/Hr) IV Continuous <Continuous>  dextrose 5%. 1000 milliLiter(s) (50 mL/Hr) IV Continuous <Continuous>  dextrose 50% Injectable 25 Gram(s) IV Push once  dextrose 50% Injectable 12.5 Gram(s) IV Push once  dextrose 50% Injectable 25 Gram(s) IV Push once  FLUoxetine 20 milliGRAM(s) Oral daily  glucagon  Injectable 1 milliGRAM(s) IntraMuscular once  insulin lispro (ADMELOG) corrective regimen sliding scale   SubCutaneous every 6 hours  levETIRAcetam 500 milliGRAM(s) Oral two times a day  pantoprazole  Injectable 40 milliGRAM(s) IV Push two times a day    MEDICATIONS  (PRN):  acetaminophen     Tablet .. 650 milliGRAM(s) Oral every 6 hours PRN Temp greater or equal to 38C (100.4F), Mild Pain (1 - 3)  aluminum hydroxide/magnesium hydroxide/simethicone Suspension 30 milliLiter(s) Oral every 4 hours PRN Dyspepsia  dextrose Oral Gel 15 Gram(s) Oral once PRN Blood Glucose LESS THAN 70 milliGRAM(s)/deciliter  melatonin 3 milliGRAM(s) Oral at bedtime PRN Insomnia        Objective:    Vitals: Vital Signs Last 24 Hrs  T(C): 36.3 (22 @ 04:35), Max: 37.2 (22 @ 19:45)  T(F): 97.4 (22 @ 04:35), Max: 98.9 (22 @ 19:45)  HR: 74 (22 @ 04:35) (70 - 78)  BP: 126/75 (22 @ 04:35) (104/66 - 126/75)  BP(mean): --  RR: 18 (22 @ 04:35) (18 - 18)  SpO2: 96% (22 @ 04:35) (95% - 98%)            I&O's Summary    27 Sep 2022 07:01  -  28 Sep 2022 06:28  --------------------------------------------------------  IN: 350 mL / OUT: 250 mL / NET: 100 mL      PHYSICAL EXAM:  GENERAL: NAD  EYES: EOMI, no scleral icterus  ENMT: Moist mucous membranes  NECK: Supple  CHEST/LUNG: Clear to auscultation bilaterally on anterior chest. No increased work of breathing.  HEART: Regular rate and rhythm  ABDOMEN: Soft, Nontender, distended; Bowel sounds appreciated  EXTREMITIES:  Pitting edema in b/l LE. Gross sensation intact in b/l LE. Able to move toes. B/l UE strength 4/5. B/l LE strength 2/5, unable to resist gravity.  SKIN: Darker discoloration on forehead noted. Non-tender.   NERVOUS SYSTEM:  Alert & Oriented X2-3 (hospital not name, year  not month, self/birthday).     LABS:                        12.4   7.75  )-----------( 93       ( 27 Sep 2022 13:14 )             35.8                         13.7   10.44 )-----------( 101      ( 26 Sep 2022 14:31 )             41.3     Hgb Trend: 12.4<--, 13.7<--, 16.5<--      139  |  104  |  19  ----------------------------<  160<H>  4.4   |  26  |  0.72      140  |  101  |  28<H>  ----------------------------<  245<H>  4.6   |  26  |  0.83    Ca    8.4      27 Sep 2022 13:14  Ca    9.1      26 Sep 2022 14:31  Phos  3.6       Mg     1.6         TPro  4.9<L>  /  Alb  3.1<L>  /  TBili  0.6  /  DBili  x   /  AST  35  /  ALT  62<H>  /  AlkPhos  77    TPro  5.7<L>  /  Alb  3.4  /  TBili  0.5  /  DBili  x   /  AST  43<H>  /  ALT  74<H>  /  AlkPhos  93      Creatinine Trend: 0.72<--, 0.83<--  PT/INR - ( 26 Sep 2022 14:31 )   PT: 13.2 sec;   INR: 1.15 ratio         PTT - ( 26 Sep 2022 14:31 )  PTT:20.6 sec              Urinalysis Basic - ( 27 Sep 2022 05:07 )    Color: Light Yellow / Appearance: Clear / S.020 / pH: x  Gluc: x / Ketone: Negative  / Bili: Negative / Urobili: Negative   Blood: x / Protein: Trace / Nitrite: Negative   Leuk Esterase: Negative / RBC: 2 /hpf / WBC 3 /HPF   Sq Epi: x / Non Sq Epi: 2 /hpf / Bacteria: Negative        CAPILLARY BLOOD GLUCOSE      POCT Blood Glucose.: 165 mg/dL (28 Sep 2022 05:30)  POCT Blood Glucose.: 189 mg/dL (28 Sep 2022 00:06)  POCT Blood Glucose.: 165 mg/dL (27 Sep 2022 21:42)  POCT Blood Glucose.: 137 mg/dL (27 Sep 2022 17:49)  POCT Blood Glucose.: 148 mg/dL (27 Sep 2022 12:24)  POCT Blood Glucose.: 148 mg/dL (27 Sep 2022 08:05)

## 2022-09-28 NOTE — PROGRESS NOTE ADULT - PROBLEM SELECTOR PLAN 5
lactate elevated to 4.5 on VBG on admission, improved to 2.7 after 1L NS. Likely 2/2 dehydration i/s/o decreased PO intake  - another 1L NS given, lactate downtrend 2.3     PLAN:  - f/u AM lactate lactate elevated to 4.5 on VBG on admission, improved to 2.7 after 1L NS. Likely 2/2 dehydration i/s/o decreased PO intake  - another 1L NS given, lactate downtrend 2.3     PLAN:  - lactate today normalized 1.7

## 2022-09-28 NOTE — PROGRESS NOTE ADULT - SUBJECTIVE AND OBJECTIVE BOX
Patient seen and examined at bedside.    --Anticoagulation--    T(C): 36.3 (09-28-22 @ 04:35), Max: 37.2 (09-27-22 @ 19:45)  HR: 74 (09-28-22 @ 04:35) (70 - 78)  BP: 126/75 (09-28-22 @ 04:35) (104/66 - 126/75)  RR: 18 (09-28-22 @ 04:35) (18 - 18)  SpO2: 96% (09-28-22 @ 04:35) (95% - 98%)  Wt(kg): --    Exam: AO2 (knows year, not month), PERRL, EOMI, no droop/drift, BUE 5/5, b/l swollen, b/l HF 2/5, R KE 4/5 w/assistance, L KE 2/5, b/l DF/PF 5/5

## 2022-09-28 NOTE — PROGRESS NOTE ADULT - PROBLEM SELECTOR PLAN 10
Diet: CLD, NPO at midnight for EGD/colonoscopy tomorrow   DVT ppx: hold chemical ppx given GIB, SCD's in LLE (not in RLE given DVT)  Dispo: pending clinical course Diet: CLD, NPO at midnight for EGD/colonoscopy tomorrow   DVT ppx: SCD's in LLE (not in RLE given DVT), plan for IVC filter today, per heme/onc continue to hold AC   Dispo: PT recommends subacute rehab    Discussed [facility] with daughter Faith, she says she feels it is physically right decision, worried about effect on pt emotionally. Will discuss with pt and family.

## 2022-09-29 PROBLEM — E09.9 DRUG OR CHEMICAL INDUCED DIABETES MELLITUS WITHOUT COMPLICATIONS: Chronic | Status: ACTIVE | Noted: 2022-01-01

## 2022-09-29 PROBLEM — C71.9 MALIGNANT NEOPLASM OF BRAIN, UNSPECIFIED: Chronic | Status: ACTIVE | Noted: 2022-01-01

## 2022-09-29 NOTE — DIETITIAN INITIAL EVALUATION ADULT - NSFNSADHERENCEPTAFT_GEN_A_CORE
Per chart, pt with steroid-induced DM. Daughter reports pt was taking Januvia and Metformin PTA. She was only able to check his fingersticks 1x/day and typical readings have been ~250-290 mg/dl. A1c 9.3% indicates poor glycemic control (was 7.0% 07/24/22).

## 2022-09-29 NOTE — DISCHARGE NOTE PROVIDER - NSDCMRMEDTOKEN_GEN_ALL_CORE_FT
dexamethasone 4 mg oral tablet:   FLUoxetine 20 mg oral capsule: 1 cap(s) orally once a day  Januvia 100 mg oral tablet: 1 tab(s) orally once a day  levETIRAcetam 500 mg oral tablet: 1 tab(s) orally every 12 hours  metFORMIN 500 mg oral tablet, extended release: 1 tab(s) orally 2 times a day  pantoprazole 40 mg oral delayed release tablet: 1 tab(s) orally once a day (before a meal)  rosuvastatin 40 mg oral tablet: 1 tab(s) orally once a day (at bedtime)

## 2022-09-29 NOTE — DIETITIAN INITIAL EVALUATION ADULT - PERTINENT MEDS FT
MEDICATIONS  (STANDING):  atorvastatin 80 milliGRAM(s) Oral at bedtime  bisacodyl Suppository 10 milliGRAM(s) Rectal at bedtime  dexAMETHasone     Tablet 4 milliGRAM(s) Oral every 12 hours  dextrose 5%. 1000 milliLiter(s) (100 mL/Hr) IV Continuous <Continuous>  dextrose 5%. 1000 milliLiter(s) (50 mL/Hr) IV Continuous <Continuous>  dextrose 50% Injectable 25 Gram(s) IV Push once  dextrose 50% Injectable 12.5 Gram(s) IV Push once  dextrose 50% Injectable 25 Gram(s) IV Push once  FLUoxetine 20 milliGRAM(s) Oral daily  glucagon  Injectable 1 milliGRAM(s) IntraMuscular once  insulin lispro (ADMELOG) corrective regimen sliding scale   SubCutaneous every 6 hours  levETIRAcetam 500 milliGRAM(s) Oral two times a day  metoclopramide Injectable 10 milliGRAM(s) IV Push once  pantoprazole  Injectable 40 milliGRAM(s) IV Push two times a day  polyethylene glycol 3350 17 Gram(s) Oral <User Schedule>    MEDICATIONS  (PRN):  acetaminophen     Tablet .. 650 milliGRAM(s) Oral every 6 hours PRN Temp greater or equal to 38C (100.4F), Mild Pain (1 - 3)  aluminum hydroxide/magnesium hydroxide/simethicone Suspension 30 milliLiter(s) Oral every 4 hours PRN Dyspepsia  dextrose Oral Gel 15 Gram(s) Oral once PRN Blood Glucose LESS THAN 70 milliGRAM(s)/deciliter  melatonin 3 milliGRAM(s) Oral at bedtime PRN Insomnia

## 2022-09-29 NOTE — DISCHARGE NOTE PROVIDER - DETAILS OF MALNUTRITION DIAGNOSIS/DIAGNOSES
This patient has been assessed with a concern for Malnutrition and was treated during this hospitalization for the following Nutrition diagnosis/diagnoses:     -  09/29/2022: Severe protein-calorie malnutrition

## 2022-09-29 NOTE — DIETITIAN INITIAL EVALUATION ADULT - NSFNSGIIOFT_GEN_A_CORE
Daughter denies pt with any nausea, vomiting, constipation, diarrhea. Last BM 9/29 per chart. Pt currently on bowel regimen (miralax, dulcolax).

## 2022-09-29 NOTE — DISCHARGE NOTE PROVIDER - NSDCCPCAREPLAN_GEN_ALL_CORE_FT
PRINCIPAL DISCHARGE DIAGNOSIS  Diagnosis: GI bleed  Assessment and Plan of Treatment:       SECONDARY DISCHARGE DIAGNOSES  Diagnosis: Glioblastoma  Assessment and Plan of Treatment:     Diagnosis: Lower extremity weakness  Assessment and Plan of Treatment:     Diagnosis: Thrombosis of right popliteal vein  Assessment and Plan of Treatment:     Diagnosis: Thrombocytopenia  Assessment and Plan of Treatment:     Diagnosis: Steroid-induced diabetes mellitus  Assessment and Plan of Treatment:     Diagnosis: Hyperlipidemia  Assessment and Plan of Treatment:      PRINCIPAL DISCHARGE DIAGNOSIS  Diagnosis: GI bleed  Assessment and Plan of Treatment: During your admission, your Hgb was noted to have decreased from prior levels. In combination with your dark stool, there was concern for a possible gastrointestinal bleed. The GI service was consulted. You underwent an EGD and colonoscopy for further evaluation.  If you develop future episodes of dark stool, dizziness, chest pain, shortness of breath, return to the emergency room and call your doctor.      SECONDARY DISCHARGE DIAGNOSES  Diagnosis: Glioblastoma  Assessment and Plan of Treatment: The neurosurgery team was consulted and followed you regarding your known diagnosis of glioblastoma. You had a CT head and MRI brain performed, and the findings seen on the images most likely were due to posttreatment changes. Continue to follow up closely with Dr. Mcneil and the neurosurgery team closely after discharge.    Diagnosis: Lower extremity weakness  Assessment and Plan of Treatment: You were evaluated for your bilateral leg weakness. A CT of your lumbar spine was done. Neurosurgery was consulted and evaluated you.    Diagnosis: Thrombosis of right popliteal vein  Assessment and Plan of Treatment: You were evaluated for the blood clot in your right leg. The hematology service was consulted and recommended an IVC filter, as you could not take anticoagulation medication. You underwent IVC filter placement on 9/28. Please follow up via telehealth with IR Dr. Gilman on 10/28/22 2:30PM. The IR service should reach out to you about the appointment, call [phone] for questions/more information.   If you have any leg pain, leg redness, chest pain, shortness of breath, return to the emergency room and call your doctor.    Diagnosis: Thrombocytopenia  Assessment and Plan of Treatment:     Diagnosis: Steroid-induced diabetes mellitus  Assessment and Plan of Treatment:     Diagnosis: Hyperlipidemia  Assessment and Plan of Treatment:      PRINCIPAL DISCHARGE DIAGNOSIS  Diagnosis: GI bleed  Assessment and Plan of Treatment: During your admission, your Hgb was noted to have decreased from prior levels. In combination with your dark stool, there was concern for a possible gastrointestinal bleed. The GI service was consulted. You underwent an EGD and colonoscopy for further evaluation.  If you develop future episodes of dark stool, dizziness, chest pain, shortness of breath, return to the emergency room and call your doctor.      SECONDARY DISCHARGE DIAGNOSES  Diagnosis: Glioblastoma  Assessment and Plan of Treatment: The neurosurgery team was consulted and followed you regarding your known diagnosis of glioblastoma. You had a CT head and MRI brain performed, and the findings seen on the images most likely were due to posttreatment changes. Continue to follow up closely with Dr. Mcneil and the neurosurgery team closely after discharge.    Diagnosis: Lower extremity weakness  Assessment and Plan of Treatment: You were evaluated for your bilateral leg weakness. A CT of your lumbar spine was done. Neurosurgery was consulted and evaluated you. Physical therapy was consulted and recommended sub-acute rehab. However, due to your radiation schedule, you and your family as discussed with the  preferred outpatient physical therapy services.  If you develop new or worsening symptoms, fever, back pain, episodes of urinary or fecal incontinence, return to the emergency room and call your doctor.    Diagnosis: Thrombosis of right popliteal vein  Assessment and Plan of Treatment: You were evaluated for the blood clot in your right leg. The hematology service was consulted and recommended an IVC filter, as you could not take anticoagulation medication. You underwent IVC filter placement on 9/28. Please follow up via telehealth with IR Dr. Gilman on 10/28/22 2:30PM. The IR service should reach out to you about the appointment, call [phone] for questions/more information.   If you have any leg pain, leg redness, chest pain, shortness of breath, return to the emergency room and call your doctor.    Diagnosis: Thrombocytopenia  Assessment and Plan of Treatment: Your platelets were noted to be low on admission, but were improved from previously low platelet levels outpatient. Your platelet levels continued to be monitored during your admission.   Continue to follow up closely with your primary doctor after discharge.    Diagnosis: Steroid-induced diabetes mellitus  Assessment and Plan of Treatment: Continue your home Januvia and Metformin as prescribed. Follow up closely with your primary doctor after discharge.    Diagnosis: Hyperlipidemia  Assessment and Plan of Treatment: Continue taking your home rosuvastatin as prescribed.     PRINCIPAL DISCHARGE DIAGNOSIS  Diagnosis: GI bleed  Assessment and Plan of Treatment: During your admission, your Hgb was noted to have decreased from prior levels. In combination with your dark stool, there was concern for a possible gastrointestinal bleed. The GI service was consulted. You underwent an EGD and colonoscopy for further evaluation.  If you develop future episodes of dark stool, dizziness, chest pain, shortness of breath, return to the emergency room and call your doctor.      SECONDARY DISCHARGE DIAGNOSES  Diagnosis: Glioblastoma  Assessment and Plan of Treatment: The neurosurgery team was consulted and followed you regarding your known diagnosis of glioblastoma. You had a CT head and MRI brain performed, and the findings seen on the images most likely were due to posttreatment changes. Continue to follow up closely with Dr. Mcneil and the neurosurgery team closely after discharge.    Diagnosis: Lower extremity weakness  Assessment and Plan of Treatment: You were evaluated for your bilateral leg weakness. Neurosurgery was consulted and evaluated you. A CT of your lumbar spine was done which showed degenerative changes resulting in mild multilevel spinal canal stenosis or neural foraminal narrowing. Physical therapy was consulted and recommended sub-acute rehab. However, due to your radiation treatment schedule, you and your family discussed with the  and preferred outpatient physical therapy services.  If you develop new or worsening symptoms, fever, back pain, episodes of urinary or fecal incontinence, return to the emergency room and call your doctor.    Diagnosis: Thrombosis of right popliteal vein  Assessment and Plan of Treatment: You were evaluated for the blood clot in your right leg. The hematology service was consulted and recommended an IVC filter, as you could not take anticoagulation medication. You underwent IVC filter placement on 9/28. Please follow up via telehealth with IR Dr. Gilman on 10/28/22 2:30PM. The IR service should reach out to you about the appointment, call [phone] for questions/more information.   If you have any leg pain, leg redness, chest pain, shortness of breath, return to the emergency room and call your doctor.    Diagnosis: Thrombocytopenia  Assessment and Plan of Treatment: Your platelets were noted to be low on admission, but were improved from previously low platelet levels outpatient. Your platelet levels continued to be monitored during your admission.   Continue to follow up closely with your primary doctor after discharge.    Diagnosis: Steroid-induced diabetes mellitus  Assessment and Plan of Treatment: Continue your home Januvia and Metformin as prescribed. Follow up closely with your primary doctor after discharge.    Diagnosis: Hyperlipidemia  Assessment and Plan of Treatment: Continue taking your home rosuvastatin as prescribed.     PRINCIPAL DISCHARGE DIAGNOSIS  Diagnosis: GI bleed  Assessment and Plan of Treatment: During your admission, your Hgb was noted to have decreased from prior levels. In combination with your dark stool, there was concern for a possible gastrointestinal bleed. The GI service was consulted. You underwent an EGD and colonoscopy for further evaluation.  Follow up with the gastroenterology service after discharge. Call the Gastroenterology Clinic number to confirm/arrange appointment; [phone] ([facility] at [address] [facility]) or [phone] ([facility] at [address] [place] [address]) or [phone] ([facility] at [address] [facility]).  It is recommended that you have another EGD/colonoscopy as outpatient within this year.   If you develop future episodes of dark stool, dizziness, chest pain, shortness of breath, return to the emergency room and call your doctor.      SECONDARY DISCHARGE DIAGNOSES  Diagnosis: Glioblastoma  Assessment and Plan of Treatment: The neurosurgery team was consulted and followed you regarding your known diagnosis of glioblastoma. You had a CT head and MRI brain performed, and the findings seen on the images most likely were due to posttreatment changes. Continue to follow up closely with Dr. Mcneil and the neurosurgery team closely after discharge.    Diagnosis: Lower extremity weakness  Assessment and Plan of Treatment: You were evaluated for your bilateral leg weakness. Neurosurgery was consulted and evaluated you. A CT of your lumbar spine was done which showed degenerative changes resulting in mild multilevel spinal canal stenosis or neural foraminal narrowing. Physical therapy was consulted and recommended sub-acute rehab. However, due to your radiation treatment schedule, you and your family discussed with the  and preferred outpatient physical therapy services.  If you develop new or worsening symptoms, fever, back pain, episodes of urinary or fecal incontinence, return to the emergency room and call your doctor.    Diagnosis: Thrombosis of right popliteal vein  Assessment and Plan of Treatment: You were evaluated for the blood clot in your right leg. The hematology service was consulted and recommended an IVC filter, as you could not take anticoagulation medication. You underwent IVC filter placement on 9/28. Please follow up via telehealth with IR Dr. Gilman on 10/28/22 2:30PM. The IR service should reach out to you about the appointment, call [phone] for questions/more information.   If you have any leg pain, leg redness, chest pain, shortness of breath, return to the emergency room and call your doctor.    Diagnosis: Thrombocytopenia  Assessment and Plan of Treatment: Your platelets were noted to be low on admission, but were improved from previously low platelet levels outpatient. Your platelet levels continued to be monitored during your admission.   Continue to follow up closely with your primary doctor after discharge.    Diagnosis: Steroid-induced diabetes mellitus  Assessment and Plan of Treatment: Continue your home Januvia and Metformin as prescribed. Follow up closely with your primary doctor after discharge.    Diagnosis: Hyperlipidemia  Assessment and Plan of Treatment: Continue taking your home rosuvastatin as prescribed.     PRINCIPAL DISCHARGE DIAGNOSIS  Diagnosis: GI bleed  Assessment and Plan of Treatment: During your admission, your Hgb was noted to have decreased from prior levels. In combination with your dark stool, there was concern for a possible gastrointestinal bleed. The GI service was consulted. You underwent an EGD and colonoscopy for further evaluation.  Follow up with the gastroenterology service after discharge. Call the Gastroenterology Clinic number to confirm/arrange appointment; [phone] ([facility] at [address] [facility]) or [phone] ([facility] at [address] [place] [address]) or [phone] ([facility] at [address] [facility]).  It is recommended that you have another EGD/colonoscopy as outpatient within this year.   If you develop future episodes of dark stool, dizziness, chest pain, shortness of breath, return to the emergency room and call your doctor.      SECONDARY DISCHARGE DIAGNOSES  Diagnosis: Glioblastoma  Assessment and Plan of Treatment: The neurosurgery team was consulted and followed you regarding your known diagnosis of glioblastoma. You had a CT head and MRI brain performed, and the findings seen on the images most likely were due to posttreatment changes. Continue to follow up closely with Dr. Mcneil and the neurosurgery team closely after discharge.    Diagnosis: Lower extremity weakness  Assessment and Plan of Treatment: You were evaluated for your bilateral leg weakness. Neurosurgery was consulted and evaluated you. A CT of your lumbar spine was done which showed degenerative changes resulting in mild multilevel spinal canal stenosis or neural foraminal narrowing. Physical therapy was consulted and recommended sub-acute rehab. However, due to your radiation treatment schedule, you and your family discussed with the  and preferred outpatient physical therapy services.  If you develop new or worsening symptoms, fever, back pain, episodes of urinary or fecal incontinence, return to the emergency room and call your doctor.    Diagnosis: Thrombosis of right popliteal vein  Assessment and Plan of Treatment: You were evaluated for the blood clot in your right leg. The hematology service was consulted and recommended an IVC filter, as you could not take anticoagulation medication. You underwent IVC filter placement on 9/28. Please follow up via telehealth with IR Dr. Gilman on 10/28/22 2:30PM. The IR service should reach out to you about the appointment, call [phone] for questions/more information.   If you have any leg pain, leg redness, chest pain, shortness of breath, return to the emergency room and call your doctor.    Diagnosis: Sacral wound  Assessment and Plan of Treatment: [facility] outpatient at [facility] [address] [place] [address] [phone].    Diagnosis: Thrombocytopenia  Assessment and Plan of Treatment: Your platelets were noted to be low on admission, but were improved from previously low platelet levels outpatient. Your platelet levels continued to be monitored during your admission.   Continue to follow up closely with your primary doctor after discharge.    Diagnosis: Steroid-induced diabetes mellitus  Assessment and Plan of Treatment: Continue your home Januvia and Metformin as prescribed. Follow up closely with your primary doctor after discharge.    Diagnosis: Hyperlipidemia  Assessment and Plan of Treatment: Continue taking your home rosuvastatin as prescribed.     PRINCIPAL DISCHARGE DIAGNOSIS  Diagnosis: GI bleed  Assessment and Plan of Treatment: During your admission, your Hgb was noted to have decreased from prior levels. In combination with your dark stool, there was concern for a possible gastrointestinal bleed. The GI service was consulted. You underwent an EGD and colonoscopy for further evaluation.   Follow up with the gastroenterology service after discharge. Call the Gastroenterology Clinic number to confirm/arrange appointment; [phone] ([facility] at [address] [place] [address]) or [phone] ([facility] at [address] [place] [address]) or [phone] ([facility] at [address] [place] [address]).  It is recommended that you have another EGD/colonoscopy as outpatient within this year.   If you develop future episodes of dark stool, dizziness, chest pain, shortness of breath, return to the emergency room and call your doctor.      SECONDARY DISCHARGE DIAGNOSES  Diagnosis: Candida esophagitis  Assessment and Plan of Treatment: The EGD showed plaques in the esophagus suspicious for Candida esophagitis. You were started on IV Fluconazole. Continue taking the Fluconazole after discharge.  Follow up with the gastroenterology service after discharge. Call the Gastroenterology Clinic number to confirm/arrange appointment; [phone] ([facility] at [address] [place] [address]) or [phone] ([facility] at [address] [place] [address]) or [phone] ([facility] at [address] [place] [address]).  It is recommended that you have another EGD/colonoscopy as outpatient within this year.    Diagnosis: Glioblastoma  Assessment and Plan of Treatment: The neurosurgery team was consulted and followed you regarding your known diagnosis of glioblastoma. You had a CT head and MRI brain performed, and the findings seen on the images most likely were due to posttreatment changes. Continue to follow up closely with Dr. Mcneil and the neurosurgery team closely after discharge.    Diagnosis: Lower extremity weakness  Assessment and Plan of Treatment: You were evaluated for your bilateral leg weakness. Neurosurgery was consulted and evaluated you. A CT of your lumbar spine was done which showed degenerative changes resulting in mild multilevel spinal canal stenosis or neural foraminal narrowing. Physical therapy was consulted and recommended sub-acute rehab. However, due to your radiation treatment schedule, you and your family discussed with the  and preferred outpatient physical therapy services.  If you develop new or worsening symptoms, fever, back pain, episodes of urinary or fecal incontinence, return to the emergency room and call your doctor.    Diagnosis: Thrombosis of right popliteal vein  Assessment and Plan of Treatment: You were evaluated for the blood clot in your right leg. The hematology service was consulted and recommended an IVC filter, as you could not take anticoagulation medication. You underwent IVC filter placement on 9/28. Please follow up via telehealth with IR Dr. Gilman on 10/28/22 2:30PM. The IR service should reach out to you about the appointment, call [phone] for questions/more information.   If you have any leg pain, leg redness, chest pain, shortness of breath, return to the emergency room and call your doctor.    Diagnosis: Sacral wound  Assessment and Plan of Treatment: Follow up outpatient at the [facility] [address] [place] [address] [phone].    Diagnosis: Thrombocytopenia  Assessment and Plan of Treatment: Your platelets were noted to be low on admission, but were improved from previously low platelet levels outpatient. Your platelet levels continued to be monitored during your admission.   Continue to follow up closely with your primary doctor after discharge.    Diagnosis: Steroid-induced diabetes mellitus  Assessment and Plan of Treatment: Continue your home Januvia and Metformin as prescribed. Follow up closely with your primary doctor after discharge.    Diagnosis: Hyperlipidemia  Assessment and Plan of Treatment: Continue taking your home rosuvastatin as prescribed.     PRINCIPAL DISCHARGE DIAGNOSIS  Diagnosis: GI bleed  Assessment and Plan of Treatment: During your admission, your Hgb was noted to have decreased from prior levels. In combination with your dark stool, there was concern for a possible gastrointestinal bleed. The GI service was consulted. You underwent an EGD and colonoscopy for further evaluation.   Follow up with the gastroenterology service after discharge. Call the Gastroenterology Clinic number to confirm/arrange appointment; [phone] ([facility] at [address] [place] [address]) or [phone] ([facility] at [address] [place] [address]) or [phone] ([facility] at [address] [place] [address]).  It is recommended that you have another EGD/colonoscopy as outpatient within this year.   If you develop future episodes of dark stool, dizziness, chest pain, shortness of breath, return to the emergency room and call your doctor.      SECONDARY DISCHARGE DIAGNOSES  Diagnosis: Glioblastoma  Assessment and Plan of Treatment: The neurosurgery team was consulted and followed you regarding your known diagnosis of glioblastoma. You had a CT head and MRI brain performed, and the findings seen on the images most likely were due to posttreatment changes. Continue to follow up closely with Dr. Mcneil and the neurosurgery team closely after discharge.  While awaiting rehab placement during COVID isolation, it was coordinated with the radiation oncology team to continue radiation at [facility].    Diagnosis: Candida esophagitis  Assessment and Plan of Treatment: The EGD showed plaques in the esophagus suspicious for Candida esophagitis. You were started on IV Fluconazole. Continue taking the Fluconazole after discharge.  Follow up with the gastroenterology service after discharge. Call the Gastroenterology Clinic number to confirm/arrange appointment; [phone] ([facility] at [address] [place] [address]) or [phone] ([facility] at [address] [place] [address]) or [phone] ([facility] at [address] [place] [address]).  It is recommended that you have another EGD/colonoscopy as outpatient within this year.    Diagnosis: 2019 novel coronavirus disease (COVID-19)  Assessment and Plan of Treatment: You tested positive for COVID during your admission. Your vitals were monitored and monitored for development of any symptoms.  If you develop fever, shortness of breath, cough, chest pain, return to the emergency room and call your doctor.    Diagnosis: Lower extremity weakness  Assessment and Plan of Treatment: You were evaluated for your bilateral leg weakness. Neurosurgery was consulted and evaluated you. A CT of your lumbar spine was done which showed degenerative changes resulting in mild multilevel spinal canal stenosis or neural foraminal narrowing. Physical therapy was consulted and recommended sub-acute rehab. However, due to your radiation treatment schedule, you and your family discussed with the  and preferred outpatient physical therapy services.  If you develop new or worsening symptoms, fever, back pain, episodes of urinary or fecal incontinence, return to the emergency room and call your doctor.    Diagnosis: Thrombosis of right popliteal vein  Assessment and Plan of Treatment: You were evaluated for the blood clot in your right leg. The hematology service was consulted and recommended an IVC filter, as you could not take anticoagulation medication. You underwent IVC filter placement on 9/28. Please follow up via telehealth with IR Dr. Gilman on 10/28/22 2:30PM. The IR service should reach out to you about the appointment, call [phone] for questions/more information.   If you have any leg pain, leg redness, chest pain, shortness of breath, return to the emergency room and call your doctor.    Diagnosis: Sacral wound  Assessment and Plan of Treatment: Follow up outpatient at the [facility] [address] [place] [address] [phone].    Diagnosis: Thrombocytopenia  Assessment and Plan of Treatment: Your platelets were noted to be low on admission, but were improved from previously low platelet levels outpatient. Your platelet levels continued to be monitored during your admission.   Continue to follow up closely with your primary doctor after discharge.    Diagnosis: Steroid-induced diabetes mellitus  Assessment and Plan of Treatment: Continue your home Januvia and Metformin as prescribed. Follow up closely with your primary doctor after discharge.    Diagnosis: Hyperlipidemia  Assessment and Plan of Treatment: Continue taking your home rosuvastatin as prescribed.     PRINCIPAL DISCHARGE DIAGNOSIS  Diagnosis: GI bleed  Assessment and Plan of Treatment: During your admission, your Hgb was noted to have decreased from prior levels. In combination with your dark stool, there was concern for a possible gastrointestinal bleed. The GI service was consulted. You underwent an EGD and colonoscopy for further evaluation.   Follow up with the gastroenterology service after discharge. Call the Gastroenterology Clinic number to confirm/arrange appointment; [phone] ([facility] [address] [place] [address]) or [phone] ([facility] at [address] [place] [address]) or [phone] ([facility] at [address] [place] [address]).  It is recommended that you have another EGD/colonoscopy as outpatient within this year.   If you develop future episodes of dark stool, dizziness, chest pain, shortness of breath, return to the emergency room and call your doctor.      SECONDARY DISCHARGE DIAGNOSES  Diagnosis: Glioblastoma  Assessment and Plan of Treatment: The neurosurgery team was consulted and followed you regarding your known diagnosis of glioblastoma. You had a CT head and MRI brain performed, and the findings seen on the images most likely were due to posttreatment changes. Continue to follow up closely with Dr. Mcneil and the neurosurgery team closely after discharge.  While awaiting rehab placement during COVID isolation, it was coordinated with the radiation oncology team to continue radiation at [facility].    Diagnosis: Candida esophagitis  Assessment and Plan of Treatment: The EGD showed plaques in the esophagus suspicious for Candida esophagitis. You were started on IV Fluconazole. Continue taking the Fluconazole after discharge.  Follow up with the gastroenterology service after discharge. Call the Gastroenterology Clinic number to confirm/arrange appointment; [phone] ([facility] [address] [place] [address]) or [phone] ([facility] at [address] [place] [address]) or [phone] ([facility] at [address] [place] [address]).  It is recommended that you have another EGD/colonoscopy as outpatient within this year.    Diagnosis: 2019 novel coronavirus disease (COVID-19)  Assessment and Plan of Treatment: You tested positive for COVID during your admission. Your vitals were monitored and monitored for development of any symptoms.  If you develop fever, shortness of breath, cough, chest pain, return to the emergency room and call your doctor.    Diagnosis: Lower extremity weakness  Assessment and Plan of Treatment: You were evaluated for your bilateral leg weakness. Neurosurgery was consulted and evaluated you. A CT of your lumbar spine was done which showed degenerative changes resulting in mild multilevel spinal canal stenosis or neural foraminal narrowing. Physical therapy was consulted and recommended sub-acute rehab.   If you develop new or worsening symptoms, fever, back pain, episodes of urinary or fecal incontinence, return to the emergency room and call your doctor.    Diagnosis: Thrombosis of right popliteal vein  Assessment and Plan of Treatment: You were evaluated for the blood clot in your right leg. The hematology service was consulted and recommended an IVC filter, as you could not take anticoagulation medication. You underwent IVC filter placement on 9/28. Please follow up via telehealth with IR Dr. Gilman on 10/28/22 2:30PM. The IR service should reach out to you about the appointment, call [phone] for questions/more information.   If you have any leg pain, leg redness, chest pain, shortness of breath, return to the emergency room and call your doctor.    Diagnosis: Sacral wound  Assessment and Plan of Treatment: [facility] [address] [place] [address] [phone].    Diagnosis: Thrombocytopenia  Assessment and Plan of Treatment: Your platelets were noted to be low on admission, but were improved from previously low platelet levels outpatient. Your platelet levels continued to be monitored during your admission.   Continue to follow up closely with your primary doctor after discharge.    Diagnosis: Steroid-induced diabetes mellitus  Assessment and Plan of Treatment: Continue your home Januvia and Metformin as prescribed. Follow up closely with your primary doctor after discharge.    Diagnosis: Hyperlipidemia  Assessment and Plan of Treatment: Continue taking your home rosuvastatin as prescribed.

## 2022-09-29 NOTE — PROGRESS NOTE ADULT - PROBLEM SELECTOR PLAN 6
troponin mildly elevated to 65. No CP, EKG showing NSR, HR 85  - troponin lateral trend 65 -> 69 -> 68, will stop trending   - TTE (7/23) EF 75%, hyperdynamic LV sys fxn, nl RV size/function a1c 7% (7/24/22), likely 2/2 steroid-induced DM  - holding home oral agents while inpatient (januvia 100mg qd, metformin ER 500mg BID)    PLAN:   - low dose correctional scale  - FS TID qAC and qhs or q6h while NPO

## 2022-09-29 NOTE — PROGRESS NOTE ADULT - PROBLEM SELECTOR PLAN 1
p/w dark brown stools x 1-2 days i/s/o lovenox use outpt (1-2 doses) for R DVT. Here found to have +FOBT and acute downtrend in hgb from 16.5 (9/22) to 13.7, concerning for GI bleed. HD stable   - giving timing, likely 2/2 AC use which has now been discontinued, possibly LGIB, ?diverticulosis. ddx also includes PUD given steroid use, anorexia, BUN/Cr ratio ~34, although lower suspicion given no abd pain, no black stools.  - last colonoscopy 2014, results unknown but was told to repeat in 10 yrs   - s/p PPI 80mg IV x1    PLAN:   - GI consulted; plan for EGD/colonoscopy tmmrw, pt did not finish bowel prep overnight   - c/w PPI 40mg BID  - NPO at midnight p/w dark brown stools x 1-2 days i/s/o lovenox use outpt (1-2 doses) for R DVT. Here found to have +FOBT and acute downtrend in hgb from 16.5 (9/22) to 13.7, concerning for GI bleed. HD stable   - giving timing, likely 2/2 AC use which has now been discontinued, possibly LGIB, ?diverticulosis. ddx also includes PUD given steroid use, anorexia, BUN/Cr ratio ~34, although lower suspicion given no abd pain, no black stools.  - last colonoscopy 2014, results unknown but was told to repeat in 10 yrs   - s/p PPI 80mg IV x1    PLAN:   - GI consulted; plan for EGD/colonoscopy today  - c/w PPI 40mg BID

## 2022-09-29 NOTE — PROGRESS NOTE ADULT - PROBLEM SELECTOR PLAN 2
generalized weakness x 3 weeks with worsening LE weakness x 5 days w/ difficulty ambulating. Here with 2/5 strength in BLE, 4/5 in UE, rectal tone intact, no changes in urinary sxs (although w/ history of urinary incontinence). no acute changes in mental status (AAOx2, mild confusion since GBM dx)  - given diproprionate weakness, concerning for spinal pathology ?mets, ?cauda equina   - generalized weakness due to malignancy, decreased PO intake, and relative drop in hgb may also be contributing  - CTH here showing post-op changes, possible residual or remnant tumor   - CT L-spine showed degenerative changes resulting in mild multilevel spinal canal stenosis or neural foraminal narrowing.    PLAN:  - neurosurgery following; appreciate recs  - PT recommends [facility]\  - OT generalized weakness x 3 weeks with worsening LE weakness x 5 days w/ difficulty ambulating. Here with 2/5 strength in BLE, 4/5 in UE, rectal tone intact, no changes in urinary sxs (although w/ history of urinary incontinence). no acute changes in mental status (AAOx2, mild confusion since GBM dx)  - given diproprionate weakness, concerning for spinal pathology ?mets, ?cauda equina   - generalized weakness due to malignancy, decreased PO intake, and relative drop in hgb may also be contributing  - CTH here showing post-op changes, possible residual or remnant tumor   - CT L-spine showed degenerative changes resulting in mild multilevel spinal canal stenosis or neural foraminal narrowing    PLAN:  - neurosurgery following; patient can continue to follow up as an outpatient with Dr. Mcneil as planned after their current admission  - PT recommends [facility]  - OT

## 2022-09-29 NOTE — DIETITIAN INITIAL EVALUATION ADULT - ENERGY INTAKE
Pt hasn't eaten solids since Monday per daughter. Has been on clear liquid diet and NPO for majority of admission; GIB workup ongoing. EGD tomorrow.  Daughter is amenable to Glucerna 1x/day (vanilla).

## 2022-09-29 NOTE — DISCHARGE NOTE PROVIDER - HOSPITAL COURSE
Mr. Hendrickson is a 81 y/o M PMHx of PMH Glioblastoma (diagnosed 7/18/22, s/p R stereo biopsy, TIMOTHY x2, R crani for tumor debulking 7/28/22, on temozolomide (last dose 4 days ago)), HLD, steroid-induced diabetes, and R popliteal DVT (found 9/21/22, not on AC due to thrombocytopenia) who presented with weakness x 3 weeks, acutely worsening over 3-5 days with dark brown stools x 1-2 days. Patient's daughter states he has had loose dark brown stools for the past 4 weeks intermittently, and that they noticed the stool became darker yesterday, denies black stools. Was told by her mom there was some scant red blood on wiping but wasn't sure if it was from external irritation. Patient denies stomach pain, nausea, or vomiting, but endorses he has had little appetite for food or fluids. Patient also reported bilateral leg swelling, weakness with moving legs, and difficulty ambulating for the past 3 weeks, acutely worsening a week ago. Patient was found to have a DVT in his RLE 6 days ago at his radiation clinic, confirmed with doppler. Patient denies leg pain, but endorses SOB, especially on exertion and orthopnea. Patient was started on Lovenox and took 2 doses, but stopped anticoagulation along with the Temozolomide when his platelets were found to be low at 77, 4 days ago. Patient endorses that he has noticed bruising on his arms and stomach (where Lovenox was injected), but denies any trauma. Patient was urged to present to the ED by nurse practitioner at his radiation clinic given worsening weakness, decreased PO intake, and dark stools. Patient denies chest pain, fever, nausea, vomiting, abdominal pain, back pain. Patient is followed by Dr. Mcneil and Dr. Borja.    In the ED, Hgb was found to be   Hgb remained stable, did not require any transfusions.       Neurosurgery also followed the pt. CT head  MRI showed  Findings were likely        Mr. Hendrickson is a 81 y/o M PMHx of PMH Glioblastoma (diagnosed 7/18/22, s/p R stereo biopsy, TIMOTHY x2, R crani for tumor debulking 7/28/22, on temozolomide), HLD, steroid-induced diabetes, and R popliteal DVT (found 9/21/22, not on AC due to thrombocytopenia) who presented with weakness x 3 weeks, acutely worsening over 3-5 days with dark brown stools x 1-2 days. Patient's daughter states he has had loose dark brown stools for the past 4 weeks intermittently, and that they noticed the stool became darker yesterday, denies black stools. Was told by her mom there was some scant red blood on wiping but wasn't sure if it was from external irritation. Patient denies stomach pain, nausea, or vomiting, but endorses he has had little appetite for food or fluids. Patient also reported bilateral leg swelling, weakness with moving legs, and difficulty ambulating for the past 3 weeks, acutely worsening a week ago. Patient was found to have a DVT in his RLE 6 days ago at his radiation clinic, confirmed with doppler. Patient denies leg pain, but endorses SOB, especially on exertion and orthopnea. Patient was started on Lovenox and took 2 doses, but stopped anticoagulation along with the Temozolomide when his platelets were found to be low at 77, 4 days ago. Patient endorses that he has noticed bruising on his arms and stomach (where Lovenox was injected), but denies any trauma. Patient was urged to present to the ED by nurse practitioner at his radiation clinic given worsening weakness, decreased PO intake, and dark stools. Patient denies chest pain, fever, nausea, vomiting, abdominal pain, back pain. Patient is followed by Dr. Mcneil and Dr. Borja.    In the ED, Hgb was found to be 13.7 acutely downtrended from 16.5 on 9/22, with +FOBT, was HD stable. GI consulted and underwent endoscopy/colonoscopy for concern for GI bleed. Hgb remained stable, did not require any transfusions.     For leg weakness, concern for spinal pathology vs mets. Neurosurgery consulted and followed the pt. Maintained on Keppra and dexamethasone PO. CTH showed post-op changes, possible residual or remnant tumor. CT L-spine showed degenerative changes resulting in mild multilevel spinal canal stenosis or neural foraminal narrowing. MRI brain showed findings representing residual/recurrent neoplasm and/or posttreatment changes. presence of neoplasm is not excluded. Findings most likely represented post treatment changes, neurosurgery rec patient continue to follow up as an outpatient with Dr. Mcneil as planned after their current admission. PT/OT recommended [facility]. Pt currently undergoing radiation treatment, pt's family discussed [facility] with , may not be possible for pt to continue radiation tx schedule at [facility]. Pt's family prefer outpt PT services to continue radiation tx.     Pt on admission with known R popliteal DVT, was not on AC due to thrombocytopenia. On arrival, Plts 101, improved from 77 on 9/22. Heme/onc consulted given DVT in setting of possible GIB and thrombocytopenia. Heme/onc recommended continue holding AC, IR consult for IVC filter placement. Pt underwent IVC filter placement on 9/28, to follow up outpt w/ IR after discharge.          Mr. Hendrickson is a 81 y/o M PMHx of PMH Glioblastoma (diagnosed 7/18/22, s/p R stereo biopsy, TIMOTHY x2, R crani for tumor debulking 7/28/22, on temozolomide), HLD, steroid-induced diabetes, and R popliteal DVT (found 9/21/22, not on AC due to thrombocytopenia) who presented with weakness x 3 weeks, acutely worsening over 3-5 days with dark brown stools x 1-2 days. Patient's daughter states he has had loose dark brown stools for the past 4 weeks intermittently, and that they noticed the stool became darker yesterday, denies black stools. Was told by her mom there was some scant red blood on wiping but wasn't sure if it was from external irritation. Patient denies stomach pain, nausea, or vomiting, but endorses he has had little appetite for food or fluids. Patient also reported bilateral leg swelling, weakness with moving legs, and difficulty ambulating for the past 3 weeks, acutely worsening a week ago. Patient was found to have a DVT in his RLE 6 days ago at his radiation clinic, confirmed with doppler. Patient denies leg pain, but endorses SOB, especially on exertion and orthopnea. Patient was started on Lovenox and took 2 doses, but stopped anticoagulation along with the Temozolomide when his platelets were found to be low at 77, 4 days ago. Patient endorses that he has noticed bruising on his arms and stomach (where Lovenox was injected), but denies any trauma. Patient was urged to present to the ED by nurse practitioner at his radiation clinic given worsening weakness, decreased PO intake, and dark stools. Patient denies chest pain, fever, nausea, vomiting, abdominal pain, back pain. Patient is followed by Dr. Mcneil and Dr. Borja.    In the ED, Hgb was found to be 13.7 acutely downtrended from 16.5 on 9/22, with +FOBT, HD stable. GI consulted and underwent endoscopy/colonoscopy for concern for GI bleed ***. During admission, Hgb remained stable, did not require any transfusions.     For leg weakness in setting of hx of GBM, concern for spinal pathology vs mets vs cauda equina vs other etiology. Neurosurgery consulted. Maintained on Keppra and dexamethasone PO. CT L-spine showed degenerative changes resulting in mild multilevel spinal canal stenosis or neural foraminal narrowing. CT head showed post-op changes, possible residual or remnant tumor. MRI brain showed findings representing residual/recurrent neoplasm and/or posttreatment changes. presence of neoplasm is not excluded. Per neurosurgery, findings most likely represented post treatment changes, recommend pt continue to follow up as an outpatient with Dr. Mcneil as planned after their current admission. PT/OT recommended [facility]. Pt currently undergoing radiation treatment, pt's family discussed [facility] with , may not be possible for pt to continue radiation tx schedule at [facility]. Pt's family prefer outpt PT services to continue radiation tx.     Pt on admission with known R popliteal DVT, was not on AC due to thrombocytopenia. On arrival, Plts 101, improved from 77 on 9/22. Heme/onc consulted about AC given DVT in setting of possible GIB and thrombocytopenia. Heme/onc recommended continue holding AC, IR consult for IVC filter placement. Pt underwent IVC filter placement on 9/28, to follow up outpt w/ IR after discharge.        Mr. Hendrickson is a 81 y/o M PMHx of PMH Glioblastoma (diagnosed 7/18/22, s/p R stereo biopsy, TIMOTHY x2, R crani for tumor debulking 7/28/22, on temozolomide), HLD, steroid-induced diabetes, and R popliteal DVT (found 9/21/22, not on AC due to thrombocytopenia) who presented with weakness x 3 weeks, acutely worsening over 3-5 days with dark brown stools x 1-2 days. Patient's daughter states he has had loose dark brown stools for the past 4 weeks intermittently, and that they noticed the stool became darker yesterday, denies black stools. Was told by her mom there was some scant red blood on wiping but wasn't sure if it was from external irritation. Patient denies stomach pain, nausea, or vomiting, but endorses he has had little appetite for food or fluids. Patient also reported bilateral leg swelling, weakness with moving legs, and difficulty ambulating for the past 3 weeks, acutely worsening a week ago. Patient was found to have a DVT in his RLE 6 days ago at his radiation clinic, confirmed with doppler. Patient denies leg pain, but endorses SOB, especially on exertion and orthopnea. Patient was started on Lovenox and took 2 doses, but stopped anticoagulation along with the Temozolomide when his platelets were found to be low at 77, 4 days prior to presentation. Patient endorses that he has noticed bruising on his arms and stomach (where Lovenox was injected), but denies any trauma. Patient was urged to present to the ED by nurse practitioner at his radiation clinic given worsening weakness, decreased PO intake, and dark stools. Patient denies chest pain, fever, nausea, vomiting, abdominal pain, back pain. Patient is followed by Dr. Mcneil and Dr. Borja.    In the ED, Hgb was found to be 13.7 acutely downtrended from 16.5 on 9/22, with +FOBT, HD stable. GI consulted and underwent endoscopy/colonoscopy for concern for GI bleed ***. During admission, Hgb remained stable, did not require any transfusions.     For leg weakness in setting of hx of GBM, concern for spinal pathology vs mets vs cauda equina vs other etiology. Neurosurgery consulted. Maintained on Keppra and dexamethasone PO. CT L-spine showed degenerative changes resulting in mild multilevel spinal canal stenosis or neural foraminal narrowing. CT head showed post-op changes, possible residual or remnant tumor. MRI brain showed findings representing residual/recurrent neoplasm and/or posttreatment changes. presence of neoplasm is not excluded. Per neurosurgery, findings most likely represented post treatment changes, recommend pt continue to follow up as an outpatient with Dr. Mcneil as planned after their current admission. PT/OT recommended [facility]. Pt currently undergoing radiation treatment, pt's family discussed [facility] with , may not be possible for pt to continue radiation tx schedule at [facility]. Pt's family prefer outpt PT services to continue radiation tx.     Pt on admission with known R popliteal DVT, was not on AC due to thrombocytopenia. On arrival, Plts 101, improved from 77 on 9/22. Heme/onc consulted about AC given DVT in setting of possible GIB and thrombocytopenia. Heme/onc recommended continue holding AC, IR consult for IVC filter placement. Pt underwent IVC filter placement on 9/28, to follow up outpt w/ IR after discharge.        Mr. Hendrickson is a 83 y/o M PMHx of PMH Glioblastoma (diagnosed 7/18/22, s/p R stereo biopsy, TIMOTHY x2, R crani for tumor debulking 7/28/22, on temozolomide), HLD, steroid-induced diabetes, and R popliteal DVT (found 9/21/22, not on AC due to thrombocytopenia) who presented with weakness x 3 weeks, acutely worsening over 3-5 days with dark brown stools x 1-2 days. Patient's daughter states he has had loose dark brown stools for the past 4 weeks intermittently, and that they noticed the stool became darker yesterday, denies black stools. Was told by her mom there was some scant red blood on wiping but wasn't sure if it was from external irritation. Patient denies stomach pain, nausea, or vomiting, but endorses he has had little appetite for food or fluids. Patient also reported bilateral leg swelling, weakness with moving legs, and difficulty ambulating for the past 3 weeks, acutely worsening a week ago. Patient was found to have a DVT in his RLE 6 days ago at his radiation clinic, confirmed with doppler. Patient denies leg pain, but endorses SOB, especially on exertion and orthopnea. Patient was started on Lovenox and took 2 doses, but stopped anticoagulation along with the Temozolomide when his platelets were found to be low at 77, 4 days prior to presentation. Patient endorses that he has noticed bruising on his arms and stomach (where Lovenox was injected), but denies any trauma. Patient was urged to present to the ED by nurse practitioner at his radiation clinic given worsening weakness, decreased PO intake, and dark stools. Patient denies chest pain, fever, nausea, vomiting, abdominal pain, back pain. Patient is followed by Dr. Mcneil and Dr. Borja.    In the ED, Hgb was found to be 13.7 acutely downtrended from 16.5 on 9/22, with +FOBT, HD stable. GI consulted and underwent endoscopy/colonoscopy for concern for GI bleed. EGD showed *** During admission, Hgb remained stable, did not require any transfusions.     For leg weakness in setting of hx of GBM, concern for spinal pathology vs mets vs cauda equina vs other etiology. Neurosurgery consulted. Maintained on Keppra and dexamethasone PO. CT L-spine showed degenerative changes resulting in mild multilevel spinal canal stenosis or neural foraminal narrowing. CT head showed post-op changes, possible residual or remnant tumor. MRI brain showed findings representing residual/recurrent neoplasm and/or posttreatment changes. presence of neoplasm is not excluded. Per neurosurgery, findings most likely represented post treatment changes, recommend pt continue to follow up as an outpatient with Dr. Mcneil as planned after their current admission. PT/OT recommended [facility]. Pt currently undergoing radiation treatment, pt's family discussed [facility] with .     Pt on admission with known R popliteal DVT, was not on AC due to thrombocytopenia. On arrival, Plts 101, improved from 77 on 9/22. Heme/onc consulted about AC given DVT in setting of possible GIB and thrombocytopenia. Heme/onc recommended continue holding AC, IR consult for IVC filter placement. Pt underwent IVC filter placement on 9/28, to follow up outpt w/ IR after discharge.        Mr. Hendrickson is a 81 y/o M PMHx of PMH Glioblastoma (diagnosed 7/18/22, s/p R stereo biopsy, TIMOTHY x2, R crani for tumor debulking 7/28/22, on temozolomide), HLD, steroid-induced diabetes, and R popliteal DVT (found 9/21/22, not on AC due to thrombocytopenia) who presented with weakness x 3 weeks, acutely worsening over 3-5 days with dark brown stools x 1-2 days. Patient's daughter states he has had loose dark brown stools for the past 4 weeks intermittently, and that they noticed the stool became darker yesterday, denies black stools. Was told by her mom there was some scant red blood on wiping but wasn't sure if it was from external irritation. Patient denies stomach pain, nausea, or vomiting, but endorses he has had little appetite for food or fluids. Patient also reported bilateral leg swelling, weakness with moving legs, and difficulty ambulating for the past 3 weeks, acutely worsening a week ago. Patient was found to have a DVT in his RLE 6 days ago at his radiation clinic, confirmed with doppler. Patient denies leg pain, but endorses SOB, especially on exertion and orthopnea. Patient was started on Lovenox and took 2 doses, but stopped anticoagulation along with the Temozolomide when his platelets were found to be low at 77, 4 days prior to presentation. Patient endorses that he has noticed bruising on his arms and stomach (where Lovenox was injected), but denies any trauma. Patient was urged to present to the ED by nurse practitioner at his radiation clinic given worsening weakness, decreased PO intake, and dark stools. Patient denies chest pain, fever, nausea, vomiting, abdominal pain, back pain. Patient is followed by Dr. Mcneil and Dr. Borja.    In the ED, Hgb was found to be 13.7 acutely downtrended from 16.5 on 9/22, with +FOBT, HD stable. During admission, Hgb remained stable, did not require any transfusions. GI consulted and underwent endoscopy/colonoscopy for concern for GI bleed. EGD showed plaques concerning for Candida, large amount of food in stomach c/f gasroparesis, erythematous mucosa in the pylorus and stomach. Biopsied. Colonoscopy with poor bowel prep, stool seen in bowels, did not show blood or bleeding, Pt started on IV fluconazole for empiric tx of Candida esophagitis, to continue orally after discharge. GI recommended outpt followup with repeat EGD/colonoscopy within this year.     For leg weakness in setting of hx of GBM, concern for spinal pathology vs mets vs cauda equina vs other etiology. Neurosurgery consulted. Maintained on Keppra and dexamethasone PO. CT L-spine showed degenerative changes resulting in mild multilevel spinal canal stenosis or neural foraminal narrowing. CT head showed post-op changes, possible residual or remnant tumor. MRI brain showed findings representing residual/recurrent neoplasm and/or posttreatment changes. presence of neoplasm is not excluded. Per neurosurgery, findings most likely represented post treatment changes, recommend pt continue to follow up as an outpatient with Dr. Mcneil as planned after their current admission. PT/OT recommended [facility]. Pt currently undergoing radiation treatment, pt's family discussed [facility] with .     Pt on admission with known R popliteal DVT, was not on AC due to thrombocytopenia. On arrival, Plts 101, improved from 77 on 9/22. Heme/onc consulted about AC given DVT in setting of possible GIB and thrombocytopenia. Heme/onc recommended continue holding AC, IR consult for IVC filter placement. Pt underwent IVC filter placement on 9/28, to follow up outpt w/ IR after discharge.        Mr. Hendrickson is a 81 y/o M PMHx of PMH Glioblastoma (diagnosed 7/18/22, s/p R stereo biopsy, TIMOTHY x2, R crani for tumor debulking 7/28/22, on temozolomide), HLD, steroid-induced diabetes, and R popliteal DVT (found 9/21/22, not on AC due to thrombocytopenia) who presented with weakness x 3 weeks, acutely worsening over 3-5 days with dark brown stools x 1-2 days. Patient's daughter states he has had loose dark brown stools for the past 4 weeks intermittently, and that they noticed the stool became darker yesterday, denies black stools. Was told by her mom there was some scant red blood on wiping but wasn't sure if it was from external irritation. Patient denies stomach pain, nausea, or vomiting, but endorses he has had little appetite for food or fluids. Patient also reported bilateral leg swelling, weakness with moving legs, and difficulty ambulating for the past 3 weeks, acutely worsening a week ago. Patient was found to have a DVT in his RLE 6 days ago at his radiation clinic, confirmed with doppler. Patient denies leg pain, but endorses SOB, especially on exertion and orthopnea. Patient was started on Lovenox and took 2 doses, but stopped anticoagulation along with the Temozolomide when his platelets were found to be low at 77, 4 days prior to presentation. Patient endorses that he has noticed bruising on his arms and stomach (where Lovenox was injected), but denies any trauma. Patient was urged to present to the ED by nurse practitioner at his radiation clinic given worsening weakness, decreased PO intake, and dark stools. Patient denies chest pain, fever, nausea, vomiting, abdominal pain, back pain. Patient is followed by Dr. Mcneil and Dr. Borja.    In the ED, Hgb was found to be 13.7 acutely downtrended from 16.5 on 9/22, with +FOBT, HD stable. During admission, Hgb remained stable, did not require any transfusions. GI consulted and underwent endoscopy/colonoscopy for concern for GI bleed. EGD showed plaques concerning for Candida, large amount of food in stomach c/f gasroparesis, erythematous mucosa in the pylorus and stomach. Biopsied. Colonoscopy with poor bowel prep, stool seen in bowels, did not show blood or bleeding, Pt started on IV fluconazole for empiric tx of Candida esophagitis, to continue orally after discharge. GI recommended outpt followup with repeat EGD/colonoscopy within this year.     For leg weakness in setting of hx of GBM, concern for spinal pathology vs mets vs cauda equina vs other etiology. Neurosurgery consulted. Maintained on Keppra and dexamethasone PO. CT L-spine showed degenerative changes resulting in mild multilevel spinal canal stenosis or neural foraminal narrowing. CT head showed post-op changes, possible residual or remnant tumor. MRI brain showed findings representing residual/recurrent neoplasm and/or posttreatment changes. presence of neoplasm is not excluded. Per neurosurgery, findings most likely represented post treatment changes, recommend pt continue to follow up as an outpatient with Dr. Mcneil as planned after their current admission. PT/OT recommended [facility]. Pt currently undergoing radiation treatment, pt's family discussed [facility] with .     Pt on admission with known R popliteal DVT, was not on AC due to thrombocytopenia. On arrival, Plts 101, improved from 77 on 9/22. Heme/onc consulted about AC given DVT in setting of possible GIB and thrombocytopenia. Heme/onc recommended continue holding AC, IR consult for IVC filter placement. Pt underwent IVC filter placement on 9/28, to follow up outpt w/ IR after discharge.     Pt pending [facility] placement, exposed to COVID 9/30, tested positive 10/3. Pt was asymptomatic and on room air. [facility], required 10 day isolation. Discussed with radiation oncology team, 10 day isolation may delay care. Coordinated transfer to [facility] where pt could continue radiation tx while pending [facility] placement.        Mr. Hendrickson is a 81 y/o M PMHx of PMH Glioblastoma (diagnosed 7/18/22, s/p R stereo biopsy, TIMOTHY x2, R crani for tumor debulking 7/28/22, on temozolomide), HLD, steroid-induced diabetes, and R popliteal DVT (found 9/21/22, not on AC due to thrombocytopenia) who presented with weakness x 3 weeks, acutely worsening over 3-5 days with dark brown stools x 1-2 days. Patient's daughter states he has had loose dark brown stools for the past 4 weeks intermittently, and that they noticed the stool became darker yesterday, denies black stools. Was told by her mom there was some scant red blood on wiping but wasn't sure if it was from external irritation. Patient denies stomach pain, nausea, or vomiting, but endorses he has had little appetite for food or fluids. Patient also reported bilateral leg swelling, weakness with moving legs, and difficulty ambulating for the past 3 weeks, acutely worsening a week ago. Patient was found to have a DVT in his RLE 6 days ago at his radiation clinic, confirmed with doppler. Patient denies leg pain, but endorses SOB, especially on exertion and orthopnea. Patient was started on Lovenox and took 2 doses, but stopped anticoagulation along with the Temozolomide when his platelets were found to be low at 77, 4 days prior to presentation. Patient endorses that he has noticed bruising on his arms and stomach (where Lovenox was injected), but denies any trauma. Patient was urged to present to the ED by nurse practitioner at his radiation clinic given worsening weakness, decreased PO intake, and dark stools. Patient denies chest pain, fever, nausea, vomiting, abdominal pain, back pain. Patient is followed by Dr. Mcneil and Dr. Borja.    In the ED, Hgb was found to be 13.7 acutely downtrended from 16.5 on 9/22, with +FOBT, HD stable. During admission, Hgb remained stable, did not require any transfusions. GI consulted and underwent endoscopy/colonoscopy for concern for GI bleed. EGD showed plaques concerning for Candida, large amount of food in stomach c/f gasroparesis, erythematous mucosa in the pylorus and stomach. Biopsied. Colonoscopy with poor bowel prep, stool seen in bowels, did not show blood or bleeding, Pt started on IV fluconazole for empiric tx of Candida esophagitis, to continue orally after discharge. GI recommended outpt followup with repeat EGD/colonoscopy within this year.     For leg weakness in setting of hx of GBM, concern for spinal pathology vs mets vs cauda equina vs other etiology. Neurosurgery consulted. Maintained on Keppra and dexamethasone PO. CT L-spine showed degenerative changes resulting in mild multilevel spinal canal stenosis or neural foraminal narrowing. CT head showed post-op changes, possible residual or remnant tumor. MRI brain showed findings representing residual/recurrent neoplasm and/or posttreatment changes. presence of neoplasm is not excluded. Per neurosurgery, findings most likely represented post treatment changes, recommend pt continue to follow up as an outpatient with Dr. Mcneil as planned after their current admission. PT/OT recommended F[facility]. Pt currently undergoing radiation treatment, pt's family discussed F[facility] with .     Pt on admission with known R popliteal DVT, was not on AC due to thrombocytopenia. On arrival, Plts 101, improved from 77 on 9/22. Heme/onc consulted about AC given DVT in setting of possible GIB and thrombocytopenia. Heme/onc recommended continue holding AC, IR consult for IVC filter placement. Pt underwent IVC filter placement on 9/28, to follow up outpt w/ IR after discharge.     Pt pending [facility] placement, exposed to COVID 9/30, tested positive 10/3. Pt was asymptomatic and on room air. [facility], required 10 day isolation. Discussed with radiation oncology team, 10 day isolation may delay care. Coordinated transfer to [facility] where pt could continue radiation tx while pending F[facility]ON placement.     10/7 pt reported to be having cough by family, continued to be maintained on room air. CXR was obtained.

## 2022-09-29 NOTE — OCCUPATIONAL THERAPY INITIAL EVALUATION ADULT - PERTINENT HX OF CURRENT PROBLEM, REHAB EVAL
81 y/o M PMHx of PMH Glioblastoma (diagnosed 7/18/22, s/p R stereo biopsy, TIMOTHY x2, R crani for tumor debulking 7/28/22, on temozolomide (last dose 4 days ago)), HLD, steroid-induced diabetes, and R popliteal DVT (found 9/21/22, not on AC due to thrombocytopenia) presenting with weakness x 3 weeks, acutely worsening over 3-5 days with dark brown stools x 1-2 days. Patient's daughter states he has had loose dark brown stools for the past 4 weeks intermittently, and that they noticed the stool became darker yesterday, denies black stools. Was told by her mom there was some scant red blood on wiping but wasn't sure if it was from external irritation. Patient denies stomach pain, nausea, or vomiting, but endorses he has had little appetite for food or fluids. Patient also reported bilateral leg swelling, weakness with moving legs, and difficulty ambulating for the past 3 weeks, acutely worsening a week ago. Patient was found to have a DVT in his RLE 6 days ago at his radiation clinic, confirmed with doppler. Patient denies leg pain, but endorses SOB, especially on exertion and orthopnea. Patient was started on Lovenox and took 2 doses, but stopped anticoagulation along with the Temozolomide when his platelets were found to be low at 77, 4 days ago. Patient endorses that he has noticed bruising on his arms and stomach (where Lovenox was injected), but denies any trauma. Patient was urged to present to the ED by nurse practitioner at his radiation clinic given worsening weakness, decreased PO intake, and dark stools. Patient denies chest pain, fever, nausea, vomiting, abdominal pain, back pain. Patient is followed by Dr. Mcneil and Dr. Borja. Pt s/p IVC filter placement on 9/28 in Interventional Radiology    BLE Doppler: Right popliteal vein deep vein thrombosis, new as compared with 08/01/2022. Acute deep venous thrombosis: above the knee.

## 2022-09-29 NOTE — PROGRESS NOTE ADULT - SUBJECTIVE AND OBJECTIVE BOX
%%%%INCOMPLETE NOTE%%%%%     Dr. Liliana Tejeda, PGY-1    ANA LOVE  82y  MRN: [id number]    Subjective:    Patient is a 82y old  Male who presents with a chief complaint of GIB, weakness (28 Sep 2022 08:18)      MEDICATIONS  (STANDING):  atorvastatin 80 milliGRAM(s) Oral at bedtime  dexAMETHasone     Tablet 4 milliGRAM(s) Oral every 12 hours  dextrose 5%. 1000 milliLiter(s) (100 mL/Hr) IV Continuous <Continuous>  dextrose 5%. 1000 milliLiter(s) (50 mL/Hr) IV Continuous <Continuous>  dextrose 50% Injectable 25 Gram(s) IV Push once  dextrose 50% Injectable 12.5 Gram(s) IV Push once  dextrose 50% Injectable 25 Gram(s) IV Push once  FLUoxetine 20 milliGRAM(s) Oral daily  glucagon  Injectable 1 milliGRAM(s) IntraMuscular once  insulin lispro (ADMELOG) corrective regimen sliding scale   SubCutaneous every 6 hours  levETIRAcetam 500 milliGRAM(s) Oral two times a day  pantoprazole  Injectable 40 milliGRAM(s) IV Push two times a day    MEDICATIONS  (PRN):  acetaminophen     Tablet .. 650 milliGRAM(s) Oral every 6 hours PRN Temp greater or equal to 38C (100.4F), Mild Pain (1 - 3)  aluminum hydroxide/magnesium hydroxide/simethicone Suspension 30 milliLiter(s) Oral every 4 hours PRN Dyspepsia  dextrose Oral Gel 15 Gram(s) Oral once PRN Blood Glucose LESS THAN 70 milliGRAM(s)/deciliter  melatonin 3 milliGRAM(s) Oral at bedtime PRN Insomnia        Objective:    Vitals: Vital Signs Last 24 Hrs  T(C): 36.5 (09-29-22 @ 05:21), Max: 37.4 (09-28-22 @ 09:45)  T(F): 97.7 (09-29-22 @ 05:21), Max: 99.3 (09-28-22 @ 09:45)  HR: 73 (09-29-22 @ 05:21) (73 - 98)  BP: 151/94 (09-29-22 @ 05:21) (115/61 - 151/94)  BP(mean): --  RR: 18 (09-29-22 @ 05:21) (18 - 18)  SpO2: 95% (09-29-22 @ 05:21) (94% - 97%)            I&O's Summary    28 Sep 2022 07:01  -  29 Sep 2022 07:00  --------------------------------------------------------  IN: 1670 mL / OUT: 0 mL / NET: 1670 mL        PHYSICAL EXAM:  GENERAL: NAD, well-groomed, well-developed  HEAD:  Atraumatic, Normocephalic  EYES: EOMI, PERRLA, conjunctiva and sclera clear  ENMT: Moist mucous membranes  NECK: Supple, No JVD  CHEST/LUNG: Clear to auscultation bilaterally  HEART: Regular rate and rhythm  ABDOMEN: Soft, Nontender, Nondistended; Bowel sounds present  EXTREMITIES:  2+ Peripheral Pulses, No LE edema  SKIN: No rashes or lesions  NERVOUS SYSTEM:  Alert & Oriented X3, moving all extremities   PSYCH: Speaking in Full Sentences. Laying in bed comfortably; not agitated     LABS:                        13.0   7.63  )-----------( 87       ( 28 Sep 2022 06:04 )             38.3                         12.4   7.75  )-----------( 93       ( 27 Sep 2022 13:14 )             35.8                         13.7   10.44 )-----------( 101      ( 26 Sep 2022 14:31 )             41.3     Hgb Trend: 13.0<--, 12.4<--, 13.7<--, 16.5<--  09-28    138  |  102  |  15  ----------------------------<  183<H>  4.1   |  23  |  0.71  09-27    139  |  104  |  19  ----------------------------<  160<H>  4.4   |  26  |  0.72  09-26    140  |  101  |  28<H>  ----------------------------<  245<H>  4.6   |  26  |  0.83    Ca    8.5      28 Sep 2022 06:04  Ca    8.4      27 Sep 2022 13:14  Ca    9.1      26 Sep 2022 14:31  Phos  3.1     09-28  Mg     1.6     09-28    TPro  5.1<L>  /  Alb  3.1<L>  /  TBili  0.6  /  DBili  x   /  AST  34  /  ALT  62<H>  /  AlkPhos  76  09-28  TPro  4.9<L>  /  Alb  3.1<L>  /  TBili  0.6  /  DBili  x   /  AST  35  /  ALT  62<H>  /  AlkPhos  77  09-27  TPro  5.7<L>  /  Alb  3.4  /  TBili  0.5  /  DBili  x   /  AST  43<H>  /  ALT  74<H>  /  AlkPhos  93  09-26    Creatinine Trend: 0.71<--, 0.72<--, 0.83<--                    CAPILLARY BLOOD GLUCOSE      POCT Blood Glucose.: 164 mg/dL (29 Sep 2022 05:34)  POCT Blood Glucose.: 197 mg/dL (29 Sep 2022 01:04)  POCT Blood Glucose.: 184 mg/dL (28 Sep 2022 17:00)  POCT Blood Glucose.: 160 mg/dL (28 Sep 2022 12:06)     Dr. Liliana Tejeda, PGY-1    ANA LOVE  82y  MRN: [id number]    Subjective:    Patient is a 82y old  Male who presents with a chief complaint of GIB, weakness (28 Sep 2022 08:18)     [id number] Sindy    Pt finished bowel prep overnight. Denying HA, cp, abd pain. Last BM yesterday.       MEDICATIONS  (STANDING):  atorvastatin 80 milliGRAM(s) Oral at bedtime  dexAMETHasone     Tablet 4 milliGRAM(s) Oral every 12 hours  dextrose 5%. 1000 milliLiter(s) (100 mL/Hr) IV Continuous <Continuous>  dextrose 5%. 1000 milliLiter(s) (50 mL/Hr) IV Continuous <Continuous>  dextrose 50% Injectable 25 Gram(s) IV Push once  dextrose 50% Injectable 12.5 Gram(s) IV Push once  dextrose 50% Injectable 25 Gram(s) IV Push once  FLUoxetine 20 milliGRAM(s) Oral daily  glucagon  Injectable 1 milliGRAM(s) IntraMuscular once  insulin lispro (ADMELOG) corrective regimen sliding scale   SubCutaneous every 6 hours  levETIRAcetam 500 milliGRAM(s) Oral two times a day  pantoprazole  Injectable 40 milliGRAM(s) IV Push two times a day    MEDICATIONS  (PRN):  acetaminophen     Tablet .. 650 milliGRAM(s) Oral every 6 hours PRN Temp greater or equal to 38C (100.4F), Mild Pain (1 - 3)  aluminum hydroxide/magnesium hydroxide/simethicone Suspension 30 milliLiter(s) Oral every 4 hours PRN Dyspepsia  dextrose Oral Gel 15 Gram(s) Oral once PRN Blood Glucose LESS THAN 70 milliGRAM(s)/deciliter  melatonin 3 milliGRAM(s) Oral at bedtime PRN Insomnia        Objective:    Vitals: Vital Signs Last 24 Hrs  T(C): 36.5 (09-29-22 @ 05:21), Max: 37.4 (09-28-22 @ 09:45)  T(F): 97.7 (09-29-22 @ 05:21), Max: 99.3 (09-28-22 @ 09:45)  HR: 73 (09-29-22 @ 05:21) (73 - 98)  BP: 151/94 (09-29-22 @ 05:21) (115/61 - 151/94)  BP(mean): --  RR: 18 (09-29-22 @ 05:21) (18 - 18)  SpO2: 95% (09-29-22 @ 05:21) (94% - 97%)            I&O's Summary    28 Sep 2022 07:01  -  29 Sep 2022 07:00  --------------------------------------------------------  IN: 1670 mL / OUT: 0 mL / NET: 1670 mL        PHYSICAL EXAM:  GENERAL: NAD  EYES: EOMI, PERRLA, no scleral icterus  ENMT: Moist mucous membranes  NECK: Supple  CHEST/LUNG: Clear to auscultation bilaterally on anterior chest. No increased work of breathing.  HEART: Regular rate and rhythm  ABDOMEN: Soft, Nontender, distended; Bowel sounds appreciated  EXTREMITIES:  Pitting edema in b/l LE. Gross sensation intact in b/l LE. Able to move toes. B/l UE strength 4/5. B/l LE strength 2/5, unable to resist gravity.  SKIN: Darker discoloration on forehead noted. Non-tender.   NERVOUS SYSTEM:  Alert & Oriented X2 (hospital not name, self).     LABS:                        13.0   7.63  )-----------( 87       ( 28 Sep 2022 06:04 )             38.3                         12.4   7.75  )-----------( 93       ( 27 Sep 2022 13:14 )             35.8                         13.7   10.44 )-----------( 101      ( 26 Sep 2022 14:31 )             41.3     Hgb Trend: 13.0<--, 12.4<--, 13.7<--, 16.5<--  09-28    138  |  102  |  15  ----------------------------<  183<H>  4.1   |  23  |  0.71  09-27    139  |  104  |  19  ----------------------------<  160<H>  4.4   |  26  |  0.72  09-26    140  |  101  |  28<H>  ----------------------------<  245<H>  4.6   |  26  |  0.83    Ca    8.5      28 Sep 2022 06:04  Ca    8.4      27 Sep 2022 13:14  Ca    9.1      26 Sep 2022 14:31  Phos  3.1     09-28  Mg     1.6     09-28    TPro  5.1<L>  /  Alb  3.1<L>  /  TBili  0.6  /  DBili  x   /  AST  34  /  ALT  62<H>  /  AlkPhos  76  09-28  TPro  4.9<L>  /  Alb  3.1<L>  /  TBili  0.6  /  DBili  x   /  AST  35  /  ALT  62<H>  /  AlkPhos  77  09-27  TPro  5.7<L>  /  Alb  3.4  /  TBili  0.5  /  DBili  x   /  AST  43<H>  /  ALT  74<H>  /  AlkPhos  93  09-26    Creatinine Trend: 0.71<--, 0.72<--, 0.83<--                    CAPILLARY BLOOD GLUCOSE      POCT Blood Glucose.: 164 mg/dL (29 Sep 2022 05:34)  POCT Blood Glucose.: 197 mg/dL (29 Sep 2022 01:04)  POCT Blood Glucose.: 184 mg/dL (28 Sep 2022 17:00)  POCT Blood Glucose.: 160 mg/dL (28 Sep 2022 12:06)     Dr. Liliana Tejeda, PGY-1    ANA LOVE  82y  MRN: [id number]    Subjective:    Patient is a 82y old  Male who presents with a chief complaint of GIB, weakness (28 Sep 2022 08:18)     [id number] Sindy    Pt finished bowel prep overnight. Denies RLE pain. Denies HA, cp, abd pain. Last BM yesterday.       MEDICATIONS  (STANDING):  atorvastatin 80 milliGRAM(s) Oral at bedtime  dexAMETHasone     Tablet 4 milliGRAM(s) Oral every 12 hours  dextrose 5%. 1000 milliLiter(s) (100 mL/Hr) IV Continuous <Continuous>  dextrose 5%. 1000 milliLiter(s) (50 mL/Hr) IV Continuous <Continuous>  dextrose 50% Injectable 25 Gram(s) IV Push once  dextrose 50% Injectable 12.5 Gram(s) IV Push once  dextrose 50% Injectable 25 Gram(s) IV Push once  FLUoxetine 20 milliGRAM(s) Oral daily  glucagon  Injectable 1 milliGRAM(s) IntraMuscular once  insulin lispro (ADMELOG) corrective regimen sliding scale   SubCutaneous every 6 hours  levETIRAcetam 500 milliGRAM(s) Oral two times a day  pantoprazole  Injectable 40 milliGRAM(s) IV Push two times a day    MEDICATIONS  (PRN):  acetaminophen     Tablet .. 650 milliGRAM(s) Oral every 6 hours PRN Temp greater or equal to 38C (100.4F), Mild Pain (1 - 3)  aluminum hydroxide/magnesium hydroxide/simethicone Suspension 30 milliLiter(s) Oral every 4 hours PRN Dyspepsia  dextrose Oral Gel 15 Gram(s) Oral once PRN Blood Glucose LESS THAN 70 milliGRAM(s)/deciliter  melatonin 3 milliGRAM(s) Oral at bedtime PRN Insomnia        Objective:    Vitals: Vital Signs Last 24 Hrs  T(C): 36.5 (09-29-22 @ 05:21), Max: 37.4 (09-28-22 @ 09:45)  T(F): 97.7 (09-29-22 @ 05:21), Max: 99.3 (09-28-22 @ 09:45)  HR: 73 (09-29-22 @ 05:21) (73 - 98)  BP: 151/94 (09-29-22 @ 05:21) (115/61 - 151/94)  BP(mean): --  RR: 18 (09-29-22 @ 05:21) (18 - 18)  SpO2: 95% (09-29-22 @ 05:21) (94% - 97%)            I&O's Summary    28 Sep 2022 07:01  -  29 Sep 2022 07:00  --------------------------------------------------------  IN: 1670 mL / OUT: 0 mL / NET: 1670 mL        PHYSICAL EXAM:  GENERAL: NAD  EYES: EOMI, PERRLA, no scleral icterus  ENMT: Moist mucous membranes  NECK: Supple  CHEST/LUNG: Clear to auscultation bilaterally on anterior chest. No increased work of breathing.  HEART: Regular rate and rhythm  ABDOMEN: Soft, Nontender, distended; Bowel sounds appreciated  EXTREMITIES:  Pitting edema in b/l LE. Gross sensation intact in b/l LE. Able to move toes. B/l UE strength 4/5. B/l LE strength 2/5, unable to resist gravity.  SKIN: Darker discoloration on forehead noted. Non-tender.   NERVOUS SYSTEM:  Alert & Oriented X2 (hospital not name, self).     LABS:                        13.0   7.63  )-----------( 87       ( 28 Sep 2022 06:04 )             38.3                         12.4   7.75  )-----------( 93       ( 27 Sep 2022 13:14 )             35.8                         13.7   10.44 )-----------( 101      ( 26 Sep 2022 14:31 )             41.3     Hgb Trend: 13.0<--, 12.4<--, 13.7<--, 16.5<--  09-28    138  |  102  |  15  ----------------------------<  183<H>  4.1   |  23  |  0.71  09-27    139  |  104  |  19  ----------------------------<  160<H>  4.4   |  26  |  0.72  09-26    140  |  101  |  28<H>  ----------------------------<  245<H>  4.6   |  26  |  0.83    Ca    8.5      28 Sep 2022 06:04  Ca    8.4      27 Sep 2022 13:14  Ca    9.1      26 Sep 2022 14:31  Phos  3.1     09-28  Mg     1.6     09-28    TPro  5.1<L>  /  Alb  3.1<L>  /  TBili  0.6  /  DBili  x   /  AST  34  /  ALT  62<H>  /  AlkPhos  76  09-28  TPro  4.9<L>  /  Alb  3.1<L>  /  TBili  0.6  /  DBili  x   /  AST  35  /  ALT  62<H>  /  AlkPhos  77  09-27  TPro  5.7<L>  /  Alb  3.4  /  TBili  0.5  /  DBili  x   /  AST  43<H>  /  ALT  74<H>  /  AlkPhos  93  09-26    Creatinine Trend: 0.71<--, 0.72<--, 0.83<--                    CAPILLARY BLOOD GLUCOSE      POCT Blood Glucose.: 164 mg/dL (29 Sep 2022 05:34)  POCT Blood Glucose.: 197 mg/dL (29 Sep 2022 01:04)  POCT Blood Glucose.: 184 mg/dL (28 Sep 2022 17:00)  POCT Blood Glucose.: 160 mg/dL (28 Sep 2022 12:06)

## 2022-09-29 NOTE — DISCHARGE NOTE PROVIDER - NSDCFUADDAPPT_GEN_ALL_CORE_FT
Interventional Radiology (IR) Telehealth appointment  Dr. Akbar Gilman  10/28/22 at 2:30PM   The IR service should contact you regarding this appointment. For more information/questions call [phone] Interventional Radiology (IR) Telehealth appointment  Dr. Akbar Gilman  10/28/22 at 2:30PM   The IR service should contact you regarding this appointment. For more information/questions call [phone]    Gastroenterology (GI) Clinic number to confirm/arrange appointment [phone] ([facility] at [address] [place] [address]) or [phone] ([facility] at [address] [place] [address]) or [phone] ([facility] at [address] [place] [address])  Interventional Radiology (IR) Telehealth appointment  Dr. Akbar Gilman  10/28/22 at 2:30PM   The IR service should contact you regarding this appointment. For more information/questions call [phone]    Gastroenterology (GI) Clinic number to confirm/arrange appointment [phone] ([facility] at [address] [place] [address]) or [phone] ([facility] at [address] [place] [address]) or [phone] ([facility] at [address] [place] [address])     Follow up outpatient at the [facility] [address] [place] [address] [phone]

## 2022-09-29 NOTE — PROGRESS NOTE ADULT - SUBJECTIVE AND OBJECTIVE BOX
Interventional Radiology Follow-Up Note.    This is a 82y Male s/p IVC filter placement on 9/28 in Interventional Radiology with Dr. Gilman.     S:    Medication:       Vitals:  T(F): 97.7, Max: 99.3 (09:45)  HR: 73  BP: 151/94  RR: 18  SpO2: 95%    Physical Exam:  General:   Groin:       Aspartate Aminotransferase (AST/SGOT): 34 U/L (09-28-22 @ 06:04)  Alanine Aminotransferase (ALT/SGPT): 62 U/L (09-28-22 @ 06:04)  Aspartate Aminotransferase (AST/SGOT): 35 U/L (09-27-22 @ 13:14)  Alanine Aminotransferase (ALT/SGPT): 62 U/L (09-27-22 @ 13:14)        LABS:  Na: 138 (09-28 @ 06:04), 139 (09-27 @ 13:14), 140 (09-26 @ 14:31)  K: 4.1 (09-28 @ 06:04), 4.4 (09-27 @ 13:14), 4.6 (09-26 @ 14:31)  Cl: 102 (09-28 @ 06:04), 104 (09-27 @ 13:14), 101 (09-26 @ 14:31)  CO2: 23 (09-28 @ 06:04), 26 (09-27 @ 13:14), 26 (09-26 @ 14:31)  BUN: 15 (09-28 @ 06:04), 19 (09-27 @ 13:14), 28 (09-26 @ 14:31)  Cr: 0.71 (09-28 @ 06:04), 0.72 (09-27 @ 13:14), 0.83 (09-26 @ 14:31)  Glu: 183(09-28 @ 06:04), 160(09-27 @ 13:14), 245(09-26 @ 14:31)    Hgb: 13.0 (09-28 @ 06:04), 12.4 (09-27 @ 13:14), 13.7 (09-26 @ 14:31)  Hct: 38.3 (09-28 @ 06:04), 35.8 (09-27 @ 13:14), 41.3 (09-26 @ 14:31)  WBC: 7.63 (09-28 @ 06:04), 7.75 (09-27 @ 13:14), 10.44 (09-26 @ 14:31)  Plt: 87 (09-28 @ 06:04), 93 (09-27 @ 13:14), 101 (09-26 @ 14:31)    INR: 1.15 09-26-22 @ 14:31  PTT: 20.6 09-26-22 @ 14:31          LIVER FUNCTIONS - ( 28 Sep 2022 06:04 )  Alb: 3.1 g/dL / Pro: 5.1 g/dL / ALK PHOS: 76 U/L / ALT: 62 U/L / AST: 34 U/L / GGT: x                  Assessment/Plan:  82y Male with glioblastoma multiforme diagnosed 7/18/22 s/p debulking 7/28/22, hyperlipidemia, right above-the-knee popliteal DVT, concern for HIT admitted for workup of gastrointestinal bleeding unable to be anticoagulated at this time s/p IVC filter placement on 9/28 in Interventional Radiology with Dr. Gilman.      - IR follow up: Will coordinated telehealth visit in 1 month to evaluate for possible filter removal.   - IR will sign off.   -Global care per primary team.     Please call IR at  [phone] with any questions, concerns, or issues regarding above.    Also available on Teams.    ** Note in progress** Interventional Radiology Follow-Up Note.    This is a 82y Male s/p IVC filter placement on 9/28 in Interventional Radiology with Dr. Gilman.     S: Denies abdominal pain     Medication:       Vitals:  T(F): 97.7, Max: 99.3 (09:45)  HR: 73  BP: 151/94  RR: 18  SpO2: 95%    Physical Exam:   General: NAD.  Groin: right groin dressing c/d/i.   Abdomen: Soft. Distended. NT.           Aspartate Aminotransferase (AST/SGOT): 34 U/L (09-28-22 @ 06:04)  Alanine Aminotransferase (ALT/SGPT): 62 U/L (09-28-22 @ 06:04)  Aspartate Aminotransferase (AST/SGOT): 35 U/L (09-27-22 @ 13:14)  Alanine Aminotransferase (ALT/SGPT): 62 U/L (09-27-22 @ 13:14)        LABS:  Na: 138 (09-28 @ 06:04), 139 (09-27 @ 13:14), 140 (09-26 @ 14:31)  K: 4.1 (09-28 @ 06:04), 4.4 (09-27 @ 13:14), 4.6 (09-26 @ 14:31)  Cl: 102 (09-28 @ 06:04), 104 (09-27 @ 13:14), 101 (09-26 @ 14:31)  CO2: 23 (09-28 @ 06:04), 26 (09-27 @ 13:14), 26 (09-26 @ 14:31)  BUN: 15 (09-28 @ 06:04), 19 (09-27 @ 13:14), 28 (09-26 @ 14:31)  Cr: 0.71 (09-28 @ 06:04), 0.72 (09-27 @ 13:14), 0.83 (09-26 @ 14:31)  Glu: 183(09-28 @ 06:04), 160(09-27 @ 13:14), 245(09-26 @ 14:31)    Hgb: 13.0 (09-28 @ 06:04), 12.4 (09-27 @ 13:14), 13.7 (09-26 @ 14:31)  Hct: 38.3 (09-28 @ 06:04), 35.8 (09-27 @ 13:14), 41.3 (09-26 @ 14:31)  WBC: 7.63 (09-28 @ 06:04), 7.75 (09-27 @ 13:14), 10.44 (09-26 @ 14:31)  Plt: 87 (09-28 @ 06:04), 93 (09-27 @ 13:14), 101 (09-26 @ 14:31)    INR: 1.15 09-26-22 @ 14:31  PTT: 20.6 09-26-22 @ 14:31          LIVER FUNCTIONS - ( 28 Sep 2022 06:04 )  Alb: 3.1 g/dL / Pro: 5.1 g/dL / ALK PHOS: 76 U/L / ALT: 62 U/L / AST: 34 U/L / GGT: x                  Assessment/Plan:  82y Male with glioblastoma multiforme diagnosed 7/18/22 s/p debulking 7/28/22, hyperlipidemia, right above-the-knee popliteal DVT, concern for HIT admitted for workup of gastrointestinal bleeding unable to be anticoagulated at this time s/p IVC filter placement on 9/28 in Interventional Radiology with Dr. Gilman.      - IR follow up: Scheduled for Oct 28th at 14:30 to discuss filter retrieval. D/w Daughter via telephone.   - IR will sign off.   -Global care per primary team.     Please call IR at  [phone] with any questions, concerns, or issues regarding above.    Also available on Teams.    ** Note in progress**

## 2022-09-29 NOTE — DIETITIAN INITIAL EVALUATION ADULT - REASON INDICATOR FOR ASSESSMENT
RD consult for Pressure Injury Stage 2 or >.   Source: pt's wife and daughter at bedside, medical record, provider. Pt wishing to rest at this time, just returned from PACU. Pt known to this RD from previous admission on 4 Cohen 07/2022. Chart reviewed, events noted.

## 2022-09-29 NOTE — DIETITIAN INITIAL EVALUATION ADULT - OTHER CALCULATIONS
Fluid needs deferred to team. Energy, protein needs based on bedscale weight obtained by RD: 167.8 lbs/76.1 kg (09-29)

## 2022-09-29 NOTE — DIETITIAN INITIAL EVALUATION ADULT - OTHER INFO
Pt's UBW was ~168 lbs in May 2022, but had gained to 191 lbs by 07/2022 due to greatly increased PO intake. Daughter reports pt has now lost to 162 lbs ~1 month ago due to poor PO intake over the past few months.  Dosing wt: 150 lbs (09-26, stated)  Wt history per chart: 167.8 lbs (09-29, RD obtained bedscale wt), 171 lbs (07-22, stated). 12.2% wt loss noted x 2 months, based on today's weight and previous wt 07/2022 - clinically significant. RD to continue to monitor weight trends as able/available.     Per chart, pt currently ordered for admelog SSI, reglan, atorvastatin, decadron, and protonix in-house.

## 2022-09-29 NOTE — DIETITIAN INITIAL EVALUATION ADULT - NSPROEDAREADYLEARNOTH_GEN_A_NUR
impairments found/predicted duration of therapy intervention/therapy frequency/anticipated discharge recommendation/functional limitations in following categories/rehab potential
none

## 2022-09-29 NOTE — PROGRESS NOTE ADULT - PROBLEM SELECTOR PLAN 3
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 plts 101 on admission, improved from 77 on 9/22.   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 Silva)  - On 9/21, he was found to have a DVT and was started on Lovenox, which was quickly discontinued after outpt labs showed plt of 77 < 172 (8/2022). Per daughter only took 1 or 2 doses of the lovenox, so thrombocytopenia more likely 2/ temozolomide, which was also discontinued at that time  - 9/28 HIT assay (heparin pf4 ab) negative    PLAN:  - continue to monitor plts on CBC

## 2022-09-29 NOTE — PROGRESS NOTE ADULT - PROBLEM SELECTOR PLAN 7
- diagnosed 7/18/22, s/p R stereo biopsy, TIMOTHY x2, R crani for tumor debulking 7/28/22, on temozolomide (last dose 4 days ago)  - follows Dr. Mcneil and Dr. Borja outpt  - CT here showing possible residual or remanent tumor   - MRI brain showed findings representing residual/recurrent neoplasm and/or posttreatment changes. presence of neoplasm is not excluded    PLAN:   - neuro surg following here; appreciate recs   - c/w dexamethasone 4mg BID, keppra 500mg BID on home rosuvastatin 40mg qd  - c/w atorvastatin 80mg while inpatient

## 2022-09-29 NOTE — DIETITIAN INITIAL EVALUATION ADULT - PHYSCIAL ASSESSMENT
Visual nutrition-focused physical examination conducted as pt wishing to rest at time of visit. Visual findings noted.

## 2022-09-29 NOTE — PROGRESS NOTE ADULT - ASSESSMENT
82M, Romansh-speaking, with PMH Glioblastoma (diagnosed 7/18/22, s/p R stereo biopsy, TIMOTHY x2, R crani for tumor debulking 7/28/22, on temozolomide (last dose 4 days ago)), HLD, steroid-induced diabetes, h/o HIT during recent admission, and R popliteal DVT (found 9/21/22, AC discontinued due to thrombocytopenia) presenting with worsening LE weakness x 5 days i/s/o dark stools x 1-2 days and AC use. Admitted to medicine for further workup.

## 2022-09-29 NOTE — PROGRESS NOTE ADULT - ASSESSMENT
Juan JadelfofrancineJordon  82M German speaking s/p R stereo biopsy, TIMOTHY x2, R crani for tumor debulking of GBM (Dr. Mcneil 7.28.22) p/w worsening weakness since Sept 5, most notably over the past week. He's had 6 rounds of radiation and was on temozolomide (last dose 4 days ago; stopped due to low PLT count). He was started on lovenox for RLE DVT (above knee popliteal) but this was also stopped due to low PLT count. Also has dark stools; positive occult blood test here in ED. Patient was planned to get an MRI this week. Head CT shows c/f possible area of residual or recurrent tumor. Exam: AO2 (knows year, not month), PERRL, EOMI, no droop/drift, BUE 5/5, b/l swollen, b/l HF 2/5, R KE 4/5 w/assistance, L KE 2/5, b/l DF/PF 5/5     - After review of the patient's MRI and the typical post treatment course and timing the findings most likely represent post treatment changes. As such, the patient can continue to follow up as an outpatient with Dr. Mcneil as planned after their current admission.

## 2022-09-29 NOTE — PROGRESS NOTE ADULT - PROBLEM SELECTOR PLAN 5
lactate elevated to 4.5 on VBG on admission, improved to 2.7 after 1L NS. Likely 2/2 dehydration i/s/o decreased PO intake  - another 1L NS given, lactate downtrend 2.3     PLAN:  - lactate today normalized 1.7 - diagnosed 7/18/22, s/p R stereo biopsy, TIMOTHY x2, R crani for tumor debulking 7/28/22, on temozolomide (last dose 4 days ago)  - follows Dr. Mcneil and Dr. Borja outpt  - CTH here showing possible residual or remanent tumor   - MRI brain showed findings representing residual/recurrent neoplasm and/or posttreatment changes. presence of neoplasm is not excluded    PLAN:   - neuro surg following here; patient can continue to follow up as an outpatient with Dr. Mcneil as planned after their current admission  - c/w dexamethasone 4mg BID, keppra 500mg BID - diagnosed 7/18/22, s/p R stereo biopsy, TIMOTHY x2, R crani for tumor debulking 7/28/22, on temozolomide (last dose 4 days ago)  - follows Dr. Mcneil and Dr. Borja outpt  - CT here showing possible residual or remanent tumor   - MRI brain showed findings representing residual/recurrent neoplasm and/or posttreatment changes. presence of neoplasm is not excluded    PLAN:   - neuro surg following here; findings most likely represent post treatment changes, patient can continue to follow up as an outpatient with Dr. Mcneil as planned after their current admission  - c/w dexamethasone 4mg BID, keppra 500mg BID

## 2022-09-29 NOTE — DIETITIAN INITIAL EVALUATION ADULT - NS FNS DIET ORDER
Diet, NPO after Midnight:      NPO Start Date: 29-Sep-2022,   NPO Start Time: 23:59  Except Medications (09-29-22 @ 13:00) [Active]  Diet, Clear Liquid (09-27-22 @ 10:26) [Active]

## 2022-09-29 NOTE — PROGRESS NOTE ADULT - SUBJECTIVE AND OBJECTIVE BOX
Patient seen and examined at bedside.    --Anticoagulation--    T(C): 36.5 (09-29-22 @ 05:21), Max: 37.4 (09-28-22 @ 09:45)  HR: 73 (09-29-22 @ 05:21) (73 - 98)  BP: 151/94 (09-29-22 @ 05:21) (115/61 - 151/94)  RR: 18 (09-29-22 @ 05:21) (18 - 18)  SpO2: 95% (09-29-22 @ 05:21) (94% - 97%)  Wt(kg): --    Exam: AO2 (knows year, not month), PERRL, EOMI, no droop/drift, BUE 5/5, b/l swollen, b/l HF 2/5, R KE 4/5 w/assistance, L KE 2/5, b/l DF/PF 5/5

## 2022-09-29 NOTE — PROGRESS NOTE ADULT - ATTENDING COMMENTS
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 Suspect weakness likely multifactorial 2/2 underlying disease and Hgb drop possibly 2/2 GIB. CT lumbar spine with mild multilevel spinal canal stenosis. MRI brain results reviewed, can not rule out neoplasm. Nsx aware and believe findings are more likely post treatment changes. GI following, EGD and c-scope today. Holding AC. Heme consulted do not recommend AC. s/p IVF filter given above the knee DVT. Plan for [place] on discharge 83yo M, w/ PMH of glioblastoma (dx 7/18/22, s/p R stereo bx, TIMOTHY x2, R crani for tumor debulking 7/28/22, on temozolomide (last dose 4 days prior to admission), HLD, steroid-induced DM, h/o HIT during recent admission, and newly dx R popliteal DVT (found 9/21/22, AC dc d/t thrombocytopenia) p/w worsening LE weakness. Suspect weakness likely multifactorial 2/2 underlying disease and Hgb drop possibly 2/2 GIB. CT lumbar spine with mild multilevel spinal canal stenosis. MRI brain results reviewed, can not rule out neoplasm. Nsx aware and believe findings are more likely post treatment changes. GI following, EGD and c-scope aborted 2/2 food in stomach. Will re-attempt tomorrow. Clears, today, NPO after midnight. Ordered for reglan x2, Miralax Q4 as pt did not want to re-prep, dulcolax suppository as discussed with GI. Holding AC. Heme consulted do not recommend AC. s/p IVF filter given above the knee DVT. Plan for [place] on discharge

## 2022-09-29 NOTE — DIETITIAN NUTRITION RISK NOTIFICATION - BUCCAL DEPLETION IS
[facility]    [address] [place] [address]    Phone:  [phone]    Fax:  [phone]                                       Kristen De La Cruz   MRN: [id number]    Department:  [facility]   Date of Visit:  12/21/2017           After Visit Summary Signature Page     I have received my discharge instructions, and my questions have been answered. I have discussed any challenges I see with this plan with the nurse or doctor.    ..........................................................................................................................................  Patient/Patient Representative Signature      ..........................................................................................................................................  Patient Representative Print Name and Relationship to Patient    ..................................................               ................................................  Date                                            Time    ..........................................................................................................................................  Reviewed by Signature/Title    ...................................................              ..............................................  Date                                                            Time           mild

## 2022-09-29 NOTE — DISCHARGE NOTE PROVIDER - CARE PROVIDER_API CALL
Raymond Goss (DO)  Medicine  [id number] [place] [address]  Phone: [phone]  Fax: [phone]  Follow Up Time:

## 2022-09-29 NOTE — DIETITIAN INITIAL EVALUATION ADULT - PERSON TAUGHT/METHOD
Discussed importance of adequate protein-energy consumption to meet estimated nutrient needs.  Encouraged intake of protein-rich foods first at mealtimes to help preserve lean muscle mass and promote wound healing. Reviewed food choices that are high in protein in depth. Also briefly reviewed DM dietary recommendations- limited concentrated sweets, refined grains, choosing whole grains. Pt's daughter noted with good comprehension and made aware RD remains available for further questions/concerns./verbal instruction/daughter instructed

## 2022-09-29 NOTE — PROGRESS NOTE ADULT - PROBLEM SELECTOR PLAN 10
Diet: CLD, NPO at midnight for EGD/colonoscopy tomorrow   DVT ppx: SCD's in LLE (not in RLE given DVT), plan for IVC filter today, per heme/onc continue to hold AC   Dispo: PT recommends subacute rehab    Discussed [place] with daughter Faith, she says she feels it is physically right decision, worried about effect on pt emotionally. Will discuss with pt and family.

## 2022-09-29 NOTE — DIETITIAN INITIAL EVALUATION ADULT - ORAL INTAKE PTA/DIET HISTORY
Pt had very good appetite/PO intake during 07/2022 admission, but now pt's daughter reports since Labor day weekend (~09/04), pt has had significantly reduced appetite and reduced PO intake. Pt only taking a few bites of food, not interested in eating meats, and is barely drinking any fluids. Pt follows a low sugar diet for DM. Confirms NKFA.

## 2022-09-29 NOTE — DISCHARGE NOTE PROVIDER - NSFOLLOWUPCLINICS_GEN_ALL_ED_FT
[facility]  Psychiatry  [address] [place] [address]  Phone: [phone]  Fax:

## 2022-09-29 NOTE — DIETITIAN INITIAL EVALUATION ADULT - COLLABORATION WITH OTHER PROVIDERS
Endocrinology - to assist with setting up outpatient DM medications and appointment, to help improve glycemic control. Daughter reports difficulty checking pt's fingersticks more than 1x/day and is hesitant about her ability to administer insulin if pt gets prescribed on DC.

## 2022-09-29 NOTE — DISCHARGE NOTE PROVIDER - NSDCFUSCHEDAPPT_GEN_ALL_CORE_FT
Mitesh Silva  [facility] Physician [facility]  [facility]  Scheduled Appointment: 10/05/2022    Akbar Gilman  [facility] Physician [facility]  [address]  Scheduled Appointment: 10/28/2022    Wolfgang Andrade  [facility] Physician [facility]  [id number]  Scheduled Appointment: 12/08/2022     Akbar Gilman  [facility] Physician [facility]  [address]  Scheduled Appointment: 10/28/2022    Wolfgang Andrade  [facility] Physician [facility]  [id number]  Scheduled Appointment: 12/08/2022

## 2022-09-29 NOTE — DIETITIAN INITIAL EVALUATION ADULT - PERTINENT LABORATORY DATA
09-29    139  |  102  |  12  ----------------------------<  178<H>  4.2   |  23  |  0.66    Ca    8.4      29 Sep 2022 07:31  Phos  2.6     09-29  Mg     1.6     09-29    TPro  5.1<L>  /  Alb  2.9<L>  /  TBili  0.7  /  DBili  x   /  AST  32  /  ALT  60<H>  /  AlkPhos  74  09-29    CAPILLARY BLOOD GLUCOSE  POCT Blood Glucose.: 153 mg/dL (29 Sep 2022 15:15)  POCT Blood Glucose.: 164 mg/dL (29 Sep 2022 05:34)  POCT Blood Glucose.: 197 mg/dL (29 Sep 2022 01:04)  POCT Blood Glucose.: 184 mg/dL (28 Sep 2022 17:00)    A1C with Estimated Average Glucose Result: 9.3 % (09-27-22 @ 13:14)  A1C with Estimated Average Glucose Result: 7.0 % (07-24-22 @ 06:23)

## 2022-09-29 NOTE — PROGRESS NOTE ADULT - PROBLEM SELECTOR PLAN 4
Found to have R popliteal DVT on outpt duplex for BLE pitting edema. Was briefly on Lovenox (1-2 doses) but then discontinued due to thrombocytopenia as above  - daughter reports some KHAN and orthopnea at home over past few weeks, which is concerning for PE. Although pt saturating well on RA here, no tachycardia. No SOB at rest, no CP  - AC also c/b current concern for GIB     PLAN:  - Heme/onc consulted (saw Dr. Silva outpt); recommend continuing to hold AC, IVC filter w/ IR  - IR to place IVC filter today Found to have R popliteal DVT on outpt duplex for BLE pitting edema. Was briefly on Lovenox (1-2 doses) but then discontinued due to thrombocytopenia as above  - daughter reports some KHAN and orthopnea at home over past few weeks, which is concerning for PE. Although pt saturating well on RA here, no tachycardia. No SOB at rest, no CP  - AC also c/b current concern for GIB     PLAN:  - Heme/onc consulted (saw Dr. Silva outpt); recommend continuing to hold AC  - IVC filter placed with IR on 9/28

## 2022-09-29 NOTE — DIETITIAN INITIAL EVALUATION ADULT - REASON FOR ADMISSION
Gastrointestinal hemorrhage    "82M, Sri Lankan-speaking, with PMH Glioblastoma (diagnosed 7/18/22, s/p R stereo biopsy, TIMOTHY x2, R crani for tumor debulking 7/28/22, on temozolomide (last dose 4 days ago)), HLD, steroid-induced diabetes, h/o HIT during recent admission, and R popliteal DVT (found 9/21/22, AC discontinued due to thrombocytopenia) presenting with worsening LE weakness x 5 days i/s/o dark stools x 1-2 days and AC use. Admitted to medicine for further workup."

## 2022-09-29 NOTE — PROGRESS NOTE ADULT - PROBLEM SELECTOR PLAN 8
a1c 7% (7/24/22), likely 2/2 steroid-induced DM  - holding home oral agents while inpatient (januvia 100mg qd, metformin ER 500mg BID)    PLAN:   - low dose correctional scale  - FS TID qAC and qhs or q6h while NPO Diet: CLD, NPO at midnight for EGD/colonoscopy tomorrow   DVT ppx: SCD's in LLE (not in RLE given DVT), plan for IVC filter today, per heme/onc continue to hold AC   Dispo: PT recommends subacute rehab Diet: CLD, NPO at midnight for EGD/colonoscopy tomorrow   DVT ppx: SCD's in LLE (not in RLE given DVT), s/p IVC filter today, per heme/onc continue to hold AC   Dispo: PT recommends subacute rehab Diet: CLD  DVT ppx: SCD's in LLE, s/p IVC filter in RLE, per heme/onc continue to hold AC   Dispo: PT recommends subacute rehab Diet: CLD  DVT ppx: SCD's in LLE, s/p IVC filter in RLE, per heme/onc continue to hold AC   Dispo: PT recommends subacute rehab. CM spoke with pt's caretaker/family and pt, unable to get radiation tx while in [place]. Pt's family would prefer to continue radiation tx.    Updated pt's daughter Faith at bedside 1:40PM. Diet: CLD  DVT ppx: SCD's in LLE, s/p IVC filter in RLE, per heme/onc continue to hold AC   Dispo: PT recommends subacute rehab. CM spoke with pt's caretaker/family and pt, unable to get radiation tx while in [place]. Pt's family would prefer to continue radiation tx.    Updated pt's daughter Faith at bedside 1:40PM.    9/29 3:40PM Was contacted by SW as pt stated he felt at times it was better if he wasn't alive, but had no specific plans to hurt himself. Spoke with pt with pt's wife at bedside. Offered  services, but pt and pt's family preferred daughter translate over phone. Spoke with daughter Faith, states father started taking Fluoxetine recently after GBM diagnosis, no prior hx of depression to her knowledge.  Pt used to be very independent prior to diagnosis and treatment.  Pt's daughter was present for conversation with SW and is aware pt said those statements, had no plan. Asked pt if he would like to speak with psych service, declined. Pt's daughter states pt is not very receptive to  services, would prefer to seek outpt psych services with Swedish speaking provider. Diet: CLD  DVT ppx: SCD's in LLE, s/p IVC filter in RLE, per heme/onc continue to hold AC   Dispo: PT recommends subacute rehab. CM spoke with pt's caretaker/family and pt, unable to get radiation tx while in [place]. Pt's family would prefer to continue radiation tx.    Updated pt's daughter Faith at bedside 1:40PM.    9/29 3:40PM Was contacted by SW as pt stated he felt at times it was better if he wasn't alive, but had no specific plans to hurt himself. Spoke with pt with pt's wife at bedside. Offered  services, but pt preferred daughter translate over phone. Spoke with daughter Faith, states father started taking Fluoxetine recently after GBM diagnosis, no prior hx of depression to her knowledge.  Pt used to be very independent prior to diagnosis and treatment.  Pt's daughter was present for conversation with SW and is aware pt said those statements, had no plan. Asked pt if he would like to speak with psych service, declined. Pt's daughter states pt is not very receptive to  services, would prefer to seek outpt psych services with Palauan speaking provider.

## 2022-09-29 NOTE — DIETITIAN INITIAL EVALUATION ADULT - ETIOLOGY
inability to meet estimated needs in setting of loss of appetite/reduced PO intake and increased needs for GBM increased physiological demand for nutrients

## 2022-09-30 NOTE — PROGRESS NOTE ADULT - PROBLEM SELECTOR PLAN 8
Diet: CLD  DVT ppx: SCD's in LLE, s/p IVC filter in RLE, per heme/onc continue to hold AC   Dispo: PT recommends subacute rehab. CM spoke with pt's caretaker/family and pt, unable to get radiation tx while in [place]. Pt's family would prefer to continue radiation tx.    Updated pt's daughter Faith at bedside 1:40PM.    9/29 3:40PM Was contacted by SW as pt stated he felt at times it was better if he wasn't alive, but had no specific plans to hurt himself. Spoke with pt with pt's wife at bedside. Offered  services, but pt and pt's family preferred daughter translate over phone. Spoke with daughter Faith, states father started taking Fluoxetine recently after GBM diagnosis, no prior hx of depression to her knowledge.  Pt used to be very independent prior to diagnosis and treatment.  Pt's daughter was present for conversation with SW and is aware pt said those statements, had no plan. Asked pt if he would like to speak with psych service, declined. Pt's daughter states pt is not very receptive to  services, would prefer to seek outpt psych services with Frisian speaking provider. Diet: CLD  DVT ppx: SCD's in LLE, s/p IVC filter in RLE, per heme/onc continue to hold AC   Dispo: PT recommends subacute rehab. CM discussing with family rehab options that work with pt's radiation tx schedule.

## 2022-09-30 NOTE — CONSULT NOTE ADULT - SUBJECTIVE AND OBJECTIVE BOX
Wound Surgery Consult Note:    HPI:  HPI: AD is a 81 y/o M PMHx of PMH Glioblastoma (diagnosed 7/18/22, s/p R stereo biopsy, TIMOTHY x2, R crani for tumor debulking 7/28/22, on temozolomide (last dose 4 days ago)), HLD, steroid-induced diabetes, and R popliteal DVT (found 9/21/22, not on AC due to thrombocytopenia) presenting with weakness x 3 weeks, acutely worsening over 3-5 days with dark brown stools x 1-2 days. Patient's daughter states he has had loose dark brown stools for the past 4 weeks intermittently, and that they noticed the stool became darker yesterday, denies black stools. Was told by her mom there was some scant red blood on wiping but wasn't sure if it was from external irritation. Patient denies stomach pain, nausea, or vomiting, but endorses he has had little appetite for food or fluids. Patient also reported bilateral leg swelling, weakness with moving legs, and difficulty ambulating for the past 3 weeks, acutely worsening a week ago. Patient was found to have a DVT in his RLE 6 days ago at his radiation clinic, confirmed with doppler. Patient denies leg pain, but endorses SOB, especially on exertion and orthopnea. Patient was started on Lovenox and took 2 doses, but stopped anticoagulation along with the Temozolomide when his platelets were found to be low at 77, 4 days ago. Patient endorses that he has noticed bruising on his arms and stomach (where Lovenox was injected), but denies any trauma. Patient was urged to present to the ED by nurse practitioner at his radiation clinic given worsening weakness, decreased PO intake, and dark stools. Patient denies chest pain, fever, nausea, vomiting, abdominal pain, back pain. Patient is followed by Dr. Mcneil and Dr. Borja.    In ED, VS: afebrile, /84, HR 90, RR 16, SpO2 97%  Labs significant for hgb 13.7 (baseline 16.5 9/22/22), thrombocytopenia 101 (improved from 9/22, 77), mild transaminitis, elevated lactate 4.5   Meds: given 1L NS, PPI 80mg IV x1   Consults: nephro consulted in ED (26 Sep 2022 20:54)    Request for wound care consult for sacral/bilateral buttocks skin breakdown received from nursing. Mr. Hendrickson was encountered on an alternating air with low air loss surface. His daughter and wife were at the bedside and stated that they are his caregivers and that he has had burning pain and redness and irritation for some time at home prior to being admitted here. They confirmed that he has recently become grossly incontinent of stool and urine and prefers to wear diapers. Furthermore, he does not walk at this time due to his increased weakness. He denied pruritus in his gluteal cleft and skin folds. His extreme immobility, inactivity, gross incontinence of urine and stool as well as poor nutritional status all contribute to his high risk for pressure injury development and hinder healing. Identification of the initial signs of deep tissue damage at the level of the skin within 72 hours of admission make this an injury that occurred prior to admission.    Mr. Hendrickson and his family were educated on pressure injury prevention and incontinence management, including avoiding diapers, application of moisture barrier, need for frequent repositioning while in bed or in a chair. They indicated understanding throughout the conversation.    PAST MEDICAL & SURGICAL HISTORY:  Glioblastoma multiforme  Steroid-induced diabetes mellitus  S/P craniotomy  R craniotomy for debulking with Dr. Mcneil (7/28/22)    REVIEW OF SYSTEMS  General:	  Respiratory and Thorax:  Cardiovascular:	  Gastrointestinal:	  Genitourinary:	  Musculoskeletal:	  Neurological:	  Psychiatric:	  Hematology/Lymphatics:	  Endocrine:	  Skin/ MSK: see HPI    MEDICATIONS  (STANDING):  ascorbic acid 500 milliGRAM(s) Oral daily  atorvastatin 80 milliGRAM(s) Oral at bedtime  dexAMETHasone     Tablet 4 milliGRAM(s) Oral every 12 hours  dextrose 5%. 1000 milliLiter(s) (100 mL/Hr) IV Continuous <Continuous>  dextrose 5%. 1000 milliLiter(s) (50 mL/Hr) IV Continuous <Continuous>  dextrose 50% Injectable 25 Gram(s) IV Push once  dextrose 50% Injectable 12.5 Gram(s) IV Push once  dextrose 50% Injectable 25 Gram(s) IV Push once  FLUoxetine 20 milliGRAM(s) Oral daily  glucagon  Injectable 1 milliGRAM(s) IntraMuscular once  insulin lispro (ADMELOG) corrective regimen sliding scale   SubCutaneous every 6 hours  levETIRAcetam 500 milliGRAM(s) Oral two times a day  multivitamin 1 Tablet(s) Oral daily  pantoprazole  Injectable 40 milliGRAM(s) IV Push two times a day    MEDICATIONS  (PRN):  acetaminophen     Tablet .. 650 milliGRAM(s) Oral every 6 hours PRN Temp greater or equal to 38C (100.4F), Mild Pain (1 - 3)  aluminum hydroxide/magnesium hydroxide/simethicone Suspension 30 milliLiter(s) Oral every 4 hours PRN Dyspepsia  dextrose Oral Gel 15 Gram(s) Oral once PRN Blood Glucose LESS THAN 70 milliGRAM(s)/deciliter  melatonin 3 milliGRAM(s) Oral at bedtime PRN Insomnia    Allergies    heparin containing compounds (Other)    Intolerances    SOCIAL HISTORY:  ; Denies smoking, ETOH, drugs    FAMILY HISTORY:  FH: type 2 diabetes    FH: hyperlipidemia    Vital Signs Last 24 Hrs  T(C): 36.5 (30 Sep 2022 08:54), Max: 37.3 (30 Sep 2022 05:00)  T(F): 97.7 (30 Sep 2022 08:54), Max: 99.1 (30 Sep 2022 05:00)  HR: 60 (30 Sep 2022 08:54) (60 - 86)  BP: 157/85 (30 Sep 2022 08:54) (113/78 - 157/85)  BP(mean): --  RR: 18 (30 Sep 2022 08:54) (18 - 18)  SpO2: 98% (30 Sep 2022 08:54) (94% - 98%)    Parameters below as of 30 Sep 2022 08:54  Patient On (Oxygen Delivery Method): room air    Physical Exam:  General: Alert, weak  Respiratory: equal chest rise with respirations  Gastrointestinal: soft NT/ND  Neurology: verbal, following commands  Musculoskeletal: no contractures  Vascular: BLE edema equal  Skin:  Sacral/bilateral buttocks with linear, superficially denuded skin tear in the gluteal cleft with deep maroon discoloration L 4cm X W 4cm x D 0.1cm with pink wound bed, no necrotic tissue, and scant serosanguinous drainage  No odor, erythema, increased warmth, tenderness, induration, fluctuance    LABS:  09-30    138  |  102  |  9   ----------------------------<  141<H>  3.5   |  25  |  0.67    Ca    7.9<L>      30 Sep 2022 10:46  Phos  2.6     09-30  Mg     1.7     09-30    TPro  4.9<L>  /  Alb  2.7<L>  /  TBili  0.6  /  DBili  x   /  AST  35  /  ALT  54<H>  /  AlkPhos  67  09-30                          12.6   8.77  )-----------( 112      ( 30 Sep 2022 10:46 )             37.8            Wound Surgery Consult Note:    HPI:  HPI: AD is a 81 y/o M PMHx of PMH Glioblastoma (diagnosed 7/18/22, s/p R stereo biopsy, TIMOTHY x2, R crani for tumor debulking 7/28/22, on temozolomide (last dose 4 days ago)), HLD, steroid-induced diabetes, and R popliteal DVT (found 9/21/22, not on AC due to thrombocytopenia) presenting with weakness x 3 weeks, acutely worsening over 3-5 days with dark brown stools x 1-2 days. Patient's daughter states he has had loose dark brown stools for the past 4 weeks intermittently, and that they noticed the stool became darker yesterday, denies black stools. Was told by her mom there was some scant red blood on wiping but wasn't sure if it was from external irritation. Patient denies stomach pain, nausea, or vomiting, but endorses he has had little appetite for food or fluids. Patient also reported bilateral leg swelling, weakness with moving legs, and difficulty ambulating for the past 3 weeks, acutely worsening a week ago. Patient was found to have a DVT in his RLE 6 days ago at his radiation clinic, confirmed with doppler. Patient denies leg pain, but endorses SOB, especially on exertion and orthopnea. Patient was started on Lovenox and took 2 doses, but stopped anticoagulation along with the Temozolomide when his platelets were found to be low at 77, 4 days ago. Patient endorses that he has noticed bruising on his arms and stomach (where Lovenox was injected), but denies any trauma. Patient was urged to present to the ED by nurse practitioner at his radiation clinic given worsening weakness, decreased PO intake, and dark stools. Patient denies chest pain, fever, nausea, vomiting, abdominal pain, back pain. Patient is followed by Dr. Mcneil and Dr. Borja.    In ED, VS: afebrile, /84, HR 90, RR 16, SpO2 97%  Labs significant for hgb 13.7 (baseline 16.5 9/22/22), thrombocytopenia 101 (improved from 9/22, 77), mild transaminitis, elevated lactate 4.5   Meds: given 1L NS, PPI 80mg IV x1   Consults: nephro consulted in ED (26 Sep 2022 20:54)    Request for wound care consult for sacral/bilateral buttocks skin breakdown received from nursing. Mr. Hendrickson was encountered on an alternating air with low air loss surface. His daughter and wife were at the bedside and stated that they are his caregivers and that he has had burning pain and redness and irritation for some time at home prior to being admitted here. They confirmed that he has recently become grossly incontinent of stool and urine and prefers to wear diapers. Furthermore, he does not walk at this time due to his increased weakness. He denied pruritus in his gluteal cleft and skin folds. His extreme immobility, inactivity, gross incontinence of urine and stool as well as poor nutritional status all contribute to his high risk for pressure injury development and hinder healing. Identification of the initial signs of deep tissue damage at the level of the skin within 72 hours of admission make this an injury that occurred prior to admission.    Mr. Hendrickson and his family were educated on pressure injury prevention and incontinence management, including avoiding diapers, application of moisture barrier, need for frequent repositioning while in bed or in a chair. They indicated understanding throughout the conversation.    PAST MEDICAL & SURGICAL HISTORY:  Glioblastoma multiforme  Steroid-induced diabetes mellitus  S/P craniotomy  R craniotomy for debulking with Dr. Mcneil (7/28/22)    REVIEW OF SYSTEMS  General:	  Respiratory and Thorax:  Cardiovascular:	  Gastrointestinal:	  Genitourinary:	  Musculoskeletal:	  Neurological:	  Psychiatric:	  Hematology/Lymphatics:	  Endocrine:	  Skin/ MSK: see HPI    MEDICATIONS  (STANDING):  ascorbic acid 500 milliGRAM(s) Oral daily  atorvastatin 80 milliGRAM(s) Oral at bedtime  dexAMETHasone     Tablet 4 milliGRAM(s) Oral every 12 hours  dextrose 5%. 1000 milliLiter(s) (100 mL/Hr) IV Continuous <Continuous>  dextrose 5%. 1000 milliLiter(s) (50 mL/Hr) IV Continuous <Continuous>  dextrose 50% Injectable 25 Gram(s) IV Push once  dextrose 50% Injectable 12.5 Gram(s) IV Push once  dextrose 50% Injectable 25 Gram(s) IV Push once  FLUoxetine 20 milliGRAM(s) Oral daily  glucagon  Injectable 1 milliGRAM(s) IntraMuscular once  insulin lispro (ADMELOG) corrective regimen sliding scale   SubCutaneous every 6 hours  levETIRAcetam 500 milliGRAM(s) Oral two times a day  multivitamin 1 Tablet(s) Oral daily  pantoprazole  Injectable 40 milliGRAM(s) IV Push two times a day    MEDICATIONS  (PRN):  acetaminophen     Tablet .. 650 milliGRAM(s) Oral every 6 hours PRN Temp greater or equal to 38C (100.4F), Mild Pain (1 - 3)  aluminum hydroxide/magnesium hydroxide/simethicone Suspension 30 milliLiter(s) Oral every 4 hours PRN Dyspepsia  dextrose Oral Gel 15 Gram(s) Oral once PRN Blood Glucose LESS THAN 70 milliGRAM(s)/deciliter  melatonin 3 milliGRAM(s) Oral at bedtime PRN Insomnia    Allergies    heparin containing compounds (Other)    Intolerances    SOCIAL HISTORY:  ; Denies smoking, ETOH, drugs    FAMILY HISTORY:  FH: type 2 diabetes    FH: hyperlipidemia    Vital Signs Last 24 Hrs  T(C): 36.5 (30 Sep 2022 08:54), Max: 37.3 (30 Sep 2022 05:00)  T(F): 97.7 (30 Sep 2022 08:54), Max: 99.1 (30 Sep 2022 05:00)  HR: 60 (30 Sep 2022 08:54) (60 - 86)  BP: 157/85 (30 Sep 2022 08:54) (113/78 - 157/85)  BP(mean): --  RR: 18 (30 Sep 2022 08:54) (18 - 18)  SpO2: 98% (30 Sep 2022 08:54) (94% - 98%)    Parameters below as of 30 Sep 2022 08:54  Patient On (Oxygen Delivery Method): room air    Physical Exam:  General: Alert, weak  Respiratory: equal chest rise with respirations  Gastrointestinal: soft NT/ND  Neurology: verbal, following commands  Musculoskeletal: no contractures  Vascular: BLE edema equal  Skin:  Sacral/bilateral buttocks with linear, superficially denuded skin tear in the gluteal cleft with deep dusky maroon discoloration L 4cm X W 4cm x D 0.1cm with pink wound bed, no necrotic tissue, and scant serosanguinous drainage  No odor, erythema, increased warmth, tenderness, induration, fluctuance    LABS:  09-30    138  |  102  |  9   ----------------------------<  141<H>  3.5   |  25  |  0.67    Ca    7.9<L>      30 Sep 2022 10:46  Phos  2.6     09-30  Mg     1.7     09-30    TPro  4.9<L>  /  Alb  2.7<L>  /  TBili  0.6  /  DBili  x   /  AST  35  /  ALT  54<H>  /  AlkPhos  67  09-30                          12.6   8.77  )-----------( 112      ( 30 Sep 2022 10:46 )             37.8

## 2022-09-30 NOTE — PROGRESS NOTE ADULT - PROBLEM SELECTOR PLAN 1
p/w dark brown stools x 1-2 days i/s/o lovenox use outpt (1-2 doses) for R DVT. Here found to have +FOBT and acute downtrend in hgb from 16.5 (9/22) to 13.7, concerning for GI bleed. HD stable   - giving timing, likely 2/2 AC use which has now been discontinued, possibly LGIB, ?diverticulosis. ddx also includes PUD given steroid use, anorexia, BUN/Cr ratio ~34, although lower suspicion given no abd pain, no black stools.  - last colonoscopy 2014, results unknown but was told to repeat in 10 yrs   - s/p PPI 80mg IV x1    PLAN:   - GI consulted; plan for EGD/colonoscopy today  - c/w PPI 40mg BID

## 2022-09-30 NOTE — PROGRESS NOTE ADULT - ATTENDING COMMENTS
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 Suspect weakness likely multifactorial 2/2 underlying disease and Hgb drop possibly 2/2 GIB. CT lumbar spine with mild multilevel spinal canal stenosis. MRI brain results reviewed, can not rule out neoplasm. Nsx aware and believe findings are more likely post treatment changes. Needs 9 additional radiation treatments as per Nsx. GI following, EGD and c-scope aborted 2/2 food in stomach on 9/29. Will re-attempt today. Holding AC. Heme consulted do not recommend AC. s/p IVF filter given above the knee DVT. Recommended for [facility] on discharge but pt and family want home given continued need for radiation therapy.

## 2022-09-30 NOTE — PROGRESS NOTE ADULT - ASSESSMENT
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 Admitted to medicine for further workup.

## 2022-09-30 NOTE — CONSULT NOTE ADULT - CONSULT REASON
IVC filter placement
worsening BLE weakness
Anticoagulation for DVT with recent HIT
Dark stools with concern for GI bleed, needs AC
sacral/bilateral buttocks skin damage

## 2022-09-30 NOTE — PRE-ANESTHESIA EVALUATION ADULT - NSANTHADDINFOFT_GEN_ALL_CORE
Risk of aspiration pneumonia/pneumonitis, prolonged intubation and ICU stay explained to patient and family. Despite risks explained, patient and family wish to proceed with planned procedure with general anesthesia with RSI for airway protection. Surgeon alerted of risks as well. All questions were answered.

## 2022-09-30 NOTE — PROGRESS NOTE ADULT - PROBLEM SELECTOR PLAN 4
Found to have R popliteal DVT on outpt duplex for BLE pitting edema. Was briefly on Lovenox (1-2 doses) but then discontinued due to thrombocytopenia as above  - daughter reports some KHAN and orthopnea at home over past few weeks, which is concerning for PE. Although pt saturating well on RA here, no tachycardia. No SOB at rest, no CP  - AC also c/b current concern for GIB     PLAN:  - Heme/onc consulted (saw Dr. Silva outpt); recommend continuing to hold AC  - IVC filter placed with IR on 9/28 Found to have R popliteal DVT on outpt duplex for BLE pitting edema. Was briefly on Lovenox (1-2 doses) but then discontinued due to thrombocytopenia as above  - daughter reports some KHAN and orthopnea at home over past few weeks, which is concerning for PE. Although pt saturating well on RA here, no tachycardia. No SOB at rest, no CP  - AC also c/b current concern for GIB     PLAN:  - Heme/onc consulted; recommend continuing to hold AC  - IVC filter placed with IR on 9/28

## 2022-09-30 NOTE — PRE PROCEDURE NOTE - PRE PROCEDURE EVALUATION
Attending Physician:  Dr. Storey                         Procedure: EGD/Colonoscopy    Indication for Procedure: anemia/dark stool  ________________________________________________________  PAST MEDICAL & SURGICAL HISTORY:  Glioblastoma multiforme      Steroid-induced diabetes mellitus      S/P craniotomy  R craniotomy for debulking with Dr. Mcneil (7/28/22)        ALLERGIES:  heparin containing compounds (Other)    HOME MEDICATIONS:  FLUoxetine 20 mg oral capsule: 1 cap(s) orally once a day  Januvia 100 mg oral tablet: 1 tab(s) orally once a day  metFORMIN 500 mg oral tablet, extended release: 1 tab(s) orally 2 times a day  rosuvastatin 40 mg oral tablet: 1 tab(s) orally once a day (at bedtime)    AICD/PPM: [ ] yes   [x] no    PERTINENT LAB DATA:                        12.6   8.77  )-----------( 112      ( 30 Sep 2022 10:46 )             37.8     09-29    139  |  102  |  12  ----------------------------<  178<H>  4.2   |  23  |  0.66    Ca    8.4      29 Sep 2022 07:31  Phos  2.6     09-29  Mg     1.6     09-29    TPro  5.1<L>  /  Alb  2.9<L>  /  TBili  0.7  /  DBili  x   /  AST  32  /  ALT  60<H>  /  AlkPhos  74  09-29                PHYSICAL EXAMINATION:    T(C): 36.5  HR: 60  BP: 157/85  RR: 18  SpO2: 98%    Constitutional: NAD  HEENT: PERRLA, EOMI,    Neck:  No JVD  Respiratory: CTAB/L  Cardiovascular: S1 and S2  Gastrointestinal: BS+, soft, NT/ND  Extremities: No peripheral edema  Neurological: A/O x 3, no focal deficits  Psychiatric: Normal mood, normal affect  Skin: No rashes    ASA Class: I [ ]  II [x]  III [ ]  IV [ ]    COMMENTS:    The patient is a suitable candidate for the planned procedure unless box checked [ ]  No, explain:    
Interventional Radiology    HPI:  82y Male with glioblastoma multiforme diagnosed 7/18/22 s/p debulking 7/28/22, hyperlipidemia, right above-the-knee popliteal DVT, concern for HIT admitted for workup of gastrointestinal bleeding.  Referred for placement of IVC filter.     Allergies: heparin - Concern for HIT.     Medications (Abx/Cardiac/Anticoagulation/Blood Products)      Data:    T(C): 37.4  HR: 78  BP: 139/84  RR: 18  SpO2: 94%    Gastrointestinal hemorrhage    FH: type 2 diabetes    FH: hyperlipidemia    Handoff    MEWS Score    Brain tumor    Glioblastoma multiforme    Steroid-induced diabetes mellitus    Hyperlipidemia    GI bleed    Glioblastoma multiforme    Steroid-induced diabetes mellitus    Thrombocytopenia    Hyperlipidemia    GI bleed    Lower extremity weakness    Need for prophylactic measure    DVT prophylaxis    Thrombosis of right popliteal vein    High serum lactate    Elevated troponin    S/P craniotomy    ENCOUNTER FOR OTHER GENERAL EX    Hyperlipidemia    53    Glioblastoma    [email or web address]        Exam  General: No acute distress  Chest: Non labored breathing    -WBC 7.63 / HgB 13.0 / Hct 38.3 / Plt 87  -Na 138 / Cl 102 / BUN 15 / Glucose 183  -K 4.1 / CO2 23 / Cr 0.71  -ALT 62 / Alk Phos 76 / T.Bili 0.6  -INR1.15    Imaging:     Plan: 82y Male presents for IVC filter placement.   -Risks/Benefits/alternatives explained with the HCP and witnessed informed consent obtained.   
Attending Physician:  Dr. Storey                     Procedure: EGD/Colonoscopy    Indication for Procedure: dark stool/anemia  ________________________________________________________  PAST MEDICAL & SURGICAL HISTORY:  Glioblastoma multiforme      Steroid-induced diabetes mellitus      Hyperlipidemia      S/P craniotomy  R craniotomy for debulking with Dr. Mcneil (7/28/22)        ALLERGIES:  heparin containing compounds (Other)    HOME MEDICATIONS:  FLUoxetine 20 mg oral capsule: 1 cap(s) orally once a day  Januvia 100 mg oral tablet: 1 tab(s) orally once a day  metFORMIN 500 mg oral tablet, extended release: 1 tab(s) orally 2 times a day  rosuvastatin 40 mg oral tablet: 1 tab(s) orally once a day (at bedtime)    AICD/PPM: [ ] yes   [ ] no    PERTINENT LAB DATA:                        12.9   8.61  )-----------( 97       ( 29 Sep 2022 07:31 )             37.4     09-29    139  |  102  |  12  ----------------------------<  178<H>  4.2   |  23  |  0.66    Ca    8.4      29 Sep 2022 07:31  Phos  2.6     09-29  Mg     1.6     09-29    TPro  5.1<L>  /  Alb  2.9<L>  /  TBili  0.7  /  DBili  x   /  AST  32  /  ALT  60<H>  /  AlkPhos  74  09-29                PHYSICAL EXAMINATION:    Height (cm): 165.1  Weight (kg): 68  BMI (kg/m2): 24.9  BSA (m2): 1.75T(C): 36.3  HR: 60  BP: 163/77  RR: 22  SpO2: 96%    Constitutional: NAD  HEENT: PERRLA, EOMI,    Neck:  No JVD  Respiratory: CTAB/L  Cardiovascular: S1 and S2  Gastrointestinal: BS+, soft, NT/ND  Extremities: No peripheral edema  Neurological: A/O x 3, no focal deficits  Psychiatric: Normal mood, normal affect  Skin: No rashes    ASA Class: I [ ]  II [x]  III [ ]  IV [ ]    COMMENTS:    The patient is a suitable candidate for the planned procedure unless box checked [ ]  No, explain:

## 2022-09-30 NOTE — PROGRESS NOTE ADULT - PROBLEM SELECTOR PLAN 3
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 plts 101 on admission, improved from 77 on 9/22.   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 Silva)  - On 9/21, he was found to have a DVT and was started on Lovenox, which was quickly discontinued after outpt labs showed plt of 77 < 172 (8/2022). Per daughter only took 1 or 2 doses of the lovenox, so thrombocytopenia more likely 2/ temozolomide, which was also discontinued at that time  - 9/28 HIT assay (heparin pf4 ab) negative    PLAN:  - continue to monitor plts on CBC pt w/ h/o thrombocytopenia 2/2 HIT, then likely AC and chemotherapy. plts 101 on admission, improved from 77 on 9/22.   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 Silva)  - On 9/21, he was found to have a DVT and was started on Lovenox, which was quickly discontinued after outpt labs showed plt of 77 < 172 (8/2022). Per daughter only took 1 or 2 doses of the lovenox, so thrombocytopenia more likely 2/ temozolomide, which was also discontinued at that time  - 9/28 HIT assay (heparin pf4 ab) negative  - neg serotonin releasing assay     PLAN:  - continue to monitor plts on CBC

## 2022-09-30 NOTE — PRE-ANESTHESIA EVALUATION ADULT - NSANTHOSAYNRD_GEN_A_CORE
No. LEONARDA screening performed.  STOP BANG Legend: 0-2 = LOW Risk; 3-4 = INTERMEDIATE Risk; 5-8 = HIGH Risk
No. LEONARDA screening performed.  STOP BANG Legend: 0-2 = LOW Risk; 3-4 = INTERMEDIATE Risk; 5-8 = HIGH Risk

## 2022-09-30 NOTE — PROGRESS NOTE ADULT - PROBLEM SELECTOR PLAN 5
- diagnosed 7/18/22, s/p R stereo biopsy, TIMOTHY x2, R crani for tumor debulking 7/28/22, on temozolomide (last dose 4 days ago)  - follows Dr. Mcneil and Dr. Borja outpt  - CT here showing possible residual or remanent tumor   - MRI brain showed findings representing residual/recurrent neoplasm and/or posttreatment changes. presence of neoplasm is not excluded    PLAN:   - neuro surg following here; findings most likely represent post treatment changes, patient can continue to follow up as an outpatient with Dr. Mcneil as planned after their current admission  - c/w dexamethasone 4mg BID, keppra 500mg BID

## 2022-09-30 NOTE — PROGRESS NOTE ADULT - PROBLEM SELECTOR PLAN 2
generalized weakness x 3 weeks with worsening LE weakness x 5 days w/ difficulty ambulating. Here with 2/5 strength in BLE, 4/5 in UE, rectal tone intact, no changes in urinary sxs (although w/ history of urinary incontinence). no acute changes in mental status (AAOx2, mild confusion since GBM dx)  - given diproprionate weakness, concerning for spinal pathology ?mets, ?cauda equina   - generalized weakness due to malignancy, decreased PO intake, and relative drop in hgb may also be contributing  - CTH here showing post-op changes, possible residual or remnant tumor   - CT L-spine showed degenerative changes resulting in mild multilevel spinal canal stenosis or neural foraminal narrowing    PLAN:  - neurosurgery following; patient can continue to follow up as an outpatient with Dr. Mcneil as planned after their current admission  - PT recommends [facility]  - OT generalized weakness x 3 weeks with worsening LE weakness x 5 days w/ difficulty ambulating. Here with 2/5 strength in BLE, 4/5 in UE, rectal tone intact, no changes in urinary sxs (although w/ history of urinary incontinence). no acute changes in mental status (AAOx2, mild confusion since GBM dx)  - given diproprionate weakness, concerning for spinal pathology ?mets, ?cauda equina   - generalized weakness due to malignancy, decreased PO intake, and relative drop in hgb may also be contributing  - CTH here showing post-op changes, possible residual or remnant tumor   - CT L-spine showed degenerative changes resulting in mild multilevel spinal canal stenosis or neural foraminal narrowing    PLAN:  - neurosurgery following; patient can continue to follow up as an outpatient with Dr. Mcneil as planned after their current admission  - PT recommends [facility], OT [facility] or home OT

## 2022-09-30 NOTE — CONSULT NOTE ADULT - ASSESSMENT
Impression:    Sacral/bilateral Buttocks deep tissue injury present on admission  Incontinence of bowel and bladder  Incontinence Dermatitis    Recommend:  1.) topical therapy: sacral/buttock injury - cleanse with incontinence cleanser, pat dry, apply Triad ointment BID and PRN for incontinent episodes  2.) Incontinence Management - incontinence cleanser, pads, pericare BID  3.) Maintain on an alternating air with low air loss surface  4.) Turn and reposition Q 2 hours  5.) Nutrition optimization  6.) Offload heels/feet with complete cair air fluidized boots; ensure that the soles of the feet are not resting on the foot board of the bed.    Care as per medicine. Will not actively follow but will remain available. Please recall for new issues or deterioration.  Upon [facility] [address] [place] [address] [phone]  Thank you for this consult  Karlee Mckeon, NP-C, CWOCN [id number] Impression:    Sacral/bilateral Buttocks stage 2 pressure injury present on admission  Incontinence of bowel and bladder  Incontinence Dermatitis    Recommend:  1.) topical therapy: sacral/buttock injury - cleanse with incontinence cleanser, pat dry, apply Triad ointment BID and PRN for incontinent episodes  2.) Incontinence Management - incontinence cleanser, pads, pericare BID  3.) Maintain on an alternating air with low air loss surface  4.) Turn and reposition Q 2 hours  5.) Nutrition optimization  6.) Offload heels/feet with complete cair air fluidized boots; ensure that the soles of the feet are not resting on the foot board of the bed.    Care as per medicine. Will not actively follow but will remain available. Please recall for new issues or deterioration.  Upon discharge f/u as outpatient at [facility] [address] [place] [address] [phone]  Thank you for this consult  Karlee Mckeon, NP-C, CWOCN [id number]

## 2022-09-30 NOTE — PROGRESS NOTE ADULT - SUBJECTIVE AND OBJECTIVE BOX
%%%%INCOMPLETE NOTE%%%%%     Dr. 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 Admitted to medicine for further workup." (29 Sep 2022 16:21)      MEDICATIONS  (STANDING):  atorvastatin 80 milliGRAM(s) Oral at bedtime  dexAMETHasone     Tablet 4 milliGRAM(s) Oral every 12 hours  dextrose 5%. 1000 milliLiter(s) (100 mL/Hr) IV Continuous <Continuous>  dextrose 5%. 1000 milliLiter(s) (50 mL/Hr) IV Continuous <Continuous>  dextrose 50% Injectable 25 Gram(s) IV Push once  dextrose 50% Injectable 12.5 Gram(s) IV Push once  dextrose 50% Injectable 25 Gram(s) IV Push once  FLUoxetine 20 milliGRAM(s) Oral daily  glucagon  Injectable 1 milliGRAM(s) IntraMuscular once  insulin lispro (ADMELOG) corrective regimen sliding scale   SubCutaneous every 6 hours  levETIRAcetam 500 milliGRAM(s) Oral two times a day  pantoprazole  Injectable 40 milliGRAM(s) IV Push two times a day    MEDICATIONS  (PRN):  acetaminophen     Tablet .. 650 milliGRAM(s) Oral every 6 hours PRN Temp greater or equal to 38C (100.4F), Mild Pain (1 - 3)  aluminum hydroxide/magnesium hydroxide/simethicone Suspension 30 milliLiter(s) Oral every 4 hours PRN Dyspepsia  dextrose Oral Gel 15 Gram(s) Oral once PRN Blood Glucose LESS THAN 70 milliGRAM(s)/deciliter  melatonin 3 milliGRAM(s) Oral at bedtime PRN Insomnia        Objective:    Vitals: Vital Signs Last 24 Hrs  T(C): 37.3 (09-30-22 @ 05:00), Max: 37.3 (09-30-22 @ 05:00)  T(F): 99.1 (09-30-22 @ 05:00), Max: 99.1 (09-30-22 @ 05:00)  HR: 86 (09-30-22 @ 05:00) (60 - 100)  BP: 127/84 (09-30-22 @ 05:00) (96/70 - 167/90)  BP(mean): --  RR: 18 (09-30-22 @ 05:00) (18 - 22)  SpO2: 96% (09-30-22 @ 05:00) (94% - 100%)            I&O's Summary    28 Sep 2022 07:01  -  29 Sep 2022 07:00  --------------------------------------------------------  IN: 1670 mL / OUT: 0 mL / NET: 1670 mL    29 Sep 2022 07:01  -  30 Sep 2022 06:28  --------------------------------------------------------  IN: 120 mL / OUT: 0 mL / NET: 120 mL        PHYSICAL EXAM:  GENERAL: NAD, well-groomed, well-developed  HEAD:  Atraumatic, Normocephalic  EYES: EOMI, PERRLA, conjunctiva and sclera clear  ENMT: Moist mucous membranes  NECK: Supple, No JVD  CHEST/LUNG: Clear to auscultation bilaterally  HEART: Regular rate and rhythm  ABDOMEN: Soft, Nontender, Nondistended; Bowel sounds present  EXTREMITIES:  2+ Peripheral Pulses, No LE edema  SKIN: No rashes or lesions  NERVOUS SYSTEM:  Alert & Oriented X3, moving all extremities   PSYCH: Speaking in Full Sentences. Laying in bed comfortably; not agitated     LABS:                        12.9   8.61  )-----------( 97       ( 29 Sep 2022 07:31 )             37.4                         13.0   7.63  )-----------( 87       ( 28 Sep 2022 06:04 )             38.3                         12.4   7.75  )-----------( 93       ( 27 Sep 2022 13:14 )             35.8     Hgb Trend: 12.9<--, 13.0<--, 12.4<--, 13.7<--  09-29    139  |  102  |  12  ----------------------------<  178<H>  4.2   |  23  |  0.66  09-28    138  |  102  |  15  ----------------------------<  183<H>  4.1   |  23  |  0.71  09-27    139  |  104  |  19  ----------------------------<  160<H>  4.4   |  26  |  0.72    Ca    8.4      29 Sep 2022 07:31  Ca    8.5      28 Sep 2022 06:04  Ca    8.4      27 Sep 2022 13:14  Phos  2.6     09-29  Mg     1.6     09-29    TPro  5.1<L>  /  Alb  2.9<L>  /  TBili  0.7  /  DBili  x   /  AST  32  /  ALT  60<H>  /  AlkPhos  74  09-29  TPro  5.1<L>  /  Alb  3.1<L>  /  TBili  0.6  /  DBili  x   /  AST  34  /  ALT  62<H>  /  AlkPhos  76  09-28  TPro  4.9<L>  /  Alb  3.1<L>  /  TBili  0.6  /  DBili  x   /  AST  35  /  ALT  62<H>  /  AlkPhos  77  09-27    Creatinine Trend: 0.66<--, 0.71<--, 0.72<--, 0.83<--                    CAPILLARY BLOOD GLUCOSE      POCT Blood Glucose.: 143 mg/dL (30 Sep 2022 06:19)  POCT Blood Glucose.: 232 mg/dL (30 Sep 2022 00:34)  POCT Blood Glucose.: 184 mg/dL (29 Sep 2022 17:03)  POCT Blood Glucose.: 153 mg/dL (29 Sep 2022 15:15)     %%%%INCOMPLETE NOTE%%%%%     Dr. Liliana Tejeda, PGY-1    ANA LOVE  82y  MRN: [id number]    Subjective:    Patient is a 82y old  Male who presents with a chief complaint of Gastrointestinal hemorrhage      Daughter at bedside to interpret. Pt is much more responsive with daughter translating than with , daughter reports that has happened in the past with other healthcare providers.     Denies         MEDICATIONS  (STANDING):  atorvastatin 80 milliGRAM(s) Oral at bedtime  dexAMETHasone     Tablet 4 milliGRAM(s) Oral every 12 hours  dextrose 5%. 1000 milliLiter(s) (100 mL/Hr) IV Continuous <Continuous>  dextrose 5%. 1000 milliLiter(s) (50 mL/Hr) IV Continuous <Continuous>  dextrose 50% Injectable 25 Gram(s) IV Push once  dextrose 50% Injectable 12.5 Gram(s) IV Push once  dextrose 50% Injectable 25 Gram(s) IV Push once  FLUoxetine 20 milliGRAM(s) Oral daily  glucagon  Injectable 1 milliGRAM(s) IntraMuscular once  insulin lispro (ADMELOG) corrective regimen sliding scale   SubCutaneous every 6 hours  levETIRAcetam 500 milliGRAM(s) Oral two times a day  pantoprazole  Injectable 40 milliGRAM(s) IV Push two times a day    MEDICATIONS  (PRN):  acetaminophen     Tablet .. 650 milliGRAM(s) Oral every 6 hours PRN Temp greater or equal to 38C (100.4F), Mild Pain (1 - 3)  aluminum hydroxide/magnesium hydroxide/simethicone Suspension 30 milliLiter(s) Oral every 4 hours PRN Dyspepsia  dextrose Oral Gel 15 Gram(s) Oral once PRN Blood Glucose LESS THAN 70 milliGRAM(s)/deciliter  melatonin 3 milliGRAM(s) Oral at bedtime PRN Insomnia        Objective:    Vitals: Vital Signs Last 24 Hrs  T(C): 37.3 (09-30-22 @ 05:00), Max: 37.3 (09-30-22 @ 05:00)  T(F): 99.1 (09-30-22 @ 05:00), Max: 99.1 (09-30-22 @ 05:00)  HR: 86 (09-30-22 @ 05:00) (60 - 100)  BP: 127/84 (09-30-22 @ 05:00) (96/70 - 167/90)  BP(mean): --  RR: 18 (09-30-22 @ 05:00) (18 - 22)  SpO2: 96% (09-30-22 @ 05:00) (94% - 100%)            I&O's Summary    28 Sep 2022 07:01  -  29 Sep 2022 07:00  --------------------------------------------------------  IN: 1670 mL / OUT: 0 mL / NET: 1670 mL    29 Sep 2022 07:01  -  30 Sep 2022 06:28  --------------------------------------------------------  IN: 120 mL / OUT: 0 mL / NET: 120 mL        PHYSICAL EXAM:  GENERAL: NAD, well-groomed, well-developed  HEAD:  Atraumatic, Normocephalic  EYES: EOMI, PERRLA, conjunctiva and sclera clear  ENMT: Moist mucous membranes  NECK: Supple, No JVD  CHEST/LUNG: Clear to auscultation bilaterally  HEART: Regular rate and rhythm  ABDOMEN: Soft, Nontender, Nondistended; Bowel sounds present  EXTREMITIES:  2+ Peripheral Pulses, No LE edema  SKIN: No rashes or lesions  NERVOUS SYSTEM:  Alert & Oriented X3, moving all extremities   PSYCH: Speaking in Full Sentences. Laying in bed comfortably; not agitated     LABS:                        12.9   8.61  )-----------( 97       ( 29 Sep 2022 07:31 )             37.4                         13.0   7.63  )-----------( 87       ( 28 Sep 2022 06:04 )             38.3                         12.4   7.75  )-----------( 93       ( 27 Sep 2022 13:14 )             35.8     Hgb Trend: 12.9<--, 13.0<--, 12.4<--, 13.7<--  09-29    139  |  102  |  12  ----------------------------<  178<H>  4.2   |  23  |  0.66  09-28    138  |  102  |  15  ----------------------------<  183<H>  4.1   |  23  |  0.71  09-27    139  |  104  |  19  ----------------------------<  160<H>  4.4   |  26  |  0.72    Ca    8.4      29 Sep 2022 07:31  Ca    8.5      28 Sep 2022 06:04  Ca    8.4      27 Sep 2022 13:14  Phos  2.6     09-29  Mg     1.6     09-29    TPro  5.1<L>  /  Alb  2.9<L>  /  TBili  0.7  /  DBili  x   /  AST  32  /  ALT  60<H>  /  AlkPhos  74  09-29  TPro  5.1<L>  /  Alb  3.1<L>  /  TBili  0.6  /  DBili  x   /  AST  34  /  ALT  62<H>  /  AlkPhos  76  09-28  TPro  4.9<L>  /  Alb  3.1<L>  /  TBili  0.6  /  DBili  x   /  AST  35  /  ALT  62<H>  /  AlkPhos  77  09-27    Creatinine Trend: 0.66<--, 0.71<--, 0.72<--, 0.83<--                    CAPILLARY BLOOD GLUCOSE      POCT Blood Glucose.: 143 mg/dL (30 Sep 2022 06:19)  POCT Blood Glucose.: 232 mg/dL (30 Sep 2022 00:34)  POCT Blood Glucose.: 184 mg/dL (29 Sep 2022 17:03)  POCT Blood Glucose.: 153 mg/dL (29 Sep 2022 15:15)     Dr. Liliana Tejeda, PGY-1    ANA LOVE  82y  MRN: [id number]    Subjective:    Patient is a 82y old  Male who presents with a chief complaint of Gastrointestinal hemorrhage    Daughter at bedside to interpret. Pt is much more responsive with daughter translating than with , daughter reports that has happened in the past with other healthcare providers.     Denies HA, cp, sob, abd pain, urinary symptoms.         MEDICATIONS  (STANDING):  atorvastatin 80 milliGRAM(s) Oral at bedtime  dexAMETHasone     Tablet 4 milliGRAM(s) Oral every 12 hours  dextrose 5%. 1000 milliLiter(s) (100 mL/Hr) IV Continuous <Continuous>  dextrose 5%. 1000 milliLiter(s) (50 mL/Hr) IV Continuous <Continuous>  dextrose 50% Injectable 25 Gram(s) IV Push once  dextrose 50% Injectable 12.5 Gram(s) IV Push once  dextrose 50% Injectable 25 Gram(s) IV Push once  FLUoxetine 20 milliGRAM(s) Oral daily  glucagon  Injectable 1 milliGRAM(s) IntraMuscular once  insulin lispro (ADMELOG) corrective regimen sliding scale   SubCutaneous every 6 hours  levETIRAcetam 500 milliGRAM(s) Oral two times a day  pantoprazole  Injectable 40 milliGRAM(s) IV Push two times a day    MEDICATIONS  (PRN):  acetaminophen     Tablet .. 650 milliGRAM(s) Oral every 6 hours PRN Temp greater or equal to 38C (100.4F), Mild Pain (1 - 3)  aluminum hydroxide/magnesium hydroxide/simethicone Suspension 30 milliLiter(s) Oral every 4 hours PRN Dyspepsia  dextrose Oral Gel 15 Gram(s) Oral once PRN Blood Glucose LESS THAN 70 milliGRAM(s)/deciliter  melatonin 3 milliGRAM(s) Oral at bedtime PRN Insomnia        Objective:    Vitals: Vital Signs Last 24 Hrs  T(C): 37.3 (09-30-22 @ 05:00), Max: 37.3 (09-30-22 @ 05:00)  T(F): 99.1 (09-30-22 @ 05:00), Max: 99.1 (09-30-22 @ 05:00)  HR: 86 (09-30-22 @ 05:00) (60 - 100)  BP: 127/84 (09-30-22 @ 05:00) (96/70 - 167/90)  BP(mean): --  RR: 18 (09-30-22 @ 05:00) (18 - 22)  SpO2: 96% (09-30-22 @ 05:00) (94% - 100%)            I&O's Summary    28 Sep 2022 07:01  -  29 Sep 2022 07:00  --------------------------------------------------------  IN: 1670 mL / OUT: 0 mL / NET: 1670 mL    29 Sep 2022 07:01  -  30 Sep 2022 06:28  --------------------------------------------------------  IN: 120 mL / OUT: 0 mL / NET: 120 mL      PHYSICAL EXAM:  GENERAL: NAD  EYES: EOMI, PERRLA, no scleral icterus  ENMT: Moist mucous membranes  NECK: Supple  CHEST/LUNG: Clear to auscultation bilaterally on anterior chest   HEART: Regular rate and rhythm  ABDOMEN: Soft, Nontender, distended   EXTREMITIES:  Pitting edema in b/l LE. Gross sensation intact in b/l LE.  B/l UE strength 4/5. B/l LE strength 2/5, unable to resist gravity.  SKIN: Darker discoloration on forehead noted. Non-tender. Small wound above sacrum noted, approx 2cm. No active bleeding or discharge.   NERVOUS SYSTEM:  Citizen of [place] speaking. Alert & Oriented X2 (hospital not name, self).       LABS:                        12.9   8.61  )-----------( 97       ( 29 Sep 2022 07:31 )             37.4                         13.0   7.63  )-----------( 87       ( 28 Sep 2022 06:04 )             38.3                         12.4   7.75  )-----------( 93       ( 27 Sep 2022 13:14 )             35.8     Hgb Trend: 12.9<--, 13.0<--, 12.4<--, 13.7<--  09-29    139  |  102  |  12  ----------------------------<  178<H>  4.2   |  23  |  0.66  09-28    138  |  102  |  15  ----------------------------<  183<H>  4.1   |  23  |  0.71  09-27    139  |  104  |  19  ----------------------------<  160<H>  4.4   |  26  |  0.72    Ca    8.4      29 Sep 2022 07:31  Ca    8.5      28 Sep 2022 06:04  Ca    8.4      27 Sep 2022 13:14  Phos  2.6     09-29  Mg     1.6     09-29    TPro  5.1<L>  /  Alb  2.9<L>  /  TBili  0.7  /  DBili  x   /  AST  32  /  ALT  60<H>  /  AlkPhos  74  09-29  TPro  5.1<L>  /  Alb  3.1<L>  /  TBili  0.6  /  DBili  x   /  AST  34  /  ALT  62<H>  /  AlkPhos  76  09-28  TPro  4.9<L>  /  Alb  3.1<L>  /  TBili  0.6  /  DBili  x   /  AST  35  /  ALT  62<H>  /  AlkPhos  77  09-27    Creatinine Trend: 0.66<--, 0.71<--, 0.72<--, 0.83<--                    CAPILLARY BLOOD GLUCOSE      POCT Blood Glucose.: 143 mg/dL (30 Sep 2022 06:19)  POCT Blood Glucose.: 232 mg/dL (30 Sep 2022 00:34)  POCT Blood Glucose.: 184 mg/dL (29 Sep 2022 17:03)  POCT Blood Glucose.: 153 mg/dL (29 Sep 2022 15:15)     Dr. Liliana Tejeda, PGY-1    ANA LOVE  82y  MRN: [id number]    Subjective:    Patient is a 82y old  Male who presents with a chief complaint of Gastrointestinal hemorrhage    Daughter at bedside to interpret. Pt is more responsive with daughter translating than with , daughter reports that has happened in the past with other healthcare providers.     Denies HA, cp, sob, abd pain, urinary symptoms.         MEDICATIONS  (STANDING):  atorvastatin 80 milliGRAM(s) Oral at bedtime  dexAMETHasone     Tablet 4 milliGRAM(s) Oral every 12 hours  dextrose 5%. 1000 milliLiter(s) (100 mL/Hr) IV Continuous <Continuous>  dextrose 5%. 1000 milliLiter(s) (50 mL/Hr) IV Continuous <Continuous>  dextrose 50% Injectable 25 Gram(s) IV Push once  dextrose 50% Injectable 12.5 Gram(s) IV Push once  dextrose 50% Injectable 25 Gram(s) IV Push once  FLUoxetine 20 milliGRAM(s) Oral daily  glucagon  Injectable 1 milliGRAM(s) IntraMuscular once  insulin lispro (ADMELOG) corrective regimen sliding scale   SubCutaneous every 6 hours  levETIRAcetam 500 milliGRAM(s) Oral two times a day  pantoprazole  Injectable 40 milliGRAM(s) IV Push two times a day    MEDICATIONS  (PRN):  acetaminophen     Tablet .. 650 milliGRAM(s) Oral every 6 hours PRN Temp greater or equal to 38C (100.4F), Mild Pain (1 - 3)  aluminum hydroxide/magnesium hydroxide/simethicone Suspension 30 milliLiter(s) Oral every 4 hours PRN Dyspepsia  dextrose Oral Gel 15 Gram(s) Oral once PRN Blood Glucose LESS THAN 70 milliGRAM(s)/deciliter  melatonin 3 milliGRAM(s) Oral at bedtime PRN Insomnia        Objective:    Vitals: Vital Signs Last 24 Hrs  T(C): 37.3 (09-30-22 @ 05:00), Max: 37.3 (09-30-22 @ 05:00)  T(F): 99.1 (09-30-22 @ 05:00), Max: 99.1 (09-30-22 @ 05:00)  HR: 86 (09-30-22 @ 05:00) (60 - 100)  BP: 127/84 (09-30-22 @ 05:00) (96/70 - 167/90)  BP(mean): --  RR: 18 (09-30-22 @ 05:00) (18 - 22)  SpO2: 96% (09-30-22 @ 05:00) (94% - 100%)            I&O's Summary    28 Sep 2022 07:01  -  29 Sep 2022 07:00  --------------------------------------------------------  IN: 1670 mL / OUT: 0 mL / NET: 1670 mL    29 Sep 2022 07:01  -  30 Sep 2022 06:28  --------------------------------------------------------  IN: 120 mL / OUT: 0 mL / NET: 120 mL      PHYSICAL EXAM:  GENERAL: NAD  EYES: EOMI, PERRLA, no scleral icterus  ENMT: Moist mucous membranes  NECK: Supple  CHEST/LUNG: Clear to auscultation bilaterally on anterior chest   HEART: Regular rate and rhythm  ABDOMEN: Soft, Nontender, distended   EXTREMITIES:  Pitting edema in b/l LE. Gross sensation intact in b/l LE.  B/l UE strength 4/5. B/l LE strength 2/5, unable to resist gravity.  SKIN: Darker discoloration on forehead noted. Non-tender. Small wound above sacrum noted, approx 2cm. No active bleeding or discharge.   NERVOUS SYSTEM:  [place] speaking. Alert & Oriented X2 (hospital not name, self).       LABS:                        12.9   8.61  )-----------( 97       ( 29 Sep 2022 07:31 )             37.4                         13.0   7.63  )-----------( 87       ( 28 Sep 2022 06:04 )             38.3                         12.4   7.75  )-----------( 93       ( 27 Sep 2022 13:14 )             35.8     Hgb Trend: 12.9<--, 13.0<--, 12.4<--, 13.7<--  09-29    139  |  102  |  12  ----------------------------<  178<H>  4.2   |  23  |  0.66  09-28    138  |  102  |  15  ----------------------------<  183<H>  4.1   |  23  |  0.71  09-27    139  |  104  |  19  ----------------------------<  160<H>  4.4   |  26  |  0.72    Ca    8.4      29 Sep 2022 07:31  Ca    8.5      28 Sep 2022 06:04  Ca    8.4      27 Sep 2022 13:14  Phos  2.6     09-29  Mg     1.6     09-29    TPro  5.1<L>  /  Alb  2.9<L>  /  TBili  0.7  /  DBili  x   /  AST  32  /  ALT  60<H>  /  AlkPhos  74  09-29  TPro  5.1<L>  /  Alb  3.1<L>  /  TBili  0.6  /  DBili  x   /  AST  34  /  ALT  62<H>  /  AlkPhos  76  09-28  TPro  4.9<L>  /  Alb  3.1<L>  /  TBili  0.6  /  DBili  x   /  AST  35  /  ALT  62<H>  /  AlkPhos  77  09-27    Creatinine Trend: 0.66<--, 0.71<--, 0.72<--, 0.83<--                    CAPILLARY BLOOD GLUCOSE      POCT Blood Glucose.: 143 mg/dL (30 Sep 2022 06:19)  POCT Blood Glucose.: 232 mg/dL (30 Sep 2022 00:34)  POCT Blood Glucose.: 184 mg/dL (29 Sep 2022 17:03)  POCT Blood Glucose.: 153 mg/dL (29 Sep 2022 15:15)

## 2022-10-01 NOTE — PROGRESS NOTE ADULT - PROBLEM SELECTOR PLAN 8
Diet: CLD  DVT ppx: SCD's in LLE, s/p IVC filter in RLE, per heme/onc continue to hold AC   Dispo: PT recommends subacute rehab. CM discussing with family rehab options that work with pt's radiation tx schedule. on home rosuvastatin 40mg qd  - c/w atorvastatin 80mg while inpatient

## 2022-10-01 NOTE — PROGRESS NOTE ADULT - SUBJECTIVE AND OBJECTIVE BOX
%%%%INCOMPLETE NOTE%%%%%     Dr. Liliana Tejeda, PGY-1    ANA LOVE  82y  MRN: [id number]    Subjective:    Patient is a 82y old  Male who presents with a chief complaint of GIB, weakness (30 Sep 2022 12:38)      MEDICATIONS  (STANDING):  ascorbic acid 500 milliGRAM(s) Oral daily  atorvastatin 80 milliGRAM(s) Oral at bedtime  dexAMETHasone     Tablet 4 milliGRAM(s) Oral every 12 hours  dextrose 5%. 1000 milliLiter(s) (100 mL/Hr) IV Continuous <Continuous>  dextrose 5%. 1000 milliLiter(s) (50 mL/Hr) IV Continuous <Continuous>  dextrose 50% Injectable 25 Gram(s) IV Push once  dextrose 50% Injectable 12.5 Gram(s) IV Push once  dextrose 50% Injectable 25 Gram(s) IV Push once  fluconAZOLE IVPB 400 milliGRAM(s) IV Intermittent every 24 hours  fluconAZOLE IVPB      FLUoxetine 20 milliGRAM(s) Oral daily  glucagon  Injectable 1 milliGRAM(s) IntraMuscular once  insulin lispro (ADMELOG) corrective regimen sliding scale   SubCutaneous Before meals and at bedtime  levETIRAcetam 500 milliGRAM(s) Oral two times a day  multivitamin 1 Tablet(s) Oral daily  pantoprazole  Injectable 40 milliGRAM(s) IV Push two times a day    MEDICATIONS  (PRN):  acetaminophen     Tablet .. 650 milliGRAM(s) Oral every 6 hours PRN Temp greater or equal to 38C (100.4F), Mild Pain (1 - 3)  aluminum hydroxide/magnesium hydroxide/simethicone Suspension 30 milliLiter(s) Oral every 4 hours PRN Dyspepsia  dextrose Oral Gel 15 Gram(s) Oral once PRN Blood Glucose LESS THAN 70 milliGRAM(s)/deciliter  melatonin 3 milliGRAM(s) Oral at bedtime PRN Insomnia        Objective:    Vitals: Vital Signs Last 24 Hrs  T(C): 36.5 (10-01-22 @ 04:07), Max: 37.1 (09-30-22 @ 19:50)  T(F): 97.7 (10-01-22 @ 04:07), Max: 98.8 (09-30-22 @ 19:50)  HR: 69 (10-01-22 @ 04:07) (60 - 92)  BP: 126/81 (10-01-22 @ 04:07) (116/74 - 168/76)  BP(mean): --  RR: 18 (10-01-22 @ 04:07) (16 - 22)  SpO2: 96% (10-01-22 @ 04:07) (96% - 100%)            I&O's Summary    29 Sep 2022 07:01  -  30 Sep 2022 07:00  --------------------------------------------------------  IN: 120 mL / OUT: 300 mL / NET: -180 mL    30 Sep 2022 07:01  -  01 Oct 2022 06:38  --------------------------------------------------------  IN: 440 mL / OUT: 0 mL / NET: 440 mL        PHYSICAL EXAM:  GENERAL: NAD, well-groomed, well-developed  HEAD:  Atraumatic, Normocephalic  EYES: EOMI, PERRLA, conjunctiva and sclera clear  ENMT: Moist mucous membranes  NECK: Supple, No JVD  CHEST/LUNG: Clear to auscultation bilaterally  HEART: Regular rate and rhythm  ABDOMEN: Soft, Nontender, Nondistended; Bowel sounds present  EXTREMITIES:  2+ Peripheral Pulses, No LE edema  SKIN: No rashes or lesions  NERVOUS SYSTEM:  Alert & Oriented X3, moving all extremities   PSYCH: Speaking in Full Sentences. Laying in bed comfortably; not agitated     LABS:                        12.6   8.77  )-----------( 112      ( 30 Sep 2022 10:46 )             37.8                         12.9   8.61  )-----------( 97       ( 29 Sep 2022 07:31 )             37.4     Hgb Trend: 12.6<--, 12.9<--, 13.0<--, 12.4<--, 13.7<--  09-30    138  |  102  |  9   ----------------------------<  141<H>  3.5   |  25  |  0.67  09-29    139  |  102  |  12  ----------------------------<  178<H>  4.2   |  23  |  0.66    Ca    7.9<L>      30 Sep 2022 10:46  Ca    8.4      29 Sep 2022 07:31  Phos  2.6     09-30  Mg     1.7     09-30    TPro  4.9<L>  /  Alb  2.7<L>  /  TBili  0.6  /  DBili  x   /  AST  35  /  ALT  54<H>  /  AlkPhos  67  09-30  TPro  5.1<L>  /  Alb  2.9<L>  /  TBili  0.7  /  DBili  x   /  AST  32  /  ALT  60<H>  /  AlkPhos  74  09-29    Creatinine Trend: 0.67<--, 0.66<--, 0.71<--, 0.72<--, 0.83<--                    CAPILLARY BLOOD GLUCOSE      POCT Blood Glucose.: 220 mg/dL (01 Oct 2022 00:32)  POCT Blood Glucose.: 194 mg/dL (30 Sep 2022 21:19)  POCT Blood Glucose.: 147 mg/dL (30 Sep 2022 12:12)     Dr. Liliana Tejeda, PGY-1    ANA LOVE  82y  MRN: [id number]    Subjective:    Patient is a 82y old  Male who presents with a chief complaint of GIB, weakness (30 Sep 2022 12:38)    Pt declined  services. Preferred daughter to translate. Denies cp, abd pain, HA, leg pain.     Gave daughter and family an update about GI workup, findings concerning for gastroparesis, Candida esophagitis and empiric tx and GI outpt followup.       MEDICATIONS  (STANDING):  ascorbic acid 500 milliGRAM(s) Oral daily  atorvastatin 80 milliGRAM(s) Oral at bedtime  dexAMETHasone     Tablet 4 milliGRAM(s) Oral every 12 hours  dextrose 5%. 1000 milliLiter(s) (100 mL/Hr) IV Continuous <Continuous>  dextrose 5%. 1000 milliLiter(s) (50 mL/Hr) IV Continuous <Continuous>  dextrose 50% Injectable 25 Gram(s) IV Push once  dextrose 50% Injectable 12.5 Gram(s) IV Push once  dextrose 50% Injectable 25 Gram(s) IV Push once  fluconAZOLE IVPB 400 milliGRAM(s) IV Intermittent every 24 hours  fluconAZOLE IVPB      FLUoxetine 20 milliGRAM(s) Oral daily  glucagon  Injectable 1 milliGRAM(s) IntraMuscular once  insulin lispro (ADMELOG) corrective regimen sliding scale   SubCutaneous Before meals and at bedtime  levETIRAcetam 500 milliGRAM(s) Oral two times a day  multivitamin 1 Tablet(s) Oral daily  pantoprazole  Injectable 40 milliGRAM(s) IV Push two times a day    MEDICATIONS  (PRN):  acetaminophen     Tablet .. 650 milliGRAM(s) Oral every 6 hours PRN Temp greater or equal to 38C (100.4F), Mild Pain (1 - 3)  aluminum hydroxide/magnesium hydroxide/simethicone Suspension 30 milliLiter(s) Oral every 4 hours PRN Dyspepsia  dextrose Oral Gel 15 Gram(s) Oral once PRN Blood Glucose LESS THAN 70 milliGRAM(s)/deciliter  melatonin 3 milliGRAM(s) Oral at bedtime PRN Insomnia        Objective:    Vitals: Vital Signs Last 24 Hrs  T(C): 36.5 (10-01-22 @ 04:07), Max: 37.1 (09-30-22 @ 19:50)  T(F): 97.7 (10-01-22 @ 04:07), Max: 98.8 (09-30-22 @ 19:50)  HR: 69 (10-01-22 @ 04:07) (60 - 92)  BP: 126/81 (10-01-22 @ 04:07) (116/74 - 168/76)  BP(mean): --  RR: 18 (10-01-22 @ 04:07) (16 - 22)  SpO2: 96% (10-01-22 @ 04:07) (96% - 100%)            I&O's Summary    29 Sep 2022 07:01  -  30 Sep 2022 07:00  --------------------------------------------------------  IN: 120 mL / OUT: 300 mL / NET: -180 mL    30 Sep 2022 07:01  -  01 Oct 2022 06:38  --------------------------------------------------------  IN: 440 mL / OUT: 0 mL / NET: 440 mL        PHYSICAL EXAM:  GENERAL: NAD  EYES: EOMI, no scleral icterus  ENMT: Moist mucous membranes  NECK: Supple  CHEST/LUNG: Clear to auscultation bilaterally on anterior chest   HEART: Regular rate and rhythm  ABDOMEN: Soft, Nontender, distended   EXTREMITIES:  Pitting edema in b/l LE. Gross sensation intact in b/l LE.  B/l UE strength 4/5. B/l LE strength 2/5, unable to resist gravity.  SKIN: Darker discoloration on forehead noted. Non-tender.   NERVOUS SYSTEM:  [place] speaking. Alert & Oriented X2 (hospital not name, self).       LABS:                        12.6   8.77  )-----------( 112      ( 30 Sep 2022 10:46 )             37.8                         12.9   8.61  )-----------( 97       ( 29 Sep 2022 07:31 )             37.4     Hgb Trend: 12.6<--, 12.9<--, 13.0<--, 12.4<--, 13.7<--  09-30    138  |  102  |  9   ----------------------------<  141<H>  3.5   |  25  |  0.67  09-29    139  |  102  |  12  ----------------------------<  178<H>  4.2   |  23  |  0.66    Ca    7.9<L>      30 Sep 2022 10:46  Ca    8.4      29 Sep 2022 07:31  Phos  2.6     09-30  Mg     1.7     09-30    TPro  4.9<L>  /  Alb  2.7<L>  /  TBili  0.6  /  DBili  x   /  AST  35  /  ALT  54<H>  /  AlkPhos  67  09-30  TPro  5.1<L>  /  Alb  2.9<L>  /  TBili  0.7  /  DBili  x   /  AST  32  /  ALT  60<H>  /  AlkPhos  74  09-29    Creatinine Trend: 0.67<--, 0.66<--, 0.71<--, 0.72<--, 0.83<--                    CAPILLARY BLOOD GLUCOSE      POCT Blood Glucose.: 220 mg/dL (01 Oct 2022 00:32)  POCT Blood Glucose.: 194 mg/dL (30 Sep 2022 21:19)  POCT Blood Glucose.: 147 mg/dL (30 Sep 2022 12:12)

## 2022-10-01 NOTE — PROGRESS NOTE ADULT - PROBLEM SELECTOR PLAN 5
- diagnosed 7/18/22, s/p R stereo biopsy, TIMOTHY x2, R crani for tumor debulking 7/28/22, on temozolomide (last dose 4 days ago)  - follows Dr. Mcneil and Dr. Borja outpt  - CT here showing possible residual or remanent tumor   - MRI brain showed findings representing residual/recurrent neoplasm and/or posttreatment changes. presence of neoplasm is not excluded    PLAN:   - neuro surg following here; findings most likely represent post treatment changes, patient can continue to follow up as an outpatient with Dr. Mcneil as planned after their current admission  - c/w dexamethasone 4mg BID, keppra 500mg BID Found to have R popliteal DVT on outpt duplex for BLE pitting edema. Was briefly on Lovenox (1-2 doses) but then discontinued due to thrombocytopenia as above  - daughter reports some KHAN and orthopnea at home over past few weeks, which is concerning for PE. Although pt saturating well on RA here, no tachycardia. No SOB at rest, no CP  - AC also c/b current concern for GIB     PLAN:  - Heme/onc consulted; recommend continuing to hold AC  - IVC filter placed with IR on 9/28, f/u with IR outpt

## 2022-10-01 NOTE — PROGRESS NOTE ADULT - PROBLEM SELECTOR PLAN 7
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

## 2022-10-01 NOTE — PROGRESS NOTE ADULT - PROBLEM SELECTOR PLAN 2
generalized weakness x 3 weeks with worsening LE weakness x 5 days w/ difficulty ambulating. Here with 2/5 strength in BLE, 4/5 in UE, rectal tone intact, no changes in urinary sxs (although w/ history of urinary incontinence). no acute changes in mental status (AAOx2, mild confusion since GBM dx)  - given diproprionate weakness, concerning for spinal pathology ?mets, ?cauda equina   - generalized weakness due to malignancy, decreased PO intake, and relative drop in hgb may also be contributing  - CTH here showing post-op changes, possible residual or remnant tumor   - CT L-spine showed degenerative changes resulting in mild multilevel spinal canal stenosis or neural foraminal narrowing    PLAN:  - neurosurgery following; patient can continue to follow up as an outpatient with Dr. Mcneil as planned after their current admission  - PT recommends [facility], OT [facility] or home OT - EGD done 9/30 showed esophageal plaques suspicious for candidiasis. Biopsied.    Plan:  - continue empiric tx for Candidiasis: IV fluconazole

## 2022-10-01 NOTE — PROGRESS NOTE ADULT - ATTENDING COMMENTS
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 Suspect weakness likely multifactorial 2/2 underlying disease and Hgb drop possibly 2/2 GIB. CT lumbar spine with mild multilevel spinal canal stenosis. MRI brain results reviewed, can not rule out neoplasm. Nsx aware and believe findings are more likely post treatment changes. Needs 9 additional radiation treatments as per Nsx. GI following, EGD and c-scope performed. Poor prep. Found to have candida esophagitis now on Fluconazole. Asymptomatic from gastroparesis standpoint so will hold off on adding additional medications. Holding AC. Heme consulted do not recommend AC. s/p IVF filter given above the knee DVT. Recommended for [facility] on discharge

## 2022-10-01 NOTE — PROGRESS NOTE ADULT - PROBLEM SELECTOR PLAN 9
Diet: CC/DASH diet, advised pt's family and nurse that pt may require smaller bites and smaller portions d/t gastroparesis   DVT ppx: SCD's in LLE, s/p IVC filter in RLE, per heme/onc continue to hold AC   Dispo: PT recommends subacute rehab. CM discussing with family rehab options that work with pt's radiation tx schedule.

## 2022-10-01 NOTE — PROGRESS NOTE ADULT - ASSESSMENT
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 Admitted to medicine for further workup.

## 2022-10-01 NOTE — PROGRESS NOTE ADULT - PROBLEM SELECTOR PLAN 1
p/w dark brown stools x 1-2 days i/s/o lovenox use outpt (1-2 doses) for R DVT. Here found to have +FOBT and acute downtrend in hgb from 16.5 (9/22) to 13.7, concerning for GI bleed. HD stable   - giving timing, likely 2/2 AC use which has now been discontinued, possibly LGIB, ?diverticulosis. ddx also includes PUD given steroid use, anorexia, BUN/Cr ratio ~34, although lower suspicion given no abd pain, no black stools.  - last colonoscopy 2014, results unknown but was told to repeat in 10 yrs   - s/p PPI 80mg IV x1    PLAN:   - GI consulted; plan for EGD/colonoscopy today  - c/w PPI 40mg BID p/w dark brown stools x 1-2 days i/s/o lovenox use outpt (1-2 doses) for R DVT. Here found to have +FOBT and acute downtrend in hgb from 16.5 (9/22) to 13.7, concerning for GI bleed. HD stable   - giving timing, likely 2/2 AC use which has now been discontinued, possibly LGIB, ?diverticulosis. ddx also includes PUD given steroid use, anorexia, BUN/Cr ratio ~34, although lower suspicion given no abd pain, no black stools.  - last colonoscopy 2014, results unknown but was told to repeat in 10 yrs   - s/p PPI 80mg IV x1    PLAN:   - GI consulted; plan for EGD/colonoscopy performed yesterday; large amount of food in stomach c/f gastroparesis, bx taken from erythematous mucosa in stomach. stool seen in colon, no blood or bleeding noted.  - c/w PPI 40mg BID

## 2022-10-01 NOTE — PROGRESS NOTE ADULT - PROBLEM SELECTOR PLAN 4
Found to have R popliteal DVT on outpt duplex for BLE pitting edema. Was briefly on Lovenox (1-2 doses) but then discontinued due to thrombocytopenia as above  - daughter reports some KHAN and orthopnea at home over past few weeks, which is concerning for PE. Although pt saturating well on RA here, no tachycardia. No SOB at rest, no CP  - AC also c/b current concern for GIB     PLAN:  - Heme/onc consulted; recommend continuing to hold AC  - IVC filter placed with IR on 9/28 pt w/ h/o thrombocytopenia 2/2 HIT, then likely AC and chemotherapy. plts 101 on admission, improved from 77 on 9/22.   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 Silva)  - On 9/21, he was found to have a DVT and was started on Lovenox, which was quickly discontinued after outpt labs showed plt of 77 < 172 (8/2022). Per daughter only took 1 or 2 doses of the lovenox, so thrombocytopenia more likely 2/ temozolomide, which was also discontinued at that time  - 9/28 HIT assay (heparin pf4 ab) negative  - neg serotonin releasing assay     PLAN:  - continue to monitor plts on CBC

## 2022-10-01 NOTE — PROGRESS NOTE ADULT - PROBLEM SELECTOR PLAN 6
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 Mcneil and Dr. Borja outpt  - CT here showing possible residual or remanent tumor   - MRI brain showed findings representing residual/recurrent neoplasm and/or posttreatment changes. presence of neoplasm is not excluded    PLAN:   - neuro surg following here; findings most likely represent post treatment changes, patient can continue to follow up as an outpatient with Dr. Mcneil as planned after their current admission  - c/w dexamethasone 4mg BID, keppra 500mg BID

## 2022-10-02 NOTE — PROGRESS NOTE ADULT - PROBLEM SELECTOR PLAN 3
generalized weakness x 3 weeks with worsening LE weakness x 5 days w/ difficulty ambulating. Here with 2/5 strength in BLE, 4/5 in UE, rectal tone intact, no changes in urinary sxs (although w/ history of urinary incontinence). no acute changes in mental status (AAOx2, mild confusion since GBM dx)  - given diproprionate weakness, concerning for spinal pathology ?mets, ?cauda equina   - generalized weakness due to malignancy, decreased PO intake, and relative drop in hgb may also be contributing  - CTH here showing post-op changes, possible residual or remnant tumor   - CT L-spine showed degenerative changes resulting in mild multilevel spinal canal stenosis or neural foraminal narrowing    PLAN:  - neurosurgery following; patient can continue to follow up as an outpatient with Dr. Mcneil as planned after their current admission  - PT recommends [facility], OT [facility] or home OT

## 2022-10-02 NOTE — PROGRESS NOTE ADULT - SUBJECTIVE AND OBJECTIVE BOX
Subjective:    INTERVAL HPI/OVERNIGHT EVENTS:   No acute events overnight  Afebrile, hemodynamically stable   - spoke to wife, patient at bedside. Daughter translated over phone as patient and wife insisted  - patient has been selectively quiet per family the past few weeks, though he is alert, answers questions, and follows commands  - patient is currently waiting for SW to work on finding a rehab that will allow him to continue with radiation therapy, otherwise family will consider home w home services on Monday as radiation seems to be a priority   - stool has been "clearing up", no melena no blood.       T(F): 98.6 (10-02-22 @ 06:01), Max: 99.1 (10-01-22 @ 16:36)  HR: 80 (10-02-22 @ 06:01) (67 - 80)  BP: 117/79 (10-02-22 @ 06:01) (117/79 - 139/83)  RR: 18 (10-02-22 @ 06:01) (18 - 18)  SpO2: 95% (10-02-22 @ 06:01) (94% - 98%)  I&O's Summary    01 Oct 2022 07:01  -  02 Oct 2022 07:00  --------------------------------------------------------  IN: 800 mL / OUT: 670 mL / NET: 130 mL      Weight (kg): 68 (09-30-22 @ 16:10)    Medications:  acetaminophen     Tablet .. 650 milliGRAM(s) Oral every 6 hours PRN  aluminum hydroxide/magnesium hydroxide/simethicone Suspension 30 milliLiter(s) Oral every 4 hours PRN  ascorbic acid 500 milliGRAM(s) Oral daily  atorvastatin 80 milliGRAM(s) Oral at bedtime  dexAMETHasone     Tablet 4 milliGRAM(s) Oral every 12 hours  dextrose 5%. 1000 milliLiter(s) (50 mL/Hr) IV Continuous <Continuous>  dextrose 5%. 1000 milliLiter(s) (100 mL/Hr) IV Continuous <Continuous>  dextrose 50% Injectable 25 Gram(s) IV Push once  dextrose 50% Injectable 12.5 Gram(s) IV Push once  dextrose 50% Injectable 25 Gram(s) IV Push once  dextrose Oral Gel 15 Gram(s) Oral once PRN  fluconAZOLE IVPB      fluconAZOLE IVPB 400 milliGRAM(s) IV Intermittent every 24 hours  FLUoxetine 20 milliGRAM(s) Oral daily  glucagon  Injectable 1 milliGRAM(s) IntraMuscular once  insulin lispro (ADMELOG) corrective regimen sliding scale   SubCutaneous Before meals and at bedtime  levETIRAcetam 500 milliGRAM(s) Oral two times a day  melatonin 3 milliGRAM(s) Oral at bedtime PRN  multivitamin 1 Tablet(s) Oral daily  pantoprazole  Injectable 40 milliGRAM(s) IV Push two times a day    PHYSICAL EXAM:  GENERAL: NAD  EYES: EOMI, no scleral icterus  ENMT: Moist mucous membranes  NECK: Supple  CHEST/LUNG: Clear to auscultation bilaterally on anterior chest   HEART: Regular rate and rhythm  ABDOMEN: Soft, Nontender, distended   EXTREMITIES:  trace Pitting edema in b/l LE. Gross sensation intact in b/l LE.  B/l UE strength 4/5. B/l LE strength 2/5, unable to resist gravity.  SKIN: Darker discoloration on forehead noted. Non-tender.   NERVOUS SYSTEM:  [place]enian speaking. Alert & Oriented X2 (hospital not name, self).     Notable Labs:    Labs:                          12.5   9.93  )-----------( 109      ( 02 Oct 2022 07:41 )             37.0     10-02    138  |  105  |  17  ----------------------------<  249<H>  3.8   |  22  |  0.71    Ca    8.0<L>      02 Oct 2022 07:34  Phos  2.2     10-02  Mg     1.8     10-02    TPro  4.8<L>  /  Alb  2.8<L>  /  TBili  0.6  /  DBili  x   /  AST  22  /  ALT  48<H>  /  AlkPhos  79  10-02    LIVER FUNCTIONS - ( 02 Oct 2022 07:34 )  Alb: 2.8 g/dL / Pro: 4.8 g/dL / ALK PHOS: 79 U/L / ALT: 48 U/L / AST: 22 U/L / GGT: x             CAPILLARY BLOOD GLUCOSE      POCT Blood Glucose.: 235 mg/dL (01 Oct 2022 21:20)  POCT Blood Glucose.: 201 mg/dL (01 Oct 2022 17:17)  POCT Blood Glucose.: 148 mg/dL (01 Oct 2022 13:11)    Micro:      RADIOLOGY & ADDITIONAL TESTS:    NNI

## 2022-10-02 NOTE — PROGRESS NOTE ADULT - PROBLEM SELECTOR PLAN 1
p/w dark brown stools x 1-2 days i/s/o lovenox use outpt (1-2 doses) for R DVT. Here found to have +FOBT and acute downtrend in hgb from 16.5 (9/22) to 13.7, concerning for GI bleed. HD stable   - giving timing, likely 2/2 AC use which has now been discontinued, possibly LGIB, ?diverticulosis. ddx also includes PUD given steroid use, anorexia, BUN/Cr ratio ~34, although lower suspicion given no abd pain, no black stools.  - last colonoscopy 2014, results unknown but was told to repeat in 10 yrs   - s/p PPI 80mg IV x1    PLAN:   - GI consulted; plan for EGD/colonoscopy performed yesterday; large amount of food in stomach c/f gastroparesis, bx taken from erythematous mucosa in stomach. stool seen in colon, no blood or bleeding noted.  - c/w PPI 40mg BID

## 2022-10-02 NOTE — PROGRESS NOTE ADULT - ATTENDING COMMENTS
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 Suspect weakness likely multifactorial 2/2 underlying disease and Hgb drop possibly 2/2 GIB. CT lumbar spine with mild multilevel spinal canal stenosis. MRI brain results reviewed, can not rule out neoplasm. Nsx aware and believe findings are more likely post treatment changes. Needs 9 additional radiation treatments as per Nsx. GI following, EGD and c-scope performed. Poor prep. Found to have candida esophagitis now on Fluconazole. Asymptomatic from gastroparesis standpoint so will hold off on adding additional medications. Holding AC. Heme consulted do not recommend AC. s/p IVF filter given above the knee DVT. Recommended for [facility] on discharge that can accommodate Rad tx.     Prerna Flores MD  Division of Hospital Medicine

## 2022-10-02 NOTE — PROGRESS NOTE ADULT - PROBLEM SELECTOR PLAN 5
Found to have R popliteal DVT on outpt duplex for BLE pitting edema. Was briefly on Lovenox (1-2 doses) but then discontinued due to thrombocytopenia as above  - daughter reports some KHAN and orthopnea at home over past few weeks, which is concerning for PE. Although pt saturating well on RA here, no tachycardia. No SOB at rest, no CP  - AC also c/b current concern for GIB     PLAN:  - Heme/onc consulted; recommend continuing to hold AC  - IVC filter placed with IR on 9/28, f/u with IR outpt

## 2022-10-02 NOTE — PROGRESS NOTE ADULT - ASSESSMENT
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 Admitted to medicine for further workup.

## 2022-10-02 NOTE — PROGRESS NOTE ADULT - PROBLEM SELECTOR PLAN 4
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 plts 101 on admission, improved from 77 on 9/22.   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 Silva)  - On 9/21, he was found to have a DVT and was started on Lovenox, which was quickly discontinued after outpt labs showed plt of 77 < 172 (8/2022). Per daughter only took 1 or 2 doses of the lovenox, so thrombocytopenia more likely 2/ temozolomide, which was also discontinued at that time  - 9/28 HIT assay (heparin pf4 ab) negative  - neg serotonin releasing assay     PLAN:  - continue to monitor plts on CBC

## 2022-10-02 NOTE — PROGRESS NOTE ADULT - PROBLEM SELECTOR PLAN 2
- EGD done 9/30 showed esophageal plaques suspicious for candidiasis. Biopsied.    Plan:  - continue empiric tx for Candidiasis: IV fluconazole

## 2022-10-03 NOTE — PROGRESS NOTE ADULT - PROBLEM SELECTOR PLAN 2
- EGD done 9/30 showed esophageal plaques suspicious for candidiasis. Biopsied.    Plan:  - continue empiric tx for Candidiasis: IV fluconazole - EGD done 9/30 showed esophageal plaques suspicious for candidiasis. Biopsied.    Plan:  - continue empiric tx for Candidiasis: IV fluconazole  plan to continue PO fluconazole after discharge

## 2022-10-03 NOTE — PROGRESS NOTE ADULT - PROBLEM SELECTOR PLAN 1
p/w dark brown stools x 1-2 days i/s/o lovenox use outpt (1-2 doses) for R DVT. Here found to have +FOBT and acute downtrend in hgb from 16.5 (9/22) to 13.7, concerning for GI bleed. HD stable   - giving timing, likely 2/2 AC use which has now been discontinued, possibly LGIB, ?diverticulosis. ddx also includes PUD given steroid use, anorexia, BUN/Cr ratio ~34, although lower suspicion given no abd pain, no black stools.  - last colonoscopy 2014, results unknown but was told to repeat in 10 yrs   - s/p PPI 80mg IV x1    PLAN:   - GI consulted; plan for EGD/colonoscopy performed yesterday; large amount of food in stomach c/f gastroparesis, bx taken from erythematous mucosa in stomach. stool seen in colon, no blood or bleeding noted.  - c/w PPI 40mg BID p/w dark brown stools x 1-2 days i/s/o lovenox use outpt (1-2 doses) for R DVT. Here found to have +FOBT and acute downtrend in hgb from 16.5 (9/22) to 13.7, concerning for GI bleed. HD stable   - giving timing, likely 2/2 AC use which has now been discontinued, possibly LGIB, ?diverticulosis. ddx also includes PUD given steroid use, anorexia, BUN/Cr ratio ~34, although lower suspicion given no abd pain, no black stools.  - last colonoscopy 2014, results unknown but was told to repeat in 10 yrs   - s/p PPI 80mg IV x1  - GI consulted; plan for EGD/colonoscopy performed yesterday; large amount of food in stomach c/f gastroparesis, bx taken from erythematous mucosa in stomach. stool seen in colon, no blood or bleeding noted.    PLAN:   - c/w PO PPI 40mg BID

## 2022-10-03 NOTE — PROGRESS NOTE ADULT - ATTENDING COMMENTS
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 Suspect weakness likely multifactorial 2/2 underlying disease and Hgb drop possibly 2/2 GIB. CT lumbar spine with mild multilevel spinal canal stenosis. MRI brain can not rule out neoplasm. Nsx aware and believe findings are more likely post treatment changes. Needs 9 additional radiation treatments as per Nsx. GI following, EGD and c-scope performed. Poor prep. Found to have candida esophagitis now on Fluconazole will transition to PO on discharge. Asymptomatic from gastroparesis standpoint so will hold off on adding additional medications. Holding AC. Heme consulted, do not recommend AC. s/p IVF filter given above the knee DVT. Recommended for [facility] on discharge that can accommodate Rad tx. Medically stable for discharge. Dispo planning 83yo M, w/ PMH of glioblastoma (dx 7/18/22, s/p R stereo bx, TIMOTHY x2, R crani for tumor debulking 7/28/22, on temozolomide (last dose 4 days prior to admission), HLD, steroid-induced DM, h/o HIT during recent admission, and newly dx R popliteal DVT (found 9/21/22, AC dc d/t thrombocytopenia) p/w worsening LE weakness. Suspect weakness likely multifactorial 2/2 underlying disease and Hgb drop possibly 2/2 GIB. CT lumbar spine with mild multilevel spinal canal stenosis. MRI brain can not rule out neoplasm. Nsx aware and believe findings are more likely post treatment changes. Needs 9 additional radiation treatments as per Nsx. GI following, EGD and c-scope performed. Poor prep. Found to have candida esophagitis now on Fluconazole will transition to PO on discharge. Asymptomatic from gastroparesis standpoint so will hold off on adding additional medications. Holding AC. Heme consulted, do not recommend AC. s/p IVF filter given above the knee DVT. Recommended for [facility] on discharge that can accommodate Rad tx. Was exposed to COVID on 9/30, currently asymptomatic. Medically stable for discharge. Dispo planning

## 2022-10-03 NOTE — GOALS OF CARE CONVERSATION - ADVANCED CARE PLANNING - CONVERSATION DETAILS
This alert and oriented x 3-4  patient was seen today for goals of care planning and conversation.. Patient very quiet and withdrawn, requested I speak with his daughter Faith.  Introduced self and role of PCE. Patient  and daughter states understanding of health status  and prognosis.  Patient states he lives with is wife and uses a wheelchair and a walker . Requires some assistance with ADLs.  Patient states he has prior HCP and identifies Faith Richards (daughter) [phone]  as the agent .No alternate identified. Same found in patient window 7/20/22.    CPR , intubation, artificial nutrition  procedures and possible complications explained. Patient refused educational video. As per daughter patient is very depressed since his diagnosis of glioblastoma. Patient states he is undecided about his wishes and would like to leave the decision to his daughter Faith. Daughter Faith however states she has attempted to discuss this with him on multiple occasion and he's unable to states his wishes. His exact words are "I don't know" Daughter attempted to utilized different scenarios and states what she would like however patient states "you young people are different". Videoo links given to daughter as patient has a difficult time discussing code status secondary to new diagnosis.. Explained hospital base Full Code policy to daughter , verbalise understanding. Patient and daughter  aware he will remain FULL CODE.      Contact information for further needs provided.  No [employer] exemptions noted.      Tita Lizama  MSN -ED RN [facility]     [phone]

## 2022-10-03 NOTE — PROGRESS NOTE ADULT - ASSESSMENT
82M, Lithuanian-speaking, with PMH Glioblastoma (diagnosed 7/18/22, s/p R stereo biopsy, TIMOTHY x2, R crani for tumor debulking 7/28/22, on temozolomide (last dose 4 days ago)), HLD, steroid-induced diabetes, h/o HIT during recent admission, and R popliteal DVT (found 9/21/22, AC discontinued due to thrombocytopenia) presenting with worsening LE weakness x 5 days i/s/o dark stools x 1-2 days and AC use. Admitted to medicine for further workup.

## 2022-10-03 NOTE — PROGRESS NOTE ADULT - SUBJECTIVE AND OBJECTIVE BOX
%%%%INCOMPLETE NOTE%%%%%     Dr. Liliana Tejeda, PGY-1    ANA LOVE  82y  MRN: [id number]    Subjective:    Patient is a 82y old  Male who presents with a chief complaint of GIB, weakness (02 Oct 2022 11:13)      MEDICATIONS  (STANDING):  ascorbic acid 500 milliGRAM(s) Oral daily  atorvastatin 80 milliGRAM(s) Oral at bedtime  dexAMETHasone     Tablet 4 milliGRAM(s) Oral every 12 hours  dextrose 5%. 1000 milliLiter(s) (100 mL/Hr) IV Continuous <Continuous>  dextrose 5%. 1000 milliLiter(s) (50 mL/Hr) IV Continuous <Continuous>  dextrose 50% Injectable 25 Gram(s) IV Push once  dextrose 50% Injectable 12.5 Gram(s) IV Push once  dextrose 50% Injectable 25 Gram(s) IV Push once  fluconAZOLE IVPB      fluconAZOLE IVPB 400 milliGRAM(s) IV Intermittent every 24 hours  FLUoxetine 20 milliGRAM(s) Oral daily  glucagon  Injectable 1 milliGRAM(s) IntraMuscular once  insulin lispro (ADMELOG) corrective regimen sliding scale   SubCutaneous Before meals and at bedtime  levETIRAcetam 500 milliGRAM(s) Oral two times a day  multivitamin 1 Tablet(s) Oral daily  pantoprazole    Tablet 40 milliGRAM(s) Oral two times a day    MEDICATIONS  (PRN):  acetaminophen     Tablet .. 650 milliGRAM(s) Oral every 6 hours PRN Temp greater or equal to 38C (100.4F), Mild Pain (1 - 3)  aluminum hydroxide/magnesium hydroxide/simethicone Suspension 30 milliLiter(s) Oral every 4 hours PRN Dyspepsia  dextrose Oral Gel 15 Gram(s) Oral once PRN Blood Glucose LESS THAN 70 milliGRAM(s)/deciliter  melatonin 3 milliGRAM(s) Oral at bedtime PRN Insomnia        Objective:    Vitals: Vital Signs Last 24 Hrs  T(C): 36.8 (10-03-22 @ 04:26), Max: 37.3 (10-02-22 @ 18:45)  T(F): 98.3 (10-03-22 @ 04:26), Max: 99.2 (10-02-22 @ 18:45)  HR: 74 (10-03-22 @ 04:26) (72 - 83)  BP: 131/79 (10-03-22 @ 04:26) (106/69 - 131/79)  BP(mean): --  RR: 17 (10-03-22 @ 04:26) (17 - 18)  SpO2: 96% (10-03-22 @ 04:26) (93% - 96%)            I&O's Summary    01 Oct 2022 07:01  -  02 Oct 2022 07:00  --------------------------------------------------------  IN: 800 mL / OUT: 670 mL / NET: 130 mL    02 Oct 2022 07:01  -  03 Oct 2022 06:36  --------------------------------------------------------  IN: 120 mL / OUT: 800 mL / NET: -680 mL        PHYSICAL EXAM:  GENERAL: NAD, well-groomed, well-developed  HEAD:  Atraumatic, Normocephalic  EYES: EOMI, PERRLA, conjunctiva and sclera clear  ENMT: Moist mucous membranes  NECK: Supple, No JVD  CHEST/LUNG: Clear to auscultation bilaterally  HEART: Regular rate and rhythm  ABDOMEN: Soft, Nontender, Nondistended; Bowel sounds present  EXTREMITIES:  2+ Peripheral Pulses, No LE edema  SKIN: No rashes or lesions  NERVOUS SYSTEM:  Alert & Oriented X3, moving all extremities   PSYCH: Speaking in Full Sentences. Laying in bed comfortably; not agitated     LABS:                        12.5   9.93  )-----------( 109      ( 02 Oct 2022 07:41 )             37.0                         12.9   8.49  )-----------( 101      ( 01 Oct 2022 10:25 )             39.0                         12.6   8.77  )-----------( 112      ( 30 Sep 2022 10:46 )             37.8     Hgb Trend: 12.5<--, 12.9<--, 12.6<--, 12.9<--, 13.0<--  10-02    138  |  105  |  17  ----------------------------<  249<H>  3.8   |  22  |  0.71  10-01    137  |  103  |  10  ----------------------------<  183<H>  4.2   |  23  |  0.67  09-30    138  |  102  |  9   ----------------------------<  141<H>  3.5   |  25  |  0.67    Ca    8.0<L>      02 Oct 2022 07:34  Ca    8.0<L>      01 Oct 2022 10:25  Ca    7.9<L>      30 Sep 2022 10:46  Phos  2.2     10-02  Mg     1.8     10-02    TPro  4.8<L>  /  Alb  2.8<L>  /  TBili  0.6  /  DBili  x   /  AST  22  /  ALT  48<H>  /  AlkPhos  79  10-02  TPro  5.1<L>  /  Alb  3.0<L>  /  TBili  0.7  /  DBili  x   /  AST  29  /  ALT  53<H>  /  AlkPhos  71  10-01  TPro  4.9<L>  /  Alb  2.7<L>  /  TBili  0.6  /  DBili  x   /  AST  35  /  ALT  54<H>  /  AlkPhos  67  09-30    Creatinine Trend: 0.71<--, 0.67<--, 0.67<--, 0.66<--, 0.71<--, 0.72<--                    CAPILLARY BLOOD GLUCOSE      POCT Blood Glucose.: 257 mg/dL (02 Oct 2022 21:43)  POCT Blood Glucose.: 270 mg/dL (02 Oct 2022 17:21)  POCT Blood Glucose.: 249 mg/dL (02 Oct 2022 12:13)  POCT Blood Glucose.: 230 mg/dL (02 Oct 2022 08:36)     Dr. Liliana Tejeda, PGY-1    ANA LOVE  82y  MRN: [id number]    Subjective:    Patient is a 82y old  Male who presents with a chief complaint of GIB, weakness (02 Oct 2022 11:13)    Pt seen and evaluated at bedside.     Pt declined  services. Denies any chest pain, headache, cough, abd pain.     MEDICATIONS  (STANDING):  ascorbic acid 500 milliGRAM(s) Oral daily  atorvastatin 80 milliGRAM(s) Oral at bedtime  dexAMETHasone     Tablet 4 milliGRAM(s) Oral every 12 hours  dextrose 5%. 1000 milliLiter(s) (100 mL/Hr) IV Continuous <Continuous>  dextrose 5%. 1000 milliLiter(s) (50 mL/Hr) IV Continuous <Continuous>  dextrose 50% Injectable 25 Gram(s) IV Push once  dextrose 50% Injectable 12.5 Gram(s) IV Push once  dextrose 50% Injectable 25 Gram(s) IV Push once  fluconAZOLE IVPB      fluconAZOLE IVPB 400 milliGRAM(s) IV Intermittent every 24 hours  FLUoxetine 20 milliGRAM(s) Oral daily  glucagon  Injectable 1 milliGRAM(s) IntraMuscular once  insulin lispro (ADMELOG) corrective regimen sliding scale   SubCutaneous Before meals and at bedtime  levETIRAcetam 500 milliGRAM(s) Oral two times a day  multivitamin 1 Tablet(s) Oral daily  pantoprazole    Tablet 40 milliGRAM(s) Oral two times a day    MEDICATIONS  (PRN):  acetaminophen     Tablet .. 650 milliGRAM(s) Oral every 6 hours PRN Temp greater or equal to 38C (100.4F), Mild Pain (1 - 3)  aluminum hydroxide/magnesium hydroxide/simethicone Suspension 30 milliLiter(s) Oral every 4 hours PRN Dyspepsia  dextrose Oral Gel 15 Gram(s) Oral once PRN Blood Glucose LESS THAN 70 milliGRAM(s)/deciliter  melatonin 3 milliGRAM(s) Oral at bedtime PRN Insomnia        Objective:    Vitals: Vital Signs Last 24 Hrs  T(C): 36.8 (10-03-22 @ 04:26), Max: 37.3 (10-02-22 @ 18:45)  T(F): 98.3 (10-03-22 @ 04:26), Max: 99.2 (10-02-22 @ 18:45)  HR: 74 (10-03-22 @ 04:26) (72 - 83)  BP: 131/79 (10-03-22 @ 04:26) (106/69 - 131/79)  BP(mean): --  RR: 17 (10-03-22 @ 04:26) (17 - 18)  SpO2: 96% (10-03-22 @ 04:26) (93% - 96%)            I&O's Summary    01 Oct 2022 07:01  -  02 Oct 2022 07:00  --------------------------------------------------------  IN: 800 mL / OUT: 670 mL / NET: 130 mL    02 Oct 2022 07:01  -  03 Oct 2022 06:36  --------------------------------------------------------  IN: 120 mL / OUT: 800 mL / NET: -680 mL      PHYSICAL EXAM:  GENERAL: NAD, observed eating breakfast   EYES: EOMI, no scleral icterus  ENMT: Moist mucous membranes  NECK: Supple  CHEST/LUNG: Clear to auscultation bilaterally on anterior chest   HEART: Regular rate and rhythm  ABDOMEN: Soft, Nontender, distended   EXTREMITIES:  Pitting edema in b/l LE. B/l UE strength 4/5. B/l LE strength 2/5, unable to resist gravity. Gross sensation intact in b/l LE.  SKIN: Darker discoloration on forehead noted. Non-tender.   NERVOUS SYSTEM:  Somali speaking. Alert & Oriented X2 (hospital, self).     LABS:                        12.5   9.93  )-----------( 109      ( 02 Oct 2022 07:41 )             37.0                         12.9   8.49  )-----------( 101      ( 01 Oct 2022 10:25 )             39.0                         12.6   8.77  )-----------( 112      ( 30 Sep 2022 10:46 )             37.8     Hgb Trend: 12.5<--, 12.9<--, 12.6<--, 12.9<--, 13.0<--  10-02    138  |  105  |  17  ----------------------------<  249<H>  3.8   |  22  |  0.71  10-01    137  |  103  |  10  ----------------------------<  183<H>  4.2   |  23  |  0.67  09-30    138  |  102  |  9   ----------------------------<  141<H>  3.5   |  25  |  0.67    Ca    8.0<L>      02 Oct 2022 07:34  Ca    8.0<L>      01 Oct 2022 10:25  Ca    7.9<L>      30 Sep 2022 10:46  Phos  2.2     10-02  Mg     1.8     10-02    TPro  4.8<L>  /  Alb  2.8<L>  /  TBili  0.6  /  DBili  x   /  AST  22  /  ALT  48<H>  /  AlkPhos  79  10-02  TPro  5.1<L>  /  Alb  3.0<L>  /  TBili  0.7  /  DBili  x   /  AST  29  /  ALT  53<H>  /  AlkPhos  71  10-01  TPro  4.9<L>  /  Alb  2.7<L>  /  TBili  0.6  /  DBili  x   /  AST  35  /  ALT  54<H>  /  AlkPhos  67  09-30    Creatinine Trend: 0.71<--, 0.67<--, 0.67<--, 0.66<--, 0.71<--, 0.72<--                    CAPILLARY BLOOD GLUCOSE      POCT Blood Glucose.: 257 mg/dL (02 Oct 2022 21:43)  POCT Blood Glucose.: 270 mg/dL (02 Oct 2022 17:21)  POCT Blood Glucose.: 249 mg/dL (02 Oct 2022 12:13)  POCT Blood Glucose.: 230 mg/dL (02 Oct 2022 08:36)     Dr. Liliana Tejeda, PGY-1    ANA LOVE  82y  MRN: [id number]    Subjective:    Patient is a 82y old  Male who presents with a chief complaint of GIB, weakness (02 Oct 2022 11:13)    Pt seen and evaluated at bedside.     Pt declined  services. Denies any chest pain, headache, cough, abd pain.     Updated pt's daughter Faith via phone about pt's COVID status. Due to pt's current clinical status (satting well on RA, not hypoxic, afebrile), informed family that we will monitor clinical status for now. Family wants to be informed of COVID pt family visiting policy before making further decision.     MEDICATIONS  (STANDING):  ascorbic acid 500 milliGRAM(s) Oral daily  atorvastatin 80 milliGRAM(s) Oral at bedtime  dexAMETHasone     Tablet 4 milliGRAM(s) Oral every 12 hours  dextrose 5%. 1000 milliLiter(s) (100 mL/Hr) IV Continuous <Continuous>  dextrose 5%. 1000 milliLiter(s) (50 mL/Hr) IV Continuous <Continuous>  dextrose 50% Injectable 25 Gram(s) IV Push once  dextrose 50% Injectable 12.5 Gram(s) IV Push once  dextrose 50% Injectable 25 Gram(s) IV Push once  fluconAZOLE IVPB      fluconAZOLE IVPB 400 milliGRAM(s) IV Intermittent every 24 hours  FLUoxetine 20 milliGRAM(s) Oral daily  glucagon  Injectable 1 milliGRAM(s) IntraMuscular once  insulin lispro (ADMELOG) corrective regimen sliding scale   SubCutaneous Before meals and at bedtime  levETIRAcetam 500 milliGRAM(s) Oral two times a day  multivitamin 1 Tablet(s) Oral daily  pantoprazole    Tablet 40 milliGRAM(s) Oral two times a day    MEDICATIONS  (PRN):  acetaminophen     Tablet .. 650 milliGRAM(s) Oral every 6 hours PRN Temp greater or equal to 38C (100.4F), Mild Pain (1 - 3)  aluminum hydroxide/magnesium hydroxide/simethicone Suspension 30 milliLiter(s) Oral every 4 hours PRN Dyspepsia  dextrose Oral Gel 15 Gram(s) Oral once PRN Blood Glucose LESS THAN 70 milliGRAM(s)/deciliter  melatonin 3 milliGRAM(s) Oral at bedtime PRN Insomnia        Objective:    Vitals: Vital Signs Last 24 Hrs  T(C): 36.8 (10-03-22 @ 04:26), Max: 37.3 (10-02-22 @ 18:45)  T(F): 98.3 (10-03-22 @ 04:26), Max: 99.2 (10-02-22 @ 18:45)  HR: 74 (10-03-22 @ 04:26) (72 - 83)  BP: 131/79 (10-03-22 @ 04:26) (106/69 - 131/79)  BP(mean): --  RR: 17 (10-03-22 @ 04:26) (17 - 18)  SpO2: 96% (10-03-22 @ 04:26) (93% - 96%)            I&O's Summary    01 Oct 2022 07:01  -  02 Oct 2022 07:00  --------------------------------------------------------  IN: 800 mL / OUT: 670 mL / NET: 130 mL    02 Oct 2022 07:01  -  03 Oct 2022 06:36  --------------------------------------------------------  IN: 120 mL / OUT: 800 mL / NET: -680 mL      PHYSICAL EXAM:  GENERAL: NAD, observed eating breakfast   EYES: EOMI, no scleral icterus  ENMT: Moist mucous membranes  NECK: Supple  CHEST/LUNG: Clear to auscultation bilaterally on anterior chest   HEART: Regular rate and rhythm  ABDOMEN: Soft, Nontender, distended   EXTREMITIES:  Pitting edema in b/l LE. B/l UE strength 4/5. B/l LE strength 2/5, unable to resist gravity. Gross sensation intact in b/l LE.  SKIN: Darker discoloration on forehead noted. Non-tender.   NERVOUS SYSTEM:  Pashto speaking. Alert & Oriented X2 (hospital, self).     LABS:                        12.5   9.93  )-----------( 109      ( 02 Oct 2022 07:41 )             37.0                         12.9   8.49  )-----------( 101      ( 01 Oct 2022 10:25 )             39.0                         12.6   8.77  )-----------( 112      ( 30 Sep 2022 10:46 )             37.8     Hgb Trend: 12.5<--, 12.9<--, 12.6<--, 12.9<--, 13.0<--  10-02    138  |  105  |  17  ----------------------------<  249<H>  3.8   |  22  |  0.71  10-01    137  |  103  |  10  ----------------------------<  183<H>  4.2   |  23  |  0.67  09-30    138  |  102  |  9   ----------------------------<  141<H>  3.5   |  25  |  0.67    Ca    8.0<L>      02 Oct 2022 07:34  Ca    8.0<L>      01 Oct 2022 10:25  Ca    7.9<L>      30 Sep 2022 10:46  Phos  2.2     10-02  Mg     1.8     10-02    TPro  4.8<L>  /  Alb  2.8<L>  /  TBili  0.6  /  DBili  x   /  AST  22  /  ALT  48<H>  /  AlkPhos  79  10-02  TPro  5.1<L>  /  Alb  3.0<L>  /  TBili  0.7  /  DBili  x   /  AST  29  /  ALT  53<H>  /  AlkPhos  71  10-01  TPro  4.9<L>  /  Alb  2.7<L>  /  TBili  0.6  /  DBili  x   /  AST  35  /  ALT  54<H>  /  AlkPhos  67  09-30    Creatinine Trend: 0.71<--, 0.67<--, 0.67<--, 0.66<--, 0.71<--, 0.72<--                    CAPILLARY BLOOD GLUCOSE      POCT Blood Glucose.: 257 mg/dL (02 Oct 2022 21:43)  POCT Blood Glucose.: 270 mg/dL (02 Oct 2022 17:21)  POCT Blood Glucose.: 249 mg/dL (02 Oct 2022 12:13)  POCT Blood Glucose.: 230 mg/dL (02 Oct 2022 08:36)     Dr. Liliana Tejeda, PGY-1    ANA LOVE  82y  MRN: [id number]    Subjective:    Patient is a 82y old  Male who presents with a chief complaint of GIB, weakness (02 Oct 2022 11:13)    Pt seen and evaluated at bedside.     Pt declined  services. Denies any chest pain, headache, cough, abd pain. Pt on room air, observed eating breakfast.     Updated pt's daughter Faith via phone about pt's COVID status. Due to pt's current clinical status (satting well on RA, without hypoxia, afebrile), informed family that we will monitor clinical status for now. Family wants to be informed of COVID pt family visiting policy before making further decision.     MEDICATIONS  (STANDING):  ascorbic acid 500 milliGRAM(s) Oral daily  atorvastatin 80 milliGRAM(s) Oral at bedtime  dexAMETHasone     Tablet 4 milliGRAM(s) Oral every 12 hours  dextrose 5%. 1000 milliLiter(s) (100 mL/Hr) IV Continuous <Continuous>  dextrose 5%. 1000 milliLiter(s) (50 mL/Hr) IV Continuous <Continuous>  dextrose 50% Injectable 25 Gram(s) IV Push once  dextrose 50% Injectable 12.5 Gram(s) IV Push once  dextrose 50% Injectable 25 Gram(s) IV Push once  fluconAZOLE IVPB      fluconAZOLE IVPB 400 milliGRAM(s) IV Intermittent every 24 hours  FLUoxetine 20 milliGRAM(s) Oral daily  glucagon  Injectable 1 milliGRAM(s) IntraMuscular once  insulin lispro (ADMELOG) corrective regimen sliding scale   SubCutaneous Before meals and at bedtime  levETIRAcetam 500 milliGRAM(s) Oral two times a day  multivitamin 1 Tablet(s) Oral daily  pantoprazole    Tablet 40 milliGRAM(s) Oral two times a day    MEDICATIONS  (PRN):  acetaminophen     Tablet .. 650 milliGRAM(s) Oral every 6 hours PRN Temp greater or equal to 38C (100.4F), Mild Pain (1 - 3)  aluminum hydroxide/magnesium hydroxide/simethicone Suspension 30 milliLiter(s) Oral every 4 hours PRN Dyspepsia  dextrose Oral Gel 15 Gram(s) Oral once PRN Blood Glucose LESS THAN 70 milliGRAM(s)/deciliter  melatonin 3 milliGRAM(s) Oral at bedtime PRN Insomnia        Objective:    Vitals: Vital Signs Last 24 Hrs  T(C): 36.8 (10-03-22 @ 04:26), Max: 37.3 (10-02-22 @ 18:45)  T(F): 98.3 (10-03-22 @ 04:26), Max: 99.2 (10-02-22 @ 18:45)  HR: 74 (10-03-22 @ 04:26) (72 - 83)  BP: 131/79 (10-03-22 @ 04:26) (106/69 - 131/79)  BP(mean): --  RR: 17 (10-03-22 @ 04:26) (17 - 18)  SpO2: 96% (10-03-22 @ 04:26) (93% - 96%)            I&O's Summary    01 Oct 2022 07:01  -  02 Oct 2022 07:00  --------------------------------------------------------  IN: 800 mL / OUT: 670 mL / NET: 130 mL    02 Oct 2022 07:01  -  03 Oct 2022 06:36  --------------------------------------------------------  IN: 120 mL / OUT: 800 mL / NET: -680 mL      PHYSICAL EXAM:  GENERAL: NAD, observed eating breakfast   EYES: EOMI, no scleral icterus  ENMT: Moist mucous membranes  NECK: Supple  CHEST/LUNG: Clear to auscultation bilaterally on anterior chest   HEART: Regular rate and rhythm  ABDOMEN: Soft, Nontender, distended   EXTREMITIES:  Pitting edema in b/l LE. B/l UE strength 4/5. B/l LE strength 2/5, unable to resist gravity. Gross sensation intact in b/l LE.  SKIN: Darker discoloration on forehead noted. Non-tender.   NERVOUS SYSTEM:  Frisian speaking. Alert & Oriented X2 (hospital, self).     LABS:                        12.5   9.93  )-----------( 109      ( 02 Oct 2022 07:41 )             37.0                         12.9   8.49  )-----------( 101      ( 01 Oct 2022 10:25 )             39.0                         12.6   8.77  )-----------( 112      ( 30 Sep 2022 10:46 )             37.8     Hgb Trend: 12.5<--, 12.9<--, 12.6<--, 12.9<--, 13.0<--  10-02    138  |  105  |  17  ----------------------------<  249<H>  3.8   |  22  |  0.71  10-01    137  |  103  |  10  ----------------------------<  183<H>  4.2   |  23  |  0.67  09-30    138  |  102  |  9   ----------------------------<  141<H>  3.5   |  25  |  0.67    Ca    8.0<L>      02 Oct 2022 07:34  Ca    8.0<L>      01 Oct 2022 10:25  Ca    7.9<L>      30 Sep 2022 10:46  Phos  2.2     10-02  Mg     1.8     10-02    TPro  4.8<L>  /  Alb  2.8<L>  /  TBili  0.6  /  DBili  x   /  AST  22  /  ALT  48<H>  /  AlkPhos  79  10-02  TPro  5.1<L>  /  Alb  3.0<L>  /  TBili  0.7  /  DBili  x   /  AST  29  /  ALT  53<H>  /  AlkPhos  71  10-01  TPro  4.9<L>  /  Alb  2.7<L>  /  TBili  0.6  /  DBili  x   /  AST  35  /  ALT  54<H>  /  AlkPhos  67  09-30    Creatinine Trend: 0.71<--, 0.67<--, 0.67<--, 0.66<--, 0.71<--, 0.72<--                    CAPILLARY BLOOD GLUCOSE      POCT Blood Glucose.: 257 mg/dL (02 Oct 2022 21:43)  POCT Blood Glucose.: 270 mg/dL (02 Oct 2022 17:21)  POCT Blood Glucose.: 249 mg/dL (02 Oct 2022 12:13)  POCT Blood Glucose.: 230 mg/dL (02 Oct 2022 08:36)     Dr. Liliana Tejeda, PGY-1    ANA LOVE  82y  MRN: [id number]    Subjective:    Patient is a 82y old  Male who presents with a chief complaint of GIB, weakness (02 Oct 2022 11:13)    Pt seen and evaluated at bedside.     Pt declined  services. Denies any chest pain, headache, cough, abd pain. Pt on room air, observed eating breakfast.     Updated pt's daughter Faith via phone about pt's COVID status. Due to pt's current clinical status (satting well on RA, without hypoxia, afebrile), informed family that we will monitor clinical status for now. Informed her that pt required to quarantine for 10 days per rehab facility policy. Family wants to be informed of COVID pt family visiting policy and ask further questions regarding insurance/rehab with CM.     MEDICATIONS  (STANDING):  ascorbic acid 500 milliGRAM(s) Oral daily  atorvastatin 80 milliGRAM(s) Oral at bedtime  dexAMETHasone     Tablet 4 milliGRAM(s) Oral every 12 hours  dextrose 5%. 1000 milliLiter(s) (100 mL/Hr) IV Continuous <Continuous>  dextrose 5%. 1000 milliLiter(s) (50 mL/Hr) IV Continuous <Continuous>  dextrose 50% Injectable 25 Gram(s) IV Push once  dextrose 50% Injectable 12.5 Gram(s) IV Push once  dextrose 50% Injectable 25 Gram(s) IV Push once  fluconAZOLE IVPB      fluconAZOLE IVPB 400 milliGRAM(s) IV Intermittent every 24 hours  FLUoxetine 20 milliGRAM(s) Oral daily  glucagon  Injectable 1 milliGRAM(s) IntraMuscular once  insulin lispro (ADMELOG) corrective regimen sliding scale   SubCutaneous Before meals and at bedtime  levETIRAcetam 500 milliGRAM(s) Oral two times a day  multivitamin 1 Tablet(s) Oral daily  pantoprazole    Tablet 40 milliGRAM(s) Oral two times a day    MEDICATIONS  (PRN):  acetaminophen     Tablet .. 650 milliGRAM(s) Oral every 6 hours PRN Temp greater or equal to 38C (100.4F), Mild Pain (1 - 3)  aluminum hydroxide/magnesium hydroxide/simethicone Suspension 30 milliLiter(s) Oral every 4 hours PRN Dyspepsia  dextrose Oral Gel 15 Gram(s) Oral once PRN Blood Glucose LESS THAN 70 milliGRAM(s)/deciliter  melatonin 3 milliGRAM(s) Oral at bedtime PRN Insomnia        Objective:    Vitals: Vital Signs Last 24 Hrs  T(C): 36.8 (10-03-22 @ 04:26), Max: 37.3 (10-02-22 @ 18:45)  T(F): 98.3 (10-03-22 @ 04:26), Max: 99.2 (10-02-22 @ 18:45)  HR: 74 (10-03-22 @ 04:26) (72 - 83)  BP: 131/79 (10-03-22 @ 04:26) (106/69 - 131/79)  BP(mean): --  RR: 17 (10-03-22 @ 04:26) (17 - 18)  SpO2: 96% (10-03-22 @ 04:26) (93% - 96%)            I&O's Summary    01 Oct 2022 07:01  -  02 Oct 2022 07:00  --------------------------------------------------------  IN: 800 mL / OUT: 670 mL / NET: 130 mL    02 Oct 2022 07:01  -  03 Oct 2022 06:36  --------------------------------------------------------  IN: 120 mL / OUT: 800 mL / NET: -680 mL      PHYSICAL EXAM:  GENERAL: NAD, observed eating breakfast   EYES: EOMI, no scleral icterus  ENMT: Moist mucous membranes  NECK: Supple  CHEST/LUNG: Clear to auscultation bilaterally on anterior chest   HEART: Regular rate and rhythm  ABDOMEN: Soft, Nontender, distended   EXTREMITIES:  Pitting edema in b/l LE. B/l UE strength 4/5. B/l LE strength 2/5, unable to resist gravity. Gross sensation intact in b/l LE.  SKIN: Darker discoloration on forehead noted. Non-tender.   NERVOUS SYSTEM:  Chinese speaking. Alert & Oriented X2 (hospital, self).     LABS:                        12.5   9.93  )-----------( 109      ( 02 Oct 2022 07:41 )             37.0                         12.9   8.49  )-----------( 101      ( 01 Oct 2022 10:25 )             39.0                         12.6   8.77  )-----------( 112      ( 30 Sep 2022 10:46 )             37.8     Hgb Trend: 12.5<--, 12.9<--, 12.6<--, 12.9<--, 13.0<--  10-02    138  |  105  |  17  ----------------------------<  249<H>  3.8   |  22  |  0.71  10-01    137  |  103  |  10  ----------------------------<  183<H>  4.2   |  23  |  0.67  09-30    138  |  102  |  9   ----------------------------<  141<H>  3.5   |  25  |  0.67    Ca    8.0<L>      02 Oct 2022 07:34  Ca    8.0<L>      01 Oct 2022 10:25  Ca    7.9<L>      30 Sep 2022 10:46  Phos  2.2     10-02  Mg     1.8     10-02    TPro  4.8<L>  /  Alb  2.8<L>  /  TBili  0.6  /  DBili  x   /  AST  22  /  ALT  48<H>  /  AlkPhos  79  10-02  TPro  5.1<L>  /  Alb  3.0<L>  /  TBili  0.7  /  DBili  x   /  AST  29  /  ALT  53<H>  /  AlkPhos  71  10-01  TPro  4.9<L>  /  Alb  2.7<L>  /  TBili  0.6  /  DBili  x   /  AST  35  /  ALT  54<H>  /  AlkPhos  67  09-30    Creatinine Trend: 0.71<--, 0.67<--, 0.67<--, 0.66<--, 0.71<--, 0.72<--                    CAPILLARY BLOOD GLUCOSE      POCT Blood Glucose.: 257 mg/dL (02 Oct 2022 21:43)  POCT Blood Glucose.: 270 mg/dL (02 Oct 2022 17:21)  POCT Blood Glucose.: 249 mg/dL (02 Oct 2022 12:13)  POCT Blood Glucose.: 230 mg/dL (02 Oct 2022 08:36)

## 2022-10-04 NOTE — PROGRESS NOTE ADULT - PROBLEM SELECTOR PLAN 10
Diet: CC/DASH diet, advised pt's family and nurse that pt may require smaller bites and smaller portions d/t gastroparesis   DVT ppx: SCD's in LLE, s/p IVC filter in RLE, per heme/onc continue to hold AC   Dispo: PT recommends subacute rehab. CM discussing with family rehab options that work with pt's radiation tx schedule. In process of working on possible transfer to [facility] to continue radiation tx while waiting for transfer to [facility].

## 2022-10-04 NOTE — PROGRESS NOTE ADULT - PROBLEM SELECTOR PLAN 7
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 Mcneil and Dr. Borja outpt  - [facility] here showing possible residual or remanent tumor   - MRI brain showed findings representing residual/recurrent neoplasm and/or posttreatment changes. presence of neoplasm is not excluded    PLAN:   - pending 9 more radiation tx; discussed with rad-onc team for possible transfer to [facility] to continue radiation tx  - neuro surg following here; findings most likely represent post treatment changes, patient can continue to follow up as an outpatient with Dr. Mcneil as planned after their current admission  - c/w dexamethasone 4mg BID, keppra 500mg BID

## 2022-10-04 NOTE — PROGRESS NOTE ADULT - PROBLEM SELECTOR PLAN 5
Found to have R popliteal DVT on outpt duplex for BLE pitting edema. Was briefly on Lovenox (1-2 doses) but then discontinued due to thrombocytopenia as above  - daughter reports some KHAN and orthopnea at home over past few weeks, which is concerning for PE. Although pt saturating well on RA here, no tachycardia. No SOB at rest, no CP  - AC also c/b current concern for GIB     PLAN:  - Heme/onc consulted; recommend continuing to hold AC  - IVC filter placed with IR on 9/28, f/u with IR outpt pt w/ h/o thrombocytopenia 2/2 HIT, then likely AC and chemotherapy. plts 101 on admission, improved from 77 on 9/22.   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 Silva)  - On 9/21, he was found to have a DVT and was started on Lovenox, which was quickly discontinued after outpt labs showed plt of 77 < 172 (8/2022). Per daughter only took 1 or 2 doses of the lovenox, so thrombocytopenia more likely 2/ temozolomide, which was also discontinued at that time  - 9/28 HIT assay (heparin pf4 ab) negative  - neg serotonin releasing assay     PLAN:  - continue to monitor plts on CBC

## 2022-10-04 NOTE — PROGRESS NOTE ADULT - ASSESSMENT
82M, Hebrew-speaking, with PMH Glioblastoma (diagnosed 7/18/22, s/p R stereo biopsy, TIMOTHY x2, R crani for tumor debulking 7/28/22, on temozolomide (last dose 4 days ago)), HLD, steroid-induced diabetes, h/o HIT during recent admission, and R popliteal DVT (found 9/21/22, AC discontinued due to thrombocytopenia) presenting with worsening LE weakness x 5 days i/s/o dark stools x 1-2 days and AC use. Admitted to medicine for further workup.

## 2022-10-04 NOTE — PROGRESS NOTE ADULT - PROBLEM SELECTOR PLAN 9
Diet: CC/DASH diet, advised pt's family and nurse that pt may require smaller bites and smaller portions d/t gastroparesis   DVT ppx: SCD's in LLE, s/p IVC filter in RLE, per heme/onc continue to hold AC   Dispo: PT recommends subacute rehab. CM discussing with family rehab options that work with pt's radiation tx schedule. on home rosuvastatin 40mg qd  - c/w atorvastatin 80mg while inpatient

## 2022-10-04 NOTE — PROGRESS NOTE ADULT - PROBLEM SELECTOR PLAN 8
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

## 2022-10-04 NOTE — PROGRESS NOTE ADULT - PROBLEM SELECTOR PLAN 2
- EGD done 9/30 showed esophageal plaques suspicious for candidiasis. Biopsied.    Plan:  - continue empiric tx for Candidiasis: IV fluconazole  plan to continue PO fluconazole after discharge

## 2022-10-04 NOTE — PROGRESS NOTE ADULT - PROBLEM SELECTOR PLAN 3
generalized weakness x 3 weeks with worsening LE weakness x 5 days w/ difficulty ambulating. Here with 2/5 strength in BLE, 4/5 in UE, rectal tone intact, no changes in urinary sxs (although w/ history of urinary incontinence). no acute changes in mental status (AAOx2, mild confusion since GBM dx)  - given diproprionate weakness, concerning for spinal pathology ?mets, ?cauda equina   - generalized weakness due to malignancy, decreased PO intake, and relative drop in hgb may also be contributing  - CTH here showing post-op changes, possible residual or remnant tumor   - CT L-spine showed degenerative changes resulting in mild multilevel spinal canal stenosis or neural foraminal narrowing    PLAN:  - neurosurgery following; patient can continue to follow up as an outpatient with Dr. Mcneil as planned after their current admission  - PT recommends [facility], OT [facility] or home OT - pt exposed to COVID 9/30, tested positive 9/3  - COVID vaccinated   - currently satting well on room air, asymptomatic     Plan:  - continue to monitor  - monitor fever, maintain O2 sat>90%  - requires 10 day isolation (until 10/11) per [facility] policy

## 2022-10-04 NOTE — PROGRESS NOTE ADULT - PROBLEM SELECTOR PLAN 4
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 plts 101 on admission, improved from 77 on 9/22.   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 Silva)  - On 9/21, he was found to have a DVT and was started on Lovenox, which was quickly discontinued after outpt labs showed plt of 77 < 172 (8/2022). Per daughter only took 1 or 2 doses of the lovenox, so thrombocytopenia more likely 2/ temozolomide, which was also discontinued at that time  - 9/28 HIT assay (heparin pf4 ab) negative  - neg serotonin releasing assay     PLAN:  - continue to monitor plts on CBC generalized weakness x 3 weeks with worsening LE weakness x 5 days w/ difficulty ambulating. Here with 2/5 strength in BLE, 4/5 in UE, rectal tone intact, no changes in urinary sxs (although w/ history of urinary incontinence). no acute changes in mental status (AAOx2, mild confusion since GBM dx)  - given diproprionate weakness, concerning for spinal pathology ?mets, ?cauda equina   - generalized weakness due to malignancy, decreased PO intake, and relative drop in hgb may also be contributing  - CTH here showing post-op changes, possible residual or remnant tumor   - CT L-spine showed degenerative changes resulting in mild multilevel spinal canal stenosis or neural foraminal narrowing    PLAN:  - neurosurgery following; patient can continue to follow up as an outpatient with Dr. Mcneil as planned after their current admission  - PT recommends [facility], OT [facility] or home OT

## 2022-10-04 NOTE — PROGRESS NOTE ADULT - ATTENDING COMMENTS
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 Suspect weakness likely multifactorial 2/2 underlying disease and Hgb drop possibly 2/2 GIB. CT lumbar spine with mild multilevel spinal canal stenosis. MRI brain can not rule out neoplasm. Nsx aware and believe findings are more likely post treatment changes. Needs 9 additional radiation treatments as per Nsx. GI following, EGD and c-scope performed. Poor prep. Found to have candida esophagitis now on Fluconazole will transition to PO on discharge if still on therapy. Asymptomatic from gastroparesis standpoint so will hold off on adding additional medications. Holding AC. Heme consulted, do not recommend AC. s/p IVF filter given above the knee DVT. Recommended for [facility] on discharge that can accommodate Rad tx. Was exposed to COVID on 9/30, currently asymptomatic and tested positive on 10/3. Needs to quarantine prior to [facility]. Pending [facility] placement 83yo M, w/ PMH of glioblastoma (dx 7/18/22, s/p R stereo bx, TIMOTHY x2, R crani for tumor debulking 7/28/22, on temozolomide (last dose 4 days prior to admission), HLD, steroid-induced DM, h/o HIT during recent admission, and newly dx R popliteal DVT (found 9/21/22, AC dc d/t thrombocytopenia) p/w worsening LE weakness. Suspect weakness likely multifactorial 2/2 underlying disease and Hgb drop possibly 2/2 GIB. CT lumbar spine with mild multilevel spinal canal stenosis. MRI brain can not rule out neoplasm. Nsx aware and believe findings are more likely post treatment changes. Needs 9 additional radiation treatments as per Nsx. Potential transfer to [facility] for radiation therapy while pt is quarantined prior to [facility]. GI following, EGD and c-scope performed. Poor prep. Found to have candida esophagitis now on Fluconazole will transition to PO on discharge if still on therapy. Asymptomatic from gastroparesis standpoint so will hold off on adding additional medications. Holding AC. Heme consulted, do not recommend AC. s/p IVF filter given above the knee DVT. Recommended for [facility] on discharge that can accommodate Rad tx. Was exposed to COVID on 9/30, currently asymptomatic and tested positive on 10/3. Needs to quarantine prior to [facility]. Pending [facility] placement

## 2022-10-04 NOTE — PROGRESS NOTE ADULT - PROBLEM SELECTOR PLAN 1
p/w dark brown stools x 1-2 days i/s/o lovenox use outpt (1-2 doses) for R DVT. Here found to have +FOBT and acute downtrend in hgb from 16.5 (9/22) to 13.7, concerning for GI bleed. HD stable   - giving timing, likely 2/2 AC use which has now been discontinued, possibly LGIB, ?diverticulosis. ddx also includes PUD given steroid use, anorexia, BUN/Cr ratio ~34, although lower suspicion given no abd pain, no black stools.  - last colonoscopy 2014, results unknown but was told to repeat in 10 yrs   - s/p PPI 80mg IV x1  - GI consulted; plan for EGD/colonoscopy performed yesterday; large amount of food in stomach c/f gastroparesis, bx taken from erythematous mucosa in stomach. stool seen in colon, no blood or bleeding noted.    PLAN:   - c/w PO PPI 40mg BID

## 2022-10-04 NOTE — PROGRESS NOTE ADULT - SUBJECTIVE AND OBJECTIVE BOX
%%%%INCOMPLETE NOTE%%%%%     Dr. Liliana Tejeda, PGY-1    ANA LOVE  82y  MRN: [id number]    Subjective:    Patient is a 82y old  Male who presents with a chief complaint of GIB, weakness (03 Oct 2022 06:36)      MEDICATIONS  (STANDING):  ascorbic acid 500 milliGRAM(s) Oral daily  atorvastatin 80 milliGRAM(s) Oral at bedtime  dexAMETHasone     Tablet 4 milliGRAM(s) Oral every 12 hours  dextrose 5%. 1000 milliLiter(s) (50 mL/Hr) IV Continuous <Continuous>  dextrose 5%. 1000 milliLiter(s) (100 mL/Hr) IV Continuous <Continuous>  dextrose 50% Injectable 12.5 Gram(s) IV Push once  dextrose 50% Injectable 25 Gram(s) IV Push once  dextrose 50% Injectable 25 Gram(s) IV Push once  fluconAZOLE IVPB      fluconAZOLE IVPB 400 milliGRAM(s) IV Intermittent every 24 hours  FLUoxetine 20 milliGRAM(s) Oral daily  glucagon  Injectable 1 milliGRAM(s) IntraMuscular once  insulin lispro (ADMELOG) corrective regimen sliding scale   SubCutaneous Before meals and at bedtime  levETIRAcetam 500 milliGRAM(s) Oral two times a day  multivitamin 1 Tablet(s) Oral daily  pantoprazole    Tablet 40 milliGRAM(s) Oral two times a day    MEDICATIONS  (PRN):  acetaminophen     Tablet .. 650 milliGRAM(s) Oral every 6 hours PRN Temp greater or equal to 38C (100.4F), Mild Pain (1 - 3)  aluminum hydroxide/magnesium hydroxide/simethicone Suspension 30 milliLiter(s) Oral every 4 hours PRN Dyspepsia  dextrose Oral Gel 15 Gram(s) Oral once PRN Blood Glucose LESS THAN 70 milliGRAM(s)/deciliter  melatonin 3 milliGRAM(s) Oral at bedtime PRN Insomnia        Objective:    Vitals: Vital Signs Last 24 Hrs  T(C): 36.9 (10-04-22 @ 06:39), Max: 36.9 (10-04-22 @ 06:39)  T(F): 98.5 (10-04-22 @ 06:39), Max: 98.5 (10-04-22 @ 06:39)  HR: 75 (10-04-22 @ 06:39) (72 - 75)  BP: 133/79 (10-04-22 @ 06:39) (133/79 - 143/76)  BP(mean): --  RR: 16 (10-04-22 @ 06:39) (16 - 18)  SpO2: 95% (10-04-22 @ 06:39) (95% - 96%)            I&O's Summary    02 Oct 2022 07:01  -  03 Oct 2022 07:00  --------------------------------------------------------  IN: 120 mL / OUT: 800 mL / NET: -680 mL    03 Oct 2022 07:01  -  04 Oct 2022 06:44  --------------------------------------------------------  IN: 1180 mL / OUT: 300 mL / NET: 880 mL        PHYSICAL EXAM:  GENERAL: NAD, well-groomed, well-developed  HEAD:  Atraumatic, Normocephalic  EYES: EOMI, PERRLA, conjunctiva and sclera clear  ENMT: Moist mucous membranes  NECK: Supple, No JVD  CHEST/LUNG: Clear to auscultation bilaterally  HEART: Regular rate and rhythm  ABDOMEN: Soft, Nontender, Nondistended; Bowel sounds present  EXTREMITIES:  2+ Peripheral Pulses, No LE edema  SKIN: No rashes or lesions  NERVOUS SYSTEM:  Alert & Oriented X3, moving all extremities   PSYCH: Speaking in Full Sentences. Laying in bed comfortably; not agitated     LABS:                        12.6   10.68 )-----------( 101      ( 03 Oct 2022 06:51 )             38.2                         12.5   9.93  )-----------( 109      ( 02 Oct 2022 07:41 )             37.0                         12.9   8.49  )-----------( 101      ( 01 Oct 2022 10:25 )             39.0     Hgb Trend: 12.6<--, 12.5<--, 12.9<--, 12.6<--, 12.9<--  10-03    140  |  107  |  23  ----------------------------<  289<H>  4.2   |  23  |  0.82  10-02    138  |  105  |  17  ----------------------------<  249<H>  3.8   |  22  |  0.71  10-01    137  |  103  |  10  ----------------------------<  183<H>  4.2   |  23  |  0.67    Ca    8.3<L>      03 Oct 2022 06:49  Ca    8.0<L>      02 Oct 2022 07:34  Ca    8.0<L>      01 Oct 2022 10:25  Phos  2.3     10-03  Mg     1.9     10-03    TPro  4.9<L>  /  Alb  3.0<L>  /  TBili  0.6  /  DBili  x   /  AST  21  /  ALT  46<H>  /  AlkPhos  84  10-03  TPro  4.8<L>  /  Alb  2.8<L>  /  TBili  0.6  /  DBili  x   /  AST  22  /  ALT  48<H>  /  AlkPhos  79  10-02  TPro  5.1<L>  /  Alb  3.0<L>  /  TBili  0.7  /  DBili  x   /  AST  29  /  ALT  53<H>  /  AlkPhos  71  10-01    Creatinine Trend: 0.82<--, 0.71<--, 0.67<--, 0.67<--, 0.66<--, 0.71<--                    CAPILLARY BLOOD GLUCOSE      POCT Blood Glucose.: 288 mg/dL (03 Oct 2022 22:12)  POCT Blood Glucose.: 283 mg/dL (03 Oct 2022 17:17)  POCT Blood Glucose.: 264 mg/dL (03 Oct 2022 11:54)  POCT Blood Glucose.: 255 mg/dL (03 Oct 2022 08:05)     Dr. Liilana Tejeda, PGY-1    ANA LOVE  82y  MRN: [id number]    Subjective:    Patient is a 82y old  Male who presents with a chief complaint of GIB, weakness (03 Oct 2022 06:36)    No acute events overnight.      Rosaline [id number]    Pt seen and evaluated at bedside. O2 Sat 95% on room air.  Pt denies shortness of breath, cp, abd pain.       MEDICATIONS  (STANDING):  ascorbic acid 500 milliGRAM(s) Oral daily  atorvastatin 80 milliGRAM(s) Oral at bedtime  dexAMETHasone     Tablet 4 milliGRAM(s) Oral every 12 hours  dextrose 5%. 1000 milliLiter(s) (50 mL/Hr) IV Continuous <Continuous>  dextrose 5%. 1000 milliLiter(s) (100 mL/Hr) IV Continuous <Continuous>  dextrose 50% Injectable 12.5 Gram(s) IV Push once  dextrose 50% Injectable 25 Gram(s) IV Push once  dextrose 50% Injectable 25 Gram(s) IV Push once  fluconAZOLE IVPB      fluconAZOLE IVPB 400 milliGRAM(s) IV Intermittent every 24 hours  FLUoxetine 20 milliGRAM(s) Oral daily  glucagon  Injectable 1 milliGRAM(s) IntraMuscular once  insulin lispro (ADMELOG) corrective regimen sliding scale   SubCutaneous Before meals and at bedtime  levETIRAcetam 500 milliGRAM(s) Oral two times a day  multivitamin 1 Tablet(s) Oral daily  pantoprazole    Tablet 40 milliGRAM(s) Oral two times a day    MEDICATIONS  (PRN):  acetaminophen     Tablet .. 650 milliGRAM(s) Oral every 6 hours PRN Temp greater or equal to 38C (100.4F), Mild Pain (1 - 3)  aluminum hydroxide/magnesium hydroxide/simethicone Suspension 30 milliLiter(s) Oral every 4 hours PRN Dyspepsia  dextrose Oral Gel 15 Gram(s) Oral once PRN Blood Glucose LESS THAN 70 milliGRAM(s)/deciliter  melatonin 3 milliGRAM(s) Oral at bedtime PRN Insomnia        Objective:    Vitals: Vital Signs Last 24 Hrs  T(C): 36.9 (10-04-22 @ 06:39), Max: 36.9 (10-04-22 @ 06:39)  T(F): 98.5 (10-04-22 @ 06:39), Max: 98.5 (10-04-22 @ 06:39)  HR: 75 (10-04-22 @ 06:39) (72 - 75)  BP: 133/79 (10-04-22 @ 06:39) (133/79 - 143/76)  BP(mean): --  RR: 16 (10-04-22 @ 06:39) (16 - 18)  SpO2: 95% (10-04-22 @ 06:39) (95% - 96%)            I&O's Summary    02 Oct 2022 07:01  -  03 Oct 2022 07:00  --------------------------------------------------------  IN: 120 mL / OUT: 800 mL / NET: -680 mL    03 Oct 2022 07:01  -  04 Oct 2022 06:44  --------------------------------------------------------  IN: 1180 mL / OUT: 300 mL / NET: 880 mL      PHYSICAL EXAM:  GENERAL: NAD, resting comfortably   EYES: EOMI, no scleral icterus  ENMT: Moist mucous membranes  NECK: Supple  CHEST/LUNG: Clear to auscultation bilaterally on anterior chest, on room air   HEART: Regular rate and rhythm  ABDOMEN: Soft, Nontender, distended   EXTREMITIES:  Pitting edema in b/l LE. B/l LE strength 2/5, unable to resist gravity. Gross sensation intact in b/l LE.  SKIN: Darker discoloration on forehead noted. Non-tender.   NERVOUS SYSTEM:  Macedonian speaking. Alert & Oriented X2.      LABS:                        12.6   10.68 )-----------( 101      ( 03 Oct 2022 06:51 )             38.2                         12.5   9.93  )-----------( 109      ( 02 Oct 2022 07:41 )             37.0                         12.9   8.49  )-----------( 101      ( 01 Oct 2022 10:25 )             39.0     Hgb Trend: 12.6<--, 12.5<--, 12.9<--, 12.6<--, 12.9<--  10-03    140  |  107  |  23  ----------------------------<  289<H>  4.2   |  23  |  0.82  10-02    138  |  105  |  17  ----------------------------<  249<H>  3.8   |  22  |  0.71  10-01    137  |  103  |  10  ----------------------------<  183<H>  4.2   |  23  |  0.67    Ca    8.3<L>      03 Oct 2022 06:49  Ca    8.0<L>      02 Oct 2022 07:34  Ca    8.0<L>      01 Oct 2022 10:25  Phos  2.3     10-03  Mg     1.9     10-03    TPro  4.9<L>  /  Alb  3.0<L>  /  TBili  0.6  /  DBili  x   /  AST  21  /  ALT  46<H>  /  AlkPhos  84  10-03  TPro  4.8<L>  /  Alb  2.8<L>  /  TBili  0.6  /  DBili  x   /  AST  22  /  ALT  48<H>  /  AlkPhos  79  10-02  TPro  5.1<L>  /  Alb  3.0<L>  /  TBili  0.7  /  DBili  x   /  AST  29  /  ALT  53<H>  /  AlkPhos  71  10-01    Creatinine Trend: 0.82<--, 0.71<--, 0.67<--, 0.67<--, 0.66<--, 0.71<--                    CAPILLARY BLOOD GLUCOSE      POCT Blood Glucose.: 288 mg/dL (03 Oct 2022 22:12)  POCT Blood Glucose.: 283 mg/dL (03 Oct 2022 17:17)  POCT Blood Glucose.: 264 mg/dL (03 Oct 2022 11:54)  POCT Blood Glucose.: 255 mg/dL (03 Oct 2022 08:05)     Dr. Liliana Tejeda, PGY-1    ANA LOVE  82y  MRN: [id number]    Subjective:    Patient is a 82y old  Male who presents with a chief complaint of GIB, weakness (03 Oct 2022 06:36)    No acute events overnight.      Rosaline [id number]    Pt seen and evaluated at bedside. O2 Sat 95% on room air.  Pt denies shortness of breath, cp, abd pain.     Update pt's daughter Faith 12:40PM about possible transfer to [facility] for radiation tx. Daughter agreeable to transfer.       MEDICATIONS  (STANDING):  ascorbic acid 500 milliGRAM(s) Oral daily  atorvastatin 80 milliGRAM(s) Oral at bedtime  dexAMETHasone     Tablet 4 milliGRAM(s) Oral every 12 hours  dextrose 5%. 1000 milliLiter(s) (50 mL/Hr) IV Continuous <Continuous>  dextrose 5%. 1000 milliLiter(s) (100 mL/Hr) IV Continuous <Continuous>  dextrose 50% Injectable 12.5 Gram(s) IV Push once  dextrose 50% Injectable 25 Gram(s) IV Push once  dextrose 50% Injectable 25 Gram(s) IV Push once  fluconAZOLE IVPB      fluconAZOLE IVPB 400 milliGRAM(s) IV Intermittent every 24 hours  FLUoxetine 20 milliGRAM(s) Oral daily  glucagon  Injectable 1 milliGRAM(s) IntraMuscular once  insulin lispro (ADMELOG) corrective regimen sliding scale   SubCutaneous Before meals and at bedtime  levETIRAcetam 500 milliGRAM(s) Oral two times a day  multivitamin 1 Tablet(s) Oral daily  pantoprazole    Tablet 40 milliGRAM(s) Oral two times a day    MEDICATIONS  (PRN):  acetaminophen     Tablet .. 650 milliGRAM(s) Oral every 6 hours PRN Temp greater or equal to 38C (100.4F), Mild Pain (1 - 3)  aluminum hydroxide/magnesium hydroxide/simethicone Suspension 30 milliLiter(s) Oral every 4 hours PRN Dyspepsia  dextrose Oral Gel 15 Gram(s) Oral once PRN Blood Glucose LESS THAN 70 milliGRAM(s)/deciliter  melatonin 3 milliGRAM(s) Oral at bedtime PRN Insomnia        Objective:    Vitals: Vital Signs Last 24 Hrs  T(C): 36.9 (10-04-22 @ 06:39), Max: 36.9 (10-04-22 @ 06:39)  T(F): 98.5 (10-04-22 @ 06:39), Max: 98.5 (10-04-22 @ 06:39)  HR: 75 (10-04-22 @ 06:39) (72 - 75)  BP: 133/79 (10-04-22 @ 06:39) (133/79 - 143/76)  BP(mean): --  RR: 16 (10-04-22 @ 06:39) (16 - 18)  SpO2: 95% (10-04-22 @ 06:39) (95% - 96%)            I&O's Summary    02 Oct 2022 07:01  -  03 Oct 2022 07:00  --------------------------------------------------------  IN: 120 mL / OUT: 800 mL / NET: -680 mL    03 Oct 2022 07:01  -  04 Oct 2022 06:44  --------------------------------------------------------  IN: 1180 mL / OUT: 300 mL / NET: 880 mL      PHYSICAL EXAM:  GENERAL: NAD, resting comfortably   EYES: EOMI, no scleral icterus  ENMT: Moist mucous membranes  NECK: Supple  CHEST/LUNG: Clear to auscultation bilaterally on anterior chest, on room air   HEART: Regular rate and rhythm  ABDOMEN: Soft, Nontender, distended   EXTREMITIES:  Pitting edema in b/l LE. B/l LE strength 2/5, unable to resist gravity. Gross sensation intact in b/l LE.  SKIN: Darker discoloration on forehead noted. Non-tender.   NERVOUS SYSTEM:  Micronesian speaking. Alert & Oriented X2.      LABS:                        12.6   10.68 )-----------( 101      ( 03 Oct 2022 06:51 )             38.2                         12.5   9.93  )-----------( 109      ( 02 Oct 2022 07:41 )             37.0                         12.9   8.49  )-----------( 101      ( 01 Oct 2022 10:25 )             39.0     Hgb Trend: 12.6<--, 12.5<--, 12.9<--, 12.6<--, 12.9<--  10-03    140  |  107  |  23  ----------------------------<  289<H>  4.2   |  23  |  0.82  10-02    138  |  105  |  17  ----------------------------<  249<H>  3.8   |  22  |  0.71  10-01    137  |  103  |  10  ----------------------------<  183<H>  4.2   |  23  |  0.67    Ca    8.3<L>      03 Oct 2022 06:49  Ca    8.0<L>      02 Oct 2022 07:34  Ca    8.0<L>      01 Oct 2022 10:25  Phos  2.3     10-03  Mg     1.9     10-03    TPro  4.9<L>  /  Alb  3.0<L>  /  TBili  0.6  /  DBili  x   /  AST  21  /  ALT  46<H>  /  AlkPhos  84  10-03  TPro  4.8<L>  /  Alb  2.8<L>  /  TBili  0.6  /  DBili  x   /  AST  22  /  ALT  48<H>  /  AlkPhos  79  10-02  TPro  5.1<L>  /  Alb  3.0<L>  /  TBili  0.7  /  DBili  x   /  AST  29  /  ALT  53<H>  /  AlkPhos  71  10-01    Creatinine Trend: 0.82<--, 0.71<--, 0.67<--, 0.67<--, 0.66<--, 0.71<--                    CAPILLARY BLOOD GLUCOSE      POCT Blood Glucose.: 288 mg/dL (03 Oct 2022 22:12)  POCT Blood Glucose.: 283 mg/dL (03 Oct 2022 17:17)  POCT Blood Glucose.: 264 mg/dL (03 Oct 2022 11:54)  POCT Blood Glucose.: 255 mg/dL (03 Oct 2022 08:05)

## 2022-10-05 NOTE — PROGRESS NOTE ADULT - PROBLEM SELECTOR PLAN 8
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

## 2022-10-05 NOTE — PROGRESS NOTE ADULT - ATTENDING COMMENTS
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 Suspect weakness likely multifactorial 2/2 underlying disease and Hgb drop possibly 2/2 GIB. CT lumbar spine with mild multilevel spinal canal stenosis. MRI brain can not rule out neoplasm. Nsx aware and believe findings are more likely post treatment changes. Needs 9 additional radiation treatments as per Nsx. Accepted for transfer to [facility] for radiation therapy while pt is quarantined prior to [facility]. GI was following given likely GIB, EGD and c-scope performed. Poor prep. Additionally, has candida esophagitis on Fluconazole will transition to PO on discharge if still on therapy. Asymptomatic from gastroparesis standpoint so will hold off on adding additional medications. Holding AC. Heme did not recommend AC. s/p IVF filter given above the knee DVT. Recommended for [facility] on discharge that can accommodate Rad tx. Was exposed to COVID on 9/30, currently asymptomatic and tested positive on 10/3. Needs to quarantine prior to [facility]. Pending transfer to [facility] for radiation and [facility] placement

## 2022-10-05 NOTE — PROGRESS NOTE ADULT - PROBLEM SELECTOR PLAN 10
Diet: CC/DASH diet, advised pt's family and nurse that pt may require smaller bites and smaller portions d/t gastroparesis   DVT ppx: SCD's in LLE, s/p IVC filter in RLE, per heme/onc continue to hold AC   Dispo: PT recommends subacute rehab. CM discussing with family rehab options that work with pt's radiation tx schedule. In process of working on possible transfer to [facility] to continue radiation tx while waiting for transfer to [facility]. Diet: CC/DASH diet, advised pt's family and nurse that pt may require smaller bites and smaller portions d/t gastroparesis   DVT ppx: SCD's in LLE, s/p IVC filter in RLE, per heme/onc continue to hold AC   Dispo: PT recommends subacute rehab. Accepted transfer to [facility], awaiting bed to continue radiation tx while waiting for transfer to [facility] due to COVID (+) status.

## 2022-10-05 NOTE — PROGRESS NOTE ADULT - PROBLEM SELECTOR PLAN 2
- EGD done 9/30 showed esophageal plaques suspicious for candidiasis. Biopsied.    Plan:  - continue empiric tx for Candidiasis: IV fluconazole  plan to continue PO fluconazole after discharge - EGD done 9/30 showed esophageal plaques suspicious for candidiasis. Biopsied.    Plan:  - continue empiric tx for Candidiasis: IV fluconazole  - plan to continue PO fluconazole after discharge

## 2022-10-05 NOTE — PROGRESS NOTE ADULT - PROBLEM SELECTOR PLAN 3
- pt exposed to COVID 9/30, tested positive 9/3  - COVID vaccinated   - currently satting well on room air, asymptomatic     Plan:  - continue to monitor  - monitor fever, maintain O2 sat>90%  - requires 10 day isolation (until 10/11) per [facility] policy - pt exposed to COVID 9/30, tested positive 9/3  - COVID vaccinated   - currently satting well on room air, asymptomatic     Plan:  - continue to monitor  - monitor fever, maintain O2 sat>90%  - requires 10 day isolation (until 10/11) per [facility] policy  - pending transfer to [facility] for continued radiation tx while awaiting [facility] placement

## 2022-10-05 NOTE — PROGRESS NOTE ADULT - SUBJECTIVE AND OBJECTIVE BOX
%%%%INCOMPLETE NOTE%%%%%     Dr. Liliana Tejeda, PGY-1    ANA LOVE  82y  MRN: [id number]    Subjective:    Patient is a 82y old  Male who presents with a chief complaint of GIB, weakness (04 Oct 2022 06:44)      MEDICATIONS  (STANDING):  ascorbic acid 500 milliGRAM(s) Oral daily  atorvastatin 80 milliGRAM(s) Oral at bedtime  chlorhexidine 2% Cloths 1 Application(s) Topical daily  dexAMETHasone     Tablet 4 milliGRAM(s) Oral every 12 hours  dextrose 5%. 1000 milliLiter(s) (100 mL/Hr) IV Continuous <Continuous>  dextrose 5%. 1000 milliLiter(s) (50 mL/Hr) IV Continuous <Continuous>  dextrose 50% Injectable 25 Gram(s) IV Push once  dextrose 50% Injectable 12.5 Gram(s) IV Push once  dextrose 50% Injectable 25 Gram(s) IV Push once  fluconAZOLE IVPB      fluconAZOLE IVPB 400 milliGRAM(s) IV Intermittent every 24 hours  FLUoxetine 20 milliGRAM(s) Oral daily  glucagon  Injectable 1 milliGRAM(s) IntraMuscular once  insulin glargine Injectable (LANTUS) 3 Unit(s) SubCutaneous at bedtime  insulin lispro (ADMELOG) corrective regimen sliding scale   SubCutaneous Before meals and at bedtime  insulin lispro Injectable (ADMELOG) 1 Unit(s) SubCutaneous three times a day before meals  levETIRAcetam 500 milliGRAM(s) Oral two times a day  multivitamin 1 Tablet(s) Oral daily  pantoprazole    Tablet 40 milliGRAM(s) Oral two times a day    MEDICATIONS  (PRN):  acetaminophen     Tablet .. 650 milliGRAM(s) Oral every 6 hours PRN Temp greater or equal to 38C (100.4F), Mild Pain (1 - 3)  aluminum hydroxide/magnesium hydroxide/simethicone Suspension 30 milliLiter(s) Oral every 4 hours PRN Dyspepsia  dextrose Oral Gel 15 Gram(s) Oral once PRN Blood Glucose LESS THAN 70 milliGRAM(s)/deciliter  melatonin 3 milliGRAM(s) Oral at bedtime PRN Insomnia        Objective:    Vitals: Vital Signs Last 24 Hrs  T(C): 36.8 (10-05-22 @ 05:54), Max: 36.9 (10-04-22 @ 13:53)  T(F): 98.3 (10-05-22 @ 05:54), Max: 98.4 (10-04-22 @ 13:53)  HR: 74 (10-05-22 @ 05:54) (71 - 74)  BP: 128/75 (10-05-22 @ 05:54) (128/75 - 151/89)  BP(mean): --  RR: 18 (10-05-22 @ 05:54) (17 - 18)  SpO2: 97% (10-05-22 @ 05:54) (96% - 97%)            I&O's Summary    03 Oct 2022 07:01  -  04 Oct 2022 07:00  --------------------------------------------------------  IN: 1180 mL / OUT: 300 mL / NET: 880 mL    04 Oct 2022 07:01  -  05 Oct 2022 06:54  --------------------------------------------------------  IN: 1030 mL / OUT: 0 mL / NET: 1030 mL        PHYSICAL EXAM:  GENERAL: NAD, well-groomed, well-developed  HEAD:  Atraumatic, Normocephalic  EYES: EOMI, PERRLA, conjunctiva and sclera clear  ENMT: Moist mucous membranes  NECK: Supple, No JVD  CHEST/LUNG: Clear to auscultation bilaterally  HEART: Regular rate and rhythm  ABDOMEN: Soft, Nontender, Nondistended; Bowel sounds present  EXTREMITIES:  2+ Peripheral Pulses, No LE edema  SKIN: No rashes or lesions  NERVOUS SYSTEM:  Alert & Oriented X3, moving all extremities   PSYCH: Speaking in Full Sentences. Laying in bed comfortably; not agitated     LABS:                        11.9   9.33  )-----------( 110      ( 04 Oct 2022 06:16 )             35.9                         12.6   10.68 )-----------( 101      ( 03 Oct 2022 06:51 )             38.2                         12.5   9.93  )-----------( 109      ( 02 Oct 2022 07:41 )             37.0     Hgb Trend: 11.9<--, 12.6<--, 12.5<--, 12.9<--, 12.6<--  10-04    137  |  105  |  19  ----------------------------<  294<H>  4.5   |  23  |  0.62  10-03    140  |  107  |  23  ----------------------------<  289<H>  4.2   |  23  |  0.82  10-02    138  |  105  |  17  ----------------------------<  249<H>  3.8   |  22  |  0.71    Ca    7.9<L>      04 Oct 2022 06:16  Ca    8.3<L>      03 Oct 2022 06:49  Ca    8.0<L>      02 Oct 2022 07:34  Phos  2.6     10-04  Mg     2.0     10-04    TPro  4.6<L>  /  Alb  2.6<L>  /  TBili  0.6  /  DBili  x   /  AST  22  /  ALT  41  /  AlkPhos  87  10-04  TPro  4.9<L>  /  Alb  3.0<L>  /  TBili  0.6  /  DBili  x   /  AST  21  /  ALT  46<H>  /  AlkPhos  84  10-03  TPro  4.8<L>  /  Alb  2.8<L>  /  TBili  0.6  /  DBili  x   /  AST  22  /  ALT  48<H>  /  AlkPhos  79  10-02    Creatinine Trend: 0.62<--, 0.82<--, 0.71<--, 0.67<--, 0.67<--, 0.66<--                    CAPILLARY BLOOD GLUCOSE      POCT Blood Glucose.: 270 mg/dL (04 Oct 2022 21:54)  POCT Blood Glucose.: 284 mg/dL (04 Oct 2022 17:20)  POCT Blood Glucose.: 235 mg/dL (04 Oct 2022 13:00)  POCT Blood Glucose.: 265 mg/dL (04 Oct 2022 09:02)     Dr. Liliana Tejeda, PGY-1    ANA LOVE  82y  MRN: [id number]    Subjective:    Patient is a 82y old  Male who presents with a chief complaint of GIB, weakness (04 Oct 2022 06:44)    No acute events overnight. Pt was accepted for [facility] transfer, awaiting a bed.     Pt seen and evaluated at bedside. Denies HA, abd pain, cp, shortness of breath. A&Ox2 to self and hospital.       MEDICATIONS  (STANDING):  ascorbic acid 500 milliGRAM(s) Oral daily  atorvastatin 80 milliGRAM(s) Oral at bedtime  chlorhexidine 2% Cloths 1 Application(s) Topical daily  dexAMETHasone     Tablet 4 milliGRAM(s) Oral every 12 hours  dextrose 5%. 1000 milliLiter(s) (100 mL/Hr) IV Continuous <Continuous>  dextrose 5%. 1000 milliLiter(s) (50 mL/Hr) IV Continuous <Continuous>  dextrose 50% Injectable 25 Gram(s) IV Push once  dextrose 50% Injectable 12.5 Gram(s) IV Push once  dextrose 50% Injectable 25 Gram(s) IV Push once  fluconAZOLE IVPB      fluconAZOLE IVPB 400 milliGRAM(s) IV Intermittent every 24 hours  FLUoxetine 20 milliGRAM(s) Oral daily  glucagon  Injectable 1 milliGRAM(s) IntraMuscular once  insulin glargine Injectable (LANTUS) 3 Unit(s) SubCutaneous at bedtime  insulin lispro (ADMELOG) corrective regimen sliding scale   SubCutaneous Before meals and at bedtime  insulin lispro Injectable (ADMELOG) 1 Unit(s) SubCutaneous three times a day before meals  levETIRAcetam 500 milliGRAM(s) Oral two times a day  multivitamin 1 Tablet(s) Oral daily  pantoprazole    Tablet 40 milliGRAM(s) Oral two times a day    MEDICATIONS  (PRN):  acetaminophen     Tablet .. 650 milliGRAM(s) Oral every 6 hours PRN Temp greater or equal to 38C (100.4F), Mild Pain (1 - 3)  aluminum hydroxide/magnesium hydroxide/simethicone Suspension 30 milliLiter(s) Oral every 4 hours PRN Dyspepsia  dextrose Oral Gel 15 Gram(s) Oral once PRN Blood Glucose LESS THAN 70 milliGRAM(s)/deciliter  melatonin 3 milliGRAM(s) Oral at bedtime PRN Insomnia        Objective:    Vitals: Vital Signs Last 24 Hrs  T(C): 36.8 (10-05-22 @ 05:54), Max: 36.9 (10-04-22 @ 13:53)  T(F): 98.3 (10-05-22 @ 05:54), Max: 98.4 (10-04-22 @ 13:53)  HR: 74 (10-05-22 @ 05:54) (71 - 74)  BP: 128/75 (10-05-22 @ 05:54) (128/75 - 151/89)  BP(mean): --  RR: 18 (10-05-22 @ 05:54) (17 - 18)  SpO2: 97% (10-05-22 @ 05:54) (96% - 97%)            I&O's Summary    03 Oct 2022 07:01  -  04 Oct 2022 07:00  --------------------------------------------------------  IN: 1180 mL / OUT: 300 mL / NET: 880 mL    04 Oct 2022 07:01  -  05 Oct 2022 06:54  --------------------------------------------------------  IN: 1030 mL / OUT: 0 mL / NET: 1030 mL        PHYSICAL EXAM:  GENERAL: NAD, resting comfortably   EYES: EOMI, no scleral icterus  ENMT: Moist mucous membranes  NECK: Supple  CHEST/LUNG: Clear to auscultation bilaterally on anterior chest, on room air, no increased work of breathing   HEART: Regular rate and rhythm  ABDOMEN: Soft, Nontender, distended   EXTREMITIES:  Pitting edema in b/l LE. B/l LE strength 2/5, unable to resist gravity. Gross sensation intact in b/l LE.  SKIN: Darker discoloration on forehead noted. Non-tender.   NERVOUS SYSTEM:  Mongolian speaking. Alert & Oriented X2.      LABS:                        11.9   9.33  )-----------( 110      ( 04 Oct 2022 06:16 )             35.9                         12.6   10.68 )-----------( 101      ( 03 Oct 2022 06:51 )             38.2                         12.5   9.93  )-----------( 109      ( 02 Oct 2022 07:41 )             37.0     Hgb Trend: 11.9<--, 12.6<--, 12.5<--, 12.9<--, 12.6<--  10-04    137  |  105  |  19  ----------------------------<  294<H>  4.5   |  23  |  0.62  10-03    140  |  107  |  23  ----------------------------<  289<H>  4.2   |  23  |  0.82  10-02    138  |  105  |  17  ----------------------------<  249<H>  3.8   |  22  |  0.71    Ca    7.9<L>      04 Oct 2022 06:16  Ca    8.3<L>      03 Oct 2022 06:49  Ca    8.0<L>      02 Oct 2022 07:34  Phos  2.6     10-04  Mg     2.0     10-04    TPro  4.6<L>  /  Alb  2.6<L>  /  TBili  0.6  /  DBili  x   /  AST  22  /  ALT  41  /  AlkPhos  87  10-04  TPro  4.9<L>  /  Alb  3.0<L>  /  TBili  0.6  /  DBili  x   /  AST  21  /  ALT  46<H>  /  AlkPhos  84  10-03  TPro  4.8<L>  /  Alb  2.8<L>  /  TBili  0.6  /  DBili  x   /  AST  22  /  ALT  48<H>  /  AlkPhos  79  10-02    Creatinine Trend: 0.62<--, 0.82<--, 0.71<--, 0.67<--, 0.67<--, 0.66<--                    CAPILLARY BLOOD GLUCOSE      POCT Blood Glucose.: 270 mg/dL (04 Oct 2022 21:54)  POCT Blood Glucose.: 284 mg/dL (04 Oct 2022 17:20)  POCT Blood Glucose.: 235 mg/dL (04 Oct 2022 13:00)  POCT Blood Glucose.: 265 mg/dL (04 Oct 2022 09:02)     Dr. Liliana Tejeda, PGY-1    ANA LOVE  82y  MRN: [id number]    Subjective:    Patient is a 82y old  Male who presents with a chief complaint of GIB, weakness (04 Oct 2022 06:44)    No acute events overnight. Pt was accepted for [facility] transfer, awaiting a bed.     Pt seen and evaluated at bedside. Denies HA, abd pain, cp, shortness of breath. A&Ox2 to self and hospital.     Updated pt's daughter Faith 2:30PM, informed her of accepted transfer and status of awaiting bed opening.     MEDICATIONS  (STANDING):  ascorbic acid 500 milliGRAM(s) Oral daily  atorvastatin 80 milliGRAM(s) Oral at bedtime  chlorhexidine 2% Cloths 1 Application(s) Topical daily  dexAMETHasone     Tablet 4 milliGRAM(s) Oral every 12 hours  dextrose 5%. 1000 milliLiter(s) (100 mL/Hr) IV Continuous <Continuous>  dextrose 5%. 1000 milliLiter(s) (50 mL/Hr) IV Continuous <Continuous>  dextrose 50% Injectable 25 Gram(s) IV Push once  dextrose 50% Injectable 12.5 Gram(s) IV Push once  dextrose 50% Injectable 25 Gram(s) IV Push once  fluconAZOLE IVPB      fluconAZOLE IVPB 400 milliGRAM(s) IV Intermittent every 24 hours  FLUoxetine 20 milliGRAM(s) Oral daily  glucagon  Injectable 1 milliGRAM(s) IntraMuscular once  insulin glargine Injectable (LANTUS) 3 Unit(s) SubCutaneous at bedtime  insulin lispro (ADMELOG) corrective regimen sliding scale   SubCutaneous Before meals and at bedtime  insulin lispro Injectable (ADMELOG) 1 Unit(s) SubCutaneous three times a day before meals  levETIRAcetam 500 milliGRAM(s) Oral two times a day  multivitamin 1 Tablet(s) Oral daily  pantoprazole    Tablet 40 milliGRAM(s) Oral two times a day    MEDICATIONS  (PRN):  acetaminophen     Tablet .. 650 milliGRAM(s) Oral every 6 hours PRN Temp greater or equal to 38C (100.4F), Mild Pain (1 - 3)  aluminum hydroxide/magnesium hydroxide/simethicone Suspension 30 milliLiter(s) Oral every 4 hours PRN Dyspepsia  dextrose Oral Gel 15 Gram(s) Oral once PRN Blood Glucose LESS THAN 70 milliGRAM(s)/deciliter  melatonin 3 milliGRAM(s) Oral at bedtime PRN Insomnia        Objective:    Vitals: Vital Signs Last 24 Hrs  T(C): 36.8 (10-05-22 @ 05:54), Max: 36.9 (10-04-22 @ 13:53)  T(F): 98.3 (10-05-22 @ 05:54), Max: 98.4 (10-04-22 @ 13:53)  HR: 74 (10-05-22 @ 05:54) (71 - 74)  BP: 128/75 (10-05-22 @ 05:54) (128/75 - 151/89)  BP(mean): --  RR: 18 (10-05-22 @ 05:54) (17 - 18)  SpO2: 97% (10-05-22 @ 05:54) (96% - 97%)            I&O's Summary    03 Oct 2022 07:01  -  04 Oct 2022 07:00  --------------------------------------------------------  IN: 1180 mL / OUT: 300 mL / NET: 880 mL    04 Oct 2022 07:01  -  05 Oct 2022 06:54  --------------------------------------------------------  IN: 1030 mL / OUT: 0 mL / NET: 1030 mL        PHYSICAL EXAM:  GENERAL: NAD, resting comfortably   EYES: EOMI, no scleral icterus  ENMT: Moist mucous membranes  NECK: Supple  CHEST/LUNG: Clear to auscultation bilaterally on anterior chest, on room air, no increased work of breathing   HEART: Regular rate and rhythm  ABDOMEN: Soft, Nontender, distended   EXTREMITIES:  Pitting edema in b/l LE. B/l LE strength 2/5, unable to resist gravity. Gross sensation intact in b/l LE.  SKIN: Darker discoloration on forehead noted. Non-tender.   NERVOUS SYSTEM:  Yi speaking. Alert & Oriented X2.      LABS:                        11.9   9.33  )-----------( 110      ( 04 Oct 2022 06:16 )             35.9                         12.6   10.68 )-----------( 101      ( 03 Oct 2022 06:51 )             38.2                         12.5   9.93  )-----------( 109      ( 02 Oct 2022 07:41 )             37.0     Hgb Trend: 11.9<--, 12.6<--, 12.5<--, 12.9<--, 12.6<--  10-04    137  |  105  |  19  ----------------------------<  294<H>  4.5   |  23  |  0.62  10-03    140  |  107  |  23  ----------------------------<  289<H>  4.2   |  23  |  0.82  10-02    138  |  105  |  17  ----------------------------<  249<H>  3.8   |  22  |  0.71    Ca    7.9<L>      04 Oct 2022 06:16  Ca    8.3<L>      03 Oct 2022 06:49  Ca    8.0<L>      02 Oct 2022 07:34  Phos  2.6     10-04  Mg     2.0     10-04    TPro  4.6<L>  /  Alb  2.6<L>  /  TBili  0.6  /  DBili  x   /  AST  22  /  ALT  41  /  AlkPhos  87  10-04  TPro  4.9<L>  /  Alb  3.0<L>  /  TBili  0.6  /  DBili  x   /  AST  21  /  ALT  46<H>  /  AlkPhos  84  10-03  TPro  4.8<L>  /  Alb  2.8<L>  /  TBili  0.6  /  DBili  x   /  AST  22  /  ALT  48<H>  /  AlkPhos  79  10-02    Creatinine Trend: 0.62<--, 0.82<--, 0.71<--, 0.67<--, 0.67<--, 0.66<--                    CAPILLARY BLOOD GLUCOSE      POCT Blood Glucose.: 270 mg/dL (04 Oct 2022 21:54)  POCT Blood Glucose.: 284 mg/dL (04 Oct 2022 17:20)  POCT Blood Glucose.: 235 mg/dL (04 Oct 2022 13:00)  POCT Blood Glucose.: 265 mg/dL (04 Oct 2022 09:02)

## 2022-10-05 NOTE — PROGRESS NOTE ADULT - ASSESSMENT
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 Admitted to medicine for further workup.  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 Admitted to medicine for further workup. Was exposed to COVID, tested positive 10/3. Accepted for [facility] transfer, pending bed, for continued radiation tx while pending [facility] placement.

## 2022-10-05 NOTE — PROGRESS NOTE ADULT - PROBLEM SELECTOR PLAN 7
- diagnosed 7/18/22, s/p R stereo biopsy, TIMOTHY x2, R crani for tumor debulking 7/28/22, on temozolomide (last dose 4 days ago)  - follows Dr. Mcneil and Dr. Borja outpt  - [facility] here showing possible residual or remanent tumor   - MRI brain showed findings representing residual/recurrent neoplasm and/or posttreatment changes. presence of neoplasm is not excluded    PLAN:   - pending 9 more radiation tx; discussed with rad-onc team for possible transfer to [facility] to continue radiation tx  - neuro surg following here; findings most likely represent post treatment changes, patient can continue to follow up as an outpatient with Dr. Mcneil as planned after their current admission  - c/w dexamethasone 4mg BID, keppra 500mg BID - diagnosed 7/18/22, s/p R stereo biopsy, TIMOTHY x2, R crani for tumor debulking 7/28/22, on temozolomide (last dose 4 days ago)  - follows Dr. Mcneil and Dr. Borja outpt  - [facility] here showing possible residual or remanent tumor   - MRI brain showed findings representing residual/recurrent neoplasm and/or posttreatment changes. presence of neoplasm is not excluded    PLAN:   - pending 9 more radiation tx; discussed with rad-onc team for transfer to [facility] to continue radiation tx, accepted transfer, now awaiting bed  - neuro surg following here; findings most likely represent post treatment changes, patient can continue to follow up as an outpatient with Dr. Mcneil as planned after their current admission  - c/w dexamethasone 4mg BID, keppra 500mg BID

## 2022-10-06 NOTE — PROGRESS NOTE ADULT - PROBLEM SELECTOR PLAN 1
p/w dark brown stools x 1-2 days i/s/o lovenox use outpt (1-2 doses) for R DVT. Here found to have +FOBT and acute downtrend in hgb from 16.5 (9/22) to 13.7, concerning for GI bleed. HD stable   - giving timing, likely 2/2 AC use which has now been discontinued, possibly LGIB, ?diverticulosis. ddx also includes PUD given steroid use, anorexia, BUN/Cr ratio ~34, although lower suspicion given no abd pain, no black stools.  - last colonoscopy 2014, results unknown but was told to repeat in 10 yrs   - s/p PPI 80mg IV x1  - GI consulted; plan for EGD/colonoscopy performed yesterday; large amount of food in stomach c/f gastroparesis, bx taken from erythematous mucosa in stomach. stool seen in colon, no blood or bleeding noted.    PLAN:   - c/w PO PPI 40mg BID  - will require repeat scope outpatient

## 2022-10-06 NOTE — PROGRESS NOTE ADULT - PROBLEM SELECTOR PLAN 10
Diet: CC/DASH diet, advised pt's family and nurse that pt may require smaller bites and smaller portions d/t gastroparesis   DVT ppx: SCD's in LLE, s/p IVC filter in RLE, per heme/onc continue to hold AC   Dispo: PT recommends subacute rehab. Accepted transfer to [facility] awaiting bed to continue radiation tx while waiting for transfer to [facility] due to COVID (+) status.

## 2022-10-06 NOTE — PROGRESS NOTE ADULT - PROBLEM SELECTOR PLAN 5
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 plts 101 on admission, improved from 77 on 9/22.   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 Silva)  - On 9/21, he was found to have a DVT and was started on Lovenox, which was quickly discontinued after outpt labs showed plt of 77 < 172 (8/2022). Per daughter only took 1 or 2 doses of the lovenox, so thrombocytopenia more likely 2/ temozolomide, which was also discontinued at that time  - 9/28 HIT assay (heparin pf4 ab) negative  - neg serotonin releasing assay     PLAN:  - continue to monitor plts on CBC, currently downtrending   - no chemical DVT ppx for now

## 2022-10-06 NOTE — PROGRESS NOTE ADULT - ASSESSMENT
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 Admitted to medicine for further workup. Was exposed to COVID, tested positive 10/3. Accepted for [facility] transfer, pending bed, for continued radiation tx while pending [facility] placement.

## 2022-10-06 NOTE — PROGRESS NOTE ADULT - PROBLEM SELECTOR PLAN 4
generalized weakness x 3 weeks with worsening LE weakness x 5 days w/ difficulty ambulating. Here with 2/5 strength in BLE, 4/5 in UE, rectal tone intact, no changes in urinary sxs (although w/ history of urinary incontinence). no acute changes in mental status (AAOx2, mild confusion since GBM dx)  - given diproprionate weakness, concerning for spinal pathology ?mets, ?cauda equina   - generalized weakness due to malignancy, decreased PO intake, and relative drop in hgb may also be contributing  - [facility] here showing post-op changes, possible residual or remnant tumor   - CT L-spine showed degenerative changes resulting in mild multilevel spinal canal stenosis or neural foraminal narrowing  - MRI head negative    PLAN:  - neurosurgery following; patient can continue to follow up as an outpatient with Dr. Mcneil as planned after their current admission  - PT recommends [facility], OT [facility] or home OT

## 2022-10-06 NOTE — PROGRESS NOTE ADULT - ATTENDING COMMENTS
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 Suspect weakness likely multifactorial 2/2 underlying disease and Hgb drop possibly 2/2 GIB. CT lumbar spine with mild multilevel spinal canal stenosis. MRI brain can not rule out neoplasm. Nsx aware and believe findings are more likely post treatment changes. Needs 9 additional radiation treatments as per Nsx. Accepted for transfer to [facility] for radiation therapy while pt is quarantined prior to [facility]. GI was following given likely GIB, EGD and c-scope performed. Poor prep. Additionally, has candida esophagitis on Fluconazole will transition to PO on discharge if still on therapy. Asymptomatic from gastroparesis standpoint so will hold off on adding additional medications. Holding AC. Heme did not recommend AC. s/p IVF filter given above the knee DVT. Recommended for [facility] on discharge that can accommodate Rad tx. Was exposed to COVID on 9/30, currently asymptomatic and tested positive on 10/3. Needs to quarantine prior to [facility]. Pending transfer to [facility] for radiation and [facility] placement

## 2022-10-06 NOTE — PROGRESS NOTE ADULT - PROBLEM SELECTOR PLAN 3
- pt exposed to COVID 9/30, tested positive 9/3  - COVID vaccinated   - currently satting well on room air, asymptomatic     Plan:  - continue to monitor  - monitor fever, maintain O2 sat>90%  - requires 10 day isolation (until 10/11) per [facility] policy  - pending transfer to [facility] for continued radiation tx while awaiting [facility] placement

## 2022-10-06 NOTE — PROGRESS NOTE ADULT - PROBLEM SELECTOR PLAN 7
- diagnosed 7/18/22, s/p R stereo biopsy, TIMOTHY x2, R crani for tumor debulking 7/28/22, on temozolomide (last dose 4 days ago)  - follows Dr. Mcneil and Dr. Borja outpt  - [facility] here showing possible residual or remanent tumor   - MRI brain showed findings representing residual/recurrent neoplasm and/or posttreatment changes. presence of neoplasm is not excluded    PLAN:   - pending 9 more radiation tx; discussed with rad-onc team for transfer to [facility] to continue radiation tx, accepted transfer, now awaiting bed  - neuro surg following here; findings most likely represent post treatment changes, patient can continue to follow up as an outpatient with Dr. Mcneil as planned after their current admission  - c/w dexamethasone 4mg BID, keppra 500mg BID

## 2022-10-06 NOTE — PROGRESS NOTE ADULT - SUBJECTIVE AND OBJECTIVE BOX
Deloris Martinez MD  PGY 2 Department of Internal Medicine  Pager: [phone] ([facility]) /[phone] ([facility])       Patient is a 82y old  Male who presents with a chief complaint of GIB, weakness (05 Oct 2022 06:54)      SUBJECTIVE / OVERNIGHT EVENTS: Pt seen and examined. No acute overnight events. Pending transfer to [facility]         MEDICATIONS  (STANDING):  ascorbic acid 500 milliGRAM(s) Oral daily  atorvastatin 80 milliGRAM(s) Oral at bedtime  chlorhexidine 2% Cloths 1 Application(s) Topical daily  dexAMETHasone     Tablet 4 milliGRAM(s) Oral every 12 hours  dextrose 5%. 1000 milliLiter(s) (100 mL/Hr) IV Continuous <Continuous>  dextrose 5%. 1000 milliLiter(s) (50 mL/Hr) IV Continuous <Continuous>  dextrose 50% Injectable 25 Gram(s) IV Push once  dextrose 50% Injectable 12.5 Gram(s) IV Push once  dextrose 50% Injectable 25 Gram(s) IV Push once  fluconAZOLE IVPB      fluconAZOLE IVPB 400 milliGRAM(s) IV Intermittent every 24 hours  FLUoxetine 20 milliGRAM(s) Oral daily  glucagon  Injectable 1 milliGRAM(s) IntraMuscular once  insulin glargine Injectable (LANTUS) 4 Unit(s) SubCutaneous at bedtime  insulin lispro (ADMELOG) corrective regimen sliding scale   SubCutaneous Before meals and at bedtime  insulin lispro Injectable (ADMELOG) 2 Unit(s) SubCutaneous three times a day before meals  levETIRAcetam 500 milliGRAM(s) Oral two times a day  multivitamin 1 Tablet(s) Oral daily  pantoprazole    Tablet 40 milliGRAM(s) Oral two times a day    MEDICATIONS  (PRN):  acetaminophen     Tablet .. 650 milliGRAM(s) Oral every 6 hours PRN Temp greater or equal to 38C (100.4F), Mild Pain (1 - 3)  aluminum hydroxide/magnesium hydroxide/simethicone Suspension 30 milliLiter(s) Oral every 4 hours PRN Dyspepsia  dextrose Oral Gel 15 Gram(s) Oral once PRN Blood Glucose LESS THAN 70 milliGRAM(s)/deciliter  melatonin 3 milliGRAM(s) Oral at bedtime PRN Insomnia      I&O's Summary    05 Oct 2022 07:01  -  06 Oct 2022 07:00  --------------------------------------------------------  IN: 490 mL / OUT: 0 mL / NET: 490 mL        Vital Signs Last 24 Hrs  T(C): 36.7 (06 Oct 2022 04:54), Max: 36.9 (05 Oct 2022 22:02)  T(F): 98.1 (06 Oct 2022 04:54), Max: 98.4 (05 Oct 2022 22:02)  HR: 65 (06 Oct 2022 04:54) (65 - 75)  BP: 147/81 (06 Oct 2022 04:54) (132/72 - 147/81)  BP(mean): --  RR: 18 (06 Oct 2022 04:54) (18 - 18)  SpO2: 95% (06 Oct 2022 04:54) (95% - 96%)    Parameters below as of 06 Oct 2022 04:54  Patient On (Oxygen Delivery Method): room air        CAPILLARY BLOOD GLUCOSE      POCT Blood Glucose.: 203 mg/dL (05 Oct 2022 21:55)  POCT Blood Glucose.: 278 mg/dL (05 Oct 2022 18:02)  POCT Blood Glucose.: 226 mg/dL (05 Oct 2022 12:55)  POCT Blood Glucose.: 239 mg/dL (05 Oct 2022 09:17)      PHYSICAL EXAM:  GENERAL: NAD, resting comfortably   EYES: EOMI, no scleral icterus  ENMT: Moist mucous membranes  NECK: Supple  CHEST/LUNG: Clear to auscultation bilaterally on anterior chest, on room air, no increased work of breathing   HEART: Regular rate and rhythm  ABDOMEN: Soft, Nontender, distended   EXTREMITIES:  Pitting edema in b/l LE. B/l LE strength 2/5, unable to resist gravity. Gross sensation intact in b/l LE.  SKIN: Darker discoloration on forehead noted. Non-tender.   NERVOUS SYSTEM:  French speaking. Alert & Oriented X2.       LABS:                        12.9   9.18  )-----------( 81       ( 06 Oct 2022 06:50 )             38.8     Auto Eosinophil # x     / Auto Eosinophil % x     / Auto Neutrophil # x     / Auto Neutrophil % x     / BANDS % x                            12.3   9.09  )-----------( 88       ( 05 Oct 2022 07:25 )             37.0     Auto Eosinophil # x     / Auto Eosinophil % x     / Auto Neutrophil # x     / Auto Neutrophil % x     / BANDS % x        10-05    136  |  105  |  21  ----------------------------<  271<H>  4.3   |  22  |  0.60    Ca    8.2<L>      05 Oct 2022 07:22  Mg     1.9     10-05  Phos  2.7     10-05  TPro  4.7<L>  /  Alb  2.6<L>  /  TBili  0.6  /  DBili  x   /  AST  21  /  ALT  42  /  AlkPhos  85  10-05

## 2022-10-06 NOTE — PROGRESS NOTE ADULT - PROBLEM SELECTOR PLAN 8
a1c 7% (7/24/22), likely 2/2 steroid-induced DM  - holding home oral agents while inpatient (januvia 100mg qd, metformin ER 500mg BID)    PLAN:   - adjust insulin regimen daily   - low dose correctional scale  - FS TID qAC and qhs or q6h while NPO

## 2022-10-06 NOTE — PROGRESS NOTE ADULT - PROBLEM SELECTOR PLAN 2
- EGD done 9/30 showed esophageal plaques suspicious for candidiasis. Biopsied.    Plan:  - continue empiric tx for Candidiasis: IV fluconazole 9/30-  - plan to continue PO fluconazole after discharge

## 2022-10-07 NOTE — PROGRESS NOTE ADULT - ASSESSMENT
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 Admitted to medicine for further workup. Was exposed to COVID, tested positive 10/3. Accepted for [facility] transfer, pending bed, for continued radiation tx while pending [facility] placement.

## 2022-10-07 NOTE — PROGRESS NOTE ADULT - PROBLEM SELECTOR PLAN 1
p/w dark brown stools x 1-2 days i/s/o lovenox use outpt (1-2 doses) for R DVT. Here found to have +FOBT and acute downtrend in hgb from 16.5 (9/22) to 13.7, concerning for GI bleed. HD stable   - giving timing, likely 2/2 AC use which has now been discontinued, possibly LGIB, ?diverticulosis. ddx also includes PUD given steroid use, anorexia, BUN/Cr ratio ~34, although lower suspicion given no abd pain, no black stools.  - last colonoscopy 2014, results unknown but was told to repeat in 10 yrs   - s/p PPI 80mg IV x1  - GI consulted; plan for EGD/colonoscopy performed yesterday; large amount of food in stomach c/f gastroparesis, bx taken from erythematous mucosa in stomach. stool seen in colon, no blood or bleeding noted.    PLAN:   - c/w PO PPI 40mg BID  - will require repeat scope outpatient p/w dark brown stools x 1-2 days i/s/o lovenox use outpt (1-2 doses) for R DVT. Here found to have +FOBT and acute downtrend in hgb from 16.5 (9/22) to 13.7, concerning for GI bleed. HD stable   - giving timing, likely 2/2 AC use which has now been discontinued, possibly LGIB, ?diverticulosis. ddx also includes PUD given steroid use, anorexia, BUN/Cr ratio ~34, although lower suspicion given no abd pain, no black stools.  - last colonoscopy 2014, results unknown but was told to repeat in 10 yrs   - s/p PPI 80mg IV x1  - GI consulted; plan for EGD/colonoscopy performed; large amount of food in stomach c/f gastroparesis, bx taken from erythematous mucosa in stomach. stool seen in colon, no blood or bleeding noted.    PLAN:   - c/w PO PPI 40mg BID  - will require repeat scope outpatient

## 2022-10-07 NOTE — PROGRESS NOTE ADULT - PROBLEM SELECTOR PLAN 3
- pt exposed to COVID 9/30, tested positive 9/3  - COVID vaccinated   - currently satting well on room air, asymptomatic     Plan:  - continue to monitor  - monitor fever, maintain O2 sat>90%  - requires 10 day isolation (until 10/11) per [facility] policy  - pending transfer to [facility] for continued radiation tx while awaiting [facility] placement

## 2022-10-07 NOTE — PROGRESS NOTE ADULT - PROBLEM SELECTOR PLAN 7
- diagnosed 7/18/22, s/p R stereo biopsy, TIMOTHY x2, R crani for tumor debulking 7/28/22, on temozolomide (last dose 4 days ago)  - follows Dr. Mcneil and Dr. Borja outpt  - [facility] here showing possible residual or remanent tumor   - MRI brain showed findings representing residual/recurrent neoplasm and/or posttreatment changes. presence of neoplasm is not excluded    PLAN:   - pending 9 more radiation tx; discussed with rad-onc team for transfer to [facility] to continue radiation tx, accepted transfer, now awaiting bed  - neuro surg following here; findings most likely represent post treatment changes, patient can continue to follow up as an outpatient with Dr. Mcneil as planned after their current admission  - c/w dexamethasone 4mg BID, keppra 500mg BID

## 2022-10-07 NOTE — PROGRESS NOTE ADULT - SUBJECTIVE AND OBJECTIVE BOX
%%%%INCOMPLETE NOTE%%%%%     Dr. Liliana Tejeda, PGY-1    ANA LOVE  82y  MRN: [id number]    Subjective:    Patient is a 82y old  Male who presents with a chief complaint of GIB, weakness (06 Oct 2022 07:27)      MEDICATIONS  (STANDING):  ascorbic acid 500 milliGRAM(s) Oral daily  atorvastatin 80 milliGRAM(s) Oral at bedtime  chlorhexidine 2% Cloths 1 Application(s) Topical daily  dexAMETHasone     Tablet 4 milliGRAM(s) Oral every 12 hours  dextrose 5%. 1000 milliLiter(s) (100 mL/Hr) IV Continuous <Continuous>  dextrose 5%. 1000 milliLiter(s) (50 mL/Hr) IV Continuous <Continuous>  dextrose 50% Injectable 25 Gram(s) IV Push once  dextrose 50% Injectable 12.5 Gram(s) IV Push once  dextrose 50% Injectable 25 Gram(s) IV Push once  fluconAZOLE IVPB 400 milliGRAM(s) IV Intermittent every 24 hours  fluconAZOLE IVPB      FLUoxetine 20 milliGRAM(s) Oral daily  glucagon  Injectable 1 milliGRAM(s) IntraMuscular once  insulin glargine Injectable (LANTUS) 8 Unit(s) SubCutaneous at bedtime  insulin lispro (ADMELOG) corrective regimen sliding scale   SubCutaneous Before meals and at bedtime  insulin lispro Injectable (ADMELOG) 4 Unit(s) SubCutaneous three times a day before meals  levETIRAcetam 500 milliGRAM(s) Oral two times a day  multivitamin 1 Tablet(s) Oral daily  pantoprazole    Tablet 40 milliGRAM(s) Oral two times a day    MEDICATIONS  (PRN):  acetaminophen     Tablet .. 650 milliGRAM(s) Oral every 6 hours PRN Temp greater or equal to 38C (100.4F), Mild Pain (1 - 3)  aluminum hydroxide/magnesium hydroxide/simethicone Suspension 30 milliLiter(s) Oral every 4 hours PRN Dyspepsia  dextrose Oral Gel 15 Gram(s) Oral once PRN Blood Glucose LESS THAN 70 milliGRAM(s)/deciliter  melatonin 3 milliGRAM(s) Oral at bedtime PRN Insomnia  polyethylene glycol 3350 17 Gram(s) Oral at bedtime PRN Constipation  senna 2 Tablet(s) Oral at bedtime PRN Constipation        Objective:    Vitals: Vital Signs Last 24 Hrs  T(C): 36.9 (10-06-22 @ 20:11), Max: 36.9 (10-06-22 @ 10:32)  T(F): 98.4 (10-06-22 @ 20:11), Max: 98.5 (10-06-22 @ 10:32)  HR: 64 (10-06-22 @ 20:11) (64 - 77)  BP: 138/78 (10-06-22 @ 20:11) (134/75 - 145/81)  BP(mean): --  RR: 18 (10-06-22 @ 20:11) (18 - 18)  SpO2: 96% (10-06-22 @ 20:11) (95% - 98%)            I&O's Summary    05 Oct 2022 07:01  -  06 Oct 2022 07:00  --------------------------------------------------------  IN: 490 mL / OUT: 0 mL / NET: 490 mL    06 Oct 2022 07:01  -  07 Oct 2022 06:40  --------------------------------------------------------  IN: 820 mL / OUT: 0 mL / NET: 820 mL        PHYSICAL EXAM:  GENERAL: NAD, well-groomed, well-developed  HEAD:  Atraumatic, Normocephalic  EYES: EOMI, PERRLA, conjunctiva and sclera clear  ENMT: Moist mucous membranes  NECK: Supple, No JVD  CHEST/LUNG: Clear to auscultation bilaterally  HEART: Regular rate and rhythm  ABDOMEN: Soft, Nontender, Nondistended; Bowel sounds present  EXTREMITIES:  2+ Peripheral Pulses, No LE edema  SKIN: No rashes or lesions  NERVOUS SYSTEM:  Alert & Oriented X3, moving all extremities   PSYCH: Speaking in Full Sentences. Laying in bed comfortably; not agitated     LABS:                        12.9   9.18  )-----------( 81       ( 06 Oct 2022 06:50 )             38.8                         12.3   9.09  )-----------( 88       ( 05 Oct 2022 07:25 )             37.0     Hgb Trend: 12.9<--, 12.3<--, 11.9<--, 12.6<--, 12.5<--  10-06    133<L>  |  102  |  20  ----------------------------<  202<H>  4.5   |  19<L>  |  0.61  10-05    136  |  105  |  21  ----------------------------<  271<H>  4.3   |  22  |  0.60    Ca    8.2<L>      06 Oct 2022 06:47  Ca    8.2<L>      05 Oct 2022 07:22  Phos  2.9     10-06  Mg     1.9     10-06    TPro  4.7<L>  /  Alb  2.5<L>  /  TBili  0.8  /  DBili  x   /  AST  25  /  ALT  40  /  AlkPhos  84  10-06  TPro  4.7<L>  /  Alb  2.6<L>  /  TBili  0.6  /  DBili  x   /  AST  21  /  ALT  42  /  AlkPhos  85  10-05    Creatinine Trend: 0.61<--, 0.60<--, 0.62<--, 0.82<--, 0.71<--, 0.67<--                    CAPILLARY BLOOD GLUCOSE      POCT Blood Glucose.: 141 mg/dL (06 Oct 2022 22:21)  POCT Blood Glucose.: 175 mg/dL (06 Oct 2022 18:11)  POCT Blood Glucose.: 216 mg/dL (06 Oct 2022 17:06)  POCT Blood Glucose.: 227 mg/dL (06 Oct 2022 14:29)  POCT Blood Glucose.: 253 mg/dL (06 Oct 2022 12:27)  POCT Blood Glucose.: 323 mg/dL (06 Oct 2022 10:36)  POCT Blood Glucose.: 208 mg/dL (06 Oct 2022 08:46)     Dr. Liliana Tejeda, PGY-1    ANA LOVE  82y  MRN: [id number]    Subjective:    Patient is a 82y old  Male who presents with a chief complaint of GIB, weakness (06 Oct 2022 07:27)    No acute events overnight. Pt seen and evaluated at bedside. Denies cp, abd pain, sob.     Awaiting bed at [facility] for transfer.     MEDICATIONS  (STANDING):  ascorbic acid 500 milliGRAM(s) Oral daily  atorvastatin 80 milliGRAM(s) Oral at bedtime  chlorhexidine 2% Cloths 1 Application(s) Topical daily  dexAMETHasone     Tablet 4 milliGRAM(s) Oral every 12 hours  dextrose 5%. 1000 milliLiter(s) (100 mL/Hr) IV Continuous <Continuous>  dextrose 5%. 1000 milliLiter(s) (50 mL/Hr) IV Continuous <Continuous>  dextrose 50% Injectable 25 Gram(s) IV Push once  dextrose 50% Injectable 12.5 Gram(s) IV Push once  dextrose 50% Injectable 25 Gram(s) IV Push once  fluconAZOLE IVPB 400 milliGRAM(s) IV Intermittent every 24 hours  fluconAZOLE IVPB      FLUoxetine 20 milliGRAM(s) Oral daily  glucagon  Injectable 1 milliGRAM(s) IntraMuscular once  insulin glargine Injectable (LANTUS) 8 Unit(s) SubCutaneous at bedtime  insulin lispro (ADMELOG) corrective regimen sliding scale   SubCutaneous Before meals and at bedtime  insulin lispro Injectable (ADMELOG) 4 Unit(s) SubCutaneous three times a day before meals  levETIRAcetam 500 milliGRAM(s) Oral two times a day  multivitamin 1 Tablet(s) Oral daily  pantoprazole    Tablet 40 milliGRAM(s) Oral two times a day    MEDICATIONS  (PRN):  acetaminophen     Tablet .. 650 milliGRAM(s) Oral every 6 hours PRN Temp greater or equal to 38C (100.4F), Mild Pain (1 - 3)  aluminum hydroxide/magnesium hydroxide/simethicone Suspension 30 milliLiter(s) Oral every 4 hours PRN Dyspepsia  dextrose Oral Gel 15 Gram(s) Oral once PRN Blood Glucose LESS THAN 70 milliGRAM(s)/deciliter  melatonin 3 milliGRAM(s) Oral at bedtime PRN Insomnia  polyethylene glycol 3350 17 Gram(s) Oral at bedtime PRN Constipation  senna 2 Tablet(s) Oral at bedtime PRN Constipation        Objective:    Vitals: Vital Signs Last 24 Hrs  T(C): 36.9 (10-06-22 @ 20:11), Max: 36.9 (10-06-22 @ 10:32)  T(F): 98.4 (10-06-22 @ 20:11), Max: 98.5 (10-06-22 @ 10:32)  HR: 64 (10-06-22 @ 20:11) (64 - 77)  BP: 138/78 (10-06-22 @ 20:11) (134/75 - 145/81)  BP(mean): --  RR: 18 (10-06-22 @ 20:11) (18 - 18)  SpO2: 96% (10-06-22 @ 20:11) (95% - 98%)            I&O's Summary    05 Oct 2022 07:01  -  06 Oct 2022 07:00  --------------------------------------------------------  IN: 490 mL / OUT: 0 mL / NET: 490 mL    06 Oct 2022 07:01  -  07 Oct 2022 06:40  --------------------------------------------------------  IN: 820 mL / OUT: 0 mL / NET: 820 mL        PHYSICAL EXAM:  GENERAL: NAD, lying comfortably in bed   EYES: EOMI, no scleral icterus  ENMT: Moist mucous membranes  NECK: Supple  CHEST/LUNG: Clear to auscultation bilaterally on anterior chest, on room air, no increased work of breathing   HEART: Regular rate and rhythm  ABDOMEN: Soft, Nontender, distended   EXTREMITIES:  Pitting edema in b/l LE. B/l LE strength 2/5, unable to resist gravity. Gross sensation intact in b/l LE.  SKIN: Darker discoloration on forehead noted. Non-tender.   NERVOUS SYSTEM:  Mexican speaking. Alert & Oriented X2.       LABS:                        12.9   9.18  )-----------( 81       ( 06 Oct 2022 06:50 )             38.8                         12.3   9.09  )-----------( 88       ( 05 Oct 2022 07:25 )             37.0     Hgb Trend: 12.9<--, 12.3<--, 11.9<--, 12.6<--, 12.5<--  10-06    133<L>  |  102  |  20  ----------------------------<  202<H>  4.5   |  19<L>  |  0.61  10-05    136  |  105  |  21  ----------------------------<  271<H>  4.3   |  22  |  0.60    Ca    8.2<L>      06 Oct 2022 06:47  Ca    8.2<L>      05 Oct 2022 07:22  Phos  2.9     10-06  Mg     1.9     10-06    TPro  4.7<L>  /  Alb  2.5<L>  /  TBili  0.8  /  DBili  x   /  AST  25  /  ALT  40  /  AlkPhos  84  10-06  TPro  4.7<L>  /  Alb  2.6<L>  /  TBili  0.6  /  DBili  x   /  AST  21  /  ALT  42  /  AlkPhos  85  10-05    Creatinine Trend: 0.61<--, 0.60<--, 0.62<--, 0.82<--, 0.71<--, 0.67<--                    CAPILLARY BLOOD GLUCOSE      POCT Blood Glucose.: 141 mg/dL (06 Oct 2022 22:21)  POCT Blood Glucose.: 175 mg/dL (06 Oct 2022 18:11)  POCT Blood Glucose.: 216 mg/dL (06 Oct 2022 17:06)  POCT Blood Glucose.: 227 mg/dL (06 Oct 2022 14:29)  POCT Blood Glucose.: 253 mg/dL (06 Oct 2022 12:27)  POCT Blood Glucose.: 323 mg/dL (06 Oct 2022 10:36)  POCT Blood Glucose.: 208 mg/dL (06 Oct 2022 08:46)     Dr. Liliana Tejeda, PGY-1    ANA LOVE  82y  MRN: [id number]    Subjective:    Patient is a 82y old  Male who presents with a chief complaint of GIB, weakness (06 Oct 2022 07:27)    No acute events overnight. Pt seen and evaluated at bedside. Denies cp, abd pain, sob.     Awaiting bed at [facility] for transfer.     Updated daughter Faith via phone.     MEDICATIONS  (STANDING):  ascorbic acid 500 milliGRAM(s) Oral daily  atorvastatin 80 milliGRAM(s) Oral at bedtime  chlorhexidine 2% Cloths 1 Application(s) Topical daily  dexAMETHasone     Tablet 4 milliGRAM(s) Oral every 12 hours  dextrose 5%. 1000 milliLiter(s) (100 mL/Hr) IV Continuous <Continuous>  dextrose 5%. 1000 milliLiter(s) (50 mL/Hr) IV Continuous <Continuous>  dextrose 50% Injectable 25 Gram(s) IV Push once  dextrose 50% Injectable 12.5 Gram(s) IV Push once  dextrose 50% Injectable 25 Gram(s) IV Push once  fluconAZOLE IVPB 400 milliGRAM(s) IV Intermittent every 24 hours  fluconAZOLE IVPB      FLUoxetine 20 milliGRAM(s) Oral daily  glucagon  Injectable 1 milliGRAM(s) IntraMuscular once  insulin glargine Injectable (LANTUS) 8 Unit(s) SubCutaneous at bedtime  insulin lispro (ADMELOG) corrective regimen sliding scale   SubCutaneous Before meals and at bedtime  insulin lispro Injectable (ADMELOG) 4 Unit(s) SubCutaneous three times a day before meals  levETIRAcetam 500 milliGRAM(s) Oral two times a day  multivitamin 1 Tablet(s) Oral daily  pantoprazole    Tablet 40 milliGRAM(s) Oral two times a day    MEDICATIONS  (PRN):  acetaminophen     Tablet .. 650 milliGRAM(s) Oral every 6 hours PRN Temp greater or equal to 38C (100.4F), Mild Pain (1 - 3)  aluminum hydroxide/magnesium hydroxide/simethicone Suspension 30 milliLiter(s) Oral every 4 hours PRN Dyspepsia  dextrose Oral Gel 15 Gram(s) Oral once PRN Blood Glucose LESS THAN 70 milliGRAM(s)/deciliter  melatonin 3 milliGRAM(s) Oral at bedtime PRN Insomnia  polyethylene glycol 3350 17 Gram(s) Oral at bedtime PRN Constipation  senna 2 Tablet(s) Oral at bedtime PRN Constipation        Objective:    Vitals: Vital Signs Last 24 Hrs  T(C): 36.9 (10-06-22 @ 20:11), Max: 36.9 (10-06-22 @ 10:32)  T(F): 98.4 (10-06-22 @ 20:11), Max: 98.5 (10-06-22 @ 10:32)  HR: 64 (10-06-22 @ 20:11) (64 - 77)  BP: 138/78 (10-06-22 @ 20:11) (134/75 - 145/81)  BP(mean): --  RR: 18 (10-06-22 @ 20:11) (18 - 18)  SpO2: 96% (10-06-22 @ 20:11) (95% - 98%)            I&O's Summary    05 Oct 2022 07:01  -  06 Oct 2022 07:00  --------------------------------------------------------  IN: 490 mL / OUT: 0 mL / NET: 490 mL    06 Oct 2022 07:01  -  07 Oct 2022 06:40  --------------------------------------------------------  IN: 820 mL / OUT: 0 mL / NET: 820 mL        PHYSICAL EXAM:  GENERAL: NAD, lying comfortably in bed   EYES: EOMI, no scleral icterus  ENMT: Moist mucous membranes  NECK: Supple  CHEST/LUNG: Clear to auscultation bilaterally on anterior chest, on room air, no increased work of breathing   HEART: Regular rate and rhythm  ABDOMEN: Soft, Nontender, distended   EXTREMITIES:  Pitting edema in b/l LE. B/l LE strength 2/5, unable to resist gravity. Gross sensation intact in b/l LE.  SKIN: Darker discoloration on forehead noted. Non-tender.   NERVOUS SYSTEM:  Mozambican speaking. Alert & Oriented X2.       LABS:                        12.9   9.18  )-----------( 81       ( 06 Oct 2022 06:50 )             38.8                         12.3   9.09  )-----------( 88       ( 05 Oct 2022 07:25 )             37.0     Hgb Trend: 12.9<--, 12.3<--, 11.9<--, 12.6<--, 12.5<--  10-06    133<L>  |  102  |  20  ----------------------------<  202<H>  4.5   |  19<L>  |  0.61  10-05    136  |  105  |  21  ----------------------------<  271<H>  4.3   |  22  |  0.60    Ca    8.2<L>      06 Oct 2022 06:47  Ca    8.2<L>      05 Oct 2022 07:22  Phos  2.9     10-06  Mg     1.9     10-06    TPro  4.7<L>  /  Alb  2.5<L>  /  TBili  0.8  /  DBili  x   /  AST  25  /  ALT  40  /  AlkPhos  84  10-06  TPro  4.7<L>  /  Alb  2.6<L>  /  TBili  0.6  /  DBili  x   /  AST  21  /  ALT  42  /  AlkPhos  85  10-05    Creatinine Trend: 0.61<--, 0.60<--, 0.62<--, 0.82<--, 0.71<--, 0.67<--                    CAPILLARY BLOOD GLUCOSE      POCT Blood Glucose.: 141 mg/dL (06 Oct 2022 22:21)  POCT Blood Glucose.: 175 mg/dL (06 Oct 2022 18:11)  POCT Blood Glucose.: 216 mg/dL (06 Oct 2022 17:06)  POCT Blood Glucose.: 227 mg/dL (06 Oct 2022 14:29)  POCT Blood Glucose.: 253 mg/dL (06 Oct 2022 12:27)  POCT Blood Glucose.: 323 mg/dL (06 Oct 2022 10:36)  POCT Blood Glucose.: 208 mg/dL (06 Oct 2022 08:46)     Dr. Liliana Tejeda, PGY-1    ANA LOVE  82y  MRN: [id number]    Subjective:    Patient is a 82y old  Male who presents with a chief complaint of GIB, weakness (06 Oct 2022 07:27)    No acute events overnight. Pt seen and evaluated at bedside. Denies cp, abd pain, sob.     Awaiting bed at [facility] for transfer.     Updated daughter Faith via phone 5:30PM.     MEDICATIONS  (STANDING):  ascorbic acid 500 milliGRAM(s) Oral daily  atorvastatin 80 milliGRAM(s) Oral at bedtime  chlorhexidine 2% Cloths 1 Application(s) Topical daily  dexAMETHasone     Tablet 4 milliGRAM(s) Oral every 12 hours  dextrose 5%. 1000 milliLiter(s) (100 mL/Hr) IV Continuous <Continuous>  dextrose 5%. 1000 milliLiter(s) (50 mL/Hr) IV Continuous <Continuous>  dextrose 50% Injectable 25 Gram(s) IV Push once  dextrose 50% Injectable 12.5 Gram(s) IV Push once  dextrose 50% Injectable 25 Gram(s) IV Push once  fluconAZOLE IVPB 400 milliGRAM(s) IV Intermittent every 24 hours  fluconAZOLE IVPB      FLUoxetine 20 milliGRAM(s) Oral daily  glucagon  Injectable 1 milliGRAM(s) IntraMuscular once  insulin glargine Injectable (LANTUS) 8 Unit(s) SubCutaneous at bedtime  insulin lispro (ADMELOG) corrective regimen sliding scale   SubCutaneous Before meals and at bedtime  insulin lispro Injectable (ADMELOG) 4 Unit(s) SubCutaneous three times a day before meals  levETIRAcetam 500 milliGRAM(s) Oral two times a day  multivitamin 1 Tablet(s) Oral daily  pantoprazole    Tablet 40 milliGRAM(s) Oral two times a day    MEDICATIONS  (PRN):  acetaminophen     Tablet .. 650 milliGRAM(s) Oral every 6 hours PRN Temp greater or equal to 38C (100.4F), Mild Pain (1 - 3)  aluminum hydroxide/magnesium hydroxide/simethicone Suspension 30 milliLiter(s) Oral every 4 hours PRN Dyspepsia  dextrose Oral Gel 15 Gram(s) Oral once PRN Blood Glucose LESS THAN 70 milliGRAM(s)/deciliter  melatonin 3 milliGRAM(s) Oral at bedtime PRN Insomnia  polyethylene glycol 3350 17 Gram(s) Oral at bedtime PRN Constipation  senna 2 Tablet(s) Oral at bedtime PRN Constipation        Objective:    Vitals: Vital Signs Last 24 Hrs  T(C): 36.9 (10-06-22 @ 20:11), Max: 36.9 (10-06-22 @ 10:32)  T(F): 98.4 (10-06-22 @ 20:11), Max: 98.5 (10-06-22 @ 10:32)  HR: 64 (10-06-22 @ 20:11) (64 - 77)  BP: 138/78 (10-06-22 @ 20:11) (134/75 - 145/81)  BP(mean): --  RR: 18 (10-06-22 @ 20:11) (18 - 18)  SpO2: 96% (10-06-22 @ 20:11) (95% - 98%)            I&O's Summary    05 Oct 2022 07:01  -  06 Oct 2022 07:00  --------------------------------------------------------  IN: 490 mL / OUT: 0 mL / NET: 490 mL    06 Oct 2022 07:01  -  07 Oct 2022 06:40  --------------------------------------------------------  IN: 820 mL / OUT: 0 mL / NET: 820 mL        PHYSICAL EXAM:  GENERAL: NAD, lying comfortably in bed   EYES: EOMI, no scleral icterus  ENMT: Moist mucous membranes  NECK: Supple  CHEST/LUNG: Clear to auscultation bilaterally on anterior chest, on room air, no increased work of breathing   HEART: Regular rate and rhythm  ABDOMEN: Soft, Nontender, distended   EXTREMITIES:  Pitting edema in b/l LE. B/l LE strength 2/5, unable to resist gravity. Gross sensation intact in b/l LE.  SKIN: Darker discoloration on forehead noted. Non-tender.   NERVOUS SYSTEM:  Czech speaking. Alert & Oriented X2.       LABS:                        12.9   9.18  )-----------( 81       ( 06 Oct 2022 06:50 )             38.8                         12.3   9.09  )-----------( 88       ( 05 Oct 2022 07:25 )             37.0     Hgb Trend: 12.9<--, 12.3<--, 11.9<--, 12.6<--, 12.5<--  10-06    133<L>  |  102  |  20  ----------------------------<  202<H>  4.5   |  19<L>  |  0.61  10-05    136  |  105  |  21  ----------------------------<  271<H>  4.3   |  22  |  0.60    Ca    8.2<L>      06 Oct 2022 06:47  Ca    8.2<L>      05 Oct 2022 07:22  Phos  2.9     10-06  Mg     1.9     10-06    TPro  4.7<L>  /  Alb  2.5<L>  /  TBili  0.8  /  DBili  x   /  AST  25  /  ALT  40  /  AlkPhos  84  10-06  TPro  4.7<L>  /  Alb  2.6<L>  /  TBili  0.6  /  DBili  x   /  AST  21  /  ALT  42  /  AlkPhos  85  10-05    Creatinine Trend: 0.61<--, 0.60<--, 0.62<--, 0.82<--, 0.71<--, 0.67<--                    CAPILLARY BLOOD GLUCOSE      POCT Blood Glucose.: 141 mg/dL (06 Oct 2022 22:21)  POCT Blood Glucose.: 175 mg/dL (06 Oct 2022 18:11)  POCT Blood Glucose.: 216 mg/dL (06 Oct 2022 17:06)  POCT Blood Glucose.: 227 mg/dL (06 Oct 2022 14:29)  POCT Blood Glucose.: 253 mg/dL (06 Oct 2022 12:27)  POCT Blood Glucose.: 323 mg/dL (06 Oct 2022 10:36)  POCT Blood Glucose.: 208 mg/dL (06 Oct 2022 08:46)

## 2022-10-07 NOTE — PROGRESS NOTE ADULT - PROBLEM SELECTOR PLAN 2
- EGD done 9/30 showed esophageal plaques suspicious for candidiasis. Biopsied.    Plan:  - continue empiric tx for Candidiasis: IV fluconazole 9/30-  - plan to continue PO fluconazole after discharge - EGD done 9/30 showed esophageal plaques suspicious for candidiasis. Biopsied.    Plan:  - continue empiric tx for Candidiasis: IV fluconazole 9/30-  - plan to continue PO fluconazole after discharge if still on tx

## 2022-10-07 NOTE — PROGRESS NOTE ADULT - ATTENDING COMMENTS
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 Suspect weakness likely multifactorial 2/2 underlying disease and Hgb drop possibly 2/2 GIB. CT lumbar spine with mild multilevel spinal canal stenosis. MRI brain can not rule out neoplasm. Nsx aware and believe findings are more likely post treatment changes. Needs 9 additional radiation treatments as per Nsx. Accepted for transfer to [facility] for radiation therapy while pt is quarantined prior to FALLON. GI was following given likely GIB, EGD and c-scope performed. Poor prep. Additionally, has candida esophagitis on Fluconazole will transition to PO on discharge if still on therapy. Asymptomatic from gastroparesis standpoint so will hold off on adding additional medications. Holding AC. Heme did not recommend AC. s/p IVF filter given above the knee DVT. Thrombocytopenia slightly worsened today. Continue to monitor CBC. Recommended for [facility] on discharge that can accommodate Rad tx. Was exposed to COVID on 9/30, currently asymptomatic and tested positive on 10/3. Needs to quarantine prior to FALLON. Pending transfer to [facility] for radiation and F[facility]ON placement

## 2022-10-07 NOTE — PROGRESS NOTE ADULT - PROBLEM SELECTOR PLAN 4
generalized weakness x 3 weeks with worsening LE weakness x 5 days w/ difficulty ambulating. Here with 2/5 strength in BLE, 4/5 in UE, rectal tone intact, no changes in urinary sxs (although w/ history of urinary incontinence). no acute changes in mental status (AAOx2, mild confusion since GBM dx)  - given diproprionate weakness, concerning for spinal pathology ?mets, ?cauda equina   - generalized weakness due to malignancy, decreased PO intake, and relative drop in hgb may also be contributing  - CTH here showing post-op changes, possible residual or remnant tumor   - CT L-spine showed degenerative changes resulting in mild multilevel spinal canal stenosis or neural foraminal narrowing  - MRI head negative    PLAN:  - neurosurgery following; patient can continue to follow up as an outpatient with Dr. Mcneil as planned after their current admission  - PT recommends F[facility]ON, OT F[facility]ON or home OT generalized weakness x 3 weeks with worsening LE weakness x 5 days w/ difficulty ambulating. Here with 2/5 strength in BLE, 4/5 in UE, rectal tone intact, no changes in urinary sxs (although w/ history of urinary incontinence). no acute changes in mental status (AAOx2, mild confusion since GBM dx)  - given diproprionate weakness, concerning for spinal pathology ?mets, ?cauda equina   - generalized weakness due to malignancy, decreased PO intake, and relative drop in hgb may also be contributing  - CTH here showing post-op changes, possible residual or remnant tumor   - CT L-spine showed degenerative changes resulting in mild multilevel spinal canal stenosis or neural foraminal narrowing  - MRI head findings representing residual/recurrent neoplasm and/or posttreatment changes. presence of neoplasm is not excluded    PLAN:  - neurosurgery following; patient can continue to follow up as an outpatient with Dr. Mcneil as planned after their current admission  - PT recommends F[facility]ON, OT FALLON or home OT

## 2022-10-07 NOTE — PROGRESS NOTE ADULT - PROBLEM SELECTOR PLAN 10
Diet: CC/DASH diet, advised pt's family and nurse that pt may require smaller bites and smaller portions d/t gastroparesis   DVT ppx: SCD's in LLE, s/p IVC filter in RLE, per heme/onc continue to hold AC   Dispo: PT recommends subacute rehab. Accepted transfer to [facility], awaiting bed to continue radiation tx while waiting for transfer to [facility] due to COVID (+) status.

## 2022-10-08 NOTE — H&P ADULT - HISTORY OF PRESENT ILLNESS
83 y/o M PMHx of PMH Glioblastoma (diagnosed 7/18/22, s/p R stereo biopsy, TIMOTHY x2, R crani for tumor debulking 7/28/22, on temozolomide (last dose 4 days ago)), HLD, steroid-induced diabetes, and R popliteal DVT (found 9/21/22, not on AC due to thrombocytopenia) transferred from [facility] for radiation oncology treatment. Patient initially presented to [facility] on /26 for GIB and weakness. s/p EGD and Colonoscopy 9/30 which showed findings suspicious for esophageal candidiasis, gastroparesis, erythematous mucosa in pylorus and stomach. Poor prep for colonoscopy, no bleeding/blood noted, need repeat oupt colonoscopy. IVC filter placed 9/28 given holding AC in the setting of thrombocytopenia per heme. CTH and MR head done; per neuro sx, patient's MRI and the typical post treatment course and timing the findings most likely represent post treatment changes. As such, the patient can continue to follow up as an outpatient with Dr. Mcneil as planned after their current admission. Patient requires 9 remaining radiation treatments on a Monday-Friday schedule to begin as soon as reasonably possible. Given patient became COVID positive, he was being transferred to [facility] to continue radiation oncology treatment while waiting for quaratine/placement to not delay care.     Currently, patient reports feeling okay. Denies n/v/abd pain. Poor appetite per family at bedside. He eats better when family is there. Denies chest pain, sob, fever, chills. A little bit of dry cough per wife today. Also c/o worsening blurry vision yesterday to daughter; he first noticed blurry vision 2 weeks ago. No eye pain or discharge.

## 2022-10-08 NOTE — PATIENT PROFILE ADULT - FALL HARM RISK - HARM RISK INTERVENTIONS

## 2022-10-08 NOTE — PROGRESS NOTE ADULT - PROBLEM SELECTOR PLAN 7
- diagnosed 7/18/22, s/p R stereo biopsy, TIMOTHY x2, R crani for tumor debulking 7/28/22, on temozolomide (last dose 4 days ago)  - follows Dr. Mcneil and Dr. Borja outpt  - [facility] here showing possible residual or remanent tumor   - MRI brain showed findings representing residual/recurrent neoplasm and/or posttreatment changes. presence of neoplasm is not excluded    PLAN:   - pending 9 more radiation tx; discussed with rad-onc team for transfer to [facility] to continue radiation tx, accepted transfer, now awaiting bed  - neuro surg following here; findings most likely represent post treatment changes, patient can continue to follow up as an outpatient with Dr. Mcneil as planned after their current admission  - c/w dexamethasone 4mg BID, keppra 500mg BID - diagnosed 7/18/22, s/p R stereo biopsy, TIMOTHY x2, R crani for tumor debulking 7/28/22, on temozolomide (last dose 4 days ago)  - follows Dr. Mcneil and Dr. Borja outpt  - [facility] here showing possible residual or remanent tumor   - MRI brain showed findings representing residual/recurrent neoplasm and/or posttreatment changes. presence of neoplasm is not excluded    PLAN:   - pending 9 more radiation tx; discussed with rad-onc team for transfer to [facility] to continue radiation tx, accepted transfer to [facility], pending transfer today, assigned bed  - neuro surg following here; findings most likely represent post treatment changes, patient can continue to follow up as an outpatient with Dr. Mcneil as planned after their current admission  - c/w dexamethasone 4mg BID, keppra 500mg BID

## 2022-10-08 NOTE — PROGRESS NOTE ADULT - ATTENDING SUPERVISION STATEMENT
Fellow
Resident

## 2022-10-08 NOTE — H&P ADULT - PROBLEM SELECTOR PLAN 3
a little bit of cough today per wife, better than yesterday. Satting well on room air  -pt on decadron for glioblastoma  -hold off remdesivir for now  -continue to monitor symptoms exposed 9/30; tested + on 10/3  a little bit of cough today per wife, better than yesterday. Satting well on room air  -pt on decadron for glioblastoma  -hold off remdesivir for now  -continue to monitor symptoms

## 2022-10-08 NOTE — H&P ADULT - NSHPREVIEWOFSYSTEMS_GEN_ALL_CORE
CONSTITUTIONAL: + weakness, no fevers or chills  EYES: No visual change  ENT: No vertigo or throat pain   NECK: No pain or stiffness  RESPIRATORY: +cough, no wheezing, hemoptysis; No shortness of breath  CARDIOVASCULAR: No chest pain or palpitations  GASTROINTESTINAL: No abdominal or epigastric pain. No nausea, vomiting, or hematemesis; No diarrhea or constipation. No melena or hematochezia.  GENITOURINARY: No dysuria, frequency or hematuria  NEUROLOGICAL: + weakness  SKIN: No itching, rashes  MSK: no joint pain, able to move extremities  PSYCH: no anxiety, patient is sad for being in the hospital

## 2022-10-08 NOTE — H&P ADULT - PROBLEM SELECTOR PLAN 8
Diet: CC/DASH diet, advised pt's family and nurse that pt may require smaller bites and smaller portions d/t gastroparesis   DVT ppx: SCD's in LLE, s/p IVC filter in RLE, per heme/onc continue to hold AC   Dispo: pending radiation tx

## 2022-10-08 NOTE — PROGRESS NOTE ADULT - PROBLEM SELECTOR PLAN 3
- pt exposed to COVID 9/30, tested positive 9/3  - COVID vaccinated   - currently satting well on room air, asymptomatic     Plan:  - continue to monitor  - monitor fever, maintain O2 sat>90%  - requires 10 day isolation (until 10/11) per [facility] policy  - pending transfer to [facility] for continued radiation tx while awaiting [facility] placement - pt exposed to COVID 9/30, tested positive 9/3  - COVID vaccinated   - currently satting well on room air, with intermittent cough, afebrile   - CXR done     Plan:  - continue to monitor  - monitor fever, maintain O2 sat>90%  - requires 10 day isolation (until 10/11) per [facility] policy  - pending transfer to [facility] for continued radiation tx while awaiting [facility] placement

## 2022-10-08 NOTE — H&P ADULT - PROBLEM SELECTOR PLAN 7
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 Silva)  - On 9/21, he was found to have a DVT and was started on Lovenox, which was quickly discontinued after outpt labs showed plt of 77 < 172 (8/2022). Per daughter only took 1 or 2 doses of the lovenox, so thrombocytopenia more likely 2/ temozolomide, which was also discontinued at that time  - 9/28 HIT assay (heparin pf4 ab) negative  - neg serotonin releasing assay   - continue to monitor

## 2022-10-08 NOTE — PROGRESS NOTE ADULT - PROBLEM SELECTOR PLAN 10
Diet: CC/DASH diet, advised pt's family and nurse that pt may require smaller bites and smaller portions d/t gastroparesis   DVT ppx: SCD's in LLE, s/p IVC filter in RLE, per heme/onc continue to hold AC   Dispo: PT recommends subacute rehab. Accepted transfer to [facility], awaiting bed to continue radiation tx while waiting for transfer to [facility] due to COVID (+) status. Diet: CC/DASH diet, advised pt's family and nurse that pt may require smaller bites and smaller portions d/t gastroparesis   DVT ppx: SCD's in LLE, s/p IVC filter in RLE, per heme/onc continue to hold AC   Dispo: PT recommends subacute rehab. Accepted transfer to [facility], awaiting bed to continue radiation tx while waiting for transfer to [facility] due to COVID (+) status. Pending transfer today, bed assigned

## 2022-10-08 NOTE — H&P ADULT - PROBLEM SELECTOR PLAN 2
a1c 9.3% (9/27/22), likely 2/2 steroid-induced DM  - holding home oral agents while inpatient (januvia 100mg qd, metformin ER 500mg BID)  - continue [facility] regimen: 8 units lantus qhs and 4u admelog TID AC   - adjust insulin regimen as needed  - low dose correctional scale  - FS TID qAC and qhs while eating or q6h while NPO

## 2022-10-08 NOTE — PATIENT PROFILE ADULT - VISION (WITH CORRECTIVE LENSES IF THE PATIENT USUALLY WEARS THEM):
blurry vision/Normal vision: sees adequately in most situations; can see medication labels, newsprint

## 2022-10-08 NOTE — H&P ADULT - PROBLEM SELECTOR PLAN 4
s/p EGD and colonoscopy on 9/30  -started on pantoprazole 40mg BID by GI at [facility], will c/w  -pt will need outpt repeat colonoscopy given poor prep on /30  -monitor stool and Hb  -gastroparesis on EGD: asymptomatic so fa

## 2022-10-08 NOTE — H&P ADULT - ASSESSMENT
81yo M, w/ PMH of glioblastoma (dx 7/18/22, s/p R stereo bx, TIMOTHY x2, R crani for tumor debulking 7/28/22, on temozolomide (last dose 4 days prior to admission), HLD, steroid-induced DM, h/o HIT during recent admission, and newly dx R popliteal DVT (found 9/21/22, AC dc d/t thrombocytopenia), presented to [facility] with weakness and GIB and now transferred from [facility] for radiation treatment.

## 2022-10-08 NOTE — PROGRESS NOTE ADULT - NUTRITIONAL ASSESSMENT
This patient has been assessed with a concern for Malnutrition and has been determined to have a diagnosis/diagnoses of Severe protein-calorie malnutrition.    This patient is being managed with:   Diet Regular-  Consistent Carbohydrate {Evening Snack} (CSTCHOSN)  DASH/TLC {Sodium & Cholesterol Restricted} (DASH)  Supplement Feeding Modality:  Oral  Glucerna Shake Cans or Servings Per Day:  1       Frequency:  Daily  Entered: Oct  1 2022 11:21AM    
This patient has been assessed with a concern for Malnutrition and has been determined to have a diagnosis/diagnoses of Severe protein-calorie malnutrition.    This patient is being managed with:   Diet Clear Liquid-  Entered: Sep 27 2022 10:26AM    
This patient has been assessed with a concern for Malnutrition and has been determined to have a diagnosis/diagnoses of Severe protein-calorie malnutrition.    This patient is being managed with:   Diet NPO after Midnight-     NPO Start Date: 29-Sep-2022   NPO Start Time: 23:59  Except Medications  Entered: Sep 29 2022  1:00PM    Diet Clear Liquid-  Entered: Sep 27 2022 10:26AM    
This patient has been assessed with a concern for Malnutrition and has been determined to have a diagnosis/diagnoses of Severe protein-calorie malnutrition.    This patient is being managed with:   Diet Regular-  Consistent Carbohydrate {Evening Snack} (CSTCHOSN)  DASH/TLC {Sodium & Cholesterol Restricted} (DASH)  Supplement Feeding Modality:  Oral  Glucerna Shake Cans or Servings Per Day:  1       Frequency:  Daily  Entered: Oct  1 2022 11:21AM    

## 2022-10-08 NOTE — PROGRESS NOTE ADULT - PROBLEM SELECTOR PLAN 2
- EGD done 9/30 showed esophageal plaques suspicious for candidiasis. Biopsied.    Plan:  - continue empiric tx for Candidiasis: IV fluconazole 9/30-  - plan to continue PO fluconazole after discharge if still on tx

## 2022-10-08 NOTE — H&P ADULT - PROBLEM SELECTOR PLAN 1
following Dr. Mcneil. MR Head result reviewed. Neuro sx at [facility] believes findings are more likely post treatment changes and recommended 9 additional radiation tx  -radiation oncology consult AM  -c/w dexamethasone 4mg BID, keppra 500mg BID. Will reach out to neuro sx regarding plan regarding if and when to taper decadron  - PT eval

## 2022-10-08 NOTE — PROGRESS NOTE ADULT - ASSESSMENT
82M, [place]ic-speaking, with PMH Glioblastoma (diagnosed 7/18/22, s/p R stereo biopsy, TIMOTHY x2, R crani for tumor debulking 7/28/22, on temozolomide (last dose 4 days ago)), HLD, steroid-induced diabetes, h/o HIT during recent admission, and R popliteal DVT (found 9/21/22, AC discontinued due to thrombocytopenia) presenting with worsening LE weakness x 5 days i/s/o dark stools x 1-2 days and AC use. Admitted to medicine for further workup. Was exposed to COVID, tested positive 10/3. Accepted for [facility] transfer, pending bed, for continued radiation tx while pending [facility] placement.

## 2022-10-08 NOTE — PROGRESS NOTE ADULT - PROBLEM SELECTOR PLAN 8
a1c 7% (7/24/22), likely 2/2 steroid-induced DM  - holding home oral agents while inpatient (januvia 100mg qd, metformin ER 500mg BID)    PLAN:   - adjust insulin regimen daily   - low dose correctional scale  - FS TID qAC and qhs or q6h while NPO 56

## 2022-10-08 NOTE — PROGRESS NOTE ADULT - PROBLEM/PLAN-10
URINE SENT  
DISPLAY PLAN FREE TEXT

## 2022-10-08 NOTE — H&P ADULT - PROBLEM SELECTOR PLAN 6
R popliteal DVT on outpt duplex for BLE pitting edema. Was briefly on Lovenox (1-2 doses) but then discontinued due to thrombocytopenia. Heme/onc recommended continuing to hold AC  - IVC filter placed with IR on 9/28, f/u with IR outpt.

## 2022-10-08 NOTE — PROGRESS NOTE ADULT - ATTENDING COMMENTS
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 Suspect weakness likely multifactorial 2/2 underlying disease and Hgb drop possibly 2/2 GIB. CT lumbar spine with mild multilevel spinal canal stenosis. MRI brain can not rule out neoplasm. Nsx aware and believe findings are more likely post treatment changes. Needs 9 additional radiation treatments as per Nsx. Accepted for transfer to [facility] for radiation therapy while pt is quarantined prior to [facility]. GI was following given likely GIB, EGD and c-scope performed. Poor prep. Additionally, has candida esophagitis on Fluconazole will transition to PO on discharge if still on therapy. Asymptomatic from gastroparesis standpoint so will hold off on adding additional medications. Holding AC. Heme did not recommend AC. s/p IVF filter given above the knee DVT. Thrombocytopenia slightly worsened today. Continue to monitor CBC. Recommended for [facility] on discharge that can accommodate Rad tx. Was exposed to COVID on 9/30, currently asymptomatic and tested positive on 10/3. Needs to quarantine prior to [facility]. Pending transfer to [facility] for radiation and [facility] placement.     Stable for transfer today  d/c time 32 minutes

## 2022-10-08 NOTE — PROGRESS NOTE ADULT - SUBJECTIVE AND OBJECTIVE BOX
%%%%INCOMPLETE NOTE%%%%%     Dr. Liliana Tejeda, PGY-1    ANA LOVE  82y  MRN: [id number]    Subjective:    Patient is a 82y old  Male who presents with a chief complaint of GIB, weakness (07 Oct 2022 06:40)      MEDICATIONS  (STANDING):  ascorbic acid 500 milliGRAM(s) Oral daily  atorvastatin 80 milliGRAM(s) Oral at bedtime  chlorhexidine 2% Cloths 1 Application(s) Topical daily  dexAMETHasone     Tablet 4 milliGRAM(s) Oral every 12 hours  dextrose 5%. 1000 milliLiter(s) (100 mL/Hr) IV Continuous <Continuous>  dextrose 5%. 1000 milliLiter(s) (50 mL/Hr) IV Continuous <Continuous>  dextrose 50% Injectable 25 Gram(s) IV Push once  dextrose 50% Injectable 12.5 Gram(s) IV Push once  dextrose 50% Injectable 25 Gram(s) IV Push once  fluconAZOLE IVPB      fluconAZOLE IVPB 400 milliGRAM(s) IV Intermittent every 24 hours  FLUoxetine 20 milliGRAM(s) Oral daily  glucagon  Injectable 1 milliGRAM(s) IntraMuscular once  insulin glargine Injectable (LANTUS) 8 Unit(s) SubCutaneous at bedtime  insulin lispro (ADMELOG) corrective regimen sliding scale   SubCutaneous Before meals and at bedtime  insulin lispro Injectable (ADMELOG) 4 Unit(s) SubCutaneous three times a day before meals  levETIRAcetam 500 milliGRAM(s) Oral two times a day  multivitamin 1 Tablet(s) Oral daily  pantoprazole    Tablet 40 milliGRAM(s) Oral two times a day    MEDICATIONS  (PRN):  acetaminophen     Tablet .. 650 milliGRAM(s) Oral every 6 hours PRN Temp greater or equal to 38C (100.4F), Mild Pain (1 - 3)  aluminum hydroxide/magnesium hydroxide/simethicone Suspension 30 milliLiter(s) Oral every 4 hours PRN Dyspepsia  dextrose Oral Gel 15 Gram(s) Oral once PRN Blood Glucose LESS THAN 70 milliGRAM(s)/deciliter  melatonin 3 milliGRAM(s) Oral at bedtime PRN Insomnia  polyethylene glycol 3350 17 Gram(s) Oral at bedtime PRN Constipation  senna 2 Tablet(s) Oral at bedtime PRN Constipation        Objective:    Vitals: Vital Signs Last 24 Hrs  T(C): 36.9 (10-07-22 @ 21:26), Max: 37.1 (10-07-22 @ 19:45)  T(F): 98.5 (10-07-22 @ 21:26), Max: 98.8 (10-07-22 @ 19:45)  HR: 71 (10-07-22 @ 21:26) (67 - 73)  BP: 146/90 (10-07-22 @ 21:26) (135/84 - 150/87)  BP(mean): --  RR: 18 (10-07-22 @ 21:26) (18 - 18)  SpO2: 95% (10-07-22 @ 21:26) (95% - 98%)            I&O's Summary    06 Oct 2022 07:01  -  07 Oct 2022 07:00  --------------------------------------------------------  IN: 820 mL / OUT: 0 mL / NET: 820 mL    07 Oct 2022 07:01  -  08 Oct 2022 06:43  --------------------------------------------------------  IN: 1040 mL / OUT: 0 mL / NET: 1040 mL        PHYSICAL EXAM:  GENERAL: NAD, well-groomed, well-developed  HEAD:  Atraumatic, Normocephalic  EYES: EOMI, PERRLA, conjunctiva and sclera clear  ENMT: Moist mucous membranes  NECK: Supple, No JVD  CHEST/LUNG: Clear to auscultation bilaterally  HEART: Regular rate and rhythm  ABDOMEN: Soft, Nontender, Nondistended; Bowel sounds present  EXTREMITIES:  2+ Peripheral Pulses, No LE edema  SKIN: No rashes or lesions  NERVOUS SYSTEM:  Alert & Oriented X3, moving all extremities   PSYCH: Speaking in Full Sentences. Laying in bed comfortably; not agitated     LABS:                        13.4   9.03  )-----------( 77       ( 07 Oct 2022 07:10 )             41.9                         12.9   9.18  )-----------( 81       ( 06 Oct 2022 06:50 )             38.8                         12.3   9.09  )-----------( 88       ( 05 Oct 2022 07:25 )             37.0     Hgb Trend: 13.4<--, 12.9<--, 12.3<--, 11.9<--, 12.6<--  10-07    137  |  103  |  22  ----------------------------<  157<H>  4.5   |  21<L>  |  0.65  10-06    133<L>  |  102  |  20  ----------------------------<  202<H>  4.5   |  19<L>  |  0.61  10-05    136  |  105  |  21  ----------------------------<  271<H>  4.3   |  22  |  0.60    Ca    8.3<L>      07 Oct 2022 07:12  Ca    8.2<L>      06 Oct 2022 06:47  Ca    8.2<L>      05 Oct 2022 07:22  Phos  3.0     10-07  Mg     1.9     10-07    TPro  4.7<L>  /  Alb  2.5<L>  /  TBili  0.8  /  DBili  x   /  AST  25  /  ALT  40  /  AlkPhos  84  10-06  TPro  4.7<L>  /  Alb  2.6<L>  /  TBili  0.6  /  DBili  x   /  AST  21  /  ALT  42  /  AlkPhos  85  10-05    Creatinine Trend: 0.65<--, 0.61<--, 0.60<--, 0.62<--, 0.82<--, 0.71<--                    CAPILLARY BLOOD GLUCOSE      POCT Blood Glucose.: 195 mg/dL (08 Oct 2022 05:49)  POCT Blood Glucose.: 157 mg/dL (07 Oct 2022 22:23)  POCT Blood Glucose.: 183 mg/dL (07 Oct 2022 17:50)  POCT Blood Glucose.: 175 mg/dL (07 Oct 2022 12:11)  POCT Blood Glucose.: 191 mg/dL (07 Oct 2022 08:53)     Dr. Liliana Tejeda, PGY-1    ANA LOVE  82y  MRN: [id number]    Subjective:    Patient is a 82y old  Male who presents with a chief complaint of GIB, weakness (07 Oct 2022 06:40)    No acute events overnight. Pt seen and examined at bedside.   Accepted to [facility] for transfer, pt assigned a bed.       MEDICATIONS  (STANDING):  ascorbic acid 500 milliGRAM(s) Oral daily  atorvastatin 80 milliGRAM(s) Oral at bedtime  chlorhexidine 2% Cloths 1 Application(s) Topical daily  dexAMETHasone     Tablet 4 milliGRAM(s) Oral every 12 hours  dextrose 5%. 1000 milliLiter(s) (100 mL/Hr) IV Continuous <Continuous>  dextrose 5%. 1000 milliLiter(s) (50 mL/Hr) IV Continuous <Continuous>  dextrose 50% Injectable 25 Gram(s) IV Push once  dextrose 50% Injectable 12.5 Gram(s) IV Push once  dextrose 50% Injectable 25 Gram(s) IV Push once  fluconAZOLE IVPB      fluconAZOLE IVPB 400 milliGRAM(s) IV Intermittent every 24 hours  FLUoxetine 20 milliGRAM(s) Oral daily  glucagon  Injectable 1 milliGRAM(s) IntraMuscular once  insulin glargine Injectable (LANTUS) 8 Unit(s) SubCutaneous at bedtime  insulin lispro (ADMELOG) corrective regimen sliding scale   SubCutaneous Before meals and at bedtime  insulin lispro Injectable (ADMELOG) 4 Unit(s) SubCutaneous three times a day before meals  levETIRAcetam 500 milliGRAM(s) Oral two times a day  multivitamin 1 Tablet(s) Oral daily  pantoprazole    Tablet 40 milliGRAM(s) Oral two times a day    MEDICATIONS  (PRN):  acetaminophen     Tablet .. 650 milliGRAM(s) Oral every 6 hours PRN Temp greater or equal to 38C (100.4F), Mild Pain (1 - 3)  aluminum hydroxide/magnesium hydroxide/simethicone Suspension 30 milliLiter(s) Oral every 4 hours PRN Dyspepsia  dextrose Oral Gel 15 Gram(s) Oral once PRN Blood Glucose LESS THAN 70 milliGRAM(s)/deciliter  melatonin 3 milliGRAM(s) Oral at bedtime PRN Insomnia  polyethylene glycol 3350 17 Gram(s) Oral at bedtime PRN Constipation  senna 2 Tablet(s) Oral at bedtime PRN Constipation        Objective:    Vitals: Vital Signs Last 24 Hrs  T(C): 36.9 (10-07-22 @ 21:26), Max: 37.1 (10-07-22 @ 19:45)  T(F): 98.5 (10-07-22 @ 21:26), Max: 98.8 (10-07-22 @ 19:45)  HR: 71 (10-07-22 @ 21:26) (67 - 73)  BP: 146/90 (10-07-22 @ 21:26) (135/84 - 150/87)  BP(mean): --  RR: 18 (10-07-22 @ 21:26) (18 - 18)  SpO2: 95% (10-07-22 @ 21:26) (95% - 98%)            I&O's Summary    06 Oct 2022 07:01  -  07 Oct 2022 07:00  --------------------------------------------------------  IN: 820 mL / OUT: 0 mL / NET: 820 mL    07 Oct 2022 07:01  -  08 Oct 2022 06:43  --------------------------------------------------------  IN: 1040 mL / OUT: 0 mL / NET: 1040 mL        PHYSICAL EXAM:  GENERAL: NAD, lying in bed   EYES: EOMI, no scleral icterus  ENMT: Moist mucous membranes  NECK: Supple  CHEST/LUNG: Clear to auscultation bilaterally on anterior chest, on room air, no increased work of breathing   HEART: Regular rate and rhythm  ABDOMEN: Soft, nontender, distended   EXTREMITIES: Pitting edema in b/l LE. B/l LE strength 2/5, unable to resist gravity. Gross sensation intact in b/l LE.  SKIN: Darker discoloration on forehead noted. Non-tender.   NERVOUS SYSTEM:  [place] speaking. Alert & Oriented X2 (name, [facility] [place]).       LABS:                        13.4   9.03  )-----------( 77       ( 07 Oct 2022 07:10 )             41.9                         12.9   9.18  )-----------( 81       ( 06 Oct 2022 06:50 )             38.8                         12.3   9.09  )-----------( 88       ( 05 Oct 2022 07:25 )             37.0     Hgb Trend: 13.4<--, 12.9<--, 12.3<--, 11.9<--, 12.6<--  10-07    137  |  103  |  22  ----------------------------<  157<H>  4.5   |  21<L>  |  0.65  10-06    133<L>  |  102  |  20  ----------------------------<  202<H>  4.5   |  19<L>  |  0.61  10-05    136  |  105  |  21  ----------------------------<  271<H>  4.3   |  22  |  0.60    Ca    8.3<L>      07 Oct 2022 07:12  Ca    8.2<L>      06 Oct 2022 06:47  Ca    8.2<L>      05 Oct 2022 07:22  Phos  3.0     10-07  Mg     1.9     10-07    TPro  4.7<L>  /  Alb  2.5<L>  /  TBili  0.8  /  DBili  x   /  AST  25  /  ALT  40  /  AlkPhos  84  10-06  TPro  4.7<L>  /  Alb  2.6<L>  /  TBili  0.6  /  DBili  x   /  AST  21  /  ALT  42  /  AlkPhos  85  10-05    Creatinine Trend: 0.65<--, 0.61<--, 0.60<--, 0.62<--, 0.82<--, 0.71<--                    CAPILLARY BLOOD GLUCOSE      POCT Blood Glucose.: 195 mg/dL (08 Oct 2022 05:49)  POCT Blood Glucose.: 157 mg/dL (07 Oct 2022 22:23)  POCT Blood Glucose.: 183 mg/dL (07 Oct 2022 17:50)  POCT Blood Glucose.: 175 mg/dL (07 Oct 2022 12:11)  POCT Blood Glucose.: 191 mg/dL (07 Oct 2022 08:53)     Dr. Liliana Tejeda, PGY-1    ANA LOVE  82y  MRN: [id number]    Subjective:    Patient is a 82y old  Male who presents with a chief complaint of GIB, weakness (07 Oct 2022 06:40)    No acute events overnight. Pt seen and examined at bedside.   CXR done yesterday after pt began developing a cough, CXR without active pulmonary dz.   Accepted to [facility] for transfer, pt assigned a bed.       MEDICATIONS  (STANDING):  ascorbic acid 500 milliGRAM(s) Oral daily  atorvastatin 80 milliGRAM(s) Oral at bedtime  chlorhexidine 2% Cloths 1 Application(s) Topical daily  dexAMETHasone     Tablet 4 milliGRAM(s) Oral every 12 hours  dextrose 5%. 1000 milliLiter(s) (100 mL/Hr) IV Continuous <Continuous>  dextrose 5%. 1000 milliLiter(s) (50 mL/Hr) IV Continuous <Continuous>  dextrose 50% Injectable 25 Gram(s) IV Push once  dextrose 50% Injectable 12.5 Gram(s) IV Push once  dextrose 50% Injectable 25 Gram(s) IV Push once  fluconAZOLE IVPB      fluconAZOLE IVPB 400 milliGRAM(s) IV Intermittent every 24 hours  FLUoxetine 20 milliGRAM(s) Oral daily  glucagon  Injectable 1 milliGRAM(s) IntraMuscular once  insulin glargine Injectable (LANTUS) 8 Unit(s) SubCutaneous at bedtime  insulin lispro (ADMELOG) corrective regimen sliding scale   SubCutaneous Before meals and at bedtime  insulin lispro Injectable (ADMELOG) 4 Unit(s) SubCutaneous three times a day before meals  levETIRAcetam 500 milliGRAM(s) Oral two times a day  multivitamin 1 Tablet(s) Oral daily  pantoprazole    Tablet 40 milliGRAM(s) Oral two times a day    MEDICATIONS  (PRN):  acetaminophen     Tablet .. 650 milliGRAM(s) Oral every 6 hours PRN Temp greater or equal to 38C (100.4F), Mild Pain (1 - 3)  aluminum hydroxide/magnesium hydroxide/simethicone Suspension 30 milliLiter(s) Oral every 4 hours PRN Dyspepsia  dextrose Oral Gel 15 Gram(s) Oral once PRN Blood Glucose LESS THAN 70 milliGRAM(s)/deciliter  melatonin 3 milliGRAM(s) Oral at bedtime PRN Insomnia  polyethylene glycol 3350 17 Gram(s) Oral at bedtime PRN Constipation  senna 2 Tablet(s) Oral at bedtime PRN Constipation        Objective:    Vitals: Vital Signs Last 24 Hrs  T(C): 36.9 (10-07-22 @ 21:26), Max: 37.1 (10-07-22 @ 19:45)  T(F): 98.5 (10-07-22 @ 21:26), Max: 98.8 (10-07-22 @ 19:45)  HR: 71 (10-07-22 @ 21:26) (67 - 73)  BP: 146/90 (10-07-22 @ 21:26) (135/84 - 150/87)  BP(mean): --  RR: 18 (10-07-22 @ 21:26) (18 - 18)  SpO2: 95% (10-07-22 @ 21:26) (95% - 98%)            I&O's Summary    06 Oct 2022 07:01  -  07 Oct 2022 07:00  --------------------------------------------------------  IN: 820 mL / OUT: 0 mL / NET: 820 mL    07 Oct 2022 07:01  -  08 Oct 2022 06:43  --------------------------------------------------------  IN: 1040 mL / OUT: 0 mL / NET: 1040 mL        PHYSICAL EXAM:  GENERAL: NAD, lying in bed   EYES: EOMI, no scleral icterus  ENMT: Moist mucous membranes  NECK: Supple  CHEST/LUNG: Clear to auscultation bilaterally on anterior chest, on room air, no increased work of breathing   HEART: Regular rate and rhythm  ABDOMEN: Soft, nontender, distended   EXTREMITIES: Pitting edema in b/l LE. B/l LE strength 2/5, unable to resist gravity. Gross sensation intact in b/l LE.  SKIN: Darker discoloration on forehead noted. Non-tender.   NERVOUS SYSTEM:  Thai speaking. Alert & Oriented X2 (name, [facility]).       LABS:                        13.4   9.03  )-----------( 77       ( 07 Oct 2022 07:10 )             41.9                         12.9   9.18  )-----------( 81       ( 06 Oct 2022 06:50 )             38.8                         12.3   9.09  )-----------( 88       ( 05 Oct 2022 07:25 )             37.0     Hgb Trend: 13.4<--, 12.9<--, 12.3<--, 11.9<--, 12.6<--  10-07    137  |  103  |  22  ----------------------------<  157<H>  4.5   |  21<L>  |  0.65  10-06    133<L>  |  102  |  20  ----------------------------<  202<H>  4.5   |  19<L>  |  0.61  10-05    136  |  105  |  21  ----------------------------<  271<H>  4.3   |  22  |  0.60    Ca    8.3<L>      07 Oct 2022 07:12  Ca    8.2<L>      06 Oct 2022 06:47  Ca    8.2<L>      05 Oct 2022 07:22  Phos  3.0     10-07  Mg     1.9     10-07    TPro  4.7<L>  /  Alb  2.5<L>  /  TBili  0.8  /  DBili  x   /  AST  25  /  ALT  40  /  AlkPhos  84  10-06  TPro  4.7<L>  /  Alb  2.6<L>  /  TBili  0.6  /  DBili  x   /  AST  21  /  ALT  42  /  AlkPhos  85  10-05    Creatinine Trend: 0.65<--, 0.61<--, 0.60<--, 0.62<--, 0.82<--, 0.71<--                    CAPILLARY BLOOD GLUCOSE      POCT Blood Glucose.: 195 mg/dL (08 Oct 2022 05:49)  POCT Blood Glucose.: 157 mg/dL (07 Oct 2022 22:23)  POCT Blood Glucose.: 183 mg/dL (07 Oct 2022 17:50)  POCT Blood Glucose.: 175 mg/dL (07 Oct 2022 12:11)  POCT Blood Glucose.: 191 mg/dL (07 Oct 2022 08:53)

## 2022-10-08 NOTE — H&P ADULT - PROBLEM SELECTOR PLAN 5
EGD done 9/30 showed esophageal plaques suspicious for candidiasis. Biopsied.  - continue empiric tx for Candidiasis for now: fluconazole 9/30--  - plan to continue PO fluconazole after discharge if still on tx.  - pathology result: No morphologic evidence of fungal forms  - will reach out to GI to see if still need rx given findings visualized on EGD

## 2022-10-08 NOTE — H&P ADULT - NSHPPHYSICALEXAM_GEN_ALL_CORE
VITALS: T(C): 36.9 (10-08-22 @ 12:33), Max: 37.1 (10-07-22 @ 19:45)  HR: 62 (10-08-22 @ 12:33) (62 - 80)  BP: 126/75 (10-08-22 @ 12:33) (126/75 - 146/90)  RR: 18 (10-08-22 @ 12:33) (18 - 19)  SpO2: 98% (10-08-22 @ 12:33) (95% - 99%)  GENERAL: NAD, well-developed, alert, resting comfortably  HEAD:  Atraumatic, Normocephalic  EYES: EOMI, conjunctiva and sclera clear  NECK: Supple, No JVD  CHEST/LUNG: Clear to auscultation bilaterally; No wheeze, ronchi or rales  HEART: Regular rate and rhythm; No murmurs, rubs, or gallops  ABDOMEN: Soft, Nontender, Nondistended; Bowel sounds present  EXTREMITIES: No clubbing, cyanosis. trace peripheral edema b/l  PSYCH: AAOx2, normal behavior, normal affect  NEUROLOGY: non-focal, normal strength, normal sensation  SKIN: No rashes or lesions

## 2022-10-09 NOTE — CHART NOTE - NSCHARTNOTEFT_GEN_A_CORE
As per discussion with GI, Fluconazole tx no longer indicated at this time given pathology negative for fungi on EGD pathology report from [facility].     Shraddha Mo PA-C  Department of Medicine   In-house pager #[phone]

## 2022-10-09 NOTE — PHYSICAL THERAPY INITIAL EVALUATION ADULT - PERTINENT HX OF CURRENT PROBLEM, REHAB EVAL
patient is a 82 year old male who was transferred from [facility] for RT. Patient presented to [facility] on 09/26 for GIB and weakness. s/p EGD and Colonoscopy 9/30 which showed findings suspicious for esophageal candidiasis, gastroparesis, erythematous mucosa in pylorus and stomach

## 2022-10-09 NOTE — PROGRESS NOTE ADULT - SUBJECTIVE AND OBJECTIVE BOX
[facility] Division of Hospital Medicine  Shellie Webb MD  Pager [phone]    Patient is a 82y old  Male who presents with a chief complaint of weakness; radiation therapy      SUBJECTIVE / OVERNIGHT EVENTS: wife at bedside; daughter on phone, reports pt with dec urine outpt and poor PO intake; denies dysruia    MEDICATIONS  (STANDING):  atorvastatin 80 milliGRAM(s) Oral at bedtime  dexAMETHasone     Tablet 4 milliGRAM(s) Oral every 12 hours  dextrose 5%. 1000 milliLiter(s) (50 mL/Hr) IV Continuous <Continuous>  dextrose 5%. 1000 milliLiter(s) (100 mL/Hr) IV Continuous <Continuous>  dextrose 5%. 1000 milliLiter(s) (50 mL/Hr) IV Continuous <Continuous>  dextrose 50% Injectable 25 Gram(s) IV Push once  dextrose 50% Injectable 12.5 Gram(s) IV Push once  dextrose 50% Injectable 25 Gram(s) IV Push once  FLUoxetine 20 milliGRAM(s) Oral daily  glucagon  Injectable 1 milliGRAM(s) IntraMuscular once  insulin glargine Injectable (LANTUS) 8 Unit(s) SubCutaneous at bedtime  insulin lispro (ADMELOG) corrective regimen sliding scale   SubCutaneous three times a day before meals  insulin lispro (ADMELOG) corrective regimen sliding scale   SubCutaneous at bedtime  insulin lispro Injectable (ADMELOG) 4 Unit(s) SubCutaneous three times a day before meals  levETIRAcetam 500 milliGRAM(s) Oral two times a day  pantoprazole    Tablet 40 milliGRAM(s) Oral every 12 hours    MEDICATIONS  (PRN):  acetaminophen     Tablet .. 650 milliGRAM(s) Oral every 6 hours PRN Temp greater or equal to 38C (100.4F), Mild Pain (1 - 3)  dextrose Oral Gel 15 Gram(s) Oral once PRN Blood Glucose LESS THAN 70 milliGRAM(s)/deciliter  melatonin 3 milliGRAM(s) Oral at bedtime PRN Insomnia  ondansetron Injectable 4 milliGRAM(s) IV Push every 8 hours PRN Nausea and/or Vomiting      CAPILLARY BLOOD GLUCOSE  POCT Blood Glucose.: 280 mg/dL (09 Oct 2022 11:58)  POCT Blood Glucose.: 176 mg/dL (09 Oct 2022 08:23)  POCT Blood Glucose.: 195 mg/dL (08 Oct 2022 21:50)  POCT Blood Glucose.: 193 mg/dL (08 Oct 2022 17:27)      PHYSICAL EXAM:  Vital Signs Last 24 Hrs  T(F): 98.2 (09 Oct 2022 05:50), Max: 98.2 (09 Oct 2022 05:50)  HR: 69 (09 Oct 2022 05:50) (65 - 70)  BP: 122/66 (09 Oct 2022 05:50) (122/66 - 146/90)  RR: 17 (09 Oct 2022 05:50) (17 - 19)  SpO2: 99% (09 Oct 2022 05:50) (97% - 99%)    Parameters below as of 09 Oct 2022 05:50  Patient On (Oxygen Delivery Method): room air        CONSTITUTIONAL: NAD, appears comfortable  EYES: PERRLA; conjunctiva and sclera clear  ENMT: Moist oral mucosa; normal dentition  RESPIRATORY: Normal respiratory effort; grossly b/l AE  CARDIOVASCULAR: Regular rate and rhythm; No lower extremity edema;   ABDOMEN: Nontender to palpation, normoactive bowel sounds  MUSCULOSKELETAL: no clubbing or cyanosis of digits; no joint swelling or tenderness to palpation  PSYCH: calm, coop; affect appropriate  NEUROLOGY: CN 2-12 are intact and symmetric; no gross sensory deficits   SKIN: No rashes; no palpable lesions    LABS:                        12.8   10.10 )-----------( 64       ( 09 Oct 2022 06:00 )             38.8     10-09    136  |  105  |  24<H>  ----------------------------<  212<H>  4.1   |  21<L>  |  0.58    Ca    8.1<L>      09 Oct 2022 06:00      TPro  4.3<L>  /  Alb  2.4<L>  /  TBili  0.7  /  DBili  x   /  AST  21  /  ALT  37  /  AlkPhos  84  10-09

## 2022-10-09 NOTE — PROGRESS NOTE ADULT - PROBLEM SELECTOR PLAN 9
noted by family  pt also with poor PO  will check bladder scan to r/o retention, however cr stable so not likely  will give gentle IVF poor PO intake  monitor closely

## 2022-10-09 NOTE — PROGRESS NOTE ADULT - ASSESSMENT
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

## 2022-10-09 NOTE — PROGRESS NOTE ADULT - PROBLEM SELECTOR PLAN 3
exposed 9/30; tested + on 10/3  currently asymptomatic  -pt on decadron for glioblastoma  -hold off remdesivir for now  -continue to monitor closely

## 2022-10-09 NOTE — PROGRESS NOTE ADULT - PROBLEM SELECTOR PLAN 5
EGD done 9/30 showed esophageal plaques suspicious for candidiasis. Biopsied.  - continue empiric tx for Candidiasis for now: fluconazole 9/30--  - plan to continue PO fluconazole after discharge if still on tx.  - pathology result: No morphologic evidence of fungal forms  d/w GI, since bx neg, can dc fluconazole

## 2022-10-09 NOTE — PROGRESS NOTE ADULT - PROBLEM SELECTOR PLAN 2
a1c 9.3% (9/27/22), likely 2/2 steroid-induced DM  - holding home oral agents while inpatient (januvia 100mg qd, metformin ER 500mg BID)  - continue [facility] regimen: 8 units lantus qhs and 4u admelog TID AC   - adjust insulin regimen as needed  - low dose correctional scale  - FS TID qAC and qhs while eating or q6h while NPO

## 2022-10-09 NOTE — PHYSICAL THERAPY INITIAL EVALUATION ADULT - MANUAL MUSCLE TESTING RESULTS, REHAB EVAL
bilateral LE grossly 2/5 throughout major muscle groups. BUE grossly 3/5 throughout major muscle groups

## 2022-10-10 NOTE — CONSULT NOTE ADULT - ATTENDING COMMENTS
Re-examination later in the AM:  Stupor, no eye opening.  Roving EOM - full.   Minimal, symmetric movement in all 4 limbs.     A/P  Mr. 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   I agree with work up and management as above.   Palliative care physician was d/w family [facility].   Family agrees with CT head and EEG since these are noninvasive.   Thank you.
Patient is a 83 yo M w/ Gliobastoma s/p R craniotomy/debulking current on RT, HLD, R DVT (s/p IVC filter, no AC due to thrombocytopenia) who was initially admitted to [facility] on 9/26 after p/w GIB with course c/b finding of R DVT, now s/p IVC filter, transferred to [facility] for RT. MICU consulted after RRT this AM for possible seizures with persistent AMS and concern for airway protection. Patient however is now DNR/DNI    #Encephalopathy - Possible seizure in setting of Gliobastoma currently receiving RT. Received Keppra and ativan now with persistent AMS and  lethargy  - Patient is now DNR/DNI  - Agree with repeat head imaging and EEG if consistent with GOC  - c/w AEDs and PRN benzos    Patient is currently not MICU candidate, please call back if any questions.    Darryl Kirkpatrick MD  Pulmonary & Critical Care

## 2022-10-10 NOTE — CONSULT NOTE ADULT - SUBJECTIVE AND OBJECTIVE BOX
CHIEF COMPLAINT:    HPI:    PAST MEDICAL & SURGICAL HISTORY:  Glioblastoma multiforme      Steroid-induced diabetes mellitus      S/P craniotomy  R craniotomy for debulking with Dr. Mcneil (7/28/22)          FAMILY HISTORY:  FH: type 2 diabetes    FH: hyperlipidemia        SOCIAL HISTORY:  Smoking: __ packs x ___ years  EtOH Use:  Marital Status:  Occupation:  Recent Travel:  Country of Birth:  Advance Directives:    Allergies    heparin containing compounds (Other)    Intolerances        HOME MEDICATIONS:    REVIEW OF SYSTEMS:  Constitutional:   Eyes:  ENT:  CV:  Resp:  GI:  :  MSK:  Integumentary:  Neurological:  Psychiatric:  Endocrine:  Hematologic/Lymphatic:  Allergic/Immunologic:  [ ] All other systems negative  [ ] Unable to assess ROS because ________    OBJECTIVE:  ICU Vital Signs Last 24 Hrs  T(C): 36.8 (10 Oct 2022 05:15), Max: 36.9 (09 Oct 2022 13:28)  T(F): 98.3 (10 Oct 2022 05:15), Max: 98.4 (09 Oct 2022 13:28)  HR: 83 (10 Oct 2022 05:15) (63 - 83)  BP: 138/83 (10 Oct 2022 05:15) (130/79 - 138/83)  BP(mean): --  ABP: --  ABP(mean): --  RR: 16 (10 Oct 2022 05:15) (16 - 17)  SpO2: 100% (10 Oct 2022 05:15) (98% - 100%)    O2 Parameters below as of 10 Oct 2022 05:15  Patient On (Oxygen Delivery Method): room air              CAPILLARY BLOOD GLUCOSE      POCT Blood Glucose.: 238 mg/dL (10 Oct 2022 08:05)      PHYSICAL EXAM:  General:   HEENT:   Lymph Nodes:  Neck:   Respiratory:   Cardiovascular:   Abdomen:   Extremities:   Skin:   Neurological:  Psychiatry:    HOSPITAL MEDICATIONS:  MEDICATIONS  (STANDING):  atorvastatin 80 milliGRAM(s) Oral at bedtime  dexAMETHasone     Tablet 4 milliGRAM(s) Oral every 12 hours  dextrose 5%. 1000 milliLiter(s) (50 mL/Hr) IV Continuous <Continuous>  dextrose 5%. 1000 milliLiter(s) (100 mL/Hr) IV Continuous <Continuous>  dextrose 5%. 1000 milliLiter(s) (50 mL/Hr) IV Continuous <Continuous>  dextrose 50% Injectable 25 Gram(s) IV Push once  dextrose 50% Injectable 12.5 Gram(s) IV Push once  dextrose 50% Injectable 25 Gram(s) IV Push once  FLUoxetine 20 milliGRAM(s) Oral daily  glucagon  Injectable 1 milliGRAM(s) IntraMuscular once  insulin glargine Injectable (LANTUS) 8 Unit(s) SubCutaneous at bedtime  insulin lispro (ADMELOG) corrective regimen sliding scale   SubCutaneous three times a day before meals  insulin lispro (ADMELOG) corrective regimen sliding scale   SubCutaneous at bedtime  insulin lispro Injectable (ADMELOG) 4 Unit(s) SubCutaneous three times a day before meals  levETIRAcetam  IVPB 1000 milliGRAM(s) IV Intermittent once  levETIRAcetam  IVPB 500 milliGRAM(s) IV Intermittent once  levETIRAcetam  IVPB 1000 milliGRAM(s) IV Intermittent every 12 hours  pantoprazole    Tablet 40 milliGRAM(s) Oral every 12 hours    MEDICATIONS  (PRN):  acetaminophen     Tablet .. 650 milliGRAM(s) Oral every 6 hours PRN Temp greater or equal to 38C (100.4F), Mild Pain (1 - 3)  dextrose Oral Gel 15 Gram(s) Oral once PRN Blood Glucose LESS THAN 70 milliGRAM(s)/deciliter  melatonin 3 milliGRAM(s) Oral at bedtime PRN Insomnia  ondansetron Injectable 4 milliGRAM(s) IV Push every 8 hours PRN Nausea and/or Vomiting      LABS:                        9.8    4.97  )-----------( 209      ( 10 Oct 2022 07:02 )             31.4     10-10    139  |  103  |  5<L>  ----------------------------<  146<H>  3.6   |  21<L>  |  0.73    Ca    9.1      10 Oct 2022 07:02  Phos  2.8     10-10  Mg     1.80     10-10    TPro  6.8  /  Alb  4.0  /  TBili  0.3  /  DBili  x   /  AST  19  /  ALT  9   /  AlkPhos  44  10-10              MICROBIOLOGY:     RADIOLOGY:  [ ] Reviewed and interpreted by me    EKG: CHIEF COMPLAINT:    HPI:  81 y/o M PMHx of PMH Glioblastoma (diagnosed 7/18/22, s/p R stereo biopsy, TIMOTHY x2, R crani for tumor debulking 7/28/22, on temozolomide (last dose 4 days ago)), HLD, steroid-induced diabetes, and R popliteal DVT (found 9/21/22, not on AC due to thrombocytopenia) transferred from [facility] for radiation oncology treatment. Patient initially presented to [facility] on /26 for GIB and weakness. s/p EGD and Colonoscopy 9/30 which showed findings suspicious for esophageal candidiasis, gastroparesis, erythematous mucosa in pylorus and stomach. Poor prep for colonoscopy, no bleeding/blood noted, need repeat oupt colonoscopy. IVC filter placed 9/28 given holding AC in the setting of thrombocytopenia per heme. CTH and MR head done; per neuro sx, patient's MRI and the typical post treatment course and timing the findings most likely represent post treatment changes. As such, the patient can continue to follow up as an outpatient with Dr. Mcneil as planned after their current admission. Patient requires 9 remaining radiation treatments on a Monday-Friday schedule to begin as soon as reasonably possible. Given patient became COVID positive, he was being transferred to [facility] to continue radiation oncology treatment while waiting for quaratine/placement to not delay care.     Interval History:  RRT called this AM for seizure-like activity, loaded with Keppra 1000mg, given 2mg Ativan x2. Patient with persistent lethargy, likely in s/o ativan. Primary team concerned for patient's ability to protect airway, MICU consulted. At time of exam, patient unable to participate in interview.   Met with patient's daughter Faith at bedside; discussed RRT and reason for MICU consult, patient's wife and daughter voiced understanding. Reported that patient w/ progressive lethargy over last 2 days, but still answering questions when prompted, though notably more withdrawn. Discussed concern for airway protection at RRT and family has not yet discussed DNR/DNI with patient and amongst themselves, though leaning toward DNI. However notes they would like all resuscitation efforts though they were not aware that intubation almost always accompanies CPR.     PAST MEDICAL & SURGICAL HISTORY:  Glioblastoma multiforme      Steroid-induced diabetes mellitus      S/P craniotomy  R craniotomy for debulking with Dr. Mcneil (7/28/22)          FAMILY HISTORY:  FH: type 2 diabetes    FH: hyperlipidemia        Allergies    heparin containing compounds (Other)    Intolerances        REVIEW OF SYSTEMS:  [x ] Unable to assess ROS due to lethargy, patient unable to answer questions    OBJECTIVE:  ICU Vital Signs Last 24 Hrs  T(C): 36.8 (10 Oct 2022 05:15), Max: 36.9 (09 Oct 2022 13:28)  T(F): 98.3 (10 Oct 2022 05:15), Max: 98.4 (09 Oct 2022 13:28)  HR: 83 (10 Oct 2022 05:15) (63 - 83)  BP: 138/83 (10 Oct 2022 05:15) (130/79 - 138/83)  BP(mean): --  ABP: --  ABP(mean): --  RR: 16 (10 Oct 2022 05:15) (16 - 17)  SpO2: 100% (10 Oct 2022 05:15) (98% - 100%)    O2 Parameters below as of 10 Oct 2022 05:15  Patient On (Oxygen Delivery Method): room air              CAPILLARY BLOOD GLUCOSE      POCT Blood Glucose.: 238 mg/dL (10 Oct 2022 08:05)      PHYSICAL EXAM:  General: resting comfortably, eyes closed  HEENT: moist mucous membranes  Respiratory:   Cardiovascular:   Abdomen:   Extremities:   Skin:   Neurological:  Psychiatry:    HOSPITAL MEDICATIONS:  MEDICATIONS  (STANDING):  atorvastatin 80 milliGRAM(s) Oral at bedtime  dexAMETHasone     Tablet 4 milliGRAM(s) Oral every 12 hours  dextrose 5%. 1000 milliLiter(s) (50 mL/Hr) IV Continuous <Continuous>  dextrose 5%. 1000 milliLiter(s) (100 mL/Hr) IV Continuous <Continuous>  dextrose 5%. 1000 milliLiter(s) (50 mL/Hr) IV Continuous <Continuous>  dextrose 50% Injectable 25 Gram(s) IV Push once  dextrose 50% Injectable 12.5 Gram(s) IV Push once  dextrose 50% Injectable 25 Gram(s) IV Push once  FLUoxetine 20 milliGRAM(s) Oral daily  glucagon  Injectable 1 milliGRAM(s) IntraMuscular once  insulin glargine Injectable (LANTUS) 8 Unit(s) SubCutaneous at bedtime  insulin lispro (ADMELOG) corrective regimen sliding scale   SubCutaneous three times a day before meals  insulin lispro (ADMELOG) corrective regimen sliding scale   SubCutaneous at bedtime  insulin lispro Injectable (ADMELOG) 4 Unit(s) SubCutaneous three times a day before meals  levETIRAcetam  IVPB 1000 milliGRAM(s) IV Intermittent once  levETIRAcetam  IVPB 500 milliGRAM(s) IV Intermittent once  levETIRAcetam  IVPB 1000 milliGRAM(s) IV Intermittent every 12 hours  pantoprazole    Tablet 40 milliGRAM(s) Oral every 12 hours    MEDICATIONS  (PRN):  acetaminophen     Tablet .. 650 milliGRAM(s) Oral every 6 hours PRN Temp greater or equal to 38C (100.4F), Mild Pain (1 - 3)  dextrose Oral Gel 15 Gram(s) Oral once PRN Blood Glucose LESS THAN 70 milliGRAM(s)/deciliter  melatonin 3 milliGRAM(s) Oral at bedtime PRN Insomnia  ondansetron Injectable 4 milliGRAM(s) IV Push every 8 hours PRN Nausea and/or Vomiting      LABS:                        9.8    4.97  )-----------( 209      ( 10 Oct 2022 07:02 )             31.4     10-10    139  |  103  |  5<L>  ----------------------------<  146<H>  3.6   |  21<L>  |  0.73    Ca    9.1      10 Oct 2022 07:02  Phos  2.8     10-10  Mg     1.80     10-10    TPro  6.8  /  Alb  4.0  /  TBili  0.3  /  DBili  x   /  AST  19  /  ALT  9   /  AlkPhos  44  10-10              MICROBIOLOGY:     RADIOLOGY:  [ ] Reviewed and interpreted by me    EKG: CHIEF COMPLAINT:    HPI:  83 y/o M PMHx of PMH Glioblastoma (diagnosed 7/18/22, s/p R stereo biopsy, TIMOTHY x2, R crani for tumor debulking 7/28/22, on temozolomide (last dose 4 days ago)), HLD, steroid-induced diabetes, and R popliteal DVT (found 9/21/22, not on AC due to thrombocytopenia) transferred from [facility] for radiation oncology treatment. Patient initially presented to [facility] on /26 for GIB and weakness. s/p EGD and Colonoscopy 9/30 which showed findings suspicious for esophageal candidiasis, gastroparesis, erythematous mucosa in pylorus and stomach. Poor prep for colonoscopy, no bleeding/blood noted, need repeat oupt colonoscopy. IVC filter placed 9/28 given holding AC in the setting of thrombocytopenia per heme. CTH and MR head done; per neuro sx, patient's MRI and the typical post treatment course and timing the findings most likely represent post treatment changes. As such, the patient can continue to follow up as an outpatient with Dr. Mcneil as planned after their current admission. Patient requires 9 remaining radiation treatments on a Monday-Friday schedule to begin as soon as reasonably possible. Given patient became COVID positive, he was being transferred to [facility] to continue radiation oncology treatment while waiting for quaratine/placement to not delay care.     Interval History:  RRT called this AM for seizure-like activity, loaded with Keppra 1000mg, given 2mg Ativan x2. Patient with persistent lethargy, likely in s/o ativan. Primary team concerned for patient's ability to protect airway, MICU consulted. At time of exam, patient unable to participate in interview.   Met with patient's daughter Faith at bedside; discussed RRT and reason for MICU consult, patient's wife and daughter voiced understanding. Reported that patient w/ progressive lethargy over last 2 days, but still answering questions when prompted, though notably more withdrawn. Discussed concern for airway protection at RRT and family has not yet discussed DNR/DNI with patient and amongst themselves, though leaning toward DNI. However notes they would like all resuscitation efforts though they were not aware that intubation almost always accompanies CPR.     PAST MEDICAL & SURGICAL HISTORY:  Glioblastoma multiforme      Steroid-induced diabetes mellitus      S/P craniotomy  R craniotomy for debulking with Dr. Mcneil (7/28/22)          FAMILY HISTORY:  FH: type 2 diabetes    FH: hyperlipidemia        Allergies    heparin containing compounds (Other)    Intolerances        REVIEW OF SYSTEMS:  [x ] Unable to assess ROS due to lethargy, patient unable to answer questions    OBJECTIVE:  ICU Vital Signs Last 24 Hrs  T(C): 36.8 (10 Oct 2022 05:15), Max: 36.9 (09 Oct 2022 13:28)  T(F): 98.3 (10 Oct 2022 05:15), Max: 98.4 (09 Oct 2022 13:28)  HR: 83 (10 Oct 2022 05:15) (63 - 83)  BP: 138/83 (10 Oct 2022 05:15) (130/79 - 138/83)  BP(mean): --  ABP: --  ABP(mean): --  RR: 16 (10 Oct 2022 05:15) (16 - 17)  SpO2: 100% (10 Oct 2022 05:15) (98% - 100%)    O2 Parameters below as of 10 Oct 2022 05:15  Patient On (Oxygen Delivery Method): room air              CAPILLARY BLOOD GLUCOSE      POCT Blood Glucose.: 238 mg/dL (10 Oct 2022 08:05)    LOS: 2d    VITALS:   T(C): 37 (10-10-22 @ 12:45), Max: 37 (10-10-22 @ 12:45)  HR: 109 (10-10-22 @ 12:45) (74 - 109)  BP: 144/82 (10-10-22 @ 12:45) (130/79 - 144/82)  RR: 17 (10-10-22 @ 12:45) (16 - 17)  SpO2: 99% (10-10-22 @ 12:45) (99% - 100%)    GENERAL: NAD, lying in bed comfortably  HEAD:  Atraumatic, Normocephalic  EYES: EOMI, PERRLA, conjunctiva and sclera clear  ENT: Moist mucous membranes  NECK: Supple, No JVD  CHEST/LUNG: Clear to auscultation bilaterally  HEART: Regular rate and rhythm; No murmurs, rubs, or gallops  ABDOMEN: BSx4; Soft, nontender, nondistended  EXTREMITIES:  2+ Peripheral Pulses, brisk capillary refill. No clubbing, cyanosis, or edema  NERVOUS SYSTEM:  A&Ox0, groans in response to painful stimuli      HOSPITAL MEDICATIONS:  MEDICATIONS  (STANDING):  atorvastatin 80 milliGRAM(s) Oral at bedtime  dexAMETHasone     Tablet 4 milliGRAM(s) Oral every 12 hours  dextrose 5%. 1000 milliLiter(s) (50 mL/Hr) IV Continuous <Continuous>  dextrose 5%. 1000 milliLiter(s) (100 mL/Hr) IV Continuous <Continuous>  dextrose 5%. 1000 milliLiter(s) (50 mL/Hr) IV Continuous <Continuous>  dextrose 50% Injectable 25 Gram(s) IV Push once  dextrose 50% Injectable 12.5 Gram(s) IV Push once  dextrose 50% Injectable 25 Gram(s) IV Push once  FLUoxetine 20 milliGRAM(s) Oral daily  glucagon  Injectable 1 milliGRAM(s) IntraMuscular once  insulin glargine Injectable (LANTUS) 8 Unit(s) SubCutaneous at bedtime  insulin lispro (ADMELOG) corrective regimen sliding scale   SubCutaneous three times a day before meals  insulin lispro (ADMELOG) corrective regimen sliding scale   SubCutaneous at bedtime  insulin lispro Injectable (ADMELOG) 4 Unit(s) SubCutaneous three times a day before meals  levETIRAcetam  IVPB 1000 milliGRAM(s) IV Intermittent once  levETIRAcetam  IVPB 500 milliGRAM(s) IV Intermittent once  levETIRAcetam  IVPB 1000 milliGRAM(s) IV Intermittent every 12 hours  pantoprazole    Tablet 40 milliGRAM(s) Oral every 12 hours    MEDICATIONS  (PRN):  acetaminophen     Tablet .. 650 milliGRAM(s) Oral every 6 hours PRN Temp greater or equal to 38C (100.4F), Mild Pain (1 - 3)  dextrose Oral Gel 15 Gram(s) Oral once PRN Blood Glucose LESS THAN 70 milliGRAM(s)/deciliter  melatonin 3 milliGRAM(s) Oral at bedtime PRN Insomnia  ondansetron Injectable 4 milliGRAM(s) IV Push every 8 hours PRN Nausea and/or Vomiting      LABS:                        9.8    4.97  )-----------( 209      ( 10 Oct 2022 07:02 )             31.4     10-10    139  |  103  |  5<L>  ----------------------------<  146<H>  3.6   |  21<L>  |  0.73    Ca    9.1      10 Oct 2022 07:02  Phos  2.8     10-10  Mg     1.80     10-10    TPro  6.8  /  Alb  4.0  /  TBili  0.3  /  DBili  x   /  AST  19  /  ALT  9   /  AlkPhos  44  10-10

## 2022-10-10 NOTE — DIETITIAN INITIAL EVALUATION ADULT - PERTINENT LABORATORY DATA
10-10 Na 139 mmol/L Glu 146 mg/dL<H> K+ 3.6 mmol/L Cr 0.73 mg/dL BUN 5 mg/dL<L> Phos 2.8 mg/dL    10-10 @ 12:07 POCT 251 mg/dL  10-10 @ 09:32 POCT 282 mg/dL  10-10 @ 08:05 POCT 238 mg/dL  10-09 @ 22:16 POCT 258 mg/dL  10-09 @ 17:08 POCT 289 mg/dL    A1C with Estimated Average Glucose Result: 9.3 % (09-27-22 @ 13:14)  A1C with Estimated Average Glucose Result: 7.0 % (07-24-22 @ 06:23)

## 2022-10-10 NOTE — RAPID RESPONSE TEAM SUMMARY - NSSITUATIONBACKGROUNDRRT_GEN_ALL_CORE
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 Rapid called for epileptiform activity. Patient with jerking movements in all 4 ext, given 2 mg ativan with residual jerking movements, given 2 mg more.

## 2022-10-10 NOTE — CONSULT NOTE ADULT - SUBJECTIVE AND OBJECTIVE BOX
[facility] Geriatrics and Palliative Care  Martha Lopez, Palliative Care Attending  Contact Info: Page [phone] (including Nights/Weekends), message on Microsoft Teams (Martha Lopez), or leave VM at Palliative Office [phone] (non-urgent)     HPI:  81 y/o M PMHx of PMH Glioblastoma (diagnosed 7/18/22, s/p R stereo biopsy, TIMOTHY x2, R crani for tumor debulking 7/28/22, on temozolomide (last dose 4 days ago)), HLD, steroid-induced diabetes, and R popliteal DVT (found 9/21/22, not on AC due to thrombocytopenia) transferred from [facility] for radiation oncology treatment. Patient initially presented to [facility] on /26 for GIB and weakness. s/p EGD and Colonoscopy 9/30 which showed findings suspicious for esophageal candidiasis, gastroparesis, erythematous mucosa in pylorus and stomach. Poor prep for colonoscopy, no bleeding/blood noted, need repeat oupt colonoscopy. IVC filter placed 9/28 given holding AC in the setting of thrombocytopenia per heme. CTH and MR head done; per neuro sx, patient's MRI and the typical post treatment course and timing the findings most likely represent post treatment changes. As such, the patient can continue to follow up as an outpatient with Dr. Mcneil as planned after their current admission. Patient requires 9 remaining radiation treatments on a Monday-Friday schedule to begin as soon as reasonably possible. Given patient became COVID positive, he was being transferred to [facility] to continue radiation oncology treatment while waiting for quaratine/placement to not delay care.     Currently, patient reports feeling okay. Denies n/v/abd pain. Poor appetite per family at bedside. He eats better when family is there. Denies chest pain, sob, fever, chills. A little bit of dry cough per wife today. Also c/o worsening blurry vision yesterday to daughter; he first noticed blurry vision 2 weeks ago. No eye pain or discharge.    (08 Oct 2022 17:12)    INTERVAL EVENTS:  > 10/10: Palliative provider met with patient and his family (wife and daughter) at bedside. Patient had RRT this AM s/p Ativan 4mg total and keppra. He was not arouseable during encounter today. Please see separate [facility] note regarding discussion with family at bedside.     PERTINENT PM/SXH:   Brain tumor  Glioblastoma multiforme  Steroid-induced diabetes mellitus  Hyperlipidemia  s/P craniotomy      FAMILY HISTORY:  FH: type 2 diabetes  FH: hyperlipidemia      SOCIAL HISTORY:   Significant other/partner[ ]  Children[ ]  Protestant/Spirituality:  Substance hx:  [ ]   Tobacco hx:  [ ]   Alcohol hx: [ ]   Home Opioid hx:  [ ] I-Stop Reference No: [id number]  Living Situation: [ ]Home  [ ]Long term care  [ ]Rehab [ ]Other    ADVANCE DIRECTIVES:    DNR/MOLST  [ ]  Living Will  [ ]   DECISION MAKER(s):  [ ] Health Care Proxy(s)  [ ] Surrogate(s)  [ ] Guardian           Name(s): Phone Number(s):    BASELINE (I)ADL(s) (prior to admission):  [place]: [ ]Total  [ ] Moderate [ ]Dependent    Allergies    heparin containing compounds (Other)    Intolerances    MEDICATIONS  (STANDING):  atorvastatin 80 milliGRAM(s) Oral at bedtime  dexAMETHasone     Tablet 4 milliGRAM(s) Oral every 12 hours  dextrose 5%. 1000 milliLiter(s) (50 mL/Hr) IV Continuous <Continuous>  dextrose 5%. 1000 milliLiter(s) (100 mL/Hr) IV Continuous <Continuous>  dextrose 5%. 1000 milliLiter(s) (50 mL/Hr) IV Continuous <Continuous>  dextrose 50% Injectable 25 Gram(s) IV Push once  dextrose 50% Injectable 12.5 Gram(s) IV Push once  dextrose 50% Injectable 25 Gram(s) IV Push once  FLUoxetine 20 milliGRAM(s) Oral daily  glucagon  Injectable 1 milliGRAM(s) IntraMuscular once  insulin glargine Injectable (LANTUS) 8 Unit(s) SubCutaneous at bedtime  insulin lispro (ADMELOG) corrective regimen sliding scale   SubCutaneous three times a day before meals  insulin lispro (ADMELOG) corrective regimen sliding scale   SubCutaneous at bedtime  insulin lispro Injectable (ADMELOG) 4 Unit(s) SubCutaneous three times a day before meals  levETIRAcetam  IVPB 1000 milliGRAM(s) IV Intermittent once  levETIRAcetam  IVPB 1000 milliGRAM(s) IV Intermittent every 12 hours  pantoprazole    Tablet 40 milliGRAM(s) Oral every 12 hours    MEDICATIONS  (PRN):  acetaminophen     Tablet .. 650 milliGRAM(s) Oral every 6 hours PRN Temp greater or equal to 38C (100.4F), Mild Pain (1 - 3)  dextrose Oral Gel 15 Gram(s) Oral once PRN Blood Glucose LESS THAN 70 milliGRAM(s)/deciliter  melatonin 3 milliGRAM(s) Oral at bedtime PRN Insomnia  ondansetron Injectable 4 milliGRAM(s) IV Push every 8 hours PRN Nausea and/or Vomiting    PRESENT SYMPTOMS: [ ]Unable to self-report  [ ] CPOT [ ] PAINADs [ ] RDOS  Source if other than patient:  [ ]Family   [ ]Team     Pain: [ ]yes [ ]no  QOL impact -   Location -                    Aggravating factors -  Quality -  Radiation -  Timing-  Severity (0-10 scale):  Minimal acceptable level (0-10 scale):     CPOT:    https://www.[facility].org/getattachment/[email or web address]/Critical-Care-Pain-Observation-Tool-(CPOT)    Dyspnea:                           [ ]Mild [ ]Moderate [ ]Severe  Anxiety:                             [ ]Mild [ ]Moderate [ ]Severe  Fatigue:                             [ ]Mild [ ]Moderate [ ]Severe  Nausea:                             [ ]Mild [ ]Moderate [ ]Severe  Loss of appetite:              [ ]Mild [ ]Moderate [ ]Severe  Constipation:                    [ ]Mild [ ]Moderate [ ]Severe    Other Symptoms:  [ ]All other review of systems negative     PCSSQ[Palliative Care Spiritual Screening Question]   Severity (0-10):  Chaplaincy Referral: [ ] yes [ ] refused [ ] following    Caregiver [place]? : [ ] yes [ ] no [ ] Deferred [ ] Declined             Social work referral [ ] Patient & Family Centered Care Referral [ ]  Anticipatory Grief present?:  [ ] yes [ ] no  [ ] Deferred                  Social work referral [ ] Patient & Family Centered Care Referral [ ]    PHYSICAL EXAM:  Vital Signs Last 24 Hrs  T(C): 36.8 (10 Oct 2022 05:15), Max: 36.9 (09 Oct 2022 13:28)  T(F): 98.3 (10 Oct 2022 05:15), Max: 98.4 (09 Oct 2022 13:28)  HR: 83 (10 Oct 2022 05:15) (63 - 83)  BP: 138/83 (10 Oct 2022 05:15) (130/79 - 138/83)  BP(mean): --  RR: 16 (10 Oct 2022 05:15) (16 - 17)  SpO2: 100% (10 Oct 2022 05:15) (98% - 100%)    Parameters below as of 10 Oct 2022 05:15  Patient On (Oxygen Delivery Method): room air     I&O's Summary    GENERAL: [ ]Cachexia    [ ]Alert  [ ]Oriented x   [ ]Lethargic  [ ]Unarousable  [ ]Verbal  [ ]Non-Verbal  Behavioral:   [ ] Anxiety  [ ] Delirium [ ] Agitation [ ] Other  HEENT:  [ ]Normal   [ ]Dry mouth   [ ]ET Tube/Trach  [ ]Oral lesions  PULMONARY:   [ ]Clear [ ]Tachypnea  [ ]Audible excessive secretions   [ ]Rhonchi        [ ]Right [ ]Left [ ]Bilateral  [ ]Crackles        [ ]Right [ ]Left [ ]Bilateral  [ ]Wheezing     [ ]Right [ ]Left [ ]Bilateral  [ ]Diminished breath sounds [ ]right [ ]left [ ]bilateral  CARDIOVASCULAR:    [ ]Regular [ ]Irregular [ ]Tachy  [ ]Ildefonso [ ]Murmur [ ]Other  GASTROINTESTINAL:  [ ]Soft  [ ]Distended   [ ]+BS  [ ]Non tender [ ]Tender  [ ]Other [ ]PEG [ ]OGT/ NGT  Last BM:  GENITOURINARY:  [ ]Normal [ ] Incontinent   [ ]Oliguria/Anuria   [ ]Dunn  MUSCULOSKELETAL:   [ ]Normal   [ ]Weakness  [ ]Bed/Wheelchair bound [ ]Edema  NEUROLOGIC:   [ ]No focal deficits  [ ]Cognitive impairment  [ ]Dysphagia [ ]Dysarthria [ ]Paresis [ ]Other   SKIN:   [ ]Normal  [ ]Rash  [ ]Other  [ ]Pressure ulcer(s)       Present on admission [ ]y [ ]n    CRITICAL CARE:  [ ] Shock Present  [ ]Septic [ ]Cardiogenic [ ]Neurologic [ ]Hypovolemic  [ ]  Vasopressors [ ]  Inotropes   [ ]Respiratory failure present [ ]Mechanical ventilation [ ]Non-invasive ventilatory support [ ]High flow    [ ]Acute  [ ]Chronic [ ]Hypoxic  [ ]Hypercarbic [ ]Other  [ ]Other organ failure     LABS:                        9.8    4.97  )-----------( 209      ( 10 Oct 2022 07:02 )             31.4   10-10    139  |  103  |  5<L>  ----------------------------<  146<H>  3.6   |  21<L>  |  0.73    Ca    9.1      10 Oct 2022 07:02  Phos  2.8     10-10  Mg     1.80     10-10    TPro  6.8  /  Alb  4.0  /  TBili  0.3  /  DBili  x   /  AST  19  /  ALT  9   /  AlkPhos  44  10-10        RADIOLOGY & ADDITIONAL STUDIES: < from: Xray Chest 1 View- PORTABLE-Routine (10.07.22 @ 18:13) >  INTERPRETATION:  Chest one view    HISTORY: Covid-19 and cough    COMPARISON STUDY: 9/26/2022    Frontal expiratory view of the chest shows the heart to be similar in   size. The lungs are clear and there is no evidence of pneumothorax nor   pleural effusion.    IMPRESSION:  No active pulmonary disease.    < end of copied text >  < from: MR Head w/wo IV Cont (09.27.22 @ 09:57) >  4.1 x 4.8 x 4.4 cm heterogeneously and peripherally enhancing necrotic   lesion centered in the mesial right frontal lobe, extending across the   corpus callosum into the left mesial frontal lobe. Findings may represent   the presence of residual/recurrent neoplasm and/or posttreatment changes.    Peripheral enhancement of right frontal surgical tract which may be   postsurgical in nature. The presence of neoplasm is not excluded..    Overall decreased, but still moderate nonspecific T2 and FLAIR   hyperintense signal surrounding this lesion. This may represent   nonenhancing neoplasm and/or posttreatment changes.    Overall decreased relative cerebral blood flow, decreased relative   cerebral blood volume, and subtly increased mean transit time in the   region of the surgical bed, suggesting the presence of posttreatment   changes. The presence of neoplasm is not excluded.      < end of copied text >      PROTEIN CALORIE MALNUTRITION PRESENT: [ ]mild [ ]moderate [ ]severe [ ]underweight [ ]morbid obesity  https://www.andeal.org/vault/2440/web/files/ONC/Table_Clinical%20Characteristics%20to%20Document%20Malnutrition-White%20JV%20et%20al%202012.pdf    Height (cm): 167.6 (10-08-22 @ 12:33), 165.1 (09-30-22 @ 16:10), 165 (08-23-22 @ 15:00)  Weight (kg): 71 (10-08-22 @ 12:33), 68 (09-30-22 @ 16:10), [id number] (08-23-22 @ 15:00)  BMI (kg/m2): 25.3 (10-08-22 @ 12:33), 24.9 (09-30-22 @ 16:10), 28.8 (08-23-22 @ 15:00)    [ ]PPSV2 < or = to 30% [ ]significant weight loss  [ ]poor nutritional intake  [ ]anasarca[ ]Artificial Nutrition      Other REFERRALS:  [ ]Hospice  [ ]Child Life  [ ]Social Work  [ ]Case management [ ]Holistic Therapy   Face to face      Electronic Signatures:  Tita Lizama (RN)  (Signed 03-Oct-2022 16:17)  	Authored: Goals of Care Conversation, Personal Advance Directives Treatment Guidelines, Location of Discussion      Last Updated: 03-Oct-2022 16:17 by Tita Lizama (RN)     [facility] Geriatrics and Palliative Care  Martha Lopez, Palliative Care Attending  Contact Info: Page [phone] (including Nights/Weekends), message on Microsoft Teams (Martha Lopez), or leave VM at Palliative Office [phone] (non-urgent)     HPI:  83 y/o M PMHx of PMH Glioblastoma (diagnosed 7/18/22, s/p R stereo biopsy, TIMOTHY x2, R crani for tumor debulking 7/28/22, on temozolomide (last dose 4 days ago)), HLD, steroid-induced diabetes, and R popliteal DVT (found 9/21/22, not on AC due to thrombocytopenia) transferred from [facility] for radiation oncology treatment. Patient initially presented to [facility] on /26 for GIB and weakness. s/p EGD and Colonoscopy 9/30 which showed findings suspicious for esophageal candidiasis, gastroparesis, erythematous mucosa in pylorus and stomach. Poor prep for colonoscopy, no bleeding/blood noted, need repeat oupt colonoscopy. IVC filter placed 9/28 given holding AC in the setting of thrombocytopenia per heme. CTH and MR head done; per neuro sx, patient's MRI and the typical post treatment course and timing the findings most likely represent post treatment changes. As such, the patient can continue to follow up as an outpatient with Dr. Mcneil as planned after their current admission. Patient requires 9 remaining radiation treatments on a Monday-Friday schedule to begin as soon as reasonably possible. Given patient became COVID positive, he was being transferred to [facility] to continue radiation oncology treatment while waiting for quaratine/placement to not delay care.     Currently, patient reports feeling okay. Denies n/v/abd pain. Poor appetite per family at bedside. He eats better when family is there. Denies chest pain, sob, fever, chills. A little bit of dry cough per wife today. Also c/o worsening blurry vision yesterday to daughter; he first noticed blurry vision 2 weeks ago. No eye pain or discharge.    (08 Oct 2022 17:12)    INTERVAL EVENTS:  > 10/10: Palliative provider met with patient and his family (wife and daughter) at bedside. Patient had RRT this AM s/p Ativan 4mg total and keppra. He was lethargic during encounter today after RRT. Please see separate goc note regarding discussion with family at bedside.     PERTINENT PM/SXH:   Brain tumor  Glioblastoma multiforme  Steroid-induced diabetes mellitus  Hyperlipidemia  s/P craniotomy      FAMILY HISTORY:  FH: type 2 diabetes  FH: hyperlipidemia      SOCIAL HISTORY: Patient worked in grocery store.   Significant other/partner[ x]  Children[ x]  Moravian/Spirituality:  Substance hx:  [ ]   Tobacco hx:  [ ]   Alcohol hx: [ ]   Home Opioid hx:  [ ] I-Stop Reference No: [id number]  Living Situation: [x ]Home  [ ]Long term care  [ ]Rehab [ ]Other    ADVANCE DIRECTIVES:    DNR/MOLST  [ ]  Living Will  [ ]   DECISION MAKER(s):  [x ] Health Care Proxy(s)  [ ] Surrogate(s)  [ ] Guardian           Name(s): Faith Hendrickson (daughter)  Phone Number(s): [phone]    BASELINE (I)ADL(s) (prior to admission): Per discussion with family, patient was independent of ADLs prior to hospitalization.   [place]: [x ]Total  [ ] Moderate [ ]Dependent    Allergies    heparin containing compounds (Other)    Intolerances    MEDICATIONS  (STANDING):  atorvastatin 80 milliGRAM(s) Oral at bedtime  dexAMETHasone     Tablet 4 milliGRAM(s) Oral every 12 hours  dextrose 5%. 1000 milliLiter(s) (50 mL/Hr) IV Continuous <Continuous>  dextrose 5%. 1000 milliLiter(s) (100 mL/Hr) IV Continuous <Continuous>  dextrose 5%. 1000 milliLiter(s) (50 mL/Hr) IV Continuous <Continuous>  dextrose 50% Injectable 25 Gram(s) IV Push once  dextrose 50% Injectable 12.5 Gram(s) IV Push once  dextrose 50% Injectable 25 Gram(s) IV Push once  FLUoxetine 20 milliGRAM(s) Oral daily  glucagon  Injectable 1 milliGRAM(s) IntraMuscular once  insulin glargine Injectable (LANTUS) 8 Unit(s) SubCutaneous at bedtime  insulin lispro (ADMELOG) corrective regimen sliding scale   SubCutaneous three times a day before meals  insulin lispro (ADMELOG) corrective regimen sliding scale   SubCutaneous at bedtime  insulin lispro Injectable (ADMELOG) 4 Unit(s) SubCutaneous three times a day before meals  levETIRAcetam  IVPB 1000 milliGRAM(s) IV Intermittent once  levETIRAcetam  IVPB 1000 milliGRAM(s) IV Intermittent every 12 hours  pantoprazole    Tablet 40 milliGRAM(s) Oral every 12 hours    MEDICATIONS  (PRN):  acetaminophen     Tablet .. 650 milliGRAM(s) Oral every 6 hours PRN Temp greater or equal to 38C (100.4F), Mild Pain (1 - 3)  dextrose Oral Gel 15 Gram(s) Oral once PRN Blood Glucose LESS THAN 70 milliGRAM(s)/deciliter  melatonin 3 milliGRAM(s) Oral at bedtime PRN Insomnia  ondansetron Injectable 4 milliGRAM(s) IV Push every 8 hours PRN Nausea and/or Vomiting    PRESENT SYMPTOMS: [x ]Unable to self-report  [ ] CPOT [x ] PAINADs [ ] RDOS  Source if other than patient:  [ ]Family   [ ]Team     Pain: [ ]yes [ ]no  QOL impact -   Location -                    Aggravating factors -  Quality -  Radiation -  Timing-  Severity (0-10 scale):  Minimal acceptable level (0-10 scale):     CPOT:    https://www.[facility].org/getattachment/[email or web address]/Critical-Care-Pain-Observation-Tool-(CPOT)    Dyspnea:                           [ ]Mild [ ]Moderate [ ]Severe  Anxiety:                             [ ]Mild [ ]Moderate [ ]Severe  Fatigue:                             [ ]Mild [ ]Moderate [ ]Severe  Nausea:                             [ ]Mild [ ]Moderate [ ]Severe  Loss of appetite:              [ ]Mild [ ]Moderate [ ]Severe  Constipation:                    [ ]Mild [ ]Moderate [ ]Severe    Other Symptoms:  [ ]All other review of systems negative     PCSSQ[Palliative Care Spiritual Screening Question]   Severity (0-10):  Chaplaincy Referral: [ ] yes [ ] refused [ ] following [x]deferred     Caregiver [place]? : [x ] yes [ ] no [ ] Deferred [ ] Declined             Social work referral [x ] Patient & Family Centered Care Referral [ ]  Anticipatory Grief present?:  [x ] yes [ ] no  [ ] Deferred                  Social work referral [x ] Patient & Family Centered Care Referral [ ]    PHYSICAL EXAM:  Vital Signs Last 24 Hrs  T(C): 36.8 (10 Oct 2022 05:15), Max: 36.9 (09 Oct 2022 13:28)  T(F): 98.3 (10 Oct 2022 05:15), Max: 98.4 (09 Oct 2022 13:28)  HR: 83 (10 Oct 2022 05:15) (63 - 83)  BP: 138/83 (10 Oct 2022 05:15) (130/79 - 138/83)  BP(mean): --  RR: 16 (10 Oct 2022 05:15) (16 - 17)  SpO2: 100% (10 Oct 2022 05:15) (98% - 100%)    Parameters below as of 10 Oct 2022 05:15  Patient On (Oxygen Delivery Method): room air     I&O's Summary    GENERAL: [ ]Cachexia    [ ]Alert  [ ]Oriented x   [x ]Lethargic  [ ]Unarousable  [ ]Verbal  [ ]Non-Verbal  Behavioral:   [ ] Anxiety  [ ] Delirium [ ] Agitation [x ] Other  HEENT:  [x ]Normal   [ ]Dry mouth   [ ]ET Tube/Trach  [ ]Oral lesions  PULMONARY:   [ ]Clear [ ]Tachypnea  [ ]Audible excessive secretions   [ ]Rhonchi        [ ]Right [ ]Left [ ]Bilateral  [ ]Crackles        [ ]Right [ ]Left [ ]Bilateral  [ ]Wheezing     [ ]Right [ ]Left [ ]Bilateral  [ ]Diminished breath sounds [ ]right [ ]left [ ]bilateral  CARDIOVASCULAR:    [ ]Regular [ ]Irregular [ ]Tachy  [ ]Ildefonso [ ]Murmur [ ]Other  GASTROINTESTINAL:  [ ]Soft  [ ]Distended   [ ]+BS  [ ]Non tender [ ]Tender  [ ]Other [ ]PEG [ ]OGT/ NGT  Last BM:  GENITOURINARY:  [ ]Normal [ ] Incontinent   [ ]Oliguria/Anuria   [ ]Dunn  MUSCULOSKELETAL:   [ ]Normal   [ ]Weakness  [ ]Bed/Wheelchair bound [ ]Edema  NEUROLOGIC:   [ ]No focal deficits  [ ]Cognitive impairment  [ ]Dysphagia [ ]Dysarthria [ ]Paresis [ ]Other   SKIN:   [ ]Normal  [ ]Rash  [ ]Other  [ ]Pressure ulcer(s)       Present on admission [ ]y [ ]n    CRITICAL CARE:  [ ] Shock Present  [ ]Septic [ ]Cardiogenic [ ]Neurologic [ ]Hypovolemic  [ ]  Vasopressors [ ]  Inotropes   [ ]Respiratory failure present [ ]Mechanical ventilation [ ]Non-invasive ventilatory support [ ]High flow    [ ]Acute  [ ]Chronic [ ]Hypoxic  [ ]Hypercarbic [ ]Other  [ ]Other organ failure     LABS:                        9.8    4.97  )-----------( 209      ( 10 Oct 2022 07:02 )             31.4   10-10    139  |  103  |  5<L>  ----------------------------<  146<H>  3.6   |  21<L>  |  0.73    Ca    9.1      10 Oct 2022 07:02  Phos  2.8     10-10  Mg     1.80     10-10    TPro  6.8  /  Alb  4.0  /  TBili  0.3  /  DBili  x   /  AST  19  /  ALT  9   /  AlkPhos  44  10-10        RADIOLOGY & ADDITIONAL STUDIES: < from: Xray Chest 1 View- PORTABLE-Routine (10.07.22 @ 18:13) >  INTERPRETATION:  Chest one view    HISTORY: Covid-19 and cough    COMPARISON STUDY: 9/26/2022    Frontal expiratory view of the chest shows the heart to be similar in   size. The lungs are clear and there is no evidence of pneumothorax nor   pleural effusion.    IMPRESSION:  No active pulmonary disease.    < end of copied text >  < from: MR Head w/wo IV Cont (09.27.22 @ 09:57) >  4.1 x 4.8 x 4.4 cm heterogeneously and peripherally enhancing necrotic   lesion centered in the mesial right frontal lobe, extending across the   corpus callosum into the left mesial frontal lobe. Findings may represent   the presence of residual/recurrent neoplasm and/or posttreatment changes.    Peripheral enhancement of right frontal surgical tract which may be   postsurgical in nature. The presence of neoplasm is not excluded..    Overall decreased, but still moderate nonspecific T2 and FLAIR   hyperintense signal surrounding this lesion. This may represent   nonenhancing neoplasm and/or posttreatment changes.    Overall decreased relative cerebral blood flow, decreased relative   cerebral blood volume, and subtly increased mean transit time in the   region of the surgical bed, suggesting the presence of posttreatment   changes. The presence of neoplasm is not excluded.      < end of copied text >      PROTEIN CALORIE MALNUTRITION PRESENT: [ ]mild [ ]moderate [ ]severe [ ]underweight [ ]morbid obesity  https://www.andeal.org/vault/2440/web/files/ONC/Table_Clinical%20Characteristics%20to%20Document%20Malnutrition-White%20JV%20et%20al%202012.pdf    Height (cm): 167.6 (10-08-22 @ 12:33), 165.1 (09-30-22 @ 16:10), 165 (08-23-22 @ 15:00)  Weight (kg): 71 (10-08-22 @ 12:33), 68 (09-30-22 @ 16:10), [id number] (08-23-22 @ 15:00)  BMI (kg/m2): 25.3 (10-08-22 @ 12:33), 24.9 (09-30-22 @ 16:10), 28.8 (08-23-22 @ 15:00)    [ ]PPSV2 < or = to 30% [ ]significant weight loss  [ ]poor nutritional intake  [ ]anasarca[ ]Artificial Nutrition      Other REFERRALS:  [ ]Hospice  [ ]Child Life  [ ]Social Work  [ ]Case management [ ]Holistic Therapy   Face to face      Electronic Signatures:  Tita Lizama (ELSA)  (Signed 03-Oct-2022 16:17)  	Authored: Goals of Care Conversation, Personal Advance Directives Treatment Guidelines, Location of Discussion      Last Updated: 03-Oct-2022 16:17 by Tita Lizama (ELSA)     [facility] Geriatrics and Palliative Care  Martha Lopez, Palliative Care Attending  Contact Info: Page [phone] (including Nights/Weekends), message on Microsoft Teams (Martha Lopez), or leave VM at Palliative Office [phone] (non-urgent)     HPI:  83 y/o M PMHx of PMH Glioblastoma (diagnosed 7/18/22, s/p R stereo biopsy, TIMOTHY x2, R crani for tumor debulking 7/28/22, on temozolomide (last dose 4 days ago)), HLD, steroid-induced diabetes, and R popliteal DVT (found 9/21/22, not on AC due to thrombocytopenia) transferred from [facility] for radiation oncology treatment. Patient initially presented to [facility] on /26 for GIB and weakness. s/p EGD and Colonoscopy 9/30 which showed findings suspicious for esophageal candidiasis, gastroparesis, erythematous mucosa in pylorus and stomach. Poor prep for colonoscopy, no bleeding/blood noted, need repeat oupt colonoscopy. IVC filter placed 9/28 given holding AC in the setting of thrombocytopenia per heme. CTH and MR head done; per neuro sx, patient's MRI and the typical post treatment course and timing the findings most likely represent post treatment changes. As such, the patient can continue to follow up as an outpatient with Dr. Mcneil as planned after their current admission. Patient requires 9 remaining radiation treatments on a Monday-Friday schedule to begin as soon as reasonably possible. Given patient became COVID positive, he was being transferred to [facility] to continue radiation oncology treatment while waiting for quaratine/placement to not delay care.     Currently, patient reports feeling okay. Denies n/v/abd pain. Poor appetite per family at bedside. He eats better when family is there. Denies chest pain, sob, fever, chills. A little bit of dry cough per wife today. Also c/o worsening blurry vision yesterday to daughter; he first noticed blurry vision 2 weeks ago. No eye pain or discharge.    (08 Oct 2022 17:12)    INTERVAL EVENTS:  > 10/10: Palliative provider met with patient and his family (wife and daughter) at bedside. Patient had RRT this AM s/p Ativan 4mg total and keppra. He was lethargic during encounter today after RRT. Please see separate goc note regarding discussion with family at bedside.     PERTINENT PM/SXH:   Brain tumor  Glioblastoma multiforme  Steroid-induced diabetes mellitus  Hyperlipidemia  s/P craniotomy      FAMILY HISTORY:  FH: type 2 diabetes  FH: hyperlipidemia      SOCIAL HISTORY: Patient worked in grocery store.   Significant other/partner[ x]  Children[ x]  Restoration/Spirituality:  Substance hx:  [ ]   Tobacco hx:  [ ]   Alcohol hx: [ ]   Home Opioid hx:  [ ] I-Stop Reference No: [id number]  Living Situation: [x ]Home  [ ]Long term care  [ ]Rehab [ ]Other    ADVANCE DIRECTIVES:    DNR/MOLST  [ ]  Living Will  [ ]   DECISION MAKER(s):  [x ] Health Care Proxy(s)  [ ] Surrogate(s)  [ ] Guardian           Name(s): Faith Hendrickson (daughter)  Phone Number(s): [phone]    BASELINE (I)ADL(s) (prior to admission): Per discussion with family, patient was independent of ADLs prior to hospitalization.   [place]: [x ]Total  [ ] Moderate [ ]Dependent    Allergies    heparin containing compounds (Other)    Intolerances    MEDICATIONS  (STANDING):  atorvastatin 80 milliGRAM(s) Oral at bedtime  dexAMETHasone     Tablet 4 milliGRAM(s) Oral every 12 hours  dextrose 5%. 1000 milliLiter(s) (50 mL/Hr) IV Continuous <Continuous>  dextrose 5%. 1000 milliLiter(s) (100 mL/Hr) IV Continuous <Continuous>  dextrose 5%. 1000 milliLiter(s) (50 mL/Hr) IV Continuous <Continuous>  dextrose 50% Injectable 25 Gram(s) IV Push once  dextrose 50% Injectable 12.5 Gram(s) IV Push once  dextrose 50% Injectable 25 Gram(s) IV Push once  FLUoxetine 20 milliGRAM(s) Oral daily  glucagon  Injectable 1 milliGRAM(s) IntraMuscular once  insulin glargine Injectable (LANTUS) 8 Unit(s) SubCutaneous at bedtime  insulin lispro (ADMELOG) corrective regimen sliding scale   SubCutaneous three times a day before meals  insulin lispro (ADMELOG) corrective regimen sliding scale   SubCutaneous at bedtime  insulin lispro Injectable (ADMELOG) 4 Unit(s) SubCutaneous three times a day before meals  levETIRAcetam  IVPB 1000 milliGRAM(s) IV Intermittent once  levETIRAcetam  IVPB 1000 milliGRAM(s) IV Intermittent every 12 hours  pantoprazole    Tablet 40 milliGRAM(s) Oral every 12 hours    MEDICATIONS  (PRN):  acetaminophen     Tablet .. 650 milliGRAM(s) Oral every 6 hours PRN Temp greater or equal to 38C (100.4F), Mild Pain (1 - 3)  dextrose Oral Gel 15 Gram(s) Oral once PRN Blood Glucose LESS THAN 70 milliGRAM(s)/deciliter  melatonin 3 milliGRAM(s) Oral at bedtime PRN Insomnia  ondansetron Injectable 4 milliGRAM(s) IV Push every 8 hours PRN Nausea and/or Vomiting    PRESENT SYMPTOMS: [x ]Unable to self-report  [ ] CPOT [x ] PAINADs [ ] RDOS  Source if other than patient:  [ ]Family   [ ]Team     Pain: [ ]yes [ ]no- See PAiN AD score   QOL impact -   Location -                    Aggravating factors -  Quality -  Radiation -  Timing-  Severity (0-10 scale):  Minimal acceptable level (0-10 scale):     CPOT:    https://[email or web address]/[email or web address]/Critical-Care-Pain-Observation-Tool-(CPOT)    Dyspnea:                           [ ]Mild [ ]Moderate [ ]Severe  Anxiety:                             [ ]Mild [ ]Moderate [ ]Severe  Fatigue:                             [ ]Mild [ ]Moderate [ ]Severe  Nausea:                             [ ]Mild [ ]Moderate [ ]Severe  Loss of appetite:              [ ]Mild [ ]Moderate [ ]Severe  Constipation:                    [ ]Mild [ ]Moderate [ ]Severe    Other Symptoms:  [ ]All other review of systems negative     PCSSQ[Palliative Care Spiritual Screening Question]   Severity (0-10):  Chaplaincy Referral: [ ] yes [ ] refused [ ] following [x]deferred     Caregiver [place]? : [x ] yes [ ] no [ ] Deferred [ ] Declined             Social work referral [x ] Patient & Family Centered Care Referral [ ]  Anticipatory Grief present?:  [x ] yes [ ] no  [ ] Deferred                  Social work referral [x ] Patient & Family Centered Care Referral [ ]    PHYSICAL EXAM:  Vital Signs Last 24 Hrs  T(C): 36.8 (10 Oct 2022 05:15), Max: 36.9 (09 Oct 2022 13:28)  T(F): 98.3 (10 Oct 2022 05:15), Max: 98.4 (09 Oct 2022 13:28)  HR: 83 (10 Oct 2022 05:15) (63 - 83)  BP: 138/83 (10 Oct 2022 05:15) (130/79 - 138/83)  BP(mean): --  RR: 16 (10 Oct 2022 05:15) (16 - 17)  SpO2: 100% (10 Oct 2022 05:15) (98% - 100%)    Parameters below as of 10 Oct 2022 05:15  Patient On (Oxygen Delivery Method): room air     I&O's Summary    GENERAL: [ ]Cachexia    [ ]Alert  [ ]Oriented x   [x ]Lethargic  [ ]Unarousable  [ ]Verbal  [ ]Non-Verbal  Behavioral:   [ ] Anxiety  [ ] Delirium [ ] Agitation [x ] Other  HEENT:  [x ]Normal   [ ]Dry mouth   [ ]ET Tube/Trach  [ ]Oral lesions  PULMONARY:   [x ]Clear [ ]Tachypnea  [ ]Audible excessive secretions   [ ]Rhonchi        [ ]Right [ ]Left [ ]Bilateral  [ ]Crackles        [ ]Right [ ]Left [ ]Bilateral  [ ]Wheezing     [ ]Right [ ]Left [ ]Bilateral  [ ]Diminished breath sounds [ ]right [ ]left [ ]bilateral  CARDIOVASCULAR:    [x ]Regular [ ]Irregular [ ]Tachy  [ ]Ildefonso [ ]Murmur [ ]Other  GASTROINTESTINAL:  [x ]Soft  [ ]Distended   [ ]+BS  [ ]Non tender [ ]Tender  [ ]Other [ ]PEG [ ]OGT/ NGT  Last BM: 10/8  GENITOURINARY:   [ ]Normal [ x] Incontinent   [ ]Oliguria/Anuria   [ ]Dunn  MUSCULOSKELETAL:   [ ]Normal   [ x]Weakness  [ ]Bed/Wheelchair bound [ ]Edema  NEUROLOGIC:   [ ]No focal deficits  [ ]Cognitive impairment  [ ]Dysphagia [ ]Dysarthria [ ]Paresis [x ]Other   SKIN: Left buttocks stage 2, skin tears- please see flowsheets  [ ]Normal  [ ]Rash  [ ]Other  [ ]Pressure ulcer(s)       Present on admission [ ]y [ ]n    CRITICAL CARE:  [ ] Shock Present  [ ]Septic [ ]Cardiogenic [ ]Neurologic [ ]Hypovolemic  [ ]  Vasopressors [ ]  Inotropes   [ ]Respiratory failure present [ ]Mechanical ventilation [ ]Non-invasive ventilatory support [ ]High flow    [ ]Acute  [ ]Chronic [ ]Hypoxic  [ ]Hypercarbic [ ]Other  [ ]Other organ failure     LABS:                        9.8    4.97  )-----------( 209      ( 10 Oct 2022 07:02 )             31.4   10-10    139  |  103  |  5<L>  ----------------------------<  146<H>  3.6   |  21<L>  |  0.73    Ca    9.1      10 Oct 2022 07:02  Phos  2.8     10-10  Mg     1.80     10-10    TPro  6.8  /  Alb  4.0  /  TBili  0.3  /  DBili  x   /  AST  19  /  ALT  9   /  AlkPhos  44  10-10        RADIOLOGY & ADDITIONAL STUDIES: < from: Xray Chest 1 View- PORTABLE-Routine (10.07.22 @ 18:13) >  INTERPRETATION:  Chest one view    HISTORY: Covid-19 and cough    COMPARISON STUDY: 9/26/2022    Frontal expiratory view of the chest shows the heart to be similar in   size. The lungs are clear and there is no evidence of pneumothorax nor   pleural effusion.    IMPRESSION:  No active pulmonary disease.    < end of copied text >  < from: MR Head w/wo IV Cont (09.27.22 @ 09:57) >  4.1 x 4.8 x 4.4 cm heterogeneously and peripherally enhancing necrotic   lesion centered in the mesial right frontal lobe, extending across the   corpus callosum into the left mesial frontal lobe. Findings may represent   the presence of residual/recurrent neoplasm and/or posttreatment changes.    Peripheral enhancement of right frontal surgical tract which may be   postsurgical in nature. The presence of neoplasm is not excluded..    Overall decreased, but still moderate nonspecific T2 and FLAIR   hyperintense signal surrounding this lesion. This may represent   nonenhancing neoplasm and/or posttreatment changes.    Overall decreased relative cerebral blood flow, decreased relative   cerebral blood volume, and subtly increased mean transit time in the   region of the surgical bed, suggesting the presence of posttreatment   changes. The presence of neoplasm is not excluded.      < end of copied text >      PROTEIN CALORIE MALNUTRITION PRESENT: [ ]mild [ ]moderate [ ]severe [ ]underweight [ ]morbid obesity  https://www.andeal.org/vault/5576/web/files/ONC/Table_Clinical%20Characteristics%20to%20Document%20Malnutrition-White%20JV%20et%20al%274745.pdf    Height (cm): 167.6 (10-08-22 @ 12:33), 165.1 (09-30-22 @ 16:10), 165 (08-23-22 @ 15:00)  Weight (kg): 71 (10-08-22 @ 12:33), 68 (09-30-22 @ 16:10), [id number] (08-23-22 @ 15:00)  BMI (kg/m2): 25.3 (10-08-22 @ 12:33), 24.9 (09-30-22 @ 16:10), 28.8 (08-23-22 @ 15:00)    [x ]PPSV2 < or = to 30% [ ]significant weight loss  [ ]poor nutritional intake  [ ]anasarca[ ]Artificial Nutrition      Other REFERRALS:  [ ]Hospice  [ ]Child Life  [ ]Social Work  [ ]Case management [ ]Holistic Therapy   Face to face      Electronic Signatures:  Tita Lizama (ELSA)  (Signed 03-Oct-2022 16:17)  	Authored: Goals of Care Conversation, Personal Advance Directives Treatment Guidelines, Location of Discussion      Last Updated: 03-Oct-2022 16:17 by Tita Lizama (ELSA)

## 2022-10-10 NOTE — CONSULT NOTE ADULT - PROBLEM SELECTOR RECOMMENDATION 9
Patient with RRT today Patient with RRT today for seizure-like activity s/p Ativan 2mg x2, keppra  Pending EEG and CTH   Appreciate Neuro recs   Continue antiepileptics per neuro recs

## 2022-10-10 NOTE — CHART NOTE - NSCHARTNOTEFT_GEN_A_CORE
Notified by the RN that patient is more confused than the previous night and not responding appropriately. As per RN  patient is currently Axox1. 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    Seen and assessed the patient at the bedside. Patient is primarily Swazi speaking. Translation service used with  ID #[id number]. Patient is Axox2, able to say his first and last name and reports he is in the hospital, but not sure about the date. Patient is Axox2 as the baseline, as per previous documentation on admission. Patient reports, he has mild head ache at the back of the head. Patient denies any other distress or discomfort at the time of assessment. .Patient reports he is tired ,and he  wants to rest . Advised the RN to administer prn tylenol for headache. Will continue to monitor.

## 2022-10-10 NOTE — CONSULT NOTE ADULT - ASSESSMENT
Assessment: 81 yo Macedonian-speaking male with a PMHx of Glioblastoma (dx 7/18/22; s/p R stereo biopsy, TIMOTHY x2, R crani for tumor debulking; on temozolomide, follows with Dr. Borja), HLD, steroid-induced diabetes, and R popliteal DVT (found 9/21/22, treated with Lovenox outpatient which was DC'd due to thrombocytopenia) who initially presented to [facility] on 9/26 for 5 days of lower extremity weakness and with dark stools x1-2 days. EGD and colonoscopy were done with no signs of bleeding. Patient was exposed to COVID in the hospital and is now COVID positive. He was transferred to [facility] on 10/08 for further radiation oncology treatments. Neuro consulted on 10/10 following an RRT for seizure-like activity.    Impression: Possible seizure in setting of patient with known GBM and active infection.    Recommendations:  [] Neuro checks Q4HR.  [] Telemetry.  [] Seizure/Fall/Aspiration precautions.  [] vEEG.  [x] s/p Ativan 2MG IVP x1 and Keppra 1G IV x1 during 10/10 RRT.  [] Please give additional Keppra 500MG IV now.  [] Increase Keppra to 1G IV Q12HR.  [] Will need CTH w/o when patient more awake following Ativan administration.  [] PT/OT.  [] Radiation treatments as per oncology team.  [] Rest of care per primary team.    Case seen and discussed with neurology attending Dr. Keys. Assessment: 81 yo Croatian-speaking male with a PMHx of Glioblastoma (dx 7/18/22; s/p R stereo biopsy, TIMOTHY x2, R crani for tumor debulking; on temozolomide, follows with Dr. Borja), HLD, steroid-induced diabetes, and R popliteal DVT (found 9/21/22, treated with Lovenox outpatient which was DC'd due to thrombocytopenia) who initially presented to [facility] on 9/26 for 5 days of lower extremity weakness and with dark stools x1-2 days. EGD and colonoscopy were done with no signs of bleeding. Patient was exposed to COVID in the hospital and is now COVID positive. He was transferred to [facility] on 10/08 for further radiation oncology treatments. Neuro consulted on 10/10 following an RRT for seizure-like activity.    Impression: Possible seizure in setting of patient with known GBM and active infection.    Recommendations:  [] Neuro checks Q4HR.  [] Telemetry.  [] Seizure/Fall/Aspiration precautions.  [] vEEG.  [x] s/p Ativan 2MG IVP x1 and Keppra 1G IV x1 during 10/10 RRT.  [] Please give additional Keppra 500MG IV now.  [] Increase Keppra to 1G IV Q12HR.  [] Will need CTH w/o when patient more awake following Ativan administration.  [] Send serum lactate, CPK.  [] PT/OT.  [] Radiation treatments as per oncology team.  [] Rest of care per primary team.    Case seen and discussed with neurology attending Dr. Keys. Case also discussed with patient's outpatient neuro oncologist, Dr. Borja. Assessment: 81 yo Bulgarian-speaking male with a PMHx of Glioblastoma (dx 7/18/22; s/p R stereo biopsy, TIMOTHY x2, R crani for tumor debulking; on temozolomide, follows with Dr. Borja), HLD, steroid-induced diabetes, and R popliteal DVT (found 9/21/22, treated with Lovenox outpatient which was DC'd due to thrombocytopenia, now s/p IVC filter) who initially presented to [facility] on 9/26 for 5 days of lower extremity weakness and with dark stools x1-2 days. EGD and colonoscopy were done with no signs of bleeding. Patient was exposed to COVID in the hospital and is now COVID positive. He was transferred to [facility] on 10/08 for further radiation oncology treatments. Neuro consulted on 10/10 following an RRT for seizure-like activity.    Impression: Possible seizure in setting of patient with known GBM and active infection.    Recommendations:  [] Neuro checks Q4HR.  [] Telemetry.  [] Seizure/Fall/Aspiration precautions.  [] vEEG.  [x] s/p Ativan 2MG IVP x1 and Keppra 1G IV x1 during 10/10 RRT.  [] Please give additional Keppra 500MG IV now.  [] Increase Keppra to 1G IV Q12HR.  [] Will need CTH w/o when patient more awake following Ativan administration.  [] Send serum lactate, CPK.  [] PT/OT.  [] Radiation treatments as per oncology team.  [] Rest of care per primary team.    Case seen and discussed with neurology attending Dr. Keys. Case also discussed with patient's outpatient neuro oncologist, Dr. Broja.

## 2022-10-10 NOTE — PROGRESS NOTE ADULT - PROBLEM SELECTOR PLAN 8
Diet: NPO for now due to poor mental status  DVT ppx: SCD's in LLE, s/p IVC filter in RLE, per heme/onc continue to hold AC   Dispo: pending radiation tx

## 2022-10-10 NOTE — PROGRESS NOTE ADULT - ASSESSMENT
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

## 2022-10-10 NOTE — PROGRESS NOTE ADULT - PROBLEM SELECTOR PLAN 2
a1c 9.3% (9/27/22), likely 2/2 steroid-induced DM  - holding home oral agents while inpatient (januvia 100mg qd, metformin ER 500mg BID)  - continue [facility] regimen: 8 units lantus qhs and 4u admelog TID AC   - adjust insulin regimen as needed  - low dose correctional scale  - FS TID qAC and qhs while eating or q6h while NPO

## 2022-10-10 NOTE — PROGRESS NOTE ADULT - SUBJECTIVE AND OBJECTIVE BOX
[facility] Department of Hospital Medicine  Dex Joaquin MD  Available on MS Teams  Pager: [phone]    Patient is a 82y old  Male who presents with a chief complaint of Primary malignant neoplasm     (10 Oct 2022 14:40)    OVERNIGHT EVENTS: In morning patient with RRT for seizure activity. Given total ativan 4mg IV and additional keppra load with termination of seizure activity. Patient evaluated by MICU and neurology. Started on vEEG and CTH performed. Also seen by palliative care and made DNR/DNI.    SUBJECTIVE: Pt seen and examined this morning with daughter and wife at bedside. Remains lethargic and grimaced and moaning to sternal rub. Otherwise non-verbal.    ADDITIONAL REVIEW OF SYSTEMS: Unable to obtain.    MEDICATIONS  (STANDING):  atorvastatin 80 milliGRAM(s) Oral at bedtime  dexAMETHasone  Injectable 4 milliGRAM(s) IV Push two times a day  dextrose 5%. 1000 milliLiter(s) (100 mL/Hr) IV Continuous <Continuous>  dextrose 5%. 1000 milliLiter(s) (50 mL/Hr) IV Continuous <Continuous>  dextrose 50% Injectable 25 Gram(s) IV Push once  dextrose 50% Injectable 12.5 Gram(s) IV Push once  dextrose 50% Injectable 25 Gram(s) IV Push once  FLUoxetine 20 milliGRAM(s) Oral daily  glucagon  Injectable 1 milliGRAM(s) IntraMuscular once  insulin glargine Injectable (LANTUS) 8 Unit(s) SubCutaneous at bedtime  insulin lispro (ADMELOG) corrective regimen sliding scale   SubCutaneous three times a day before meals  insulin lispro (ADMELOG) corrective regimen sliding scale   SubCutaneous at bedtime  insulin lispro Injectable (ADMELOG) 4 Unit(s) SubCutaneous three times a day before meals  levETIRAcetam  IVPB 1000 milliGRAM(s) IV Intermittent once  levETIRAcetam  IVPB 1000 milliGRAM(s) IV Intermittent every 12 hours  pantoprazole  Injectable 40 milliGRAM(s) IV Push two times a day    MEDICATIONS  (PRN):  acetaminophen     Tablet .. 650 milliGRAM(s) Oral every 6 hours PRN Temp greater or equal to 38C (100.4F), Mild Pain (1 - 3)  dextrose Oral Gel 15 Gram(s) Oral once PRN Blood Glucose LESS THAN 70 milliGRAM(s)/deciliter  melatonin 3 milliGRAM(s) Oral at bedtime PRN Insomnia  ondansetron Injectable 4 milliGRAM(s) IV Push every 8 hours PRN Nausea and/or Vomiting    CAPILLARY BLOOD GLUCOSE    POCT Blood Glucose.: 251 mg/dL (10 Oct 2022 12:07)  POCT Blood Glucose.: 282 mg/dL (10 Oct 2022 09:32)  POCT Blood Glucose.: 238 mg/dL (10 Oct 2022 08:05)  POCT Blood Glucose.: 258 mg/dL (09 Oct 2022 22:16)  POCT Blood Glucose.: 289 mg/dL (09 Oct 2022 17:08)    I&O's Summary    PHYSICAL EXAM:  Vital Signs Last 24 Hrs  T(C): 37 (10 Oct 2022 12:45), Max: 37 (10 Oct 2022 12:45)  T(F): 98.6 (10 Oct 2022 12:45), Max: 98.6 (10 Oct 2022 12:45)  HR: 109 (10 Oct 2022 12:45) (74 - 109)  BP: 144/82 (10 Oct 2022 12:45) (130/79 - 144/82)  BP(mean): --  RR: 17 (10 Oct 2022 12:45) (16 - 17)  SpO2: 99% (10 Oct 2022 12:45) (99% - 100%)    Parameters below as of 10 Oct 2022 12:45  Patient On (Oxygen Delivery Method): nasal cannula  O2 Flow (L/min): 3    CONSTITUTIONAL: NAD, well-developed  HEAD: Normocephalic, atraumatic  EYES: EOMI, PERRL  ENT: no rhinorrhea, no pharyngeal erythema  RESPIRATORY: No increased work of breathing, CTAB, no wheezes or crackles appreciated  CARDIOVASCULAR: RRR, S1 and S2 present, no m/r/g  ABDOMEN: soft, NT, ND, bowel sounds present  EXTREMITIES: No LE edema  MUSCULOSKELETAL: no joint swelling, no tenderness to palpation  NEURO: A&Ox0, nonverbal, grimacing to sternal rub    LABS:                        9.8    4.97  )-----------( 209      ( 10 Oct 2022 07:02 )             31.4     10-10    139  |  103  |  5<L>  ----------------------------<  146<H>  3.6   |  21<L>  |  0.73    Ca    9.1      10 Oct 2022 07:02  Phos  2.8     10-10  Mg     1.80     10-10    TPro  6.8  /  Alb  4.0  /  TBili  0.3  /  DBili  x   /  AST  19  /  ALT  9   /  AlkPhos  44  10-10    CARDIAC MARKERS ( 10 Oct 2022 12:06 )  x     / x     / 104 U/L / x     / x        RADIOLOGY & ADDITIONAL TESTS:  < from: CT Head No Cont (10.10.22 @ 12:48) >  IMPRESSION:    Similar-appearing ill-defined low density mass involving the bilateral   mesial frontal lobes and the anterior corpus callosum.    Similar right frontal vasogenic edema.    No hydrocephalus or midline shift. Basal cisterns are visualized.    No large acute intracranial hemorrhage.    < end of copied text >    Results Reviewed:   Imaging Personally Reviewed:  Electrocardiogram Personally Reviewed:    COORDINATION OF CARE:  Care Discussed with Consultants/Other Providers [Y/N]:  Prior or Outpatient Records Reviewed [Y/N]:

## 2022-10-10 NOTE — GOALS OF CARE CONVERSATION - ADVANCED CARE PLANNING - CONVERSATION DETAILS
Referral for complex decision making and symptom management in setting of  malignancy. Met with patient's family at bedside and introduced role of palliative care during patient's hospitalization. Neurologist, Dr. Keys, present during encounter and discussed seizure workup after patient had RRT for seizure-like activity this AM. 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 After cancer diagnosis, patient's mood declined, making it very difficult for family to have goc discussions. Faith shared that her parents have been  for 50 years and it has been difficult for them to see him decline so quickly with this new cancer diagnosis (7/2022). However she shared that she has spoken to other providers and her mother about her father's advance directives. She shared that her father would not want to connected to machines if he was going to be remain so debilitated. Discussed advanced directives including CPR and mechanical ventilation in setting of malignancy. Reviewed the risks and benefits of these interventions at the EOL in setting of malignancy sharing that these interventions might pose more burden than benefit. Explained that patient can continue with medical management for seizures and have access to other symptom medications regardless of code status. Family was in agreement with DNR/DNI and completion of MOLST form.     Faith inquired about future options if patient was not a candidate for further cancer directed therapy. Reviewed the philosophy of hospice services with her and her mother. She expressed appreciation for the information. Family is awaiting for recs from Dr. Borja and rad/onc team. They are also aware patient is pending CT head and EEG. Palliative team will remain available for support.

## 2022-10-10 NOTE — DIETITIAN INITIAL EVALUATION ADULT - SIGNS/SYMPTOMS
as evidence by meeting <75% of needs > 7 days, >7.5% wt loss <3 mo, mod muscle & fat loss, mod edema

## 2022-10-10 NOTE — CHART NOTE - NSCHARTNOTEFT_GEN_A_CORE
Pt with new tachycardia 101s, from 60s/70s.  Pt seen at bedside  Rectal temp 100.6F     500 cc bolus ordered  blood cxs  urine cx  empiric vanc and zosyn ordered  cxr ordered  EKG ordered, night staff to follow up     Updates provided to family at bedside, all questions answered  case discussed with NIA BennettC  Department of Medicine  Pager [phone] Pt with new tachycardia 101s, from 60s/70s.  Pt seen at bedside  Rectal temp 100.6F     500 cc bolus ordered  blood cxs  urine cx  empiric vanc and zosyn ordered  cxr ordered  EKG ordered, night staff to follow up   repeat cbc ordered for hgb 9.8 today from 12.8 yesterday    Updates provided to family at bedside, all questions answered  case discussed with NIA BennettC  Department of Medicine  Pager [phone] Pt with new tachycardia 101s, from 60s/70s.  Pt seen at bedside  Rectal temp 100.6F     500 cc bolus ordered  blood cxs  urine cx  empiric vanc and zosyn ordered  cxr ordered  EKG ordered, night staff to follow up   repeat cbc ordered for hgb 9.8 today from 12.8 yesterday    Updates provided to family at bedside, all questions answered  case discussed with Dr. Joaquin and Dayna Jaime PA-C  Department of Medicine  Pager [phone]

## 2022-10-10 NOTE — DIETITIAN INITIAL EVALUATION ADULT - OTHER INFO
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 Palliative following.     Pt A&O x1-2 - lethargic during interview. Spoke with daughter and wife by bedside.  Daughter reports patient's usual body weight was 168 lbs in April, then the patient had gained weight due to increased appetite and weighed 191 lbs in July (+13.7%). Daughter then reports patient had unintentional weight loss over the past 2 months. Admission weight 156.5 lbs (-18.1%). Patient had a good appetite prior to the start of radiation on 9/16. Following radiation, patients appetite began to diminish. Pt was transferred from [facility] - where daughter reports he was NPO for about 4 days for procedures and once put on PO diet was consuming ~50% of meals. Pt tried ensure at home twice, but did not like it. Pt has not been interested in many protein foods or drinking liquids lately. As per chart, pt with steroid induced DM. Daughter reports checking pt's finger sticks 1x daily in the AM and states they are always elevated above 250 mg/dL. As per H&P, patient on Januvia 1x daily and metformin 2x daily for glycemic management. A1c increased from 7.0% in July to 9.3%. NKFA.     Pt made NPO this morning following rapid response. Prior to NPO status, patient was receiving consistent carbohydrate (no snacks), DASH/TLC diet with glucerna 3x daily. Observed two unopened glucernas by bedside. As per daughter, intake was improving in house when family provides assistance with meals. Pt prefers fish over meat - will update preferences. POCT's 10/10 238-282 mg/dL - likely secondary to steroids. No nutrition related questions at this time.

## 2022-10-10 NOTE — DIETITIAN INITIAL EVALUATION ADULT - ETIOLOGY
related to inability to meet sufficient protein-energy in setting of persistent lack of appetite, glioblastoma on radiation therapy

## 2022-10-10 NOTE — CHART NOTE - NSCHARTNOTEFT_GEN_A_CORE
RRT called by nursing staff for unresponsiveness and tremors.   4 grams ativan and 1 gram keppra given during RRT.  Vitals remained stable, pt still unresponsive, but protecting airway.   RRT ended by MAR.     MICU consulted, per MICU no need for MICU at this time, however will follow up. Recommended to hold off on CT Head until pt more stable.   Palliative care consulted   Neuro consulted, recommended an additional 500 mg keppra stat and to increase standing dose to 1gram BID  CT Head ordered during RRT, will wait until pt is more stable.   EEG ordered per neuro   NPO     Daughter Faith called and given updates, confirmed pt is FULL CODE for now. Daughter aware of palliative care consult, family is coming to hospital today.     Haydee Jaime PA-C  Department of Medicine  Pager [phone] RRT called by nursing staff for unresponsiveness and tremors.   4 grams ativan and 1 gram keppra given during RRT.   SEE RRT NOTE  Vitals remained stable, pt still unresponsive, but protecting airway.   RRT ended by MAR.     MICU consulted, per MICU no need for MICU at this time, however will follow up. Recommended to hold off on CT Head until pt more stable.   Palliative care consulted   Neuro consulted, recommended an additional 500 mg keppra stat and to increase standing dose to 1gram BID  CT Head ordered during RRT, will wait until pt is more stable.   EEG ordered per neuro   NPO and     Daughter Faith called and given updates, confirmed pt is FULL CODE for now. Daughter aware of palliative care consult, family is coming to hospital today.     Haydee Jaime PA-C  Department of Medicine  Pager [phone] RRT called by nursing staff for unresponsiveness and tremors in all 4 extremities. Concern for seizure.   4 grams ativan and 1 gram keppra given during RRT.   SEE RRT NOTE  Vitals remained stable, pt still unresponsive, but protecting airway.   RRT ended by MAR.     MICU consulted, per MICU no need for MICU at this time, however will follow up. Recommended to hold off on CT Head until pt more stable.   Palliative care consulted   Neuro consulted, recommended an additional 500 mg keppra stat and to increase standing dose to 1gram BID  CT Head ordered during RRT, will wait until pt is more stable.   EEG ordered per neuro   NPO and     Daughter Faith called and given updates, confirmed pt is FULL CODE for now. Daughter aware of palliative care consult, family is coming to hospital today.   Case discussed at length with Dr. Jemal Jaime, PA-C  Department of Medicine  Pager [phone]

## 2022-10-10 NOTE — DIETITIAN INITIAL EVALUATION ADULT - ADD RECOMMEND
1) Resume PO consistent carbohydrate, DASH/TLC diet when medically feasible. If pt with prolonged inability to consume PO diet, consider alternate means of nutrition if within GOC.   2) When PO diet resumes, resume glucerna TID.   3) Consider multivitamin daily and Vitamin C (500 mg) BID, pending no medical contraindications, to promote wound healing.  4) Monitor weights, labs, tolerance to diet prescription.   5) RD to follow up per protocol.  1) Resume PO consistent carbohydrate, DASH/TLC diet when medically feasible. If pt with prolonged inability to consume PO diet, consider alternate means of nutrition if within GOC.   2) When PO diet resumes, resume glucerna TID.   3) Consider Vitamin C (500 mg) BID, pending no medical contraindications, to promote wound healing.  4) Monitor weights, labs, tolerance to diet prescription.   5) RD to follow up per protocol.

## 2022-10-10 NOTE — DIETITIAN INITIAL EVALUATION ADULT - PERTINENT MEDS FT
MEDICATIONS  (STANDING):  atorvastatin 80 milliGRAM(s) Oral at bedtime  dexAMETHasone     Tablet 4 milliGRAM(s) Oral every 12 hours  dextrose 5%. 1000 milliLiter(s) (50 mL/Hr) IV Continuous <Continuous>  dextrose 5%. 1000 milliLiter(s) (100 mL/Hr) IV Continuous <Continuous>  dextrose 5%. 1000 milliLiter(s) (50 mL/Hr) IV Continuous <Continuous>  dextrose 50% Injectable 25 Gram(s) IV Push once  dextrose 50% Injectable 12.5 Gram(s) IV Push once  dextrose 50% Injectable 25 Gram(s) IV Push once  FLUoxetine 20 milliGRAM(s) Oral daily  glucagon  Injectable 1 milliGRAM(s) IntraMuscular once  insulin glargine Injectable (LANTUS) 8 Unit(s) SubCutaneous at bedtime  insulin lispro (ADMELOG) corrective regimen sliding scale   SubCutaneous three times a day before meals  insulin lispro (ADMELOG) corrective regimen sliding scale   SubCutaneous at bedtime  insulin lispro Injectable (ADMELOG) 4 Unit(s) SubCutaneous three times a day before meals  levETIRAcetam  IVPB 1000 milliGRAM(s) IV Intermittent once  levETIRAcetam  IVPB 1000 milliGRAM(s) IV Intermittent every 12 hours  pantoprazole    Tablet 40 milliGRAM(s) Oral every 12 hours    MEDICATIONS  (PRN):  acetaminophen     Tablet .. 650 milliGRAM(s) Oral every 6 hours PRN Temp greater or equal to 38C (100.4F), Mild Pain (1 - 3)  dextrose Oral Gel 15 Gram(s) Oral once PRN Blood Glucose LESS THAN 70 milliGRAM(s)/deciliter  melatonin 3 milliGRAM(s) Oral at bedtime PRN Insomnia  ondansetron Injectable 4 milliGRAM(s) IV Push every 8 hours PRN Nausea and/or Vomiting

## 2022-10-10 NOTE — CONSULT NOTE ADULT - SUBJECTIVE AND OBJECTIVE BOX
ANA LOVE  Male  MRN-[id number]    HPI: As per primary team-  "83 y/o M PMHx of PMH Glioblastoma (diagnosed 7/18/22, s/p R stereo biopsy, TIMOTHY x2, R crani for tumor debulking 7/28/22, on temozolomide (last dose 4 days ago)), HLD, steroid-induced diabetes, and R popliteal DVT (found 9/21/22, treated with Lovenox outpatient which was DC'd due to thrombocytopenia) transferred from [facility] for radiation oncology treatment. Patient initially presented to [facility] on /26 for GIB and weakness. s/p EGD and Colonoscopy 9/30 which showed findings suspicious for esophageal candidiasis, gastroparesis, erythematous mucosa in pylorus and stomach. Poor prep for colonoscopy, no bleeding/blood noted, need repeat oupt colonoscopy. IVC filter placed 9/28 given holding AC in the setting of thrombocytopenia per heme. CTH and MR head done; per neuro sx, patient's MRI and the typical post treatment course and timing the findings most likely represent post treatment changes. As such, the patient can continue to follow up as an outpatient with Dr. Mcneil as planned after their current admission. Patient requires 9 remaining radiation treatments on a Monday-Friday schedule to begin as soon as reasonably possible. Given patient became COVID positive, he was being transferred to [facility] to continue radiation oncology treatment while waiting for quaratine/placement to not delay care."    Neurology consulted following an RRT for seizure-like activity. RN reports patient started having whole body shaking with roving eye movements, not responding to questions. RRT was called and shaking movements did not resolve until a second dose of Ativan 2MG IVP was given. Patient received total of Ativan 4MG IVP and Keppra 1G IV x1 during the RRT. Upon assessment by neurology patient is snoring, minimally responsive. Family states patient had one similar episode in the past He is currently on Keppra 500MG PO BID for seizure prophylaxis.     ROS: Unable to obtain, patient too sedated.    PAST MEDICAL & SURGICAL HISTORY:  Glioblastoma multiforme    Steroid-induced diabetes mellitus    S/P craniotomy  R craniotomy for debulking with Dr. Mcneil (7/28/22)    FAMILY HISTORY:  No family history of GBM, seizures.    SOCIAL HISTORY:  Lives with wife, nonsmoker.    MEDICATIONS  (STANDING):  atorvastatin 80 milliGRAM(s) Oral at bedtime  dexAMETHasone     Tablet 4 milliGRAM(s) Oral every 12 hours  dextrose 5%. 1000 milliLiter(s) (50 mL/Hr) IV Continuous <Continuous>  dextrose 5%. 1000 milliLiter(s) (100 mL/Hr) IV Continuous <Continuous>  dextrose 5%. 1000 milliLiter(s) (50 mL/Hr) IV Continuous <Continuous>  dextrose 50% Injectable 25 Gram(s) IV Push once  dextrose 50% Injectable 12.5 Gram(s) IV Push once  dextrose 50% Injectable 25 Gram(s) IV Push once  FLUoxetine 20 milliGRAM(s) Oral daily  glucagon  Injectable 1 milliGRAM(s) IntraMuscular once  insulin glargine Injectable (LANTUS) 8 Unit(s) SubCutaneous at bedtime  insulin lispro (ADMELOG) corrective regimen sliding scale   SubCutaneous three times a day before meals  insulin lispro (ADMELOG) corrective regimen sliding scale   SubCutaneous at bedtime  insulin lispro Injectable (ADMELOG) 4 Unit(s) SubCutaneous three times a day before meals  levETIRAcetam 500 milliGRAM(s) Oral two times a day  levETIRAcetam  IVPB 1000 milliGRAM(s) IV Intermittent once  levETIRAcetam  IVPB 500 milliGRAM(s) IV Intermittent once  pantoprazole    Tablet 40 milliGRAM(s) Oral every 12 hours    MEDICATIONS  (PRN):  acetaminophen     Tablet .. 650 milliGRAM(s) Oral every 6 hours PRN Temp greater or equal to 38C (100.4F), Mild Pain (1 - 3)  dextrose Oral Gel 15 Gram(s) Oral once PRN Blood Glucose LESS THAN 70 milliGRAM(s)/deciliter  melatonin 3 milliGRAM(s) Oral at bedtime PRN Insomnia  ondansetron Injectable 4 milliGRAM(s) IV Push every 8 hours PRN Nausea and/or Vomiting    Allergies    heparin containing compounds (Other)    Vital Signs Last 24 Hrs  T(C): 36.8 (10 Oct 2022 05:15), Max: 36.9 (09 Oct 2022 13:28)  T(F): 98.3 (10 Oct 2022 05:15), Max: 98.4 (09 Oct 2022 13:28)  HR: 83 (10 Oct 2022 05:15) (63 - 83)  BP: 138/83 (10 Oct 2022 05:15) (130/79 - 138/83)  RR: 16 (10 Oct 2022 05:15) (16 - 17)  SpO2: 100% (10 Oct 2022 05:15) (98% - 100%)    Parameters below as of 10 Oct 2022 05:15  Patient On (Oxygen Delivery Method): room air    General Exam:  Constitutional: Lying in bed, snoring. NAD.  Head: Normocephalic, atraumatic.  Extremities: No edema, no cyanosis.    Neuro Exam: (After patient received Ativan 4MG IVP)  MS: Eyes closed, do not open to verbal/noxious stimuli. No verbal output. Does not follow commands.  CN: PERRL. Eyes dysconjugate (esotropia OD) with roving eye movements. Face grossly symmetric.   Motor: No spontaneous movement. Withdraws upper extremities to noxious stimuli within the plane of the bed. Trace withdrawal of lower extremities to noxious stimuli.  Sensory: Grimaces to noxious stimuli x4.  Reflexes: 1+ throughout upper extremities, 1+ patellars, absent Achilles. Toes mute bilaterally.   Coordination/Gait: Unable to assess.    LABS:               12.8   10.10 )-----------( 64       ( 09 Oct 2022 06:00 )             38.8     10-10    139  |  103  |  5<L>  ----------------------------<  146<H>  3.6   |  21<L>  |  0.73    Ca    9.1      10 Oct 2022 07:02  Phos  2.8     10-10  Mg     1.80     10-10    TPro  6.8  /  Alb  4.0  /  TBili  0.3  /  DBili  x   /  AST  19  /  ALT  9   /  AlkPhos  44  10-10    RADIOLOGY:    -09/26 CTH: Postoperative changes compatible with a high right frontal craniotomy is again seen. There is also evidence of low-attenuation in the postop bed region which could be compatible with area of fat packing. Just medial to the postop bed there is peripheral area of high attenuation identified which could be compatible with residual recurrent tumor. This finding measures approximately 2.0 x 2.0 cm. This area is slightly more conspicuous when compared with the prior exam. Surrounding edema is identified.  -09/27 MRI Head w/wo: 4.1 x 4.8 x 4.4 cm heterogeneously and peripherally enhancing necrotic lesion centered in the mesial right frontal lobe, extending across the corpus callosum into the left mesial frontal lobe. Findings may represent the presence of residual/recurrent neoplasm and/or posttreatment changes. Peripheral enhancement of right frontal surgical tract which may be postsurgical in nature. The presence of neoplasm is not excluded. Overall decreased, but still moderate nonspecific T2 and FLAIR hyperintense signal surrounding this lesion. This may represent nonenhancing neoplasm and/or posttreatment changes. Overall decreased relative cerebral blood flow, decreased relative cerebral blood volume, and subtly increased mean transit time in the region of the surgical bed, suggesting the presence of posttreatment changes. The presence of neoplasm is not excluded.   ANA LOVE  Male  MRN-[id number]    HPI: As per primary team-  "81 y/o M PMHx of PMH Glioblastoma (diagnosed 7/18/22, s/p R stereo biopsy, TIMOTHY x2, R crani for tumor debulking 7/28/22, on temozolomide (last dose 4 days ago)), HLD, steroid-induced diabetes, and R popliteal DVT (found 9/21/22, treated with Lovenox outpatient which was DC'd due to thrombocytopenia) transferred from [facility] for radiation oncology treatment. Patient initially presented to [facility] on /26 for GIB and weakness. s/p EGD and Colonoscopy 9/30 which showed findings suspicious for esophageal candidiasis, gastroparesis, erythematous mucosa in pylorus and stomach. Poor prep for colonoscopy, no bleeding/blood noted, need repeat oupt colonoscopy. IVC filter placed 9/28 given holding AC in the setting of thrombocytopenia per heme. CTH and MR head done; per neuro sx, patient's MRI and the typical post treatment course and timing the findings most likely represent post treatment changes. As such, the patient can continue to follow up as an outpatient with Dr. Mcneil as planned after their current admission. Patient requires 9 remaining radiation treatments on a Monday-Friday schedule to begin as soon as reasonably possible. Given patient became COVID positive, he was being transferred to [facility] to continue radiation oncology treatment while waiting for quaratine/placement to not delay care."    Neurology consulted following an RRT for seizure-like activity. RN reports patient started having whole body shaking with roving eye movements, not responding to questions. RRT was called and shaking movements did not resolve until a second dose of Ativan 2MG IVP was given. Patient received total of Ativan 4MG IVP and Keppra 1G IV x1 during the RRT. Upon assessment by neurology patient is snoring, minimally responsive. Family states patient had one similar episode in the past. He is currently on Keppra 500MG PO BID for seizure prophylaxis.     ROS: Unable to obtain, patient too sedated.    PAST MEDICAL & SURGICAL HISTORY:  Glioblastoma multiforme    Steroid-induced diabetes mellitus    S/P craniotomy  R craniotomy for debulking with Dr. Mcneil (7/28/22)    FAMILY HISTORY:  No family history of GBM, seizures.    SOCIAL HISTORY:  Lives with wife, nonsmoker.    MEDICATIONS  (STANDING):  atorvastatin 80 milliGRAM(s) Oral at bedtime  dexAMETHasone     Tablet 4 milliGRAM(s) Oral every 12 hours  dextrose 5%. 1000 milliLiter(s) (50 mL/Hr) IV Continuous <Continuous>  dextrose 5%. 1000 milliLiter(s) (100 mL/Hr) IV Continuous <Continuous>  dextrose 5%. 1000 milliLiter(s) (50 mL/Hr) IV Continuous <Continuous>  dextrose 50% Injectable 25 Gram(s) IV Push once  dextrose 50% Injectable 12.5 Gram(s) IV Push once  dextrose 50% Injectable 25 Gram(s) IV Push once  FLUoxetine 20 milliGRAM(s) Oral daily  glucagon  Injectable 1 milliGRAM(s) IntraMuscular once  insulin glargine Injectable (LANTUS) 8 Unit(s) SubCutaneous at bedtime  insulin lispro (ADMELOG) corrective regimen sliding scale   SubCutaneous three times a day before meals  insulin lispro (ADMELOG) corrective regimen sliding scale   SubCutaneous at bedtime  insulin lispro Injectable (ADMELOG) 4 Unit(s) SubCutaneous three times a day before meals  levETIRAcetam 500 milliGRAM(s) Oral two times a day  levETIRAcetam  IVPB 1000 milliGRAM(s) IV Intermittent once  levETIRAcetam  IVPB 500 milliGRAM(s) IV Intermittent once  pantoprazole    Tablet 40 milliGRAM(s) Oral every 12 hours    MEDICATIONS  (PRN):  acetaminophen     Tablet .. 650 milliGRAM(s) Oral every 6 hours PRN Temp greater or equal to 38C (100.4F), Mild Pain (1 - 3)  dextrose Oral Gel 15 Gram(s) Oral once PRN Blood Glucose LESS THAN 70 milliGRAM(s)/deciliter  melatonin 3 milliGRAM(s) Oral at bedtime PRN Insomnia  ondansetron Injectable 4 milliGRAM(s) IV Push every 8 hours PRN Nausea and/or Vomiting    Allergies    heparin containing compounds (Other)    Vital Signs Last 24 Hrs  T(C): 36.8 (10 Oct 2022 05:15), Max: 36.9 (09 Oct 2022 13:28)  T(F): 98.3 (10 Oct 2022 05:15), Max: 98.4 (09 Oct 2022 13:28)  HR: 83 (10 Oct 2022 05:15) (63 - 83)  BP: 138/83 (10 Oct 2022 05:15) (130/79 - 138/83)  RR: 16 (10 Oct 2022 05:15) (16 - 17)  SpO2: 100% (10 Oct 2022 05:15) (98% - 100%)    Parameters below as of 10 Oct 2022 05:15  Patient On (Oxygen Delivery Method): room air    General Exam:  Constitutional: Lying in bed, snoring. NAD.  Head: Normocephalic, atraumatic.  Extremities: No edema, no cyanosis.    Neuro Exam: (After patient received Ativan 4MG IVP)  MS: Eyes closed, do not open to verbal/noxious stimuli. No verbal output. Does not follow commands.  CN: PERRL. Eyes dysconjugate (esotropia OD) with roving eye movements. Face grossly symmetric.   Motor: No spontaneous movement. Withdraws upper extremities to noxious stimuli within the plane of the bed. Trace withdrawal of lower extremities to noxious stimuli.  Sensory: Grimaces to noxious stimuli x4.  Reflexes: 1+ throughout upper extremities, 1+ patellars, absent Achilles. Toes mute bilaterally.   Coordination/Gait: Unable to assess.    LABS:               12.8   10.10 )-----------( 64       ( 09 Oct 2022 06:00 )             38.8     10-10    139  |  103  |  5<L>  ----------------------------<  146<H>  3.6   |  21<L>  |  0.73    Ca    9.1      10 Oct 2022 07:02  Phos  2.8     10-10  Mg     1.80     10-10    TPro  6.8  /  Alb  4.0  /  TBili  0.3  /  DBili  x   /  AST  19  /  ALT  9   /  AlkPhos  44  10-10    RADIOLOGY:    -09/26 CTH: Postoperative changes compatible with a high right frontal craniotomy is again seen. There is also evidence of low-attenuation in the postop bed region which could be compatible with area of fat packing. Just medial to the postop bed there is peripheral area of high attenuation identified which could be compatible with residual recurrent tumor. This finding measures approximately 2.0 x 2.0 cm. This area is slightly more conspicuous when compared with the prior exam. Surrounding edema is identified.  -09/27 MRI Head w/wo: 4.1 x 4.8 x 4.4 cm heterogeneously and peripherally enhancing necrotic lesion centered in the mesial right frontal lobe, extending across the corpus callosum into the left mesial frontal lobe. Findings may represent the presence of residual/recurrent neoplasm and/or posttreatment changes. Peripheral enhancement of right frontal surgical tract which may be postsurgical in nature. The presence of neoplasm is not excluded. Overall decreased, but still moderate nonspecific T2 and FLAIR hyperintense signal surrounding this lesion. This may represent nonenhancing neoplasm and/or posttreatment changes. Overall decreased relative cerebral blood flow, decreased relative cerebral blood volume, and subtly increased mean transit time in the region of the surgical bed, suggesting the presence of posttreatment changes. The presence of neoplasm is not excluded.

## 2022-10-10 NOTE — CONSULT NOTE ADULT - ASSESSMENT
83 yo [place]-speaking male with a PMHx of Glioblastoma (dx 7/18/22; s/p R stereo biopsy, TIMOTHY x2, R crani for tumor debulking; on temozolomide, follows with Dr. Borja), HLD, steroid-induced diabetes, and R popliteal DVT (found 9/21/22, treated with Lovenox outpatient which was DC'd due to thrombocytopenia, now s/p IVC filter) who initially presented to [facility] on 9/26 for 5 days of lower extremity weakness and with dark stools x1-2 days. EGD and colonoscopy were done with no signs of bleeding. Patient was exposed to COVID in the hospital and is now COVID positive. He was transferred to [facility] on 10/08 for further radiation oncology treatments. Palliative consulted for complex decision making regarding goc in setting of malignancy.

## 2022-10-10 NOTE — CONSULT NOTE ADULT - PROBLEM SELECTOR RECOMMENDATION 2
Currently, patient lethargic after RRT. Per discussion with family, patient was independent of ADLs.   Appreciate PT recs- rehab

## 2022-10-10 NOTE — PROGRESS NOTE ADULT - PROBLEM SELECTOR PLAN 8
0 DVT PPX: Lovenox subcu, SCDs  Constipation - Last BM: 7/26    Activity as tolerated. DVT PPX: Lovenox subcu, SCDs  Constipation - Last BM: 7/26  LE duplex neg 7/25    Activity as tolerated.

## 2022-10-10 NOTE — CONSULT NOTE ADULT - PROBLEM SELECTOR RECOMMENDATION 5
Thank you for allowing us to participate in your patient's care. We will continue to follow with you. Please page [phone] for any q's or c's.     Martha Lopez D.O.   Palliative Medicine

## 2022-10-10 NOTE — CONSULT NOTE ADULT - ASSESSMENT
82M w/ PMH GBM s/p tumor debulking, XRT, steroid-induced DM admitted as xfer from [facility] for radiation oncology treatment; RRT called for seizure activity now s/p Keppra load, Ativan w/ persistent AMS and lethargy; MICU consulted for concern for airway protection.     - patient likely lethargic due to Ativan; recommend frequent neurochecks  - recommend CT head non-con when patient more awake  - agree with palliative consult; patient now DNR/DNI  - patient does not require intubation; is not a MICU candidate at this time    Case discussed with Dr. Kirkpatrick.     Recommendations not final until signed by attending.  82M w/ PMH GBM s/p tumor debulking, XRT, steroid-induced DM admitted as xfer from [facility] for radiation oncology treatment; RRT called for seizure activity now s/p Keppra load, Ativan w/ persistent AMS and lethargy; MICU consulted for concern for airway protection.     - patient likely lethargic due to Ativan; recommend frequent neurochecks  - recommend CT head non-con when patient more awake  - agree with palliative consult; patient now DNR/DNI  - patient does not require intubation; is not a MICU candidate at this time  - can obtain vEEG if in line with [facility]    Case discussed with Dr. Kirkpatrick.     Recommendations not final until signed by attending.

## 2022-10-10 NOTE — CONSULT NOTE ADULT - PROBLEM SELECTOR RECOMMENDATION 3
MR Head (9/27): 4.1 x 4.8 x 4.4 cm heterogeneously and peripherally enhancing necrotic lesion centered in the mesial right frontal lobe, extending across the corpus callosum into the left mesial frontal lobe. Findings may represent the presence of residual/recurrent neoplasm and/or posttreatment changes. Peripheral enhancement of right frontal surgical tract which may be postsurgical in nature. The presence of neoplasm is not excluded. Overall decreased, but still moderate nonspecific T2 and FLAIR hyperintense signal surrounding this lesion. This may represent nonenhancing neoplasm and/or posttreatment changes. Overall decreased relative cerebral blood flow, decreased relative cerebral blood volume, and subtly increased mean transit time in the region of the surgical bed, suggesting the presence of posttreatment changes.   Outpatient neuro-onc: Dr. Borja   Outpatient rad/onc: Dr. Plunkett- patient completed 6 of 15 fractions of RT. Transferred to [facility] for continued RT

## 2022-10-10 NOTE — CHART NOTE - NSCHARTNOTEFT_GEN_A_CORE
Spoke with Radiation Oncology, Dr. Plunkett who was updated on RRT this am and possible seizure activity. Dr. Plunkett decided to defer Radiation treatment today, with plan to resume tomorrow, pending pt's clincal status.   Family updated at bedside.     Haydee Jaime PA-C  Department of Medicine  Pager [phone]

## 2022-10-10 NOTE — PROGRESS NOTE ADULT - PROBLEM SELECTOR PLAN 4
s/p EGD and colonoscopy on 9/30  -started on pantoprazole 40mg BID by GI at [facility], will cont  -pt will need outpt repeat colonoscopy given poor prep on 9/30  -monitor stool and Hb  -gastroparesis on EGD: asymptomatic so far

## 2022-10-10 NOTE — DIETITIAN INITIAL EVALUATION ADULT - NSFNSPHYEXAMSKINFT_GEN_A_CORE
As per RN flowsheet, skin tear to left buttocks, stage II left buttock wound, stage I right buttock wound, stage I right lower buttock wound noted.

## 2022-10-11 NOTE — PROGRESS NOTE ADULT - PROBLEM SELECTOR PLAN 2
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 regimen: 8 units lantus qhs and 4u admelog TID AC   - adjust insulin regimen as needed  - low dose correctional scale  - FS TID qAC and qhs while eating or q6h while NPO Noted to be tachycardic and febrile on 10/10 PM, meeting SIRS criteria  - Patient noted on cultures from 10/10 to have e. faecalis in both bottles, pending sensitivities  - initially placed on empiric vanc/zosyn  - d/w ID pharmacist, de-escalated to ampicillin 2g q4h IV  - most likely source is urine as previous urine culture from 9/27 with e. faecalis sensitive to ampicillin  - vitals currently remaining stable, will plan for 5-7 day treatment course

## 2022-10-11 NOTE — PROGRESS NOTE ADULT - PROBLEM SELECTOR PLAN 9
noted by family  pt also with poor PO  will check bladder scan to r/o retention, however cr stable so not likely  will give gentle IVF poor PO intake  monitor closely Diet: NPO for now due to poor mental status  DVT ppx: SCD's in LLE, s/p IVC filter in RLE, per heme/onc continue to hold AC   Dispo: pending radiation tx

## 2022-10-11 NOTE — PHARMACOTHERAPY INTERVENTION NOTE - COMMENTS
Recommended de-escalating from cefepime and vancomycin to ceftriaxone 2g IV q24h in the setting of Streptococcus pneumoniae bacteremia.  Recommended de-escalating from vancomycin and piperacillin-tazobactam to ampicillin 2g IV q4h, based on an estimated CrCl of ~90mL/min, in the setting of E. faecalis bacteremia.

## 2022-10-11 NOTE — PROGRESS NOTE ADULT - PROBLEM SELECTOR PLAN 7
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 Silva)  - On 9/21, he was found to have a DVT and was started on Lovenox, which was quickly discontinued after outpt labs showed plt of 77 < 172 (8/2022). Per daughter only took 1 or 2 doses of the lovenox, so thrombocytopenia more likely 2/ temozolomide, which was also discontinued at that time  - 9/28 HIT assay (heparin pf4 ab) negative  - neg serotonin releasing assay   - continue to monitor R popliteal DVT on outpt duplex for BLE pitting edema. Was briefly on Lovenox (1-2 doses) but then discontinued due to thrombocytopenia. Heme/onc recommended continuing to hold AC  - IVC filter placed with IR on 9/28, f/u with IR outpt.

## 2022-10-11 NOTE — PROGRESS NOTE ADULT - PROBLEM SELECTOR PLAN 8
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 Silva)  - On 9/21, he was found to have a DVT and was started on Lovenox, which was quickly discontinued after outpt labs showed plt of 77 < 172 (8/2022). Per daughter only took 1 or 2 doses of the lovenox, so thrombocytopenia more likely 2/ temozolomide, which was also discontinued at that time  - 9/28 HIT assay (heparin pf4 ab) negative  - neg serotonin releasing assay   - continue to monitor

## 2022-10-11 NOTE — PROGRESS NOTE ADULT - PROBLEM SELECTOR PLAN 6
R popliteal DVT on outpt duplex for BLE pitting edema. Was briefly on Lovenox (1-2 doses) but then discontinued due to thrombocytopenia. Heme/onc recommended continuing to hold AC  - IVC filter placed with IR on 9/28, f/u with IR outpt. EGD done 9/30 showed esophageal plaques suspicious for candidiasis. Biopsied.  - continue empiric tx for Candidiasis for now: fluconazole 9/30--  - plan to continue PO fluconazole after discharge if still on tx.  - pathology result: No morphologic evidence of fungal forms  d/w GI, since bx neg, can dc fluconazole

## 2022-10-11 NOTE — PROGRESS NOTE ADULT - SUBJECTIVE AND OBJECTIVE BOX
[facility] Department of Hospital Medicine  Dex Joaquin MD  Available on MS Teams  Pager: [phone]    Patient is a 82y old  Male who presents with a chief complaint of weakness; radiation therapy (11 Oct 2022 10:20)    OVERNIGHT EVENTS: No acute events overnight.    SUBJECTIVE: Pt seen and examined this morning with patient's daughter and wife at bedside. Patient remains lethargic, unable to participate in interview. However, noted to be slightly more responsive to light touch. Per family, patient appearing to be more alert to voice commands however continues to keep his eyes closed.    ADDITIONAL REVIEW OF SYSTEMS: Unable to obtain    MEDICATIONS  (STANDING):  ampicillin  IVPB 2 Gram(s) IV Intermittent every 4 hours  atorvastatin 80 milliGRAM(s) Oral at bedtime  dexAMETHasone  Injectable 4 milliGRAM(s) IV Push two times a day  dextrose 5%. 1000 milliLiter(s) (100 mL/Hr) IV Continuous <Continuous>  dextrose 5%. 1000 milliLiter(s) (50 mL/Hr) IV Continuous <Continuous>  dextrose 50% Injectable 25 Gram(s) IV Push once  dextrose 50% Injectable 12.5 Gram(s) IV Push once  dextrose 50% Injectable 25 Gram(s) IV Push once  FLUoxetine 20 milliGRAM(s) Oral daily  glucagon  Injectable 1 milliGRAM(s) IntraMuscular once  insulin glargine Injectable (LANTUS) 4 Unit(s) SubCutaneous at bedtime  insulin lispro (ADMELOG) corrective regimen sliding scale   SubCutaneous three times a day before meals  insulin lispro (ADMELOG) corrective regimen sliding scale   SubCutaneous at bedtime  insulin lispro Injectable (ADMELOG) 4 Unit(s) SubCutaneous three times a day before meals  levETIRAcetam  IVPB 750 milliGRAM(s) IV Intermittent every 12 hours  pantoprazole  Injectable 40 milliGRAM(s) IV Push two times a day  sodium chloride 0.9%. 1000 milliLiter(s) (50 mL/Hr) IV Continuous <Continuous>    MEDICATIONS  (PRN):  acetaminophen     Tablet .. 650 milliGRAM(s) Oral every 6 hours PRN Temp greater or equal to 38C (100.4F), Mild Pain (1 - 3)  dextrose Oral Gel 15 Gram(s) Oral once PRN Blood Glucose LESS THAN 70 milliGRAM(s)/deciliter  melatonin 3 milliGRAM(s) Oral at bedtime PRN Insomnia  ondansetron Injectable 4 milliGRAM(s) IV Push every 8 hours PRN Nausea and/or Vomiting    CAPILLARY BLOOD GLUCOSE    POCT Blood Glucose.: 214 mg/dL (11 Oct 2022 18:02)  POCT Blood Glucose.: 250 mg/dL (11 Oct 2022 12:06)  POCT Blood Glucose.: 245 mg/dL (11 Oct 2022 07:22)  POCT Blood Glucose.: 283 mg/dL (10 Oct 2022 22:11)    I&O's Summary    10 Oct 2022 07:01  -  11 Oct 2022 07:00  --------------------------------------------------------  IN: 0 mL / OUT: 100 mL / NET: -100 mL    PHYSICAL EXAM:  Vital Signs Last 24 Hrs  T(C): 36.8 (11 Oct 2022 15:40), Max: 37.1 (10 Oct 2022 20:55)  T(F): 98.2 (11 Oct 2022 15:40), Max: 98.8 (10 Oct 2022 20:55)  HR: 89 (11 Oct 2022 15:40) (81 - 89)  BP: 114/76 (11 Oct 2022 15:40) (112/64 - 135/82)  BP(mean): --  RR: 18 (11 Oct 2022 15:40) (17 - 18)  SpO2: 99% (11 Oct 2022 15:40) (95% - 99%)    Parameters below as of 11 Oct 2022 15:40  Patient On (Oxygen Delivery Method): room air    CONSTITUTIONAL: NAD, well-developed, lethargic  HEAD: Normocephalic, atraumatic  EYES: EOMI, PERRL  ENT: no rhinorrhea, no pharyngeal erythema  RESPIRATORY: No increased work of breathing, CTAB, no wheezes or crackles appreciated  CARDIOVASCULAR: RRR, S1 and S2 present, no m/r/g  ABDOMEN: soft, NT, mildly distended, bowel sounds present  EXTREMITIES: No LE edema  MUSCULOSKELETAL: no joint swelling, no tenderness to palpation  NEURO: A&Ox0, moving all extremities, grimaces to painful stimuli and voice    LABS:                        12.8   8.82  )-----------( 51       ( 11 Oct 2022 07:45 )             38.6     10-11    131<L>  |  101  |  16  ----------------------------<  275<H>  4.4   |  19<L>  |  0.56    Ca    8.3<L>      11 Oct 2022 07:45  Phos  2.3     10-11  Mg     1.90     10-11    TPro  6.8  /  Alb  4.0  /  TBili  0.3  /  DBili  x   /  AST  19  /  ALT  9   /  AlkPhos  44  10-10      CARDIAC MARKERS ( 10 Oct 2022 12:06 )  x     / x     / 104 U/L / x     / x        Culture - Blood (collected 10 Oct 2022 19:42)  Source: .Blood Blood  Gram Stain (11 Oct 2022 13:58):    Growth in aerobic and anaerobic bottles: Gram positive cocci in pairs  Preliminary Report (11 Oct 2022 13:59):    Growth in aerobic and anaerobic bottles: Gram positive cocci in pairs    ***Blood Panel PCR results on this specimen are available    approximately 3 hours after the Gram stain result.***    Gram stain, PCR, and/or culture results may not always    correspond due to difference in methodologies.    ************************************************************    This PCR assay was performed by multiplex PCR. This    Assay tests for 66 bacterial and resistance gene targets.    Please refer to the [facility] test directory    at [email or web address] for details.  Organism: Blood Culture PCR (11 Oct 2022 15:59)  Organism: Blood Culture PCR (11 Oct 2022 15:59)    RADIOLOGY & ADDITIONAL TESTS:    Results Reviewed:   Imaging Personally Reviewed:  Electrocardiogram Personally Reviewed:    COORDINATION OF CARE:  Care Discussed with Consultants/Other Providers [Y/N]:  Prior or Outpatient Records Reviewed [Y/N]: [facility] Department of Hospital Medicine  Dex Joaquin MD  Available on MS Teams  Pager: [phone]    Patient is a 82y old  Male who presents with a chief complaint of weakness; radiation therapy (11 Oct 2022 10:20)    OVERNIGHT EVENTS: On evening of 10/10, noted to be febrile and tachycardic, cultures obtained and empiric vanc/zosyn started    SUBJECTIVE: Pt seen and examined this morning with patient's daughter and wife at bedside. Patient remains lethargic, unable to participate in interview. However, noted to be slightly more responsive to light touch. Per family, patient appearing to be more alert to voice commands however continues to keep his eyes closed.    ADDITIONAL REVIEW OF SYSTEMS: Unable to obtain    MEDICATIONS  (STANDING):  ampicillin  IVPB 2 Gram(s) IV Intermittent every 4 hours  atorvastatin 80 milliGRAM(s) Oral at bedtime  dexAMETHasone  Injectable 4 milliGRAM(s) IV Push two times a day  dextrose 5%. 1000 milliLiter(s) (100 mL/Hr) IV Continuous <Continuous>  dextrose 5%. 1000 milliLiter(s) (50 mL/Hr) IV Continuous <Continuous>  dextrose 50% Injectable 25 Gram(s) IV Push once  dextrose 50% Injectable 12.5 Gram(s) IV Push once  dextrose 50% Injectable 25 Gram(s) IV Push once  FLUoxetine 20 milliGRAM(s) Oral daily  glucagon  Injectable 1 milliGRAM(s) IntraMuscular once  insulin glargine Injectable (LANTUS) 4 Unit(s) SubCutaneous at bedtime  insulin lispro (ADMELOG) corrective regimen sliding scale   SubCutaneous three times a day before meals  insulin lispro (ADMELOG) corrective regimen sliding scale   SubCutaneous at bedtime  insulin lispro Injectable (ADMELOG) 4 Unit(s) SubCutaneous three times a day before meals  levETIRAcetam  IVPB 750 milliGRAM(s) IV Intermittent every 12 hours  pantoprazole  Injectable 40 milliGRAM(s) IV Push two times a day  sodium chloride 0.9%. 1000 milliLiter(s) (50 mL/Hr) IV Continuous <Continuous>    MEDICATIONS  (PRN):  acetaminophen     Tablet .. 650 milliGRAM(s) Oral every 6 hours PRN Temp greater or equal to 38C (100.4F), Mild Pain (1 - 3)  dextrose Oral Gel 15 Gram(s) Oral once PRN Blood Glucose LESS THAN 70 milliGRAM(s)/deciliter  melatonin 3 milliGRAM(s) Oral at bedtime PRN Insomnia  ondansetron Injectable 4 milliGRAM(s) IV Push every 8 hours PRN Nausea and/or Vomiting    CAPILLARY BLOOD GLUCOSE    POCT Blood Glucose.: 214 mg/dL (11 Oct 2022 18:02)  POCT Blood Glucose.: 250 mg/dL (11 Oct 2022 12:06)  POCT Blood Glucose.: 245 mg/dL (11 Oct 2022 07:22)  POCT Blood Glucose.: 283 mg/dL (10 Oct 2022 22:11)    I&O's Summary    10 Oct 2022 07:01  -  11 Oct 2022 07:00  --------------------------------------------------------  IN: 0 mL / OUT: 100 mL / NET: -100 mL    PHYSICAL EXAM:  Vital Signs Last 24 Hrs  T(C): 36.8 (11 Oct 2022 15:40), Max: 37.1 (10 Oct 2022 20:55)  T(F): 98.2 (11 Oct 2022 15:40), Max: 98.8 (10 Oct 2022 20:55)  HR: 89 (11 Oct 2022 15:40) (81 - 89)  BP: 114/76 (11 Oct 2022 15:40) (112/64 - 135/82)  BP(mean): --  RR: 18 (11 Oct 2022 15:40) (17 - 18)  SpO2: 99% (11 Oct 2022 15:40) (95% - 99%)    Parameters below as of 11 Oct 2022 15:40  Patient On (Oxygen Delivery Method): room air    CONSTITUTIONAL: NAD, well-developed, lethargic  HEAD: Normocephalic, atraumatic  EYES: EOMI, PERRL  ENT: no rhinorrhea, no pharyngeal erythema  RESPIRATORY: No increased work of breathing, CTAB, no wheezes or crackles appreciated  CARDIOVASCULAR: RRR, S1 and S2 present, no m/r/g  ABDOMEN: soft, NT, mildly distended, bowel sounds present  EXTREMITIES: No LE edema  MUSCULOSKELETAL: no joint swelling, no tenderness to palpation  NEURO: A&Ox0, moving all extremities, grimaces to painful stimuli and voice    LABS:                        12.8   8.82  )-----------( 51       ( 11 Oct 2022 07:45 )             38.6     10-11    131<L>  |  101  |  16  ----------------------------<  275<H>  4.4   |  19<L>  |  0.56    Ca    8.3<L>      11 Oct 2022 07:45  Phos  2.3     10-11  Mg     1.90     10-11    TPro  6.8  /  Alb  4.0  /  TBili  0.3  /  DBili  x   /  AST  19  /  ALT  9   /  AlkPhos  44  10-10      CARDIAC MARKERS ( 10 Oct 2022 12:06 )  x     / x     / 104 U/L / x     / x        Culture - Blood (collected 10 Oct 2022 19:42)  Source: .Blood Blood  Gram Stain (11 Oct 2022 13:58):    Growth in aerobic and anaerobic bottles: Gram positive cocci in pairs  Preliminary Report (11 Oct 2022 13:59):    Growth in aerobic and anaerobic bottles: Gram positive cocci in pairs    ***Blood Panel PCR results on this specimen are available    approximately 3 hours after the Gram stain result.***    Gram stain, PCR, and/or culture results may not always    correspond due to difference in methodologies.    ************************************************************    This PCR assay was performed by multiplex PCR. This    Assay tests for 66 bacterial and resistance gene targets.    Please refer to the [facility] test directory    at [email or web address] for details.  Organism: Blood Culture PCR (11 Oct 2022 15:59)  Organism: Blood Culture PCR (11 Oct 2022 15:59)    RADIOLOGY & ADDITIONAL TESTS:    Results Reviewed:   Imaging Personally Reviewed:  Electrocardiogram Personally Reviewed:    COORDINATION OF CARE:  Care Discussed with Consultants/Other Providers [Y/N]:  Prior or Outpatient Records Reviewed [Y/N]:

## 2022-10-11 NOTE — PROGRESS NOTE ADULT - ASSESSMENT
81yo M, w/ PMH of glioblastoma (dx 7/18/22, s/p R stereo bx, TIMOTHY x2, R crani for tumor debulking 7/28/22, on temozolomide (last dose 4 days prior to admission), HLD, steroid-induced DM, h/o HIT during recent admission, and newly dx R popliteal DVT (found 9/21/22, AC dc d/t thrombocytopenia), presented to [facility] with weakness and GIB and now transferred from [facility] for radiation treatment with course c/b seizures.

## 2022-10-11 NOTE — PROGRESS NOTE ADULT - PROBLEM SELECTOR PLAN 3
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

## 2022-10-11 NOTE — PROGRESS NOTE ADULT - PROBLEM SELECTOR PLAN 4
s/p EGD and colonoscopy on 9/30  -started on pantoprazole 40mg BID by GI at [facility], will cont  -pt will need outpt repeat colonoscopy given poor prep on 9/30  -monitor stool and Hb  -gastroparesis on EGD: asymptomatic so far exposed 9/30; tested + on 10/3  currently asymptomatic  -pt on decadron for glioblastoma  -hold off remdesivir for now  -continue to monitor closely

## 2022-10-11 NOTE — EEG REPORT - NS EEG TEXT BOX
Health system  [facility]  Report of Continuous Video EEG    [facility]: [address], [place] [address], Phone [phone]  [place] Office: [address], [address], [place] [address] Phone [phone]    [facility]: [address] [place] [address], Phone [phone]  [place] Office: [address] [place] [address], Phone [phone]    Patient Name: ANA LOVE    Age: 82 year, : 1940  Patient ID: -, MRN #: -, Mullen: [id number] -  Referring Physician: -  EEG #: [id number]    Study Time/Date: 4:00:21 PM on 10/10/2022  	  End Time/Date: 0800 on 10/11/2022          			   Duration: 16H    Study Information:    EEG Recording Technique:  The patient underwent continuous Video-EEG monitoring, using Telemetry System hardware on the XLTek Digital System. EEG and video data were stored on a computer hard drive with important events saved in digital archive files. The material was reviewed by a physician (electroencephalographer / epileptologist) on a daily basis. [place] and seizure detection algorithms were utilized and reviewed. An EEG Technician attended to the patient, and was available throughout daytime work hours.  The epilepsy center neurologist was available in person or on call 24-hours per day.    EEG Placement and Labeling of Electrodes:  The EEG was performed utilizing 20 channel referential EEG connections (coronal over temporal over parasagittal montage) using all standard 10-20 electrode placements with EKG, with additional electrodes placed in the inferior temporal region using the modified 10-10 montage electrode placements for elective admissions, or if deemed necessary. Recording was at a sampling rate of 256 samples per second per channel. Time synchronized digital video recording was done simultaneously with EEG recording. A low light infrared camera was used for low light recording.     History:   VEEG AT BEDSIDE 543 A   82 YEAR OLD MALE   COR:  DROWSY / Non-verbal   P/W: Primary maglignany neoplasm  PMH: Steriod-induced diabets mellitus, glioblastoma multiforme  HV:NOT COMPLETED DUE TO Pandemic  PHOTIC:COMPLETED  A&OX Pt nonverbal   CONCERN FOR SEIZURE          Pertinent Medication  Protonix  Zofran  Melatonin  Glucagon  Prozac  Decadron  Lipitor  Tylenol  Keppra (Levetiracetam)    Interpretation:    Daily EEG Visual Analysis  Findings: The background was continuous and reactive. During wakefulness, the posterior dominant rhythm consisted of 7-8 Hz activity, with amplitude to 30 uV, that attenuated to eye opening.     Background Slowing:  Excess diffuse polymorphic delta slowing.    Focal Slowing:   Continuous theta/delta slowing over the right hemisphere.     Sleep Background:  Drowsiness was characterized by fragmentation, attenuation, and slowing of the background activity.    Sleep was characterized by the presence of vertex waves, symmetric sleep spindles and K-complexes.    Other Non-Epileptiform Findings:  Breach effect max over the right hemisphere characterized by higher amplitude, sharply contoured waves and fast activities.    Interictal Epileptiform Activity:   None were present.    Events:  Clinical events: None recorded.  Seizures: None recorded.    Activation Procedures:   Hyperventilation was not performed.    Photic stimulation was not performed.     Artifacts:  Intermittent myogenic and movement artifacts were noted.    ECG:  The heart rate on single channel ECG was predominantly between 60-70 BPM.    EEG Summary / Classification:  Abnormal EEG in the awake, drowsy and asleep states.  - Right hemispheric slowing and breach effect.  - Mild to Moderate generalized slowing.    EEG Impression / Clinical Correlate:  Abnormal EEG study.  Structural or functional abnormality in the right hemisphere with overlying skull defect.  Mild to Moderate nonspecific diffuse or multifocal cerebral dysfunction.   No clear epileptiform pattern and no seizure seen.  ________________________________________    Froy Glynn MD  Attending Physician, [facility]

## 2022-10-11 NOTE — PROGRESS NOTE ADULT - ASSESSMENT
Mr. 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   He is still lethargic which may be due to medication effect and/or fever/infection.  I recommend decreasing levetiracetam to 750 mg Q12H IV while continuing EEG.   Management of GBM per palliative care, rad onc and neuro-onc  D/W hospitalist  Thank you

## 2022-10-11 NOTE — PROGRESS NOTE ADULT - SUBJECTIVE AND OBJECTIVE BOX
Slightly more awake/active than yesterday but still lethargic.  Started on Abx - not definite source of infection. Yesterday, after he was already on EEG, he had slight tremor in the right arm.  Tmax - 100.6 rectal  T 98.6 axillary  /64  HR 81  RR17  O2sat 99%    MS: moans only, no eye opening, no follow commands.  CN: EOMI; pupils symmetric reactive.  Motor/sensory: minimal movement with stimulation in all 4 ext.     10/11 0914   16Hrs  EEG Summary / Classification:  Abnormal EEG in the awake, drowsy and asleep states.  - Right hemispheric slowing and breach effect.  - Mild to Moderate generalized slowing.    EEG Impression / Clinical Correlate:  Abnormal EEG study.  Structural or functional abnormality in the right hemisphere with overlying skull defect.  Mild to Moderate nonspecific diffuse or multifocal cerebral dysfunction.   No clear epileptiform pattern and no seizure seen.

## 2022-10-11 NOTE — PROGRESS NOTE ADULT - PROBLEM SELECTOR PLAN 5
EGD done 9/30 showed esophageal plaques suspicious for candidiasis. Biopsied.  - continue empiric tx for Candidiasis for now: fluconazole 9/30--  - plan to continue PO fluconazole after discharge if still on tx.  - pathology result: No morphologic evidence of fungal forms  d/w GI, since bx neg, can dc fluconazole s/p EGD and colonoscopy on 9/30  -started on pantoprazole 40mg BID by GI at [facility], will cont  -pt will need outpt repeat colonoscopy given poor prep on 9/30  -monitor stool and Hb  -gastroparesis on EGD: asymptomatic so far

## 2022-10-12 NOTE — ADVANCED PRACTICE NURSE CONSULT - REASON FOR CONSULT
Patient seen on skin care rounds after wound care referral received for assessment of skin impairment and recommendations of topical management. 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 Chart reviewed: WBC 8.82, H/H 12.8/38.6, Platelets 51, INR 1.15, D Dimer Assay 518, Serum albumin 4.0, A1C 9.3, BMI 25.3, Tacho 11.  Patient's wife and daughter at bedside. As per patient's daughter, patient had same skin issues on the buttocks while being at another hospital and was seen by wound care nurse who applied some cream.  Patient seen on skin care rounds after wound care referral received for assessment of skin impairment and recommendations of topical management. 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 Chart reviewed: WBC 8.82, H/H 12.8/38.6, Platelets 51, INR 1.15, D Dimer Assay 518, Serum albumin 4.0, A1C 9.3, BMI 25.3, Tacho 11.  Patient's wife and daughter at bedside. As per patient's daughter, patient had same skin issues on the buttocks while being at another hospital and was seen by wound care nurse who applied some cream.  Patient seen on skin care rounds after wound care referral received for assessment of skin impairment and recommendations of topical management. 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 Chart reviewed: WBC 8.82, H/H 12.8/38.6, Platelets 51, INR 1.15, D Dimer Assay 518, Serum albumin 4.0, A1C 9.3, BMI 25.3, Tacho 11.  Patient's wife and daughter at bedside. As per patient's daughter, patient had same skin issues on the buttocks while being at another hospital and was seen by wound care nurse who applied/recommended TRIAD. Per daughter after her fathers seizure on Monday he was nonresponsive, he appears to be improving today as he responds to his name by opening his eyes, remains nonverbal.

## 2022-10-12 NOTE — EEG REPORT - NS EEG TEXT BOX
[facility]  Comprehensive Epilepsy Center  Report of Continuous Video EEG    [facility]: [address], [place] [address], Phone [phone]  [place] Office: [address], [address], [place] [address] Phone [phone]    [facility]: [address] [place] [address], Phone [phone]  [place] Office: [address] [place] [address], Phone [phone]    Patient Name: ANA LOVE    Age: 82 year, : 1940  Patient ID: -, MRN #: -, Mullen: [id number] A -  Referring Physician: -      Study Time/Date: 08 on 10/10/2022  	  End Time/Date: 08 on 10/11/2022          			   Duration: 24H    Study Information:    EEG Recording Technique:  The patient underwent continuous Video-EEG monitoring, using Telemetry System hardware on the XLTek Digital System. EEG and video data were stored on a computer hard drive with important events saved in digital archive files. The material was reviewed by a physician (electroencephalographer / epileptologist) on a daily basis. Bernardino and seizure detection algorithms were utilized and reviewed. An EEG Technician attended to the patient, and was available throughout daytime work hours.  The epilepsy center neurologist was available in person or on call 24-hours per day.    EEG Placement and Labeling of Electrodes:  The EEG was performed utilizing 20 channel referential EEG connections (coronal over temporal over parasagittal montage) using all standard 10-20 electrode placements with EKG, with additional electrodes placed in the inferior temporal region using the modified 10-10 montage electrode placements for elective admissions, or if deemed necessary. Recording was at a sampling rate of 256 samples per second per channel. Time synchronized digital video recording was done simultaneously with EEG recording. A low light infrared camera was used for low light recording.     History:   VEEG AT BEDSIDE 543 A   82 YEAR OLD MALE   COR:  DROWSY / Non-verbal   P/W: Primary maglignany neoplasm  PMH: Steriod-induced diabets mellitus, glioblastoma multiforme  HV:NOT COMPLETED DUE TO Pandemic  PHOTIC:COMPLETED  A&OX Pt nonverbal   CONCERN FOR SEIZURE      Interpretation:    Daily EEG Visual Analysis  Findings: The background was continuous and reactive. During wakefulness, the posterior dominant rhythm consisted of 7-8 Hz activity, with amplitude to 30 uV, that attenuated to eye opening.     Background Slowing:  Excess diffuse polymorphic delta slowing.    Focal Slowing:   Continuous theta/delta slowing over the right hemisphere.     Sleep Background:  Drowsiness was characterized by fragmentation, attenuation, and slowing of the background activity.    Sleep was characterized by the presence of vertex waves, symmetric sleep spindles and K-complexes.    Other Non-Epileptiform Findings:  Breach effect max over the right hemisphere characterized by higher amplitude, sharply contoured waves and fast activities.    Interictal Epileptiform Activity:   None were present.    Events:  Clinical events: None recorded.  Seizures: None recorded.    Activation Procedures:   Hyperventilation was not performed.    Photic stimulation was not performed.     Artifacts:  Intermittent myogenic and movement artifacts were noted.    ECG:  The heart rate on single channel ECG was predominantly between 60-70 BPM.    EEG Summary / Classification:  Abnormal EEG in the awake, drowsy and asleep states.  - Right hemispheric slowing and breach effect.  - Mild to Moderate generalized slowing.    EEG Impression / Clinical Correlate:  Abnormal EEG study.  Structural or functional abnormality in the right hemisphere with overlying skull defect.  Mild to Moderate nonspecific diffuse or multifocal cerebral dysfunction.   No clear epileptiform pattern and no seizure seen.    **In absence of additional clinical concerns, recommend consideration for discontinuation of current EEG study with reconnection in future if warranted.    ________________________________________      Froy Glynn MD  Attending Physician, [facility] Epilepsy [place]   [facility]  Comprehensive Epilepsy Center  Report of Continuous Video EEG    [facility]: [address], [place] [address], Phone [phone]  [place] Office: [address], [address], [place] [address] Phone [phone]    [facility]: [address] [place] [address], Phone [phone]  [place] Office: [address] [place] [address], Phone [phone]    Patient Name: ANA LOVE    Age: 82 year, : 1940  Patient ID: -, MRN #: -, Mullen: [id number] A -  Referring Physician: -      Study Time/Date: 08 on 10/11/2022  	  End Time/Date: 08 on 10/12/2022          			   Duration: 24H    Study Information:    EEG Recording Technique:  The patient underwent continuous Video-EEG monitoring, using Telemetry System hardware on the XLTek Digital System. EEG and video data were stored on a computer hard drive with important events saved in digital archive files. The material was reviewed by a physician (electroencephalographer / epileptologist) on a daily basis. Bernardino and seizure detection algorithms were utilized and reviewed. An EEG Technician attended to the patient, and was available throughout daytime work hours.  The epilepsy center neurologist was available in person or on call 24-hours per day.    EEG Placement and Labeling of Electrodes:  The EEG was performed utilizing 20 channel referential EEG connections (coronal over temporal over parasagittal montage) using all standard 10-20 electrode placements with EKG, with additional electrodes placed in the inferior temporal region using the modified 10-10 montage electrode placements for elective admissions, or if deemed necessary. Recording was at a sampling rate of 256 samples per second per channel. Time synchronized digital video recording was done simultaneously with EEG recording. A low light infrared camera was used for low light recording.     History:   VEEG AT BEDSIDE 543 A   82 YEAR OLD MALE   COR:  DROWSY / Non-verbal   P/W: Primary maglignany neoplasm  PMH: Steriod-induced diabets mellitus, glioblastoma multiforme  HV:NOT COMPLETED DUE TO Pandemic  PHOTIC:COMPLETED  A&OX Pt nonverbal   CONCERN FOR SEIZURE      Interpretation:    Daily EEG Visual Analysis  Findings: The background was continuous and reactive. During wakefulness, the posterior dominant rhythm consisted of 7-8 Hz activity, with amplitude to 30 uV, that attenuated to eye opening.     Background Slowing:  Excess diffuse polymorphic delta slowing.    Focal Slowing:   Continuous theta/delta slowing over the right hemisphere.     Sleep Background:  Drowsiness was characterized by fragmentation, attenuation, and slowing of the background activity.    Sleep was characterized by the presence of vertex waves, symmetric sleep spindles and K-complexes.    Other Non-Epileptiform Findings:  Breach effect max over the right hemisphere characterized by higher amplitude, sharply contoured waves and fast activities.    Interictal Epileptiform Activity:   None were present.    Events:  Clinical events: None recorded.  Seizures: None recorded.    Activation Procedures:   Hyperventilation was not performed.    Photic stimulation was not performed.     Artifacts:  Intermittent myogenic and movement artifacts were noted.    ECG:  The heart rate on single channel ECG was predominantly between 60-70 BPM.    EEG Summary / Classification:  Abnormal EEG in the awake, drowsy and asleep states.  - Right hemispheric slowing and breach effect.  - Mild to Moderate generalized slowing.    EEG Impression / Clinical Correlate:  Abnormal EEG study.  Structural or functional abnormality in the right hemisphere with overlying skull defect.  Mild to Moderate nonspecific diffuse or multifocal cerebral dysfunction.   No clear epileptiform pattern and no seizure seen.    **In absence of additional clinical concerns, recommend consideration for discontinuation of current EEG study with reconnection in future if warranted.    ________________________________________      Froy Glynn MD  Attending Physician, [facility] Epilepsy [place]

## 2022-10-12 NOTE — ADVANCED PRACTICE NURSE CONSULT - RECOMMEDATIONS
Topical recommendations:     Sacrum to B/L buttocks and right lower buttock in skin fold- Cleanse with skin cleanser, pat dry. Apply  a thin layer of TRIAD paste/hydrophilic dressing twice a day and PRN with incontinent episodes. With episodes of incontinence only remove soiled layer of Triad, then reinforce with thin layer.     Continue low air loss bed therapy, continue heel elevation with Complete CAIR Boots, continue to turn & reposition as per protocol, continue moisture management with barrier creams & single breathable pad, continue measures to decrease friction/shear/pressure.     Plan of care discussed with patients family, all questions answered to their satisfaction and with primary RN Claire Hurley.    Please contact Wound/Ostomy Care Service Line if we can be of further assistance (ext [phone]).  Topical recommendations:     Sacrum to B/L buttocks and right lower buttock in skin fold- Cleanse with skin cleanser, pat dry. Apply  a thin layer of TRIAD moisture barrier paste twice a day and PRN with incontinent episodes. With episodes of incontinence only remove soiled layer of Triad, then reapply thin layer. Continue incontinence management per protocol, continue use of single breathable incontinence pad.    Continue low air loss bed therapy, continue heel elevation with Complete CAIR Boots, continue to turn & reposition as per protocol, continue measures to decrease friction/shear/pressure.     Plan of care discussed with patients family, all questions answered to their satisfaction and with primary RN Claire Hurley.    Please contact Wound/Ostomy Care Service Line if we can be of further assistance (ext [phone]).

## 2022-10-12 NOTE — PROGRESS NOTE ADULT - PROBLEM SELECTOR PLAN 5
s/p EGD and colonoscopy on 9/30  -started on pantoprazole 40mg BID by GI at [facility], will cont  -pt will need outpt repeat colonoscopy given poor prep on 9/30  -monitor stool and Hb  -gastroparesis on EGD: asymptomatic so far

## 2022-10-12 NOTE — PROGRESS NOTE ADULT - SUBJECTIVE AND OBJECTIVE BOX
[facility] Department of Hospital Medicine  Dex Joaquin MD  Available on MS Teams  Pager: [phone]    Patient is a 82y old  Male who presents with a chief complaint of weakness; radiation therapy (12 Oct 2022 10:08)    OVERNIGHT EVENTS: No acute events overnight.    SUBJECTIVE: Pt seen and examined at bedside this morning. Remains lethargic. Grimacing to light touch and voice. Able to very briefly open eyes when asked. Moaning in response to painful stimuli.     ADDITIONAL REVIEW OF SYSTEMS: Unable to obtain.    MEDICATIONS  (STANDING):  atorvastatin 80 milliGRAM(s) Oral at bedtime  dexAMETHasone  Injectable 4 milliGRAM(s) IV Push two times a day  dextrose 5%. 1000 milliLiter(s) (100 mL/Hr) IV Continuous <Continuous>  dextrose 5%. 1000 milliLiter(s) (50 mL/Hr) IV Continuous <Continuous>  dextrose 50% Injectable 25 Gram(s) IV Push once  dextrose 50% Injectable 12.5 Gram(s) IV Push once  dextrose 50% Injectable 25 Gram(s) IV Push once  FLUoxetine 20 milliGRAM(s) Oral daily  glucagon  Injectable 1 milliGRAM(s) IntraMuscular once  insulin glargine Injectable (LANTUS) 4 Unit(s) SubCutaneous at bedtime  insulin lispro (ADMELOG) corrective regimen sliding scale   SubCutaneous every 6 hours  levETIRAcetam  IVPB 750 milliGRAM(s) IV Intermittent every 12 hours  pantoprazole  Injectable 40 milliGRAM(s) IV Push two times a day  piperacillin/tazobactam IVPB.. 3.375 Gram(s) IV Intermittent every 8 hours  sodium chloride 0.9%. 1000 milliLiter(s) (50 mL/Hr) IV Continuous <Continuous>    MEDICATIONS  (PRN):  acetaminophen     Tablet .. 650 milliGRAM(s) Oral every 6 hours PRN Temp greater or equal to 38C (100.4F), Mild Pain (1 - 3)  dextrose Oral Gel 15 Gram(s) Oral once PRN Blood Glucose LESS THAN 70 milliGRAM(s)/deciliter  melatonin 3 milliGRAM(s) Oral at bedtime PRN Insomnia  ondansetron Injectable 4 milliGRAM(s) IV Push every 8 hours PRN Nausea and/or Vomiting    CAPILLARY BLOOD GLUCOSE    POCT Blood Glucose.: 149 mg/dL (12 Oct 2022 12:23)  POCT Blood Glucose.: 181 mg/dL (12 Oct 2022 07:27)  POCT Blood Glucose.: 190 mg/dL (12 Oct 2022 06:00)  POCT Blood Glucose.: 203 mg/dL (11 Oct 2022 23:41)  POCT Blood Glucose.: 214 mg/dL (11 Oct 2022 18:02)    I&O's Summary    PHYSICAL EXAM:  Vital Signs Last 24 Hrs  T(C): 36.5 (12 Oct 2022 11:53), Max: 36.8 (11 Oct 2022 15:40)  T(F): 97.7 (12 Oct 2022 11:53), Max: 98.2 (11 Oct 2022 15:40)  HR: 74 (12 Oct 2022 11:53) (67 - 89)  BP: 132/77 (12 Oct 2022 11:53) (114/76 - 144/62)  BP(mean): --  RR: 17 (12 Oct 2022 11:53) (17 - 18)  SpO2: 98% (12 Oct 2022 11:53) (98% - 100%)    Parameters below as of 12 Oct 2022 11:53  Patient On (Oxygen Delivery Method): room air    CONSTITUTIONAL: NAD, well-developed, lethargic  HEAD: Normocephalic, atraumatic  EYES: EOMI, PERRL  ENT: no rhinorrhea, no pharyngeal erythema  RESPIRATORY: No increased work of breathing, CTAB, no wheezes or crackles appreciated  CARDIOVASCULAR: RRR, S1 and S2 present, no m/r/g  ABDOMEN: soft, NT, mildly distended, bowel sounds present  EXTREMITIES: No LE edema  MUSCULOSKELETAL: no joint swelling, no tenderness to palpation  NEURO: A&Ox0, moving all extremities, grimaces to painful stimuli and voice    LABS:                        12.8   8.82  )-----------( 51       ( 11 Oct 2022 07:45 )             38.6     10-11    131<L>  |  101  |  16  ----------------------------<  275<H>  4.4   |  19<L>  |  0.56    Ca    8.3<L>      11 Oct 2022 07:45  Phos  2.3     10-11  Mg     1.90     10-11    Culture - Urine (collected 10 Oct 2022 22:30)  Source: Clean Catch Clean Catch (Midstream)  Preliminary Report (12 Oct 2022 12:11):    >100,000 CFU/ml Gram Positive Cocci in Pairs and Chains    50,000 - 99,000 CFU/mL Citrobacter koseri    Culture - Blood (collected 10 Oct 2022 19:42)  Source: .Blood Blood-Venous  Gram Stain (12 Oct 2022 09:53):    Growth in aerobic bottle: Gram positive cocci in pairs  Preliminary Report (12 Oct 2022 09:53):    Growth in aerobic bottle: Gram positive cocci in pairs    Culture - Blood (collected 10 Oct 2022 19:42)  Source: .Blood Blood  Gram Stain (11 Oct 2022 13:58):    Growth in aerobic and anaerobic bottles: Gram positive cocci in pairs  Preliminary Report (11 Oct 2022 13:59):    Growth in aerobic and anaerobic bottles: Gram positive cocci in pairs    ***Blood Panel PCR results on this specimen are available    approximately 3 hours after the Gram stain result.***    Gram stain, PCR, and/or culture results may not always    correspond due to difference in methodologies.    ************************************************************    This PCR assay was performed by multiplex PCR. This    Assay tests for 66 bacterial and resistance gene targets.    Please refer to the [facility] test directory    at [email or web address] for details.  Organism: Blood Culture PCR (11 Oct 2022 15:59)  Organism: Blood Culture PCR (11 Oct 2022 15:59)    RADIOLOGY & ADDITIONAL TESTS:    Results Reviewed:   Imaging Personally Reviewed:  Electrocardiogram Personally Reviewed:    COORDINATION OF CARE:  Care Discussed with Consultants/Other Providers [Y/N]:  Prior or Outpatient Records Reviewed [Y/N]:

## 2022-10-12 NOTE — PROGRESS NOTE ADULT - PROBLEM SELECTOR PLAN 2
Noted to be tachycardic and febrile on 10/10 PM, meeting SIRS criteria  - Patient noted on cultures from 10/10 to have e. faecalis in both bottles, pending sensitivities  - initially placed on empiric vanc/zosyn, c/w zosyn only for now  - most likely source is urine as previous urine culture from 9/27 with e. faecalis sensitive to ampicillin  - vitals currently remaining stable, will plan for 5-7 day treatment course

## 2022-10-12 NOTE — CHART NOTE - NSCHARTNOTEFT_GEN_A_CORE
Seen/examined today, now COVID positive in isolation room.  We called to bring him down for brain RT, he is s/p craniotomy having Who Grade IV GBM,  but has been unresponsive, eyes closed the past 24 hours.    Per daugther he responds to shaking head yes/no to questions.  I asked him to squeeze my hand, he did not comply but opened his eyes when touched.    At present, he is not stable resume RT now.  He poses a safety risk.  Will hold Rt.

## 2022-10-12 NOTE — ADVANCED PRACTICE NURSE CONSULT - ASSESSMENT
General: Patient is lethargic, awakens to tactile stimuli by opening his eyes and moaning, not following directions. Patient with generalized +4 edema, bedbound, turned with 2 x assist, incontinent of urine and stool. Condom catheter is in place. Skin warm, dry with increased moisture in intertriginous folds, scattered areas of ecchymosis on bilateral upper extremities. B/L knees with dry abrasions s/p fall at home as per daughter who stated that she was able to hold on to the patient while he was falling but his knees still rubbed against the rug.    Sacrum to B/L buttocks and right lower buttock in skin fold- IAD incontinent associated dermatitis. Areas of denuded epidermis, not over the bony prominences.  General: Patient is lethargic, awakens to verbal and tactile stimuli by opening his eyes and moaning, not following directions. Completed EEG, tech at bedside removing monitor. Patient with generalized +4 edema, bedbound, turned with 2 x assist, incontinent of urine and stool. Condom catheter is in place. Skin warm, dry with increased moisture in intertriginous folds, scattered areas of ecchymosis on bilateral upper extremities without hematoma. B/L knees with dry scabbed abrasions s/p fall at home as per daughter who stated that she was able to hold on to the patient while he was falling but his knees still rubbed against the rug.    Sacrum to B/L buttocks and right lower buttock in skin fold- IAD incontinent associated dermatitis. Scattered areas of denuded epidermis, exposing pink-red moist dermis, not over the bony prominences.

## 2022-10-12 NOTE — PROGRESS NOTE ADULT - PROBLEM SELECTOR PLAN 3
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

## 2022-10-12 NOTE — PROGRESS NOTE ADULT - ASSESSMENT
Mr. 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   He is still lethargic which may be due to medication effect and/or fever/infection but there is no evidence of nonconvulsive seizures contributing to his lethargy.   I recommend continuing levetiracetam 750 mg Q12H IV.  Can D/C EEG  Management of GBM per palliative care, rad onc and neuro-onc  D/W hospitalist  Thank you

## 2022-10-12 NOTE — PROGRESS NOTE ADULT - PROBLEM SELECTOR PLAN 9
Diet: NPO for now due to poor mental status, plan for tube feeds once NGT placed  DVT ppx: SCD's in LLE, s/p IVC filter in RLE, per heme/onc continue to hold AC   Dispo: pending radiation tx

## 2022-10-12 NOTE — PROCEDURE NOTE - NSPROCDETAILS_GEN_ALL_CORE
nasogastric
location identified, draped/prepped, sterile technique used, needle inserted/introduced/positive blood return obtained via catheter/hemostasis with direct pressure, dressing applied

## 2022-10-12 NOTE — PROCEDURE NOTE - NSINFORMCONSENT_GEN_A_CORE
Consent obtained from patient family/Benefits, risks, and possible complications of procedure explained to patient/caregiver who verbalized understanding and gave verbal consent.
Benefits, risks, and possible complications of procedure explained to patient/caregiver who verbalized understanding and gave verbal consent.

## 2022-10-12 NOTE — PROGRESS NOTE ADULT - SUBJECTIVE AND OBJECTIVE BOX
No improvement in mental status.  No further abnormal movements.      T 98.1  HR  67  /77  RR17  O2sat 100%  MS: moans only, no eye opening, no follow commands.  No BTT.   CN: EOMI; pupils symmetric reactive.  Face - symmetric.  Corneal reflex intact.   Motor/sensory: minimal movement with stimulation in all 4 ext.           EEG Summary / Classification:  Abnormal EEG in the awake, drowsy and asleep states.  - Right hemispheric slowing and breach effect.  - Mild to Moderate generalized slowing.    EEG Impression / Clinical Correlate:  Abnormal EEG study.  Structural or functional abnormality in the right hemisphere with overlying skull defect.  Mild to Moderate nonspecific diffuse or multifocal cerebral dysfunction.   No clear epileptiform pattern and no seizure seen.

## 2022-10-12 NOTE — CHART NOTE - NSCHARTNOTEFT_GEN_A_CORE
Patient seen with family at bedside. He intermittently answers yes/no questions by nodding head, withdraws to pain, however overall still lethargic. Family understands that patient is being treated for infection and is aware RT is on hold. They are hopeful patient can recover mental status and will remain a candidate for further DMT.     Family is aware of hospice services if patient is not a candidate for DMT. Palliative team signing off for now. Please re-consult if needed.     Thank you for allowing us to participate in your patient's care. Please page [phone] for any q's or c's.     Martha Lopez D.O.   Palliative Medicine

## 2022-10-12 NOTE — PROGRESS NOTE ADULT - ASSESSMENT
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

## 2022-10-13 NOTE — PROGRESS NOTE ADULT - PROBLEM SELECTOR PLAN 2
Noted to be tachycardic and febrile on 10/10 PM, meeting SIRS criteria  - Patient noted on cultures from 10/10 to have e. faecalis in both bottles, pending sensitivities  - initially placed on empiric vanc/zosyn, c/w zosyn only for now  - most likely source is urine as previous urine culture from 9/27 with e. faecalis sensitive to ampicillin  - vitals currently remaining stable, will plan for repeat cultures on 10/14 to guide antibiotic course  - plan for ID consult on 10/14 to further guide management

## 2022-10-13 NOTE — PROGRESS NOTE ADULT - PROBLEM SELECTOR PLAN 6
EGD done 9/30 showed esophageal plaques suspicious for candidiasis. Biopsied.  - continue empiric tx for Candidiasis for now: fluconazole 9/30  - plan to continue PO fluconazole after discharge if still on tx.  - pathology result: No morphologic evidence of fungal forms  - d/w GI, since bx neg, can dc fluconazole

## 2022-10-13 NOTE — CHART NOTE - NSCHARTNOTEFT_GEN_A_CORE
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       Nutrition Course: Patient seen at bedside. S/p NGT placement. Currently prescribed Glucerna 1.5. TF visibly seen at bedside @ goal rate 30ml. Patient denies any GI distress at the time of visit. Last BM per RN today. Patient's current TF regimen@ 30ml/hr provides 1080cal, 89.1gm pro which is not meeting his nutritional needs. Recommend increase TF rate 5ml q 8hrs to goal rate 65ml/hr total volume 1560ml. Will provide 2340cal, 128.7gm pro, and 1184ml free water. Labs notable with low phos 2.3L recommend continue monitor phos daily and replenish as patient is at risk for refeeding syndrome. Patient continues on IVF for hydration.        Diet, NPO with Tube Feed:   Tube Feeding Modality: Nasogastric  Glucerna 1.5 Jeremias (GLUCERNA1.5RTH)  Total Volume for 24 Hours (mL): 720  Continuous  Starting Tube Feed Rate {mL per Hour}: 10  Increase Tube Feed Rate by (mL): 10  Until Goal Tube Feed Rate (mL per Hour): 30  Tube Feed Duration (in Hours): 24  Tube Feed Start Time: 19:00 (10-12-22 @ 18:42)      GI: WDL. Last BM today     Enteral /Parenteral Nutrition: Glucerna 1.5 @30ml x24 hrs. TF is not meeting patient's nutritional needs.     Anthropometrics: Height (cm): 167.6 (10-08), 165.1 (09-30), 165 (08-23), 165.1 (07-28)  Weight (kg): 71 (10-08), 68 (09-30), [id number] (08-23), 86.6 (07-28)  BMI (kg/m2): 25.3 (10-08), 24.9 (09-30), 28.8 (08-23), 31.8 (07-28)    Edema:   Pressure Injuries:    __________________ Pertinent Medications__________________   MEDICATIONS  (STANDING):  atorvastatin 80 milliGRAM(s) Oral at bedtime  dexAMETHasone  Injectable 4 milliGRAM(s) IV Push two times a day  dextrose 5%. 1000 milliLiter(s) (100 mL/Hr) IV Continuous <Continuous>  dextrose 5%. 1000 milliLiter(s) (50 mL/Hr) IV Continuous <Continuous>  dextrose 50% Injectable 25 Gram(s) IV Push once  dextrose 50% Injectable 12.5 Gram(s) IV Push once  dextrose 50% Injectable 25 Gram(s) IV Push once  FLUoxetine 20 milliGRAM(s) Oral daily  glucagon  Injectable 1 milliGRAM(s) IntraMuscular once  insulin glargine Injectable (LANTUS) 4 Unit(s) SubCutaneous at bedtime  insulin lispro (ADMELOG) corrective regimen sliding scale   SubCutaneous every 6 hours  levETIRAcetam  IVPB 750 milliGRAM(s) IV Intermittent every 12 hours  pantoprazole  Injectable 40 milliGRAM(s) IV Push two times a day  piperacillin/tazobactam IVPB.. 3.375 Gram(s) IV Intermittent every 8 hours    MEDICATIONS  (PRN):  acetaminophen     Tablet .. 650 milliGRAM(s) Oral every 6 hours PRN Temp greater or equal to 38C (100.4F), Mild Pain (1 - 3)  dextrose Oral Gel 15 Gram(s) Oral once PRN Blood Glucose LESS THAN 70 milliGRAM(s)/deciliter  melatonin 3 milliGRAM(s) Oral at bedtime PRN Insomnia  ondansetron Injectable 4 milliGRAM(s) IV Push every 8 hours PRN Nausea and/or Vomiting      __________________ Pertinent Labs__________________   10-13 Na138 mmol/L Glu 201 mg/dL<H> K+ 4.0 mmol/L Cr  0.53 mg/dL BUN 20 mg/dL 10-13 Phos 2.1 mg/dL<L> 10-13 Alb 2.6 g/dL<L>        POCT Blood Glucose.: 195 mg/dL (10-13-22 @ 11:52)  POCT Blood Glucose.: 242 mg/dL (10-13-22 @ 06:51)  POCT Blood Glucose.: 141 mg/dL (10-12-22 @ 23:43)  POCT Blood Glucose.: 179 mg/dL (10-12-22 @ 17:57)  POCT Blood Glucose.: 149 mg/dL (10-12-22 @ 12:23)  POCT Blood Glucose.: 181 mg/dL (10-12-22 @ 07:27)  POCT Blood Glucose.: 190 mg/dL (10-12-22 @ 06:00)  POCT Blood Glucose.: 203 mg/dL (10-11-22 @ 23:41)  POCT Blood Glucose.: 214 mg/dL (10-11-22 @ 18:02)  POCT Blood Glucose.: 250 mg/dL (10-11-22 @ 12:06)  POCT Blood Glucose.: 245 mg/dL (10-11-22 @ 07:22)  POCT Blood Glucose.: 283 mg/dL (10-10-22 @ 22:11)  POCT Blood Glucose.: 216 mg/dL (10-10-22 @ 17:11)          Estimated Needs:     [x ] no change since previous assessment      Previous Nutrition Diagnosis: Severe malnutrition     Nutrition Diagnosis is [x] ongoing       Recommendations:  1)    Monitoring and Evaluation:      [ x] Tolerance to diet prescription [x ] weights [x ] follow up per protocol  [ ] other: Source: Patient A&Ox [ 4]    Medical Course: 83yo M, w/ PMH of glioblastoma (dx 7/18/22, s/p R stereo bx, TIMOTHY x2, R crani for tumor debulking 7/28/22, on temozolomide (last dose 4 days prior to admission), HLD, steroid-induced DM, 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       Nutrition Course: Patient seen at bedside. S/p NGT placement. Currently prescribed Glucerna 1.5. TF visibly seen at bedside @ goal rate 30ml. Patient denies any GI distress at the time of visit. Last BM per RN today. Patient's current TF regimen@ 30ml/hr provides 1080cal, 89.1gm pro which is not meeting his nutritional needs. Recommend increase TF rate 5ml q 8hrs to goal rate 65ml/hr total volume 1560ml. Will provide 2340cal, 128.7gm pro, and 1184ml free water. Labs notable with low phos 2.3L recommend continue monitor phos daily and replenish as patient is at risk for refeeding syndrome. Patient continues on IVF for hydration.        Diet, NPO with Tube Feed:   Tube Feeding Modality: Nasogastric  Glucerna 1.5 Jeremias (GLUCERNA1.5RTH)  Total Volume for 24 Hours (mL): 720  Continuous  Starting Tube Feed Rate {mL per Hour}: 10  Increase Tube Feed Rate by (mL): 10  Until Goal Tube Feed Rate (mL per Hour): 30  Tube Feed Duration (in Hours): 24  Tube Feed Start Time: 19:00 (10-12-22 @ 18:42)      GI: WDL. Last BM today     Enteral /Parenteral Nutrition: Glucerna 1.5 @30ml x24 hrs. TF is not meeting patient's nutritional needs.     Anthropometrics: Height (cm): 167.6 (10-08), 165.1 (09-30), 165 (08-23), 165.1 (07-28)  Weight (kg): 71 (10-08), 68 (09-30), [id number] (08-23), 86.6 (07-28)  BMI (kg/m2): 25.3 (10-08), 24.9 (09-30), 28.8 (08-23), 31.8 (07-28)    Edema:   Pressure Injuries:    __________________ Pertinent Medications__________________   MEDICATIONS  (STANDING):  atorvastatin 80 milliGRAM(s) Oral at bedtime  dexAMETHasone  Injectable 4 milliGRAM(s) IV Push two times a day  dextrose 5%. 1000 milliLiter(s) (100 mL/Hr) IV Continuous <Continuous>  dextrose 5%. 1000 milliLiter(s) (50 mL/Hr) IV Continuous <Continuous>  dextrose 50% Injectable 25 Gram(s) IV Push once  dextrose 50% Injectable 12.5 Gram(s) IV Push once  dextrose 50% Injectable 25 Gram(s) IV Push once  FLUoxetine 20 milliGRAM(s) Oral daily  glucagon  Injectable 1 milliGRAM(s) IntraMuscular once  insulin glargine Injectable (LANTUS) 4 Unit(s) SubCutaneous at bedtime  insulin lispro (ADMELOG) corrective regimen sliding scale   SubCutaneous every 6 hours  levETIRAcetam  IVPB 750 milliGRAM(s) IV Intermittent every 12 hours  pantoprazole  Injectable 40 milliGRAM(s) IV Push two times a day  piperacillin/tazobactam IVPB.. 3.375 Gram(s) IV Intermittent every 8 hours    MEDICATIONS  (PRN):  acetaminophen     Tablet .. 650 milliGRAM(s) Oral every 6 hours PRN Temp greater or equal to 38C (100.4F), Mild Pain (1 - 3)  dextrose Oral Gel 15 Gram(s) Oral once PRN Blood Glucose LESS THAN 70 milliGRAM(s)/deciliter  melatonin 3 milliGRAM(s) Oral at bedtime PRN Insomnia  ondansetron Injectable 4 milliGRAM(s) IV Push every 8 hours PRN Nausea and/or Vomiting      __________________ Pertinent Labs__________________   10-13 Na138 mmol/L Glu 201 mg/dL<H> K+ 4.0 mmol/L Cr  0.53 mg/dL BUN 20 mg/dL 10-13 Phos 2.1 mg/dL<L> 10-13 Alb 2.6 g/dL<L>        POCT Blood Glucose.: 195 mg/dL (10-13-22 @ 11:52)  POCT Blood Glucose.: 242 mg/dL (10-13-22 @ 06:51)  POCT Blood Glucose.: 141 mg/dL (10-12-22 @ 23:43)  POCT Blood Glucose.: 179 mg/dL (10-12-22 @ 17:57)  POCT Blood Glucose.: 149 mg/dL (10-12-22 @ 12:23)  POCT Blood Glucose.: 181 mg/dL (10-12-22 @ 07:27)  POCT Blood Glucose.: 190 mg/dL (10-12-22 @ 06:00)  POCT Blood Glucose.: 203 mg/dL (10-11-22 @ 23:41)  POCT Blood Glucose.: 214 mg/dL (10-11-22 @ 18:02)  POCT Blood Glucose.: 250 mg/dL (10-11-22 @ 12:06)  POCT Blood Glucose.: 245 mg/dL (10-11-22 @ 07:22)  POCT Blood Glucose.: 283 mg/dL (10-10-22 @ 22:11)  POCT Blood Glucose.: 216 mg/dL (10-10-22 @ 17:11)          Estimated Needs:     [x ] no change since previous assessment      Previous Nutrition Diagnosis: Severe malnutrition     Nutrition Diagnosis is [x] ongoing       Recommendations:  1) Recommend slowly increase TF rate 5ml q 8hrs to goal rate 65ml/hr total volume 1560ml. Will provide 2340cal, 128.7gm pro, and 1184ml free water.  2) Recommend monitor electrolytes (phos/K) and replenish electrolytes as patient is at risk for refeeding syndrome.   3) Monitor TF tolerance, Labs, weights, BMs and skin integrity.   4) RD to remain available for further nutritional interventions as indicated.     Monitoring and Evaluation:      [ x] Tolerance to diet prescription [x ] weights [x ] follow up per protocol  [ ] other:

## 2022-10-13 NOTE — PROGRESS NOTE ADULT - PROBLEM SELECTOR PLAN 9
Diet: Tube feeds via NGT  DVT ppx: SCD's in LLE, s/p IVC filter in RLE, per heme/onc continue to hold AC   Dispo: pending radiation tx

## 2022-10-13 NOTE — PROGRESS NOTE ADULT - ASSESSMENT
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

## 2022-10-13 NOTE — PROGRESS NOTE ADULT - PROBLEM SELECTOR PLAN 8
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 Silva)  - On 9/21, he was found to have a DVT and was started on Lovenox, which was quickly discontinued after outpt labs showed plt of 77 < 172 (8/2022). Per daughter only took 1 or 2 doses of the lovenox, so thrombocytopenia more likely 2/2 temozolomide, which was also discontinued at that time  - 9/28 HIT assay (heparin pf4 ab) negative  - neg serotonin releasing assay   - continue to monitor

## 2022-10-13 NOTE — PROGRESS NOTE ADULT - SUBJECTIVE AND OBJECTIVE BOX
[facility] Department of Hospital Medicine  Dex Joaquin MD  Available on MS Teams  Pager: [phone]    Patient is a 82y old  Male who presents with a chief complaint of weakness; radiation therapy (12 Oct 2022 13:08)    OVERNIGHT EVENTS: No acute events overnight.    SUBJECTIVE: Pt seen and examined this morning with family members at bedside. Patient today noted to be alert with eyes open and tracking. Smiled and acknowledged provider upon entering room. Patient attempting to verbalize but overall appearing weak. Able to follow very basic commands. Closed eyes on command and was able to squeeze hands on command (although very weakly). Unable to participate further with interview however.    ADDITIONAL REVIEW OF SYSTEMS: Unable to obtain as patient non-verbal currently.    MEDICATIONS  (STANDING):  atorvastatin 80 milliGRAM(s) Oral at bedtime  dexAMETHasone  Injectable 4 milliGRAM(s) IV Push two times a day  dextrose 5%. 1000 milliLiter(s) (100 mL/Hr) IV Continuous <Continuous>  dextrose 5%. 1000 milliLiter(s) (50 mL/Hr) IV Continuous <Continuous>  dextrose 50% Injectable 25 Gram(s) IV Push once  dextrose 50% Injectable 12.5 Gram(s) IV Push once  dextrose 50% Injectable 25 Gram(s) IV Push once  FLUoxetine 20 milliGRAM(s) Oral daily  glucagon  Injectable 1 milliGRAM(s) IntraMuscular once  insulin glargine Injectable (LANTUS) 4 Unit(s) SubCutaneous at bedtime  insulin lispro (ADMELOG) corrective regimen sliding scale   SubCutaneous every 6 hours  levETIRAcetam  IVPB 750 milliGRAM(s) IV Intermittent every 12 hours  pantoprazole  Injectable 40 milliGRAM(s) IV Push two times a day  piperacillin/tazobactam IVPB.. 3.375 Gram(s) IV Intermittent every 8 hours    MEDICATIONS  (PRN):  acetaminophen     Tablet .. 650 milliGRAM(s) Oral every 6 hours PRN Temp greater or equal to 38C (100.4F), Mild Pain (1 - 3)  dextrose Oral Gel 15 Gram(s) Oral once PRN Blood Glucose LESS THAN 70 milliGRAM(s)/deciliter  melatonin 3 milliGRAM(s) Oral at bedtime PRN Insomnia  ondansetron Injectable 4 milliGRAM(s) IV Push every 8 hours PRN Nausea and/or Vomiting    CAPILLARY BLOOD GLUCOSE    POCT Blood Glucose.: 195 mg/dL (13 Oct 2022 11:52)  POCT Blood Glucose.: 242 mg/dL (13 Oct 2022 06:51)  POCT Blood Glucose.: 141 mg/dL (12 Oct 2022 23:43)  POCT Blood Glucose.: 179 mg/dL (12 Oct 2022 17:57)    I&O's Summary    PHYSICAL EXAM:  Vital Signs Last 24 Hrs  T(C): 36.6 (13 Oct 2022 12:30), Max: 36.7 (13 Oct 2022 05:49)  T(F): 97.9 (13 Oct 2022 12:30), Max: 98.1 (13 Oct 2022 05:49)  HR: 95 (13 Oct 2022 12:30) (61 - 95)  BP: 136/82 (13 Oct 2022 12:30) (136/82 - 150/91)  BP(mean): --  RR: 18 (13 Oct 2022 12:30) (18 - 18)  SpO2: 97% (13 Oct 2022 12:30) (97% - 98%)    Parameters below as of 13 Oct 2022 12:30  Patient On (Oxygen Delivery Method): room air    CONSTITUTIONAL: NAD, well-developed  HEAD: Normocephalic, atraumatic  EYES: EOMI, PERRL, eyes tracking  ENT: no rhinorrhea, no pharyngeal erythema  RESPIRATORY: No increased work of breathing, CTAB, no wheezes or crackles appreciated  CARDIOVASCULAR: RRR, S1 and S2 present, no m/r/g  ABDOMEN: soft, NT, mildly distended, bowel sounds present  EXTREMITIES: No LE edema  MUSCULOSKELETAL: no joint swelling, no tenderness to palpation  NEURO: A&Ox0, minimal movement of extremities, attempting to verbalize but weak, following very simple commands in [place].    LABS:                        14.0   9.29  )-----------( 71       ( 13 Oct 2022 10:40 )             43.0     10-13    138  |  107  |  20  ----------------------------<  201<H>  4.0   |  20<L>  |  0.53    Ca    8.2<L>      13 Oct 2022 10:40  Phos  2.1     10-13  Mg     2.00     10-13    TPro  5.1<L>  /  Alb  2.6<L>  /  TBili  0.8  /  DBili  x   /  AST  34  /  ALT  40  /  AlkPhos  92  10-13    CARDIAC MARKERS ( 12 Oct 2022 15:37 )  x     / x     / 36 U/L / x     / x        Culture - Urine (collected 10 Oct 2022 22:30)  Source: Clean Catch Clean Catch (Midstream)  Final Report (13 Oct 2022 10:10):    >100,000 CFU/ml Enterococcus faecalis    50,000 - 99,000 CFU/mL Citrobacter koseri  Organism: Enterococcus faecalis  Citrobacter koseri (13 Oct 2022 10:10)  Organism: Citrobacter koseri (13 Oct 2022 10:10)  Organism: Enterococcus faecalis (13 Oct 2022 10:10)    Culture - Blood (collected 10 Oct 2022 19:42)  Source: .Blood Blood-Venous  Gram Stain (12 Oct 2022 09:53):    Growth in aerobic bottle: Gram positive cocci in pairs  Preliminary Report (13 Oct 2022 10:43):    Growth in aerobic bottle: Enterococcus faecalis    See previous culture [id number]    Culture - Blood (collected 10 Oct 2022 19:42)  Source: .Blood Blood  Gram Stain (11 Oct 2022 13:58):    Growth in aerobic and anaerobic bottles: Gram positive cocci in pairs  Final Report (13 Oct 2022 13:37):    Growth in aerobic and anaerobic bottles: Enterococcus faecalis    ***Blood Panel PCR results on this specimen are available    approximately 3 hours after the Gram stain result.***    Gram stain, PCR, and/or culture results may not always    correspond dueto difference in methodologies.    ************************************************************    This PCR assay was performed by multiplex PCR. This    Assay tests for 66 bacterial and resistance gene targets.    Please refer to the [facility] test directory    at [email or web address] for details.  Organism: Blood Culture PCR  Enterococcus faecalis (13 Oct 2022 15:29)  Organism: Enterococcus faecalis (13 Oct 2022 15:29)  Organism: Blood Culture PCR (12 Oct 2022 19:24)    RADIOLOGY & ADDITIONAL TESTS:    Results Reviewed:   Imaging Personally Reviewed:  Electrocardiogram Personally Reviewed:    COORDINATION OF CARE:  Care Discussed with Consultants/Other Providers [Y/N]:  Prior or Outpatient Records Reviewed [Y/N]:

## 2022-10-13 NOTE — PROGRESS NOTE ADULT - PROBLEM SELECTOR PLAN 5
s/p EGD and colonoscopy on 9/30  -started on pantoprazole 40mg BID by GI at [facility], will cont  -pt will need outpt repeat colonoscopy given poor prep on 9/30  -monitor stool and Hb  -gastroparesis on EGD: asymptomatic so far

## 2022-10-14 NOTE — PROGRESS NOTE ADULT - PROBLEM SELECTOR PLAN 2
Noted to be tachycardic and febrile on 10/10 PM, meeting SIRS criteria  - Patient noted on cultures from 10/10 to have e. faecalis in both bottles, pending sensitivities  - initially placed on empiric vanc/zosyn, c/w zosyn only for now  - most likely source is urine as previous urine culture from 9/27 with e. faecalis sensitive to ampicillin  - vitals currently remaining stable, f/u repeat cultures from 10/14 to guide antibiotic course  - ID consulted, appreciate recs

## 2022-10-14 NOTE — PROVIDER CONTACT NOTE (OTHER) - BACKGROUND
Glioblastoma Multiforme, Covid pos, h/o craniotomy
Pt transferred from [facility] for weakness and radiation treatment PMHx glioblastoma.
Glioblastoma Multiforme, Covid pos, h/o craniotomy

## 2022-10-14 NOTE — CONSULT NOTE ADULT - ASSESSMENT
82 year old with glioblastoma s/p chemo and radiation  Admitted to [facility] for GI eval  Tested positive for COVID    Now here for radiation treatment for glioblastoma    Recent fever with polymicrobial uti and enterococcal bacteremia.    1) Enterococcal bacteremia  Likely urinary focus  Repeat blood cultures every 48 hours until negative  Check echocardiogram    Change zosyn to Ampicillin 2 gram iv q 4    2) Acute UTI  growing enterococcus (covered by ampicllin) and Citrobacter  Add Cipro 400 IV q 12 for 5 d for citrobacter in urine (qtc 430 on 9/26)  (can change Cipro to 500 mg po q 12 if tolerating PO)  Check renal sonogram to rule out hydronephrosis  Has condom catheter- at risk for retention- check post void residuals    3) COVID   diagnosed on 10/3  Supportive care  Asymptomatic with regard to covid  airborne/contact isolation    4) ? esophogeal candidiasis  S/p 10 d of fluconazole    On steroids- ? planned duration   We may need to consider PCP prophylaxis    Plan d/w attneding

## 2022-10-14 NOTE — PROVIDER CONTACT NOTE (OTHER) - ACTION/TREATMENT ORDERED:
Stops fluids; elevate arms.
ACP notified, tube feedings will be held for now. Will continue to monitor.
Will continue to monitor.

## 2022-10-14 NOTE — PROGRESS NOTE ADULT - SUBJECTIVE AND OBJECTIVE BOX
Per hospitalist - a little more awake yesterday.     T 98.2  HR 83  BP  130/73  RR 18  O2sat 98%    MS: Mostly moans, no eye opening but he forcefully closes his eyes. No follow commands.  No BTT. He said hello. Says yes at times.   CN: EOMI; pupils symmetric reactive.  Face - symmetric.  Corneal reflex intact.   Motor/sensory: minimal movement with stimulation in all 4 ext.     eEEG Summary / Classification:  Abnormal EEG in the awake, drowsy and asleep states.  - Right hemispheric slowing and breach effect.  - Mild to Moderate generalized slowing.    EEG Impression / Clinical Correlate:  Abnormal EEG study.  Structural or functional abnormality in the right hemisphere with overlying skull defect.  Mild to Moderate nonspecific diffuse or multifocal cerebral dysfunction.   No clear epileptiform pattern and no seizure seen.

## 2022-10-14 NOTE — PROGRESS NOTE ADULT - PROBLEM SELECTOR PLAN 5
s/p EGD and colonoscopy on 9/30  -started on pantoprazole 40mg BID by GI at [facility], will cont  -pt will need outpt repeat colonoscopy given poor prep on 9/30  -monitor stool and Hb  -gastroparesis on EGD: asymptomatic so far

## 2022-10-14 NOTE — PROGRESS NOTE ADULT - PROBLEM SELECTOR PLAN 9
Diet: Tube feeds via NGT  DVT ppx: SCD's in LLE, s/p IVC filter in RLE, per heme/onc continue to hold AC   Dispo: pending radiation tx once MS improves

## 2022-10-14 NOTE — PROGRESS NOTE ADULT - ASSESSMENT
Mr. 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   He is still encephalopathic but we know that there is no evidence of nonconvulsive seizures contributing to his lethargy.  Toxic metabolic septic encephalopathy.   I recommend continuing levetiracetam 750 mg Q12H IV.  Management of GBM per palliative care, rad onc and neuro-onc  D/W hospitalist  Neurology signing off. Please reconsult PRN or call [facility] [phone] with any questions.    Thank you

## 2022-10-14 NOTE — CONSULT NOTE ADULT - SUBJECTIVE AND OBJECTIVE BOX
HPI:  81 y/o M PMHx of PMH Glioblastoma (diagnosed 7/18/22, s/p R stereo biopsy, TIMOTHY x2, R crani for tumor debulking 7/28/22, on temozolomide (last dose 4 days ago)), HLD, steroid-induced diabetes, and R popliteal DVT (found 9/21/22, not on AC due to thrombocytopenia) transferred from [facility] for radiation oncology treatment. Patient initially presented to [facility] on 9/26 for GIB and weakness. s/p EGD and Colonoscopy 9/30 which showed findings suspicious for esophageal candidiasis, gastroparesis, erythematous mucosa in pylorus and stomach. Poor prep for colonoscopy, no bleeding/blood noted, need repeat oupt colonoscopy. IVC filter placed 9/28 given holding AC in the setting of thrombocytopenia per heme. CTH and MR head done; per neuro sx, patient's MRI and the typical post treatment course and timing the findings most likely represent post treatment changes. As such, the patient can continue to follow up as an outpatient with Dr. Mcneil as planned after their current admission. Patient requires 9 remaining radiation treatments on a Monday-Friday schedule to begin as soon as reasonably possible.     Given patient became COVID positive, he was being transferred to [facility] to continue radiation oncology treatment while waiting for quaratine/placement to not delay care.     + fever on 10/10-   Urine culture positive for citrobacter and enterococcus  Blood culture with enterococcus    Lethargic at time of my eval        PAST MEDICAL & SURGICAL HISTORY:  Glioblastoma multiforme      Steroid-induced diabetes mellitus      S/P craniotomy  R craniotomy for debulking with Dr. Mcneil (7/28/22)          Allergies    heparin containing compounds (Other)    Intolerances        ANTIMICROBIALS:  piperacillin/tazobactam IVPB.. 3.375 every 8 hours      OTHER MEDS:  acetaminophen     Tablet .. 650 milliGRAM(s) Oral every 6 hours PRN  atorvastatin 80 milliGRAM(s) Oral at bedtime  dexAMETHasone  Injectable 4 milliGRAM(s) IV Push two times a day  dextrose 5%. 1000 milliLiter(s) IV Continuous <Continuous>  dextrose 5%. 1000 milliLiter(s) IV Continuous <Continuous>  dextrose 50% Injectable 25 Gram(s) IV Push once  dextrose 50% Injectable 12.5 Gram(s) IV Push once  dextrose 50% Injectable 25 Gram(s) IV Push once  dextrose Oral Gel 15 Gram(s) Oral once PRN  FLUoxetine 20 milliGRAM(s) Oral daily  glucagon  Injectable 1 milliGRAM(s) IntraMuscular once  insulin glargine Injectable (LANTUS) 4 Unit(s) SubCutaneous at bedtime  insulin lispro (ADMELOG) corrective regimen sliding scale   SubCutaneous every 6 hours  levETIRAcetam  IVPB 750 milliGRAM(s) IV Intermittent every 12 hours  melatonin 3 milliGRAM(s) Oral at bedtime PRN  ondansetron Injectable 4 milliGRAM(s) IV Push every 8 hours PRN  pantoprazole  Injectable 40 milliGRAM(s) IV Push two times a day      SOCIAL HISTORY:  no recent travel    pt unable to provide additional details    FAMILY HISTORY:  FH: type 2 diabetes    FH: hyperlipidemia        REVIEW OF SYSTEMS  [ x ] ROS unobtainable because:  pt unable to provide    [  ] All other systems negative except as noted below:	    Constitutional:  [ ] fever [ ] chills  [ ] weight loss  [ ] weakness  Skin:  [ ] rash [ ] phlebitis	  Eyes: [ ] icterus [ ] pain  [ ] discharge	  ENMT: [ ] sore throat  [ ] thrush [ ] ulcers [ ] exudates  Respiratory: [ ] dyspnea [ ] hemoptysis [ ] cough [ ] sputum	  Cardiovascular:  [ ] chest pain [ ] palpitations [ ] edema	  Gastrointestinal:  [ ] nausea [ ] vomiting [ ] diarrhea [ ] constipation [ ] pain	  Genitourinary:  [ ] dysuria [ ] frequency [ ] hematuria [ ] discharge [ ] flank pain  [ ] incontinence  Musculoskeletal:  [ ] myalgias [ ] arthralgias [ ] arthritis  [ ] back pain  Neurological:  [ ] headache [ ] seizures  [ ] confusion/altered mental status  Psychiatric:  [ ] anxiety [ ] depression	  Hematology/Lymphatics:  [ ] lymphadenopathy  Endocrine:  [ ] adrenal [ ] thyroid  Allergic/Immunologic:	 [ ] transplant [ ] seasonal    PHYSICAL EXAM:  General: [x ] non-toxic  HEAD/EYES: [ ] PERRL [ x] white sclera [ ] icterus  ENT:  [ ] normal x[ ] supple [ ] thrush [ ] pharyngeal exudate  Cardiovascular:   [ ] murmur [x ] normal [ ] PPM/AICD  Respiratory:  [ ]x clear to ausculation bilaterally  GI:  [ ]x soft, non-tender, normal bowel sounds  :  [ ] villagran [ x] no CVA tenderness   Musculoskeletal:  [ ] no synovitis  Neurologic:  [ ] non-focal exam   Skin:  [x ] no rash  Lymph: [x ] no lymphadenopathy  Psychiatric:  [ ] appropriate affect [ ] alert & oriented  Lines:  [x ] no phlebitis [ ] central line          Drug Dosing Weight  Height (cm): 167.6 (08 Oct 2022 12:33)  Weight (kg): 71 (08 Oct 2022 12:33)  BMI (kg/m2): 25.3 (08 Oct 2022 12:33)  BSA (m2): 1.8 (08 Oct 2022 12:33)    Vital Signs Last 24 Hrs  T(F): 98.2 (10-14-22 @ 07:03), Max: 100.6 (10-10-22 @ 17:45)    Vital Signs Last 24 Hrs  HR: 83 (10-14-22 @ 12:30) (77 - 83)  BP: 130/73 (10-14-22 @ 12:30) (128/73 - 138/73)  RR: 18 (10-14-22 @ 12:30)  SpO2: 98% (10-14-22 @ 12:30) (95% - 98%)  Wt(kg): --                          13.7   6.81  )-----------( 41       ( 14 Oct 2022 07:23 )             40.9       10-14    138  |  108<H>  |  21  ----------------------------<  232<H>  4.1   |  21<L>  |  0.48<L>    Ca    7.9<L>      14 Oct 2022 07:23  Phos  1.7     10-14  Mg     1.90     10-14    TPro  5.1<L>  /  Alb  2.6<L>  /  TBili  0.8  /  DBili  x   /  AST  34  /  ALT  40  /  AlkPhos  92  10-13          MICROBIOLOGY:  RECENT CULTURES:  10-10 @ 22:30 Clean Catch Clean Catch (Midstream)   HIREN      Enterococcus faecalis  Citrobacter koseri  Enterococcus faecalis     >100,000 CFU/ml Enterococcus faecalis  50,000 - 99,000 CFU/mL Citrobacter koseri    10-10 @ 19:42 .Blood Blood   PCR    Growth in aerobic and anaerobic bottles: Gram positive cocci in pairs    Blood Culture PCR  Enterococcus faecalis  Blood Culture PCR     Growth in aerobic and anaerobic bottles: Enterococcus faecalis  ***Blood Panel PCR results on this specimen are available  approximately 3 hours after the Gram stain result.***  Gram stain, PCR, and/or culture results may not always  correspond dueto difference in methodologies.  ************************************************************  This PCR assay was performed by multiplex PCR. This  Assay tests for 66 bacterial and resistance gene targets.  Please refer to the [facility] test directory  at [email or web address] for details.          RESPIRATORY CULTURES:    RADIOLOGY:

## 2022-10-14 NOTE — PROVIDER CONTACT NOTE (OTHER) - ASSESSMENT
confuse
Pt AOx1, on tube feeding, tube feeding due to be increased by 5ml but on assessment patient had large amount of residual. On request removed as much until resistance met which was 350ml.
Pt is alert; pt is swollen both arms.

## 2022-10-14 NOTE — PROVIDER CONTACT NOTE (OTHER) - SITUATION
Pt arms very swollen
Noted pt incoherent and not understanding instructions. . Calm but unable to answer appropriately. Pt was A+OX 3 10/8/22 over night. Answered slowly but appropriately.
Tube feeding, pulling back large amounts of residual when prior to increasing dose.

## 2022-10-14 NOTE — PROVIDER CONTACT NOTE (OTHER) - REASON
change of mental status
Pt arms very swollen
Tube feeding, pulling back large amounts of residual when prior to increasing dose.

## 2022-10-14 NOTE — PROGRESS NOTE ADULT - ASSESSMENT
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

## 2022-10-15 NOTE — PROGRESS NOTE ADULT - PROBLEM SELECTOR PLAN 2
Noted to be tachycardic and febrile on 10/10 PM, meeting SIRS criteria  - Patient noted on cultures from 10/10 to have e. faecalis in both bottles, pending sensitivities; repeat BCx from 10/14 NGTD  - initially placed on empiric vanc/zosyn, c/w zosyn only for now  - most likely source is urine as previous urine culture from 9/27 with e. faecalis sensitive to ampicillin  - vitals currently remaining stable,  - ID consulted, appreciate recs

## 2022-10-15 NOTE — PROGRESS NOTE ADULT - ASSESSMENT
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 with weakness and GIB and now transferred from [facility] for radiation treatment with course c/b seizures.

## 2022-10-15 NOTE — PROGRESS NOTE ADULT - PROBLEM SELECTOR PLAN 5
s/p EGD and colonoscopy on 9/30  -started on pantoprazole 40mg BID by GI at [facility], will cont  -pt will need outpt repeat colonoscopy given poor prep on 9/30  -monitor stool and Hb  -gastroparesis on EGD: asymptomatic so far

## 2022-10-15 NOTE — PROGRESS NOTE ADULT - PROBLEM SELECTOR PLAN 9
Diet: Pureed and mildly thin liquids per S&S today  DVT ppx: SCD's in LLE, s/p IVC filter in RLE, per heme/onc continue to hold AC   Dispo: pending radiation tx once MS improves

## 2022-10-15 NOTE — PROGRESS NOTE ADULT - SUBJECTIVE AND OBJECTIVE BOX
[facility] Division of Hospital Medicine  Nazia Howard MD  Available via MS Teams    SUBJECTIVE / OVERNIGHT EVENTS: No acute overnight events. Wife and daughter at bedside. Daughter provided translation. Pt has improved mental status today, is able to answer questions and have basic conversation. Denies abd pain, n/v/d, fever, chills. Does have mild cough.    ADDITIONAL REVIEW OF SYSTEMS: All other systems negative    MEDICATIONS  (STANDING):  ampicillin  IVPB 2 Gram(s) IV Intermittent every 4 hours  atorvastatin 80 milliGRAM(s) Oral at bedtime  ciprofloxacin   IVPB 400 milliGRAM(s) IV Intermittent every 12 hours  dexAMETHasone  Injectable 4 milliGRAM(s) IV Push two times a day  dextrose 5%. 1000 milliLiter(s) (100 mL/Hr) IV Continuous <Continuous>  dextrose 5%. 1000 milliLiter(s) (50 mL/Hr) IV Continuous <Continuous>  dextrose 50% Injectable 25 Gram(s) IV Push once  dextrose 50% Injectable 12.5 Gram(s) IV Push once  dextrose 50% Injectable 25 Gram(s) IV Push once  FLUoxetine 20 milliGRAM(s) Oral daily  glucagon  Injectable 1 milliGRAM(s) IntraMuscular once  insulin glargine Injectable (LANTUS) 4 Unit(s) SubCutaneous at bedtime  insulin lispro (ADMELOG) corrective regimen sliding scale   SubCutaneous every 6 hours  levETIRAcetam  IVPB 750 milliGRAM(s) IV Intermittent every 12 hours  pantoprazole  Injectable 40 milliGRAM(s) IV Push two times a day    MEDICATIONS  (PRN):  acetaminophen     Tablet .. 650 milliGRAM(s) Oral every 6 hours PRN Temp greater or equal to 38C (100.4F), Mild Pain (1 - 3)  dextrose Oral Gel 15 Gram(s) Oral once PRN Blood Glucose LESS THAN 70 milliGRAM(s)/deciliter  melatonin 3 milliGRAM(s) Oral at bedtime PRN Insomnia  ondansetron Injectable 4 milliGRAM(s) IV Push every 8 hours PRN Nausea and/or Vomiting      I&O's Summary    15 Oct 2022 07:01  -  15 Oct 2022 15:27  --------------------------------------------------------  IN: 10 mL / OUT: 0 mL / NET: 10 mL        PHYSICAL EXAM:  Vital Signs Last 24 Hrs  T(C): 36.7 (15 Oct 2022 04:50), Max: 36.9 (14 Oct 2022 20:58)  T(F): 98 (15 Oct 2022 04:50), Max: 98.4 (14 Oct 2022 20:58)  HR: 75 (15 Oct 2022 04:50) (75 - 87)  BP: 131/63 (15 Oct 2022 04:50) (122/78 - 131/63)  BP(mean): --  RR: 17 (15 Oct 2022 04:50) (17 - 17)  SpO2: 98% (15 Oct 2022 04:50) (97% - 98%)    Parameters below as of 15 Oct 2022 04:50  Patient On (Oxygen Delivery Method): room air      CONSTITUTIONAL: NAD, chronically ill appearing  EYES: PERRLA; conjunctiva and sclera clear  ENMT: Moist oral mucosa, +NGT in place  RESPIRATORY: Normal respiratory effort; lungs are clear to auscultation bilaterally  CARDIOVASCULAR: Regular rate and rhythm, normal S1 and S2, no murmur/rub/gallop; No lower extremity edema  ABDOMEN: Nontender to palpation, normoactive bowel sounds, no rebound/guarding; No hepatosplenomegaly  MUSCULOSKELETAL:  Normal gait; no clubbing or cyanosis of digits; no joint swelling or tenderness to palpation  PSYCH: A+O to person, affect appropriate  NEUROLOGY: CN 2-12 are intact and symmetric; no gross sensory deficits   SKIN: No rashes; no palpable lesions    LABS:                        13.7   6.81  )-----------( 41       ( 14 Oct 2022 07:23 )             40.9     Hb Trend: 13.7<--, 14.0<--, 11.9<--, 12.8<--, 14.2<--  10-14    138  |  108<H>  |  21  ----------------------------<  232<H>  4.1   |  21<L>  |  0.48<L>    Ca    7.9<L>      14 Oct 2022 07:23  Phos  1.7     10-14  Mg     1.90     10-14      Creatinine Trend: 0.48<--, 0.53<--, 0.49<--, 0.56<--, 0.73<--, 0.58<--            Culture - Blood (collected 14 Oct 2022 11:15)  Source: .Blood Blood-Venous  Preliminary Report (15 Oct 2022 13:02):    No growth to date.      COVID-19 PCR: Detected (03 Oct 2022 07:00)  COVID-19 PCR: NotDetec (29 Sep 2022 19:26)  COVID-19 PCR: NotDetec (27 Jul 2022 06:29)  SARS-CoV-2: NotDetec (22 Jul 2022 23:51)      RADIOLOGY & ADDITIONAL TESTS:  N/A    COORDINATION OF CARE:  Consultant Communication and Details of Discussion (where applicable): ID- check TTE given enterococcus bacteremia

## 2022-10-15 NOTE — SWALLOW BEDSIDE ASSESSMENT ADULT - PHARYNGEAL PHASE
Could not assess due to severity of oral stage Delayed pharyngeal swallow/Delayed cough post oral intake Within functional limits

## 2022-10-15 NOTE — PROGRESS NOTE ADULT - SUBJECTIVE AND OBJECTIVE BOX
Follow Up:  enterococcus bacteremia    Interval History/ROS:   nods 'ok'  family at bedside  daughter and RN report patient is urinating without difficulty; yesterday condom catheter was disconnected    Allergies  heparin containing compounds (Other)        ANTIMICROBIALS:  ampicillin  IVPB 2 every 4 hours  ciprofloxacin   IVPB 400 every 12 hours      OTHER MEDS:  acetaminophen     Tablet .. 650 milliGRAM(s) Oral every 6 hours PRN  atorvastatin 80 milliGRAM(s) Oral at bedtime  dexAMETHasone  Injectable 4 milliGRAM(s) IV Push two times a day  dextrose 5%. 1000 milliLiter(s) IV Continuous <Continuous>  dextrose 5%. 1000 milliLiter(s) IV Continuous <Continuous>  dextrose 50% Injectable 25 Gram(s) IV Push once  dextrose 50% Injectable 12.5 Gram(s) IV Push once  dextrose 50% Injectable 25 Gram(s) IV Push once  dextrose Oral Gel 15 Gram(s) Oral once PRN  FLUoxetine 20 milliGRAM(s) Oral daily  glucagon  Injectable 1 milliGRAM(s) IntraMuscular once  insulin glargine Injectable (LANTUS) 4 Unit(s) SubCutaneous at bedtime  insulin lispro (ADMELOG) corrective regimen sliding scale   SubCutaneous every 6 hours  levETIRAcetam  IVPB 750 milliGRAM(s) IV Intermittent every 12 hours  melatonin 3 milliGRAM(s) Oral at bedtime PRN  ondansetron Injectable 4 milliGRAM(s) IV Push every 8 hours PRN  pantoprazole  Injectable 40 milliGRAM(s) IV Push two times a day      Vital Signs Last 24 Hrs  T(C): 36.7 (15 Oct 2022 04:50), Max: 36.9 (14 Oct 2022 20:58)  T(F): 98 (15 Oct 2022 04:50), Max: 98.4 (14 Oct 2022 20:58)  HR: 75 (15 Oct 2022 04:50) (75 - 87)  BP: 131/63 (15 Oct 2022 04:50) (122/78 - 131/63)  BP(mean): --  RR: 17 (15 Oct 2022 04:50) (17 - 17)  SpO2: 98% (15 Oct 2022 04:50) (97% - 98%)    Parameters below as of 15 Oct 2022 04:50  Patient On (Oxygen Delivery Method): room air        PHYSICAL EXAM:  Constitutional: Not in acute distress, awake, alert  scalp hyperpigmentation  Oral cavity: NGT  RS: Chest clear to   CVS: S1, S2   Abdomen: Soft. No guarding/rigidity/tenderness.  : external cath  Extremities:  edema  Vascular: iv right arm  Neuro: Alert, oriented to time/place/person  Cranial nerves 2-12 grossly normal. No focal abnormalities                          13.7   6.81  )-----------( 41       ( 14 Oct 2022 07:23 )             40.9       10-14    138  |  108<H>  |  21  ----------------------------<  232<H>  4.1   |  21<L>  |  0.48<L>    Ca    7.9<L>      14 Oct 2022 07:23  Phos  1.7     10-14  Mg     1.90     10-14            MICROBIOLOGY:  v  .Blood Blood-Venous  10-14-22   No growth to date.  --  --      Clean Catch Clean Catch (Midstream)  10-10-22   >100,000 CFU/ml Enterococcus faecalis  50,000 - 99,000 CFU/mL Citrobacter koseri  --  Enterococcus faecalis  Citrobacter koseri      .Blood Blood  10-10-22   Growth in aerobic and anaerobic bottles: Enterococcus faecalis  ***Blood Panel PCR results on this specimen are available  approximately 3 hours after the Gram stain result.***  Gram stain, PCR, and/or culture results may not always  correspond dueto difference in methodologies.  ************************************************************  This PCR assay was performed by multiplex PCR. This  Assay tests for 66 bacterial and resistance gene targets.  Please refer to the [facility] test directory  at [email or web address] for details.  --  Blood Culture PCR  Enterococcus faecalis      Clean Catch Clean Catch (Midstream)  09-27-22   10,000 - 49,000 CFU/mL Enterococcus faecalis  --  Enterococcus faecalis                RADIOLOGY:

## 2022-10-15 NOTE — PROGRESS NOTE ADULT - ASSESSMENT
82 year old with glioblastoma s/p chemo and radiation  Admitted to [facility] for GI eval  Tested positive for COVID    Now here for radiation treatment for glioblastoma    Recent fever with polymicrobial uti and enterococcal bacteremia.    1) Enterococcal bacteremia  Likely urinary focus  Repeat blood culture 10/14 no growth to date  Check echocardiogram    c/w Ampicillin 2 gram iv q 4    2) Acute UTI  growing enterococcus (covered by ampicllin) and Citrobacter  c/w Cipro 400 IV q 12 for 5 d for citrobacter in urine (qtc 430 on 9/26)  (can change Cipro to 500 mg po q 12 if tolerating PO)  Check renal sonogram to rule out hydronephrosis  residual void 99    3) COVID   diagnosed on 10/3  Supportive care  Asymptomatic with regard to covid  airborne/contact isolation    4) ? esophogeal candidiasis  S/p 10 d of fluconazole    On steroids- ? planned duration   We may need to consider PCP prophylaxis

## 2022-10-16 NOTE — PROGRESS NOTE ADULT - SUBJECTIVE AND OBJECTIVE BOX
[facility] Division of Hospital Medicine  Nazia Hoawrd MD  Available via MS Teams    SUBJECTIVE / OVERNIGHT EVENTS: Daughter at bedside translating. Pt today continues to feel better, is more conversant. Tolerating diet without issue. Denies abd pain, n/v/d, fevers, chills.     ADDITIONAL REVIEW OF SYSTEMS: All additional systems negative    MEDICATIONS  (STANDING):  ampicillin  IVPB 2 Gram(s) IV Intermittent every 4 hours  atorvastatin 80 milliGRAM(s) Oral at bedtime  ciprofloxacin   IVPB 400 milliGRAM(s) IV Intermittent every 12 hours  dexAMETHasone  Injectable 4 milliGRAM(s) IV Push two times a day  dextrose 5%. 1000 milliLiter(s) (100 mL/Hr) IV Continuous <Continuous>  dextrose 5%. 1000 milliLiter(s) (50 mL/Hr) IV Continuous <Continuous>  dextrose 50% Injectable 25 Gram(s) IV Push once  dextrose 50% Injectable 12.5 Gram(s) IV Push once  dextrose 50% Injectable 25 Gram(s) IV Push once  FLUoxetine 20 milliGRAM(s) Oral daily  glucagon  Injectable 1 milliGRAM(s) IntraMuscular once  insulin glargine Injectable (LANTUS) 4 Unit(s) SubCutaneous at bedtime  insulin lispro (ADMELOG) corrective regimen sliding scale   SubCutaneous Before meals and at bedtime  levETIRAcetam  IVPB 750 milliGRAM(s) IV Intermittent every 12 hours  pantoprazole  Injectable 40 milliGRAM(s) IV Push two times a day    MEDICATIONS  (PRN):  acetaminophen     Tablet .. 650 milliGRAM(s) Oral every 6 hours PRN Temp greater or equal to 38C (100.4F), Mild Pain (1 - 3)  dextrose Oral Gel 15 Gram(s) Oral once PRN Blood Glucose LESS THAN 70 milliGRAM(s)/deciliter  melatonin 3 milliGRAM(s) Oral at bedtime PRN Insomnia  ondansetron Injectable 4 milliGRAM(s) IV Push every 8 hours PRN Nausea and/or Vomiting      I&O's Summary    15 Oct 2022 07:01  -  16 Oct 2022 07:00  --------------------------------------------------------  IN: 10 mL / OUT: 200 mL / NET: -190 mL        PHYSICAL EXAM:  Vital Signs Last 24 Hrs  T(C): 36.3 (16 Oct 2022 04:45), Max: 36.7 (15 Oct 2022 14:00)  T(F): 97.3 (16 Oct 2022 04:45), Max: 98 (15 Oct 2022 14:00)  HR: 64 (16 Oct 2022 04:45) (64 - 73)  BP: 141/57 (16 Oct 2022 04:45) (123/81 - 141/57)  BP(mean): --  RR: 17 (16 Oct 2022 04:45) (16 - 17)  SpO2: 98% (16 Oct 2022 04:45) (98% - 100%)    Parameters below as of 16 Oct 2022 04:45  Patient On (Oxygen Delivery Method): room air      CONSTITUTIONAL: NAD,NGT in place  RESPIRATORY: Normal respiratory effort; lungs are clear to auscultation bilaterally  CARDIOVASCULAR: Regular rate and rhythm, normal S1 and S2, no murmur/rub/gallop; No lower extremity edema; Peripheral pulses are 2+ bilaterally  ABDOMEN: Nontender to palpation, normoactive bowel sounds, no rebound/guarding; No hepatosplenomegaly  MUSCULOSKELETAL:  Normal gait; no clubbing or cyanosis of digits; no joint swelling or tenderness to palpation  PSYCH: A+O to person, place; affect appropriate  NEUROLOGY: CN 2-12 are intact and symmetric; decreased RUE movement but improved; no gross sensory deficits   SKIN: No rashes; no palpable lesions    LABS:    Hb Trend: 13.7<--, 14.0<--, 11.9<--, 12.8<--, 14.2<--        Creatinine Trend: 0.48<--, 0.53<--, 0.49<--, 0.56<--, 0.73<--, 0.58<--            Culture - Blood (collected 14 Oct 2022 11:15)  Source: .Blood Blood-Venous  Preliminary Report (15 Oct 2022 13:02):    No growth to date.      COVID-19 PCR: Detected (03 Oct 2022 07:00)  COVID-19 PCR: NotDetec (29 Sep 2022 19:26)  COVID-19 PCR: NotDetec (27 Jul 2022 06:29)  SARS-CoV-2: [facility] (22 Jul 2022 23:51)      RADIOLOGY & ADDITIONAL TESTS:  N/A    COORDINATION OF CARE:  Consultant Communication and Details of Discussion (where applicable): N/A

## 2022-10-16 NOTE — PROGRESS NOTE ADULT - ASSESSMENT
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 with weakness and GIB and now transferred from [facility] for radiation treatment with course c/b seizures.

## 2022-10-16 NOTE — PROGRESS NOTE ADULT - PROBLEM SELECTOR PLAN 9
Diet: Pureed and mildly thin liquids per S&S today  DVT ppx: SCD's in LLE, s/p IVC filter in RLE, per heme/onc continue to hold AC   Dispo: pending radiation tx plan

## 2022-10-16 NOTE — PROGRESS NOTE ADULT - PROBLEM SELECTOR PLAN 5
s/p EGD and colonoscopy on 9/30  -started on pantoprazole 40mg BID by GI at [facility], will cont  -pt will need outpt repeat colonoscopy given poor prep on 9/30  -monitor stool and Hb  -gastroparesis on EGD: asymptomatic so far

## 2022-10-16 NOTE — PROGRESS NOTE ADULT - PROBLEM SELECTOR PLAN 2
Noted to be tachycardic and febrile on 10/10 PM, meeting SIRS criteria  - Patient noted on cultures from 10/10 to have e. faecalis in both bottles, pending sensitivities; repeat BCx from 10/14 NGTD  - initially placed on empiric vanc/zosyn, c/w zosyn only for now  - most likely source is urine as previous urine culture from 9/27 with e. faecalis sensitive to ampicillin  - vitals currently remaining stable,  - ID consulted, appreciate recs  - TTE pending to evaluate for endocarditis

## 2022-10-16 NOTE — PROGRESS NOTE ADULT - PROBLEM SELECTOR PLAN 3
a1c 9.3% (9/27/22), likely 2/2 steroid-induced DM  - holding home oral agents while inpatient (januvia 100mg qd, metformin ER 500mg BID)  - increase lantus 10u qHS and continue ISS qHS, qAC; can add premeal if needed  - adjust insulin regimen as needed

## 2022-10-17 NOTE — PROGRESS NOTE ADULT - ASSESSMENT
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

## 2022-10-17 NOTE — PROGRESS NOTE ADULT - SUBJECTIVE AND OBJECTIVE BOX
[facility] Division of Hospital Medicine  Shellie Webb MD  Pager [phone]    Patient is a 82y old  Male who presents with a chief complaint of weakness; radiation therapy       SUBJECTIVE / OVERNIGHT EVENTS: feeling tired      MEDICATIONS  (STANDING):  ampicillin  IVPB 2 Gram(s) IV Intermittent every 4 hours  atorvastatin 80 milliGRAM(s) Oral at bedtime  ciprofloxacin   IVPB 400 milliGRAM(s) IV Intermittent every 12 hours  dexAMETHasone  Injectable 4 milliGRAM(s) IV Push two times a day  dextrose 5%. 1000 milliLiter(s) (100 mL/Hr) IV Continuous <Continuous>  dextrose 5%. 1000 milliLiter(s) (50 mL/Hr) IV Continuous <Continuous>  dextrose 50% Injectable 25 Gram(s) IV Push once  dextrose 50% Injectable 12.5 Gram(s) IV Push once  dextrose 50% Injectable 25 Gram(s) IV Push once  FLUoxetine 20 milliGRAM(s) Oral daily  glucagon  Injectable 1 milliGRAM(s) IntraMuscular once  insulin glargine Injectable (LANTUS) 10 Unit(s) SubCutaneous at bedtime  insulin lispro (ADMELOG) corrective regimen sliding scale   SubCutaneous Before meals and at bedtime  levETIRAcetam  IVPB 750 milliGRAM(s) IV Intermittent every 12 hours  pantoprazole  Injectable 40 milliGRAM(s) IV Push two times a day    MEDICATIONS  (PRN):  acetaminophen     Tablet .. 650 milliGRAM(s) Oral every 6 hours PRN Temp greater or equal to 38C (100.4F), Mild Pain (1 - 3)  dextrose Oral Gel 15 Gram(s) Oral once PRN Blood Glucose LESS THAN 70 milliGRAM(s)/deciliter  melatonin 3 milliGRAM(s) Oral at bedtime PRN Insomnia  ondansetron Injectable 4 milliGRAM(s) IV Push every 8 hours PRN Nausea and/or Vomiting      CAPILLARY BLOOD GLUCOSE  POCT Blood Glucose.: 286 mg/dL (17 Oct 2022 08:30)  POCT Blood Glucose.: 291 mg/dL (16 Oct 2022 21:54)  POCT Blood Glucose.: 238 mg/dL (16 Oct 2022 17:03)        PHYSICAL EXAM:  Vital Signs Last 24 Hrs  T(F): 98.4 (17 Oct 2022 06:50), Max: 98.6 (16 Oct 2022 20:25)  HR: 65 (17 Oct 2022 06:50) (65 - 84)  BP: 131/79 (17 Oct 2022 06:50) (128/96 - 141/77)  RR: 18 (17 Oct 2022 06:50) (18 - 18)  SpO2: 97% (17 Oct 2022 06:50) (97% - 98%)    Parameters below as of 17 Oct 2022 06:50  Patient On (Oxygen Delivery Method): room air        CONSTITUTIONAL: NAD, sleepy  EYES: PERRLA; conjunctiva and sclera clear  ENMT: Moist oral mucosa; normal dentition  RESPIRATORY: Normal respiratory effort; grossly b/l AE  CARDIOVASCULAR: Regular rate and rhythm; ++lower ext edema;  ABDOMEN: Nontender to palpation, normoactive bowel sounds  MUSCULOSKELETAL:  no clubbing or cyanosis of digits; no joint swelling or tenderness to palpation  PSYCH: calm, coop;   NEUROLOGY: CN 2-12 are intact and symmetric; no gross sensory deficits   SKIN: anasarca    LABS:                        11.7   6.23  )-----------( 58       ( 17 Oct 2022 06:55 )             36.6     10-17    136  |  104  |  12  ----------------------------<  232<H>  4.1   |  23  |  0.48<L>    Ca    7.3<L>      17 Oct 2022 06:55  Phos  2.0     10-17  Mg     1.90     10-17

## 2022-10-17 NOTE — PROGRESS NOTE ADULT - ASSESSMENT
82 year old with glioblastoma s/p chemo and radiation  Admitted to [facility] for GI eval  Tested positive for COVID    Now here for radiation treatment for glioblastoma    Recent fever with polymicrobial uti and enterococcal bacteremia.    1) Enterococcal bacteremia  Likely urinary focus  Repeat blood culture 10/14 no growth to date  MICHAEL limitted but no obvious vegetation    c/w Ampicillin 2 gram iv q 4 through 10/28  Weekly CBC, BMP fax to [phone]    Follow up with tele visit on 10/26 at 4 pm     2) Acute UTI  growing enterococcus (covered by ampicllin) and Citrobacter  Finish Cipro on 10/19    3) COVID   diagnosed on 10/3  Supportive care  Asymptomatic with regard to covid  airborne/contact isolation    4) ? esophogeal candidiasis  S/p 10 d of fluconazole    On steroids- ? planned duration   If plan is for over 2 weeks, add pcp prophylaxis    Will sign off  Call [phone] with questions           82 year old with glioblastoma s/p chemo and radiation  Admitted to [facility] for GI eval  Tested positive for COVID    Now here for radiation treatment for glioblastoma    Recent fever with polymicrobial uti and enterococcal bacteremia.    1) Enterococcal bacteremia  Likely urinary focus  Repeat blood culture 10/14 no growth to date  MICHAEL limitted but no obvious vegetation    c/w Ampicillin 2 gram iv q 4 through 10/28  Weekly CBC, BMP fax to [phone]    Follow up with tele visit on 10/26 at 12:45pm     2) Acute UTI  growing enterococcus (covered by ampicllin) and Citrobacter  Finish Cipro on 10/19    3) COVID   diagnosed on 10/3  Supportive care  Asymptomatic with regard to covid  airborne/contact isolation    4) ? esophogeal candidiasis  S/p 10 d of fluconazole    On steroids- ? planned duration   If plan is for over 2 weeks, add pcp prophylaxis    Will sign off  Call [phone] with questions

## 2022-10-17 NOTE — PROGRESS NOTE ADULT - PROBLEM SELECTOR PLAN 5
s/p EGD and colonoscopy on 9/30  -started on pantoprazole 40mg BID by GI at [facility], will cont  -monitor stool and Hb  -gastroparesis on EGD: asymptomatic so far  outpt f/u if within GOC

## 2022-10-17 NOTE — PROGRESS NOTE ADULT - PROBLEM SELECTOR PLAN 8
Diet: Pureed and mildly thin liquids  DVT ppx: SCD's in LLE, s/p IVC filter in RLE, per heme/onc continue to hold AC   Dispo: pending radiation tx plan

## 2022-10-17 NOTE — CHART NOTE - NSCHARTNOTEFT_GEN_A_CORE
Seen/examined, was alert, responded to "how are you today" by answering "im fine."  Daughter /wife at bedside, no active coughing and COVID positive from 10/3/22 still in isolation room on   COVID precautions.    Will plan to resume RT today.  Has 9 fractions remaining for GBM s/p craniotomy.

## 2022-10-17 NOTE — PROGRESS NOTE ADULT - SUBJECTIVE AND OBJECTIVE BOX
Follow Up:      Inverval History/ROS:Patient is a 82y old  Male who presents with a chief complaint of weakness; radiation therapy (17 Oct 2022 12:27)    More alert   No fever    Allergies    heparin containing compounds (Other)    Intolerances        ANTIMICROBIALS:  ampicillin  IVPB 2 every 4 hours  ciprofloxacin   IVPB 400 every 12 hours      OTHER MEDS:  acetaminophen     Tablet .. 650 milliGRAM(s) Oral every 6 hours PRN  atorvastatin 80 milliGRAM(s) Oral at bedtime  dexAMETHasone  Injectable 4 milliGRAM(s) IV Push two times a day  dextrose 5%. 1000 milliLiter(s) IV Continuous <Continuous>  dextrose 5%. 1000 milliLiter(s) IV Continuous <Continuous>  dextrose 50% Injectable 25 Gram(s) IV Push once  dextrose 50% Injectable 12.5 Gram(s) IV Push once  dextrose 50% Injectable 25 Gram(s) IV Push once  dextrose Oral Gel 15 Gram(s) Oral once PRN  FLUoxetine 20 milliGRAM(s) Oral daily  glucagon  Injectable 1 milliGRAM(s) IntraMuscular once  insulin glargine Injectable (LANTUS) 10 Unit(s) SubCutaneous at bedtime  insulin lispro (ADMELOG) corrective regimen sliding scale   SubCutaneous Before meals and at bedtime  levETIRAcetam  IVPB 750 milliGRAM(s) IV Intermittent every 12 hours  melatonin 3 milliGRAM(s) Oral at bedtime PRN  ondansetron Injectable 4 milliGRAM(s) IV Push every 8 hours PRN  pantoprazole  Injectable 40 milliGRAM(s) IV Push two times a day      Vital Signs Last 24 Hrs  T(C): 36.9 (17 Oct 2022 06:50), Max: 37 (16 Oct 2022 20:25)  T(F): 98.4 (17 Oct 2022 06:50), Max: 98.6 (16 Oct 2022 20:25)  HR: 65 (17 Oct 2022 06:50) (65 - 79)  BP: 131/79 (17 Oct 2022 06:50) (131/79 - 141/77)  BP(mean): --  RR: 18 (17 Oct 2022 06:50) (18 - 18)  SpO2: 97% (17 Oct 2022 06:50) (97% - 98%)    Parameters below as of 17 Oct 2022 06:50  Patient On (Oxygen Delivery Method): room air        PHYSICAL EXAM:  General: [ ] non-toxic  HEAD/EYES: [ ] PERRL [x ] white sclera [ ] icterus  ENT:  [ ] normal [ x] supple [ ] thrush [ ] pharyngeal exudate  Cardiovascular:   [ ] murmur [x ] normal [ ] PPM/AICD  Respiratory:  [ x] clear to ausculation bilaterally  GI:  [ x] soft, non-tender, normal bowel sounds  :  [ ] villagran [ ] no CVA tenderness   Musculoskeletal:  [ ] no synovitis  Neurologic:  [ ] non-focal exam   Skin:  [ x] no rash  Lymph: [x ] no lymphadenopathy  Psychiatric:  [ ] appropriate affect [ ] alert & oriented  Lines:  [x ] no phlebitis [ ] central line                                11.7   6.23  )-----------( 58       ( 17 Oct 2022 06:55 )             36.6       10-17    136  |  104  |  12  ----------------------------<  232<H>  4.1   |  23  |  0.48<L>    Ca    7.3<L>      17 Oct 2022 06:55  Phos  2.0     10-17  Mg     1.90     10-17            MICROBIOLOGY:Culture Results:   No growth to date. (10-14-22 @ 11:15)  Culture Results:   >100,000 CFU/ml Enterococcus faecalis  50,000 - 99,000 CFU/mL Citrobacter koseri (10-10-22 @ 22:30)  Culture Results:   Growth in aerobic and anaerobic bottles: Enterococcus faecalis  ***Blood Panel PCR results on this specimen are available  approximately 3 hours after the Gram stain result.***  Gram stain, PCR, and/or culture results may not always  correspond dueto difference in methodologies.  ************************************************************  This PCR assay was performed by multiplex PCR. This  Assay tests for 66 bacterial and resistance gene targets.  Please refer to the [facility] test directory  at [email or web address] for details. (10-10-22 @ 19:42)  Culture Results:   Growth in aerobic bottle: Enterococcus faecalis  See previous culture [id number] (10-10-22 @ 19:42)      RADIOLOGY:    < from: Transthoracic Echocardiogram (10.17.22 @ 12:17) >  CONCLUSIONS:  1. Mitral annular calcification, otherwise normal mitral  valve. Minimal mitral regurgitation.  2. Normal left ventricular internal dimensions and wall  thicknesses.  3. Endocardium not well visualized; grossly normal left  ventricular systolic function.  4. Mild diastolic dysfunction (Stage I).  5. The right ventricle is not well visualized; grossly  normal right ventricular systolic function.    < end of copied text >

## 2022-10-18 NOTE — PROGRESS NOTE ADULT - ASSESSMENT
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

## 2022-10-18 NOTE — PROGRESS NOTE ADULT - PROBLEM SELECTOR PLAN 5
s/p EGD and colonoscopy on 9/30  -started on pantoprazole 40mg BID by GI at [facility], will cont  -monitor stool and Hb  -gastroparesis on EGD: asymptomatic so far  outpt f/u if within GOC

## 2022-10-18 NOTE — PROGRESS NOTE ADULT - SUBJECTIVE AND OBJECTIVE BOX
[facility] Division of Hospital Medicine  Shellie Webb MD  Pager [phone]    Patient is a 82y old  Male who presents with a chief complaint of weakness; radiation therapy       SUBJECTIVE / OVERNIGHT EVENTS: daughter and wife at bedside; asking about results from kidney and bladder US      MEDICATIONS  (STANDING):  ampicillin  IVPB 2 Gram(s) IV Intermittent every 4 hours  atorvastatin 80 milliGRAM(s) Oral at bedtime  ciprofloxacin   IVPB 400 milliGRAM(s) IV Intermittent every 12 hours  dexAMETHasone  Injectable 4 milliGRAM(s) IV Push two times a day  dextrose 5%. 1000 milliLiter(s) (100 mL/Hr) IV Continuous <Continuous>  dextrose 5%. 1000 milliLiter(s) (50 mL/Hr) IV Continuous <Continuous>  dextrose 50% Injectable 25 Gram(s) IV Push once  dextrose 50% Injectable 12.5 Gram(s) IV Push once  dextrose 50% Injectable 25 Gram(s) IV Push once  FLUoxetine 20 milliGRAM(s) Oral daily  glucagon  Injectable 1 milliGRAM(s) IntraMuscular once  insulin glargine Injectable (LANTUS) 12 Unit(s) SubCutaneous at bedtime  insulin lispro (ADMELOG) corrective regimen sliding scale   SubCutaneous Before meals and at bedtime  insulin lispro Injectable (ADMELOG) 4 Unit(s) SubCutaneous three times a day before meals  levETIRAcetam  IVPB 750 milliGRAM(s) IV Intermittent every 12 hours  pantoprazole  Injectable 40 milliGRAM(s) IV Push two times a day    MEDICATIONS  (PRN):  acetaminophen     Tablet .. 650 milliGRAM(s) Oral every 6 hours PRN Temp greater or equal to 38C (100.4F), Mild Pain (1 - 3)  dextrose Oral Gel 15 Gram(s) Oral once PRN Blood Glucose LESS THAN 70 milliGRAM(s)/deciliter  melatonin 3 milliGRAM(s) Oral at bedtime PRN Insomnia  ondansetron Injectable 4 milliGRAM(s) IV Push every 8 hours PRN Nausea and/or Vomiting      CAPILLARY BLOOD GLUCOSE  POCT Blood Glucose.: 197 mg/dL (18 Oct 2022 09:11)  POCT Blood Glucose.: 341 mg/dL (17 Oct 2022 21:33)  POCT Blood Glucose.: 272 mg/dL (17 Oct 2022 17:04)  POCT Blood Glucose.: 259 mg/dL (17 Oct 2022 13:04)        PHYSICAL EXAM:  Vital Signs Last 24 Hrs  T(F): 97.9 (18 Oct 2022 05:06), Max: 99 (17 Oct 2022 18:03)  HR: 64 (18 Oct 2022 05:06) (64 - 86)  BP: 143/70 (18 Oct 2022 05:06) (135/77 - 153/80)  RR: 18 (18 Oct 2022 05:06) (18 - 18)  SpO2: 98% (18 Oct 2022 05:06) (94% - 98%)    Parameters below as of 18 Oct 2022 05:06  Patient On (Oxygen Delivery Method): room air        CONSTITUTIONAL: NAD, more alert today with family at bedside  EYES: PERRLA; conjunctiva and sclera clear  ENMT: Moist oral mucosa; normal dentition  RESPIRATORY: Normal respiratory effort; grossly b/l AE  CARDIOVASCULAR: Regular rate and rhythm; No lower extremity edema;  ABDOMEN: Nontender to palpation, normoactive bowel sound  MUSCULOSKELETAL:  no clubbing or cyanosis of digits; no joint swelling or tenderness to palpation  PSYCH: more alert, calm; affect appropriate  NEUROLOGY: CN 2-12 are intact and symmetric; no gross sensory deficits   SKIN: No rashes; no palpable lesions    LABS:                        11.7   6.23  )-----------( 58       ( 17 Oct 2022 06:55 )             36.6     10-17    136  |  104  |  12  ----------------------------<  232<H>  4.1   |  23  |  0.48<L>    Ca    7.3<L>      17 Oct 2022 06:55  Phos  2.0     10-17  Mg     1.90     10-17                Culture - Blood (collected 16 Oct 2022 14:16)  Source: .Blood Blood-Venous  Preliminary Report (17 Oct 2022 21:01):    No growth to date.    Culture - Blood (collected 16 Oct 2022 14:06)  Source: .Blood Blood-Peripheral  Preliminary Report (17 Oct 2022 21:01):    No growth to date.

## 2022-10-18 NOTE — CHART NOTE - NSCHARTNOTEFT_GEN_A_CORE
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     Nutrition Course: Patient was not in the room at the time of visit, at radiation therapy per RN. Comprehensive chart review completed. Patient previously on TF Glucerna 1.5, s/p Swallow Beside Assessment 10/15 with recommendation Pureed diet with mildly thick liquids. TF d'marina and PO diet resumed on 10/15. Patient tolerating diet well, noted with various PO intake from % intake with assistance required at meal time per RN flowsheet. Patient's labs notable with high POCT 341H likely due to steroid induced diabetes, currently managed by insulin regimen. Recommend add Consistent Carbohydrate diet to help control glucose. Recommend continue monitor glucose and adjust insulin as needed. Patient noted with 3+ generalized edema which is masking her weight loss. Recommend add Glucerna 3x daily (660kcals, 30g protein) to meeting his nutritional needs.      Diet, Pureed:   Mildly Thick Liquids (MILDTHICKLIQS) (10-15-22 @ 14:17)      GI: WDL.     PO intake:  50-75% [x]   % [ x]      Anthropometrics: Height (cm): 167.6 (10-08), 165.1 (09-30), 165 (08-23), 165.1 (07-28)  Weight (kg): 71 (10-08), 68 (09-30), 78.69439430598244 (08-23), 86.6 (07-28)  BMI (kg/m2): 25.3 (10-08), 24.9 (09-30), 28.8 (08-23), 31.8 (07-28)    Edema: 3+ generalized edema   Pressure Injuries: None reported at present.     __________________ Pertinent Medications__________________   MEDICATIONS  (STANDING):  ampicillin  IVPB 2 Gram(s) IV Intermittent every 4 hours  atorvastatin 80 milliGRAM(s) Oral at bedtime  ciprofloxacin   IVPB 400 milliGRAM(s) IV Intermittent every 12 hours  dexAMETHasone  Injectable 4 milliGRAM(s) IV Push two times a day  dextrose 5%. 1000 milliLiter(s) (100 mL/Hr) IV Continuous <Continuous>  dextrose 5%. 1000 milliLiter(s) (50 mL/Hr) IV Continuous <Continuous>  dextrose 50% Injectable 25 Gram(s) IV Push once  dextrose 50% Injectable 12.5 Gram(s) IV Push once  dextrose 50% Injectable 25 Gram(s) IV Push once  FLUoxetine 20 milliGRAM(s) Oral daily  glucagon  Injectable 1 milliGRAM(s) IntraMuscular once  insulin glargine Injectable (LANTUS) 12 Unit(s) SubCutaneous at bedtime  insulin lispro (ADMELOG) corrective regimen sliding scale   SubCutaneous Before meals and at bedtime  insulin lispro Injectable (ADMELOG) 4 Unit(s) SubCutaneous three times a day before meals  levETIRAcetam  IVPB 750 milliGRAM(s) IV Intermittent every 12 hours  pantoprazole  Injectable 40 milliGRAM(s) IV Push two times a day    MEDICATIONS  (PRN):  acetaminophen     Tablet .. 650 milliGRAM(s) Oral every 6 hours PRN Temp greater or equal to 38C (100.4F), Mild Pain (1 - 3)  dextrose Oral Gel 15 Gram(s) Oral once PRN Blood Glucose LESS THAN 70 milliGRAM(s)/deciliter  melatonin 3 milliGRAM(s) Oral at bedtime PRN Insomnia  ondansetron Injectable 4 milliGRAM(s) IV Push every 8 hours PRN Nausea and/or Vomiting      __________________ Pertinent Labs__________________   10-17 Na136 mmol/L Glu 232 mg/dL<H> K+ 4.1 mmol/L Cr  0.48 mg/dL<L> BUN 12 mg/dL 10-17 Phos 2.0 mg/dL<L> 10-13 Alb 2.6 g/dL<L>        POCT Blood Glucose.: 197 mg/dL (10-18-22 @ 09:11)  POCT Blood Glucose.: 341 mg/dL (10-17-22 @ 21:33)  POCT Blood Glucose.: 272 mg/dL (10-17-22 @ 17:04)  POCT Blood Glucose.: 259 mg/dL (10-17-22 @ 13:04)  POCT Blood Glucose.: 286 mg/dL (10-17-22 @ 08:30)  POCT Blood Glucose.: 291 mg/dL (10-16-22 @ 21:54)  POCT Blood Glucose.: 238 mg/dL (10-16-22 @ 17:03)  POCT Blood Glucose.: 278 mg/dL (10-16-22 @ 12:25)  POCT Blood Glucose.: 215 mg/dL (10-16-22 @ 08:36)  POCT Blood Glucose.: 289 mg/dL (10-15-22 @ 22:17)  POCT Blood Glucose.: 187 mg/dL (10-15-22 @ 17:11)  POCT Blood Glucose.: 224 mg/dL (10-15-22 @ 12:57)        Estimated Needs:   [x ] no change since previous assessment      Previous Nutrition Diagnosis:   1. Severe chronic malnutrition  2. Increased Nutrient Needs    Nutrition Diagnosis is [x ] ongoing  [ ] resolved [ ] not applicable       Recommendations:  1) Recommend add Consistent Carbohydrate diet to help control glucose.  2) Recommend add Glucerna  3x daily (660kcals, 30g protein).  3) Recommend continue monitor glucose indices and adjust insulin as needed.   4) Recommend monitor and replete electrolytes (Phos).  5) Monitor PO intake, weights, BMs, and skin integrity.   6) RD to remain available for further nutritional interventions as indicated.   5)       Monitoring and Evaluation:      [ x] Tolerance to diet prescription [x ] weights [x ] follow up per protocol  [ ] other:

## 2022-10-18 NOTE — PROGRESS NOTE ADULT - PROBLEM SELECTOR PLAN 2
Noted to be tachycardic and febrile on 10/10 PM, meeting SIRS criteria  - Patient noted on cultures from 10/10 to have e. faecalis in both bottles, pending sensitivities; repeat BCx from 10/14 NGTD  - initially placed on empiric vanc/zosyn, c/w zosyn only for now  - most likely source is urine as previous urine culture from 9/27 with e. faecalis sensitive to ampicillin  - vitals currently remaining stable,  - ID consulted, appreciate recs  - TTE no veg  kidney/bladder sono done revealing lesion L upper pole kidney; family was not aware of this lesion which is being noted as seen on CT abd/pelvis which was uploaded to PACS from an outside hospital; confirmed with radiology regarding it's presence; attempting to get report for other details

## 2022-10-19 NOTE — PROGRESS NOTE ADULT - ASSESSMENT
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

## 2022-10-19 NOTE — PROGRESS NOTE ADULT - PROBLEM SELECTOR PLAN 5
s/p EGD and colonoscopy on 9/30  -started on pantoprazole 40mg BID by GI at [facility], will cont  -monitor stool and Hb  -gastroparesis on EGD: asymptomatic so far  outpt f/u if within GOC

## 2022-10-19 NOTE — CHART NOTE - NSCHARTNOTEFT_GEN_A_CORE
Discussed with rad onc, can continue current dose of decadron for now.       Haydee Jaime PA-C  Department of Medicine  Pager [phone]

## 2022-10-19 NOTE — PROGRESS NOTE ADULT - PROBLEM SELECTOR PLAN 2
Noted to be tachycardic and febrile on 10/10 PM, meeting SIRS criteria  - Patient noted on cultures from 10/10 to have e. faecalis in both bottles, pending sensitivities; repeat BCx from 10/14 NGTD  - initially placed on empiric vanc/zosyn, c/w zosyn only for now  - most likely source is urine as previous urine culture from 9/27 with e. faecalis sensitive to ampicillin  - vitals currently remaining stable,  - ID consulted, appreciate recs cont amp thru 10/28, cipro thru today  - TTE no veg  kidney/bladder sono done revealing lesion L upper pole kidney; family was not aware of this lesion which is being noted as seen on CT abd/pelvis which was uploaded to PACS from an outside hospital; confirmed with radiology regarding it's presence; d/w family who wishes for dx; will d/w ID for timing of IR guided bx given currently being treated for bacteremia

## 2022-10-20 NOTE — SWALLOW BEDSIDE ASSESSMENT ADULT - CONSISTENCIES ADMINISTERED
2 oz of each/moderately thick/mildly thick 3 oz/pureed 2 tsp, 3 cup sips/thin liquid 1/2 cristian cracker in puree/minced & moist

## 2022-10-20 NOTE — PROGRESS NOTE ADULT - PROBLEM SELECTOR PLAN 6
R popliteal DVT on outpt duplex for BLE pitting edema.   Was briefly on Lovenox (1-2 doses) but then discontinued due to thrombocytopenia.   Heme/onc recommended continuing to hold AC  - IVC filter placed with IR on 9/28, f/u with IR outpt.    now with RUE swelling, doppler P  b/l LE sewlling, repeat doppler

## 2022-10-20 NOTE — SWALLOW BEDSIDE ASSESSMENT ADULT - ADDITIONAL RECOMMENDATIONS
1. This department to follow up as schedule permits to reassess for diet tolerance, possible advancement and/or need for objective testing. 2. Medical team advised to reconsult this service if there is a change in medical status and/or observed change in patients tolerance to recommended PO diet.
1. This department to follow up as schedule permits for diet tolerance/diet advancement. 2. Medical team advised to reconsult this department if there is a change in medical status/patient's ability to tolerate recommended PO diet.

## 2022-10-20 NOTE — SWALLOW BEDSIDE ASSESSMENT ADULT - ASR SWALLOW RECOMMEND DIAG
Objective testing is NOT indicated as patient is presenting with overt s/sx of impaired airway protection for Thin Liquids only.
Objective testing NOT warranted given patient exhibits overt signs of penetration/aspiration with thin liquids and CXR is clear.

## 2022-10-20 NOTE — ADVANCED PRACTICE NURSE CONSULT - RECOMMEDATIONS
Topical recommendations:     Sacrum to B/L buttocks and right lower buttock in skin fold- Cleanse with skin cleanser, pat dry. Apply a thin layer of Critic Aid Clear Skin Protectant Clear Moisture Barrier Ointment liberally enough to cover yet visualize the skin. Apply to affected area twice a day and PRN with incontinent episodes.     Continue incontinence management per protocol, continue use of single breathable incontinence pad.    Continue low air loss bed therapy, continue heel elevation with Complete CAIR Boots, Continue using repositioning z flow pillow- positioning it from shoulder down the spine, to allow buttocks to offload, continue to turn & reposition as per protocol, continue measures to decrease friction/shear/pressure.     Plan of care discussed with patients family, all questions answered to their satisfaction and with primary RN Monalisa Riley.    Please contact Wound/Ostomy Care Service Line if we can be of further assistance (ext [phone]).

## 2022-10-20 NOTE — SWALLOW BEDSIDE ASSESSMENT ADULT - COMMENTS
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 Lungs are clear. No effusion or pneumothorax."    Patient seen this afternoon for an initial assessment of the swallow function, at which time patient was alert. Patient's spouse and niece present during assessment. Patient's niece provided Polish translation per patient and spouse preference. Patient is able to gesture via head nodding yes/no. Patient with NG tube in place during time of assessment. Spouse reporting patient wears top and bottom dentures at baseline. patient allowed top dentures to be placed in mouth, however, declined bottom dentures due to discomfort.
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 See consult for details.     Patient received upright in bed, awake and alert with daughter and wife present at bedside who served as a reliable informant and provided [place] Translation. Patient with ability to follow multi-step directives and answer yes/no questions. Patient noted with one-two word responses throughout evaluation.

## 2022-10-20 NOTE — PROGRESS NOTE ADULT - ASSESSMENT
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

## 2022-10-20 NOTE — CHART NOTE - NSCHARTNOTEFT_GEN_A_CORE
Pt with acute Right brachial DVT    Haydee Jaime PA-C  Department of Medicine  Pager [phone] Pt found to have new acute Right brachial DVT.    Pt also has hx LE DVT for which he was briefly on Lovenox (1-2 doses) but then discontinued due to thrombocytopenia. Heme/onc recommended continuing to hold AC  Will continue to hold off AC. Discussed case with Dr. Webb who is in agreement.     Haydee Jaime PA-C  Department of Medicine  Pager [phone] Pt found to have new acute Right brachial DVT.    Pt also has hx LE DVT for which he was briefly on Lovenox (1-2 doses) but then discontinued due to thrombocytopenia. Heme/onc previously recommended   to hold AC  Will continue to hold off AC. Discussed case with Dr. Webb who is in agreement.     Haydee Jaime PA-C  Department of Medicine  Pager [phone]

## 2022-10-20 NOTE — PROGRESS NOTE ADULT - PROBLEM SELECTOR PLAN 2
Noted to be tachycardic and febrile on 10/10 PM, meeting SIRS criteria  - Patient noted on cultures from 10/10 to have e. faecalis in both bottles, pending sensitivities; repeat BCx from 10/14 NGTD  - initially placed on empiric vanc/zosyn, c/w zosyn only for now  - most likely source is urine as previous urine culture from 9/27 with e. faecalis sensitive to ampicillin  - vitals currently remaining stable,  - ID consulted, appreciate recs cont amp thru 10/28, cipro thru today  - TTE no veg  kidney/bladder sono done revealing lesion L upper pole kidney; family was not aware of this lesion which is being noted as seen on CT abd/pelvis which was uploaded to PACS from an outside hospital; confirmed with radiology regarding it's presence; d/w family who wishes for dx; d/w family as above regarding bx after abx with poss that IR may not be willing to perform the bx

## 2022-10-20 NOTE — ADVANCED PRACTICE NURSE CONSULT - REASON FOR CONSULT
Patient seen on skin care rounds after wound care referral received for reassessment of skin impairment and recommendations of topical management. 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 Chart reviewed: WBC 9.20, H/H 12.7/37.9, Platelets 76, INR 1.15, D Dimer Assay 518, Serum albumin 2.6, A1C 9.3, BMI 25.3, Tacho 10.  Patient's wife at bedside and daughter over the phone. As per patient's daughter, patient's skin to B/L buttocks looks worse. Primary nurse explained that family want Triad paste to be applied by thick layer and to be completely removed from skin during incontinence episodes. Proper application and removal of Triad paste from skin discussed with family, explaining all details. Both verbalized understanding. Due to noncompliance with treatment Triad was switched to Critic Aid. Proper application and removal of Critic Aid discussed with family and both verbalized understanding and compliance. Wife explained that patient is tired today since he had multiple tests done throughout the day.  Patient seen on skin care rounds after wound care referral received for reassessment of skin impairment and recommendations of topical management. 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 Chart reviewed: WBC 9.20, H/H 12.7/37.9, Platelets 76, INR 1.15, D Dimer Assay 518, Serum albumin 2.6, A1C 9.3, BMI 25.3, Tacho 10.  Patient's wife at bedside and daughter over the phone. As per patient's daughter, patient's skin to B/L buttocks looks worse. Primary nurse explained that family want Triad paste to be applied by thick layer and to be completely removed from skin during incontinence episodes. Proper application and removal of Triad paste from skin discussed with family, explaining all details. Both family members verbalized understanding. Triad was switched to Critic Aid due to daughter's concerns. Proper application and removal of Critic Aid discussed with family and both verbalized understanding and compliance. Wife explained that patient is tired today since he had multiple tests done throughout the day.

## 2022-10-20 NOTE — SWALLOW BEDSIDE ASSESSMENT ADULT - SWALLOW EVAL: DIAGNOSIS
1. Functional oral stage for puree, mildly thick liquids and thin liquids marked by adequate oral acceptance, collection and transfer. 2. Severe oral dysphagia for regular solids marked by inability to break down solid likely given patient declined to place bottom dentures, with patient volitionally expectorating cookie into Spouses hand. 3. Functional pharyngeal phase for puree and mildly thick liquids marked by a present pharyngeal swallow trigger with hyolaryngeal elevation noted upon digital palpation without evidence of airway penetration/aspiration. 5. Moderate-severe pharyngeal dysphagia for thin liquids marked by a suspected delayed pharyngeal swallow trigger with hyolaryngeal elevation upon palpation with delayed coughing suggestive airway penetration/aspiration.
Patient presents with oropharyngeal dysphagia given Thin Liquids, Mildly Thick Liquids, Moderately Thick Liquids, Puree and Minced & Moist marked by adequate bolus containment, slowed bolus manipulation, slowed mastication with adequate ability to break down minced & moist solids and slowed anterior-posterior transport. Adequate oral clearance. Pharyngeal stage marked by observed initiation of the pharyngeal swallow trigger judged via laryngeal palpation. Patient with immediate cough response following Thin Liquids, possibly indicative of impaired airway protection. No overt s/sx of impaired airway protection observed for Mildly Thick Liquids, Moderately Thick Liquids, Puree and Minced & Moist Solids. Of note, further trials not provided as Patient Transport arrived during evaluation to take patient for testing.

## 2022-10-20 NOTE — PROGRESS NOTE ADULT - SUBJECTIVE AND OBJECTIVE BOX
[facility] Division of Hospital Medicine  Shellie Webb MD  Pager [phone]    Patient is a 82y old  Male who presents with a chief complaint of weakness; radiation therapy       SUBJECTIVE / OVERNIGHT EVENTS: pt seen this AM; RUE swelling; family concerned d/w family can check doppler but likely unable to offer any treatment given thrombocytopenia, GIB, s/p filter; daughter await of limited options; also d/w daughter ID not recommending bx until abx completed; also offered possibility that IR may refuse the procedure as more risk than benefit given pt not a candidate for treatment at this time; daughter understands      MEDICATIONS  (STANDING):  ampicillin  IVPB 2 Gram(s) IV Intermittent every 4 hours  atorvastatin 80 milliGRAM(s) Oral at bedtime  dexAMETHasone  Injectable 4 milliGRAM(s) IV Push two times a day  dextrose 5%. 1000 milliLiter(s) (100 mL/Hr) IV Continuous <Continuous>  dextrose 5%. 1000 milliLiter(s) (50 mL/Hr) IV Continuous <Continuous>  dextrose 50% Injectable 25 Gram(s) IV Push once  dextrose 50% Injectable 12.5 Gram(s) IV Push once  dextrose 50% Injectable 25 Gram(s) IV Push once  FLUoxetine 20 milliGRAM(s) Oral daily  glucagon  Injectable 1 milliGRAM(s) IntraMuscular once  insulin glargine Injectable (LANTUS) 12 Unit(s) SubCutaneous at bedtime  insulin lispro (ADMELOG) corrective regimen sliding scale   SubCutaneous Before meals and at bedtime  insulin lispro Injectable (ADMELOG) 4 Unit(s) SubCutaneous three times a day before meals  levETIRAcetam  IVPB 750 milliGRAM(s) IV Intermittent every 12 hours  pantoprazole  Injectable 40 milliGRAM(s) IV Push two times a day  potassium phosphate / sodium phosphate Powder (PHOS-NaK) 1 Packet(s) Oral once  trimethoprim  160 mG/sulfamethoxazole 800 mG 1 Tablet(s) Oral <User Schedule>    MEDICATIONS  (PRN):  acetaminophen     Tablet .. 650 milliGRAM(s) Oral every 6 hours PRN Temp greater or equal to 38C (100.4F), Mild Pain (1 - 3)  dextrose Oral Gel 15 Gram(s) Oral once PRN Blood Glucose LESS THAN 70 milliGRAM(s)/deciliter  melatonin 3 milliGRAM(s) Oral at bedtime PRN Insomnia  ondansetron Injectable 4 milliGRAM(s) IV Push every 8 hours PRN Nausea and/or Vomiting      CAPILLARY BLOOD GLUCOSE  POCT Blood Glucose.: 231 mg/dL (20 Oct 2022 12:13)  POCT Blood Glucose.: 189 mg/dL (20 Oct 2022 08:19)  POCT Blood Glucose.: 286 mg/dL (19 Oct 2022 22:13)  POCT Blood Glucose.: 245 mg/dL (19 Oct 2022 17:04)          PHYSICAL EXAM:  Vital Signs Last 24 Hrs  T(F): 98.2 (20 Oct 2022 05:00), Max: 98.2 (19 Oct 2022 22:20)  HR: 90 (20 Oct 2022 05:00) (89 - 100)  BP: 120/85 (20 Oct 2022 05:00) (120/85 - 126/76)  RR: 18 (20 Oct 2022 05:00) (18 - 18)  SpO2: 99% (20 Oct 2022 05:00) (97% - 99%)          CONSTITUTIONAL: NAD, appears comfortable  EYES: PERRLA; conjunctiva and sclera clear  ENMT: Moist oral mucosa; normal dentition  RESPIRATORY: Normal respiratory effort;   CARDIOVASCULAR: Regular rate and rhythm; No lower extremity edema;   ABDOMEN: Nontender to palpation, normoactive bowel sounds  MUSCULOSKELETAL:  no clubbing or cyanosis of digits; no joint swelling or tenderness to palpation  PSYCH: calm, coop; affect appropriate  NEUROLOGY: CN 2-12 are intact and symmetric; no gross sensory deficits       LABS:                        12.7   9.20  )-----------( 76       ( 20 Oct 2022 10:11 )             37.9     10-20    136  |  102  |  13  ----------------------------<  177<H>  4.1   |  25  |  0.53    Ca    7.8<L>      20 Oct 2022 06:56  Phos  1.9     10-20  Mg     1.80     10-20

## 2022-10-20 NOTE — ADVANCED PRACTICE NURSE CONSULT - ASSESSMENT
General: Patient is more awake now and speaks Polish, daughter over the phone translated for him and his wife. Patient is bedbound, turned with 2 x assist, incontinent of urine and stool. Condom catheter is in place. Incontinence care provided revealing pasty stool. Skin warm, dry with increased moisture in intertriginous folds, umbilical hernia present. B/L knees with dry scabbed abrasions s/p fall at home as per daughter who stated that she was able to hold on to the patient while he was falling but his knees still rubbed against the rug.    Sacrum to B/L buttocks and right lower buttock in skin fold- Severe IAD incontinent associated dermatitis. Scattered areas of denuded epidermis, some exposing pink-red moist dermis, and some exposing dark deep dermis, not over the bony prominences. Areas of hyper and hypopigmentation noted around denuded areas. All areas are tender to touch.

## 2022-10-20 NOTE — PROGRESS NOTE ADULT - PROBLEM SELECTOR PLAN 5
s/p EGD and colonoscopy on 9/30  -started on pantoprazole 40mg BID by GI at [facility], will cont  -monitor stool and Hb  -gastroparesis on EGD: asymptomatic so far  outpt f/u if within GOC

## 2022-10-21 NOTE — PROGRESS NOTE ADULT - PROBLEM SELECTOR PLAN 3
kidney/bladder sono done revealing lesion L upper pole kidney; family was not aware of this lesion which was seen on CT abd/pelvis which was uploaded to PACS from an outside hospital ([facility]); confirmed with radiology regarding its presence.  Discussed with daughter and wife, if mass is RCC treatment would be surgical removal and he is not a candidate for that at this time.  Discussed with urology, they recommend outpatient follow up with Dr Godwin or Amy and no further workup now.

## 2022-10-21 NOTE — CONSULT NOTE ADULT - SUBJECTIVE AND OBJECTIVE BOX
HPI:  This patient is an 83yo gentleman with PMH of glioblastoma (diagnosed 7/18/22 s/p R stereo biopsy, TIMOTHY x 2, R craniotomy for tumor debulking 7/28/22) on temozolomide (last dose 4 days prior to admission), hld, steroid-induced DM,     PAST MEDICAL & SURGICAL HISTORY:  Glioblastoma multiforme  Steroid-induced diabetes mellitus  S/P craniotomy  - R craniotomy for debulking with Dr. Mcneil (7/28/22)    FAMILY HISTORY:  FH: type 2 diabetes    FH: hyperlipidemia      Social History:  occasional alcohol use. No tobacco or drug use. (08 Oct 2022 17:12)        REVIEW OF SYSTEMS  CONSTITUTIONAL: No fever, no chills, no fatigue  EYES: No eye pain, no vision changes  ENMT:  No difficulty hearing, no throat pain  RESPIRATORY: No cough,  No shortness of breath  CARDIOVASCULAR: No chest pain, no palpitations  GASTROINTESTINAL: No abdominal pain, no nausea, no vomiting, no diarrhea, no constipation,  GENITOURINARY: No dysuria, no hematuria  NEUROLOGICAL: No numbness , no loss of strength  SKIN: No itching, no rashes,  HEME/LYMPH: No easy bruising, bleeding      >>> <<<>>> <<<  >>> <<<  Allergies  Allergies    heparin containing compounds (Other)    Intolerances        Medications  MEDICATIONS  (STANDING):  ampicillin  IVPB 2 Gram(s) IV Intermittent every 4 hours  atorvastatin 80 milliGRAM(s) Oral at bedtime  dexAMETHasone  Injectable 4 milliGRAM(s) IV Push two times a day  dextrose 5%. 1000 milliLiter(s) (100 mL/Hr) IV Continuous <Continuous>  dextrose 5%. 1000 milliLiter(s) (50 mL/Hr) IV Continuous <Continuous>  dextrose 50% Injectable 25 Gram(s) IV Push once  dextrose 50% Injectable 12.5 Gram(s) IV Push once  dextrose 50% Injectable 25 Gram(s) IV Push once  FLUoxetine 20 milliGRAM(s) Oral daily  glucagon  Injectable 1 milliGRAM(s) IntraMuscular once  insulin glargine Injectable (LANTUS) 12 Unit(s) SubCutaneous at bedtime  insulin lispro (ADMELOG) corrective regimen sliding scale   SubCutaneous Before meals and at bedtime  insulin lispro Injectable (ADMELOG) 4 Unit(s) SubCutaneous three times a day before meals  levETIRAcetam  IVPB 750 milliGRAM(s) IV Intermittent every 12 hours  pantoprazole  Injectable 40 milliGRAM(s) IV Push two times a day  trimethoprim  160 mG/sulfamethoxazole 800 mG 1 Tablet(s) Oral <User Schedule>    MEDICATIONS  (PRN):  acetaminophen     Tablet .. 650 milliGRAM(s) Oral every 6 hours PRN Temp greater or equal to 38C (100.4F), Mild Pain (1 - 3)  dextrose Oral Gel 15 Gram(s) Oral once PRN Blood Glucose LESS THAN 70 milliGRAM(s)/deciliter  melatonin 3 milliGRAM(s) Oral at bedtime PRN Insomnia  ondansetron Injectable 4 milliGRAM(s) IV Push every 8 hours PRN Nausea and/or Vomiting      PHYSICAL EXAM:  GENERAL: NAD, well-groomed  HEAD:  Atraumatic, Normocephalic  EYES: EOMI, PERRLA, conjunctiva and sclera clear  ENMT: No oropharyngeal exudates, Moist mucous membranes  NECK: Supple, no cervical lymphadenopathy  NERVOUS SYSTEM:  alert and conversant, moving all extremities spontaneously   CHEST/LUNG: Clear to auscultation bilaterally; no rhonchi  HEART: Regular rate and rhythm; No murmurs  ABDOMEN: Soft, Nontender, Nondistended  EXTREMITIES:  2+ radial Pulses, No cyanosis or edema  SKIN: warm, dry    LABS:                        12.7   9.20  )-----------( 76       ( 20 Oct 2022 10:11 )             37.9     10-20    136  |  102  |  13  ----------------------------<  177<H>  4.1   |  25  |  0.53    Ca    7.8<L>      20 Oct 2022 06:56  Phos  1.9     10-20  Mg     1.80     10-20            RADIOLOGY & ADDITIONAL STUDIES:  PATHOLOGY:

## 2022-10-21 NOTE — PROGRESS NOTE ADULT - PROBLEM SELECTOR PLAN 7
R popliteal DVT on outpt duplex for BLE pitting edema.   Was briefly on Lovenox (1-2 doses) but then discontinued due to thrombocytopenia and HIT Ab pos; repeat testing 9/28 was negative  Heme/onc consulted 9/28 at [facility], recommended continuing to hold AC given GIB  - IVC filter placed with IR on 9/28, f/u with IR outpt.    now with RUE swelling, 10/20 doppler show right brachial DVT  repeat LE dopplers 10/20, R popliteal DVT remains    Continues to have mild thrombocytopenia, will reconsult heme given new UE DVT and hypercoagulable state

## 2022-10-21 NOTE — CHART NOTE - NSCHARTNOTEFT_GEN_A_CORE
HPI:  PAST MEDICAL & SURGICAL HISTORY:  Glioblastoma multiforme      Steroid-induced diabetes mellitus      S/P craniotomy  R craniotomy for debulking with Dr. Mcneil (7/28/22)        FAMILY HISTORY:  FH: type 2 diabetes    FH: hyperlipidemia      Social History:  occasional alcohol use. No tobacco or drug use. (08 Oct 2022 17:12)        REVIEW OF SYSTEMS  CONSTITUTIONAL: No fever, no chills, no fatigue  EYES: No eye pain, no vision changes  ENMT:  No difficulty hearing, no throat pain  RESPIRATORY: No cough,  No shortness of breath  CARDIOVASCULAR: No chest pain, no palpitations  GASTROINTESTINAL: No abdominal pain, no nausea, no vomiting, no diarrhea, no constipation,  GENITOURINARY: No dysuria, no hematuria  NEUROLOGICAL: No numbness , no loss of strength  SKIN: No itching, no rashes,  HEME/LYMPH: No easy bruising, bleeding      >>> <<<>>> <<<  >>> <<<  Allergies  Allergies    heparin containing compounds (Other)    Intolerances        Medications  MEDICATIONS  (STANDING):  ampicillin  IVPB 2 Gram(s) IV Intermittent every 4 hours  atorvastatin 80 milliGRAM(s) Oral at bedtime  dexAMETHasone  Injectable 4 milliGRAM(s) IV Push two times a day  dextrose 5%. 1000 milliLiter(s) (100 mL/Hr) IV Continuous <Continuous>  dextrose 5%. 1000 milliLiter(s) (50 mL/Hr) IV Continuous <Continuous>  dextrose 50% Injectable 25 Gram(s) IV Push once  dextrose 50% Injectable 12.5 Gram(s) IV Push once  dextrose 50% Injectable 25 Gram(s) IV Push once  FLUoxetine 20 milliGRAM(s) Oral daily  glucagon  Injectable 1 milliGRAM(s) IntraMuscular once  insulin glargine Injectable (LANTUS) 12 Unit(s) SubCutaneous at bedtime  insulin lispro (ADMELOG) corrective regimen sliding scale   SubCutaneous Before meals and at bedtime  insulin lispro Injectable (ADMELOG) 4 Unit(s) SubCutaneous three times a day before meals  levETIRAcetam  IVPB 750 milliGRAM(s) IV Intermittent every 12 hours  pantoprazole  Injectable 40 milliGRAM(s) IV Push two times a day  trimethoprim  160 mG/sulfamethoxazole 800 mG 1 Tablet(s) Oral <User Schedule>    MEDICATIONS  (PRN):  acetaminophen     Tablet .. 650 milliGRAM(s) Oral every 6 hours PRN Temp greater or equal to 38C (100.4F), Mild Pain (1 - 3)  dextrose Oral Gel 15 Gram(s) Oral once PRN Blood Glucose LESS THAN 70 milliGRAM(s)/deciliter  melatonin 3 milliGRAM(s) Oral at bedtime PRN Insomnia  ondansetron Injectable 4 milliGRAM(s) IV Push every 8 hours PRN Nausea and/or Vomiting      PHYSICAL EXAM:  GENERAL: NAD, well-groomed  HEAD:  Atraumatic, Normocephalic  EYES: EOMI, PERRLA, conjunctiva and sclera clear  ENMT: No oropharyngeal exudates, Moist mucous membranes  NECK: Supple, no cervical lymphadenopathy  NERVOUS SYSTEM:  alert and conversant, moving all extremities spontaneously   CHEST/LUNG: Clear to auscultation bilaterally; no rhonchi  HEART: Regular rate and rhythm; No murmurs  ABDOMEN: Soft, Nontender, Nondistended  EXTREMITIES:  2+ radial Pulses, No cyanosis or edema  SKIN: warm, dry    LABS:                        12.7   9.20  )-----------( 76       ( 20 Oct 2022 10:11 )             37.9     10-20    136  |  102  |  13  ----------------------------<  177<H>  4.1   |  25  |  0.53    Ca    7.8<L>      20 Oct 2022 06:56  Phos  1.9     10-20  Mg     1.80     10-20            RADIOLOGY & ADDITIONAL STUDIES:  PATHOLOGY: Hematology team consulted due to new thrombosis.     HPI:  This patient is an 83yo gentleman with PMH of glioblastoma (diagnosed 7/18/22, s/p R stereo biopsy and TIMOTHY x 2, R crani for tumor debulking (7/28/22), on temozolomide, hld, steroid-induced diabetes, R popliteal DVT, who came to [facility] as transfer from [facility] for radiation therapy.  The patient was found to have RLE DVT and was started on lovenox, but stopped when he developed thrombocytopenia in setting of temozolomide use.   Patient had dyspnea and orthopnea and dark stool, and found to be anemic with Hgb down from 16.5 to 13.7. The patient did not erquire transfusions.  GI was consulted and he underwent EGD/colonoscopy.   EGD on 9/30/22 found esophageal plaques, suspicious for candidiasis. Large amount of food in stomach concerning for gastroparesis. Erythematous mucosa in pylorus and stomach, biopsied.  Normal ampulla, duodenal bulb, first portion and second portion of duodenum.   Colonoscopy on 9/30/22 found poor prep. No blood or bleeding seen.     Hematology team was consulted. Patient remained off anticoagulation. He had IVC filter placed on 9/28/22.   The patient was seen by neurosurgery for leg weakness, and continued on keppra and dexamethasone.   He tested positive for COVID19 on 10/3/22. He was transferred to [facility] for radiation thx.    He developed E. faecalis bacteremia and was treated with antibiotics.       Vascular duplex lower extremity on 9/21/22- R popliteal vein DVT, new as compared with 8/1/22. above the knee. Left- normal compressibility of common femoral, femoral and popliteal veins  Vascular Duplex lower extremity on 9/27/22- There is stable thrombus in the right popliteal vein extending into the tibial peroneal trunk, one of the paired posterior tibial veins and both   peroneal veins. LEFT calf vein thrombosis is detected in the gastrocnemius vein.    Patient was found to have new acute right brachial DVT on 10/20/22.     PAST MEDICAL & SURGICAL HISTORY:  Glioblastoma multiforme  Steroid-induced diabetes mellitus  S/P craniotomy R craniotomy for debulking with Dr. Mcneil (7/28/22)    FAMILY HISTORY:  FH: type 2 diabetes  FH: hyperlipidemia    Social History:  occasional alcohol use. No tobacco or drug use. (08 Oct 2022 17:12)    REVIEW OF SYSTEMS    >>> <<<>>> <<<  >>> <<<  Allergies  Allergies    heparin containing compounds (Other)    Intolerances    Medications  MEDICATIONS  (STANDING):  ampicillin  IVPB 2 Gram(s) IV Intermittent every 4 hours  atorvastatin 80 milliGRAM(s) Oral at bedtime  dexAMETHasone  Injectable 4 milliGRAM(s) IV Push two times a day  dextrose 5%. 1000 milliLiter(s) (100 mL/Hr) IV Continuous <Continuous>  dextrose 5%. 1000 milliLiter(s) (50 mL/Hr) IV Continuous <Continuous>  dextrose 50% Injectable 25 Gram(s) IV Push once  dextrose 50% Injectable 12.5 Gram(s) IV Push once  dextrose 50% Injectable 25 Gram(s) IV Push once  FLUoxetine 20 milliGRAM(s) Oral daily  glucagon  Injectable 1 milliGRAM(s) IntraMuscular once  insulin glargine Injectable (LANTUS) 12 Unit(s) SubCutaneous at bedtime  insulin lispro (ADMELOG) corrective regimen sliding scale   SubCutaneous Before meals and at bedtime  insulin lispro Injectable (ADMELOG) 4 Unit(s) SubCutaneous three times a day before meals  levETIRAcetam  IVPB 750 milliGRAM(s) IV Intermittent every 12 hours  pantoprazole  Injectable 40 milliGRAM(s) IV Push two times a day  trimethoprim  160 mG/sulfamethoxazole 800 mG 1 Tablet(s) Oral <User Schedule>    MEDICATIONS  (PRN):  acetaminophen     Tablet .. 650 milliGRAM(s) Oral every 6 hours PRN Temp greater or equal to 38C (100.4F), Mild Pain (1 - 3)  dextrose Oral Gel 15 Gram(s) Oral once PRN Blood Glucose LESS THAN 70 milliGRAM(s)/deciliter  melatonin 3 milliGRAM(s) Oral at bedtime PRN Insomnia  ondansetron Injectable 4 milliGRAM(s) IV Push every 8 hours PRN Nausea and/or Vomiting      PHYSICAL EXAM: deferred because of COVID19 infection    LABS:                        12.7   9.20  )-----------( 76       ( 20 Oct 2022 10:11 )             37.9     10-20    136  |  102  |  13  ----------------------------<  177<H>  4.1   |  25  |  0.53    Ca    7.8<L>      20 Oct 2022 06:56  Phos  1.9     10-20  Mg     1.80     10-20      Assessment:   83yo gentleman with PMH of glioblastoma (diagnosed 7/18/22, s/p R stereo biopsy and TIMOTHY x 2, R crani for tumor debulking (7/28/22), on temozolomide, hld, steroid-induced diabetes, R popliteal DVT, s/p IVC filter who has developed new R brachial DVT.    # Thrombosis:   Patient had R popliteal DVT identified on US from 9/21/22 and was on lovenox but was hospitalized for drop in Hgb, thus lovenox was discontinued and IVC filter was placed. Patient had EGD and colonoscopy on 9/30/22 however no acute bleeding was noted.   He was noted to have new R brachial DVT.  GBM is associated with high thrombotic risk. Decreased mobility and COVID19 infection also increase risk of thrombosis.   At this time, hold off on initiating anticoagulation for now and would recommend repeat US in 1 week for interval assessment. We will notify Dr. Borja (neuro oncologist).     #Thrombocytopenia:  Thrombocytopenia can be related in part to recent bacteremia and COVID infections.  Please trend platelet count.      Note not finalized until signed by attending.   Please do not hesitate to page with questions.     Candace Kumar MD PGY4   [phone]  Hematology-Oncology Fellow  WEEKENDS- Please call  to page on-call fellow Hematology team consulted due to new thrombosis.     HPI:  This patient is an 83yo gentleman with PMH of glioblastoma (diagnosed 7/18/22, s/p R stereo biopsy and TIMOTHY x 2, R crani for tumor debulking (7/28/22), on temozolomide, hld, steroid-induced diabetes, R popliteal DVT, who came to [facility] as transfer from [facility] for radiation therapy.  The patient was found to have RLE DVT and was started on lovenox, but stopped when he developed thrombocytopenia in setting of temozolomide use.   Patient had dyspnea and orthopnea and dark stool, and found to be anemic with Hgb down from 16.5 to 13.7. The patient did not erquire transfusions.  GI was consulted and he underwent EGD/colonoscopy.   EGD on 9/30/22 found esophageal plaques, suspicious for candidiasis. Large amount of food in stomach concerning for gastroparesis. Erythematous mucosa in pylorus and stomach, biopsied.  Normal ampulla, duodenal bulb, first portion and second portion of duodenum.   Colonoscopy on 9/30/22 found poor prep. No blood or bleeding seen.     Hematology team was consulted. Patient remained off anticoagulation. He had IVC filter placed on 9/28/22.   The patient was seen by neurosurgery for leg weakness, and continued on keppra and dexamethasone.   He tested positive for COVID19 on 10/3/22. He was transferred to [facility] for radiation thx.    He developed E. faecalis bacteremia and was treated with antibiotics.       Vascular duplex lower extremity on 9/21/22- R popliteal vein DVT, new as compared with 8/1/22. above the knee. Left- normal compressibility of common femoral, femoral and popliteal veins  Vascular Duplex lower extremity on 9/27/22- There is stable thrombus in the right popliteal vein extending into the tibial peroneal trunk, one of the paired posterior tibial veins and both   peroneal veins. LEFT calf vein thrombosis is detected in the gastrocnemius vein.    Patient was found to have new acute right brachial DVT on 10/20/22.     PAST MEDICAL & SURGICAL HISTORY:  Glioblastoma multiforme  Steroid-induced diabetes mellitus  S/P craniotomy R craniotomy for debulking with Dr. Mcneil (7/28/22)    FAMILY HISTORY:  FH: type 2 diabetes  FH: hyperlipidemia    Social History:  occasional alcohol use. No tobacco or drug use. (08 Oct 2022 17:12)    REVIEW OF SYSTEMS    >>> <<<>>> <<<  >>> <<<  Allergies  Allergies    heparin containing compounds (Other)    Intolerances    Medications  MEDICATIONS  (STANDING):  ampicillin  IVPB 2 Gram(s) IV Intermittent every 4 hours  atorvastatin 80 milliGRAM(s) Oral at bedtime  dexAMETHasone  Injectable 4 milliGRAM(s) IV Push two times a day  dextrose 5%. 1000 milliLiter(s) (100 mL/Hr) IV Continuous <Continuous>  dextrose 5%. 1000 milliLiter(s) (50 mL/Hr) IV Continuous <Continuous>  dextrose 50% Injectable 25 Gram(s) IV Push once  dextrose 50% Injectable 12.5 Gram(s) IV Push once  dextrose 50% Injectable 25 Gram(s) IV Push once  FLUoxetine 20 milliGRAM(s) Oral daily  glucagon  Injectable 1 milliGRAM(s) IntraMuscular once  insulin glargine Injectable (LANTUS) 12 Unit(s) SubCutaneous at bedtime  insulin lispro (ADMELOG) corrective regimen sliding scale   SubCutaneous Before meals and at bedtime  insulin lispro Injectable (ADMELOG) 4 Unit(s) SubCutaneous three times a day before meals  levETIRAcetam  IVPB 750 milliGRAM(s) IV Intermittent every 12 hours  pantoprazole  Injectable 40 milliGRAM(s) IV Push two times a day  trimethoprim  160 mG/sulfamethoxazole 800 mG 1 Tablet(s) Oral <User Schedule>    MEDICATIONS  (PRN):  acetaminophen     Tablet .. 650 milliGRAM(s) Oral every 6 hours PRN Temp greater or equal to 38C (100.4F), Mild Pain (1 - 3)  dextrose Oral Gel 15 Gram(s) Oral once PRN Blood Glucose LESS THAN 70 milliGRAM(s)/deciliter  melatonin 3 milliGRAM(s) Oral at bedtime PRN Insomnia  ondansetron Injectable 4 milliGRAM(s) IV Push every 8 hours PRN Nausea and/or Vomiting      PHYSICAL EXAM: deferred because of COVID19 infection    LABS:                        12.7   9.20  )-----------( 76       ( 20 Oct 2022 10:11 )             37.9     10-20    136  |  102  |  13  ----------------------------<  177<H>  4.1   |  25  |  0.53    Ca    7.8<L>      20 Oct 2022 06:56  Phos  1.9     10-20  Mg     1.80     10-20      Assessment:   83yo gentleman with PMH of glioblastoma (diagnosed 7/18/22, s/p R stereo biopsy and TIMOTHY x 2, R crani for tumor debulking (7/28/22), on temozolomide, hld, steroid-induced diabetes, R popliteal DVT, s/p IVC filter who has developed new R brachial DVT.    # Thrombosis:   Patient had R popliteal DVT identified on US from 9/21/22 and was on lovenox but was hospitalized for drop in Hgb, thus lovenox was discontinued and IVC filter was placed. Patient had EGD and colonoscopy on 9/30/22 however no acute bleeding was noted.   He was noted to have new R brachial DVT.  GBM is associated with high thrombotic risk. Decreased mobility and COVID19 infection also increase risk of thrombosis.   At this time, hold off on initiating anticoagulation for now and would recommend repeat US in 1 week for interval assessment. We will notify Dr. Borja (neuro oncologist).     #Thrombocytopenia:  Thrombocytopenia can be related in part to recent bacteremia and COVID infections.  Please trend platelet count.      Note not finalized until signed by attending.   Please do not hesitate to page with questions.     Candace Kumar MD PGY4   [phone]  Hematology-Oncology Fellow  WEEKENDS- Please call  to page on-call fellow    Case discussed with Dr. Kumar. Prior bilateral DVT, AC deferred given risk of bleeding. Now with new RUE DVT found in the setting of swelling. Can consider AC with lovenox (no history of HIT- CARRIE negative x 2). Will discuss full reasoning behind holding AC previously with outpatient oncologist prior to starting given clinical scenario. Hematology team consulted due to new thrombosis.     HPI:  This patient is an 83yo gentleman with PMH of glioblastoma (diagnosed 7/18/22, s/p R stereo biopsy and TIMOTHY x 2, R crani for tumor debulking (7/28/22), on temozolomide, hld, steroid-induced diabetes, R popliteal DVT, who came to [facility] as transfer from [facility] for radiation therapy.  The patient was found to have RLE DVT and was started on lovenox, but stopped when he developed thrombocytopenia in setting of temozolomide use.   Patient had dyspnea and orthopnea and dark stool, and found to be anemic with Hgb down from 16.5 to 13.7. The patient did not erquire transfusions.  GI was consulted and he underwent EGD/colonoscopy.   EGD on 9/30/22 found esophageal plaques, suspicious for candidiasis. Large amount of food in stomach concerning for gastroparesis. Erythematous mucosa in pylorus and stomach, biopsied.  Normal ampulla, duodenal bulb, first portion and second portion of duodenum.   Colonoscopy on 9/30/22 found poor prep. No blood or bleeding seen.     Hematology team was consulted. Patient remained off anticoagulation. He had IVC filter placed on 9/28/22.   The patient was seen by neurosurgery for leg weakness, and continued on keppra and dexamethasone.   He tested positive for COVID19 on 10/3/22. He was transferred to [facility] for radiation thx.    He developed E. faecalis bacteremia and was treated with antibiotics.       Vascular duplex lower extremity on 9/21/22- R popliteal vein DVT, new as compared with 8/1/22. above the knee. Left- normal compressibility of common femoral, femoral and popliteal veins  Vascular Duplex lower extremity on 9/27/22- There is stable thrombus in the right popliteal vein extending into the tibial peroneal trunk, one of the paired posterior tibial veins and both   peroneal veins. LEFT calf vein thrombosis is detected in the gastrocnemius vein.    Patient was found to have new acute right brachial DVT on 10/20/22.     PAST MEDICAL & SURGICAL HISTORY:  Glioblastoma multiforme  Steroid-induced diabetes mellitus  S/P craniotomy R craniotomy for debulking with Dr. Mcneil (7/28/22)    FAMILY HISTORY:  FH: type 2 diabetes  FH: hyperlipidemia    Social History:  occasional alcohol use. No tobacco or drug use. (08 Oct 2022 17:12)    REVIEW OF SYSTEMS    >>> <<<>>> <<<  >>> <<<  Allergies  Allergies    heparin containing compounds (Other)    Intolerances    Medications  MEDICATIONS  (STANDING):  ampicillin  IVPB 2 Gram(s) IV Intermittent every 4 hours  atorvastatin 80 milliGRAM(s) Oral at bedtime  dexAMETHasone  Injectable 4 milliGRAM(s) IV Push two times a day  dextrose 5%. 1000 milliLiter(s) (100 mL/Hr) IV Continuous <Continuous>  dextrose 5%. 1000 milliLiter(s) (50 mL/Hr) IV Continuous <Continuous>  dextrose 50% Injectable 25 Gram(s) IV Push once  dextrose 50% Injectable 12.5 Gram(s) IV Push once  dextrose 50% Injectable 25 Gram(s) IV Push once  FLUoxetine 20 milliGRAM(s) Oral daily  glucagon  Injectable 1 milliGRAM(s) IntraMuscular once  insulin glargine Injectable (LANTUS) 12 Unit(s) SubCutaneous at bedtime  insulin lispro (ADMELOG) corrective regimen sliding scale   SubCutaneous Before meals and at bedtime  insulin lispro Injectable (ADMELOG) 4 Unit(s) SubCutaneous three times a day before meals  levETIRAcetam  IVPB 750 milliGRAM(s) IV Intermittent every 12 hours  pantoprazole  Injectable 40 milliGRAM(s) IV Push two times a day  trimethoprim  160 mG/sulfamethoxazole 800 mG 1 Tablet(s) Oral <User Schedule>    MEDICATIONS  (PRN):  acetaminophen     Tablet .. 650 milliGRAM(s) Oral every 6 hours PRN Temp greater or equal to 38C (100.4F), Mild Pain (1 - 3)  dextrose Oral Gel 15 Gram(s) Oral once PRN Blood Glucose LESS THAN 70 milliGRAM(s)/deciliter  melatonin 3 milliGRAM(s) Oral at bedtime PRN Insomnia  ondansetron Injectable 4 milliGRAM(s) IV Push every 8 hours PRN Nausea and/or Vomiting      PHYSICAL EXAM: deferred because of COVID19 infection    LABS:                        12.7   9.20  )-----------( 76       ( 20 Oct 2022 10:11 )             37.9     10-20    136  |  102  |  13  ----------------------------<  177<H>  4.1   |  25  |  0.53    Ca    7.8<L>      20 Oct 2022 06:56  Phos  1.9     10-20  Mg     1.80     10-20      Assessment:   83yo gentleman with PMH of glioblastoma (diagnosed 7/18/22, s/p R stereo biopsy and TIMOTHY x 2, R crani for tumor debulking (7/28/22), on temozolomide, hld, steroid-induced diabetes, R popliteal DVT, s/p IVC filter who has developed new R brachial DVT.    # Thrombosis:   Patient had R popliteal DVT identified on US from 9/21/22 and was on lovenox but was hospitalized for drop in Hgb, thus lovenox was discontinued and IVC filter was placed. Patient had EGD and colonoscopy on 9/30/22 however no acute bleeding was noted.   He was noted to have new R brachial DVT.  GBM is associated with high thrombotic risk. Decreased mobility and COVID19 infection also increase risk of thrombosis.   At this time, hold off on initiating anticoagulation for now and would recommend repeat US in 1 week for interval assessment. We will notify Dr. Borja (neuro oncologist).     #Thrombocytopenia:  Thrombocytopenia can be related in part to recent bacteremia and COVID infections.  Please trend platelet count.      Note not finalized until signed by attending.   Please do not hesitate to page with questions.     Candace Kumar MD PGY4   [phone]  Hematology-Oncology Fellow  WEEKENDS- Please call  to page on-call fellow    Case discussed with Dr. Kumar. Prior bilateral DVT, AC deferred given risk of bleeding. Now with new RUE DVT found in the setting of swelling. Would treat with lovnoex (no history of HIT- CARRIE negative x 2). Will need close monitoring for bleeding. Hematology team consulted due to new thrombosis.     HPI:  This patient is an 83yo gentleman with PMH of glioblastoma (diagnosed 7/18/22, s/p R stereo biopsy and TIMOTHY x 2, R crani for tumor debulking (7/28/22), on temozolomide, hld, steroid-induced diabetes, R popliteal DVT, who came to [facility] as transfer from [facility] for radiation therapy.  The patient was found to have RLE DVT and was started on lovenox, but stopped when he developed thrombocytopenia in setting of temozolomide use.   Patient had dyspnea and orthopnea and dark stool, and found to be anemic with Hgb down from 16.5 to 13.7. The patient did not erquire transfusions.  GI was consulted and he underwent EGD/colonoscopy.   EGD on 9/30/22 found esophageal plaques, suspicious for candidiasis. Large amount of food in stomach concerning for gastroparesis. Erythematous mucosa in pylorus and stomach, biopsied.  Normal ampulla, duodenal bulb, first portion and second portion of duodenum.   Colonoscopy on 9/30/22 found poor prep. No blood or bleeding seen.     Hematology team was consulted. Patient remained off anticoagulation. He had IVC filter placed on 9/28/22.   The patient was seen by neurosurgery for leg weakness, and continued on keppra and dexamethasone.   He tested positive for COVID19 on 10/3/22. He was transferred to [facility] for radiation thx.    He developed E. faecalis bacteremia and was treated with antibiotics.       Vascular duplex lower extremity on 9/21/22- R popliteal vein DVT, new as compared with 8/1/22. above the knee. Left- normal compressibility of common femoral, femoral and popliteal veins  Vascular Duplex lower extremity on 9/27/22- There is stable thrombus in the right popliteal vein extending into the tibial peroneal trunk, one of the paired posterior tibial veins and both   peroneal veins. LEFT calf vein thrombosis is detected in the gastrocnemius vein.    Patient was found to have new acute right brachial DVT on 10/20/22.     PAST MEDICAL & SURGICAL HISTORY:  Glioblastoma multiforme  Steroid-induced diabetes mellitus  S/P craniotomy R craniotomy for debulking with Dr. Mcneil (7/28/22)    FAMILY HISTORY:  FH: type 2 diabetes  FH: hyperlipidemia    Social History:  occasional alcohol use. No tobacco or drug use. (08 Oct 2022 17:12)    REVIEW OF SYSTEMS    >>> <<<>>> <<<  >>> <<<  Allergies  Allergies    heparin containing compounds (Other)    Intolerances    Medications  MEDICATIONS  (STANDING):  ampicillin  IVPB 2 Gram(s) IV Intermittent every 4 hours  atorvastatin 80 milliGRAM(s) Oral at bedtime  dexAMETHasone  Injectable 4 milliGRAM(s) IV Push two times a day  dextrose 5%. 1000 milliLiter(s) (100 mL/Hr) IV Continuous <Continuous>  dextrose 5%. 1000 milliLiter(s) (50 mL/Hr) IV Continuous <Continuous>  dextrose 50% Injectable 25 Gram(s) IV Push once  dextrose 50% Injectable 12.5 Gram(s) IV Push once  dextrose 50% Injectable 25 Gram(s) IV Push once  FLUoxetine 20 milliGRAM(s) Oral daily  glucagon  Injectable 1 milliGRAM(s) IntraMuscular once  insulin glargine Injectable (LANTUS) 12 Unit(s) SubCutaneous at bedtime  insulin lispro (ADMELOG) corrective regimen sliding scale   SubCutaneous Before meals and at bedtime  insulin lispro Injectable (ADMELOG) 4 Unit(s) SubCutaneous three times a day before meals  levETIRAcetam  IVPB 750 milliGRAM(s) IV Intermittent every 12 hours  pantoprazole  Injectable 40 milliGRAM(s) IV Push two times a day  trimethoprim  160 mG/sulfamethoxazole 800 mG 1 Tablet(s) Oral <User Schedule>    MEDICATIONS  (PRN):  acetaminophen     Tablet .. 650 milliGRAM(s) Oral every 6 hours PRN Temp greater or equal to 38C (100.4F), Mild Pain (1 - 3)  dextrose Oral Gel 15 Gram(s) Oral once PRN Blood Glucose LESS THAN 70 milliGRAM(s)/deciliter  melatonin 3 milliGRAM(s) Oral at bedtime PRN Insomnia  ondansetron Injectable 4 milliGRAM(s) IV Push every 8 hours PRN Nausea and/or Vomiting      PHYSICAL EXAM: deferred because of COVID19 infection    LABS:                        12.7   9.20  )-----------( 76       ( 20 Oct 2022 10:11 )             37.9     10-20    136  |  102  |  13  ----------------------------<  177<H>  4.1   |  25  |  0.53    Ca    7.8<L>      20 Oct 2022 06:56  Phos  1.9     10-20  Mg     1.80     10-20      Assessment:   83yo gentleman with PMH of glioblastoma (diagnosed 7/18/22, s/p R stereo biopsy and TIMOTHY x 2, R crani for tumor debulking (7/28/22), on temozolomide, hld, steroid-induced diabetes, R popliteal DVT, s/p IVC filter who has developed new R brachial DVT.    # Thrombosis:   Patient had R popliteal DVT identified on US from 9/21/22 and was on lovenox but was hospitalized for drop in Hgb, thus lovenox was discontinued and IVC filter was placed. Patient had EGD and colonoscopy on 9/30/22 however no acute bleeding was noted.   Patient had negative serotonin release assay - does NOT have evidence of history of HIT.  He was noted to have new R brachial DVT.  GBM is associated with high thrombotic risk. Decreased mobility and COVID19 infection also increase risk of thrombosis.   At this time, hold off on initiating anticoagulation for now and would recommend repeat US in 1 week for interval assessment. We will notify Dr. Borja (neuro oncologist).     #Thrombocytopenia:  Thrombocytopenia can be related in part to recent bacteremia and COVID infections.  Please trend platelet count.      Note not finalized until signed by attending.   Please do not hesitate to page with questions.     Candace Kumar MD PGY4   [phone]  Hematology-Oncology Fellow  WEEKENDS- Please call  to page on-call fellow    Case discussed with Dr. Kumar. Prior bilateral DVT, AC deferred given risk of bleeding. Now with new RUE DVT found in the setting of swelling. Would treat with lovnoex (no history of HIT- CARRIE negative x 2). Will need close monitoring for bleeding.

## 2022-10-21 NOTE — PROGRESS NOTE ADULT - PROBLEM SELECTOR PLAN 4
a1c 9.3% (9/27/22), likely 2/2 steroid-induced DM  - holding home oral agents while inpatient (januvia 100mg qd, metformin ER 500mg BID)  - basal bolus insulin, adjust as needed

## 2022-10-21 NOTE — PROGRESS NOTE ADULT - ASSESSMENT
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

## 2022-10-21 NOTE — PROGRESS NOTE ADULT - PROBLEM SELECTOR PLAN 2
Noted to be tachycardic and febrile on 10/10 PM, meeting SIRS criteria  - Patient noted on cultures from 10/10 to have e. faecalis in both bottles, amp sens; repeat BCx from 10/14 NGTD  - most likely source is urine as previous urine culture from 9/27 with e. faecalis sensitive to ampicillin  - vitals currently remaining stable, sepsis has resolved  - ID consulted, appreciate recs cont amp thru 10/28, cipro completed 10/20  - TTE no veg

## 2022-10-21 NOTE — PROGRESS NOTE ADULT - PROBLEM SELECTOR PLAN 6
s/p EGD and colonoscopy on 9/30  -started on pantoprazole 40mg BID by GI at [facility], will cont  -monitor stool and Hb  -gastroparesis on EGD: asymptomatic so far  outpt f/u if within GOC

## 2022-10-21 NOTE — PROGRESS NOTE ADULT - SUBJECTIVE AND OBJECTIVE BOX
[facility] Division of Hospital Medicine  Darrel Huizar MD  Pager [phone]    Patient is a 82y old  Male who presents with a chief complaint of weakness; radiation therapy (20 Oct 2022 12:35)      SUBJECTIVE / OVERNIGHT EVENTS: No events      MEDICATIONS  (STANDING):  ampicillin  IVPB 2 Gram(s) IV Intermittent every 4 hours  atorvastatin 80 milliGRAM(s) Oral at bedtime  dexAMETHasone  Injectable 4 milliGRAM(s) IV Push two times a day  dextrose 5%. 1000 milliLiter(s) (100 mL/Hr) IV Continuous <Continuous>  dextrose 5%. 1000 milliLiter(s) (50 mL/Hr) IV Continuous <Continuous>  dextrose 50% Injectable 25 Gram(s) IV Push once  dextrose 50% Injectable 12.5 Gram(s) IV Push once  dextrose 50% Injectable 25 Gram(s) IV Push once  FLUoxetine 20 milliGRAM(s) Oral daily  glucagon  Injectable 1 milliGRAM(s) IntraMuscular once  insulin glargine Injectable (LANTUS) 12 Unit(s) SubCutaneous at bedtime  insulin lispro (ADMELOG) corrective regimen sliding scale   SubCutaneous Before meals and at bedtime  insulin lispro Injectable (ADMELOG) 4 Unit(s) SubCutaneous three times a day before meals  levETIRAcetam  IVPB 750 milliGRAM(s) IV Intermittent every 12 hours  pantoprazole  Injectable 40 milliGRAM(s) IV Push two times a day  trimethoprim  160 mG/sulfamethoxazole 800 mG 1 Tablet(s) Oral <User Schedule>    MEDICATIONS  (PRN):  acetaminophen     Tablet .. 650 milliGRAM(s) Oral every 6 hours PRN Temp greater or equal to 38C (100.4F), Mild Pain (1 - 3)  dextrose Oral Gel 15 Gram(s) Oral once PRN Blood Glucose LESS THAN 70 milliGRAM(s)/deciliter  melatonin 3 milliGRAM(s) Oral at bedtime PRN Insomnia  ondansetron Injectable 4 milliGRAM(s) IV Push every 8 hours PRN Nausea and/or Vomiting      CAPILLARY BLOOD GLUCOSE      POCT Blood Glucose.: 240 mg/dL (21 Oct 2022 12:06)  POCT Blood Glucose.: 168 mg/dL (21 Oct 2022 08:22)  POCT Blood Glucose.: 236 mg/dL (20 Oct 2022 22:10)  POCT Blood Glucose.: 210 mg/dL (20 Oct 2022 17:19)    I&O's Summary    20 Oct 2022 07:01  -  21 Oct 2022 07:00  --------------------------------------------------------  IN: 0 mL / OUT: 900 mL / NET: -900 mL        PHYSICAL EXAM:  Vital Signs Last 24 Hrs  T(C): 36.6 (21 Oct 2022 06:30), Max: 36.9 (20 Oct 2022 15:07)  T(F): 97.9 (21 Oct 2022 06:30), Max: 98.4 (20 Oct 2022 15:07)  HR: 69 (21 Oct 2022 06:30) (69 - 99)  BP: 131/77 (21 Oct 2022 06:30) (122/69 - 131/77)  BP(mean): --  RR: 18 (21 Oct 2022 06:30) (18 - 18)  SpO2: 96% (21 Oct 2022 06:30) (96% - 99%)    Parameters below as of 21 Oct 2022 06:30  Patient On (Oxygen Delivery Method): room air      CONSTITUTIONAL: NAD  EYES: EOMI, conjunctiva and sclera clear  ENMT: Moist oral mucosa  NECK: Supple  RESPIRATORY: Breathing unlabored, CTAB  CARDIOVASCULAR: S1S2 no MRG  ABDOMEN: Nontender to palpation, normoactive bowel sounds, no rebound/guarding  MUSCULOSKELETAL: no clubbing or cyanosis of digits  NEUROLOGY: No focal deficits   SKIN: No rashes or lesions    LABS:                        12.7   9.20  )-----------( 76       ( 20 Oct 2022 10:11 )             37.9     10-20    136  |  102  |  13  ----------------------------<  177<H>  4.1   |  25  |  0.53    Ca    7.8<L>      20 Oct 2022 06:56  Phos  1.9     10-20  Mg     1.80     10-20          CODE: DNR

## 2022-10-21 NOTE — CHART NOTE - NSCHARTNOTEFT_GEN_A_CORE
Pt found to have new R brachial DVT.  Hematology recalled regarding initiation of AC as pt thrombocytopenic.   Called Neurosurgery for recommendations  regarding initiation of AC, recommended CT head to reevaluate for bleeding. Will recall neurosurgery with results of CT head.      Case discussed with Dr. Henrietta Jaime, PA-C  Department of Medicine  Pager [phone] Pt found to have new R brachial DVT.  Hematology recalled regarding initiation of AC as pt thrombocytopenic. Hematology agreeable to starting lovenox 1mg per kg BID.   Called Neurosurgery for recommendations  regarding initiation of AC, recommended CT head to reevaluate for bleeding. Will recall neurosurgery with results of CT head.     Case discussed with Dr. Bhupendra Jaime PA-C  Department of Medicine  Pager [phone]

## 2022-10-22 NOTE — CONSULT NOTE ADULT - REASON FOR ADMISSION
weakness; radiation therapy

## 2022-10-22 NOTE — CONSULT NOTE ADULT - CONSULT REQUESTED DATE/TIME
10-Oct-2022 09:16
10-Oct-2022 08:00
14-Oct-2022 16:41
21-Oct-2022 13:41
22-Oct-2022 23:35
10-Oct-2022 12:45

## 2022-10-22 NOTE — PROGRESS NOTE ADULT - PROBLEM SELECTOR PLAN 9
Diet: Pureed and mildly thin liquids  DVT ppx: SCD's in LLE, s/p IVC filter in RLE, per heme/onc continue to hold AC   Dispo: likely [facility] when completes XRT

## 2022-10-22 NOTE — CHART NOTE - NSCHARTNOTEFT_GEN_A_CORE
Patient pending CT Head per NeuroSx recommendation prior to starting AC for DVT.  Called CT to expedite study, discussed w/ CT Tech, will expedite patient and plan for study today at 1200.      Nichol Alatorre NP-BC  Department of Medicine  In House Pager #[phone]

## 2022-10-22 NOTE — PROGRESS NOTE ADULT - ASSESSMENT
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    Statement Selected

## 2022-10-22 NOTE — CONSULT NOTE ADULT - SUBJECTIVE AND OBJECTIVE BOX
p ([phone])     HPI:  82M DNR/DNI pt Dr. Mcneil hx IDHwt GBM s/p combo TIMOTHY and debulking via tubular retractor 7/22 s/p chemo, continuing RT as inpatient at [facility] bc COVID+ (still has ~9 fx remaining thro 10/27), course c/b urosepsis 2/2 enterococcus 10/10 now on abx, consulting nsgy for clearance for therapeutic lovenox due to new RUE DVT 10/20 (already s/p IVCF for RLE DVT 9/28). However, has been thrombocytopenic last plt 68. CTH: stable R frontal surgical cavity w/ persistent mass effect/edema, no heme. Exam: pending    --Anticoagulation:    =====================  PAST MEDICAL HISTORY   Glioblastoma multiforme    Steroid-induced diabetes mellitus      PAST SURGICAL HISTORY   S/P craniotomy      heparin containing compounds (Other)      MEDICATIONS:  Antibiotics:  ampicillin  IVPB 2 Gram(s) IV Intermittent every 4 hours  trimethoprim  160 mG/sulfamethoxazole 800 mG 1 Tablet(s) Oral <User Schedule>    Neuro:  acetaminophen     Tablet .. 650 milliGRAM(s) Oral every 6 hours PRN  FLUoxetine 20 milliGRAM(s) Oral daily  levETIRAcetam  IVPB 750 milliGRAM(s) IV Intermittent every 12 hours  melatonin 3 milliGRAM(s) Oral at bedtime PRN  ondansetron Injectable 4 milliGRAM(s) IV Push every 8 hours PRN    Other:  atorvastatin 80 milliGRAM(s) Oral at bedtime  dexAMETHasone  Injectable 4 milliGRAM(s) IV Push two times a day  dextrose 5%. 1000 milliLiter(s) IV Continuous <Continuous>  dextrose 5%. 1000 milliLiter(s) IV Continuous <Continuous>  dextrose 50% Injectable 25 Gram(s) IV Push once  dextrose 50% Injectable 12.5 Gram(s) IV Push once  dextrose 50% Injectable 25 Gram(s) IV Push once  dextrose Oral Gel 15 Gram(s) Oral once PRN  glucagon  Injectable 1 milliGRAM(s) IntraMuscular once  insulin glargine Injectable (LANTUS) 12 Unit(s) SubCutaneous at bedtime  insulin lispro (ADMELOG) corrective regimen sliding scale   SubCutaneous Before meals and at bedtime  insulin lispro Injectable (ADMELOG) 4 Unit(s) SubCutaneous three times a day before meals  pantoprazole  Injectable 40 milliGRAM(s) IV Push two times a day      SOCIAL HISTORY:   Occupation:   Marital Status:     FAMILY HISTORY:  FH: type 2 diabetes    FH: hyperlipidemia        ROS: Negative except per HPI    LABS:                          12.5   7.08  )-----------( 68       ( 22 Oct 2022 06:20 )             37.9     10-22    136  |  103  |  12  ----------------------------<  135<H>  3.8   |  23  |  0.45<L>    Ca    7.9<L>      22 Oct 2022 06:20  Phos  1.9     10-22  Mg     1.80     10-22         p ([phone])     HPI:  82M DNR/DNI pt Dr. Mcneil hx IDHwt GBM s/p combo TIMOTHY and debulking via tubular retractor 7/22 s/p chemo, continuing RT as inpatient at [facility] bc COVID+ (still has ~9 fx remaining thro 10/27), course c/b urosepsis 2/2 enterococcus 10/10 now on abx, consulting nsgy for clearance for therapeutic lovenox due to new RUE DVT 10/20 (already s/p IVCF for RLE DVT 9/28). However, has been thrombocytopenic last plt 68. CTH: stable R frontal surgical cavity w/ persistent mass effect/edema, no heme. Exam: AOx1, PERRL, no facial, effort jon exam but FCx4, COLES ag at least 3/5 seems Rt>Lt.    --Anticoagulation:    =====================  PAST MEDICAL HISTORY   Glioblastoma multiforme    Steroid-induced diabetes mellitus      PAST SURGICAL HISTORY   S/P craniotomy      heparin containing compounds (Other)      MEDICATIONS:  Antibiotics:  ampicillin  IVPB 2 Gram(s) IV Intermittent every 4 hours  trimethoprim  160 mG/sulfamethoxazole 800 mG 1 Tablet(s) Oral <User Schedule>    Neuro:  acetaminophen     Tablet .. 650 milliGRAM(s) Oral every 6 hours PRN  FLUoxetine 20 milliGRAM(s) Oral daily  levETIRAcetam  IVPB 750 milliGRAM(s) IV Intermittent every 12 hours  melatonin 3 milliGRAM(s) Oral at bedtime PRN  ondansetron Injectable 4 milliGRAM(s) IV Push every 8 hours PRN    Other:  atorvastatin 80 milliGRAM(s) Oral at bedtime  dexAMETHasone  Injectable 4 milliGRAM(s) IV Push two times a day  dextrose 5%. 1000 milliLiter(s) IV Continuous <Continuous>  dextrose 5%. 1000 milliLiter(s) IV Continuous <Continuous>  dextrose 50% Injectable 25 Gram(s) IV Push once  dextrose 50% Injectable 12.5 Gram(s) IV Push once  dextrose 50% Injectable 25 Gram(s) IV Push once  dextrose Oral Gel 15 Gram(s) Oral once PRN  glucagon  Injectable 1 milliGRAM(s) IntraMuscular once  insulin glargine Injectable (LANTUS) 12 Unit(s) SubCutaneous at bedtime  insulin lispro (ADMELOG) corrective regimen sliding scale   SubCutaneous Before meals and at bedtime  insulin lispro Injectable (ADMELOG) 4 Unit(s) SubCutaneous three times a day before meals  pantoprazole  Injectable 40 milliGRAM(s) IV Push two times a day      SOCIAL HISTORY:   Occupation:   Marital Status:     FAMILY HISTORY:  FH: type 2 diabetes    FH: hyperlipidemia        ROS: Negative except per HPI    LABS:                          12.5   7.08  )-----------( 68       ( 22 Oct 2022 06:20 )             37.9     10-22    136  |  103  |  12  ----------------------------<  135<H>  3.8   |  23  |  0.45<L>    Ca    7.9<L>      22 Oct 2022 06:20  Phos  1.9     10-22  Mg     1.80     10-22

## 2022-10-22 NOTE — PROGRESS NOTE ADULT - SUBJECTIVE AND OBJECTIVE BOX
[facility] Division of Hospital Medicine  Darrel Huizar MD  Pager [phone]    Patient is a 82y old  Male who presents with a chief complaint of weakness; radiation therapy (21 Oct 2022 12:39)      SUBJECTIVE / OVERNIGHT EVENTS: Clinically stable      MEDICATIONS  (STANDING):  ampicillin  IVPB 2 Gram(s) IV Intermittent every 4 hours  atorvastatin 80 milliGRAM(s) Oral at bedtime  dexAMETHasone  Injectable 4 milliGRAM(s) IV Push two times a day  dextrose 5%. 1000 milliLiter(s) (50 mL/Hr) IV Continuous <Continuous>  dextrose 5%. 1000 milliLiter(s) (100 mL/Hr) IV Continuous <Continuous>  dextrose 50% Injectable 25 Gram(s) IV Push once  dextrose 50% Injectable 12.5 Gram(s) IV Push once  dextrose 50% Injectable 25 Gram(s) IV Push once  FLUoxetine 20 milliGRAM(s) Oral daily  glucagon  Injectable 1 milliGRAM(s) IntraMuscular once  insulin glargine Injectable (LANTUS) 12 Unit(s) SubCutaneous at bedtime  insulin lispro (ADMELOG) corrective regimen sliding scale   SubCutaneous Before meals and at bedtime  insulin lispro Injectable (ADMELOG) 4 Unit(s) SubCutaneous three times a day before meals  levETIRAcetam  IVPB 750 milliGRAM(s) IV Intermittent every 12 hours  pantoprazole  Injectable 40 milliGRAM(s) IV Push two times a day  potassium phosphate IVPB 30 milliMole(s) IV Intermittent once  trimethoprim  160 mG/sulfamethoxazole 800 mG 1 Tablet(s) Oral <User Schedule>    MEDICATIONS  (PRN):  acetaminophen     Tablet .. 650 milliGRAM(s) Oral every 6 hours PRN Temp greater or equal to 38C (100.4F), Mild Pain (1 - 3)  dextrose Oral Gel 15 Gram(s) Oral once PRN Blood Glucose LESS THAN 70 milliGRAM(s)/deciliter  melatonin 3 milliGRAM(s) Oral at bedtime PRN Insomnia  ondansetron Injectable 4 milliGRAM(s) IV Push every 8 hours PRN Nausea and/or Vomiting      CAPILLARY BLOOD GLUCOSE      POCT Blood Glucose.: 120 mg/dL (22 Oct 2022 08:21)  POCT Blood Glucose.: 239 mg/dL (21 Oct 2022 22:01)  POCT Blood Glucose.: 218 mg/dL (21 Oct 2022 17:12)    I&O's Summary      PHYSICAL EXAM:  Vital Signs Last 24 Hrs  T(C): 36.7 (22 Oct 2022 04:50), Max: 37.1 (21 Oct 2022 15:17)  T(F): 98.1 (22 Oct 2022 04:50), Max: 98.7 (21 Oct 2022 15:17)  HR: 73 (22 Oct 2022 04:50) (73 - 78)  BP: 130/77 (22 Oct 2022 04:50) (129/75 - 130/77)  BP(mean): --  RR: 18 (22 Oct 2022 04:50) (17 - 18)  SpO2: 99% (22 Oct 2022 04:50) (98% - 99%)    Parameters below as of 22 Oct 2022 04:50  Patient On (Oxygen Delivery Method): room air      CONSTITUTIONAL: NAD  EYES: EOMI, conjunctiva and sclera clear  ENMT: Moist oral mucosa  NECK: Supple  RESPIRATORY: Breathing unlabored, CTAB  CARDIOVASCULAR: S1S2 no MRG  ABDOMEN: Nontender to palpation, normoactive bowel sounds, no rebound/guarding  MUSCULOSKELETAL: no clubbing or cyanosis of digits. RUE and BLE edema  NEUROLOGY: No focal deficits   SKIN: No rashes or lesions    LABS:                        12.5   7.08  )-----------( 68       ( 22 Oct 2022 06:20 )             37.9     10-22    136  |  103  |  12  ----------------------------<  135<H>  3.8   |  23  |  0.45<L>    Ca    7.9<L>      22 Oct 2022 06:20  Phos  1.9     10-22  Mg     1.80     10-22            CODE: DNR

## 2022-10-22 NOTE — PROGRESS NOTE ADULT - PROBLEM SELECTOR PLAN 3
kidney/bladder sono done revealing lesion L upper pole kidney; family was not aware of this lesion which was seen on CT abd/pelvis which was uploaded to PACS from an outside hospital ([place]); confirmed with radiology regarding its presence.  Discussed with daughter and wife, if mass is RCC treatment would be surgical removal and he is not a candidate for that at this time.  Discussed with urology, they recommend outpatient follow up with Dr Godwin or Amy and no further workup now.

## 2022-10-22 NOTE — CONSULT NOTE ADULT - ASSESSMENT
ANA LOVE  82M DNR/DNI pt Dr. Mcneil hx IDHwt GBM s/p combo TIMOTHY and debulking via tubular retractor 7/22 s/p chemo, continuing RT as inpatient at [facility] bc COVID+ (still has ~9 fx remaining thro 10/27), course c/b urosepsis 2/2 enterococcus 10/10 now on abx, consulting nsgy for clearance for therapeutic lovenox due to new RUE DVT 10/20 (already s/p IVCF for RLE DVT 9/28). However, has been thrombocytopenic last plt 68. CTH: stable R frontal surgical cavity w/ persistent mass effect/edema, no heme. Exam:   - Given risk of intracranial hemorrhage due to glioblastoma and thrombocytopenia, risk of starting therapeutic lovenox seems greater than the benefit at this time.   - Please send a blue top plt count, check liver enzymes  - Ok to start prophylactic lovenox if BLUE TOP plt count >80K  - Can consider therapeutic lovenox if BLUE TOP plt count is >100K, however risk of sudden intracranial hemorrhage must be discussed with the patient and family.   - Cont dex 4 bid per oncology, cont treatments per rad onc, cont keppra 750 BID, consider fu w/ neurology for alternative AED since keppra can contribute to TCP  - if any AC started, please repeat CTH prior to DC ANA LOVE  82M DNR/DNI pt Dr. Mcneil hx IDHwt GBM s/p combo TIMOTHY and debulking via tubular retractor 7/22 s/p chemo, continuing RT as inpatient at [facility] bc COVID+ (still has ~9 fx remaining thro 10/27), course c/b urosepsis 2/2 enterococcus 10/10 now on abx, consulting nsgy for clearance for therapeutic lovenox due to new RUE DVT 10/20 (already s/p IVCF for RLE DVT 9/28). However, has been thrombocytopenic last plt 68. CTH: stable R frontal surgical cavity w/ persistent mass effect/edema, no heme. Exam: AOx1, PERRL, no facial, effort jon exam but FCx4, COLES ag at least 3/5 seems Rt>Lt.  - Given risk of intracranial hemorrhage due to glioblastoma and thrombocytopenia, risk of starting therapeutic lovenox seems greater than the benefit at this time.   - Please send a blue top plt count, check liver enzymes  - Ok to start prophylactic lovenox if BLUE TOP plt count >80K  - Can consider therapeutic lovenox if BLUE TOP plt count is >100K, however risk of sudden intracranial hemorrhage must be discussed with the patient and family.   - Cont dex 4 bid per oncology, cont treatments per rad onc, cont keppra 750 BID, consider fu w/ neurology for alternative AED since keppra can contribute to TCP  - if any AC started, please repeat CTH prior to DC ANA LOVE  82M DNR/DNI pt Dr. Mcneil hx IDHwt GBM s/p combo TIMOTHY and debulking via tubular retractor 7/22 s/p chemo, continuing RT as inpatient at [facility] bc COVID+ (still has ~9 fx remaining thro 10/27), course c/b urosepsis 2/2 enterococcus 10/10 now on abx, consulting nsgy for clearance for therapeutic lovenox due to new RUE DVT 10/20 (already s/p IVCF for RLE DVT 9/28). However, has been thrombocytopenic last plt 68. CTH: stable R frontal surgical cavity w/ persistent mass effect/edema, no heme. Exam: AOx1, PERRL, no facial, effort jon exam but FCx4, COLES ag at least 3/5 seems Rt>Lt.  - Given risk of intracranial hemorrhage due to glioblastoma and thrombocytopenia, risk of starting therapeutic lovenox seems greater than the benefit at this time.   - Please send a blue top plt count, check liver enzymes  - Ok to start prophylactic lovenox if BLUE TOP plt count >80K  - Can consider therapeutic lovenox if BLUE TOP plt count is >100K, however risk of sudden intracranial hemorrhage must be discussed with the patient and family.   - Cont dex 4 bid per oncology, cont treatments per rad onc, cont keppra 750 BID, consider fu w/ neurology for alternative AED since keppra can contribute to TCP  - if any AC started, please repeat CTH prior to DC or for any change in exam  - q4h neuro checks

## 2022-10-22 NOTE — PROGRESS NOTE ADULT - PROBLEM SELECTOR PLAN 7
R popliteal DVT on outpt duplex for BLE pitting edema.   Was briefly on Lovenox (1-2 doses) but then discontinued due to thrombocytopenia and HIT Ab pos; repeat testing 9/28 was negative  Heme/onc consulted 9/28 at [facility], recommended continuing to hold AC given GIB  - IVC filter placed with IR on 9/28, f/u with IR outpt.    now with RUE swelling, 10/20 doppler show right brachial DVT  repeat LE dopplers 10/20, R popliteal DVT remains    Continues to have mild thrombocytopenia  Appreciate heme recs  will start lovenox if head CT OK per neurosurgery

## 2022-10-22 NOTE — PROGRESS NOTE ADULT - PROBLEM SELECTOR PLAN 6
s/p EGD and colonoscopy on 9/30  -started on pantoprazole 40mg BID by GI at [facility], will cont  -monitor stool and Hb  -gastroparesis on EGD: asymptomatic so far  outpt f/u if within GOC

## 2022-10-23 NOTE — PROGRESS NOTE ADULT - PROBLEM SELECTOR PLAN 5
kidney/bladder sono done revealing lesion L upper pole kidney; family was not aware of this lesion which was seen on CT abd/pelvis which was uploaded to PACS from an outside hospital ([place]); confirmed with radiology regarding its presence.  Discussed with daughter and wife, if mass is RCC treatment would be surgical removal and he is not a candidate for that at this time.  Discussed with urology, they recommend outpatient follow up with Dr Godwin or Amy and no further workup now.

## 2022-10-23 NOTE — PROGRESS NOTE ADULT - SUBJECTIVE AND OBJECTIVE BOX
[facility] Division of Hospital Medicine  Darrel Huizar MD  Pager [phone]    Patient is a 82y old  Male who presents with a chief complaint of weakness; radiation therapy (22 Oct 2022 23:34)      SUBJECTIVE / OVERNIGHT EVENTS: Clinically stable      MEDICATIONS  (STANDING):  ampicillin  IVPB 2 Gram(s) IV Intermittent every 4 hours  atorvastatin 80 milliGRAM(s) Oral at bedtime  dexAMETHasone  Injectable 4 milliGRAM(s) IV Push two times a day  dextrose 5%. 1000 milliLiter(s) (50 mL/Hr) IV Continuous <Continuous>  dextrose 5%. 1000 milliLiter(s) (100 mL/Hr) IV Continuous <Continuous>  dextrose 50% Injectable 25 Gram(s) IV Push once  dextrose 50% Injectable 12.5 Gram(s) IV Push once  dextrose 50% Injectable 25 Gram(s) IV Push once  FLUoxetine 20 milliGRAM(s) Oral daily  glucagon  Injectable 1 milliGRAM(s) IntraMuscular once  insulin glargine Injectable (LANTUS) 12 Unit(s) SubCutaneous at bedtime  insulin lispro (ADMELOG) corrective regimen sliding scale   SubCutaneous Before meals and at bedtime  insulin lispro Injectable (ADMELOG) 4 Unit(s) SubCutaneous three times a day before meals  levETIRAcetam  IVPB 750 milliGRAM(s) IV Intermittent every 12 hours  pantoprazole  Injectable 40 milliGRAM(s) IV Push two times a day  trimethoprim  160 mG/sulfamethoxazole 800 mG 1 Tablet(s) Oral <User Schedule>    MEDICATIONS  (PRN):  acetaminophen     Tablet .. 650 milliGRAM(s) Oral every 6 hours PRN Temp greater or equal to 38C (100.4F), Mild Pain (1 - 3)  dextrose Oral Gel 15 Gram(s) Oral once PRN Blood Glucose LESS THAN 70 milliGRAM(s)/deciliter  melatonin 3 milliGRAM(s) Oral at bedtime PRN Insomnia  ondansetron Injectable 4 milliGRAM(s) IV Push every 8 hours PRN Nausea and/or Vomiting      CAPILLARY BLOOD GLUCOSE      POCT Blood Glucose.: 200 mg/dL (23 Oct 2022 12:42)  POCT Blood Glucose.: 115 mg/dL (23 Oct 2022 08:45)  POCT Blood Glucose.: 201 mg/dL (22 Oct 2022 21:59)  POCT Blood Glucose.: 171 mg/dL (22 Oct 2022 16:57)    I&O's Summary      PHYSICAL EXAM:  Vital Signs Last 24 Hrs  T(C): 36.5 (23 Oct 2022 05:40), Max: 37.1 (22 Oct 2022 21:33)  T(F): 97.7 (23 Oct 2022 05:40), Max: 98.7 (22 Oct 2022 21:33)  HR: 85 (23 Oct 2022 05:40) (81 - 85)  BP: 129/76 (23 Oct 2022 05:40) (116/76 - 129/76)  BP(mean): --  RR: 18 (23 Oct 2022 05:40) (18 - 18)  SpO2: 96% (23 Oct 2022 05:40) (96% - 96%)    Parameters below as of 23 Oct 2022 05:40  Patient On (Oxygen Delivery Method): room air      CONSTITUTIONAL: NAD  EYES: EOMI, conjunctiva and sclera clear  ENMT: Moist oral mucosa  NECK: Supple  RESPIRATORY: Breathing unlabored, CTAB  CARDIOVASCULAR: S1S2 no MRG  ABDOMEN: Nontender to palpation, normoactive bowel sounds, no rebound/guarding  MUSCULOSKELETAL: no clubbing or cyanosis of digits  NEUROLOGY: No focal deficits   SKIN: No rashes or lesions    LABS:                        11.7   5.66  )-----------( 64       ( 23 Oct 2022 06:40 )             35.1     10-23    136  |  104  |  10  ----------------------------<  105<H>  3.6   |  22  |  0.38<L>    Ca    7.7<L>      23 Oct 2022 06:40  Phos  2.0     10-23  Mg     1.70     10-23    TPro  4.5<L>  /  Alb  2.2<L>  /  TBili  0.5  /  DBili  x   /  AST  35  /  ALT  61<H>  /  AlkPhos  99  10-23      Blue Top platelets: 51      CODE: DNR   TOKEN:'[id number]'

## 2022-10-23 NOTE — PROGRESS NOTE ADULT - PROBLEM SELECTOR PLAN 2
R popliteal DVT on outpt duplex for BLE pitting edema.   Was briefly on Lovenox (1-2 doses) but then discontinued due to thrombocytopenia and HIT Ab pos; repeat testing 9/28 was negative  Heme/onc consulted 9/28 at [facility], recommended continuing to hold AC given GIB  - IVC filter placed with IR on 9/28, f/u with IR outpt.    now with RUE swelling, 10/20 doppler show right brachial DVT  repeat LE dopplers 10/20, R popliteal DVT remains    Discussed with hematology and neurosurgery, appreciate recs.  Risk of ICH with AC + low platelets outweighs benefit of prevention and treatment of VTE with AC.  Blue top platelets too low to consider safe administration of lovenox.  Discussed with family at bedside.

## 2022-10-23 NOTE — PROGRESS NOTE ADULT - ASSESSMENT
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

## 2022-10-23 NOTE — PROGRESS NOTE ADULT - PROBLEM SELECTOR PLAN 8
s/p EGD and colonoscopy on 9/30  -started on pantoprazole 40mg BID by GI at [facility], will cont  -monitor stool and Hb  -gastroparesis on EGD: asymptomatic so far  outpt f/u if within GOC

## 2022-10-23 NOTE — PROGRESS NOTE ADULT - PROBLEM SELECTOR PLAN 3
Unknown etiology.  Will ask neurology to consider another AED since keppra may cause thrombocytopenia

## 2022-10-24 NOTE — PROGRESS NOTE ADULT - ASSESSMENT
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

## 2022-10-24 NOTE — CHART NOTE - NSCHARTNOTEFT_GEN_A_CORE
Repeat blue top platelet count is 51, AC recommendations as previously documented.   Would touch base with neurology re: switching AED as keppra can contribute to thrombocytopenia as previously documented. Consider vimpat.     No further neurosurgical intervention. Continue neuro checks and CTH for any change in exam.

## 2022-10-24 NOTE — PROGRESS NOTE ADULT - PROBLEM SELECTOR PLAN 5
kidney/bladder sono done revealing lesion L upper pole kidney; family was not aware of this lesion which was seen on CT abd/pelvis which was uploaded to PACS from an outside hospital ([place]); confirmed with radiology regarding its presence.    after further discussion again with family, focusing on overall quality of life and no longer interested in pursuing w/u of this renal lesion

## 2022-10-24 NOTE — PROGRESS NOTE ADULT - SUBJECTIVE AND OBJECTIVE BOX
[facility] Division of Hospital Medicine  Shellie Webb MD  Pager [phone]    Patient is a 82y old  Male who presents with a chief complaint of weakness; radiation therapy       SUBJECTIVE / OVERNIGHT EVENTS: pt seen post RT, alert but not very talkative; daughter and wife at bedside; d/w them at length; they do not want to start AC given risk of brain bleed as well as incomplete investigation of GIB, and overall poor prognosis; additionally family no longer interested in investigating renal lesion as they realize he is not a candidate for surgical intervention and again given his overall poor prognosis they are just focusing on his quality of life at this time      MEDICATIONS  (STANDING):  ampicillin  IVPB 2 Gram(s) IV Intermittent every 4 hours  ascorbic acid 500 milliGRAM(s) Oral daily  atorvastatin 80 milliGRAM(s) Oral at bedtime  dexAMETHasone  Injectable 4 milliGRAM(s) IV Push two times a day  dextrose 5%. 1000 milliLiter(s) (100 mL/Hr) IV Continuous <Continuous>  dextrose 5%. 1000 milliLiter(s) (50 mL/Hr) IV Continuous <Continuous>  dextrose 50% Injectable 25 Gram(s) IV Push once  dextrose 50% Injectable 12.5 Gram(s) IV Push once  dextrose 50% Injectable 25 Gram(s) IV Push once  FLUoxetine 20 milliGRAM(s) Oral daily  glucagon  Injectable 1 milliGRAM(s) IntraMuscular once  insulin glargine Injectable (LANTUS) 12 Unit(s) SubCutaneous at bedtime  insulin lispro (ADMELOG) corrective regimen sliding scale   SubCutaneous Before meals and at bedtime  insulin lispro Injectable (ADMELOG) 4 Unit(s) SubCutaneous three times a day before meals  levETIRAcetam  IVPB 750 milliGRAM(s) IV Intermittent every 12 hours  pantoprazole  Injectable 40 milliGRAM(s) IV Push two times a day  trimethoprim  160 mG/sulfamethoxazole 800 mG 1 Tablet(s) Oral <User Schedule>    MEDICATIONS  (PRN):  acetaminophen     Tablet .. 650 milliGRAM(s) Oral every 6 hours PRN Temp greater or equal to 38C (100.4F), Mild Pain (1 - 3)  dextrose Oral Gel 15 Gram(s) Oral once PRN Blood Glucose LESS THAN 70 milliGRAM(s)/deciliter  melatonin 3 milliGRAM(s) Oral at bedtime PRN Insomnia  ondansetron Injectable 4 milliGRAM(s) IV Push every 8 hours PRN Nausea and/or Vomiting      CAPILLARY BLOOD GLUCOSE  POCT Blood Glucose.: 120 mg/dL (24 Oct 2022 08:55)  POCT Blood Glucose.: 261 mg/dL (23 Oct 2022 22:26)  POCT Blood Glucose.: 197 mg/dL (23 Oct 2022 17:25)  POCT Blood Glucose.: 200 mg/dL (23 Oct 2022 12:42)      PHYSICAL EXAM:  Vital Signs Last 24 Hrs  T(F): 98.1 (24 Oct 2022 05:28), Max: 98.1 (24 Oct 2022 05:28)  HR: 77 (24 Oct 2022 05:28) (77 - 86)  BP: 133/70 (24 Oct 2022 05:28) (133/70 - 135/66)  RR: 18 (24 Oct 2022 05:28) (18 - 18)  SpO2: 99% (24 Oct 2022 05:28) (97% - 99%)    Parameters below as of 24 Oct 2022 05:28  Patient On (Oxygen Delivery Method): room air        CONSTITUTIONAL: NAD, appears comfortable  EYES: PERRLA; conjunctiva and sclera clear  ENMT: Moist oral mucosa; normal dentition  RESPIRATORY: Normal respiratory effort; grossly b/l AE  CARDIOVASCULAR: Regular rate and rhythm; ++lower extremity edema;  ABDOMEN: Nontender to palpation, normoactive bowel sounds  MUSCULOSKELETAL:  no clubbing or cyanosis of digits; no joint swelling or tenderness to palpation  PSYCH: A+O to person, place, and time; affect appropriate  NEUROLOGY: CN 2-12 are intact and symmetric; no gross sensory deficits   SKIN: + RUE swelling    LABS:                        11.9   5.20  )-----------( 80       ( 24 Oct 2022 06:02 )             35.9     10-24    137  |  104  |  10  ----------------------------<  126<H>  3.9   |  23  |  0.37<L>    Ca    7.7<L>      24 Oct 2022 06:02  Phos  2.5     10-24  Mg     1.70     10-24    TPro  4.4<L>  /  Alb  2.2<L>  /  TBili  0.5  /  DBili  <0.2  /  AST  30  /  ALT  57<H>  /  AlkPhos  98  10-24

## 2022-10-24 NOTE — PROGRESS NOTE ADULT - PROBLEM SELECTOR PLAN 8
s/p EGD and colonoscopy on 9/30  -started on pantoprazole 40mg BID by GI at [facility], will cont  -monitor stool and Hb  -gastroparesis on EGD: asymptomatic so far  outpt f/u if within GOC

## 2022-10-24 NOTE — PROGRESS NOTE ADULT - PROBLEM SELECTOR PLAN 2
R popliteal DVT on outpt duplex for BLE pitting edema.   Was briefly on Lovenox (1-2 doses) but then discontinued due to thrombocytopenia and HIT Ab pos; repeat testing 9/28 was negative  Heme/onc consulted 9/28 at [facility], recommended continuing to hold AC given GIB  - IVC filter placed with IR on 9/28, f/u with IR outpt.    now with RUE swelling, 10/20 doppler show right brachial DVT  repeat LE dopplers 10/20, R popliteal DVT remains    Discussed with hematology and neurosurgery, appreciate recs.  Risk of ICH with AC + low platelets outweighs benefit of prevention and treatment of VTE with AC. family now focusing on overall quality of life

## 2022-10-25 NOTE — PROGRESS NOTE ADULT - PROBLEM SELECTOR PLAN 3
Unknown etiology.    spoke with family, pt has been on keppra since July  would not like to change AED given recent sz activity

## 2022-10-25 NOTE — PROGRESS NOTE ADULT - PROBLEM SELECTOR PLAN 9
Diet: Pureed and mildly thin liquids  DVT ppx: SCD's in LLE, s/p IVC filter in RLE, per heme/onc continue to hold AC   Dispo: likely [facility] when completes XRT and abx

## 2022-10-25 NOTE — PROGRESS NOTE ADULT - PROBLEM SELECTOR PLAN 5
kidney/bladder sono done revealing lesion L upper pole kidney; family was not aware of this lesion which was seen on CT abd/pelvis which was uploaded to PACS from an outside hospital ([facility]); confirmed with radiology regarding its presence.    after further discussion again with family, focusing on overall quality of life and no longer interested in pursuing w/u of this renal lesion

## 2022-10-25 NOTE — PROGRESS NOTE ADULT - PROBLEM SELECTOR PLAN 2
R popliteal DVT on outpt duplex for BLE pitting edema.   Was briefly on Lovenox (1-2 doses) but then discontinued due to thrombocytopenia and HIT Ab pos; repeat testing 9/28 was negative  Heme/onc consulted 9/28 at [facility], recommended continuing to hold AC given GIB  - IVC filter placed with IR on 9/28, f/u with IR outpt.    now with RUE swelling, 10/20 doppler show right brachial DVT  repeat LE dopplers 10/20, R popliteal DVT remains    Discussed with hematology and neurosurgery, appreciate recs.  Risk of ICH with AC + low platelets outweighs benefit of prevention and treatment of VTE with AC. family now focusing on overall quality of life

## 2022-10-25 NOTE — CHART NOTE - NSCHARTNOTEFT_GEN_A_CORE
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     Nutrition Course: Nutrition interview conducted with Pt and Pt's family (present at bedside), Pt daughter interpreted for RD on telephone (Lao): No recent episodes of nausea, vomiting, diarrhea, Pt c/o of constipation- will recommend bowel regimen. BM noted on 10/24 per RN flowsheets. Denies any chewing/swallowing difficulties. Continues on minced and moist consistency with no issues per Pt- recommended by SLP on 10/20 refer to swallow eval. Food preferences explored and noted. Intake is 50-75% per RN flowsheets and per pt. Feeding skills: total assistance required from staff for eating. Pt consuming Glucerna  3x daily (660kcals, 30g protein) to promote optimal PO intake, consuming 100% of shakes.     Diet Prescription:   Diet, Minced and Moist:   Mildly Thick Liquids (MILDTHICKLIQS)  Supplement Feeding Modality:  Oral  Glucerna Shake Cans or Servings Per Day:  1       Frequency:  Three Times a day (10-20-22 @ 12:17) [Active]      Pertinent Medications: MEDICATIONS  (STANDING):  ampicillin  IVPB 2 Gram(s) IV Intermittent every 4 hours  ascorbic acid 500 milliGRAM(s) Oral daily  atorvastatin 80 milliGRAM(s) Oral at bedtime  dexAMETHasone  Injectable 4 milliGRAM(s) IV Push two times a day  dextrose 5%. 1000 milliLiter(s) (50 mL/Hr) IV Continuous <Continuous>  dextrose 5%. 1000 milliLiter(s) (100 mL/Hr) IV Continuous <Continuous>  dextrose 50% Injectable 25 Gram(s) IV Push once  dextrose 50% Injectable 12.5 Gram(s) IV Push once  dextrose 50% Injectable 25 Gram(s) IV Push once  FLUoxetine 20 milliGRAM(s) Oral daily  glucagon  Injectable 1 milliGRAM(s) IntraMuscular once  insulin glargine Injectable (LANTUS) 12 Unit(s) SubCutaneous at bedtime  insulin lispro (ADMELOG) corrective regimen sliding scale   SubCutaneous Before meals and at bedtime  insulin lispro Injectable (ADMELOG) 4 Unit(s) SubCutaneous three times a day before meals  levETIRAcetam  IVPB 750 milliGRAM(s) IV Intermittent every 12 hours  pantoprazole  Injectable 40 milliGRAM(s) IV Push two times a day  trimethoprim  160 mG/sulfamethoxazole 800 mG 1 Tablet(s) Oral <User Schedule>    MEDICATIONS  (PRN):  acetaminophen     Tablet .. 650 milliGRAM(s) Oral every 6 hours PRN Temp greater or equal to 38C (100.4F), Mild Pain (1 - 3)  dextrose Oral Gel 15 Gram(s) Oral once PRN Blood Glucose LESS THAN 70 milliGRAM(s)/deciliter  melatonin 3 milliGRAM(s) Oral at bedtime PRN Insomnia  ondansetron Injectable 4 milliGRAM(s) IV Push every 8 hours PRN Nausea and/or Vomiting    Pertinent Labs: 10-24 Na137 mmol/L Glu 126 mg/dL<H> K+ 3.9 mmol/L Cr  0.37 mg/dL<L> BUN 10 mg/dL 10-24 Phos 2.5 mg/dL 10-24 Alb 2.2 g/dL<L>    POCT Blood Glucose.: 183 mg/dL (25 Oct 2022 12:03)  POCT Blood Glucose.: 127 mg/dL (25 Oct 2022 08:26)  POCT Blood Glucose.: 164 mg/dL (24 Oct 2022 21:56)  POCT Blood Glucose.: 158 mg/dL (24 Oct 2022 16:50)      Weight: Height (cm): 167.6 (10-08 @ 12:33)  Weight (kg): 91 (10-25), 71 (10-08 @ 12:33)  BMI (kg/m2): 32.3 (10-25)  Weight Assessment: Pt noted with sig wt gain x17d (+21.9%) likely attributed to fluid retention as pt noted with edema Right Arm, b/l LE. Pt daughter does not believe new admission wt (71 kg) to be accurate as Pt normally around 80kg.       Physical Assessment, per flowsheets:  Edema: Rt arm, b/l LE  Skin: intact     Estimated Needs:   [X] No change since previous assessment    Previous Nutrition Diagnosis: [ x] Malnutrition   Nutrition Diagnosis is [ x] ongoing  [ ] resolved [ ] not applicable   New Nutrition Diagnosis: [x ] not applicable     Interventions:   1) Continue on current diet rx, consistency per SLP recs   2) Continue to provide Glucerna  3x daily (660kcals, 30g protein)   3) Consider bowel regimen 2/2 Pt c/o constipation  4) Encourage PO intake and honor food preferences as able.  5) Obtain weekly weights  6) RD to f/u prn     Monitor & Evaluate:  PO intake, tolerance to diet/supplement, nutrition related lab values, weight trends, BMs/GI distress, hydration status, skin integrity.    Eliza Delgado MS, RDN (Pager #[phone]) | Also available on TEAMS

## 2022-10-25 NOTE — PROGRESS NOTE ADULT - ASSESSMENT
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

## 2022-10-25 NOTE — PROGRESS NOTE ADULT - SUBJECTIVE AND OBJECTIVE BOX
[facility] Division of Hospital Medicine  Shellie Webb MD  Pager [phone]    Patient is a 82y old  Male who presents with a chief complaint of weakness; radiation therapy       SUBJECTIVE / OVERNIGHT EVENTS: family at bedside, s/p RT this AM; more alert, answers some questions      MEDICATIONS  (STANDING):  ampicillin  IVPB 2 Gram(s) IV Intermittent every 4 hours  ascorbic acid 500 milliGRAM(s) Oral daily  atorvastatin 80 milliGRAM(s) Oral at bedtime  dexAMETHasone  Injectable 4 milliGRAM(s) IV Push two times a day  dextrose 5%. 1000 milliLiter(s) (50 mL/Hr) IV Continuous <Continuous>  dextrose 5%. 1000 milliLiter(s) (100 mL/Hr) IV Continuous <Continuous>  dextrose 50% Injectable 25 Gram(s) IV Push once  dextrose 50% Injectable 12.5 Gram(s) IV Push once  dextrose 50% Injectable 25 Gram(s) IV Push once  FLUoxetine 20 milliGRAM(s) Oral daily  glucagon  Injectable 1 milliGRAM(s) IntraMuscular once  insulin glargine Injectable (LANTUS) 12 Unit(s) SubCutaneous at bedtime  insulin lispro (ADMELOG) corrective regimen sliding scale   SubCutaneous Before meals and at bedtime  insulin lispro Injectable (ADMELOG) 4 Unit(s) SubCutaneous three times a day before meals  levETIRAcetam  IVPB 750 milliGRAM(s) IV Intermittent every 12 hours  pantoprazole  Injectable 40 milliGRAM(s) IV Push two times a day  trimethoprim  160 mG/sulfamethoxazole 800 mG 1 Tablet(s) Oral <User Schedule>    MEDICATIONS  (PRN):  acetaminophen     Tablet .. 650 milliGRAM(s) Oral every 6 hours PRN Temp greater or equal to 38C (100.4F), Mild Pain (1 - 3)  dextrose Oral Gel 15 Gram(s) Oral once PRN Blood Glucose LESS THAN 70 milliGRAM(s)/deciliter  melatonin 3 milliGRAM(s) Oral at bedtime PRN Insomnia  ondansetron Injectable 4 milliGRAM(s) IV Push every 8 hours PRN Nausea and/or Vomiting      CAPILLARY BLOOD GLUCOSE  POCT Blood Glucose.: 127 mg/dL (25 Oct 2022 08:26)  POCT Blood Glucose.: 164 mg/dL (24 Oct 2022 21:56)  POCT Blood Glucose.: 158 mg/dL (24 Oct 2022 16:50)  POCT Blood Glucose.: 144 mg/dL (24 Oct 2022 12:05)      PHYSICAL EXAM:  Vital Signs Last 24 Hrs  T(F): 97.9 (25 Oct 2022 05:00), Max: 98 (24 Oct 2022 14:00)  HR: 75 (25 Oct 2022 05:00) (75 - 84)  BP: 138/74 (25 Oct 2022 05:00) (120/66 - 138/74)  RR: 18 (25 Oct 2022 05:00) (18 - 18)  SpO2: 95% (25 Oct 2022 05:00) (95% - 98%)    Parameters below as of 25 Oct 2022 05:00  Patient On (Oxygen Delivery Method): room air        CONSTITUTIONAL: NAD, appears comfortable  EYES: PERRLA; conjunctiva and sclera clear  ENMT: Moist oral mucosa; normal dentition  RESPIRATORY: Normal respiratory effort; b/l AE  CARDIOVASCULAR: Regular rate and rhythm;+ lower extremity edema;  ABDOMEN: Nontender to palpation, normoactive bowel sounds  MUSCULOSKELETAL: no clubbing or cyanosis of digits; no joint swelling or tenderness to palpation  PSYCH: calm, coop; affect appropriate  NEUROLOGY: CN 2-12 are intact and symmetric; no gross sensory deficits   SKIN: RUE swelling improving    LABS:                        12.8   6.23  )-----------( 74       ( 25 Oct 2022 06:16 )             39.6     10-24    137  |  104  |  10  ----------------------------<  126<H>  3.9   |  23  |  0.37<L>    Ca    7.7<L>      24 Oct 2022 06:02  Phos  2.5     10-24  Mg     1.70     10-24    TPro  4.4<L>  /  Alb  2.2<L>  /  TBili  0.5  /  DBili  <0.2  /  AST  30  /  ALT  57<H>  /  AlkPhos  98  10-24

## 2022-10-25 NOTE — PROGRESS NOTE ADULT - PROBLEM SELECTOR PLAN 8
s/p EGD and colonoscopy on 9/30  -started on pantoprazole 40mg BID by GI at [facility], will cont  -monitor stool and Hb  -gastroparesis on EGD: asymptomatic so far  outpt f/u if within GOC

## 2022-10-26 NOTE — PROGRESS NOTE ADULT - SUBJECTIVE AND OBJECTIVE BOX
[facility] Division of Hospital Medicine  Shellie Webb MD  Pager [phone]    Patient is a 82y old  Male who presents with a chief complaint of weakness; radiation therapy       SUBJECTIVE / OVERNIGHT EVENTS: more alert today, family at bedside      MEDICATIONS  (STANDING):  ampicillin  IVPB 2 Gram(s) IV Intermittent every 4 hours  ascorbic acid 500 milliGRAM(s) Oral daily  atorvastatin 80 milliGRAM(s) Oral at bedtime  dexAMETHasone  Injectable 4 milliGRAM(s) IV Push two times a day  dextrose 5%. 1000 milliLiter(s) (100 mL/Hr) IV Continuous <Continuous>  dextrose 5%. 1000 milliLiter(s) (50 mL/Hr) IV Continuous <Continuous>  dextrose 50% Injectable 25 Gram(s) IV Push once  dextrose 50% Injectable 12.5 Gram(s) IV Push once  dextrose 50% Injectable 25 Gram(s) IV Push once  FLUoxetine 20 milliGRAM(s) Oral daily  glucagon  Injectable 1 milliGRAM(s) IntraMuscular once  insulin glargine Injectable (LANTUS) 12 Unit(s) SubCutaneous at bedtime  insulin lispro (ADMELOG) corrective regimen sliding scale   SubCutaneous Before meals and at bedtime  insulin lispro Injectable (ADMELOG) 4 Unit(s) SubCutaneous three times a day before meals  levETIRAcetam  IVPB 750 milliGRAM(s) IV Intermittent every 12 hours  pantoprazole  Injectable 40 milliGRAM(s) IV Push two times a day  polyethylene glycol 3350 17 Gram(s) Oral daily  senna 2 Tablet(s) Oral at bedtime  trimethoprim  160 mG/sulfamethoxazole 800 mG 1 Tablet(s) Oral <User Schedule>    MEDICATIONS  (PRN):  acetaminophen     Tablet .. 650 milliGRAM(s) Oral every 6 hours PRN Temp greater or equal to 38C (100.4F), Mild Pain (1 - 3)  dextrose Oral Gel 15 Gram(s) Oral once PRN Blood Glucose LESS THAN 70 milliGRAM(s)/deciliter  melatonin 3 milliGRAM(s) Oral at bedtime PRN Insomnia  ondansetron Injectable 4 milliGRAM(s) IV Push every 8 hours PRN Nausea and/or Vomiting      CAPILLARY BLOOD GLUCOSE  POCT Blood Glucose.: 123 mg/dL (26 Oct 2022 10:20)  POCT Blood Glucose.: 128 mg/dL (26 Oct 2022 08:44)  POCT Blood Glucose.: 200 mg/dL (25 Oct 2022 22:27)  POCT Blood Glucose.: 138 mg/dL (25 Oct 2022 17:16)    PHYSICAL EXAM:  Vital Signs Last 24 Hrs  T(F): 97.7 (26 Oct 2022 05:00), Max: 98.3 (25 Oct 2022 21:34)  HR: 88 (26 Oct 2022 05:00) (86 - 93)  BP: 136/80 (26 Oct 2022 05:00) (129/69 - 139/82)  RR: 18 (26 Oct 2022 05:00) (18 - 18)  SpO2: 98% (26 Oct 2022 05:00) (93% - 98%)    Parameters below as of 26 Oct 2022 05:00  Patient On (Oxygen Delivery Method): room air        CONSTITUTIONAL: NAD, appears comfortable  EYES: PERRLA; conjunctiva and sclera clear  ENMT: Moist oral mucosa; normal dentition  RESPIRATORY: Normal respiratory effort; grossly b/l AE  CARDIOVASCULAR: Regular rate and rhythm; ++ lower extremity edema;   ABDOMEN: Nontender to palpation, normoactive bowel sounds  MUSCULOSKELETAL:  no clubbing or cyanosis of digits; no joint swelling or tenderness to palpation  PSYCH: more alert, answers questions approp; affect appropriate  NEUROLOGY: CN 2-12 are intact and symmetric; no gross sensory deficits   SKIN: RUE edema stable    LABS:                        13.6   6.69  )-----------( 97       ( 26 Oct 2022 10:37 )             42.0     10-26    134<L>  |  99  |  10  ----------------------------<  197<H>  4.4   |  25  |  0.45<L>    Ca    8.2<L>      26 Oct 2022 10:37  Phos  2.5     10-26  Mg     1.80     10-26                  RADIOLOGY & ADDITIONAL TESTS:  Results Reviewed:   Imaging Personally Reviewed:  Electrocardiogram Personally Reviewed:    COORDINATION OF CARE:  Care Discussed with Consultants/Other Providers [Y/N]:  Prior or Outpatient Records Reviewed [Y/N]:

## 2022-10-26 NOTE — PROGRESS NOTE ADULT - PROBLEM SELECTOR PLAN 8
s/p EGD and colonoscopy on 9/30  -started on pantoprazole 40mg BID by GI at [facility], will cont  -monitor stool and Hb  -gastroparesis on EGD: asymptomatic so far  outpt f/u if within GOC

## 2022-10-26 NOTE — PROGRESS NOTE ADULT - PROBLEM SELECTOR PLAN 2
R popliteal DVT on outpt duplex for BLE pitting edema.   Was briefly on Lovenox (1-2 doses) but then discontinued due to thrombocytopenia and HIT Ab pos; repeat testing 9/28 was negative  Heme/onc consulted 9/28 at [facility], recommended continuing to hold AC given GIB  - IVC filter placed with IR on 9/28, f/u with IR outpt.    now with RUE swelling, 10/20 doppler show right brachial DVT  repeat LE dopplers 10/20, R popliteal DVT remains    Discussed with hematology and neurosurgery, appreciate recs.  Risk of ICH with AC + low platelets outweighs benefit of prevention and treatment of VTE with AC. family now focusing on overall quality of life

## 2022-10-26 NOTE — PROGRESS NOTE ADULT - ASSESSMENT
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

## 2022-10-26 NOTE — PROGRESS NOTE ADULT - PROBLEM SELECTOR PLAN 5
kidney/bladder sono done revealing lesion L upper pole kidney; family was not aware of this lesion which was seen on CT abd/pelvis which was uploaded to PACS from an outside hospital ([place]); confirmed with radiology regarding its presence.    after further discussion again with family, focusing on overall quality of life and no longer interested in pursuing w/u of this renal lesion

## 2022-10-27 NOTE — PROGRESS NOTE ADULT - ASSESSMENT
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 with weakness and GIB and now transferred from [facility] for radiation treatment with course c/b seizures.

## 2022-10-27 NOTE — PROGRESS NOTE ADULT - NSPROGADDITIONALINFOA_GEN_ALL_CORE
will start prepping for dc to rehab, change decadron and keppra to PO  will need auth once facility decided
plan for rehab likely early next week
d/w Dr García who feels bx should not be done until abx completed
planning for rehab ealry next week once completed RT and abx
d/w family goal for rehab by the end of the week, in agreement
plan discussed with daughter and wife at bedside

## 2022-10-27 NOTE — DISCHARGE NOTE PROVIDER - HOSPITAL COURSE
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 Course c/b Enteroccocus bacteremia started on antibiotics to complete thru 10/28     Hospital Course:   Glioblastoma multiforme   - MR Head result reviewed. Neuro sx at [facility] believes findings are more likely post treatment changes and recommended 9 additional radiation tx   - c/w Dexamethasone 4mg BID> as er Rad onc continue PO decadron 4mg BID- re-assess in 1-2 weeks  - Neurology following- c/w Keppra 750mg BID  - course c/b RRT on 10/10 AM with seizure activity likely 2/2 GBM   - vEEG without any seizure activity, CTH without acute changes  - c/w aspiration precautions, pt now DNR/DNI  - RT resumed on 10/17, will need 9 fractions Thru 10/27     Sepsis due to Enterococcus  - Noted to be tachycardic and febrile on 10/10 PM, meeting SIRS criteria  - 10/10 BCx- E- faecalis in both bottles, repeat BCx from 10/14 NGTD, BCx 10/16 - NGTD   - ID consulted - Likely urinary focus, c/w Ampicillin 2 gram iv q 4 through 10/28   - TTE - EF 65%, normal left ventricular systolic function.  Mild diastolic dysfunction (Stage I). limitted but no obvious vegetation  - Weekly CBC, BMP fax to [phone]. Follow up with tele visit on 10/26 at 12:45pm     Acute UTI   - growing enterococcus (covered by ampicllin) and Citrobacter  - Finished Cipro on 10/19    Right Brachial DVT   - 10/20 Duplex- Right brachial DVT   - Hematology recalled regarding initiation of AC as pt thrombocytopenic. Hematology agreeable to starting lovenox 1mg per kg BID  - per Neurosurgery for recs regarding initiation of AC, recommended CT head to reevaluate for bleeding  - CT head -unchanged surgical cavity in the RIGHT frontal lobe with moderate surrounding edema which crosses the corpus callosum and demonstrates mass effect on the anterior horn of the RIGHT lateral ventricle  - Given risk of intracranial hemorrhage due to glioblastoma and thrombocytopenia, risk of starting therapeutic lovenox seems greater than the benefit at this time  - Cont dex 4 bid per oncology, cont treatments per rad onc, cont keppra 750 BID  - if any AC started, please repeat CTH prior to DC or for any change in exam    Steroid-induced diabetes mellitus.   - a1c 9.3% (9/27/22), likely 2/2 steroid-induced DM  - holding home oral agents while inpatient (januvia 100mg qd, metformin ER 500mg BID)  - increase lantus 10u qHS and continue ISS qHS, qAC; can add premeal if needed  - adjust insulin regimen as needed.    COVID-19.   - exposed 9/30; tested + on 10/3  - currently asymptomatic, pt on decadron for glioblastoma  - hold off remdesivir for now    GIB (gastrointestinal bleeding).   - s/p EGD and colonoscopy on 9/30  - started on pantoprazole 40mg BID by GI at [facility], will cont  - esophogeal candidiasis -s/p 10 d of fluconazole  - gastroparesis on EGD, asymptomatic so far,outpt f/u if within GOC    DVT, lower extremity  - R popliteal DVT on outpt duplex for BLE pitting edema.   - pt was briefly on Lovenox (1-2 doses) but then discontinued due to thrombocytopenia.   Heme/onc recommended continuing to hold AC  - IVC filter placed with IR on 9/28, f/u with IR outpt.    Thrombocytopenia  - stable, no evidence of active bleeding    Renal lesion   - CT from July 2022 with renal lesion  - US kidney and bladder- Solid left upper pole renal mass measuring 3.0 cm, concerning for malignancy. No hydronephrosis.  - Family wants biopsy, As per Urology can be done as outpatient with Dr. Godwin or Amy    DVT ppx: SCD's in LLE, s/p IVC filter in RLE, per heme/onc continue to hold AC     Dispo: Rehab     On_________, case was discussed with    , patient is medically cleared and optimized for discharge today. All medications were reviewed with attending, and sent to mutually agreed upon pharmacy.   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 Course c/b Enteroccocus bacteremia started on antibiotics to complete thru 10/28     Hospital Course:   Glioblastoma multiforme   - MR Head -findings are more likely post treatment changes and recommended 9 additional radiation tx   - c/w Dexamethasone 4mg BID> as er Rad onc continue PO decadron 4mg BID- re-assess in 1-2 weeks  - Neurology following- c/w Keppra 750mg BID  - course c/b RRT on 10/10 AM with seizure activity likely 2/2 GBM   - vEEG without any seizure activity, CTH without acute changes  - c/w aspiration precautions, pt now DNR/DNI  - RT resumed on 10/17, will need 9 fractions Thru 10/27     Sepsis due to Enterococcus  - Noted to be tachycardic and febrile on 10/10 PM, meeting SIRS criteria  - 10/10 BCx- E- faecalis in both bottles, repeat BCx from 10/14 NGTD, BCx 10/16 - NGTD   - ID consulted - Likely urinary focus, c/w Ampicillin 2 gram iv q 4 through 10/28   - TTE - EF 65%, normal left ventricular systolic function.  Mild diastolic dysfunction (Stage I). limitted but no obvious vegetation  - Weekly CBC, BMP fax to [phone]. Follow up with tele visit on 10/26 at 12:45pm     Acute UTI   - growing enterococcus (covered by ampicllin) and Citrobacter  - Finished Cipro on 10/19    Right Brachial DVT   - 10/20 Duplex- Right brachial DVT   - Hematology recalled regarding initiation of AC as pt thrombocytopenic. Hematology agreeable to starting lovenox 1mg per kg BID  - per Neurosurgery for recs regarding initiation of AC, recommended CT head to reevaluate for bleeding  - CT head -unchanged surgical cavity in the RIGHT frontal lobe with moderate surrounding edema which crosses the corpus callosum and demonstrates mass effect on the anterior horn of the RIGHT lateral ventricle  - Given risk of intracranial hemorrhage due to glioblastoma and thrombocytopenia, risk of starting therapeutic lovenox seems greater than the benefit at this time  - Cont dex 4 bid per oncology, cont treatments per rad onc, cont keppra 750 BID  - if any AC started, please repeat CTH prior to DC or for any change in exam    Steroid-induced diabetes mellitus.   - a1c 9.3% (9/27/22), likely 2/2 steroid-induced DM  - holding home oral agents while inpatient (januvia 100mg qd, metformin ER 500mg BID)  - increase lantus 10u qHS and continue ISS qHS, qAC; can add premeal if needed  - adjust insulin regimen as needed.    COVID-19.   - exposed 9/30; tested + on 10/3  - currently asymptomatic, pt on decadron for glioblastoma  - hold off remdesivir for now    GIB (gastrointestinal bleeding).   - s/p EGD and colonoscopy on 9/30  - started on pantoprazole 40mg BID by GI at [facility], will cont  - esophogeal candidiasis -s/p 10 d of fluconazole  - gastroparesis on EGD, asymptomatic so far,outpt f/u if within GOC    DVT, lower extremity  - R popliteal DVT on outpt duplex for BLE pitting edema.   - pt was briefly on Lovenox (1-2 doses) but then discontinued due to thrombocytopenia.   Heme/onc recommended continuing to hold AC  - IVC filter placed with IR on 9/28, f/u with IR outpt.    Thrombocytopenia  - stable, no evidence of active bleeding    Renal lesion   - CT from July 2022 with renal lesion  - US kidney and bladder- Solid left upper pole renal mass measuring 3.0 cm, concerning for malignancy. No hydronephrosis.  - Family wants biopsy, As per Urology can be done as outpatient with Dr. Godwin or Amy    DVT ppx: SCD's in LLE, s/p IVC filter in RLE, per heme/onc continue to hold AC     Dispo: Rehab, 10/28 patient is medically cleared and optimized for discharge today. All medications were reviewed with attending, and sent to mutually agreed upon pharmacy.

## 2022-10-27 NOTE — PROGRESS NOTE ADULT - PROBLEM SELECTOR PLAN 8
s/p EGD and colonoscopy on 9/30  -started on pantoprazole 40mg BID by GI at [facility], will cont  -monitor stool and Hb  -gastroparesis on EGD: asymptomatic so far  outpt f/u if within GOC

## 2022-10-27 NOTE — CHART NOTE - NSCHARTNOTEFT_GEN_A_CORE
Vital Signs Last 24 Hrs  T(C): 36.8 (27 Oct 2022 12:23), Max: 36.9 (26 Oct 2022 21:04)  T(F): 98.3 (27 Oct 2022 12:23), Max: 98.4 (26 Oct 2022 21:04)  HR: 75 (27 Oct 2022 12:23) (75 - 90)  BP: 122/73 (27 Oct 2022 12:23) (122/73 - 136/76)  BP(mean): --  RR: 18 (27 Oct 2022 12:23) (18 - 18)  SpO2: 98% (27 Oct 2022 12:23) (96% - 98%)    Parameters below as of 27 Oct 2022 12:23  Patient On (Oxygen Delivery Method): room air                          11.5   6.02  )-----------( 88       ( 27 Oct 2022 07:05 )             35.7   10-27    137  |  104  |  12  ----------------------------<  100<H>  4.0   |  26  |  0.42<L>    Ca    7.7<L>      27 Oct 2022 07:05  Phos  2.0     10-27  Mg     1.80     10-27    Patient completed RT sessions today, writer spoke with Rad Onc Dr. Gentile >chas to transition to PO Decadron 4mg BID (no taper for now), and to re-assess in 1-2 weeks. Spoke with pharmacy> keppra 1:1ratio transitioned from IV to PO as discussed with attending. Results and case discussed with Dr. Webb

## 2022-10-27 NOTE — PROGRESS NOTE ADULT - NUTRITIONAL ASSESSMENT
This patient has been assessed with a concern for Malnutrition and has been determined to have a diagnosis/diagnoses of Severe protein-calorie malnutrition.    This patient is being managed with:   Diet Minced and Moist-  Mildly Thick Liquids (MILDTHICKLIQS)  Supplement Feeding Modality:  Oral  Glucerna Shake Cans or Servings Per Day:  1       Frequency:  Three Times a day  Entered: Oct 20 2022 12:17PM    
This patient has been assessed with a concern for Malnutrition and has been determined to have a diagnosis/diagnoses of Severe protein-calorie malnutrition.    This patient is being managed with:   Diet Pureed-  Mildly Thick Liquids (MILDTHICKLIQS)  Supplement Feeding Modality:  Oral  Glucerna Shake Cans or Servings Per Day:  1       Frequency:  Three Times a day  Entered: Oct 18 2022 11:02AM    
This patient has been assessed with a concern for Malnutrition and has been determined to have a diagnosis/diagnoses of Severe protein-calorie malnutrition.    This patient is being managed with:   Diet NPO with Tube Feed-  Tube Feeding Modality: Nasogastric  Glucerna 1.5 Jeremias (GLUCERNA1.5RTH)  Total Volume for 24 Hours (mL): 960  Continuous  Starting Tube Feed Rate {mL per Hour}: 10  Increase Tube Feed Rate by (mL): 5     Every 8 hours  Until Goal Tube Feed Rate (mL per Hour): 40  Tube Feed Duration (in Hours): 24  Tube Feed Start Time: 11:00  Entered: Oct 14 2022 11:00AM    
This patient has been assessed with a concern for Malnutrition and has been determined to have a diagnosis/diagnoses of Severe protein-calorie malnutrition.    This patient is being managed with:   Diet Minced and Moist-  Mildly Thick Liquids (MILDTHICKLIQS)  Supplement Feeding Modality:  Oral  Glucerna Shake Cans or Servings Per Day:  1       Frequency:  Three Times a day  Entered: Oct 20 2022 12:17PM    
This patient has been assessed with a concern for Malnutrition and has been determined to have a diagnosis/diagnoses of Severe protein-calorie malnutrition.    This patient is being managed with:   Diet Pureed-  Mildly Thick Liquids (MILDTHICKLIQS)  Entered: Oct 15 2022  2:16PM    
This patient has been assessed with a concern for Malnutrition and has been determined to have a diagnosis/diagnoses of Severe protein-calorie malnutrition.    This patient is being managed with:   Diet Pureed-  Mildly Thick Liquids (MILDTHICKLIQS)  Supplement Feeding Modality:  Oral  Glucerna Shake Cans or Servings Per Day:  1       Frequency:  Three Times a day  Entered: Oct 18 2022 11:02AM    
This patient has been assessed with a concern for Malnutrition and has been determined to have a diagnosis/diagnoses of Severe protein-calorie malnutrition.    This patient is being managed with:   Diet Minced and Moist-  Mildly Thick Liquids (MILDTHICKLIQS)  Supplement Feeding Modality:  Oral  Glucerna Shake Cans or Servings Per Day:  1       Frequency:  Three Times a day  Entered: Oct 20 2022 12:17PM    
This patient has been assessed with a concern for Malnutrition and has been determined to have a diagnosis/diagnoses of Severe protein-calorie malnutrition.    This patient is being managed with:   Diet NPO-  Entered: Oct 10 2022  9:14AM    
This patient has been assessed with a concern for Malnutrition and has been determined to have a diagnosis/diagnoses of Severe protein-calorie malnutrition.    This patient is being managed with:   Diet Pureed-  Mildly Thick Liquids (MILDTHICKLIQS)  Entered: Oct 15 2022  2:16PM    
This patient has been assessed with a concern for Malnutrition and has been determined to have a diagnosis/diagnoses of Severe protein-calorie malnutrition.    This patient is being managed with:   Diet Minced and Moist-  Mildly Thick Liquids (MILDTHICKLIQS)  Supplement Feeding Modality:  Oral  Glucerna Shake Cans or Servings Per Day:  1       Frequency:  Three Times a day  Entered: Oct 20 2022 12:17PM    
This patient has been assessed with a concern for Malnutrition and has been determined to have a diagnosis/diagnoses of Severe protein-calorie malnutrition.    This patient is being managed with:   Diet NPO-  Entered: Oct 10 2022  9:14AM    
This patient has been assessed with a concern for Malnutrition and has been determined to have a diagnosis/diagnoses of Severe protein-calorie malnutrition.    This patient is being managed with:   Diet NPO-  Entered: Oct 10 2022  9:14AM    
This patient has been assessed with a concern for Malnutrition and has been determined to have a diagnosis/diagnoses of Severe protein-calorie malnutrition.    This patient is being managed with:   Diet NPO with Tube Feed-  Tube Feeding Modality: Nasogastric  Glucerna 1.5 Jeremias (GLUCERNA1.5RTH)  Total Volume for 24 Hours (mL): 720  Continuous  Starting Tube Feed Rate {mL per Hour}: 10  Increase Tube Feed Rate by (mL): 10  Until Goal Tube Feed Rate (mL per Hour): 30  Tube Feed Duration (in Hours): 24  Tube Feed Start Time: 19:00  Entered: Oct 12 2022  6:42PM    
This patient has been assessed with a concern for Malnutrition and has been determined to have a diagnosis/diagnoses of Severe protein-calorie malnutrition.    This patient is being managed with:   Diet Minced and Moist-  Mildly Thick Liquids (MILDTHICKLIQS)  Supplement Feeding Modality:  Oral  Glucerna Shake Cans or Servings Per Day:  1       Frequency:  Three Times a day  Entered: Oct 20 2022 12:17PM    
This patient has been assessed with a concern for Malnutrition and has been determined to have a diagnosis/diagnoses of Severe protein-calorie malnutrition.    This patient is being managed with:   Diet Minced and Moist-  Mildly Thick Liquids (MILDTHICKLIQS)  Supplement Feeding Modality:  Oral  Glucerna Shake Cans or Servings Per Day:  1       Frequency:  Three Times a day  Entered: Oct 20 2022 12:17PM    
This patient has been assessed with a concern for Malnutrition and has been determined to have a diagnosis/diagnoses of Severe protein-calorie malnutrition.    This patient is being managed with:   Diet Pureed-  Mildly Thick Liquids (MILDTHICKLIQS)  Entered: Oct 15 2022  2:16PM

## 2022-10-27 NOTE — PROGRESS NOTE ADULT - PROVIDER SPECIALTY LIST ADULT
Hospitalist
Neurology
Neurology
Hospitalist
Infectious Disease
Infectious Disease
Neurology
Hospitalist

## 2022-10-27 NOTE — DISCHARGE NOTE PROVIDER - NSDCCPCAREPLAN_GEN_ALL_CORE_FT
PRINCIPAL DISCHARGE DIAGNOSIS  Diagnosis: Glioblastoma multiforme  Assessment and Plan of Treatment: Seen by neurosurgery and oncologist.  Continue Keppra per neurology recommendations  You completed 9fractions of radiation therapy in the hospital (10/17-10/27). Please continue Dexamethasone 4mg 2x/day as per radiation oncologist, and then followup to re-assess further dosing adjustment within next 1-2weeks**  Outpatinet neurology followup      SECONDARY DISCHARGE DIAGNOSES  Diagnosis: Sepsis due to Enterococcus  Assessment and Plan of Treatment: Resolved, bacteris growth in blood cultures, you complete a course of antibiotics in the hospital on 10/28/22.    Diagnosis: DVT, lower extremity  Assessment and Plan of Treatment: Ultrasound with Right brachial DVT (clot), you were evaluated by the hematologist who recommended to start anticoagulation; however   per Neurosurgery Doctor> You were not started on Anticoagulation Given higher risk of intracranial hemorrhage due to glioblastoma and thrombocytopenia***    Diagnosis: Renal mass  Assessment and Plan of Treatment: Imaging with renal lesion, as per urology doctor you will need to followup as outpatient for a kidney biopsy for further evaluation/diagnosis, you may followup with Urologist Dr. Godwin or Aym    Diagnosis: Thrombocytopenia  Assessment and Plan of Treatment: monitor CBC with your doctor    Diagnosis: COVID-19  Assessment and Plan of Treatment: Tested positive on 10/3/22, completed isolation period.  Remains on room air    Diagnosis: Steroid-induced diabetes mellitus  Assessment and Plan of Treatment: A1C 9.3% Continue your medication regimen and a consistent carbohydrate diet (Meaning eating the same amount of carbohydrates at the same time each day). Monitor blood glucose levels throughout the day before meals and at bedtime. Record blood sugars and bring to outpatient providers appointment in order to be reviewed by your doctor for management modifications. If your sugars are more than 400 or less than 70 you should contact your PCP immediately. Monitor for signs/symptoms of low blood glucose, such as, dizziness, altered mental status, or cool/clammy skin. In addition, monitor for signs/symptoms of high blood glucose, such as, feeling hot, dry, fatigued, or with increased thirst/urination. Make regular podiatry appointments in order to have feet checked for wounds and uncontrolled toe nail growth to prevent infections, as well as, appointments with an ophthalmologist to monitor your vision     PRINCIPAL DISCHARGE DIAGNOSIS  Diagnosis: Glioblastoma multiforme  Assessment and Plan of Treatment: Seen by neurosurgery and oncologist.  Dexamethasone 4mg 2 x day--> as er Rad onc continue PO decadron 4mg BID- re-assess in 1-2 weeks  - Neurology following- c/w Keppra 750mg BID  You completed 9fractions of radiation therapy in the hospital (10/17-10/27). Please continue Dexamethasone 4mg 2x/day as per radiation oncologist, and then followup to re-assess further dosing adjustment within next 1-2weeks**  Outpatinet neurology followup      SECONDARY DISCHARGE DIAGNOSES  Diagnosis: Steroid-induced diabetes mellitus  Assessment and Plan of Treatment: A1C 9.3% Continue your medication regimen and a consistent carbohydrate diet (Meaning eating the same amount of carbohydrates at the same time each day). Monitor blood glucose levels throughout the day before meals and at bedtime. Record blood sugars and bring to outpatient providers appointment in order to be reviewed by your doctor for management modifications. If your sugars are more than 400 or less than 70 you should contact your PCP immediately. Monitor for signs/symptoms of low blood glucose, such as, dizziness, altered mental status, or cool/clammy skin. In addition, monitor for signs/symptoms of high blood glucose, such as, feeling hot, dry, fatigued, or with increased thirst/urination. Make regular podiatry appointments in order to have feet checked for wounds and uncontrolled toe nail growth to prevent infections, as well as, appointments with an ophthalmologist to monitor your vision    Diagnosis: Sepsis due to Enterococcus  Assessment and Plan of Treatment: Resolved, bacteris growth in blood cultures, you complete a course of antibiotics in the hospital on 10/28/22.    Diagnosis: DVT, lower extremity  Assessment and Plan of Treatment: Ultrasound with Right brachial DVT (clot), you were evaluated by the hematologist who recommended to start anticoagulation; however   per Neurosurgery Doctor> You were not started on Anticoagulation Given higher risk of intracranial hemorrhage due to glioblastoma and thrombocytopenia***    Diagnosis: Renal mass  Assessment and Plan of Treatment: Imaging with renal lesion, as per urology doctor you will need to followup as outpatient for a kidney biopsy for further evaluation/diagnosis, you may followup with Urologist Dr. Godwin or Amy    Diagnosis: Thrombocytopenia  Assessment and Plan of Treatment: monitor CBC with your doctor    Diagnosis: COVID-19  Assessment and Plan of Treatment: Tested positive on 10/3/22, completed isolation period.  Remains on room air     PRINCIPAL DISCHARGE DIAGNOSIS  Diagnosis: Glioblastoma multiforme  Assessment and Plan of Treatment: Seen by neurosurgery and oncologist.  Dexamethasone 4mg 2 x day--> as er Rad onc continue PO decadron 4mg BID- re-assess in 1-2 weeks  - Neurology following- c/w Keppra 750mg BID  You completed 9fractions of radiation therapy in the hospital (10/17-10/27). Please continue Dexamethasone 4mg 2x/day as per radiation oncologist, and then followup to re-assess further dosing adjustment within next 1-2weeks**  Outpatinet neurology followup      SECONDARY DISCHARGE DIAGNOSES  Diagnosis: Steroid-induced diabetes mellitus  Assessment and Plan of Treatment: A1C 9.3% Continue your medication regimen and a consistent carbohydrate diet (Meaning eating the same amount of carbohydrates at the same time each day). Monitor blood glucose levels throughout the day before meals and at bedtime. Record blood sugars and bring to outpatient providers appointment in order to be reviewed by your doctor for management modifications. If your sugars are more than 400 or less than 70 you should contact your PCP immediately. Monitor for signs/symptoms of low blood glucose, such as, dizziness, altered mental status, or cool/clammy skin. In addition, monitor for signs/symptoms of high blood glucose, such as, feeling hot, dry, fatigued, or with increased thirst/urination. Make regular podiatry appointments in order to have feet checked for wounds and uncontrolled toe nail growth to prevent infections, as well as, appointments with an ophthalmologist to monitor your vision    Diagnosis: Sepsis due to Enterococcus  Assessment and Plan of Treatment: Resolved, bacteris growth in blood cultures, you complete a course of antibiotics in the hospital on 10/28/22.  Please  take Bactrim 1 tab 3 x week M/W/F      Diagnosis: DVT, lower extremity  Assessment and Plan of Treatment: Ultrasound with Right brachial DVT (clot), you were evaluated by the hematologist who recommended to start anticoagulation; however   per Neurosurgery Doctor> You were not started on Anticoagulation Given higher risk of intracranial hemorrhage due to glioblastoma and thrombocytopenia***    Diagnosis: Renal mass  Assessment and Plan of Treatment: Imaging with renal lesion, as per urology doctor you will need to followup as outpatient for a kidney biopsy for further evaluation/diagnosis, you may followup with Urologist Dr. Godwin or Amy    Diagnosis: Prophylactic measure  Assessment and Plan of Treatment: Please c/w Bactrim  3 x week    Diagnosis: Thrombocytopenia  Assessment and Plan of Treatment: monitor CBC with your doctor    Diagnosis: COVID-19  Assessment and Plan of Treatment: Tested positive on 10/3/22, completed isolation period.  Remains on room air

## 2022-10-27 NOTE — CHART NOTE - NSCHARTNOTEFT_GEN_A_CORE
81yo gentleman with PMH of glioblastoma (diagnosed 7/18/22, s/p R stereo biopsy and TIMOTHY x 2, R crani for tumor debulking (7/28/22), on temozolomide, hld, steroid-induced diabetes, R popliteal DVT, s/p IVC filter who has developed new R brachial DVT.    # Thrombosis:   Patient had R popliteal DVT identified on US from 9/21/22 and was on lovenox but was hospitalized for drop in Hgb, thus lovenox was discontinued and IVC filter was placed. Patient had EGD and colonoscopy on 9/30/22 however no acute bleeding was noted.   Patient had negative serotonin release assay - does NOT have evidence of history of HIT.  He was noted to have new R brachial DVT.  GBM is associated with high thrombotic risk. Decreased mobility and COVID19 infection also increase risk of thrombosis.   At this time, hold off on initiating anticoagulation for now and would recommend repeat US in 1 week for interval assessment. We will notify Dr. Borja (neuro oncologist).     #Thrombocytopenia:  Thrombocytopenia can be related in part to recent bacteremia and COVID infections.      Platelet count improved to 80-90K. Dating back to July noted to be in the 80s. Patient back at baseline. Hematology to sign off. Please call with any questions.     Enoc Soliman MD, PGY6   Hematology/Oncology Fellow   pager [phone]  After 5pm and on weekends page on call fellow

## 2022-10-27 NOTE — DISCHARGE NOTE PROVIDER - NSRESEARCHGRANT_MLMHIDDEN_GEN_A_CORE
Faxed pre-op form to office. Aware we will be able to view [facility] note once completed. COVID testing is no longer required. Baby was scheduled 3/8/22 for MRI but cancelled d/t moving residences. yes

## 2022-10-27 NOTE — PROGRESS NOTE ADULT - PROBLEM SELECTOR PLAN 2
R popliteal DVT on outpt duplex for BLE pitting edema.   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    now with RUE swelling, 10/20 doppler show right brachial DVT  repeat LE dopplers 10/20, R popliteal DVT remains    Discussed with hematology and neurosurgery, appreciate recs.  Risk of ICH with AC + low platelets outweighs benefit of prevention and treatment of VTE with AC. family now focusing on overall quality of life

## 2022-10-27 NOTE — PROGRESS NOTE ADULT - PROBLEM SELECTOR PLAN 1
MR Head result reviewed. Neuro sx at [facility] believes findings are more likely post treatment changes and recommended 9 additional radiation tx  - c/w dexamethasone 4mg BID, will d/w rad onc for taper recs  - XRT through 10/27  - neurology following, recs appreciated  - course c/b RRT on 10/10 AM with seizure activity likely 2/2 GBM  - vEEG without any seizure activity, CTH without acute changes  - per neurology, c/w keppra 750mg BID  - c/w aspiration precautions  - pt now DNR/DNI
MR Head result reviewed. Neuro sx at [facility] believes findings are more likely post treatment changes and recommended 9 additional radiation tx  - c/w dexamethasone 4mg BID, will taper per NSG  - neurology following, recs appreciated  - course c/b RRT on 10/10 AM with seizure activity likely 2/2 GBM  - vEEG without any seizure activity so far, CTH without acute changes  - per neurology, decrease keppra to 750mg BID while on EEG monitoring  - c/w aspiration precautions  - d/w rad/onc, due to poor mental status will defer RT until mental status improved and EEG completed  - case discussed extensively with family, patient DNR/DNI, will c/w GOC discussions  - while patient is planned to c/w RT, overall prognosis guarded due to poor mental status and high risk of aspiration
MR Head result reviewed. Neuro sx at [facility] believes findings are more likely post treatment changes and recommended 9 additional radiation tx  - c/w dexamethasone 4mg BID, will taper per NSG  - neurology following, recs appreciated  - course c/b RRT on 10/10 AM with seizure activity likely 2/2 GBM  - vEEG without any seizure activity, CTH without acute changes  - per neurology, c/w keppra 750mg BID, EEG discontinued  - c/w aspiration precautions  - d/w rad/onc, due to poor mental status will defer RT until mental status improved  - case discussed extensively with family, patient DNR/DNI, will c/w GOC discussions  - while patient is planned to c/w RT, overall prognosis guarded due to poor mental status and high risk of aspiration  - as patient unable to tolerate PO, NGT placed for feeds  - overall, mental status appears to be gradually improving, most likely 2/2 toxic metabolic encephalopathy
MR Head result reviewed. Neuro sx at [facility] believes findings are more likely post treatment changes and recommended 9 additional radiation tx  - c/w dexamethasone 4mg BID, will taper per NSG  - neurology following, recs appreciated  - course c/b RRT on 10/10 AM with seizure activity likely 2/2 GBM  - vEEG without any seizure activity, CTH without acute changes  - per neurology, c/w keppra 750mg BID, EEG discontinued  - c/w aspiration precautions  - mental status improving so will need to discuss with onc/RT regarding appropriateness of XRT  - case discussed extensively with family, patient DNR/DNI, will c/w GOC discussions  - while patient is planned to c/w RT, overall prognosis guarded due to poor mental status and high risk of aspiration  - as patient unable to tolerate PO, NGT placed for feeds  - overall, mental status appears to be gradually improving, most likely 2/2 toxic metabolic encephalopathy
MR Head result reviewed. Neuro sx at [facility] believes findings are more likely post treatment changes and recommended 9 additional radiation tx  - c/w dexamethasone 4mg BID, will taper per NSG  - neurology following, recs appreciated  - course c/b RRT on 10/10 AM with seizure activity likely 2/2 GBM  - vEEG without any seizure activity, CTH without acute changes  - per neurology, c/w keppra 750mg BID, EEG can be discontinued  - c/w aspiration precautions  - d/w rad/onc, due to poor mental status will defer RT until mental status improved and EEG completed  - case discussed extensively with family, patient DNR/DNI, will c/w GOC discussions  - while patient is planned to c/w RT, overall prognosis guarded due to poor mental status and high risk of aspiration  - as patient unable to tolerate PO, will place NGT for feeds
MR Head result reviewed. Neuro sx at [facility] believes findings are more likely post treatment changes and recommended 9 additional radiation tx  - c/w dexamethasone 4mg BID, will d/w rad onc for taper recs  - XRT through 10/27  - neurology following, recs appreciated  - course c/b RRT on 10/10 AM with seizure activity likely 2/2 GBM  - vEEG without any seizure activity, CTH without acute changes  - per neurology, c/w keppra 750mg BID  - c/w aspiration precautions  - pt now DNR/DNI
following Dr. Mcneil. MR Head result reviewed. Neuro sx at [facility] believes findings are more likely post treatment changes and recommended 9 additional radiation tx  -radiation oncology consult AM  -c/w dexamethasone 4mg BID, keppra 500mg BID. Will reach out to neuro sx regarding plan regarding if and when to taper decadron  - PT eval
MR Head result reviewed. Neuro sx at [facility] believes findings are more likely post treatment changes and recommended 9 additional radiation tx  - c/w dexamethasone 4mg BID, cont current dosing, f/u with rad onc once RT complete  - XRT through 10/27  - neurology following, recs appreciated  - course c/b RRT on 10/10 AM with seizure activity likely 2/2 GBM  - vEEG without any seizure activity, CTH without acute changes  - per neurology, c/w keppra 750mg BID  - c/w aspiration precautions  - pt now DNR/DNI
MR Head result reviewed. Neuro sx at [facility] believes findings are more likely post treatment changes and recommended 9 additional radiation tx  - c/w dexamethasone 4mg BID, will d/w rad onc for taper recs  - neurology following, recs appreciated  - course c/b RRT on 10/10 AM with seizure activity likely 2/2 GBM  - vEEG without any seizure activity, CTH without acute changes  - per neurology, c/w keppra 750mg BID  - c/w aspiration precautions  - pt now DNR/DNI
MR Head result reviewed. Neuro sx at [facility] believes findings are more likely post treatment changes and recommended 9 additional radiation tx  - c/w dexamethasone 4mg BID, will taper per NSG  - neurology following, recs appreciated  - course c/b RRT on 10/10 AM with seizure activity likely 2/2 GBM  - vEEG without any seizure activity, CTH without acute changes  - per neurology, c/w keppra 750mg BID  - c/w aspiration precautions  - pt now DNR/DNI  - while patient is planned to c/w RT, overall prognosis guarded due to poor mental status and high risk of aspiration
MR Head result reviewed. Neuro sx at [facility] believes findings are more likely post treatment changes and recommended 9 additional radiation tx  - c/w dexamethasone 4mg BID, will d/w rad onc for taper recs  - XRT through 10/27  - neurology following, recs appreciated  - course c/b RRT on 10/10 AM with seizure activity likely 2/2 GBM  - vEEG without any seizure activity, CTH without acute changes  - per neurology, c/w keppra 750mg BID  - c/w aspiration precautions  - pt now DNR/DNI
MR Head result reviewed. Neuro sx at [facility] believes findings are more likely post treatment changes and recommended 9 additional radiation tx  - c/w dexamethasone 4mg BID, will d/w rad onc for taper recs  - neurology following, recs appreciated  - course c/b RRT on 10/10 AM with seizure activity likely 2/2 GBM  - vEEG without any seizure activity, CTH without acute changes  - per neurology, c/w keppra 750mg BID  - c/w aspiration precautions  - pt now DNR/DNI
MR Head result reviewed. Neuro sx at [facility] believes findings are more likely post treatment changes and recommended 9 additional radiation tx  - c/w dexamethasone 4mg BID, will taper per NSG, f/u regarding taper  - neurology following, recs appreciated  - course c/b RRT on 10/10 AM with seizure activity likely 2/2 GBM  - vEEG without any seizure activity, CTH without acute changes  - per neurology, c/w keppra 750mg BID  - c/w aspiration precautions  - pt now DNR/DNI
MR Head result reviewed. Neuro sx at [facility] believes findings are more likely post treatment changes and recommended 9 additional radiation tx  - c/w dexamethasone 4mg BID, will d/w rad onc for taper recs  - XRT through 10/27  - neurology following, recs appreciated  - course c/b RRT on 10/10 AM with seizure activity likely 2/2 GBM  - vEEG without any seizure activity, CTH without acute changes  - per neurology, c/w keppra 750mg BID  - c/w aspiration precautions  - pt now DNR/DNI
MR Head result reviewed. Neuro sx at [facility] believes findings are more likely post treatment changes and recommended 9 additional radiation tx  - c/w dexamethasone 4mg BID, will d/w rad onc for taper recs  - XRT through 10/27  - neurology following, recs appreciated  - course c/b RRT on 10/10 AM with seizure activity likely 2/2 GBM  - vEEG without any seizure activity, CTH without acute changes  - per neurology, c/w keppra 750mg BID  - c/w aspiration precautions  - pt now DNR/DNI
MR Head result reviewed. Neuro sx at [facility] believes findings are more likely post treatment changes and recommended 9 additional radiation tx  - c/w dexamethasone 4mg BID, will taper per NSG  - neurology following, recs appreciated  - course c/b RRT on 10/10 AM with seizure activity likely 2/2 GBM  - vEEG without any seizure activity, CTH without acute changes  - per neurology, c/w keppra 750mg BID, EEG discontinued  - c/w aspiration precautions  - d/w rad/onc, due to poor mental status will defer RT until mental status improved  - case discussed extensively with family, patient DNR/DNI, will c/w GOC discussions  - while patient is planned to c/w RT, overall prognosis guarded due to poor mental status and high risk of aspiration  - as patient unable to tolerate PO, NGT placed for feeds  - overall, mental status appears to be gradually improving, most likely 2/2 toxic metabolic encephalopathy
following Dr. Mcneil. MR Head result reviewed. Neuro sx at [facility] believes findings are more likely post treatment changes and recommended 9 additional radiation tx  - c/w dexamethasone 4mg BID, keppra 1000mg BID  - will d/w NSG regarding decadron taper  - neurology following, recs appreciated  - course c/b RRT on 10/10 AM with seizure activity likely 2/2 GBM  - vEEG without any seizure activity so far, CTH without acute changes  - d/w rad/onc, due to poor mental status will defer RT today, plan for treatment on 10/11  - case discussed extensively with family, patient DNR/DNI, will c/w GOC discussions  - while patient is planned to c/w RT, overall prognosis guarded due to poor mental status and high risk of aspiration
MR Head result reviewed. Neuro sx at [facility] believes findings are more likely post treatment changes and recommended 9 additional radiation tx  - c/w dexamethasone 4mg BID, will taper per NSG  - neurology following, recs appreciated  - course c/b RRT on 10/10 AM with seizure activity likely 2/2 GBM  - vEEG without any seizure activity, CTH without acute changes  - per neurology, c/w keppra 750mg BID, EEG discontinued  - c/w aspiration precautions  - d/w rad/onc, due to poor mental status will defer RT until mental status improved  - case discussed extensively with family, patient DNR/DNI, will c/w GOC discussions  - while patient is planned to c/w RT, overall prognosis guarded due to poor mental status and high risk of aspiration  - as patient unable to tolerate PO, NGT placed for feeds  - overall, mental status appears to be gradually improving
MR Head result reviewed. Neuro sx at [facility] believes findings are more likely post treatment changes and recommended 9 additional radiation tx  - c/w dexamethasone 4mg BID, cont current dosing, f/u with rad onc once RT complete  - XRT through 10/27  - neurology following, recs appreciated  - course c/b RRT on 10/10 AM with seizure activity likely 2/2 GBM  - vEEG without any seizure activity, CTH without acute changes  - per neurology, c/w keppra 750mg BID  - c/w aspiration precautions  - pt now DNR/DNI

## 2022-10-27 NOTE — PROGRESS NOTE ADULT - REASON FOR ADMISSION
weakness; radiation therapy

## 2022-10-27 NOTE — DISCHARGE NOTE PROVIDER - NSDCFUADDAPPT_GEN_ALL_CORE_FT
Imaging with renal lesion, as per urology doctor you will need to followup as outpatient for a kidney biopsy for further evaluation/diagnosis, you may followup with Urologist Dr. Godwin or Amy

## 2022-10-27 NOTE — PROGRESS NOTE ADULT - PROBLEM SELECTOR PLAN 5
kidney/bladder sono done revealing lesion L upper pole kidney; family was not aware of this lesion which was seen on CT abd/pelvis which was uploaded to PACS from an outside hospital ([facility]); confirmed with radiology regarding its presence.    after further discussion again with family, focusing on overall quality of life and no longer interested in pursuing w/u of this renal lesion

## 2022-10-27 NOTE — DISCHARGE NOTE PROVIDER - NSDCMRMEDTOKEN_GEN_ALL_CORE_FT
dexamethasone 4 mg oral tablet:   FLUoxetine 20 mg oral capsule: 1 cap(s) orally once a day  Januvia 100 mg oral tablet: 1 tab(s) orally once a day  levETIRAcetam 500 mg oral tablet: 1 tab(s) orally every 12 hours  metFORMIN 500 mg oral tablet, extended release: 1 tab(s) orally 2 times a day  pantoprazole 40 mg oral delayed release tablet: 1 tab(s) orally once a day (before a meal)  rosuvastatin 40 mg oral tablet: 1 tab(s) orally once a day (at bedtime)   acetaminophen 325 mg oral tablet: 2 tab(s) orally every 6 hours, As needed, Temp greater or equal to 38C (100.4F), Mild Pain (1 - 3)  ascorbic acid 500 mg oral tablet: 1 tab(s) orally once a day  dexamethasone 4 mg oral tablet: 1 tab(s) orally every 12 hours  FLUoxetine 20 mg oral capsule: 1 cap(s) orally once a day  glucose 40% oral gel: 15 gram(s) orally prn  insulin glargine 100 units/mL subcutaneous solution: 12 unit(s) subcutaneous once a day (at bedtime)  insulin lispro 100 units/mL injectable solution: 4 unit(s) injectable 3 times a day  levETIRAcetam 750 mg oral tablet: 1 tab(s) orally every 12 hours  pantoprazole 40 mg oral delayed release tablet: 1 tab(s) orally once a day (before a meal)  polyethylene glycol 3350 oral powder for reconstitution: 17 gram(s) orally once a day  rosuvastatin 40 mg oral tablet: 1 tab(s) orally once a day (at bedtime)  senna leaf extract oral tablet: 2 tab(s) orally once a day (at bedtime)  sulfamethoxazole-trimethoprim 800 mg-160 mg oral tablet: 1 tab(s) orally Monday, Wednesday, and Friday

## 2022-10-27 NOTE — PROGRESS NOTE ADULT - PROBLEM SELECTOR PROBLEM 1
Glioblastoma multiforme
Mood disorder/Psychotic disorder/Cluster B Personality disorder/traits

## 2022-10-27 NOTE — PROGRESS NOTE ADULT - SUBJECTIVE AND OBJECTIVE BOX
[facility] Division of Hospital Medicine  Shellie Webb MD  Pager [phone]    Patient is a 82y old  Male who presents with a chief complaint of weakness; radiation therapy      SUBJECTIVE / OVERNIGHT EVENTS: last RT today; family giving more choices to SW for rehab      MEDICATIONS  (STANDING):  ampicillin  IVPB 2 Gram(s) IV Intermittent every 4 hours  ascorbic acid 500 milliGRAM(s) Oral daily  atorvastatin 80 milliGRAM(s) Oral at bedtime  dexAMETHasone  Injectable 4 milliGRAM(s) IV Push two times a day  dextrose 5%. 1000 milliLiter(s) (50 mL/Hr) IV Continuous <Continuous>  dextrose 5%. 1000 milliLiter(s) (100 mL/Hr) IV Continuous <Continuous>  dextrose 50% Injectable 25 Gram(s) IV Push once  dextrose 50% Injectable 12.5 Gram(s) IV Push once  dextrose 50% Injectable 25 Gram(s) IV Push once  FLUoxetine 20 milliGRAM(s) Oral daily  glucagon  Injectable 1 milliGRAM(s) IntraMuscular once  insulin glargine Injectable (LANTUS) 12 Unit(s) SubCutaneous at bedtime  insulin lispro (ADMELOG) corrective regimen sliding scale   SubCutaneous Before meals and at bedtime  insulin lispro Injectable (ADMELOG) 4 Unit(s) SubCutaneous three times a day before meals  levETIRAcetam  IVPB 750 milliGRAM(s) IV Intermittent every 12 hours  pantoprazole  Injectable 40 milliGRAM(s) IV Push two times a day  polyethylene glycol 3350 17 Gram(s) Oral daily  senna 2 Tablet(s) Oral at bedtime  trimethoprim  160 mG/sulfamethoxazole 800 mG 1 Tablet(s) Oral <User Schedule>    MEDICATIONS  (PRN):  acetaminophen     Tablet .. 650 milliGRAM(s) Oral every 6 hours PRN Temp greater or equal to 38C (100.4F), Mild Pain (1 - 3)  dextrose Oral Gel 15 Gram(s) Oral once PRN Blood Glucose LESS THAN 70 milliGRAM(s)/deciliter  melatonin 3 milliGRAM(s) Oral at bedtime PRN Insomnia  ondansetron Injectable 4 milliGRAM(s) IV Push every 8 hours PRN Nausea and/or Vomiting      CAPILLARY BLOOD GLUCOSE  POCT Blood Glucose.: 122 mg/dL (27 Oct 2022 09:08)  POCT Blood Glucose.: 243 mg/dL (26 Oct 2022 22:56)  POCT Blood Glucose.: 138 mg/dL (26 Oct 2022 17:40)  POCT Blood Glucose.: 204 mg/dL (26 Oct 2022 12:14)      PHYSICAL EXAM:  Vital Signs Last 24 Hrs  T(F): 98 (27 Oct 2022 05:34), Max: 98.6 (26 Oct 2022 12:40)  HR: 76 (27 Oct 2022 05:34) (76 - 98)  BP: 136/76 (27 Oct 2022 05:34) (127/87 - 136/76)  RR: 18 (27 Oct 2022 05:34) (18 - 18)  SpO2: 98% (27 Oct 2022 05:34) (96% - 98%)    Parameters below as of 27 Oct 2022 05:34  Patient On (Oxygen Delivery Method): room air        CONSTITUTIONAL: NAD, appears comfortable  EYES: PERRLA; conjunctiva and sclera clear  ENMT: Moist oral mucosa; normal dentition  RESPIRATORY: Normal respiratory effort; grossly b/l AE  CARDIOVASCULAR: Regular rate and rhythm; ++ lower extremity edema;   ABDOMEN: Nontender to palpation, normoactive bowel sounds  MUSCULOSKELETAL: no clubbing or cyanosis of digits; no joint swelling or tenderness to palpation  PSYCH: calm, coop; affect appropriate  NEUROLOGY: CN 2-12 are intact and symmetric; no gross sensory deficits   SKIN: No rashes; no palpable lesions    LABS:                        11.5   6.02  )-----------( 88       ( 27 Oct 2022 07:05 )             35.7     10-27    137  |  104  |  12  ----------------------------<  100<H>  4.0   |  26  |  0.42<L>    Ca    7.7<L>      27 Oct 2022 07:05  Phos  2.0     10-27  Mg     1.80     10-27

## 2022-10-27 NOTE — DISCHARGE NOTE PROVIDER - CARE PROVIDER_API CALL
Enrike Mcneil)  Neurosurgery  [address] [place] [address]  Phone: [phone]  Fax: [phone]  Follow Up Time:

## 2022-10-27 NOTE — DISCHARGE NOTE PROVIDER - NSDCFUSCHEDAPPT_GEN_ALL_CORE_FT
Akbar Gilman  [facility] Physician [facility]  [address]  Scheduled Appointment: 10/28/2022    Wolfgang Andrade  [facility] Physician [facility]  INT[address]  Scheduled Appointment: 12/08/2022     Akbar Gilman  [facility] Physician [facility]  [address]  Scheduled Appointment: 11/18/2022    Wolfgang Andrade  [facility] Physician [facility]  [address]  Scheduled Appointment: 12/08/2022

## 2022-10-28 NOTE — CHART NOTE - NSCHARTNOTEFT_GEN_A_CORE
pt seen and examined by me  family at bedside  d/w daughter regarding IVF filter eval with IR, can be done once dc from rehab as pt still with active clot; pt was due to f/u with IR for poss removal as it is a retrievable filter; dep on pt's status upon return from rehab will determine need for removal at that time  last dose abx at 4P, then 5P  for rehab  VSS  CHEST non labored  labs stable  will need onc/rad onc f/u post dc from rehab regarding steroid taper  cont PPI  cont bactrim PPX for chronic steroids  cont keppra  s/p RT for GBM, c/o + covid (asymptomatic), c/o sz activity and enterococcus bacteremia  completing abx today  medically stable for dc to rehab  pt likely approp for hospice post rehab  33 min spent with dc planning

## 2022-10-28 NOTE — DISCHARGE NOTE NURSING/CASE MANAGEMENT/SOCIAL WORK - PATIENT PORTAL LINK FT
You can access the FollowMyHealth Patient Portal offered by [facility] by registering at the following website: [email or web address]. By joining [facility]’s FollowMyHealth portal, you will also be able to view your health information using other applications (apps) compatible with our system.

## 2022-10-28 NOTE — CHART NOTE - NSCHARTNOTESELECT_GEN_ALL_CORE
Event Note
Hematology/Event Note
Medicine ACP/Event Note
Neurosurgery/Event Note
Palliative/Event Note
rad onc/Event Note
Consult-Nutrition Support Team/Nutrition Services
EEG prelim
Event Note
Follow Up/Nutrition Services
Hematology/Off Service Note
Nutrition Services
rad onc/Event Note

## 2022-10-30 NOTE — EEG REPORT - NS EEG TEXT BOX
[facility]   COMPREHENSIVE EPILEPSY CENTER   REPORT OF ROUTINE VIDEO EEG     [facility]: [address], [place] [address], Ph#: [phone]  [facility]: [address], [place] [address], Ph#: [phone]  [facility]: [address] [place] [address], Ph#: [phone]    Patient Name: ANA LOVE  Age and : 82y (39)  MRN #: [id number]  Location: [facility] ED  Referring Physician: Unknown Doctor    Study Date: 10-30-22    _____________________________________________________________  TECHNICAL INFORMATION    Placement and Labeling of Electrodes:  The EEG was performed utilizing 20 channels referential EEG connections (coronal over temporal over parasagittal montage) using all standard 10-20 electrode placements with EKG.  Recording was at a sampling rate of 256 samples per second per channel.  Time synchronized digital video recording was done simultaneously with EEG recording.  A low light infrared camera was used for low light recording.  Bernardino and seizure detection algorithms were utilized.    _____________________________________________________________  HISTORY    Patient is a 82y old  Male who presents with a chief complaint of AMS (30 Oct 2022 17:20)      PERTINENT MEDICATION:  s/p valproic acid 1500mg, levetiracetam 2250mg    _____________________________________________________________  STUDY INTERPRETATION    Findings: The background was continuous. No posterior dominant rhythm seen.    Background Slowing:  Diffuse attenuated theta/delta slowing.    Focal Slowing:   None were present.    Sleep Background:  Stage II sleep transients were not recorded.    Other Non-Epileptiform Findings:  Subtle breach effect in the right hemisphere characterized by higher amplitude.     Interictal Epileptiform Activity:   Emergence of continuous, static 0.5 Hz right frontal (F4) lateralized periodic discharges (LPD) in the last 5m of the study.    Events:  No event or seizure recorded.    Activation Procedures:   Hyperventilation was not performed.    Photic stimulation was performed and did not elicit any abnormality.     Artifacts:  Intermittent myogenic and movement artifacts were noted.    ECG:  The heart rate on single channel ECG was predominantly between 70-80 BPM.    _____________________________________________________________  EEG SUMMARY/CLASSIFICATION    Abnormal EEG in an altered patient.  - Emergence of continuous, static 0.5 Hz right frontal (F4) lateralized periodic discharges (LPD) in the last 5m of the study.  - Moderate to severe generalized slowing.  - Subtle breach effect in the right hemisphere characterized by higher amplitude.     _____________________________________________________________  EEG IMPRESSION/CLINICAL CORRELATE    Abnormal EEG study.  1. Potential epileptogenic focus in the right frontal region.   2. Moderate to severe nonspecific diffuse or multifocal cerebral dysfunction.   3. Hint of skull defect in the right hemisphere.  4. No seizure seen.    _____________________________________________________________    Cristin Gonzalez MD  Director, Epilepsy/EMU - [facility]

## 2022-10-30 NOTE — ED PROVIDER NOTE - OBJECTIVE STATEMENT
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 Was brought to our ER and had seizure in route, given 5mg versed.   patient now post ictal, unable to provide hx. all HPI from EMS and daughter bedside.  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 Was brought to our ER and had seizure in route, given 5mg versed.   patient now post ictal, unable to provide hx. all HPI from EMS and daughter bedside.  patient DNR DNI  patient on keppra 750mg, been complaint with meds as per daughter

## 2022-10-30 NOTE — ED ADULT NURSE REASSESSMENT NOTE - NS ED NURSE REASSESS COMMENT FT1
PT's mental status remains the same as upon arrival. PT still not eye opening, and not responsive to verbal or painful stimuli. PT gurgling but oxygen saturation remains 100%, PT remains on 2L nasal cannula for comfort. Updated PT's family on plan of care, PT admitted to NSCU, awaiting MD from NSCU for PT transport to NSCU. Safety and comfort maintained.

## 2022-10-30 NOTE — CONSULT NOTE ADULT - SUBJECTIVE AND OBJECTIVE BOX
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 Notably pt was recently discharged from [facility] 10/28 for RT initiation and E faecalis bacteremia and UTI treated with ampicillin and ciprofloxacin. Per daughter at baseline pt is Aox2-3, responds to questions however at rehab pt was found to have several generalized tonic clonic seizures. Pt has had history of seizure like activity and is on home keppra and decadron but vEEG during prior hospitalization was without seizures. Pt never returned to baseline after seizures, and is currently somnolent and minimally opening eyes to touch. Pt was given 2mg ativan and 4mg versed by EMS; on ED arrival pt was loaded with 2.25g keppra and 1.5g depakote, 10mg decadron. Neurology and neurosurgery were consulted, recommended no surgical intervention but MICU eval.     Per discussion with the family at bedside pt is to be DNI. Pt was previously DNR/DNI with completed MOLST however daughter states she would not like any intubation but would be ok with CPR. Pt made DNI.     ED course: Tmax 101/4, 100% 2L NC. CT head/chest showing vasogenic edema of head but no midline shift, ground glass opacities in lung, 2.6cm renal neoplasm. Given vanc/CTX/ampicillin and 1L NS bolus. s/p keppra, decadron, depakote    PAST MEDICAL & SURGICAL HISTORY:  Glioblastoma multiforme      Steroid-induced diabetes mellitus      S/P craniotomy  R craniotomy for debulking with Dr. Mcneil (22)          FAMILY HISTORY:  FH: type 2 diabetes    FH: hyperlipidemia      Allergies    heparin containing compounds (Other)    Intolerances        HOME MEDICATIONS:    REVIEW OF SYSTEMS:  Allergic/Immunologic:  [ ] All other systems negative  [x] Unable to assess ROS because AMS    OBJECTIVE:  ICU Vital Signs Last 24 Hrs  T(C): 38.6 (30 Oct 2022 15:40), Max: 38.6 (30 Oct 2022 15:40)  T(F): 101.4 (30 Oct 2022 15:40), Max: 101.4 (30 Oct 2022 15:40)  HR: 70 (30 Oct 2022 19:35) (70 - 95)  BP: 123/78 (30 Oct 2022 19:35) (110/74 - 150/99)  BP(mean): 92 (30 Oct 2022 19:35) (80 - 116)  ABP: --  ABP(mean): --  RR: 19 (30 Oct 2022 19:35) (16 - 19)  SpO2: 100% (30 Oct 2022 19:35) (100% - 100%)    O2 Parameters below as of 30 Oct 2022 19:35  Patient On (Oxygen Delivery Method): nasal cannula  O2 Flow (L/min): 2            CAPILLARY BLOOD GLUCOSE      POCT Blood Glucose.: 111 mg/dL (30 Oct 2022 19:26)      PHYSICAL EXAM:  General: somnolent, snoring comfortably in bed  HEENT: supple  Lymph Nodes: nonpalpable  Respiratory: course breath sounds  Cardiovascular: RRR  Abdomen: soft, protuberant  Extremities: warm, 1+ LE edema  Skin: no rash  Neurological: lethargic, snoring, PERRLA, twitching fingers    HOSPITAL MEDICATIONS:  MEDICATIONS  (STANDING):  cefepime   IVPB 1000 milliGRAM(s) IV Intermittent once    MEDICATIONS  (PRN):      LABS:                        12.7   7.85  )-----------( 118      ( 30 Oct 2022 15:56 )             39.4     10-30    132<L>  |  97  |  14  ----------------------------<  77  3.8   |  25  |  0.51    Ca    8.1<L>      30 Oct 2022 15:56  Phos  2.8     10-30  Mg     1.9     10-30    TPro  5.3<L>  /  Alb  2.5<L>  /  TBili  0.8  /  DBili  x   /  AST  35  /  ALT  51<H>  /  AlkPhos  104  10-30    PT/INR - ( 30 Oct 2022 17:25 )   PT: 18.5 sec;   INR: 1.60 ratio         PTT - ( 30 Oct 2022 17:25 )  PTT:25.2 sec  Urinalysis Basic - ( 30 Oct 2022 17:43 )    Color: Yellow / Appearance: Clear / S.034 / pH: x  Gluc: x / Ketone: Negative  / Bili: Negative / Urobili: 4 mg/dL   Blood: x / Protein: Trace / Nitrite: Negative   Leuk Esterase: Negative / RBC: 11 /hpf / WBC 2 /HPF   Sq Epi: x / Non Sq Epi: 1 /hpf / Bacteria: Negative      Arterial Blood Gas:  10-30 @ 19:30  7.41/32/133/20/99.3/-3.7  ABG lactate: --    Venous Blood Gas:  10-30 @ 18:55  7.32/47/27/24/34.0  VBG Lactate: 2.3  Venous Blood Gas:  10-30 @ 18:05  7.29/42/28/20/38.7  VBG Lactate: 1.9  Venous Blood Gas:  10-30 @ 15:56  7.38/49/27/29/35.9  VBG Lactate: 2.1      MICROBIOLOGY:     RADIOLOGY:  [ ] Reviewed and interpreted by me    EKG: CHIEF COMPLAINT:    HPI:  82M w/ PMHx of glioblastoma (dx 22, s/p R stereo bx, TIMOTHY x2, R craniectomy for tumor debulking 22, on temozolomide (last dose 4 days prior to admission), HLD, steroid-induced DM, 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 Notably pt was recently discharged from [facility] 10/28 for RT initiation and E faecalis bacteremia and UTI treated with ampicillin and ciprofloxacin. Per daughter at baseline pt is Aox2-3, responds to questions however at rehab pt was found to have several generalized tonic clonic seizures. Pt has had history of seizure like activity and is on home keppra and decadron but vEEG during prior hospitalization was without seizures. Pt never returned to baseline after seizures, and is currently somnolent and minimally opening eyes to touch. Pt was given 2mg ativan and 4mg versed by EMS; on ED arrival pt was loaded with 2.25g keppra and 1.5g depakote, 10mg decadron. Neurology and neurosurgery were consulted, recommended no surgical intervention but MICU eval.     Per discussion with the family at bedside pt is to be DNI. Pt was previously DNR/DNI with completed MOLST however daughter states she would not like any intubation but would be ok with CPR. Pt made DNI.     ED course: Tmax 101/4, 100% 2L NC. CT head/chest showing vasogenic edema of head but no midline shift, ground glass opacities in lung, 2.6cm renal neoplasm. Given vanc/CTX/ampicillin and 1L NS bolus. s/p keppra, decadron, depakote    PAST MEDICAL & SURGICAL HISTORY:  Glioblastoma multiforme      Steroid-induced diabetes mellitus      S/P craniotomy  R craniotomy for debulking with Dr. Mcneil (22)          FAMILY HISTORY:  FH: type 2 diabetes    FH: hyperlipidemia      Allergies    heparin containing compounds (Other)    Intolerances        HOME MEDICATIONS:    REVIEW OF SYSTEMS:  Allergic/Immunologic:  [ ] All other systems negative  [x] Unable to assess ROS because AMS    OBJECTIVE:  ICU Vital Signs Last 24 Hrs  T(C): 38.6 (30 Oct 2022 15:40), Max: 38.6 (30 Oct 2022 15:40)  T(F): 101.4 (30 Oct 2022 15:40), Max: 101.4 (30 Oct 2022 15:40)  HR: 70 (30 Oct 2022 19:35) (70 - 95)  BP: 123/78 (30 Oct 2022 19:35) (110/74 - 150/99)  BP(mean): 92 (30 Oct 2022 19:35) (80 - 116)  ABP: --  ABP(mean): --  RR: 19 (30 Oct 2022 19:35) (16 - 19)  SpO2: 100% (30 Oct 2022 19:35) (100% - 100%)    O2 Parameters below as of 30 Oct 2022 19:35  Patient On (Oxygen Delivery Method): nasal cannula  O2 Flow (L/min): 2            CAPILLARY BLOOD GLUCOSE      POCT Blood Glucose.: 111 mg/dL (30 Oct 2022 19:26)      PHYSICAL EXAM:  General: somnolent, snoring comfortably in bed  HEENT: supple  Lymph Nodes: nonpalpable  Respiratory: course breath sounds  Cardiovascular: RRR  Abdomen: soft, protuberant  Extremities: warm, 1+ LE edema  Skin: no rash  Neurological: lethargic, snoring, PERRLA, twitching fingers    HOSPITAL MEDICATIONS:  MEDICATIONS  (STANDING):  cefepime   IVPB 1000 milliGRAM(s) IV Intermittent once    MEDICATIONS  (PRN):      LABS:                        12.7   7.85  )-----------( 118      ( 30 Oct 2022 15:56 )             39.4     10-30    132<L>  |  97  |  14  ----------------------------<  77  3.8   |  25  |  0.51    Ca    8.1<L>      30 Oct 2022 15:56  Phos  2.8     10-30  Mg     1.9     10-30    TPro  5.3<L>  /  Alb  2.5<L>  /  TBili  0.8  /  DBili  x   /  AST  35  /  ALT  51<H>  /  AlkPhos  104  10-30    PT/INR - ( 30 Oct 2022 17:25 )   PT: 18.5 sec;   INR: 1.60 ratio         PTT - ( 30 Oct 2022 17:25 )  PTT:25.2 sec  Urinalysis Basic - ( 30 Oct 2022 17:43 )    Color: Yellow / Appearance: Clear / S.034 / pH: x  Gluc: x / Ketone: Negative  / Bili: Negative / Urobili: 4 mg/dL   Blood: x / Protein: Trace / Nitrite: Negative   Leuk Esterase: Negative / RBC: 11 /hpf / WBC 2 /HPF   Sq Epi: x / Non Sq Epi: 1 /hpf / Bacteria: Negative      Arterial Blood Gas:  10-30 @ 19:30  7.41/32/133/20/99.3/-3.7  ABG lactate: --    Venous Blood Gas:  10-30 @ 18:55  7.32/47/27/24/34.0  VBG Lactate: 2.3  Venous Blood Gas:  10-30 @ 18:05  7.29/42/28/20/38.7  VBG Lactate: 1.9  Venous Blood Gas:  10-30 @ 15:56  7.38/49/27/29/35.9  VBG Lactate: 2.1      MICROBIOLOGY:     RADIOLOGY:  [x ] Reviewed  by me  < from: CT Head No Cont (10.30.22 @ 16:46) >  IMPRESSION:    Redemonstration of right frontoparietal craniectomy for a previously   identified intra-axial mass, with slight minimal increase in extent of   subjacent vasogenic edema, which extends from the right frontal lobe and   crosses midline via the corpus callosum. However, the degree of mild   right frontal lobe sulcal effacement is not significantly changed, and   there is no evidence for increased midline shift or herniation.    < end of copied text >  < from: CT Abdomen and Pelvis w/ IV Cont (10.30.22 @ 16:46) >    IMPRESSION:  Nonspecific peripheral groundglass opacities in both upper lobes, right   middle lobe and right lower lobe. Infection is a differential   consideration.    < end of copied text >    EKG:

## 2022-10-30 NOTE — ED CLERICAL - NS ED CLERK NOTE PRE-ARRIVAL INFORMATION; ADDITIONAL PRE-ARRIVAL INFORMATION
This patient is enrolled in the [facility] Care Transitions program and has active care navigation. This patient can be followed up by the care navigation team within 24 hours. To arrange close follow-up or to obtain additional clinical information about this patient, please call the contact number above.

## 2022-10-30 NOTE — ED ADULT NURSE REASSESSMENT NOTE - NS ED NURSE REASSESS COMMENT FT1
PT straight catheterized under sterile technique with 2 RN's at the bedside as per MD order. PT tolerated well. Approximately 600 mL clear, yellow urine drained. UA/UC sent as per MD order. Safety and comfort maintained. Call bell within reach.

## 2022-10-30 NOTE — ED PROVIDER NOTE - PROGRESS NOTE DETAILS
Kody Lowery PGY-3 spoke with nsg and neurology, will come evaluate the patient.   had extensive GOC conversation with daughter who signed initial DNR/DNI, state that patient is to remain DNR/DNI currently however will rescind for any further treatment options available after speaking with nsg and neurology. Kody Lowery PGY-3 MICU bedside, patient having twitching of R hand, concern for continuing seizure activity, needs urgent EEG.  neurology recommending finishing keppra load up to 3g, gave patient another 2250mg.   NSG no surgical intervention. decadron for worsening edema.  MICU bedside, will need extensive GOC coversation with palliative. will likely need higher level of care given severity of dz, pending micu formal eval.

## 2022-10-30 NOTE — ED PROVIDER NOTE - CLINICAL SUMMARY MEDICAL DECISION MAKING FREE TEXT BOX
82y M hx of brain CA, p/w seizures on keppra, given versed and ativan prior to arrival. DNR DNI, 3 seizures PTA. concern for status epilepticus due to worsening brain ca, edema, bleed, infection, metabolic. will load with Depakote, start fluids, obtain labs and imaging to eval dz. argelia need nsg and neurology input given severity of sxs. 82y M hx of brain CA, p/w seizures on keppra, given versed and ativan prior to arrival. DNR DNI, 3 seizures PTA. concern for status epilepticus due to worsening brain ca, edema, bleed, infection, metabolic. will load with Depakote, start fluids, obtain labs and imaging to oliver guerrier. argelia need nsg and neurology input given severity of sxs.  Attending Monisha Ma: 81 yo male with ho Glioblastoma followed by neurosurgery presenting with AMS. per family member pt with multiple seizures today. received ativan and versed as well as keppra prior to arrival. in the ed pt sleepy not following commands. placed on end tidal. concern for possible status as pt has not been back to baseline since seizures started and multiple seizures occurred. neurology consulted for recommendations. given additional keppra. pt febrile in the ed. pt somnolent in the ed and not following commands. given broad spectruma abx to cover cns infection as well as prior bacteria on cultures. d/w neuro and neuro surgery to arrange EEG. pt dnr/dni, will d/w ICU for further seizure and neuro monitoring

## 2022-10-30 NOTE — ED ADULT NURSE NOTE - COVID-19  TEST TYPE
[facility] [place] asking for DME order for 2 grab bars for shower.  Also need the diagnosis code. Please fax to Carmen at [phone].    Please advise. Thank you.    MOLECULAR PCR

## 2022-10-30 NOTE — ED ADULT NURSE NOTE - OBJECTIVE STATEMENT
PT is an 82 year old currently non-verbal and non-responsive male (A&OX1, awake, and alert at baseline) with PMH of malignant brain neoplasm, HLD, seizures, thrombocytopenia, GERD, and DVT of right LE (not currently on anticoagulants) who presents to the ED via EMS with c/o AMS and seizures. PT's daughter at the bedside, serves as historian, states she witnessed PT have two seizures today at [facility]. Daughter states PT has been at the rehab center fo less than 48 hours and was sent there "to gain strength to walk again as he has been weak lately." Daughter states the first seizure was at 10:00 AM and lasted about 1 minute and is unsure of when the second seizure began but states it lasted about 50 seconds. PT was given 2 mg of Ativan and 750 mg of Keppra at the rehab center, and then EMS states PT seized again en route to the ED and was given 5 mg of Versed by EMS. EMS endorsing left-sided gaze, still present in ED. PT is laying in bed, breathing labored but sating at 100% on room air, and unresponsive to verbal or painful stimuli. PT not eye opening at all. Abdomen is soft, non-tender, and non-distended. Ecchymosis noted to left mid-abdomen, daughter states bruising occurred when PT was on anticoagulants for blood clots but is not currently on anticoagulants. Skin is warm and dry, no diaphoresis noted. 1+ pitting edema noted to B/L lower extremities. No strength in B/L extremities. IV access established 20G in left AC and left wrist. PT placed in hospital gown, non-slip socks applied. EKG completed, PT placed on cardiac monitor. PT febrile to 101.4 rectally. Pressure injury noted to sacrum, clean and dry dressing intact. Safety and comfort maintained. Family at the bedside. PT is an 82 year old currently non-verbal and non-responsive male (A&OX1, awake, and alert at baseline) with PMH of malignant brain neoplasm, HLD, seizures, thrombocytopenia, GERD, and DVT of right LE (not currently on anticoagulants) who presents to the ED via EMS with c/o AMS and seizures. PT's daughter at the bedside, serves as historian, states she witnessed PT have two seizures today at [facility]. Daughter states PT has been at the rehab center fo less than 48 hours and was sent there "to gain strength to walk again as he has been weak lately." Daughter states the first seizure was at 10:00 AM and lasted about 1 minute and is unsure of when the second seizure began but states it lasted about 50 seconds. PT was given 2 mg of Ativan and 750 mg of Keppra at the rehab center, and then EMS states PT seized again en route to the ED and was given 5 mg of Versed by EMS. EMS endorsing left-sided gaze, still present in ED. Daughter states PT tested positive for COVID on 10/03/2022. PT is laying in bed, breathing labored but sating at 100% on room air, and unresponsive to verbal or painful stimuli. PT not eye opening at all. Abdomen is soft, non-tender, and non-distended. Ecchymosis noted to left mid-abdomen, daughter states bruising occurred when PT was on anticoagulants for blood clots but is not currently on anticoagulants. Skin is warm and dry, no diaphoresis noted. 1+ pitting edema noted to B/L lower extremities. No strength in B/L extremities. IV access established 20G in left AC and left wrist. PT placed in hospital gown, non-slip socks applied. EKG completed, PT placed on cardiac monitor. PT febrile to 101.4 rectally. Pressure injury noted to sacrum, clean and dry dressing intact. Safety and comfort maintained. Family at the bedside.

## 2022-10-30 NOTE — CHART NOTE - NSCHARTNOTEFT_GEN_A_CORE
CAPRINI SCORE [CLOT] Score on Admission for     AGE RELATED RISK FACTORS                                                       MOBILITY RELATED FACTORS  [ ] Age 41-60 years                                            (1 Point)                  [ ] Bed rest                                                        (1 Point)  [ ] Age 61-74 years                                           (2 Points)                 [ ] Plaster cast                                                   (2 Points)  [x] Age > 75 years                                              (3 Points)                 [ ] Bed bound for more than 72 hours         (2 Points)    DISEASE RELATED RISK FACTORS                                               GENDER SPECIFIC FACTORS  [ ] Edema in the lower extremities                       (1 Point)           [ ] Pregnancy                                                          (1 Point)  [ ] Varicose veins                                               (1 Point)                  [ ] Post-partum < 6 weeks                                   (1 Point)             [x] BMI > 25 Kg/m2                                            (1 Point)                  [ ] Hormonal therapy  or oral contraception   (1 Point)                 [ ] Sepsis (in the previous month)                        (1 Point)            [ ] History of pregnancy complications             (1 point)  [ ] Pneumonia or serious lung disease                                            [ ] Unexplained or recurrent               (1 Point)           (in the previous month)                               (1 Point)  [ ] Abnormal pulmonary function test                     (1 Point)                 SURGERY RELATED RISK FACTORS (include planned surgeries)  [ ] Acute myocardial infarction                              (1 Point)                 [ ]  Section                                             (1 Point)  [ ] Congestive heart failure (in the previous month)  (1 Point)        [ ] Minor surgery                                                  (1 Point)   [ ] Inflammatory bowel disease                             (1 Point)                 [ ] Arthroscopic surgery                                        (2 Points)  [ ] Central venous access                                      (2 Points)  [x] History / presence of malignancy                   (2 points)   [ ] General surgery lasting more than 45 minutes   (2 Points)       [ ] Stroke (in the previous month)                          (5 Points)               [ ] Elective arthroplasty                                         (5 Points)                                                                                                                                               HEMATOLOGY RELATED FACTORS                                                 TRAUMA RELATED RISK FACTORS  [x] Prior episodes of VTE                                     (3 Points)                [ ] Fracture of the hip, pelvis, or leg                       (5 Points)  [ ] Positive family history for VTE                         (3 Points)             [ ] Acute spinal cord injury (in the previous month)  (5 Points)  [ ] Prothrombin 24482 A                                     (3 Points)                [ ] Paralysis  (less than 1 month)                             (5 Points)  [ ] Factor V Leiden                                             (3 Points)                   [ ] Multiple Trauma within 1 month                        (5 Points)  [ ] Lupus anticoagulants                                     (3 Points)                                                           [ ] Anticardiolipin antibodies                               (3 Points)                                                       [ ] High homocysteine in the blood                      (3 Points)                                             [ ] Other congenital or acquired thrombophilia      (3 Points)                                                [ ] Heparin induced thrombocytopenia                  (3 Points)                                          Total Score [9]    Risk:  Very low 0   Low 1 to 2   Moderate 3 to 4   High =5       VTE Prophylaxis Recommendations:  [x] mechanical pneumatic compression devices                                      [ ] contraindicated: _____________________  [ ] chemoprophylaxis                                                                                    [ ] contraindicated: _____________________    **** HIGH LIKELIHOOD DVT PRESENT ON ADMISSION  [ ] (please order LE dopplers within 24 hours of admission)

## 2022-10-30 NOTE — ED ADULT NURSE REASSESSMENT NOTE - NS ED NURSE REASSESS COMMENT FT1
Receiving RN aware of PT's current mental status and baseline mental. RN aware of PT's COVID positive status and that logistics states PT needs isolation still despite initial positive test being from 10/03/2022.

## 2022-10-30 NOTE — ED PROVIDER NOTE - PHYSICAL EXAMINATION
Vital signs reviewed  GENERAL: post ictal   HEAD: NCAT  EYES: Anicteric, perrla  ENT: MMM  NECK: Supple, non tender  RESPIRATORY: Normal respiratory effort  CARDIOVASCULAR: Regular rate and rhythm  ABDOMEN: Soft. Nondistended. small area of ecchymosis to R abdomen.   MUSCULOSKELETAL/EXTREMITIES: Brisk cap refill. 2+ radial pulses. No leg edema.  SKIN:  Warm and dry  NEURO: aaox0 Vital signs reviewed  GENERAL: post ictal   HEAD: NCAT  EYES: Anicteric, perrla  ENT: MMM  NECK: Supple, non tender  RESPIRATORY: Normal respiratory effort  CARDIOVASCULAR: Regular rate and rhythm  ABDOMEN: Soft. Nondistended. small area of ecchymosis to R abdomen.   MUSCULOSKELETAL/EXTREMITIES: Brisk cap refill. 2+ radial pulses. No leg edema.  SKIN:  Warm and dry  NEURO: aaox0  Attending Monisha Ma: Gen :ill appearing, heent; atraumatic, perrla, mmm, neck: nttp, chest: nttp, no crepitus, cv: rrr, lungs; scattered rhonchi, abdd: soft, nontender, nondistended. no peritoneal signs. ext: wwp, no deformity. neuro: sleeping not following commands

## 2022-10-30 NOTE — H&P ADULT - ASSESSMENT
ASSESSMENT/PLAN: 83 y/o male with GBM s/p TIMOTHY, chemo and radiation discharged to rehab presenting with multiple seizures and no return to baseline exam and concern for status    NEURO:    - Neurochecks q1 hrs  -loaded with Keppra and VPA in ED, continue Keppra 1g q8, neurology also following, spot EEG in ER showed no seizures  -cerebell placed, EEG in the morning  -Decadron 4q6 for cerebral edema   -no further neurosurgical intervention for GBM, received 9 fractions of RT which completed 10/27  -palliative consult, continue GOC      PULM:    - O2 sat > 92%  -aspiration precautions  -on face mask due to mouth breathing  -hx of being covid+ without disease on 10/3, repeat on this admission still positive, need to confirm with ID whether this is shedding and how long precautions must take place    CV:  MAP > 65  pressors if needed    RENAL:  Fluids: 2%-60 for cerebral edema, goal 140-150  *renal mass seen in [facility] of kidney on CTAP, although family focusing on overall quality of life and do not wish to intervene on this mass    GI:    NPO for now, pending further GOC discussions with the family    ENDO:   hx of steroid induced DM,   a1c is 9.3, monitor FGS   ISS    HEME/ONC:  hx of Rt popliteal DVT was initally on lovenox although d/c'd due to a drop in Hgb and thrombocytopenia, IVCF filter placed 9/28. Rt arm found to be swollen on 10/20 and found to have RUE DVT.  Per last heme note on 10/27, hold off on AC and follow with dopplers in one week, ordered  no SCDs on Rt leg    ID:    febrile in ED, Blood cultures sent, f/u  UA negative    DISPOSITION: ICU    Miscellaneous: confirmed with family that patient is DNR AND DNI. Molst in chart.  It was explained to them that CPR would be futile without intubating and they agreed and do not wish to proceed with chest compressions if patient were to need them.  ASSESSMENT/PLAN: 83 y/o male with GBM s/p TIMOTHY, chemo and radiation discharged to rehab presenting with multiple seizures and no return to baseline exam and concern for status    NEURO:    - Neurochecks q1 hrs  -loaded with Keppra and VPA in ED, continue Keppra 1g q8, neurology also following, spot EEG in ER showed no seizures  -cerebell placed, EEG in the morning  -Decadron 4q6 for cerebral edema   -no further neurosurgical intervention for GBM, received 9 fractions of RT which completed 10/27  -palliative consult, continue GOC      PULM:    - O2 sat > 92%  -aspiration precautions  -on face mask due to mouth breathing  -hx of being covid+ without disease on 10/3, repeat on this admission still positive, need to confirm with ID whether this is shedding and how long precautions must take place    CV:  MAP > 65  pressors if needed    RENAL:  Fluids: 2%-60 for cerebral edema, goal 140-150      GI:    NPO for now, risk of aspirating; pending further GOC discussions with the family  no need for NGT for the time being    ENDO:   hx of steroid induced DM,   a1c is 9.3, monitor FGS   ISS    HEME/ONC:  hx of Rt popliteal DVT was initally on lovenox although d/c'd due to a drop in Hgb and thrombocytopenia, IVCF filter placed 9/28. Rt arm found to be swollen on 10/20 and found to have RUE DVT.  Per last heme note on 10/27, hold off on AC and follow with dopplers in one week, ordered  no SCDs on Rt leg    ID:    febrile in ED, Blood cultures sent, f/u  UA negative    DISPOSITION: ICU    Miscellaneous: confirmed with family that patient is DNR AND DNI. Molst in chart.  It was explained to them that CPR would be futile without intubating and they agreed and do not wish to proceed with chest compressions if patient were to need them.

## 2022-10-30 NOTE — CONSULT NOTE ADULT - CRITICAL CARE ATTENDING COMMENT
HPI as per resident note, personally verified by me. Patient with multiple seizures without return to baseline. No reports of further seizures s/p Keppra 3g IV and VPA 1.5g IV loads. Not intubated, non-rebreather mask on, remains with poor mental status.    GTT:  None    Neurologic exam:  MS - Obtunded to stuporous, attends to all stimuli b/l (less visual), no speech output, does not follow commands. Due to clinical condition unable to assess (RANDY) orientation, rep/naming, follows commands, attn/conc/recent and remote memory/fund of knowledge  CN - PERRL, EOMI by OCR but with dysconjugate gaze (L eye exotropia) at baseline, VFF to threat b/l, face sens/str intact by corneals b/l, hearing intact to conversation b/l, (+) spontaneous respirations (NRB mask on), SCM at least 4/5 b/l and symmetric. RANDY tongue/palate  Motor - Normal bulk/tone. Diffuse tremulousness noted. Minimal spontaneous movement. BUE's 2/5, BLE's proximal 1/5, distal 2/5 with strong noxious stimuli as he localizes and withdraws  Sens - As per Motor above  DTR's - 1+ BUE's, 0+ BLE's, and neutral b/l plantar response  Coord - RANDY  Gait and station - RANDY    Albumin dec 2.5, LFT's with inc ALT 51    A/P:  Epilepsy, not intractable, with status epilepticus (currently improved)  Encephalopathy  GBM s/p resection with vasogenic edema  Recent COVID-19 infection  Hyponatremia (Na dec 134)    - Etiology for breakthrough seizures includes worsening of vasogenic edema, recent COVID-19 infection, toxic/metabolic abnormalities, or simply worsening epilepsy. No evidence of current clinical or electrographic seizures. Patient is DNR/DNI and team is pending [facility] discussions with the family  - vEEG to evaluate for focal slowing, epileptiform discharges, or seizures  - Continue AED's with Keppra 1g IV/PO TID  - Continue to address above medical and surgical issues, as you are doing  - Will continue to follow patient with you, as needed. For additional questions or concerns please call [phone]

## 2022-10-30 NOTE — PROGRESS NOTE ADULT - ASSESSMENT
Summary:  81 yo with non operative GBM, DNR/DNI, now with gradual decline culminating in status    NEURO:  q1h neuro checks; ativan PRN; keppra, consider adding 2nd/3rd line versus palliative; decadron for edema; ceribell overnight, cEEG in AM    CARDS:  MAP > 65    PULM:  sat > 92%    RENAL:  2% target 140-150    GASTRO:  NPO for now  ---> Stress ulcer prophylaxis:  PPI    HEME:  monitor H/H    ---> DVT prophylaxis: SCDs, hold anticoagulation, start SQL tomorrow    ENDO:  euglycemia    ID:  afebrile    Code status:  Full code  Disposition:  ICU    This patient was at high risk of neurologic deterioration and/or death due to: status, tumor    ATTENDING STATEMENT:  Patient is critically ill, requiring critical care services.     Attending: I have personally and independently provided 35 minutes of critical care services.  This excludes any time spent on separate procedures or teaching.

## 2022-10-30 NOTE — CONSULT NOTE ADULT - ATTENDING COMMENTS
Patient seen and examined.  Agree with resident note as above.  Patient with hx as noted including GBM dx in July s/p surgery/chemo/rad and then with a long admission in Sept/Oct complicated by UTI/bacteremia/DVT s/p IVCF/steroid induced DM/GIB/esophogeal candidiasis/COVID and d/c'ed 2 days ago with dexameth/keppra who now presents with multiple seizures compromising mentation and intermittently compromising airway (mild snoring/ phlegm).  On exam, patient has roving eye movements and intermittent moaning.  No convulsions.  Brief drop in BP while in ER but POCUS done by ER (with me at bedside) showed plethoric IVC and intact ventricular function.    Daughter (HCP) and wife at bedside who confirm DNI.  They state that they would want CPR at this time, and I discussed with them that CPR would be futile in the absence of intubation, and also that if his case progressed to cardiac arrest, that the prognosis would be dismal.  Daughter and wife plan to discuss further.    -agree with broader abx to cover hospital acquired infections  -defer to neuro regarding AEDs, await EEG read  -defer to NSGY regarding any need for mechanical intervention   -favor admit to NSCU given uncontrolled seizure is the primary reason for admission; discussed with ER

## 2022-10-30 NOTE — PROGRESS NOTE ADULT - SUBJECTIVE AND OBJECTIVE BOX
HPI:  82M w/ PMHx of glioblastoma (dx 7/18/22, s/p R stereo bx, TIMOTHY x2, R crani for tumor debulking 7/28/22, on temozolomide (last dose 4 days prior to admission), HLD, steroid-induced DM, h/o HIT, R popliteal DVT now s/p IVCF on 9/28. Discharged to rehab to continue radiation treatment which finished 10/28 course then was complicated with enterococcus bacteremia with antibiotics through 10/28. Baseline Ox2, although per family he has become less reponsive over the last couple of days. On 10/30 had two GTCs at rehab and never returned to baseline en route with EMS had another seizure, was given ativan and versed at that time. Upon arrival to the ED was loaded with 3g of Keppra and 1500 of VPA.  Pt transfered to NSCU for concern of status epilepticus. (30 Oct 2022 23:11)    On admission, the patient was:  GCS:  6    *** HIGH RISK OF DVT PRESENT ON ADMISSION ***    VITALS:  T(C): , Max: 38.6 (10-30-22 @ 15:40)  HR:  (70 - 102)  BP:  (91/56 - 150/99)  ABP: --  RR:  (13 - 19)  SpO2:  (98% - 100%)  Wt(kg): --      10-30-22 @ 07:01  -  10-31-22 @ 05:52  --------------------------------------------------------  IN: 300 mL / OUT: 0 mL / NET: 300 mL      LABS:  Na: 134 (10-30 @ 20:13), 132 (10-30 @ 15:56), 133 (10-30 @ 15:22)  K: 3.4 (10-30 @ 20:13), 3.8 (10-30 @ 15:56), 3.6 (10-30 @ 15:22)  Cl: 104 (10-30 @ 20:13), 97 (10-30 @ 15:56), 100 (10-30 @ 15:22)  CO2: 20 (10-30 @ 20:13), 25 (10-30 @ 15:56), 19 (10-30 @ 15:22)  BUN: 13 (10-30 @ 20:13), 14 (10-30 @ 15:56), 13 (10-30 @ 15:22)  Cr: 0.40 (10-30 @ 20:13), 0.51 (10-30 @ 15:56), 0.43 (10-30 @ 15:22)  Glu: 132(10-30 @ 20:13), 77(10-30 @ 15:56), 194(10-30 @ 15:22)    Hgb: 12.7 (10-30 @ 15:56)  Hct: 39.4 (10-30 @ 15:56)  WBC: 7.85 (10-30 @ 15:56)  Plt: 118 (10-30 @ 15:56)  PT: 18.5 (10-30 @ 17:25)  INR: 1.60 (10-30 @ 17:25)  aPTT: 25.2 (10-30 @ 17:25)    IMAGING:   Recent imaging studies were reviewed.    MEDICATIONS:  acetaminophen    Suspension .. 650 milliGRAM(s) Oral every 6 hours PRN  chlorhexidine 4% Liquid 1 Application(s) Topical <User Schedule>  dexAMETHasone  Injectable 4 milliGRAM(s) IV Push every 6 hours  insulin lispro (ADMELOG) corrective regimen sliding scale   SubCutaneous every 6 hours  levETIRAcetam  IVPB 1000 milliGRAM(s) IV Intermittent <User Schedule>  ondansetron Injectable 4 milliGRAM(s) IV Push every 6 hours PRN  pantoprazole  Injectable 40 milliGRAM(s) IV Push daily  polyethylene glycol 3350 17 Gram(s) Oral daily  senna 2 Tablet(s) Oral at bedtime  sodium chloride 2% . 1000 milliLiter(s) IV Continuous <Continuous>    EXAMINATION:  General:  calm  HEENT:  MMM  Neuro:  does not open eyes, does not follow commands, moans unintelligibly, no spontaneous movement x 4  Cards:  RRR  Respiratory:  no respiratory distress  Adomen:  soft  Extremities:  1-2+ b/l LE edema  Skin:  warm/dry

## 2022-10-30 NOTE — ED ADULT TRIAGE NOTE - NS ED NURSE BANDS TYPE
The spouse states the patient is currently in patient, but will be released today. She questions whether the INR results have been received that were done while he was in the hospital and what to do with his warfarin after discharge.   Name band;

## 2022-10-30 NOTE — CONSULT NOTE ADULT - ASSESSMENT
Rikki Hendrickson  82M DNR/DNI pt Dr. Mcneil hx IDHwt GBM s/p TIMOTHY/debulking 7/22 s/p chemo/rads. P/f 3-seizures w/o return to baseline.  CTH:no heme, increased edema in R frontal lobe, stable shift. S/p benzos for status  Exam: somnolent, pupils 2mm R b/l, + dolls eyes, LUE rhythmic contraction other extremities flaccid  -no acute neurosurgery intervention  -neurology consult for status, can consider adm neurology vs medicine for seizure control until palliative is able to see  -After family discussion, they would like to affirm the patient's DNR/DNI status, they want seizure control but not to the point where he will require intubation

## 2022-10-30 NOTE — H&P ADULT - NSHPLABSRESULTS_GEN_ALL_CORE
10-30    134<L>  |  104  |  13  ----------------------------<  132<H>  3.4<L>   |  20<L>  |  0.40<L>    Ca    6.7<L>      30 Oct 2022 20:13  Phos  2.8     10-30  Mg     1.9     10-30    TPro  5.3<L>  /  Alb  2.5<L>  /  TBili  0.8  /  DBili  x   /  AST  35  /  ALT  51<H>  /  AlkPhos  104  10-30                          12.7   7.85  )-----------( 118      ( 30 Oct 2022 15:56 )             39.4   < from: CT Head No Cont (10.30.22 @ 16:46) >    IMPRESSION:    Redemonstration of right frontoparietal craniectomy for a previously   identified intra-axial mass, with slight minimal increase in extent of   subjacent vasogenic edema, which extends from the right frontal lobe and   crosses midline via the corpus callosum. However, the degree of mild   right frontal lobe sulcal effacement is not significantly changed, and   there is no evidence for increased midline shift or herniation.    < end of copied text >    < from: MR Head w/wo IV Cont (09.27.22 @ 09:57) >    MPRESSION:    4.1 x 4.8 x 4.4 cm heterogeneously and peripherally enhancing necrotic   lesion centered in the mesial right frontal lobe, extending across the   corpus callosum into the left mesial frontal lobe. Findings may represent   the presence of residual/recurrent neoplasm and/or posttreatment changes.    Peripheral enhancement of right frontal surgical tract which may be   postsurgical in nature. The presence of neoplasm is not excluded..    Overall decreased, but still moderate nonspecific T2 and FLAIR   hyperintense signal surrounding this lesion. This may represent   nonenhancing neoplasm and/or posttreatment changes.    Overall decreased relative cerebral blood flow, decreased relative   cerebral blood volume, and subtly increased mean transit time in the   region of the surgical bed, suggesting the presence of posttreatment   changes. The presence of neoplasm is not excluded.    < end of copied text >

## 2022-10-30 NOTE — CONSULT NOTE ADULT - SUBJECTIVE AND OBJECTIVE BOX
p (1480)     HPI: Rikki Hendrickson  82M DNR/DNI pt Dr. Mcneil hx IDHwt GBM s/p TIMOTHY/debulking 7/22 s/p chemo/rads. P/f 3-seizures w/o return to baseline.  CTH:no heme, increased edema in R frontal lobe, stable shift. S/p benzos for status  Exam: somnolent, pupils 2mm R b/l, + dolls eyes, LUE rhythmic contraction other extremities flaccid    =====================  PAST MEDICAL HISTORY   Glioblastoma multiforme    Steroid-induced diabetes mellitus      PAST SURGICAL HISTORY   S/P craniotomy      heparin containing compounds (Other)      MEDICATIONS:  Antibiotics:  cefepime   IVPB 1000 milliGRAM(s) IV Intermittent once    Neuro:    Other:      SOCIAL HISTORY:   Occupation:   Marital Status:     FAMILY HISTORY:  FH: type 2 diabetes    FH: hyperlipidemia        ROS: Negative except per HPI    LABS:  PT/INR - ( 30 Oct 2022 17:25 )   PT: 18.5 sec;   INR: 1.60 ratio         PTT - ( 30 Oct 2022 17:25 )  PTT:25.2 sec                        12.7   7.85  )-----------( 118      ( 30 Oct 2022 15:56 )             39.4     10-30    132<L>  |  97  |  14  ----------------------------<  77  3.8   |  25  |  0.51    Ca    8.1<L>      30 Oct 2022 15:56  Phos  2.8     10-30  Mg     1.9     10-30    TPro  5.3<L>  /  Alb  2.5<L>  /  TBili  0.8  /  DBili  x   /  AST  35  /  ALT  51<H>  /  AlkPhos  104  10-30

## 2022-10-30 NOTE — ED ADULT NURSE REASSESSMENT NOTE - NS ED NURSE REASSESS COMMENT FT1
PHYLLIS Morataya here for PT transport, PT's blood pressure dropped to 91/56 (66). PHYLLIS Morataya aware and states she is okay to transport patient at this time. No ED RN interventions required at this time.

## 2022-10-30 NOTE — H&P ADULT - BIRTH SEX
Patient saturating 82% on 15L high flow nasal cannula with labored breathing. Pulmonary paged and Vapotherm was started. Patient is on 40L and 100% and saturating 94-98%. Male

## 2022-10-30 NOTE — CONSULT NOTE ADULT - SUBJECTIVE AND OBJECTIVE BOX
Neurology - Consult Note    -  Spectra: [phone] ([facility]), [phone] ([facility]  -    HPI: Patient ANA LOVE is a 82y (1940) man with a PMHx significant for GBM (s/p resection 7/2022),       Review of Systems:  INCOMPLETE   CONSTITUTIONAL: No fevers or chills  EYES AND ENT: No visual changes or no throat pain   NECK: No pain or stiffness  RESPIRATORY: No hemoptysis or shortness of breath  CARDIOVASCULAR: No chest pain or palpitations  GASTROINTESTINAL: No melena or hematochezia  GENITOURINARY: No dysuria or hematuria  NEUROLOGICAL: +As stated in HPI above  SKIN: No itching, burning, rashes, or lesions   All other review of systems is negative unless indicated above.    Allergies:  heparin containing compounds (Other)      PMHx/PSHx/Family Hx: As above, otherwise see below   Brain tumor    Glioblastoma multiforme    Steroid-induced diabetes mellitus    Hyperlipidemia        Social Hx:  No current use of tobacco, alcohol, or illicit drugs  Lives with ***    Medications:  MEDICATIONS  (STANDING):    MEDICATIONS  (PRN):      Vitals:  T(C): 38.6 (10-30-22 @ 15:40), Max: 38.6 (10-30-22 @ 15:40)  HR: 90 (10-30-22 @ 16:45) (90 - 95)  BP: 111/66 (10-30-22 @ 16:45) (111/66 - 150/99)  RR: 16 (10-30-22 @ 16:45) (16 - 18)  SpO2: 100% (10-30-22 @ 16:45) (100% - 100%)    Physical Examination: INCOMPLETE  General - NAD  Cardiovascular - Peripheral pulses palpable, no edema  Eyes - Fundoscopy with flat, sharp optic discs and no hemorrhage or exudates; Fundoscopy not well visualized; Fundoscopy not performed due to safety precautions in the setting of the COVID-19 pandemic    Neurologic Exam:  Mental status - Awake, Alert, Oriented to person, place, and time. Speech fluent, repetition and naming intact. Follows simple and complex commands. Attention/concentration, recent and remote memory (including registration and recall), and fund of knowledge intact    Cranial nerves - PERRLA, VFF, EOMI, face sensation (V1-V3) intact b/l, facial strength intact without asymmetry b/l, hearing intact b/l, palate with symmetric elevation, trapezius OR sternocleidomastiod 5/5 strength b/l, tongue midline on protrusion with full lateral movement    Motor - Normal bulk and tone throughout. No pronator drift.  Strength testing            Deltoid      Biceps      Triceps     Wrist Extension    Wrist Flexion     Interossei         R            5                 5               5                     5                              5                        5                 5  L             5                 5               5                     5                              5                        5                 5              Hip Flexion    Hip Extension    Knee Flexion    Knee Extension    Dorsiflexion    Plantar Flexion  R              5                           5                       5                           5                            5                          5  L              5                           5                        5                           5                            5                          5    Sensation - Light touch/temperature OR pain/vibration intact throughout    DTR's -             Biceps      Triceps     Brachioradialis      Patellar    Ankle    Toes/plantar response  R             2+             2+                  2+                       2+            2+                 Down  L              2+             2+                 2+                        2+           2+                 Down    Coordination - Finger to Nose intact b/l. No tremors appreciated    Gait and station - Normal casual gait. Romberg (-)    Labs:                        12.7   7.85  )-----------( 118      ( 30 Oct 2022 15:56 )             39.4     10-30    132<L>  |  97  |  14  ----------------------------<  77  3.8   |  25  |  0.51    Ca    8.1<L>      30 Oct 2022 15:56  Phos  2.8     10-30  Mg     1.9     10-30    TPro  5.3<L>  /  Alb  2.5<L>  /  TBili  0.8  /  DBili  x   /  AST  35  /  ALT  51<H>  /  AlkPhos  104  10-30    CAPILLARY BLOOD GLUCOSE      POCT Blood Glucose.: 187 mg/dL (28 Oct 2022 17:28)    LIVER FUNCTIONS - ( 30 Oct 2022 15:56 )  Alb: 2.5 g/dL / Pro: 5.3 g/dL / ALK PHOS: 104 U/L / ALT: 51 U/L / AST: 35 U/L / GGT: x               CSF:                  Radiology:     Neurology - Consult Note    -  Spectra: [phone] ([facility]), [phone] ([facility])  -    HPI: Patient ANA LOVE is a 82y (1940) man with a PMHx significant for GBM (s/p resection 7/2022), seizures, who presents w/ CC of seizure. Patient recently was admitted in late September for dark stool. Received radiation while admitted and was discharged to rehab 2 days ago. The goal of rehab was to regain walking function. Upon arrival to rehab, he was mentating at his baseline and was answering questions appropriately. However, over the past day, he has become more confused, slower to respond to questions, and intermittently shaking his limbs. This AM, daughter went to visit him and witnessed GTC at 10am. He had multiple events, 5 min apart from each other, and has not returned to baseline. He is currently obtunded and not answering questions or reacting to noxious stimuli (sternal rub, etc).      Review of Systems:  unable to assess 2/2 mental status  CONSTITUTIONAL: No fevers or chills  EYES AND ENT: No visual changes or no throat pain   NECK: No pain or stiffness  RESPIRATORY: No hemoptysis or shortness of breath  CARDIOVASCULAR: No chest pain or palpitations  GASTROINTESTINAL: No melena or hematochezia  GENITOURINARY: No dysuria or hematuria  NEUROLOGICAL: +As stated in HPI above  SKIN: No itching, burning, rashes, or lesions   All other review of systems is negative unless indicated above.    Allergies:  heparin containing compounds (Other)      PMHx/PSHx/Family Hx: As above, otherwise see below   Brain tumor    Glioblastoma multiforme    Steroid-induced diabetes mellitus    Hyperlipidemia        Social Hx:  Portuguese speaking    Medications:  MEDICATIONS  (STANDING):    MEDICATIONS  (PRN):      Vitals:  T(C): 38.6 (10-30-22 @ 15:40), Max: 38.6 (10-30-22 @ 15:40)  HR: 90 (10-30-22 @ 16:45) (90 - 95)  BP: 111/66 (10-30-22 @ 16:45) (111/66 - 150/99)  RR: 16 (10-30-22 @ 16:45) (16 - 18)  SpO2: 100% (10-30-22 @ 16:45) (100% - 100%)    Physical Examination:   General - NAD  Cardiovascular - Peripheral pulses palpable, no edema  Eyes - Fundoscopy not performed due to safety precautions in the setting of the COVID-19 pandemic    Neurologic Exam:  Mental status - obtunded, non-verbal, non-reactive to sternal rub    Cranial nerves - PERRL, no gaze deviation, facial expression grossly symmetric    Motor - Normal bulk and tone throughout. No spontaneous movement of extremities    Sensation - non-reactive to noxious stimuli in all extremities    DTR - toes mute b/l        Labs:                        12.7   7.85  )-----------( 118      ( 30 Oct 2022 15:56 )             39.4     10-30    132<L>  |  97  |  14  ----------------------------<  77  3.8   |  25  |  0.51    Ca    8.1<L>      30 Oct 2022 15:56  Phos  2.8     10-30  Mg     1.9     10-30    TPro  5.3<L>  /  Alb  2.5<L>  /  TBili  0.8  /  DBili  x   /  AST  35  /  ALT  51<H>  /  AlkPhos  104  10-30    CAPILLARY BLOOD GLUCOSE      POCT Blood Glucose.: 187 mg/dL (28 Oct 2022 17:28)    LIVER FUNCTIONS - ( 30 Oct 2022 15:56 )  Alb: 2.5 g/dL / Pro: 5.3 g/dL / ALK PHOS: 104 U/L / ALT: 51 U/L / AST: 35 U/L / GGT: x              Neurology - Consult Note    -  Spectra: [phone] ([facility]), [phone] ([facility])  -    HPI: Patient ANA LOVE is a 82y (1940) man with a PMHx significant for GBM (s/p resection 7/2022), seizures, who presents w/ CC of seizure. Patient recently was admitted in late September for dark stool. Received radiation while admitted and was discharged to rehab 2 days ago. The goal of rehab was to regain walking function. Upon arrival to rehab, he was mentating at his baseline and was answering questions appropriately. However, over the past day, he has become more confused, slower to respond to questions, and intermittently shaking his limbs. This AM, daughter went to visit him and witnessed GTC at 10am. He had multiple events, 5 min apart from each other, and has not returned to baseline. He is currently obtunded and not answering questions or reacting to noxious stimuli (sternal rub, etc).      Review of Systems:  unable to assess 2/2 mental status  CONSTITUTIONAL: No fevers or chills  EYES AND ENT: No visual changes or no throat pain   NECK: No pain or stiffness  RESPIRATORY: No hemoptysis or shortness of breath  CARDIOVASCULAR: No chest pain or palpitations  GASTROINTESTINAL: No melena or hematochezia  GENITOURINARY: No dysuria or hematuria  NEUROLOGICAL: +As stated in HPI above  SKIN: No itching, burning, rashes, or lesions   All other review of systems is negative unless indicated above.    Allergies:  heparin containing compounds (Other)      PMHx/PSHx/Family Hx: As above, otherwise see below   Brain tumor    Glioblastoma multiforme    Steroid-induced diabetes mellitus    Hyperlipidemia        Social Hx:  Amharic speaking. No reports of current tobacco, alcohol, or illicit drug use    Medications:  MEDICATIONS  (STANDING):    MEDICATIONS  (PRN):      Vitals:  T(C): 38.6 (10-30-22 @ 15:40), Max: 38.6 (10-30-22 @ 15:40)  HR: 90 (10-30-22 @ 16:45) (90 - 95)  BP: 111/66 (10-30-22 @ 16:45) (111/66 - 150/99)  RR: 16 (10-30-22 @ 16:45) (16 - 18)  SpO2: 100% (10-30-22 @ 16:45) (100% - 100%)    Physical Examination:   General - NAD  Cardiovascular - Peripheral pulses palpable, no edema  Eyes - Fundoscopy not performed due to safety precautions in the setting of the COVID-19 pandemic    Neurologic Exam:  Mental status - obtunded, non-verbal, non-reactive to sternal rub    Cranial nerves - PERRL, no gaze deviation, facial expression grossly symmetric    Motor - Normal bulk and tone throughout. No spontaneous movement of extremities    Sensation - non-reactive to noxious stimuli in all extremities    DTR - toes mute b/l        Labs:                        12.7   7.85  )-----------( 118      ( 30 Oct 2022 15:56 )             39.4     10-30    132<L>  |  97  |  14  ----------------------------<  77  3.8   |  25  |  0.51    Ca    8.1<L>      30 Oct 2022 15:56  Phos  2.8     10-30  Mg     1.9     10-30    TPro  5.3<L>  /  Alb  2.5<L>  /  TBili  0.8  /  DBili  x   /  AST  35  /  ALT  51<H>  /  AlkPhos  104  10-30    CAPILLARY BLOOD GLUCOSE      POCT Blood Glucose.: 187 mg/dL (28 Oct 2022 17:28)    LIVER FUNCTIONS - ( 30 Oct 2022 15:56 )  Alb: 2.5 g/dL / Pro: 5.3 g/dL / ALK PHOS: 104 U/L / ALT: 51 U/L / AST: 35 U/L / GGT: x

## 2022-10-30 NOTE — CONSULT NOTE ADULT - ASSESSMENT
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 MICU consulted for status epilepticus monitoring. Pt is DNI.    #Seizures  Suspect seizures are provoked by underlying infection given fever, GGOs on CT chest, recent admission for E. faecalis bacteremia  -full infectious workup: f/u BCx, UCx  -recommend broad antibiotic coverage per primary team (e.g. 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 MICU consulted for status epilepticus monitoring. Pt is DNI.    #Seizures  Suspect seizures are provoked by underlying neoplasm vs infection   -full infectious workup: f/u BCx, UCx  -recommend broad antibiotic coverage per primary team (e.g. vanc/cefepime)  -pt currently hemodynamically stable, not actively seizing, and protecting airway  -appreciate neuro and neurosurgery recs  -recommend NSICU eval as pt will likely be better suited to monitoring there if status epilepticus is the primary concern  -As pt is DNI, not currently a MICU candidate, please call back with questions or concerns    D/w attending Dr Carlin Wang  PGY-2 MICU

## 2022-10-30 NOTE — CONSULT NOTE ADULT - ASSESSMENT
82y M with Hx GBM s/p resection, seizure d/o (on Keppra), who presents for recurrent GTC and AMS    Impression: acute recurrent GTC and AMS. rEEG showed significant encephalopathy and epileptiform discharges in R frontal region. Concern for AMS due to poorly controlled seizures.    Recommendations:  [] neuro check and vital signs q1h  [] s/p Keppra load 3g (40mg/kg)  [] s/p 1x Depacon 1500mg  [] s/p Ativan 2mg 1x  [] after 8h following loading dose, start Keppra 1g q8h standing  [] fall and seizure precautions  [] depending on GOC, obtain vEEG  [] appreciate MICU, NSCU, and neurosurgery consults  [] given that patient's vitals are rapidly dropping and he is DNR/DNI, consider palliative consult and/or CMO if family agreeable      Case discussed w/ epilepsy attending Dr. Gonzalez. Case to be seen in AM w/ general neurology attending.

## 2022-10-31 NOTE — CONSULT NOTE ADULT - ASSESSMENT
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 Discharged to rehab to continue radiation treatment which finished 10/28 course then was complicated with enterococcus bacteremia with antibiotics through 10/28. Baseline Ox2, although per family he has become less reponsive over the last couple of days. On 10/30 had two GTCs at rehab and never returned to baseline en route with EMS had another seizure, was given ativan and versed at that time. Upon arrival to the ED was loaded with 3g of Keppra and 1500 of VPA.  Pt transfered to NSCU for concern of status epilepticus. Palliative called for goals of care and advance care planning

## 2022-10-31 NOTE — CONSULT NOTE ADULT - SUBJECTIVE AND OBJECTIVE BOX
HPI:  82M w/ PMHx of glioblastoma (dx 22, s/p R stereo bx, TIMOTHY x2, R crani for tumor debulking 22, on temozolomide (last dose 4 days prior to admission), HLD, steroid-induced DM, h/o HIT, R popliteal DVT now s/p IVCF on . Discharged to rehab to continue radiation treatment which finished 10/28 course then was complicated with enterococcus bacteremia with antibiotics through 10/28. Baseline Ox2, although per family he has become less reponsive over the last couple of days. On 10/30 had two GTCs at rehab and never returned to baseline en route with EMS had another seizure, was given ativan and versed at that time. Upon arrival to the ED was loaded with 3g of Keppra and 1500 of VPA.  Pt transfered to NSCU for concern of status epilepticus. (30 Oct 2022 23:11)    PERTINENT PM/SXH:   Brain tumor    Glioblastoma multiforme    Steroid-induced diabetes mellitus    Hyperlipidemia      S/P craniotomy      FAMILY HISTORY:  FH: type 2 diabetes    FH: hyperlipidemia      Family Hx substance abuse [ ]yes [ ]no  ITEMS NOT CHECKED ARE NOT PRESENT    SOCIAL HISTORY:   Significant other/partner[ ]  Children[x ]  Buddhism/Spirituality:  Substance hx:  [ ]   Tobacco hx:  [ ]   Alcohol hx: [ ]   Home Opioid hx:  [ ] I-Stop Reference No:  Living Situation: [x ]Home  [ ]Long term care  [ ]Rehab [ ]Other    ADVANCE DIRECTIVES:    DNR/MOLST  [x ]  Living Will  [ ]   DECISION MAKER(s):  [ ] Health Care Proxy(s)  [x ] Surrogate(s)  [ ] Guardian           Name(s): Phone Number(s):  wife and daughter Faith   BASELINE (I)ADL(s) (prior to admission):  [place]: [ ]Total  [ x] Moderate [ ]Dependent    Allergies    heparin containing compounds (Other)    Intolerances    MEDICATIONS  (STANDING):  chlorhexidine 4% Liquid 1 Application(s) Topical <User Schedule>  dexAMETHasone  Injectable 4 milliGRAM(s) IV Push every 6 hours  enoxaparin Injectable 40 milliGRAM(s) SubCutaneous <User Schedule>  insulin lispro (ADMELOG) corrective regimen sliding scale   SubCutaneous every 6 hours  lacosamide IVPB 200 milliGRAM(s) IV Intermittent every 12 hours  levETIRAcetam  IVPB 1000 milliGRAM(s) IV Intermittent <User Schedule>  pantoprazole  Injectable 40 milliGRAM(s) IV Push daily  polyethylene glycol 3350 17 Gram(s) Oral daily  senna 2 Tablet(s) Oral at bedtime    MEDICATIONS  (PRN):  acetaminophen    Suspension .. 650 milliGRAM(s) Oral every 6 hours PRN Temp greater or equal to 38C (100.4F), Mild Pain (1 - 3)  ondansetron Injectable 4 milliGRAM(s) IV Push every 6 hours PRN Nausea and/or Vomiting    PRESENT SYMPTOMS: [x ]Unable to self-report  [ ] CPOT [x ] PAINADs [ ] RDOS  Source if other than patient:  [ ]Family   [ ]Team     Pain: [ ]yes [ ]no  QOL impact -   Location -                    Aggravating factors -  Quality -  Radiation -  Timing-  Severity (0-10 scale):  Minimal acceptable level (0-10 scale):     CPOT:    https://www.[email or web address]/getattachment/[email or web address]/Critical-Care-Pain-Observation-Tool-(CPOT)    PAIN AD Score: 0  [email or web address].[place].[email or web address]/painad.pdf (press ctrl +  left click to view)    Dyspnea:                           [ ]Mild [ ]Moderate [ ]Severe      RDOS:  0 to 2  minimal or no respiratory distress   3  mild distress  4 to 6 moderate distress  >7 severe distress  https://homecareinformation.net/handouts/hen/Respiratory_Distress_Observation_Scale.pdf (Ctrl +  left click to view)     Anxiety:                             [x ]Mild [ ]Moderate [ ]Severe  Fatigue:                             [ ]Mild [ x]Moderate [ x]Severe  Nausea:                             [ ]Mild [ ]Moderate [ ]Severe  Loss of appetite:              [ ]Mild [x ]Moderate [ ]Severe  Constipation:                    [ ]Mild [ ]Moderate [ ]Severe    PCSSQ [Palliative Care Spiritual Screening Question]   Severity (0-10):0  Score of 4 or > indicate consideration of Chaplaincy referral.  Chaplaincy Referral: [ ] yes [x ] refused [ ] following    Caregiver [place]? : [ ] yes [ x] no  Social work referral [ ] Patient & Family Centered Care Referral [ ]     Anticipatory Grief Present?: [ ] yes [x ] no  Social work referral [ ]  Patient & Family Centered Care Referral [ ]       Other Symptoms:  [x ]All other review of systems negative     Palliative Performance Status Version 2:   30      %    http://[email or web address]/files/news/palliative_performance_scale_[email or web address].pdf  PHYSICAL EXAM:  Vital Signs Last 24 Hrs  T(C): 36.7 (31 Oct 2022 11:00), Max: 38.6 (30 Oct 2022 15:40)  T(F): 98.1 (31 Oct 2022 11:00), Max: 101.4 (30 Oct 2022 15:40)  HR: 90 (31 Oct 2022 12:00) (70 - 103)  BP: 132/78 (31 Oct 2022 12:00) (91/56 - 150/99)  BP(mean): 90 (31 Oct 2022 12:00) (66 - 116)  RR: 13 (31 Oct 2022 12:00) (13 - 19)  SpO2: 100% (31 Oct 2022 12:00) (98% - 100%)    Parameters below as of 31 Oct 2022 07:00  Patient On (Oxygen Delivery Method): mask, Venturi  O2 Flow (L/min): 15  O2 Concentration (%): 50 I&O's Summary    30 Oct 2022 07:  -  31 Oct 2022 07:00  --------------------------------------------------------  IN: 350 mL / OUT: 725 mL / NET: -375 mL    31 Oct 2022 07:01  -  31 Oct 2022 13:43  --------------------------------------------------------  IN: 320 mL / OUT: 300 mL / NET: 20 mL      GENERAL: [ ]Cachexia    [ ]Alert  [ ]Oriented x   [x ]Lethargic  [ ]Unarousable  [ ]Verbal  [ ]Non-Verbal  Behavioral:   [x ] Anxiety  [ ] Delirium [ ] Agitation [ ] Other  HEENT:  [ ]Normal   [x ]Dry mouth   [ ]ET Tube/Trach  [ ]Oral lesions  PULMONARY:   [ ]Clear [x ]Tachypnea  [ ]Audible excessive secretions   [ ]Rhonchi        [ ]Right [ ]Left [ ]Bilateral  [ ]Crackles        [ ]Right [ ]Left [ ]Bilateral  [ ]Wheezing     [ ]Right [ ]Left [ ]Bilateral  [ ]Diminished breath sounds [ ]right [ ]left [ ]bilateral  CARDIOVASCULAR:    [ ]Regular [x ]Irregular [ ]Tachy  [ ]Ildefonso [ ]Murmur [ ]Other  GASTROINTESTINAL:  [ x]Soft  [ ]Distended   [ x]+BS  [ ]Non tender [ ]Tender  [ ]Other [ ]PEG [ ]OGT/ NGT  Last BM:  GENITOURINARY:  [ ]Normal [ x] Incontinent   [ ]Oliguria/Anuria   [ ]Dunn  MUSCULOSKELETAL:   [ ]Normal   [ ]Weakness  [ x]Bed/Wheelchair bound [ ]Edema  NEUROLOGIC:   [ ]No focal deficits  [ x]Cognitive impairment  [ ]Dysphagia [ ]Dysarthria [ ]Paresis [ ]Other   SKIN:   [ ]Normal  [ ]Rash  [ x]Other  [ ]Pressure ulcer(s)       Present on admission [ ]y [ ]n    CRITICAL CARE:  [ ] Shock Present  [ ]Septic [ ]Cardiogenic [ x]Neurologic [ ]Hypovolemic  [ ]  Vasopressors [ ]  Inotropes   [ ]Respiratory failure present [ ]Mechanical ventilation [ ]Non-invasive ventilatory support [ ]High flow    [ ]Acute  [ ]Chronic [ ]Hypoxic  [ ]Hypercarbic [ ]Other  [ ]Other organ failure     LABS:                        12.7   7.85  )-----------( 118      ( 30 Oct 2022 15:56 )             39.4   10-31    134<L>  |  103  |  14  ----------------------------<  119<H>  3.8   |  21<L>  |  0.37<L>    Ca    7.8<L>      31 Oct 2022 06:41  Phos  3.1     10-31  Mg     1.8     10-31    TPro  5.3<L>  /  Alb  2.5<L>  /  TBili  0.8  /  DBili  x   /  AST  35  /  ALT  51<H>  /  AlkPhos  104  10  PT/INR - ( 30 Oct 2022 17:25 )   PT: 18.5 sec;   INR: 1.60 ratio         PTT - ( 30 Oct 2022 17:25 )  PTT:25.2 sec    Urinalysis Basic - ( 30 Oct 2022 17:43 )    Color: Yellow / Appearance: Clear / S.034 / pH: x  Gluc: x / Ketone: Negative  / Bili: Negative / Urobili: 4 mg/dL   Blood: x / Protein: Trace / Nitrite: Negative   Leuk Esterase: Negative / RBC: 11 /hpf / WBC 2 /HPF   Sq Epi: x / Non Sq Epi: 1 /hpf / Bacteria: Negative      RADIOLOGY & ADDITIONAL STUDIES:    < from: CT Head No Cont (10.30.22 @ 16:46) >    ACC: [id number] EXAM:  CT BRAIN                          PROCEDURE DATE:  10/30/2022          INTERPRETATION:  CLINICAL INFORMATION: History of a brain mass. Evaluate   for intracranial hemorrhage    TECHNIQUE: Noncontrast axial CT images were acquired through the head.   Two-dimensional sagittal and coronal reformats were generated.    COMPARISON STUDY: CT head 10/22/2022    FINDINGS:    Redemonstration of right frontoparietal craniectomy for a previously   identified intra-axial mass, with slight minimal increase in extent of   subjacent vasogenic edema, which extends from the right frontal lobe and   crosses midline via the corpus callosum. However, the degree of mild   right frontal lobe sulcal effacement is not significantly changed, and  there is no evidence for increased midline shift or herniation.    There is no CT evidence of acute intracranial hemorrhage, herniation,   midline shift or hydrocephalus.    There is mild cerebral volume loss and patchy white matter   hypoattenuation which is nonspecific in etiology but likely related to   chronic microvascular-type changes.    The visualized paranasal sinuses are clear. The mastoid air cells and   middle ear cavities are clear.    The soft tissues of the scalp are unremarkable. Status post right frontal   craniotomy. Bilateral lens replacements.    IMPRESSION:    Redemonstration of right frontoparietal craniectomy for a previously   identified intra-axial mass, with slight minimal increase in extent of   subjacent vasogenic edema, which extends from the right frontal lobe and   crosses midline via the corpus callosum. However, the degree of mild   right frontal lobe sulcal effacement is not significantly changed, and   there is no evidence for increased midline shift or herniation.    --- End of Report ---           DILIA RAYO DO; Resident Radiologist  This document has been electronically signed.  FADUMO MANZANARES MD; Attending Radiologist  This document has been electronically signed. Oct 30 2022  5:29PM    < end of copied text >  < from: CT Head No Cont (10.30.22 @ 16:46) >    ACC: [id number] EXAM:  CT BRAIN                          PROCEDURE DATE:  10/30/2022          INTERPRETATION:  CLINICAL INFORMATION: History of a brain mass. Evaluate   for intracranial hemorrhage    TECHNIQUE: Noncontrast axial CT images were acquired through the head.   Two-dimensional sagittal and coronal reformats were generated.    COMPARISON STUDY: CT head 10/22/2022    FINDINGS:    Redemonstration of right frontoparietal craniectomy for a previously   identified intra-axial mass, with slight minimal increase in extent of   subjacent vasogenic edema, which extends from the right frontal lobe and   crosses midline via the corpus callosum. However, the degree of mild   right frontal lobe sulcal effacement is not significantly changed, and  there is no evidence for increased midline shift or herniation.    There is no CT evidence of acute intracranial hemorrhage, herniation,   midline shift or hydrocephalus.    There is mild cerebral volume loss and patchy white matter   hypoattenuation which is nonspecific in etiology but likely related to   chronic microvascular-type changes.    The visualized paranasal sinuses are clear. The mastoid air cells and   middle ear cavities are clear.    The soft tissues of the scalp are unremarkable. Status post right frontal   craniotomy. Bilateral lens replacements.    IMPRESSION:    Redemonstration of right frontoparietal craniectomy for a previously   identified intra-axial mass, with slight minimal increase in extent of   subjacent vasogenic edema, which extends from the right frontal lobe and   crosses midline via the corpus callosum. However, the degree of mild   right frontal lobe sulcal effacement is not significantly changed, and   there is no evidence for increased midline shift or herniation.    --- End of Report ---           DILIA RAYO DO; Resident Radiologist  This document has been electronically signed.  FADUMO MANZANARES MD; Attending Radiologist  This document has been electronically signed. Oct 30 2022  5:29PM    < end of copied text >      PROTEIN CALORIE MALNUTRITION PRESENT: [ ]mild [x ]moderate [ ]severe [ ]underweight [ ]morbid obesity  https://www.andeal.org/vault/5750/web/files/ONC/Table_Clinical%20Characteristics%20to%20Document%20Malnutrition-White%20JV%20et%20al%2020.pdf    Height (cm): 167.6 (10-30-22 @ 15:22), 167.6 (10-08-22 @ 12:33), 165.1 (22 @ 16:10)  Weight (kg): 78.018 (10-30-22 @ 15:31), 71 (10-08-22 @ 12:33), 68 (22 @ 16:10)  BMI (kg/m2): 27.8 (10-30-22 @ 15:31), 25.3 (10-30-22 @ 15:22), 25.3 (10-08-22 @ 12:33)    [ ]PPSV2 < or = to 30% [ ]significant weight loss  [ ]poor nutritional intake  [ ]anasarca[ ]Artificial Nutrition      Other REFERRALS:  [ ]Hospice  [ ]Child Life  [ x]Social Work  [ ]Case management [ ]Holistic Therapy     Goals of Care Document: JOSH Lopez (10-10-22 @ 13:37)  Goals of Care Conversation:   Participants:  · Participants  Family; Staff; Pt unable to participate due to mental status  · Spouse  wife  · Child(aaron)  Faith    Advance Directives:  · Does patient have Advance Directive  Yes  · Indicate Type  Health Care Proxy (HCP)  · Agent's Name  Faith Hendrickson  · Phone #  [phone]  · Are any of the items on the chart  Yes  · Specify which ones are on chart  Health Care Proxy (HCP)  · Caregiver:  yes  · Name  Faith hendrickson  · Phone Number  [phone]    Conversation Discussion:  · Conversation  MOLST Discussed; Treatment Options  · Conversation Details  Referral for complex decision making and symptom management in setting of  malignancy. Met with patient's family at bedside and introduced role of palliative care during patient's hospitalization. Neurologist, Dr. Keys, present during encounter and discussed seizure workup after patient had RRT for seizure-like activity this AM. 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 After cancer diagnosis, patient's mood declined, making it very difficult for family to have goc discussions. Faith shared that her parents have been  for 50 years and it has been difficult for them to see him decline so quickly with this new cancer diagnosis (2022). However she shared that she has spoken to other providers and her mother about her father's advance directives. She shared that her father would not want to connected to machines if he was going to be remain so debilitated. Discussed advanced directives including CPR and mechanical ventilation in setting of malignancy. Reviewed the risks and benefits of these interventions at the EOL in setting of malignancy sharing that these interventions might pose more burden than benefit. Explained that patient can continue with medical management for seizures and have access to other symptom medications regardless of code status. Family was in agreement with DNR/DNI and completion of MOLST form.     Faith inquired about future options if patient was not a candidate for further cancer directed therapy. Reviewed the philosophy of hospice services with her and her mother. She expressed appreciation for the information. Family is awaiting for recs from Dr. Borja and rad/onc team. They are also aware patient is pending CT head and EEG. Palliative team will remain available for support.    Personal Advance Directives Treatment Guidelines:   Treatment Guidelines:  · Decision Maker  Health Care Proxy  · Treatment Guidelines  DNR Order  · Treatment Guideline Comments  DNR/DNI    MOLST:  · Completed  10-Oct-2022    Location of Discussion:   Time Spent on Advance Care Planning:  I spent 30 (in minutes) on advance care planning services with the patient.  This time is separate and distinct from any other care management services provided on this date.    Location of Discussion:  · Location of discussion  Face to face      Electronic Signatures:  Martha Lopez)  (Signed 10-Oct-2022 13:48)  	Authored: Goals of Care Conversation, Personal Advance Directives Treatment Guidelines, Location of Discussion      Last Updated: 10-Oct-2022 13:48 by Martha Lopez ()

## 2022-10-31 NOTE — OCCUPATIONAL THERAPY INITIAL EVALUATION ADULT - LIVES WITH, PROFILE
Pt with complicated hospital course, prior to last hospital admission pt I in ADLs and ambulation. Pt received from rehab

## 2022-10-31 NOTE — CONSULT NOTE ADULT - CONVERSATION DETAILS
Spoke with patients daughter Faith and patients wife face to face for greater than 20 minutes.   Family verbalized understanding of life limiting disease but at this time they wish to wait for more information regarding progression of disease vs persistent swelling from RT vs status epilepticus.    They are hopeful for some level of communication / interaction with patient once seizues/swelling under control.  I explained home hospice services  for EOL care and at this time they decline pending further information from team.    We will continue to follow peripherally

## 2022-10-31 NOTE — PROGRESS NOTE ADULT - ASSESSMENT
ASSESSMENT/PLAN: 83 y/o male with GBM s/p TIMOTHY, chemo and radiation discharged to rehab presenting with multiple seizures and no return to baseline exam and concern for status    NEURO:    - Neurochecks q1 hrs  -loaded with Keppra and VPA in ED, continue Keppra 1g q8, neurology also following  -EEG   -Decadron 4q6 for cerebral edema  - no further neurosurgical intervention for GBM, received 9 fractions of RT which completed 10/27  - palliative consult, continue GOC      PULM:    - O2 sat > 92%  -aspiration precautions  -on face mask due to mouth breathing  -hx of being covid+ without disease on 10/3, repeat on this admission still positive, need to confirm with ID whether this is shedding and how long precautions must take place    CV:  MAP > 65  pressors if needed    RENAL:  Fluids: 2%-60 for cerebral edema   goal 140-150  bmp q6h while on hts  saltt abs    GI:    NPO for now, risk of aspirating; pending further GOC discussions with the family  no need for NGT for the time being    ENDO:   hx of steroid induced DM,   a1c is 9.3, monitor FGS   ISS    HEME/ONC:  hx of Rt popliteal DVT was initally on lovenox although d/c'd due to a drop in Hgb and thrombocytopenia, IVCF filter placed 9/28. Rt arm found to be swollen on 10/20 and found to have RUE DVT.  Per last heme note on 10/27, hold off on AC and follow with dopplers in one week, ordered  no SCDs on Rt leg    ID:    febrile in ED, Blood cultures sent, f/u  UA negative    DISPOSITION: ICU     ASSESSMENT/PLAN: 81 y/o male with GBM s/p TIMOTHY, chemo and radiation discharged to rehab presenting with multiple seizures and no return to baseline exam and concern for status    NEURO:    - Neurochecks q1 hrs  - loaded with Keppra and VPA in ED, continue Keppra 1g q8 start vimpat 200mg BID  - neurology also following  - EEG   - Decadron 4q6 for cerebral edema  - no further neurosurgical intervention for GBM, received 9 fractions of RT which completed 10/27  - palliative consult, continue GOC  - medicine txer      PULM:    - O2 sat > 92%  -aspiration precautions  -on face mask due to mouth breathing  -hx of being covid+ without disease on 10/3, repeat on this admission still positive, need to confirm with ID whether this is shedding and how long precautions must take place    CV:  MAP > 65  pressors if needed    RENAL:  Fluids: 2%-60 for cerebral edema   goal 140-150  bmp q6h while on hts  saltt abs    GI:    NPO for now, risk of aspirating; pending further GOC discussions with the family  no need for NGT for the time being    ENDO:   hx of steroid induced DM,   a1c is 9.3, monitor FGS   ISS    HEME/ONC:  hx of Rt popliteal DVT was initally on lovenox although d/c'd due to a drop in Hgb and thrombocytopenia, IVCF filter placed 9/28. Rt arm found to be swollen on 10/20 and found to have RUE DVT.  Per last heme note on 10/27, hold off on AC and follow with dopplers in one week, ordered  no SCDs on Rt leg  start chemoppx SQL    ID:    febrile in ED, Blood cultures sent, f/u  UA negative    DISPOSITION: Medicine txer     ASSESSMENT/PLAN: 83 y/o male with GBM s/p TIMOTHY, chemo and radiation discharged to rehab presenting with multiple seizures and no return to baseline exam and concern for status    NEURO:    - Neurochecks q4 hrs  - loaded with Keppra and VPA in ED, continue Keppra 1g q8 start vimpat 200mg BID  - neurology also following  - EEG   - Decadron 4q6 for cerebral edema  - no further neurosurgical intervention for GBM, received 9 fractions of RT which completed 10/27  - palliative consult, continue GOC  - medicine txer      PULM:    - O2 sat > 92%  -aspiration precautions  -on face mask due to mouth breathing  -hx of being covid+ without disease on 10/3, repeat on this admission still positive, need to confirm with ID whether this is shedding and how long precautions must take place    CV:  MAP > 65  pressors if needed    RENAL:  Fluids: 2%-60 for cerebral edema   goal 140-150  bmp q6h while on hts  saltt abs    GI:    NPO for now, risk of aspirating; pending further GOC discussions with the family  no need for NGT for the time being    ENDO:   hx of steroid induced DM,   a1c is 9.3, monitor FGS   ISS    HEME/ONC:  hx of Rt popliteal DVT was initally on lovenox although d/c'd due to a drop in Hgb and thrombocytopenia, IVCF filter placed 9/28. Rt arm found to be swollen on 10/20 and found to have RUE DVT.  Per last heme note on 10/27, hold off on AC and follow with dopplers in one week, ordered  no SCDs on Rt leg  start chemoppx SQL    ID:    febrile in ED, Blood cultures sent, f/u  UA negative    DISPOSITION: Medicine txer

## 2022-10-31 NOTE — OCCUPATIONAL THERAPY INITIAL EVALUATION ADULT - PERTINENT HX OF CURRENT PROBLEM, REHAB EVAL
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 Discharged to rehab to continue radiation treatment which finished 10/28 course then was complicated with enterococcus bacteremia with antibiotics through 10/28. Baseline Ox2, although per family he has become less reponsive over the last couple of days. On 10/30 had two GTCs at rehab and never returned to baseline en route with EMS had another seizure, was given ativan and versed at that time. Upon arrival to the ED was loaded with 3g of Keppra and 1500 of VPA.  Pt transfered to NSCU for concern of status epilepticus.  CT Head: Redemonstration of right frontoparietal craniectomy for a previously identified intra-axial mass, with slight minimal increase in extent of subjacent vasogenic edema, which extends from the right frontal lobe and crosses midline via the corpus callosum. However, the degree of mild right frontal lobe sulcal effacement is not significantly changed, and there is no evidence for increased midline shift or herniation.  CT Chest: Nonspecific peripheral groundglass opacities in both upper lobes, right middle lobe and right lower lobe. Infection is a differential consideration. No evidence of infection in the abdomen/pelvis. A 2.6 cm solid enhancing left upper pole renal neoplasm decrease in size to prior imaging 7/15/2022 and measured 3.4 cm.

## 2022-10-31 NOTE — PROGRESS NOTE ADULT - SUBJECTIVE AND OBJECTIVE BOX
NSCU Progress Note    Assessment/Hospital Course:    82M w/ PMHx of glioblastoma (dx 7/18/22, s/p R stereo bx, TIMOTHY x2, R crani for tumor debulking 7/28/22, on temozolomide (last dose 4 days prior to admission), HLD, steroid-induced DM, h/o HIT, R popliteal DVT now s/p IVCF on 9/28. Discharged to rehab to continue radiation treatment which finished 10/28 course then was complicated with enterococcus bacteremia with antibiotics through 10/28. Baseline Ox2, although per family he has become less reponsive over the last couple of days. On 10/30 had two GTCs at rehab and never returned to baseline en route with EMS had another seizure, was given ativan and versed at that time. Upon arrival to the ED was loaded with 3g of Keppra and 1500 of VPA.  Pt transfered to NSCU for concern of status epilepticus.      24 Hour Events/Subjective:  -       REVIEW OF SYSTEMS:  - negative except as above    VITALS:   - T(C): 36.6 (10-31-22 @ 07:00), Max: 38.6 (10-30-22 @ 15:40)  T(F): 97.9 (10-31-22 @ 07:00), Max: 101.4 (10-30-22 @ 15:40)  HR: 95 (10-31-22 @ 08:00) (70 - 103)  BP: 133/78 (10-31-22 @ 08:00) (91/56 - 150/99)  ABP: --  ABP(mean): --  RR: 14 (10-31-22 @ 08:00) (13 - 19)  SpO2: 100% (10-31-22 @ 08:00) (98% - 100%)      IMAGING/DATA:   - Reviewed          PHYSICAL EXAM:    General: calm  CVS: RRR  Pulm: CTAB  GI: Soft, NTND  Extremities: No LE Edema  Neuro: No EO, pupils 2R, RLE trace toe withdrawal movement to nox, otherwise flaccid       NSCU Progress Note    Assessment/Hospital Course:    82M w/ PMHx of glioblastoma (dx 7/18/22, s/p R stereo bx, TIMOTHY x2, R crani for tumor debulking 7/28/22, on temozolomide (last dose 4 days prior to admission), HLD, steroid-induced DM, h/o HIT, R popliteal DVT now s/p IVCF on 9/28. Discharged to rehab to continue radiation treatment which finished 10/28 course then was complicated with enterococcus bacteremia with antibiotics through 10/28. Baseline Ox2, although per family he has become less reponsive over the last couple of days. On 10/30 had two GTCs at rehab and never returned to baseline en route with EMS had another seizure, was given ativan and versed at that time. Upon arrival to the ED was loaded with 3g of Keppra and 1500 of VPA.  Pt transfered to NSCU for concern of status epilepticus.      24 Hour Events/Subjective:  - admitted overnight  - febrile  - no pressor requirements, on NRB  - pending EEG      REVIEW OF SYSTEMS:  - negative except as above    VITALS:   - T(C): 36.6 (10-31-22 @ 07:00), Max: 38.6 (10-30-22 @ 15:40)  T(F): 97.9 (10-31-22 @ 07:00), Max: 101.4 (10-30-22 @ 15:40)  HR: 95 (10-31-22 @ 08:00) (70 - 103)  BP: 133/78 (10-31-22 @ 08:00) (91/56 - 150/99)  ABP: --  ABP(mean): --  RR: 14 (10-31-22 @ 08:00) (13 - 19)  SpO2: 100% (10-31-22 @ 08:00) (98% - 100%)      IMAGING/DATA:   - Reviewed          PHYSICAL EXAM:    General: calm  CVS: RRR  Pulm: CTAB  GI: Soft, NTND  Extremities: No LE Edema  Neuro: No EO, pupils 2R, LUE finger twitching to nox, RLE trace toe withdrawal movement to nox, otherwise flaccid       NSCU Progress Note    Assessment/Hospital Course:    82M w/ PMHx of glioblastoma (dx 7/18/22, s/p R stereo bx, TIMOTHY x2, R crani for tumor debulking 7/28/22, on temozolomide (last dose 4 days prior to admission), HLD, steroid-induced DM, h/o HIT, R popliteal DVT now s/p IVCF on 9/28. Discharged to rehab to continue radiation treatment which finished 10/28 course then was complicated with enterococcus bacteremia with antibiotics through 10/28. Baseline Ox2, although per family he has become less reponsive over the last couple of days. On 10/30 had two GTCs at rehab and never returned to baseline en route with EMS had another seizure, was given ativan and versed at that time. Upon arrival to the ED was loaded with 3g of Keppra and 1500 of VPA.  [facility] for concern of status epilepticus.      24 Hour Events/Subjective:  - admitted overnight  - febrile  - no pressor requirements, on NRB  - pending EEG    REVIEW OF SYSTEMS: [x] Unable to Assess due to neurologic exam   [ ] All ROS addressed below are non-contributory, except:  Neuro: [ ] Headache [ ] Back pain [ ] Numbness [ ] Weakness [ ] Ataxia [ ] Dizziness [ ] Aphasia [ ] Dysarthria [ ] Visual disturbance  Resp: [ ] Shortness of breath/dyspnea [ ] Orthopnea [ ] Cough  CV: [ ] Chest pain [ ] Palpitation [ ] Lightheadedness [ ] Syncope  Renal: [ ] Thirst [ ] Edema  GI: [ ] Nausea [ ] Emesis [ ] Abdominal pain [ ] Constipation [ ] Diarrhea  Hem: [ ] Hematemesis [ ] bBright red blood per rectum  ID: [ ] Fever [ ] Chills [ ] Dysuria  ENT: [ ] Rhinorrhea    VITALS:   - T(C): 36.6 (10-31-22 @ 07:00), Max: 38.6 (10-30-22 @ 15:40)  T(F): 97.9 (10-31-22 @ 07:00), Max: 101.4 (10-30-22 @ 15:40)  HR: 95 (10-31-22 @ 08:00) (70 - 103)  BP: 133/78 (10-31-22 @ 08:00) (91/56 - 150/99)  ABP: --  ABP(mean): --  RR: 14 (10-31-22 @ 08:00) (13 - 19)  SpO2: 100% (10-31-22 @ 08:00) (98% - 100%)      IMAGING/DATA:   - Reviewed      PHYSICAL EXAM:    General: calm  CVS: RRR  Pulm: CTAB  GI: Soft, NTND  Extremities: No LE Edema  Neuro: No EO, pupils 2R, LUE finger twitching to nox, RLE trace toe withdrawal movement to nox, otherwise flaccid

## 2022-10-31 NOTE — CHART NOTE - NSCHARTNOTEFT_GEN_A_CORE
EEG preliminary read (not final) on the initial recording hour(s) = x 4     No seizures recorded.  LPDs seen over the right hemisphere, with no evolution  Final report to follow tomorrow morning after completion of study.    [facility] EEG Reading Room Ph#: [phone]  Epilepsy Answering Service after 5PM and before 8:30AM: Ph#: [phone]

## 2022-10-31 NOTE — CONSULT NOTE ADULT - CONSULT REASON
r/o status epilepticus
Goals of care
status epilepticus in GBM patient
status epilepticus monitoring

## 2022-10-31 NOTE — PROGRESS NOTE ADULT - SUBJECTIVE AND OBJECTIVE BOX
Interval events:  No acute events this evening.   On venti mask.     VITALS:  T(C): , Max: 36.7 (10-31-22 @ 11:00)  HR:  (76 - 103)  BP:  (91/56 - 151/94)  ABP: --  RR:  (13 - 22)  SpO2:  (99% - 100%)  Wt(kg): --      10-30-22 @ 07:01  -  10-31-22 @ 07:00  --------------------------------------------------------  IN: 350 mL / OUT: 725 mL / NET: -375 mL    10-31-22 @ 07:01  -  10-31-22 @ 22:01  --------------------------------------------------------  IN: 490 mL / OUT: 550 mL / NET: -60 mL      LABS:  Na: 134 (10-31 @ 06:41), 134 (10-30 @ 20:13), 132 (10-30 @ 15:56), 133 (10-30 @ 15:22)  K: 3.8 (10-31 @ 06:41), 3.4 (10-30 @ 20:13), 3.8 (10-30 @ 15:56), 3.6 (10-30 @ 15:22)  Cl: 103 (10-31 @ 06:41), 104 (10-30 @ 20:13), 97 (10-30 @ 15:56), 100 (10-30 @ 15:22)  CO2: 21 (10-31 @ 06:41), 20 (10-30 @ 20:13), 25 (10-30 @ 15:56), 19 (10-30 @ 15:22)  BUN: 14 (10-31 @ 06:41), 13 (10-30 @ 20:13), 14 (10-30 @ 15:56), 13 (10-30 @ 15:22)  Cr: 0.37 (10-31 @ 06:41), 0.40 (10-30 @ 20:13), 0.51 (10-30 @ 15:56), 0.43 (10-30 @ 15:22)  Glu: 119(10-31 @ 06:41), 132(10-30 @ 20:13), 77(10-30 @ 15:56), 194(10-30 @ 15:22)    Hgb: 12.7 (10-30 @ 15:56)  Hct: 39.4 (10-30 @ 15:56)  WBC: 7.85 (10-30 @ 15:56)  Plt: 118 (10-30 @ 15:56)    INR: 1.60 10-30-22 @ 17:25  PTT: 25.2 10-30-22 @ 17:25    MEDICATIONS:  acetaminophen    Suspension .. 650 milliGRAM(s) Oral every 6 hours PRN  chlorhexidine 4% Liquid 1 Application(s) Topical <User Schedule>  dexAMETHasone  Injectable 4 milliGRAM(s) IV Push every 6 hours  enoxaparin Injectable 40 milliGRAM(s) SubCutaneous <User Schedule>  insulin lispro (ADMELOG) corrective regimen sliding scale   SubCutaneous every 6 hours  lacosamide IVPB 200 milliGRAM(s) IV Intermittent every 12 hours  levETIRAcetam  IVPB 1000 milliGRAM(s) IV Intermittent <User Schedule>  ondansetron Injectable 4 milliGRAM(s) IV Push every 6 hours PRN  pantoprazole  Injectable 40 milliGRAM(s) IV Push daily  polyethylene glycol 3350 17 Gram(s) Oral daily  senna 2 Tablet(s) Oral at bedtime  sodium chloride 0.9%. 1000 milliLiter(s) IV Continuous <Continuous>    EXAMINATION:  General:  in NAD  HEENT:  MMM  Neuro:  awake, alert, oriented x 3, follows commands, EOMI, face symmetric, no PD, DF 5/5   Cards:  RRR  Respiratory:  no respiratory distress  Abdomen:  soft  Extremities:  no LE edema    Assessment/Plan:   81yo man with glioblastoma (dx 7/18/22, s/p R stereo bx, TIMOTHY x2, R crani for tumor debulking 7/28/22, on temozolomide), HLD, steroid-induced DM, h/o HIT, R popliteal DVT now s/p IVCF on 9/28, admitted 10/30 with 2 GTCs.   1/4 BCx with staph epi     No change to plan from daytime.   c/w Keppra 1g q8h and vimpat 200mg BID  Decadron 4q6 for cerebral edema   Interval events:  No acute events this evening.   On venti mask.     VITALS:  T(C): , Max: 36.7 (10-31-22 @ 11:00)  HR:  (76 - 103)  BP:  (91/56 - 151/94)  ABP: --  RR:  (13 - 22)  SpO2:  (99% - 100%)  Wt(kg): --      10-30-22 @ 07:01  -  10-31-22 @ 07:00  --------------------------------------------------------  IN: 350 mL / OUT: 725 mL / NET: -375 mL    10-31-22 @ 07:01  -  10-31-22 @ 22:01  --------------------------------------------------------  IN: 490 mL / OUT: 550 mL / NET: -60 mL      LABS:  Na: 134 (10-31 @ 06:41), 134 (10-30 @ 20:13), 132 (10-30 @ 15:56), 133 (10-30 @ 15:22)  K: 3.8 (10-31 @ 06:41), 3.4 (10-30 @ 20:13), 3.8 (10-30 @ 15:56), 3.6 (10-30 @ 15:22)  Cl: 103 (10-31 @ 06:41), 104 (10-30 @ 20:13), 97 (10-30 @ 15:56), 100 (10-30 @ 15:22)  CO2: 21 (10-31 @ 06:41), 20 (10-30 @ 20:13), 25 (10-30 @ 15:56), 19 (10-30 @ 15:22)  BUN: 14 (10-31 @ 06:41), 13 (10-30 @ 20:13), 14 (10-30 @ 15:56), 13 (10-30 @ 15:22)  Cr: 0.37 (10-31 @ 06:41), 0.40 (10-30 @ 20:13), 0.51 (10-30 @ 15:56), 0.43 (10-30 @ 15:22)  Glu: 119(10-31 @ 06:41), 132(10-30 @ 20:13), 77(10-30 @ 15:56), 194(10-30 @ 15:22)    Hgb: 12.7 (10-30 @ 15:56)  Hct: 39.4 (10-30 @ 15:56)  WBC: 7.85 (10-30 @ 15:56)  Plt: 118 (10-30 @ 15:56)    INR: 1.60 10-30-22 @ 17:25  PTT: 25.2 10-30-22 @ 17:25    MEDICATIONS:  acetaminophen    Suspension .. 650 milliGRAM(s) Oral every 6 hours PRN  chlorhexidine 4% Liquid 1 Application(s) Topical <User Schedule>  dexAMETHasone  Injectable 4 milliGRAM(s) IV Push every 6 hours  enoxaparin Injectable 40 milliGRAM(s) SubCutaneous <User Schedule>  insulin lispro (ADMELOG) corrective regimen sliding scale   SubCutaneous every 6 hours  lacosamide IVPB 200 milliGRAM(s) IV Intermittent every 12 hours  levETIRAcetam  IVPB 1000 milliGRAM(s) IV Intermittent <User Schedule>  ondansetron Injectable 4 milliGRAM(s) IV Push every 6 hours PRN  pantoprazole  Injectable 40 milliGRAM(s) IV Push daily  polyethylene glycol 3350 17 Gram(s) Oral daily  senna 2 Tablet(s) Oral at bedtime  sodium chloride 0.9%. 1000 milliLiter(s) IV Continuous <Continuous>    EXAMINATION:  General:  in NAD  HEENT:  MMM  Neuro:  eyes closed, does not open eyes to voice or noxious, follows commands, EOMI, face symmetric, no PD, DF 5/5   Cards:  RRR  Respiratory:  no respiratory distress  Abdomen:  distended but soft   Extremities:  no LE edema    Assessment/Plan:   83yo man with glioblastoma (dx 7/18/22, s/p R stereo bx, TIMOTHY x2, R crani for tumor debulking 7/28/22, on temozolomide), HLD, steroid-induced DM, h/o HIT, R popliteal DVT now s/p IVCF on 9/28, admitted 10/30 with 2 GTCs.   1/4 BCx with staph epi     No change to plan from daytime.   c/w Keppra 1g q8h and vimpat 200mg BID  Decadron 4q6 for cerebral edema   Interval events:  No acute events this evening.   On venti mask.     VITALS:  T(C): , Max: 36.7 (10-31-22 @ 11:00)  HR:  (76 - 103)  BP:  (91/56 - 151/94)  ABP: --  RR:  (13 - 22)  SpO2:  (99% - 100%)  Wt(kg): --      10-30-22 @ 07:01  -  10-31-22 @ 07:00  --------------------------------------------------------  IN: 350 mL / OUT: 725 mL / NET: -375 mL    10-31-22 @ 07:01  -  10-31-22 @ 22:01  --------------------------------------------------------  IN: 490 mL / OUT: 550 mL / NET: -60 mL      LABS:  Na: 134 (10-31 @ 06:41), 134 (10-30 @ 20:13), 132 (10-30 @ 15:56), 133 (10-30 @ 15:22)  K: 3.8 (10-31 @ 06:41), 3.4 (10-30 @ 20:13), 3.8 (10-30 @ 15:56), 3.6 (10-30 @ 15:22)  Cl: 103 (10-31 @ 06:41), 104 (10-30 @ 20:13), 97 (10-30 @ 15:56), 100 (10-30 @ 15:22)  CO2: 21 (10-31 @ 06:41), 20 (10-30 @ 20:13), 25 (10-30 @ 15:56), 19 (10-30 @ 15:22)  BUN: 14 (10-31 @ 06:41), 13 (10-30 @ 20:13), 14 (10-30 @ 15:56), 13 (10-30 @ 15:22)  Cr: 0.37 (10-31 @ 06:41), 0.40 (10-30 @ 20:13), 0.51 (10-30 @ 15:56), 0.43 (10-30 @ 15:22)  Glu: 119(10-31 @ 06:41), 132(10-30 @ 20:13), 77(10-30 @ 15:56), 194(10-30 @ 15:22)    Hgb: 12.7 (10-30 @ 15:56)  Hct: 39.4 (10-30 @ 15:56)  WBC: 7.85 (10-30 @ 15:56)  Plt: 118 (10-30 @ 15:56)    INR: 1.60 10-30-22 @ 17:25  PTT: 25.2 10-30-22 @ 17:25    MEDICATIONS:  acetaminophen    Suspension .. 650 milliGRAM(s) Oral every 6 hours PRN  chlorhexidine 4% Liquid 1 Application(s) Topical <User Schedule>  dexAMETHasone  Injectable 4 milliGRAM(s) IV Push every 6 hours  enoxaparin Injectable 40 milliGRAM(s) SubCutaneous <User Schedule>  insulin lispro (ADMELOG) corrective regimen sliding scale   SubCutaneous every 6 hours  lacosamide IVPB 200 milliGRAM(s) IV Intermittent every 12 hours  levETIRAcetam  IVPB 1000 milliGRAM(s) IV Intermittent <User Schedule>  ondansetron Injectable 4 milliGRAM(s) IV Push every 6 hours PRN  pantoprazole  Injectable 40 milliGRAM(s) IV Push daily  polyethylene glycol 3350 17 Gram(s) Oral daily  senna 2 Tablet(s) Oral at bedtime  sodium chloride 0.9%. 1000 milliLiter(s) IV Continuous <Continuous>    EXAMINATION:  General:  in NAD  HEENT:  MMM  Neuro:  eyes closed, opens eyes to noxious, does not attend, does not follows commands, gaze midline, face symmetric, moves R hand spontaneously 2/5, moves b/l feet spontaneously 2/5, no movement in L hand   Cards:  RRR  Respiratory:  no respiratory distress  Abdomen:  distended but soft   Extremities:  no LE edema    Assessment/Plan:   83yo man with glioblastoma (dx 7/18/22, s/p R stereo bx, TIMOTHY x2, R crani for tumor debulking 7/28/22, on temozolomide), HLD, steroid-induced DM, h/o HIT, R popliteal DVT now s/p IVCF on 9/28, admitted 10/30 with 2 GTCs. VEEG with no seizures, with severe slowing.   1/4 BCx with staph epi     No change to plan from daytime  c/w Keppra 1g q8h and vimpat 200mg BID  Decadron 4q6 for cerebral edema    Radha Sen  Neurocritical Care Attending

## 2022-10-31 NOTE — PROGRESS NOTE ADULT - SUBJECTIVE AND OBJECTIVE BOX
Patient seen and examined at bedside.    --Anticoagulation--    T(C): 36.3 (10-31-22 @ 03:00), Max: 38.6 (10-30-22 @ 15:40)  HR: 98 (10-31-22 @ 04:00) (70 - 98)  BP: 147/86 (10-31-22 @ 04:00) (91/56 - 150/99)  RR: 14 (10-31-22 @ 04:00) (13 - 19)  SpO2: 100% (10-31-22 @ 04:00) (98% - 100%)  Wt(kg): --    Exam:   Patient seen and examined at bedside.    --Anticoagulation--    T(C): 36.3 (10-31-22 @ 03:00), Max: 38.6 (10-30-22 @ 15:40)  HR: 98 (10-31-22 @ 04:00) (70 - 98)  BP: 147/86 (10-31-22 @ 04:00) (91/56 - 150/99)  RR: 14 (10-31-22 @ 04:00) (13 - 19)  SpO2: 100% (10-31-22 @ 04:00) (98% - 100%)  Wt(kg): --    Exam: No EO, pupils 2R, RLE trace toe withdrawal movement to nox, otherwise flaccid

## 2022-11-01 NOTE — DIETITIAN INITIAL EVALUATION ADULT - ADD RECOMMEND
1. Obtain further subjective wt/diet history from pt/family PRN.   2. Defer advancement of diet (PO, EN) to medical team. If EN feeds warranted, monitor GI tolerance. RD to remain available to adjust EN formulary, volume/rate PRN. If PO diet initiated, consider consistent carbohydrate diet and defer consistency/texture to medical team, SLP.   3. Recommend multivitamin, vitamin C (if no medication contraindications) to aid in prevention of micronutrient deficiencies and aid in wound healing.   4. Monitor wt trends/labs/skin integrity/hydration status/bowel regularity.   5. Malnutrition sticker placed.

## 2022-11-01 NOTE — PROGRESS NOTE ADULT - ATTENDING COMMENTS
Agree with/edited as appropriate above  keppra, vimpat, initial vEEG x4hrs no seizures but abundant LPDs, neurology following  DNR/DNI, discussed/updated daughter  transfer to floor under medicine with neurology following
Agree with/edited as appropriate above  no seizures on EEG on keppra and vimpat, would continue current dosing, if no seizures till tomorrow, consider d/c vEEG, neurology following  accepted to medicine for transfer to floor

## 2022-11-01 NOTE — PROGRESS NOTE ADULT - ASSESSMENT
ASSESSMENT/PLAN: 81 y/o male with GBM s/p TIMOTHY, chemo and radiation discharged to rehab presenting with multiple seizures and no return to baseline exam and concern for status    NEURO:    - Neurochecks q4 hrs  - continue Keppra 1g q8, vimpat 200mg BID  - neurology also following  - EEG   - Decadron 4q6 for cerebral edema  - no further neurosurgical intervention for GBM, received 9 fractions of RT which completed 10/27  - palliative, continue GOC  - medicine txer      PULM:    - O2 sat > 92%  -aspiration precautions  -on face mask due to mouth breathing  -hx of being covid+ without disease on 10/3, repeat on this admission still positive, need to confirm with ID whether this is shedding and how long precautions must take place    CV:  MAP > 65  pressors if needed    RENAL:  Fluids: plasmalyte 75cc/h  goal 140-150  bmp q6h while on hts  saltt abs    GI:    NPO for now, risk of aspirating; pending further GOC discussions with the family  no need for NGT for the time being    ENDO:   hx of steroid induced DM,   a1c is 9.3, monitor FGS   ISS    HEME/ONC:  hx of Rt popliteal DVT was initally on lovenox although d/c'd due to a drop in Hgb and thrombocytopenia, IVCF filter placed 9/28. Rt arm found to be swollen on 10/20 and found to have RUE DVT.  Per last heme note on 10/27, hold off on AC and follow with dopplers in one week, ordered  no SCDs on Rt leg  chemoppx SQL    ID:    febrile in ED, Blood cultures sent, f/u  UA negative    DISPOSITION: Medicine txer     ASSESSMENT/PLAN: 81 y/o male with GBM s/p TIMOTHY, chemo and radiation discharged to rehab presenting with multiple seizures and no return to baseline exam and concern for status    NEURO:    - Neurochecks q4 hrs  - continue Keppra 1g q8, vimpat 200mg BID  - neurology also following  - EEG   - Decadron 4q6 for cerebral edema  - no further neurosurgical intervention for GBM, received 9 fractions of RT which completed 10/27  - palliative, continue GOC  - medicine txer      PULM:    - O2 sat > 92%  -aspiration precautions  -on face mask due to mouth breathing  -hx of being covid+ without disease on 10/3, repeat on this admission still positive, need to confirm with ID whether this is shedding and how long precautions must take place    CV:  MAP > 65  pressors if needed    RENAL:  Fluids: plasmalyte 75cc/h  goal 140-150  bmp q6h while on hts  saltt abs    GI:    NPO for now, risk of aspirating; pending further GOC discussions with the family  no need for NGT for the time being    ENDO:   hx of steroid induced DM,   a1c is 9.3, monitor FGS   ISS    HEME/ONC:  hx of Rt popliteal DVT was initally on lovenox although d/c'd due to a drop in Hgb and thrombocytopenia, IVCF filter placed 9/28. Rt arm found to be swollen on 10/20 and found to have RUE DVT.  Per last heme note on 10/27, hold off on AC and follow with dopplers in one week, ordered  no SCDs on Rt leg  chemoppx SQL    ID:    febrile in ED, Blood cultures sent, f/u  UA negative    DISPOSITION: Medicine txer  DNR/DNI

## 2022-11-01 NOTE — ADVANCED PRACTICE NURSE CONSULT - ASSESSMENT
The pt was encountered in the NSCU- Mr Hendrickson has continuos EEG in progress. He is in a Total CAre sport support surface and is being assisted with T&P using the Air-Tap system. Will recommend Complete CAir boots to off-load the heels.  the pt was seen by nutrition and has severe protein calorie malnutrition.  Upon assessment, Pt presents with an evolving deep tissue injury to the sacrum and b/l buttocks measuring 8cmx 6cm x0cm- 70% of this area is intact darkly hyperpigmented skin;  the remaining 30% is open skin that is moist and red.   Will recommend the application of Advanced  Cavilon daily to lay down a protective coating on the skin

## 2022-11-01 NOTE — DIETITIAN INITIAL EVALUATION ADULT - ORAL INTAKE PTA/DIET HISTORY
Per RN, pt semicomatose/unable to speak, unable to participate in nutrition evaluation. Recently seen by RD at OSH on 10/13/22. At that time, pt had been prescribed EN feeds via NGT of Glucerna 1.5; goal rate 65ml/hr. Per chart review, at baseline, pt with hx of eating by mouth with hx of good intake (7/2022). Significant decline in PO intake noted (between 9/2022-10/2022), and diagnosed with severe protein calorie malnutrition 9/29/22 and 10/10/22. Pt currently NPO, no NGT at this time d/t pt at risk for aspiration (per MD). GOC pending. No documented food allergies. Pt noted with steroid-induced DM; A1c 9.3%. Per H&P, home medication regimen inclusive of insulin lispro sliding scale 4u 3x daily, insulin glargine 12u 1x daily. Continues on insulin lispro sliding scale in-house. Continues on Decadron as prescribed for cerebral edema.

## 2022-11-01 NOTE — PROGRESS NOTE ADULT - SUBJECTIVE AND OBJECTIVE BOX
Interval events:  No acute events today.   VEEG with no seizures.     VITALS:  T(C): , Max: 36.8 (11-01-22 @ 12:00)  HR:  (94 - 110)  BP:  (117/71 - 149/92)  ABP: --  RR:  (13 - 26)  SpO2:  (95% - 100%)  Wt(kg): --      10-31-22 @ 07:01  -  11-01-22 @ 07:00  --------------------------------------------------------  IN: 1390 mL / OUT: 1250 mL / NET: 140 mL    11-01-22 @ 07:01  -  11-01-22 @ 21:23  --------------------------------------------------------  IN: 900 mL / OUT: 700 mL / NET: 200 mL      LABS:  Na: 138 (11-01 @ 01:50), 134 (10-31 @ 06:41), 134 (10-30 @ 20:13), 132 (10-30 @ 15:56), 133 (10-30 @ 15:22)  K: 4.0 (11-01 @ 01:50), 3.8 (10-31 @ 06:41), 3.4 (10-30 @ 20:13), 3.8 (10-30 @ 15:56), 3.6 (10-30 @ 15:22)  Cl: 107 (11-01 @ 01:50), 103 (10-31 @ 06:41), 104 (10-30 @ 20:13), 97 (10-30 @ 15:56), 100 (10-30 @ 15:22)  CO2: 15 (11-01 @ 01:50), 21 (10-31 @ 06:41), 20 (10-30 @ 20:13), 25 (10-30 @ 15:56), 19 (10-30 @ 15:22)  BUN: 18 (11-01 @ 01:50), 14 (10-31 @ 06:41), 13 (10-30 @ 20:13), 14 (10-30 @ 15:56), 13 (10-30 @ 15:22)  Cr: 0.40 (11-01 @ 01:50), 0.37 (10-31 @ 06:41), 0.40 (10-30 @ 20:13), 0.51 (10-30 @ 15:56), 0.43 (10-30 @ 15:22)  Glu: 103(11-01 @ 01:50), 119(10-31 @ 06:41), 132(10-30 @ 20:13), 77(10-30 @ 15:56), 194(10-30 @ 15:22)    Hgb: 11.8 (11-01 @ 01:50), 12.7 (10-30 @ 15:56)  Hct: 36.3 (11-01 @ 01:50), 39.4 (10-30 @ 15:56)  WBC: 10.64 (11-01 @ 01:50), 7.85 (10-30 @ 15:56)  Plt: 121 (11-01 @ 01:50), 118 (10-30 @ 15:56)    INR: 1.60 10-30-22 @ 17:25  PTT: 25.2 10-30-22 @ 17:25    MEDICATIONS:  acetaminophen    Suspension .. 650 milliGRAM(s) Oral every 6 hours PRN  dexAMETHasone  Injectable 4 milliGRAM(s) IV Push every 6 hours  enoxaparin Injectable 40 milliGRAM(s) SubCutaneous <User Schedule>  insulin lispro (ADMELOG) corrective regimen sliding scale   SubCutaneous every 6 hours  lacosamide IVPB 200 milliGRAM(s) IV Intermittent every 12 hours  levETIRAcetam  IVPB 1000 milliGRAM(s) IV Intermittent <User Schedule>  multiple electrolytes Injection Type 1 1000 milliLiter(s) IV Continuous <Continuous>  ondansetron Injectable 4 milliGRAM(s) IV Push every 6 hours PRN  pantoprazole  Injectable 40 milliGRAM(s) IV Push daily  polyethylene glycol 3350 17 Gram(s) Oral daily  senna 2 Tablet(s) Oral at bedtime    EXAMINATION:  Please see exam from daytime.     Assessment/Plan:   81yo man with glioblastoma (dx 7/18/22, s/p R stereo bx, TIMOTHY x2, R crani for tumor debulking 7/28/22, on temozolomide), HLD, steroid-induced DM, h/o HIT, R popliteal DVT now s/p IVCF on 9/28, admitted 10/30 with 2 GTCs. VEEG with no seizures, with severe slowing.   1/4 BCx with staph epi     No change to plan from daytime except resend Bcx for 1/2 with staph epi   c/w Keppra 1g q8h and vimpat 200mg BID  Decadron 4q6 for cerebral edema  Lov ppx     DNR/DNI    Radha Sen  Neurocritical Care Attending

## 2022-11-01 NOTE — DIETITIAN INITIAL EVALUATION ADULT - PERTINENT MEDS FT
MEDICATIONS  (STANDING):  chlorhexidine 4% Liquid 1 Application(s) Topical <User Schedule>  dexAMETHasone  Injectable 4 milliGRAM(s) IV Push every 6 hours  enoxaparin Injectable 40 milliGRAM(s) SubCutaneous <User Schedule>  insulin lispro (ADMELOG) corrective regimen sliding scale   SubCutaneous every 6 hours  lacosamide IVPB 200 milliGRAM(s) IV Intermittent every 12 hours  levETIRAcetam  IVPB 1000 milliGRAM(s) IV Intermittent <User Schedule>  multiple electrolytes Injection Type 1 1000 milliLiter(s) (50 mL/Hr) IV Continuous <Continuous>  pantoprazole  Injectable 40 milliGRAM(s) IV Push daily  polyethylene glycol 3350 17 Gram(s) Oral daily  senna 2 Tablet(s) Oral at bedtime    MEDICATIONS  (PRN):  acetaminophen    Suspension .. 650 milliGRAM(s) Oral every 6 hours PRN Temp greater or equal to 38C (100.4F), Mild Pain (1 - 3)  ondansetron Injectable 4 milliGRAM(s) IV Push every 6 hours PRN Nausea and/or Vomiting

## 2022-11-01 NOTE — DIETITIAN INITIAL EVALUATION ADULT - SIGNS/SYMPTOMS
pressure injuries: stage II, suspected DTI; hx of crani, hx of GBM wt loss 9.9% x ~3 mo, severe edema; mod body fat/muscle loss, inadequate energy x 3 days (NPO)

## 2022-11-01 NOTE — DIETITIAN INITIAL EVALUATION ADULT - NSFNSGIIOFT_GEN_A_CORE
-No documented BM's recorded thus far. On bowel regimen (senna, Miralax).   -Continues on Plasma-Lyte as prescribed, infusing at 50ml/hr.   -Na goal 140-150 determined by neurosurgical team.   -Continues on Decadron for cerebral edema. A1c 9.3% indicative of poor glycemic control PTA. Likely steroid-induced. On antihyperglycemic as prescribed.

## 2022-11-01 NOTE — DIETITIAN INITIAL EVALUATION ADULT - OTHER INFO
Dosing wt (10/30): 171.6 lbs  Wt trend (per [facility]): (10/9): 156.8 lbs; (9/8): 162 lbs; (7/28): 190.5 lbs; (7/22): 190.5 lbs.  Overall, wt loss trend observed since July 2022. Net loss of 18.9 lbs/9.9% x ~3 months
Yes - the patient is able to be screened

## 2022-11-01 NOTE — ADVANCED PRACTICE NURSE CONSULT - RECOMMEDATIONS
Will recommend the followin. Sacrum, b/l buttocks: continue to monitor, apply Advanced Cavilon M-W-F. routine pericare between applications  2. continue with T&P  3. Complete Cair boots  4. Seat cushion when OOB to chair  5. nutrition support as pt condition allows  Tx plan discussed with RN
(3) walks occasionally

## 2022-11-01 NOTE — EEG REPORT - NS EEG TEXT BOX
[facility]   COMPREHENSIVE EPILEPSY CENTER   REPORT OF LTM VIDEO EEG     [facility]: [address] [place] [address] [address], [place] [address], Ph#: [phone]  [facility]: [id number] [place] [address], Ph#: [phone]  [facility]: [address] [place] [address], Ph#: [phone]    Patient Name: ANA LOVE  Age and : 82y (39)  MRN #: [id number]  Location: [facility] ED  Referring Physician: Unknown Doctor    Study Date: 10-31-22 1015  to 22 0800  Study duration: 21hrs 13 min     _____________________________________________________________  TECHNICAL INFORMATION    Placement and Labeling of Electrodes:  The EEG was performed utilizing 20 channels referential EEG connections (coronal over temporal over parasagittal montage) using all standard 10-20 electrode placements with EKG.  Recording was at a sampling rate of 256 samples per second per channel.  Time synchronized digital video recording was done simultaneously with EEG recording.  A low light infrared camera was used for low light recording.  Bernardino and seizure detection algorithms were utilized.    _____________________________________________________________  HISTORY    Patient is a 82y old  Male who presents with a chief complaint of AMS (30 Oct 2022 17:20)    MEDICATIONS  (STANDING):  dexAMETHasone  Injectable 4 milliGRAM(s) IV Push every 6 hours  enoxaparin Injectable 40 milliGRAM(s) SubCutaneous <User Schedule>  insulin lispro (ADMELOG) corrective regimen sliding scale   SubCutaneous every 6 hours  lacosamide IVPB 200 milliGRAM(s) IV Intermittent every 12 hours  levETIRAcetam  IVPB 1000 milliGRAM(s) IV Intermittent <User Schedule>  _____________________________________________________________  STUDY INTERPRETATION    Findings: The background was discontinuous 1 sec duration.   No posterior dominant rhythm seen.    Background Slowing:  Diffuse attenuated theta/delta slowing.    Focal Slowing:   Continuous Right hemispheric delta activity     Sleep Background:  Stage II sleep transients were not recorded.    Other Non-Epileptiform Findings:  Subtle breach effect in the right hemisphere characterized by higher amplitude.     Interictal Epileptiform Activity:   continuous, static 0.5 Hz right frontal (F4) lateralized periodic discharges (LPDs)    Events:  No event or seizure recorded.    Activation Procedures:   Hyperventilation was not performed.    Photic stimulation was performed and did not elicit any abnormality.     Artifacts:  Intermittent myogenic and movement artifacts were noted.    ECG:  The heart rate on single channel ECG was predominantly between 70-80 BPM.    _____________________________________________________________  EEG SUMMARY/CLASSIFICATION    Abnormal EEG in an altered patient.  -continuous, static 0.5 Hz right frontal (F4) lateralized periodic discharges (LPD)  - severe generalized slowing.  - Subtle breach effect in the right hemisphere characterized by higher amplitude.   _____________________________________________________________  EEG IMPRESSION/CLINICAL CORRELATE    Abnormal EEG study.  1. Potential epileptogenic focus in the right frontal region.   2. severe nonspecific diffuse or multifocal cerebral dysfunction.   3. Subtle skull defect in the right hemisphere.  4. No seizures seen.    _____________________________________________________________    Ike Mosqueda MD  Epilepsy Fellow , [facility]  Department of Neurology, [facility]    [facility] EEG Reading Room Ph#: [phone]  Epilepsy Answering Service after 5PM and before 8:30AM: Ph#: [phone]     [facility]   COMPREHENSIVE EPILEPSY CENTER   REPORT OF LTM VIDEO EEG     [facility]: [address] [place] [address] [address], [place] [address], Ph#: [phone]  [facility]: [id number] [place] [address], Ph#: [phone]  [facility]: [address] [place] [address], Ph#: [phone]    Patient Name: ANA LOVE  Age and : 82y (39)  MRN #: [id number]  Location: [facility] ED  Referring Physician: Unknown Doctor    Study Date: 10-31-22 1015  to 22 0800  Study duration: 21hrs 13 min     _____________________________________________________________  TECHNICAL INFORMATION    Placement and Labeling of Electrodes:  The EEG was performed utilizing 20 channels referential EEG connections (coronal over temporal over parasagittal montage) using all standard 10-20 electrode placements with EKG.  Recording was at a sampling rate of 256 samples per second per channel.  Time synchronized digital video recording was done simultaneously with EEG recording.  A low light infrared camera was used for low light recording.  Bernardino and seizure detection algorithms were utilized.    _____________________________________________________________  HISTORY    Patient is a 82y old  Male who presents with a chief complaint of AMS (30 Oct 2022 17:20)    MEDICATIONS  (STANDING):  dexAMETHasone  Injectable 4 milliGRAM(s) IV Push every 6 hours  enoxaparin Injectable 40 milliGRAM(s) SubCutaneous <User Schedule>  insulin lispro (ADMELOG) corrective regimen sliding scale   SubCutaneous every 6 hours  lacosamide IVPB 200 milliGRAM(s) IV Intermittent every 12 hours  levETIRAcetam  IVPB 1000 milliGRAM(s) IV Intermittent <User Schedule>  _____________________________________________________________  STUDY INTERPRETATION    Findings: The background was discontinuous 1 sec duration.   No posterior dominant rhythm seen.    Background Slowing:  Diffuse attenuated theta/delta slowing.    Focal Slowing:   Continuous Right hemispheric delta activity     Sleep Background:  Stage II sleep transients were not recorded.    Other Non-Epileptiform Findings:  Subtle breach effect in the right hemisphere characterized by higher amplitude.     Interictal Epileptiform Activity:   continuous, static 0.5-1 Hz right frontal (F4) lateralized periodic discharges (LPDs)    Events:  No event or seizure recorded.    Activation Procedures:   Hyperventilation was not performed.    Photic stimulation was performed and did not elicit any abnormality.     Artifacts:  Intermittent myogenic and movement artifacts were noted.    ECG:  The heart rate on single channel ECG was predominantly between 70-80 BPM.    _____________________________________________________________  EEG SUMMARY/CLASSIFICATION    Abnormal EEG in an altered patient.  - Continuous, static 0.5-1 Hz right frontal (F4) lateralized periodic discharges (LPD)  - Moderate to severe generalized slowing.  - Subtle breach effect in the right hemisphere characterized by higher amplitude.   _____________________________________________________________  EEG IMPRESSION/CLINICAL CORRELATE    Abnormal EEG study.  1. Potential epileptogenic focus in the right frontal region.   2. Moderate to severe nonspecific diffuse or multifocal cerebral dysfunction.   3. Subtle skull defect in the right hemisphere.  4. No seizures seen.    _____________________________________________________________    Ike Mosqueda MD  Epilepsy Fellow , [facility]  Department of Neurology, [facility]    [facility] EEG Reading Room Ph#: [phone]  Epilepsy Answering Service after 5PM and before 8:30AM: Ph#: [phone]

## 2022-11-01 NOTE — PROGRESS NOTE ADULT - THIS PATIENT HAS THE FOLLOWING CONDITION(S)/DIAGNOSES ON THIS ADMISSION:
GBM
Cerebral Edema/Brain Compression / Herniation
Cerebral Edema/Brain Compression / Herniation
Encephalopathy/Cerebral Edema/Brain Compression / Herniation
GBM

## 2022-11-01 NOTE — PATIENT PROFILE ADULT - FALL HARM RISK - HARM RISK INTERVENTIONS
Insulin per protocol  - serial accuchecks Assistance with ambulation/Assistance OOB with selected safe patient handling equipment/Communicate Risk of Fall with Harm to all staff/Discuss with provider need for PT consult/Monitor gait and stability/Reinforce activity limits and safety measures with patient and family/Tailored Fall Risk Interventions/Visual Cue: Yellow wristband and red socks/Bed in lowest position, wheels locked, appropriate side rails in place/Call bell, personal items and telephone in reach/Instruct patient to call for assistance before getting out of bed or chair/Non-slip footwear when patient is out of bed/[place] to call system/Physically safe environment - no spills, clutter or unnecessary equipment/Purposeful Proactive Rounding/Room/bathroom lighting operational, light cord in reach

## 2022-11-01 NOTE — DIETITIAN INITIAL EVALUATION ADULT - REASON FOR ADMISSION
Pt is an 83 yo M with PMH: glioblastoma (dx 7/18/22, s/p R stero bx, TIMOTHY x 2, R crani for tumor debulking 7/28/22, on temozolomide, last dose 4 days PTA), HLD, steroid induced DM, h/o HIT, R popliteal DVT, now s/p IVCF on 9/28. Discharged to rehab to continue radiation treatment, which finished 10/28. Course then c/b enterococcus bacteremia with antibiotics through 10/28. On 10/30, pt had two GTCs at rehab. Transferred to NSCU for concern of status epilepticus. EEG negative for seizure, +LPDs pending final read. Per MD note 11/1, no further neurosurgical intervention for GBM. Palliative following to determine GOC with family.

## 2022-11-01 NOTE — DIETITIAN INITIAL EVALUATION ADULT - PERTINENT LABORATORY DATA
11-01    138  |  107  |  18  ----------------------------<  103<H>  4.0   |  15<L>  |  0.40<L>    Ca    7.9<L>      01 Nov 2022 01:50  Phos  3.2     11-01  Mg     2.0     11-01    TPro  5.3<L>  /  Alb  2.5<L>  /  TBili  0.8  /  DBili  x   /  AST  35  /  ALT  51<H>  /  AlkPhos  104  10-30  POCT Blood Glucose.: 107 mg/dL (11-01-22 @ 05:09)  A1C with Estimated Average Glucose Result: 9.3 % (09-27-22 @ 13:14)  A1C with Estimated Average Glucose Result: 7.0 % (07-24-22 @ 06:23)

## 2022-11-01 NOTE — DIETITIAN INITIAL EVALUATION ADULT - ETIOLOGY
increased physiological demand of nutrient in setting of wound healing; catabolic illness predicted inadequate energy intake in setting of complex clinical course with subsequent increased nutrient needs r/t wound healing, catabolic illness

## 2022-11-01 NOTE — DIETITIAN INITIAL EVALUATION ADULT - FEEDING SKILL
Baseline feeding skill unclear; currently requires total assistance due to altered neurological function.

## 2022-11-01 NOTE — PROGRESS NOTE ADULT - TIME BILLING
not critically ill but medically complex as above
not critically ill but medically complex inpatient care as above

## 2022-11-01 NOTE — PHYSICAL THERAPY INITIAL EVALUATION ADULT - TRANSFER TRAINING, PT EVAL
GOAL: patient will be able to performed sit<>stand transfer with mod A x2 and rolling walker in 2 weeks

## 2022-11-01 NOTE — ADVANCED PRACTICE NURSE CONSULT - REASON FOR CONSULT
Requested by staff to assess skin status of pt a/w a pressure injury. PMH is noted:    82M w/ PMHx of glioblastoma (dx 7/18/22, s/p R stereo bx, TIMOTHY x2, R crani for tumor debulking 7/28/22, on temozolomide (last dose 4 days prior to admission), HLD, steroid-induced DM, h/o HIT, R popliteal DVT now s/p IVCF on 9/28. Discharged to rehab to continue radiation treatment which finished 10/28 course then was complicated with enterococcus bacteremia with antibiotics through 10/28. Baseline Ox2, although per family he has become less responsive over the last couple of days. On 10/30 had two GTCs at rehab and never returned to baseline en route with EMS had another seizure, was given ativan and versed at that time. Upon arrival to the ED was loaded with 3g of Keppra and 1500 of VPA.  Pt transferred to NSCU for concern of status epilepticus.

## 2022-11-01 NOTE — PHYSICAL THERAPY INITIAL EVALUATION ADULT - PERTINENT HX OF CURRENT PROBLEM, REHAB EVAL
81 y/o male with GBM s/p TIMOTHY, chemo and radiation discharged to rehab presenting with multiple seizures and no return to baseline exam and concern for status. ECG 10/30, nonsignificant, CXR 10/30, subtle patchy opacities Rt lung, CT head/chest/abd 10/30, Rt frontoparietal craniectomy, nonspecific peripheral ground-glass opacity in both upper lobes.

## 2022-11-01 NOTE — DIETITIAN INITIAL EVALUATION ADULT - NS FNS ENTERAL CURRENT ORDER NOT
Nerve Block    Date/Time: 5/25/2021 8:00 AM  Performed by: Diana GIRARD Els, MD  Authorized by: Diana GIRARD Els, MD     Block Type: PECS I  Laterality:  Bilateral  Patient Location:  OR  Indication: post-op pain management at surgeon's request    patient identified, IV checked, risks and benefits discussed, surgical consent, monitors and equipment checked, pre-op evaluation and timeout performed    Patient Position:  Supine  Prep:  Chlorhexidine gluconate (CHG)  Max Sterile Barrier Technique:  Hand Washing and Sterile gloves  Monitoring:  Blood pressure, continuous pulse oximetry, EKG and heart rate  Injection Technique:  Single-shot  Procedures: ultrasound guided    Local Infiltration:  Bupivacaine  Needle Type:  Echogenic  Needle Gauge:  22 G  Needle Length:  10 cm  Needle Localization:  Ultrasound guidance  Test Dose:  Negative  Physical status during block:  Sedated  Injection Assessment:  Negative aspiration for heme  Patient Condition:  Tolerated well, no immediate complications  Performed By:  Anesthesiologist  Anesthesiologist:  Diana GIRARD Els, MD  Start Time:  5/25/2021 7:57 AM         Current diet order does not meet estimated nutrient requirements

## 2022-11-01 NOTE — DIETITIAN INITIAL EVALUATION ADULT - PHYSCIAL ASSESSMENT
Pt unable to provide consent for Nutrition Focused Physical Assessment; findings based on visual observation

## 2022-11-01 NOTE — PHYSICAL THERAPY INITIAL EVALUATION ADULT - ADDITIONAL COMMENTS
as per family at b/s, resides in a  with family, +4 stairs to enter, prior to recent hospitalization (July/2022), patient independent with functional mobility, after recent hospitalization, pt required assist for all functional mobility.

## 2022-11-01 NOTE — CHART NOTE - NSCHARTNOTEFT_GEN_A_CORE
EEG preliminary read (not final) on the initial recording hour(s) = x 7    No seizures recorded.  Unchanged from previous EEG     [facility] EEG Reading Room Ph#: [phone]  Epilepsy Answering Service after 5PM and before 8:30AM: Ph#: [phone]

## 2022-11-01 NOTE — CHART NOTE - NSCHARTNOTEFT_GEN_A_CORE
Patient transferred to Medicine under Dr. Sehy Miller on [address], signed out to ACP under [employer] link [id number]

## 2022-11-01 NOTE — PHYSICAL THERAPY INITIAL EVALUATION ADULT - PATIENT/FAMILY AGREES WITH PLAN
From: Radha Hatch  To: Kym Valladares MD  Sent: 3/21/2019 3:02 PM CDT  Subject: Non-Urgent Medical Question    Dear Dr. Valladares,    Please send a referral to Dr. Graciela Jose (Dr López recommended him for bariatric surgery). I also have to see their dietician (Kathy) once a month for 3 months in order for [employer] to cover the operation. I may need a separate referral for the dietician.     My appointment is scheduled for 3/29/19 with both the Dr and the dietician. Please send referrals.  Thank You.    Their fax: [phone]    Sincerely,    Radha Hatch   family agreed/yes

## 2022-11-01 NOTE — CHART NOTE - NSCHARTNOTEFT_GEN_A_CORE
EEG preliminary read (not final) on the initial recording hour(s) = 8 am - 9pm    Unchanged eeg with Right frontal LPDs   No seizures recorded.  Moderate to severe slowing noted, nonspecific.  Final report to follow tomorrow morning after completion of study.    [facility] EEG Reading Room Ph#: [phone]  Epilepsy Answering Service after 5PM and before 8:30AM: Ph#: [phone]

## 2022-11-01 NOTE — DIETITIAN INITIAL EVALUATION ADULT - ENTERAL
If EN feeds warranted, consider: Glucerna 1.2 at GOAL rate 75ml/hr x 24 hrs. To provide (based on dosing wt 78kg): 1800ml, 2160kcal (27.7kcal/kg) and 108g protein (1.38g protein/kg).

## 2022-11-01 NOTE — PROGRESS NOTE ADULT - SUBJECTIVE AND OBJECTIVE BOX
NSCU Progress Note    Assessment/Hospital Course:    82M w/ PMHx of glioblastoma (dx 7/18/22, s/p R stereo bx, TIMOTHY x2, R crani for tumor debulking 7/28/22, on temozolomide (last dose 4 days prior to admission), HLD, steroid-induced DM, h/o HIT, R popliteal DVT now s/p IVCF on 9/28. Discharged to rehab to continue radiation treatment which finished 10/28 course then was complicated with enterococcus bacteremia with antibiotics through 10/28. Baseline Ox2, although per family he has become less reponsive over the last couple of days. On 10/30 had two GTCs at rehab and never returned to baseline en route with EMS had another seizure, was given ativan and versed at that time. Upon arrival to the ED was loaded with 3g of Keppra and 1500 of VPA.  Pt transfered to NSCU for concern of status epilepticus.      24 Hour Events/Subjective:  - eeg negative for sz, +LPDs, pending final read  - pending medicine bed    REVIEW OF SYSTEMS:   [x] Unable to Assess due to neurologic exam    VITALS:   - Reviewed      IMAGING/DATA:   - Reviewed      PHYSICAL EXAM:    General: calm  CVS: RRR  Pulm: CTAB  GI: Soft, NTND  Extremities: No LE Edema  Neuro: No EO, pupils 2R, LUE finger twitching to nox, RLE trace toe withdrawal movement to nox, otherwise flaccid       NSCU Progress Note    Assessment/Hospital Course:    82M w/ PMHx of glioblastoma (dx 7/18/22, s/p R stereo bx, TIMOTHY x2, R crani for tumor debulking 7/28/22, on temozolomide (last dose 4 days prior to admission), HLD, steroid-induced DM, h/o HIT, R popliteal DVT now s/p IVCF on 9/28. Discharged to rehab to continue radiation treatment which finished 10/28 course then was complicated with enterococcus bacteremia with antibiotics through 10/28. Baseline Ox2, although per family he has become less reponsive over the last couple of days. On 10/30 had two GTCs at rehab and never returned to baseline en route with EMS had another seizure, was given ativan and versed at that time. Upon arrival to the ED was loaded with 3g of Keppra and 1500 of VPA.  Pt transfered to NSCU for concern of status epilepticus.      24 Hour Events/Subjective:  - eeg negative for sz, +LPDs, pending final read  - pending medicine bed    REVIEW OF SYSTEMS:   [x] Unable to Assess due to neurologic exam    VITALS:   - Reviewed    IMAGING/DATA:   - Reviewed    PHYSICAL EXAM:    General: calm  CVS: RRR  Pulm: CTAB  GI: Soft, NTND  Extremities: No LE Edema  Neuro: No EO, pupils 2R, LUE finger twitching to nox, RLE trace toe withdrawal movement to nox, otherwise flaccid

## 2022-11-02 NOTE — PROGRESS NOTE ADULT - ASSESSMENT
82M with PMHx of glioblastoma (diagnosed July 2022 and s/p tumor debulking, temozolomide, and radiation), T2DM, and right popliteal and right brachial DVT sent in by rehab for generalized tonic-clonic movement concerning for seizure. CT head showing right frontoparietal craniectomy for a previously identified intra-axial mass, with slight minimal increase in extent of subjacent vasogenic edema, which extends from the right frontal lobe and crosses midline via the corpus callosum (degree is not significantly changed). EEG without gross seizure. He was admitted to NSCU and downgraded to medicine in 11/2/2022.

## 2022-11-02 NOTE — PROGRESS NOTE ADULT - SUBJECTIVE AND OBJECTIVE BOX
Patient is a 82y old  Male who presents with a chief complaint of Seizures (01 Nov 2022 21:23)      HPI:  "82M w/ PMHx of glioblastoma (dx 7/18/22, s/p R stereo bx, TIMOTHY x2, R crani for tumor debulking 7/28/22, on temozolomide (last dose 4 days prior to admission), HLD, steroid-induced DM, h/o HIT, R popliteal DVT now s/p IVCF on 9/28. Discharged to rehab to continue radiation treatment which finished 10/28 course then was complicated with enterococcus bacteremia with antibiotics through 10/28. Baseline Ox2, although per family he has become less reponsive over the last couple of days. On 10/30 had two GTCs at rehab and never returned to baseline en route with EMS had another seizure, was given ativan and versed at that time. Upon arrival to the ED was loaded with 3g of Keppra and 1500 of VPA.  Pt transfered to NSCU for concern of status epilepticus. (30 Oct 2022 23:11)"    HOSPITAL COURSE: Patient admitted to NSCU for further monitoring with EEG showing no epileptic discharge and generalized slowing. Patient seen by neurology and neurosurgery. Patient downgraded to medicine on 11/2 and reportedly not a candidate for further treatment. Palliative care also consulted given this fact, but family wants more time to see if patient will recovery. After being loaded with Keppra he is now on maintenance Keppra and Vimpat.     SUBJECTIVE / OVERNIGHT EVENTS: Daughter Faith at bedside. Patient currently lethargic not responsive to verbal or tactile stimuli. GOC as documented below.    REVIEW OF SYSTEMS:   Unable to perform due to mentation    PAST MEDICAL & SURGICAL HISTORY:  Glioblastoma multiforme  Steroid-induced diabetes mellitus  S/P craniotomy  R craniotomy for debulking with Dr. Mcneil (7/28/22)    FAMILY HISTORY:  FH: type 2 diabetes  FH: hyperlipidemia    Social History: non-contributory      MEDICATIONS  (STANDING):  dexAMETHasone  Injectable 4 milliGRAM(s) IV Push every 6 hours  enoxaparin Injectable 40 milliGRAM(s) SubCutaneous <User Schedule>  insulin lispro (ADMELOG) corrective regimen sliding scale   SubCutaneous every 6 hours  lacosamide IVPB 200 milliGRAM(s) IV Intermittent every 12 hours  levETIRAcetam  IVPB 1000 milliGRAM(s) IV Intermittent <User Schedule>  multiple electrolytes Injection Type 1 1000 milliLiter(s) (50 mL/Hr) IV Continuous <Continuous>  pantoprazole  Injectable 40 milliGRAM(s) IV Push daily    MEDICATIONS  (PRN):  acetaminophen    Suspension .. 650 milliGRAM(s) Oral every 6 hours PRN Temp greater or equal to 38C (100.4F), Mild Pain (1 - 3)  ondansetron Injectable 4 milliGRAM(s) IV Push every 6 hours PRN Nausea and/or Vomiting    CAPILLARY BLOOD GLUCOSE    POCT Blood Glucose.: 93 mg/dL (02 Nov 2022 05:45)  POCT Blood Glucose.: 89 mg/dL (01 Nov 2022 17:15)    I&O's Summary    01 Nov 2022 07:01  -  02 Nov 2022 07:00  --------------------------------------------------------  IN: 1100 mL / OUT: 1100 mL / NET: 0 mL    PHYSICAL EXAM:  Vital Signs Last 24 Hrs  T(C): 37 (02 Nov 2022 09:06), Max: 37.1 (02 Nov 2022 05:35)  T(F): 98.6 (02 Nov 2022 09:06), Max: 98.8 (02 Nov 2022 05:35)  HR: 94 (02 Nov 2022 09:06) (94 - 110)  BP: 102/66 (02 Nov 2022 09:06) (102/66 - 129/74)  BP(mean): 75 (01 Nov 2022 23:00) (75 - 94)  RR: 18 (02 Nov 2022 09:06) (13 - 26)  SpO2: 100% (02 Nov 2022 09:06) (95% - 100%)    Parameters below as of 02 Nov 2022 09:06  Patient On (Oxygen Delivery Method): nasal cannula    GEN: male in NAD, appears comfortable, no diaphoresis  EYES: No scleral injection, PERRL, EOMI  ENTM: neck supple & symmetric without tracheal deviation, moist membranes, no gross hearing impairment, thyroid gland not enlarged  CV: +S1/S2, no m/r/g, no abdominal bruit, no LE edema  RESP: breathing comfortably, no respiratory accessory muscle use, CTAB, no w/r/r  GI: normoactive BS, soft, NTND, no rebounding/guarding, no palpable masses  LYMPHATICS: no LAD or tenderness to palpation  NEURO: not responding to verbal/tactile stimuli  PSYCH: unable to assess  SKIN: no petechiae, ecchymosis or maculopapular rash noted    LABS:                        12.4   8.87  )-----------( 112      ( 01 Nov 2022 23:14 )             40.2     11-01    144  |  111<H>  |  18  ----------------------------<  96  3.8   |  22  |  0.52    Ca    8.7      01 Nov 2022 23:14  Phos  4.3     11-01  Mg     2.3     11-01                Culture - Urine (collected 30 Oct 2022 17:43)  Source: Clean Catch Clean Catch (Midstream)  Final Report (31 Oct 2022 22:34):    No growth    Culture - Blood (collected 30 Oct 2022 13:30)  Source: .Blood Blood  Preliminary Report (31 Oct 2022 19:02):    No growth to date.    Culture - Blood (collected 30 Oct 2022 13:16)  Source: .Blood Blood  Gram Stain (31 Oct 2022 19:45):    Growth in aerobic bottle: Gram Positive Cocci in Clusters  Final Report (01 Nov 2022 13:05):    Growth in aerobic bottle: Staphylococcus epidermidis    Coag Negative Staphylococcus    Single set isolate, possible contaminant. Contact    Microbiology if susceptibility testing clinically    indicated.    ***Blood Panel PCR results on this specimen are available    approximately 3 hours after the Gram stain result.***    Gram stain, PCR, and/or culture results may not always    correspond due to difference in methodologies.    ************************************************************    This PCR assay was performed by multiplex PCR. This    Assay tests for 66 bacterial and resistance gene targets.    Please refer to the [facility] test directory    at [email or web address] for details.  Organism: Blood Culture PCR (01 Nov 2022 13:05)  Organism: Blood Culture PCR (01 Nov 2022 13:05)        RADIOLOGY & ADDITIONAL TESTS:  Results Reviewed:   Imaging Personally Reviewed:  Electrocardiogram Personally Reviewed:    COORDINATION OF CARE:  Care Discussed with Consultants/Other Providers [Y/N]:  Prior or Outpatient Records Reviewed [Y/N]:

## 2022-11-02 NOTE — PROGRESS NOTE ADULT - SUBJECTIVE AND OBJECTIVE BOX
SUBJECTIVE AND OBJECTIVE: Unresponsive, non verbal, in active phases of dying as evidenced by, shallow breathing, hypotensive, mottled LE.  Family at bedside , aware .  Indication for Geriatrics and Palliative Care Services/INTERVAL HPI:   goals of care     OVERNIGHT EVENTS:  as documented     DNR on chart:Yes  Yes      Allergies    No Known Allergies    Intolerances    MEDICATIONS  (STANDING):  dexAMETHasone  Injectable 4 milliGRAM(s) IV Push every 6 hours  dextrose 5%. 1000 milliLiter(s) (50 mL/Hr) IV Continuous <Continuous>  dextrose 5%. 1000 milliLiter(s) (100 mL/Hr) IV Continuous <Continuous>  dextrose 50% Injectable 25 Gram(s) IV Push once  dextrose 50% Injectable 12.5 Gram(s) IV Push once  dextrose 50% Injectable 25 Gram(s) IV Push once  enoxaparin Injectable 40 milliGRAM(s) SubCutaneous <User Schedule>  glucagon  Injectable 1 milliGRAM(s) IntraMuscular once  insulin lispro (ADMELOG) corrective regimen sliding scale   SubCutaneous every 6 hours  lacosamide IVPB 200 milliGRAM(s) IV Intermittent every 12 hours  levETIRAcetam  IVPB 1000 milliGRAM(s) IV Intermittent <User Schedule>  multiple electrolytes Injection Type 1 1000 milliLiter(s) (50 mL/Hr) IV Continuous <Continuous>  pantoprazole  Injectable 40 milliGRAM(s) IV Push daily    MEDICATIONS  (PRN):  acetaminophen    Suspension .. 650 milliGRAM(s) Oral every 6 hours PRN Temp greater or equal to 38C (100.4F), Mild Pain (1 - 3)  dextrose Oral Gel 15 Gram(s) Oral once PRN Blood Glucose LESS THAN 70 milliGRAM(s)/deciliter  ondansetron Injectable 4 milliGRAM(s) IV Push every 6 hours PRN Nausea and/or Vomiting      ITEMS UNCHECKED ARE NOT PRESENT    PRESENT SYMPTOMS: [x ]Unable to self-report - see [ ] CPOT [ x] PAINADS [ ] RDOS  Source if other than patient:  [ ]Family   [ ]Team     Pain:  [ ]yes [ ]no  QOL impact -   Location -                    Aggravating factors -  Quality -  Radiation -  Timing-  Severity (0-10 scale):  Minimal acceptable level (0-10 scale):     CPOT:    https://www.[email or web address]/getattachment/[email or web address]/Critical-Care-Pain-Observation-Tool-(CPOT)    PAIN AD Score:	0  [email or web address].[email or web address]/painad.pdf (Ctrl + left click to view)    Dyspnea:                           [ ]Mild [ ]Moderate [ ]Severe    RDOS:  0 to 2  minimal or no respiratory distress   3  mild distress 3  4 to 6 moderate distress  >7 severe distress  https://[email or web address]/handouts/hen/Respiratory_Distress_Observation_Scale.pdf (Ctrl +  left click to view)     Anxiety:                             [ ]Mild [ ]Moderate [ ]Severe  Fatigue:                             [ ]Mild [ ]Moderate [ x]Severe  Nausea:                             [ ]Mild [ ]Moderate [ ]Severe  Loss of appetite:              [ ]Mild [ ]Moderate [x ]Severe  Constipation:                    [ ]Mild [ ]Moderate [ ]Severe    PCSSQ[Palliative Care Spiritual Screening Question]   Severity (0-10):0  Score of 4 or > indicate consideration of Chaplaincy referral.  Chaplaincy Referral: [ ] yes [ ] refused [x ] following    Caregiver [place]? : [ x] yes [ ] no  Social work referral [ x] Patient & Family Centered Care Referral [ ]     Anticipatory Grief present?:  [ x] yes [ ] no  Social work referral [ x] Patient & Family Centered Care Referral [ ]      Other Symptoms:  [x ]All other review of systems negative     Palliative Performance Status Version 2:     10    %      [email or web address]/files/[email or web address]_performance_scale_[email or web address].pdf  PHYSICAL EXAM:  Vital Signs Last 24 Hrs  T(C): 35.9 (02 Nov 2022 12:28), Max: 37.1 (02 Nov 2022 05:35)  T(F): 96.6 (02 Nov 2022 12:28), Max: 98.8 (02 Nov 2022 05:35)  HR: 95 (02 Nov 2022 12:28) (94 - 105)  BP: 89/57 (02 Nov 2022 12:28) (89/57 - 129/74)  BP(mean): 75 (01 Nov 2022 23:00) (75 - 94)  RR: 16 (02 Nov 2022 12:28) (13 - 26)  SpO2: 99% (02 Nov 2022 12:28) (95% - 100%)    Parameters below as of 02 Nov 2022 12:28  Patient On (Oxygen Delivery Method): mask, Venturi     I&O's Summary    01 Nov 2022 07:01  -  02 Nov 2022 07:00  --------------------------------------------------------  IN: 1100 mL / OUT: 1100 mL / NET: 0 mL       GENERAL: [ ]Cachexia    [ ]Alert  [ ]Oriented x   [ ]Lethargic  [x ]Unarousable  [ ]Verbal  [x ]Non-Verbal  Behavioral:   [ ]Anxiety  [ ]Delirium [ ]Agitation [ ]Other  HEENT:  [ ]Normal   [ ]Dry mouth   [ ]ET Tube/Trach  [ ]Oral lesions  PULMONARY:   [ ]Clear [ ]Tachypnea  [ ]Audible excessive secretions   [ ]Rhonchi        [ ]Right [ ]Left [ ]Bilateral  [ ]Crackles        [ ]Right [ ]Left [ ]Bilateral  [ ]Wheezing     [ ]Right [ ]Left [ ]Bilateral  [x ]Diminished BS [ ] Right [ ]Left [ ]Bilateral  CARDIOVASCULAR:    [ ]Regular [ ]Irregular [x ]Tachy  [ ]Ildefonso [ ]Murmur [ ]Other  GASTROINTESTINAL:  [ x]Soft  [ ]Distended   [x ]+BS  [ ]Non tender [ ]Tender  [ ]Other [ ]PEG [ ]OGT/ NGT   Last BM:   GENITOURINARY:  [ ]Normal [ x]Incontinent   [ ]Oliguria/Anuria   [ ]Dunn  MUSCULOSKELETAL:   [ ]Normal   [ ]Weakness  [ x]Bed/Wheelchair bound [ ]Edema  NEUROLOGIC:   [ ]No focal deficits  [ x] Cognitive impairment  [x ] Dysphagia [ ]Dysarthria [ ] Paresis [ ]Other   SKIN:   [ ]Normal  [ ]Rash  [x ]Other  [ ]Pressure ulcer(s) [ ]y [ ]n present on admission    CRITICAL CARE:  [ ]Shock Present  [ ]Septic [ ]Cardiogenic [x ]Neurologic [ ]Hypovolemic  [ ]Vasopressors [ ]Inotropes  [ ]Respiratory failure present [ ]Mechanical Ventilation [ ]Non-invasive ventilatory support [ ]High-Flow   [ ]Acute  [ ]Chronic [ ]Hypoxic  [ ]Hypercarbic [ ]Other  [ ]Other organ failure     LABS:                        12.4   8.87  )-----------( 112      ( 01 Nov 2022 23:14 )             40.2   11-01    144  |  111<H>  |  18  ----------------------------<  96  3.8   |  22  |  0.52    Ca    8.7      01 Nov 2022 23:14  Phos  4.3     11-01  Mg     2.3     11-01          RADIOLOGY & ADDITIONAL STUDIES:    < from: CT Head No Cont (10.30.22 @ 16:46) >    ACC: [id number] EXAM:  CT BRAIN                          PROCEDURE DATE:  10/30/2022          INTERPRETATION:  CLINICAL INFORMATION: History of a brain mass. Evaluate   for intracranial hemorrhage    TECHNIQUE: Noncontrast axial CT images were acquired through the head.   Two-dimensional sagittal and coronal reformats were generated.    COMPARISON STUDY: CT head 10/22/2022    FINDINGS:    Redemonstration of right frontoparietal craniectomy for a previously   identified intra-axial mass, with slight minimal increase in extent of   subjacent vasogenic edema, which extends from the right frontal lobe and   crosses midline via the corpus callosum. However, the degree of mild   right frontal lobe sulcal effacement is not significantly changed, and  there is no evidence for increased midline shift or herniation.    There is no CT evidence of acute intracranial hemorrhage, herniation,   midline shift or hydrocephalus.    There is mild cerebral volume loss and patchy white matter   hypoattenuation which is nonspecific in etiology but likely related to   chronic microvascular-type changes.    The visualized paranasal sinuses are clear. The mastoid air cells and   middle ear cavities are clear.    The soft tissues of the scalp are unremarkable. Status post right frontal   craniotomy. Bilateral lens replacements.    IMPRESSION:    Redemonstration of right frontoparietal craniectomy for a previously   identified intra-axial mass, with slight minimal increase in extent of   subjacent vasogenic edema, which extends from the right frontal lobe and   crosses midline via the corpus callosum. However, the degree of mild   right frontal lobe sulcal effacement is not significantly changed, and   there is no evidence for increased midline shift or herniation.    --- End of Report ---          < end of copied text >  _____________________________________________________________  EEG SUMMARY/CLASSIFICATION    Abnormal EEG in an altered patient.  - Continuous, static 0.5-1 Hz right frontal (F4) lateralized periodic discharges (LPD)  - Occasional BIRDs with 2-4s bursts of focal 2-4hz activity at F4 noted  - Severe generalized slowing.  - Subtle breach effect in the right hemisphere characterized by higher amplitude.   _____________________________________________________________  EEG IMPRESSION/CLINICAL CORRELATE    Abnormal EEG study.  1. Potential epileptogenic focus in the right frontal region.   2. Severe nonspecific diffuse or multifocal cerebral dysfunction.   3. Subtle skull defect in the right hemisphere.  4. No seizures seen x3 days.      Protein Calorie Malnutrition Present: [ ]mild [ x]moderate [ ]severe [ ]underweight [ ]morbid obesity  https://www.andeal.org/vault/2440/web/files/ONC/Table_Clinical%20Characteristics%20to%20Document%20Malnutrition-White%20JV%20et%20al%891275.pdf    Height (cm): 167.6 (10-30-22 @ 15:22), 167.6 (10-08-22 @ 12:33), 165.1 (09-30-22 @ 16:10)  Weight (kg): 78.018 (10-30-22 @ 15:31), 71 (10-08-22 @ 12:33), 68 (09-30-22 @ 16:10)  BMI (kg/m2): 27.8 (10-30-22 @ 15:31), 25.3 (10-30-22 @ 15:22), 25.3 (10-08-22 @ 12:33)    [ ]PPSV2 < or = 30%  [ ]significant weight loss [ ]poor nutritional intake [ ]anasarca[ ]Artificial Nutrition    Other REFERRALS:  [ ]Hospice  [ ]Child Life  [ ]Social Work  [x ]Case management [ ]Holistic Therapy

## 2022-11-02 NOTE — EEG REPORT - NS EEG TEXT BOX
[facility]   COMPREHENSIVE EPILEPSY CENTER   REPORT OF LTM VIDEO EEG     [facility]: [address] [place] [address] , [address], [place] [address], Ph#: [phone]  [facility]: [id number] [place] [address], Ph#: [phone]  [facility]: [address] [place] [address], Ph#: [phone]    Patient Name: ANA LOVE  Age and : 82y (39)  MRN #: [id number]  Location: [facility] ED  Referring Physician: Unknown Doctor    Study Date: 22 0800 to 22 0800  Study duration: 23h    _____________________________________________________________  TECHNICAL INFORMATION    Placement and Labeling of Electrodes:  The EEG was performed utilizing 20 channels referential EEG connections (coronal over temporal over parasagittal montage) using all standard 10-20 electrode placements with EKG.  Recording was at a sampling rate of 256 samples per second per channel.  Time synchronized digital video recording was done simultaneously with EEG recording.  A low light infrared camera was used for low light recording.  Bernardino and seizure detection algorithms were utilized.    _____________________________________________________________  HISTORY    Patient is a 82y old  Male who presents with a chief complaint of AMS (30 Oct 2022 17:20)    MEDICATIONS  (STANDING):  dexAMETHasone  Injectable 4 milliGRAM(s) IV Push every 6 hours  enoxaparin Injectable 40 milliGRAM(s) SubCutaneous <User Schedule>  insulin lispro (ADMELOG) corrective regimen sliding scale   SubCutaneous every 6 hours  lacosamide IVPB 200 milliGRAM(s) IV Intermittent every 12 hours  levETIRAcetam  IVPB 1000 milliGRAM(s) IV Intermittent <User Schedule>  _____________________________________________________________  STUDY INTERPRETATION    Findings: The background was discontinuous 1 sec duration at ties  .   No posterior dominant rhythm seen.    Background Slowing:  Diffuse attenuated delta>>theta slowing.    Focal Slowing:   Continuous Right hemispheric delta activity     Sleep Background:  Stage II sleep transients were not recorded.    Other Non-Epileptiform Findings:  Subtle breach effect in the right hemisphere characterized by higher amplitude.     Interictal Epileptiform Activity:   Continuous, static 0.5-1 Hz right frontal (F4) lateralized periodic discharges (LPDs)  BIRDs with 2-4s bursts of focal 2-4hz activity at F4 noted    Events:  No event or seizure recorded.    Activation Procedures:   Hyperventilation was not performed.    Photic stimulation was performed and did not elicit any abnormality.     Artifacts:  Intermittent myogenic and movement artifacts were noted.    ECG:  The heart rate on single channel ECG was predominantly between 70-80 BPM.    _____________________________________________________________  EEG SUMMARY/CLASSIFICATION    Abnormal EEG in an altered patient.  - Continuous, static 0.5-1 Hz right frontal (F4) lateralized periodic discharges (LPD)  - Occasional BIRDs with 2-4s bursts of focal 2-4hz activity at F4 noted  - Severe generalized slowing.  - Subtle breach effect in the right hemisphere characterized by higher amplitude.   _____________________________________________________________  EEG IMPRESSION/CLINICAL CORRELATE    Abnormal EEG study.  1. Potential epileptogenic focus in the right frontal region.   2. Severe nonspecific diffuse or multifocal cerebral dysfunction.   3. Subtle skull defect in the right hemisphere.  4. No seizures seen x3 days.

## 2022-11-02 NOTE — PROGRESS NOTE ADULT - CONVERSATION DETAILS
I had extensive [facility] discussion with daughter Faith and wife at bedside. Daughter Faith acting as  for mother due to their insistence. Formal interpretation services were offered.     We discussed that patient is currently lethargic and unable to tolerate oral intake. Likely he is no longer a candidate for treatment of his glioblastoma. Per daughter had a seizure prior to last admission and was lethargic afterwards, but recovered after two days and was discharged to rehab. She is wondering if this will happen again. I state that it's possible, but less likely as each medical event will cause a stepwise decline. In addition, given his GBM is unlikely to be treated it would be inevitable that patient will worsen sooner rather than later.     At his current state he would need a feeding tube. Patient has never discussed explicitly if he wants a feeding tube, but daughter and wife are thinking about it because prior to this hospitalization he had indicated that he wanted to fight. They understand that if he does not improve soon and if he is no longer a candidate for treatment then he would be a candidate for hospice. They are considering this, but want to give him more time to see if he will recover.
Met with patients wife/surrogate and daughter Faith face to face at bedside for greater than 45 minutes. Appropriately tearful family aware of imminent death, hours to days.  Family clear in their directives to seek full comfort approach including but not limited to:  no further iv fluids, no artificial nutrition, no aggressive medical interventions, no diagnostics , no blood draws, no blood glucose testing, no antibiotics .  Patient slated for transfer to PCU await bed availability .  Kindly assess prior to transfer as patient is in the active phases of dying.  Provide PCU RN/providers with a set of vitals prior to transfer .  Appreciate [facility] note by Dr. Levine.   Palliative will continue to follow

## 2022-11-03 NOTE — EEG REPORT - NS EEG TEXT BOX
[facility]   COMPREHENSIVE EPILEPSY CENTER   REPORT OF LTM VIDEO EEG     [facility]: [address] [place] [address] [address], [address], [place] [address], Ph#: [phone]  [facility]: [id number] [facility] [place] [address], Ph#: [phone]  [facility]: [address] [place] [address], Ph#: [phone]    Patient Name: ANA LOVE  Age and : 82y (38)  MRN #: [id number]  Location: [facility] ED  Referring Physician: Unknown Doctor    Study Date: 22 0800 to 11-3-22 0800  Study duration: 24h    _____________________________________________________________  TECHNICAL INFORMATION    Placement and Labeling of Electrodes:  The EEG was performed utilizing 20 channels referential EEG connections (coronal over temporal over parasagittal montage) using all standard 10-20 electrode placements with EKG.  Recording was at a sampling rate of 256 samples per second per channel.  Time synchronized digital video recording was done simultaneously with EEG recording.  A low light infrared camera was used for low light recording.  Bernardino and seizure detection algorithms were utilized.    _____________________________________________________________  HISTORY    Patient is a 82y old  Male who presents with a chief complaint of AMS (30 Oct 2022 17:20)    MEDICATIONS  (STANDING):  dexAMETHasone  Injectable 4 milliGRAM(s) IV Push every 6 hours  enoxaparin Injectable 40 milliGRAM(s) SubCutaneous <User Schedule>  insulin lispro (ADMELOG) corrective regimen sliding scale   SubCutaneous every 6 hours  lacosamide IVPB 200 milliGRAM(s) IV Intermittent every 12 hours  levETIRAcetam  IVPB 1000 milliGRAM(s) IV Intermittent <User Schedule>  _____________________________________________________________  STUDY INTERPRETATION    Findings: The background was discontinuous 1 sec duration at ties  No posterior dominant rhythm seen.    Background Slowing:  Diffuse attenuated delta>>theta slowing.    Focal Slowing:   Continuous Right hemispheric delta activity     Sleep Background:  Stage II sleep transients were not recorded.    Other Non-Epileptiform Findings:  Subtle breach effect in the right hemisphere characterized by higher amplitude.     Interictal Epileptiform Activity:   Continuous, static 0.5-1 Hz right frontal (F4) lateralized periodic discharges (LPDs)  Abundant BIRDs with 2-6s bursts of focal 2-4hz activity at F4 noted up to q 3-5min at times    Events:  No event or seizure recorded.    Activation Procedures:   Hyperventilation was not performed.    Photic stimulation was performed and did not elicit any abnormality.     Artifacts:  Intermittent myogenic and movement artifacts were noted.    ECG:  The heart rate on single channel ECG was predominantly between 70-90 BPM with ectopy.    _____________________________________________________________  EEG SUMMARY/CLASSIFICATION    Abnormal EEG in an altered patient.  - Continuous, static 0.5-1 Hz right frontal (F4) lateralized periodic discharges (LPD)  - Abundant BIRDs with 2-6s bursts of focal 2-4hz epileptiform activity over the right frontal region  - Severe generalized slowing and intermittent attenuation.  - Subtle breach effect in the right hemisphere characterized by higher amplitude.   _____________________________________________________________  EEG IMPRESSION/CLINICAL CORRELATE    Abnormal EEG study.  1. Potential epileptogenic focus in the right frontal region.   2. Severe nonspecific diffuse or multifocal cerebral dysfunction.   3. Subtle skull defect in the right hemisphere.

## 2022-11-03 NOTE — EEG REPORT - NS EEG TEXT BOX
[facility]   COMPREHENSIVE EPILEPSY CENTER   REPORT OF LTM VIDEO EEG     [facility]: [address] [place] [address] Dr, [address], [place] [address], Ph#: [phone]  [facility]: [id number], [place] [address], Ph#: [phone]  [facility]: [address] [place] [address], Ph#: [phone]    Patient Name: ANA LOVE  Age and : 82y (39)  MRN #: [id number]  Location: [facility] ED  Referring Physician: Unknown Doctor    Study Date: 11-3-22 0800 to 11-3-22 1415  Study duration: 6h 15min    _____________________________________________________________  TECHNICAL INFORMATION    Placement and Labeling of Electrodes:  The EEG was performed utilizing 20 channels referential EEG connections (coronal over temporal over parasagittal montage) using all standard 10-20 electrode placements with EKG.  Recording was at a sampling rate of 256 samples per second per channel.  Time synchronized digital video recording was done simultaneously with EEG recording.  A low light infrared camera was used for low light recording.  Bernardino and seizure detection algorithms were utilized.    _____________________________________________________________  HISTORY    Patient is a 82y old  Male who presents with a chief complaint of AMS (30 Oct 2022 17:20)    MEDICATIONS  (STANDING):  dexAMETHasone  Injectable 4 milliGRAM(s) IV Push every 6 hours  enoxaparin Injectable 40 milliGRAM(s) SubCutaneous <User Schedule>  insulin lispro (ADMELOG) corrective regimen sliding scale   SubCutaneous every 6 hours  lacosamide IVPB 200 milliGRAM(s) IV Intermittent every 12 hours  levETIRAcetam  IVPB 1000 milliGRAM(s) IV Intermittent <User Schedule>  _____________________________________________________________  STUDY INTERPRETATION    Findings: The background was discontinuous 1 sec duration at times  No posterior dominant rhythm seen.    Background Slowing:  Diffuse attenuated delta>>theta slowing.    Focal Slowing:   Continuous Right hemispheric delta activity     Sleep Background:  Stage II sleep transients were not recorded.    Other Non-Epileptiform Findings:  Subtle breach effect in the right hemisphere characterized by higher amplitude.     Interictal Epileptiform Activity:   Continuous, static 0.5-1 Hz right frontal (F4) lateralized periodic discharges (LPDs)    Events:  No event or seizure recorded.    Activation Procedures:   Hyperventilation was not performed.    Photic stimulation was performed and did not elicit any abnormality.     Artifacts:  Intermittent myogenic and movement artifacts were noted.    ECG:  The heart rate on single channel ECG was predominantly between 100-120 BPM    _____________________________________________________________  EEG SUMMARY/CLASSIFICATION    Abnormal EEG in an altered patient.  - Continuous, static 0.5-1 Hz right frontal (F4) lateralized periodic discharges (LPD)  - Severe generalized slowing and intermittent attenuation.  - Subtle breach effect in the right hemisphere characterized by higher amplitude.   _____________________________________________________________  EEG IMPRESSION/CLINICAL CORRELATE    Abnormal EEG study.  1. Potential epileptogenic focus in the right frontal region.   2. Severe nonspecific diffuse or multifocal cerebral dysfunction.   3. Subtle skull defect in the right hemisphere.    Ike Mosqueda MD  Epilepsy Fellow , [facility]  Department of Neurology, [facility] [employer]    [facility] EEG Reading Room Ph#: [phone]  Epilepsy Answering Service after 5PM and before 8:30AM: Ph#: [phone]     [facility]   COMPREHENSIVE EPILEPSY CENTER   REPORT OF LTM VIDEO EEG     [facility]: [address] [place] [address] Dr[address], [place] [address], Ph#: [phone]  [facility]: [id number], [place] [address], Ph#: [phone]  [facility]: [address] [place] [address], Ph#: [phone]    Patient Name: ANA LOVE  Age and : 82y (39)  MRN #: [id number]  Location: [facility] ED  Referring Physician: Unknown Doctor    Study Date: 11-3-22 0800 to 11-3-22 1415  Study duration: 6h 15min    _____________________________________________________________  TECHNICAL INFORMATION    Placement and Labeling of Electrodes:  The EEG was performed utilizing 20 channels referential EEG connections (coronal over temporal over parasagittal montage) using all standard 10-20 electrode placements with EKG.  Recording was at a sampling rate of 256 samples per second per channel.  Time synchronized digital video recording was done simultaneously with EEG recording.  A low light infrared camera was used for low light recording.  Bernardino and seizure detection algorithms were utilized.    _____________________________________________________________  HISTORY    Patient is a 82y old  Male who presents with a chief complaint of AMS (30 Oct 2022 17:20)    MEDICATIONS  (STANDING):  dexAMETHasone  Injectable 4 milliGRAM(s) IV Push every 6 hours  enoxaparin Injectable 40 milliGRAM(s) SubCutaneous <User Schedule>  insulin lispro (ADMELOG) corrective regimen sliding scale   SubCutaneous every 6 hours  lacosamide IVPB 200 milliGRAM(s) IV Intermittent every 12 hours  levETIRAcetam  IVPB 1000 milliGRAM(s) IV Intermittent <User Schedule>  _____________________________________________________________  STUDY INTERPRETATION    Findings: The background was discontinuous 1 sec duration at times  No posterior dominant rhythm seen.    Background Slowing:  Diffuse attenuated delta>>theta slowing.    Focal Slowing:   Continuous Right hemispheric delta activity     Sleep Background:  Stage II sleep transients were not recorded.    Other Non-Epileptiform Findings:  Subtle breach effect in the right hemisphere characterized by higher amplitude.     Interictal Epileptiform Activity:   Continuous, static 0.5-1 Hz right frontal (F4) lateralized periodic discharges (LPDs)    Events:  No event or seizure recorded.    Activation Procedures:   Hyperventilation was not performed.    Photic stimulation was performed and did not elicit any abnormality.     Artifacts:  Intermittent myogenic and movement artifacts were noted.    ECG:  The heart rate on single channel ECG was predominantly between 100-120 BPM    _____________________________________________________________  EEG SUMMARY/CLASSIFICATION  Abnormal EEG in an altered patient.  - Continuous, static 0.5-1 Hz right frontal (F4) lateralized periodic discharges (LPD)  - Severe generalized slowing and intermittent attenuation.  - Subtle breach effect in the right hemisphere characterized by higher amplitude.   _____________________________________________________________  EEG IMPRESSION/CLINICAL CORRELATE    Abnormal EEG study.  1. Potential epileptogenic focus in the right frontal region.   2. Severe nonspecific diffuse or multifocal cerebral dysfunction.   3. Subtle skull defect in the right hemisphere.    Ike Mosqueda MD  Epilepsy Fellow , [facility]  Department of Neurology, [facility] [employer]    [facility] EEG Reading Room Ph#: [phone]  Epilepsy Answering Service after 5PM and before 8:30AM: Ph#: [phone]

## 2022-11-03 NOTE — CHART NOTE - NSCHARTNOTEFT_GEN_A_CORE
Pt VSS required 3 doses of Diluadid 0.2 mg IV in 24hr.  continue with current dosing.  Awaiting bed in PCU for continued care and symptom management.  Palliative care will follow.

## 2022-11-03 NOTE — PROGRESS NOTE ADULT - SUBJECTIVE AND OBJECTIVE BOX
Patient is a 82y old  Male who presents with a chief complaint of Seizures (02 Nov 2022 13:11)      SUBJECTIVE / OVERNIGHT EVENTS:    family at bedside  patient unresponsive    unable to obtain ROS        MEDICATIONS  (STANDING):  dexAMETHasone  Injectable 4 milliGRAM(s) IV Push every 6 hours  lacosamide IVPB 200 milliGRAM(s) IV Intermittent every 12 hours  levETIRAcetam  IVPB 1000 milliGRAM(s) IV Intermittent <User Schedule>  pantoprazole  Injectable 40 milliGRAM(s) IV Push daily    MEDICATIONS  (PRN):  acetaminophen    Suspension .. 650 milliGRAM(s) Oral every 6 hours PRN Temp greater or equal to 38C (100.4F), Mild Pain (1 - 3)  glycopyrrolate Injectable 0.4 milliGRAM(s) IV Push every 4 hours PRN secretions  HYDROmorphone  Injectable 0.2 milliGRAM(s) IV Push every 2 hours PRN mild mod severe pain  HYDROmorphone  Injectable 0.2 milliGRAM(s) IV Push every 2 hours PRN dyspnea  ondansetron Injectable 4 milliGRAM(s) IV Push every 6 hours PRN Nausea and/or Vomiting      CAPILLARY BLOOD GLUCOSE      POCT Blood Glucose.: 129 mg/dL (02 Nov 2022 21:05)  POCT Blood Glucose.: 138 mg/dL (02 Nov 2022 16:12)    I&O's Summary      PHYSICAL EXAM:  Vital Signs Last 24 Hrs  T(C): 36.3 (03 Nov 2022 09:03), Max: 37.2 (03 Nov 2022 05:21)  T(F): 97.4 (03 Nov 2022 09:03), Max: 98.9 (03 Nov 2022 05:21)  HR: 100 (03 Nov 2022 09:03) (95 - 110)  BP: 61/50 (03 Nov 2022 09:03) (61/50 - 111/77)  BP(mean): --  RR: 17 (03 Nov 2022 09:03) (16 - 18)  SpO2: 100% (03 Nov 2022 09:03) (96% - 100%)    Parameters below as of 03 Nov 2022 09:03  Patient On (Oxygen Delivery Method): mask, Venturi    EYES: No scleral injection, PERRL, EOMI  ENTM: neck supple & symmetric without tracheal deviation, moist membranes,   CV: +S1/S2, no m/r/g, no abdominal bruit, no LE edema  RESP:  no respiratory accessory muscle use, CTAB, no w/r/r  GI: normoactive BS, soft, NTND, no rebounding/guarding, no palpable masses  NEURO: not responding to verbal/tactile stimuli  PSYCH: unable to assess  SKIN: no petechiae, ecchymosis or maculopapular rash     LABS:                        12.4   8.87  )-----------( 112      ( 01 Nov 2022 23:14 )             40.2     11-01    144  |  111<H>  |  18  ----------------------------<  96  3.8   |  22  |  0.52    Ca    8.7      01 Nov 2022 23:14  Phos  4.3     11-01  Mg     2.3     11-01                RADIOLOGY & ADDITIONAL TESTS:    Imaging Personally Reviewed:    Consultant(s) Notes Reviewed:      Care Discussed with Consultants/Other Providers:

## 2022-11-04 NOTE — PROGRESS NOTE ADULT - PROBLEM SELECTOR PLAN 1
-Likely no longer a candidate for treatment  - pall care following  - comfort care  -Currently on Dexamethasone 4 mg q6 hours IVP
-Likely no longer a candidate for treatment  - pall care following  - comfort care  -Currently on Dexamethasone 4 mg q6 hours IVP
Noted use of accessory muscles  Spoke with RN about frequent assessment for symptoms and utilization of PRN medications  Utilized two doses of Dilaudid PRN for dyspnea/24 hours  one does of Dilaudid PRN for pain   Awaits transfer to PCU
11/2 EEG noted, severe non specific diffuse cerebral dysfunction
-Likely no longer a candidate for treatment  -Palliative care consulted & family wants more time to see if patient will have meaningful recovery  -Currently on Dexamethasone 4 mg q6 hours IVP  -Continued GOC discussion

## 2022-11-04 NOTE — PROGRESS NOTE ADULT - NUTRITIONAL ASSESSMENT
This patient has been assessed with a concern for Malnutrition and has been determined to have a diagnosis/diagnoses of Severe protein-calorie malnutrition.    This patient is being managed with:   Diet NPO-  Entered: Oct 30 2022 10:57PM    

## 2022-11-04 NOTE — PROGRESS NOTE ADULT - PROBLEM SELECTOR PROBLEM 1
Status epilepticus
Acute respiratory distress
Glioblastoma multiforme

## 2022-11-04 NOTE — PROGRESS NOTE ADULT - SUBJECTIVE AND OBJECTIVE BOX
Patient is a 82y old  Male who presents with a chief complaint of Seizures (03 Nov 2022 12:24)      SUBJECTIVE / OVERNIGHT EVENTS:    unable to obtain ROS due to clinical condition.    ROS:  14 point ROS negative in detail except stated as above    MEDICATIONS  (STANDING):  dexAMETHasone  Injectable 4 milliGRAM(s) IV Push every 6 hours  lacosamide IVPB 200 milliGRAM(s) IV Intermittent every 12 hours  levETIRAcetam  IVPB 1000 milliGRAM(s) IV Intermittent <User Schedule>  pantoprazole  Injectable 40 milliGRAM(s) IV Push daily    MEDICATIONS  (PRN):  acetaminophen    Suspension .. 650 milliGRAM(s) Oral every 6 hours PRN Temp greater or equal to 38C (100.4F), Mild Pain (1 - 3)  glycopyrrolate Injectable 0.4 milliGRAM(s) IV Push every 4 hours PRN secretions  HYDROmorphone  Injectable 0.2 milliGRAM(s) IV Push every 2 hours PRN mild mod severe pain  HYDROmorphone  Injectable 0.2 milliGRAM(s) IV Push every 2 hours PRN dyspnea  ondansetron Injectable 4 milliGRAM(s) IV Push every 6 hours PRN Nausea and/or Vomiting      CAPILLARY BLOOD GLUCOSE        I&O's Summary      PHYSICAL EXAM:  Vital Signs Last 24 Hrs  T(C): 36.2 (04 Nov 2022 09:00), Max: 36.4 (04 Nov 2022 05:00)  T(F): 97.2 (04 Nov 2022 09:00), Max: 97.6 (04 Nov 2022 05:00)  HR: 101 (04 Nov 2022 09:00) (101 - 112)  BP: 71/47 (04 Nov 2022 09:00) (70/41 - 91/57)  BP(mean): --  RR: 18 (04 Nov 2022 09:00) (18 - 21)  SpO2: 97% (04 Nov 2022 09:00) (91% - 98%)    Parameters below as of 04 Nov 2022 09:00  Patient On (Oxygen Delivery Method): mask, Venturi      EYES: No scleral injection, PERRL, EOMI  ENTM: neck supple & symmetric without tracheal deviation, moist membranes,   CV: +S1/S2, no m/r/g, no abdominal bruit, no LE edema  RESP:  no respiratory accessory muscle use, CTAB, no w/r/r  GI: normoactive BS, soft, NTND, no rebounding/guarding, no palpable masses  NEURO: not responding to verbal/tactile stimuli  PSYCH: unable to assess  SKIN: no petechiae, ecchymosis or maculopapular rash       LABS:                    RADIOLOGY & ADDITIONAL TESTS:    Imaging Personally Reviewed:    Consultant(s) Notes Reviewed:      Care Discussed with Consultants/Other Providers:  lesli Bearden, anthony duggan

## 2022-11-04 NOTE — PROGRESS NOTE ADULT - PROBLEM SELECTOR PROBLEM 4
Deep vein thrombosis (DVT)
Deep vein thrombosis (DVT)
Debility
Deep vein thrombosis (DVT)
Encephalopathy acute

## 2022-11-04 NOTE — DISCHARGE NOTE FOR THE EXPIRED PATIENT - HOSPITAL COURSE
82M with PMHx of glioblastoma (diagnosed July 2022 and s/p tumor debulking, temozolomide, and radiation), T2DM, and right popliteal and right brachial DVT sent in by rehab for generalized tonic-clonic movement concerning for seizure. CT head showing right frontoparietal craniectomy for a previously identified intra-axial mass, with slight minimal increase in extent of subjacent vasogenic edema, which extends from the right frontal lobe and crosses midline via the corpus callosum (degree is not significantly changed) comfort care    Glioblastoma multiforme.   - no longer a candidate for treatment  - pall care followed, s/p full comfort care  - s/p Dexamethasone 4 mg q6 hours IVP.    Seizure disorder.   - s/p Vimpat 200 mg q12 hours and Keppra 1000 mg TID. EEG with epileptiform focus.    Encephalopathy acute.   -2/2 to dying process/GBM.    Deep vein thrombosis (DVT).   -known right brachial and right popliteal DVT. He had recent IVC filter. Of note patient had concern for HIT, though serotonin assay was negative (thus ruling it out). Patient previously seen by [facility] and after much discussion it seems decision for full dose AC deferred given bleeding risk and thrombocytopenia.    Pt pronounced at 11/4/22 14:40. Attending Dr. Espinoza contacted.

## 2022-11-04 NOTE — PROGRESS NOTE ADULT - PROVIDER SPECIALTY LIST ADULT
NSICU
NSICU
Internal Medicine
NSICU
NSICU
Neurosurgery
NSICU
Hospitalist
Hospitalist
Palliative Care
Palliative Care

## 2022-11-04 NOTE — PROGRESS NOTE ADULT - NSPROGADDITIONALINFOA_GEN_ALL_CORE
actively dying  pall care following- pending pcu bed  comfort care  d/w daughter Faith and wife at bedside
d/w wife and daughter at bedside

## 2022-11-04 NOTE — PROGRESS NOTE ADULT - PROBLEM SELECTOR PLAN 2
11/2 EEG noted, severe non specific diffuse cerebral dysfunction
-Continue with Vimpat 200 mg q12 hours and Keppra 1000 mg TID  - EEG with epiltiform focus
-Continue with Vimpat 200 mg q12 hours and Keppra 1000 mg TID  - EEG with epiltiform focus
-Continue with Vimpat 200 mg q12 hours and Keppra 1000 mg TID  -Follow up final EEG read
No further neurosurgical interventions for GBM  S/P 9 fractions of RT completed 10/27.

## 2022-11-04 NOTE — CHART NOTE - NSCHARTNOTESELECT_GEN_ALL_CORE
Death Note/Event Note
Event Note
VTE Risk Assessment/Event Note
EEG prelim
EEG-prelim
Transfer to Medicine/Transfer Note
eeg prelim
palliative care/Event Note

## 2022-11-04 NOTE — PROGRESS NOTE ADULT - PROBLEM SELECTOR PLAN 3
Actively dying as evidenced by shallow respirations, hypotensive 80's systolic, mottled LE, cold to touch, remains unresponsive , non verbal  Comfortable appearing in no acute distress
No further neurosurgical interventions for GBM  S/P 9 fractions of RT completed 10/27.
2/2 to dying process/GBM
2/2 to dying process/GBM
-Likely due to the above  -Will ask neuro to come by tomorrow for further prognostication  -Continued GOC discussion with family as it may be a possibility patient will not recover and maintain current mentation. I stated we will monitor patient today and if he does not improve tomorrow we will place NG tube. If after one week patient does not improve then family will be okay with hospice with no plans for PEG  -Doubt infectious etiology, but Blood Cx on admission 1/2 staph epi, repeat in labs, follow up

## 2022-11-04 NOTE — PROGRESS NOTE ADULT - ASSESSMENT
82M with PMHx of glioblastoma (diagnosed July 2022 and s/p tumor debulking, temozolomide, and radiation), T2DM, and right popliteal and right brachial DVT sent in by rehab for generalized tonic-clonic movement concerning for seizure. CT head showing right frontoparietal craniectomy for a previously identified intra-axial mass, with slight minimal increase in extent of subjacent vasogenic edema, which extends from the right frontal lobe and crosses midline via the corpus callosum (degree is not significantly changed) now comfort care

## 2022-11-04 NOTE — PROGRESS NOTE ADULT - PROBLEM SELECTOR PROBLEM 3
Encephalopathy acute
Encephalopathy acute
Glioblastoma multiforme
Encephalopathy acute
Encephalopathy acute

## 2022-11-04 NOTE — PROGRESS NOTE ADULT - PROBLEM SELECTOR PLAN 4
He has known right brachial and right popliteal DVT. He had recent IVC filter. Of note patient had concern for HIT, though serotonin assay was negative (thus ruling it out). Patient previously seen by heme and after much discussion it seems decision for full dose AC deferred given bleeding risk and thrombocytopenia
He has known right brachial and right popliteal DVT. He had recent IVC filter. Of note patient had concern for HIT, though serotonin assay was negative (thus ruling it out). Patient previously seen by heme and after much discussion it seems decision for full dose AC deferred given bleeding risk and thrombocytopenia    Continue with DVT PPx
Actively dying as evidenced by shallow respirations, hypotensive 80's systolic, mottled LE, cold to touch, remains unresponsive , non verbal  Comfortable appearing in no acute distress
PPS 10%< needs assistance with all basic needs
He has known right brachial and right popliteal DVT. He had recent IVC filter. Of note patient had concern for HIT, though serotonin assay was negative (thus ruling it out). Patient previously seen by heme and after much discussion it seems decision for full dose AC deferred given bleeding risk and thrombocytopenia    Continue with DVT PPx

## 2022-11-04 NOTE — PROGRESS NOTE ADULT - PROBLEM SELECTOR PLAN 5
PPS 10%< needs assistance with all basic needs
See goals of care
FS q6 hours and insulin sliding scale

## 2022-11-04 NOTE — PROGRESS NOTE ADULT - SUBJECTIVE AND OBJECTIVE BOX
SUBJECTIVE AND OBJECTIVE:  Indication for Geriatrics and Palliative Care Services/INTERVAL HPI:    OVERNIGHT EVENTS:    DNR on chart:Yes  Yes      Allergies    No Known Allergies    Intolerances    MEDICATIONS  (STANDING):  dexAMETHasone  Injectable 4 milliGRAM(s) IV Push every 6 hours  lacosamide IVPB 200 milliGRAM(s) IV Intermittent every 12 hours  levETIRAcetam  IVPB 1000 milliGRAM(s) IV Intermittent <User Schedule>  pantoprazole  Injectable 40 milliGRAM(s) IV Push daily    MEDICATIONS  (PRN):  acetaminophen    Suspension .. 650 milliGRAM(s) Oral every 6 hours PRN Temp greater or equal to 38C (100.4F), Mild Pain (1 - 3)  glycopyrrolate Injectable 0.4 milliGRAM(s) IV Push every 4 hours PRN secretions  HYDROmorphone  Injectable 0.2 milliGRAM(s) IV Push every 2 hours PRN mild mod severe pain  HYDROmorphone  Injectable 0.2 milliGRAM(s) IV Push every 2 hours PRN dyspnea  ondansetron Injectable 4 milliGRAM(s) IV Push every 6 hours PRN Nausea and/or Vomiting      ITEMS UNCHECKED ARE NOT PRESENT    PRESENT SYMPTOMS: [x ]Unable to self-report - see [ ] CPOT [ x] PAINADS [ ] RDOS  Source if other than patient:  [ ]Family   [ ]Team     Pain:  [ ]yes [ ]no  QOL impact -   Location -                    Aggravating factors -  Quality -  Radiation -  Timing-  Severity (0-10 scale):  Minimal acceptable level (0-10 scale):     CPOT:    https://www.[facility].org/getattachment/gjs43x08-0d2e-[email or web address]/Critical-Care-Pain-Observation-Tool-(CPOT)    PAIN AD Score:	0  http://geriatrictoolkit.[place].[facility]/cog/painad.pdf (Ctrl + left click to view)    Dyspnea:                           [ ]Mild [ ]Moderate [ ]Severe    RDOS:  0 to 2  minimal or no respiratory distress   3  mild distress  4 to 6 moderate distress  6  >7 severe distress  https://homecareinformation.net/handouts/hen/Respiratory_Distress_Observation_Scale.pdf (Ctrl +  left click to view)     Anxiety:                             [ ]Mild [ x]Moderate [ ]Severe  Fatigue:                             [ ]Mild [ ]Moderate [ x]Severe  Nausea:                             [ ]Mild [ ]Moderate [ ]Severe  Loss of appetite:              [ ]Mild [ ]Moderate [x ]Severe  Constipation:                    [ ]Mild [ ]Moderate [ ]Severe    PCSSQ[Palliative Care Spiritual Screening Question]   Severity (0-10):  5  Score of 4 or > indicate consideration of Chaplaincy referral.  Chaplaincy Referral: [  ] yes [ ] refused [ ] following    Caregiver [place]? : [x   ] yes [ ] no  Social work referral [x ] Patient & Family Centered Care Referral [ ]     Anticipatory Grief present?:  [ x] yes [ ] no  Social work referral [ ] Patient & Family Centered Care Referral [ ]      Other Symptoms:  [x ]All other review of systems negative     Palliative Performance Status Version 2:     10    %      http://npcrc.org/files/news/palliative_performance_scale_ppsv2.pdf  PHYSICAL EXAM:  Vital Signs Last 24 Hrs  T(C): 36.2 (04 Nov 2022 09:00), Max: 36.4 (04 Nov 2022 05:00)  T(F): 97.2 (04 Nov 2022 09:00), Max: 97.6 (04 Nov 2022 05:00)  HR: 101 (04 Nov 2022 09:00) (101 - 112)  BP: 71/47 (04 Nov 2022 09:00) (70/41 - 91/57)  BP(mean): --  RR: 18 (04 Nov 2022 09:00) (18 - 21)  SpO2: 97% (04 Nov 2022 09:00) (91% - 98%)    Parameters below as of 04 Nov 2022 09:00  Patient On (Oxygen Delivery Method): mask, Venturi     I&O's Summary     GENERAL: [ ]Cachexia    [ ]Alert  [ ]Oriented x   [ ]Lethargic  [ x]Unarousable  [ ]Verbal  [ ]Non-Verbal  Behavioral:   [ ]Anxiety  [ ]Delirium [ ]Agitation [ ]Other  HEENT:  [ ]Normal   [ x]Dry mouth   [ ]ET Tube/Trach  [ ]Oral lesions  PULMONARY:   [ ]Clear [ x]Tachypnea  [x ]Audible excessive secretions   [ ]Rhonchi        [ ]Right [ ]Left [ ]Bilateral  [ ]Crackles        [ ]Right [ ]Left [ ]Bilateral  [ ]Wheezing     [ ]Right [ ]Left [ ]Bilateral  [ ]Diminished BS [ ] Right [ ]Left [ ]Bilateral  CARDIOVASCULAR:    [ ]Regular [ x]Irregular [ ]Tachy  [ ]Ildefonso [ ]Murmur [ ]Other  GASTROINTESTINAL:  [x ]Soft  [ ]Distended   [ ]+BS  [ ]Non tender [ ]Tender  [ ]Other [ ]PEG [ ]OGT/ NGT   Last BM:   GENITOURINARY:  [ ]Normal [x ]Incontinent   [ ]Oliguria/Anuria   [ ]Dunn  MUSCULOSKELETAL:   [ ]Normal   [ ]Weakness  [ x]Bed/Wheelchair bound [ ]Edema  NEUROLOGIC:   [ ]No focal deficits  [x ] Cognitive impairment  [ ] Dysphagia [ ]Dysarthria [ ] Paresis [ ]Other   SKIN:   [ ]Normal  [ ]Rash  [ ]Other  [ ]Pressure ulcer(s) [ ]y [ ]n present on admission    CRITICAL CARE:  [ ]Shock Present  [ ]Septic [ ]Cardiogenic [ ]Neurologic [ ]Hypovolemic  [ ]Vasopressors [ ]Inotropes  [ x]Respiratory failure present [ ]Mechanical Ventilation [ x]Non-invasive ventilatory support [ ]High-Flow   [ ]Acute  [ ]Chronic [ ]Hypoxic  [ ]Hypercarbic [ ]Other  [x ]Other organ failure     LABS:            RADIOLOGY & ADDITIONAL STUDIES:  < from: CT Head No Cont (10.30.22 @ 16:46) >        INTERPRETATION:  CLINICAL INFORMATION: History of a brain mass. Evaluate   for intracranial hemorrhage    TECHNIQUE: Noncontrast axial CT images were acquired through the head.   Two-dimensional sagittal and coronal reformats were generated.    COMPARISON STUDY: CT head 10/22/2022    FINDINGS:    Redemonstration of right frontoparietal craniectomy for a previously   identified intra-axial mass, with slight minimal increase in extent of   subjacent vasogenic edema, which extends from the right frontal lobe and   crosses midline via the corpus callosum. However, the degree of mild   right frontal lobe sulcal effacement is not significantly changed, and  there is no evidence for increased midline shift or herniation.    There is no CT evidence of acute intracranial hemorrhage, herniation,   midline shift or hydrocephalus.    There is mild cerebral volume loss and patchy white matter   hypoattenuation which is nonspecific in etiology but likely related to   chronic microvascular-type changes.    The visualized paranasal sinuses are clear. The mastoid air cells and   middle ear cavities are clear.    The soft tissues of the scalp are unremarkable. Status post right frontal   craniotomy. Bilateral lens replacements.    IMPRESSION:    Redemonstration of right frontoparietal craniectomy for a previously   identified intra-axial mass, with slight minimal increase in extent of   subjacent vasogenic edema, which extends from the right frontal lobe and   crosses midline via the corpus callosum. However, the degree of mild   right frontal lobe sulcal effacement is not significantly changed, and   there is no evidence for increased midline shift or herniation.    --- End of Report ---           DILIA RAYO DO; Resident Radiologist  This document has been electronically signed.  FADUMO MANZANARES MD; Attending Radiologist  This document has been electronically signed. Oct 30 2022  5:29PM    < end of copied text >  < from: CT Abdomen and Pelvis w/ IV Cont (10.30.22 @ 16:46) >    ACC: [id number] EXAM:  CT ABDOMEN AND PELVIS IC                        ACC: [id number] EXAM:  CT CHEST IC                          PROCEDURE DATE:  10/30/2022          INTERPRETATION:  CLINICAL INFORMATION: Fever. Glioblastoma status post   craniotomy in July 2022. Recent deep venous thrombosis.    COMPARISON: Outside institution CT 7/15/2022    CONTRAST/COMPLICATIONS:  IV Contrast: Omnipaque 350  90 cc administered   10 cc discarded  Oral Contrast: NONE  Complications: None reported at time of study completion    PROCEDURE:  CT of the Chest, Abdomen and Pelvis was performed.  Sagittal and coronal reformats were performed.    FINDINGS:  CHEST:  LUNGS AND LARGE AIRWAYS: Patent central airways. Nonspecific peripheral   groundglass opacities in both upper lobes, right middle lobe and right   lower lobe. Infection is a differential consideration. These are new from   prior chest CT 7/15/2022.  PLEURA: No pleural effusion.  VESSELS: Atherosclerotic changes of the aorta and coronary arteries.  HEART: Heart size is normal. No pericardial effusion.  MEDIASTINUM AND STEVEN: No lymphadenopathy.  CHEST WALL AND LOWER NECK: Within normal limits.    ABDOMEN AND PELVIS:  LIVER: Within normal limits.  BILE DUCTS: Normal caliber.  GALLBLADDER: Within normal limits.  SPLEEN: Within normal limits.  PANCREAS: Within normal limits. A few scattered regions of low density   within the gland corresponds interdigitating fat.  ADRENALS: Within normal limits.  KIDNEYS/URETERS: A 2.6 cm solid enhancing left upper pole renal neoplasm   previously 3.4 cm 7/15/2022. Additional bilateral renal cysts.    BLADDER: Within normal limits.  REPRODUCTIVE ORGANS: Prostate within normal limits.    BOWEL: No bowel obstruction. Colonic diverticulosis.  PERITONEUM: No ascites.  VESSELS: Atherosclerotic changes. IVC filter with clot within the filter.  RETROPERITONEUM/LYMPH NODES: No lymphadenopathy.  ABDOMINAL WALL: Fat-containing umbilical hernia.  BONES: Degenerative changes. Old sternal fracture.    IMPRESSION:  Nonspecific peripheral groundglass opacities in both upper lobes, right   middle lobe and right lower lobe. Infection is a differential   consideration.    No evidence of infection in the abdomen/pelvis.    A 2.6 cm solid enhancing left upper pole renal neoplasm decrease in size   to prior imaging 7/15/2022 and measured 3.4 cm.    IVC filter with clot in the filter.    --- End of Report ---    < end of copied text >      Protein Calorie Malnutrition Present: [ ]mild [ ]moderate [x ]severe [ ]underweight [ ]morbid obesity  https://www.andeal.org/vault/2440/web/files/ONC/Table_Clinical%20Characteristics%20to%20Document%20Malnutrition-White%20JV%20et%20al%202012.pdf    Height (cm): 167.6 (10-30-22 @ 15:22), 167.6 (10-08-22 @ 12:33), 165.1 (09-30-22 @ 16:10)  Weight (kg): 78.018 (10-30-22 @ 15:31), 71 (10-08-22 @ 12:33), 68 (09-30-22 @ 16:10)  BMI (kg/m2): 27.8 (10-30-22 @ 15:31), 25.3 (10-30-22 @ 15:22), 25.3 (10-08-22 @ 12:33)    [ ]PPSV2 < or = 30%  [ ]significant weight loss [x ]poor nutritional intake [ ]anasarca[ ]Artificial Nutrition    Other REFERRALS:  [ ]Hospice  [ ]Child Life  [ ]Social Work  [ x]Case management [ ]Holistic Therapy     Goals of Care Document:JOSH Lopez (10-10-22 @ 13:37)  Goals of Care Conversation:   Participants:  · Participants  Family; Staff; Pt unable to participate due to mental status  · Spouse  wife  · Child(aaron)  Faith    Advance Directives:  · Does patient have Advance Directive  Yes  · Indicate Type  Health Care Proxy (HCP)  · Agent's Name  Faith Hendrickson  · Phone #  [phone]  · Are any of the items on the chart  Yes  · Specify which ones are on chart  Health Care Proxy (HCP)  · Caregiver:  yes  · Name  Faith hendrickson  · Phone Number  [phone]    Conversation Discussion:  · Conversation  MOLST Discussed; Treatment Options  · Conversation Details  Referral for complex decision making and symptom management in setting of  malignancy. Met with patient's family at bedside and introduced role of palliative care during patient's hospitalization. Neurologist, Dr. Keys, present during encounter and discussed seizure workup after patient had RRT for seizure-like activity this AM. 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 After cancer diagnosis, patient's mood declined, making it very difficult for family to have goc discussions. Faith shared that her parents have been  for 50 years and it has been difficult for them to see him decline so quickly with this new cancer diagnosis (7/2022). However she shared that she has spoken to other providers and her mother about her father's advance directives. She shared that her father would not want to connected to machines if he was going to be remain so debilitated. Discussed advanced directives including CPR and mechanical ventilation in setting of malignancy. Reviewed the risks and benefits of these interventions at the EOL in setting of malignancy sharing that these interventions might pose more burden than benefit. Explained that patient can continue with medical management for seizures and have access to other symptom medications regardless of code status. Family was in agreement with DNR/DNI and completion of MOLST form.     Faith inquired about future options if patient was not a candidate for further cancer directed therapy. Reviewed the philosophy of hospice services with her and her mother. She expressed appreciation for the information. Family is awaiting for recs from Dr. Borja and rad/onc team. They are also aware patient is pending CT head and EEG. Palliative team will remain available for support.    Personal Advance Directives Treatment Guidelines:   Treatment Guidelines:  · Decision Maker  Health Care Proxy  · Treatment Guidelines  DNR Order  · Treatment Guideline Comments  DNR/DNI    MOLST:  · Completed  10-Oct-2022    Location of Discussion:   Time Spent on Advance Care Planning:  I spent 30 (in minutes) on advance care planning services with the patient.  This time is separate and distinct from any other care management services provided on this date.    Location of Discussion:  · Location of discussion  Face to face      Electronic Signatures:  Martha Lopez)  (Signed 10-Oct-2022 13:48)  	Authored: Goals of Care Conversation, Personal Advance Directives Treatment Guidelines, Location of Discussion      Last Updated: 10-Oct-2022 13:48 by Martha Lopez ()

## 2022-11-07 LAB
CULTURE RESULTS: SIGNIFICANT CHANGE UP
CULTURE RESULTS: SIGNIFICANT CHANGE UP
SPECIMEN SOURCE: SIGNIFICANT CHANGE UP
SPECIMEN SOURCE: SIGNIFICANT CHANGE UP

## 2022-11-08 PROCEDURE — 72131 CT LUMBAR SPINE W/O DYE: CPT | Mod: MA

## 2022-11-08 PROCEDURE — 97116 GAIT TRAINING THERAPY: CPT

## 2022-11-08 PROCEDURE — C1880: CPT

## 2022-11-08 PROCEDURE — 84132 ASSAY OF SERUM POTASSIUM: CPT

## 2022-11-08 PROCEDURE — 71045 X-RAY EXAM CHEST 1 VIEW: CPT

## 2022-11-08 PROCEDURE — 84484 ASSAY OF TROPONIN QUANT: CPT

## 2022-11-08 PROCEDURE — 70553 MRI BRAIN STEM W/O & W/DYE: CPT | Mod: MA

## 2022-11-08 PROCEDURE — 70450 CT HEAD/BRAIN W/O DYE: CPT | Mod: MA

## 2022-11-08 PROCEDURE — 83735 ASSAY OF MAGNESIUM: CPT

## 2022-11-08 PROCEDURE — 85025 COMPLETE CBC W/AUTO DIFF WBC: CPT

## 2022-11-08 PROCEDURE — 88305 TISSUE EXAM BY PATHOLOGIST: CPT

## 2022-11-08 PROCEDURE — 36415 COLL VENOUS BLD VENIPUNCTURE: CPT

## 2022-11-08 PROCEDURE — C9399: CPT

## 2022-11-08 PROCEDURE — 84295 ASSAY OF SERUM SODIUM: CPT

## 2022-11-08 PROCEDURE — A9585: CPT

## 2022-11-08 PROCEDURE — 82962 GLUCOSE BLOOD TEST: CPT

## 2022-11-08 PROCEDURE — 86900 BLOOD TYPING SEROLOGIC ABO: CPT

## 2022-11-08 PROCEDURE — 87086 URINE CULTURE/COLONY COUNT: CPT

## 2022-11-08 PROCEDURE — 83605 ASSAY OF LACTIC ACID: CPT

## 2022-11-08 PROCEDURE — 85730 THROMBOPLASTIN TIME PARTIAL: CPT

## 2022-11-08 PROCEDURE — 86850 RBC ANTIBODY SCREEN: CPT

## 2022-11-08 PROCEDURE — 85014 HEMATOCRIT: CPT

## 2022-11-08 PROCEDURE — 82565 ASSAY OF CREATININE: CPT

## 2022-11-08 PROCEDURE — 37191 INS ENDOVAS VENA CAVA FILTR: CPT

## 2022-11-08 PROCEDURE — 82330 ASSAY OF CALCIUM: CPT

## 2022-11-08 PROCEDURE — 80048 BASIC METABOLIC PNL TOTAL CA: CPT

## 2022-11-08 PROCEDURE — 82803 BLOOD GASES ANY COMBINATION: CPT

## 2022-11-08 PROCEDURE — 83036 HEMOGLOBIN GLYCOSYLATED A1C: CPT

## 2022-11-08 PROCEDURE — 97165 OT EVAL LOW COMPLEX 30 MIN: CPT

## 2022-11-08 PROCEDURE — U0005: CPT

## 2022-11-08 PROCEDURE — 83880 ASSAY OF NATRIURETIC PEPTIDE: CPT

## 2022-11-08 PROCEDURE — 88341 IMHCHEM/IMCYTCHM EA ADD ANTB: CPT

## 2022-11-08 PROCEDURE — C1894: CPT

## 2022-11-08 PROCEDURE — 87637 SARSCOV2&INF A&B&RSV AMP PRB: CPT

## 2022-11-08 PROCEDURE — 87186 SC STD MICRODIL/AGAR DIL: CPT

## 2022-11-08 PROCEDURE — 80053 COMPREHEN METABOLIC PANEL: CPT

## 2022-11-08 PROCEDURE — 84100 ASSAY OF PHOSPHORUS: CPT

## 2022-11-08 PROCEDURE — 85610 PROTHROMBIN TIME: CPT

## 2022-11-08 PROCEDURE — 82272 OCCULT BLD FECES 1-3 TESTS: CPT

## 2022-11-08 PROCEDURE — 82435 ASSAY OF BLOOD CHLORIDE: CPT

## 2022-11-08 PROCEDURE — 85018 HEMOGLOBIN: CPT

## 2022-11-08 PROCEDURE — 85027 COMPLETE CBC AUTOMATED: CPT

## 2022-11-08 PROCEDURE — C1887: CPT

## 2022-11-08 PROCEDURE — 97161 PT EVAL LOW COMPLEX 20 MIN: CPT

## 2022-11-08 PROCEDURE — 93970 EXTREMITY STUDY: CPT

## 2022-11-08 PROCEDURE — 97530 THERAPEUTIC ACTIVITIES: CPT

## 2022-11-08 PROCEDURE — 97110 THERAPEUTIC EXERCISES: CPT

## 2022-11-08 PROCEDURE — 81001 URINALYSIS AUTO W/SCOPE: CPT

## 2022-11-08 PROCEDURE — 86022 PLATELET ANTIBODIES: CPT

## 2022-11-08 PROCEDURE — 86901 BLOOD TYPING SEROLOGIC RH(D): CPT

## 2022-11-08 PROCEDURE — 99285 EMERGENCY DEPT VISIT HI MDM: CPT | Mod: 25

## 2022-11-08 PROCEDURE — 82947 ASSAY GLUCOSE BLOOD QUANT: CPT

## 2022-11-08 PROCEDURE — 93308 TTE F-UP OR LMTD: CPT

## 2022-11-08 PROCEDURE — U0003: CPT

## 2022-11-18 ENCOUNTER — APPOINTMENT (OUTPATIENT)
Dept: INTERVENTIONAL RADIOLOGY/VASCULAR | Facility: CLINIC | Age: 82
End: 2022-11-18

## 2022-12-06 ENCOUNTER — APPOINTMENT (OUTPATIENT)
Dept: INTERNAL MEDICINE | Facility: CLINIC | Age: 82
End: 2022-12-06

## 2022-12-08 ENCOUNTER — APPOINTMENT (OUTPATIENT)
Dept: INTERNAL MEDICINE | Facility: CLINIC | Age: 82
End: 2022-12-08

## 2022-12-26 NOTE — PROGRESS NOTE ADULT - PROBLEM SELECTOR PLAN 3
noted  ----- Message -----  From: Alex Miller R.N.  Sent: 12/24/2022   6:02 PM PST  To: Kimberly Dugan R.N., Chen Gil R.N., *      Please get insurance auth for 1w3 SNV. Thank you.  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

## 2023-01-11 NOTE — DIETITIAN INITIAL EVALUATION ADULT - PROBLEM/PLAN-6
Addended by: Eduardo Chan on: 1/11/2023 03:57 PM     Modules accepted: Orders DISPLAY PLAN FREE TEXT

## 2023-04-17 NOTE — PROGRESS NOTE ADULT - PROBLEM SELECTOR PLAN 3
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 plts 101 on admission, improved from 77 on 9/22.   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 Silva)  - On 9/21, he was found to have a DVT and was started on Lovenox, which was quickly discontinued after outpt labs showed plt of 77 < 172 (8/2022). Per daughter only took 1 or 2 doses of the lovenox, so thrombocytopenia more likely 2/ temozolomide, which was also discontinued at that time  - 9/28 HIT assay (heparin pf4 ab) negative  - neg serotonin releasing assay     PLAN:  - continue to monitor plts on CBC FS - 124/CBC/CMP/Type and Cross/Type and Screen/UCG/POCT Blood Glucose generalized weakness x 3 weeks with worsening LE weakness x 5 days w/ difficulty ambulating. Here with 2/5 strength in BLE, 4/5 in UE, rectal tone intact, no changes in urinary sxs (although w/ history of urinary incontinence). no acute changes in mental status (AAOx2, mild confusion since GBM dx)  - given diproprionate weakness, concerning for spinal pathology ?mets, ?cauda equina   - generalized weakness due to malignancy, decreased PO intake, and relative drop in hgb may also be contributing  - CTH here showing post-op changes, possible residual or remnant tumor   - CT L-spine showed degenerative changes resulting in mild multilevel spinal canal stenosis or neural foraminal narrowing    PLAN:  - neurosurgery following; patient can continue to follow up as an outpatient with Dr. Mcneil as planned after their current admission  - PT recommends [facility], OT [facility] or home OT

## 2023-05-07 NOTE — DIETITIAN INITIAL EVALUATION ADULT - ADD RECOMMEND
- - - 1) Recommend advance to Consistent Carbohydrate diet with Glucerna 1x/day when medically feasible.   2) Recommend multivitamin and Vitamin C, pending no medical contraindications, to promote wound healing.  3) Monitor PO intake, GI tolerance, skin integrity, labs, weight, and bowel movement regularity.   4) Honor food preferences as feasible. Assist with meals PRN and gently encourage PO intake.  5) RD remains available upon request and will follow-up per protocol.  6) malnutrition alert placed in chart

## 2023-06-21 NOTE — PHYSICAL THERAPY INITIAL EVALUATION ADULT - IMPAIRMENTS FOUND, PT EVAL
Chris Maharaj called requesting a refill of the below medication which has been pended for you:     Requested Prescriptions     Pending Prescriptions Disp Refills    hydroCHLOROthiazide (HYDRODIURIL) 50 MG tablet [Pharmacy Med Name: hydroCHLOROthiazide 50 MG Oral Tablet] 90 tablet 0     Sig: Take 1 tablet by mouth once daily       Last Appointment Date: 2/1/2023  Next Appointment Date: 8/2/2023    No Known Allergies
aerobic capacity/endurance/gait, locomotion, and balance/muscle strength/ROM

## 2023-08-24 NOTE — PROGRESS NOTE ADULT - PROBLEM SELECTOR PLAN 8
Detail Level: Detailed Detail Level: Simple Detail Level: Zone DVT PPX: Lovenox subcu, SCDs    Activity as tolerated.

## 2023-09-06 NOTE — PROGRESS NOTE ADULT - PROBLEM SELECTOR PROBLEM 8
Referral was made on 8/9/23.  I am passing message on to Marjorie.  Hyperlipidemia Steroid-induced diabetes mellitus

## 2023-10-29 NOTE — PROGRESS NOTE ADULT - PROVIDER SPECIALTY LIST ADULT
Internal Medicine
Neurosurgery
Gastroenterology
Internal Medicine
Internal Medicine
Intervent Radiology
Internal Medicine
No restrictions

## 2023-11-25 NOTE — DISCHARGE NOTE PROVIDER - DISCHARGE SERVICE FOR PATIENT
on the discharge service for the patient. I have reviewed and made amendments to the documentation where necessary.
(4) walks frequently

## 2024-01-19 NOTE — PATIENT PROFILE ADULT - HOME ACCESSIBILITY CONCERNS
LM for call back  
Onset: 1/12/24    Location/description: The pts mother reports the pt is coughing, \"Now he's starting to have mucus come out but the cough is starting to sound deeper\", -rib retractions, pts mother denies the pt is having trouble breathing    Associated Symptoms: dx with bronchiolitis on 1/12/24    What improves/worsens symptoms:     Symptom specific medications: humidifier, saline nasal drops    Intake and Output: normal/normal    Activity level: awake, alert, tracking with eyes, moving all extremities, normal coloring of skin    Temperature (route and time): no fever    Weight:   Wt Readings from Last 1 Encounters:   01/11/24 4.92 kg (10 lb 13.6 oz) (10 %, Z= -1.30)*     * Growth percentiles are based on WHO (Boys, 0-2 years) data.          Recent Care: 1/11/24 with peds    PLAN:    Urgent Care Go to Urgent Care/Immediate Care as soon as possible    Patient/Caller agrees to follow recommendations.  Reason for Disposition   Age < 3 months old (Exception: coughs a few times)    Protocols used: Cough-P-OH    
Pt evaluated in UC for this concern.  
Regarding: cough  ----- Message from Estefany Banerjee sent at 1/18/2024 11:28 AM CST -----  Patient Name: Dillon Prieto    Specialist or PCP Name: Marlen Kim    Symptoms: cough    Pregnant (females aged 13-60. If Yes, how long?) : n.a    Call Back # : [phone]    Which State are you currently located in?: [place]    Name of Clinic Site / Acct# : [facility]    Use following scripting for patients waiting for a callback:   \"Nurse callback times vary based on call volumes; please be aware the return phone call may come from an unidentified or out of state phone number. If your symptoms worsen or become life threatening while waiting, you should seek immediate assistance by calling 911 or going to the ER for evaluation.\"    
none

## 2024-04-21 NOTE — PROGRESS NOTE ADULT - SUBJECTIVE AND OBJECTIVE BOX
[facility] Department of Hospital Medicine  Dex Joaquin MD  Available on MS Teams  Pager: [phone]    Patient is a 82y old  Male who presents with a chief complaint of weakness; radiation therapy (14 Oct 2022 13:33)    OVERNIGHT EVENTS: No acute events overnight.    SUBJECTIVE: Pt seen and examined at bedside this morning. Appearing more alert. Watching TV and interacting with family members. Verbalizing more compared to previous. Able to shut eyes on commands but overall still remaining weak and minimally verbal.     ADDITIONAL REVIEW OF SYSTEMS: Unable to obtain.    MEDICATIONS  (STANDING):  atorvastatin 80 milliGRAM(s) Oral at bedtime  dexAMETHasone  Injectable 4 milliGRAM(s) IV Push two times a day  dextrose 5%. 1000 milliLiter(s) (100 mL/Hr) IV Continuous <Continuous>  dextrose 5%. 1000 milliLiter(s) (50 mL/Hr) IV Continuous <Continuous>  dextrose 50% Injectable 25 Gram(s) IV Push once  dextrose 50% Injectable 12.5 Gram(s) IV Push once  dextrose 50% Injectable 25 Gram(s) IV Push once  FLUoxetine 20 milliGRAM(s) Oral daily  glucagon  Injectable 1 milliGRAM(s) IntraMuscular once  insulin glargine Injectable (LANTUS) 4 Unit(s) SubCutaneous at bedtime  insulin lispro (ADMELOG) corrective regimen sliding scale   SubCutaneous every 6 hours  levETIRAcetam  IVPB 750 milliGRAM(s) IV Intermittent every 12 hours  pantoprazole  Injectable 40 milliGRAM(s) IV Push two times a day  piperacillin/tazobactam IVPB.. 3.375 Gram(s) IV Intermittent every 8 hours    MEDICATIONS  (PRN):  acetaminophen     Tablet .. 650 milliGRAM(s) Oral every 6 hours PRN Temp greater or equal to 38C (100.4F), Mild Pain (1 - 3)  dextrose Oral Gel 15 Gram(s) Oral once PRN Blood Glucose LESS THAN 70 milliGRAM(s)/deciliter  melatonin 3 milliGRAM(s) Oral at bedtime PRN Insomnia  ondansetron Injectable 4 milliGRAM(s) IV Push every 8 hours PRN Nausea and/or Vomiting    CAPILLARY BLOOD GLUCOSE    POCT Blood Glucose.: 243 mg/dL (14 Oct 2022 12:03)  POCT Blood Glucose.: 206 mg/dL (14 Oct 2022 07:06)  POCT Blood Glucose.: 266 mg/dL (13 Oct 2022 23:38)  POCT Blood Glucose.: 259 mg/dL (13 Oct 2022 22:07)  POCT Blood Glucose.: 206 mg/dL (13 Oct 2022 18:03)    I&O's Summary    PHYSICAL EXAM:  Vital Signs Last 24 Hrs  T(C): 36.8 (14 Oct 2022 07:03), Max: 36.8 (14 Oct 2022 07:03)  T(F): 98.2 (14 Oct 2022 07:03), Max: 98.2 (14 Oct 2022 07:03)  HR: 83 (14 Oct 2022 12:30) (77 - 83)  BP: 130/73 (14 Oct 2022 12:30) (128/73 - 138/73)  BP(mean): --  RR: 18 (14 Oct 2022 12:30) (18 - 18)  SpO2: 98% (14 Oct 2022 12:30) (95% - 98%)    Parameters below as of 14 Oct 2022 12:30  Patient On (Oxygen Delivery Method): room air    CONSTITUTIONAL: NAD, well-developed  HEAD: Normocephalic, atraumatic  EYES: EOMI, PERRL, eyes tracking  ENT: no rhinorrhea, no pharyngeal erythema  RESPIRATORY: No increased work of breathing, CTAB, no wheezes or crackles appreciated  CARDIOVASCULAR: RRR, S1 and S2 present, no m/r/g  ABDOMEN: soft, NT, mildly distended, bowel sounds present  EXTREMITIES: No LE edema  MUSCULOSKELETAL: no joint swelling, no tenderness to palpation  NEURO: A&Ox0, minimal movement of extremities, able to say "yes" and "ok."     LABS:                        13.7   6.81  )-----------( 41       ( 14 Oct 2022 07:23 )             40.9     10-14    138  |  108<H>  |  21  ----------------------------<  232<H>  4.1   |  21<L>  |  0.48<L>    Ca    7.9<L>      14 Oct 2022 07:23  Phos  1.7     10-14  Mg     1.90     10-14    TPro  5.1<L>  /  Alb  2.6<L>  /  TBili  0.8  /  DBili  x   /  AST  34  /  ALT  40  /  AlkPhos  92  10-13      CARDIAC MARKERS ( 12 Oct 2022 15:37 )  x     / x     / 36 U/L / x     / x                RADIOLOGY & ADDITIONAL TESTS:    Results Reviewed:   Imaging Personally Reviewed:  Electrocardiogram Personally Reviewed:    COORDINATION OF CARE:  Care Discussed with Consultants/Other Providers [Y/N]:  Prior or Outpatient Records Reviewed [Y/N]:   .

## 2024-12-10 NOTE — DIETITIAN INITIAL EVALUATION ADULT - NUTRITON FOCUSED PHYSICAL EXAM
Schedule a follow up if symptoms return, but not before 9 weeks if for nails and calluses.  If there is a problem which is not routine (such as infection, injury or ulcer) you can be seen at anytime.      
no...

## 2025-01-01 NOTE — CONSULT NOTE ADULT - NS ATTEST RISK GEN_ALL_CORE
Risk Statement (NON-critical care) Abormal VS: Temp > 100F or < 96.8F; SBP < 90 mmHG; HR > 120bpm; Resp > 24/min

## 2025-01-03 NOTE — OCCUPATIONAL THERAPY INITIAL EVALUATION ADULT - PATIENT/FAMILY/SIGNIFICANT OTHER GOALS STATEMENT, OT EVAL
patient with history of diverticulitis with left lower quadrant pain for 3 days.  Suspect possible diverticulitis recurrence.  Will do CAT scan labs pain control fluids reassess
To get stronger and return home.

## 2025-02-21 NOTE — PROGRESS NOTE ADULT - PROBLEM SELECTOR PLAN 1
Patient Transferred to: [facility]  Handoff Report Given to: PRISCILA Reeder p/w dark brown stools x 1-2 days i/s/o lovenox use outpt (1-2 doses) for R DVT. Here found to have +FOBT and acute downtrend in hgb from 16.5 (9/22) to 13.7, concerning for GI bleed. HD stable   - giving timing, likely 2/2 AC use which has now been discontinued, possibly LGIB, ?diverticulosis. ddx also includes PUD given steroid use, anorexia, BUN/Cr ratio ~34, although lower suspicion given no abd pain, no black stools.  - last colonoscopy 2014, results unknown but was told to repeat in 10 yrs   - s/p PPI 80mg IV x1  - GI consulted; plan for EGD/colonoscopy performed yesterday; large amount of food in stomach c/f gastroparesis, bx taken from erythematous mucosa in stomach. stool seen in colon, no blood or bleeding noted.    PLAN:   - c/w PO PPI 40mg BID

## 2025-04-10 NOTE — PROGRESS NOTE ADULT - REASON FOR ADMISSION
GIB, weakness
See MD note

## 2025-07-31 NOTE — PRE-OP CHECKLIST - SITE MARKED BY ANESTHESIOLOGIST
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 [C79.51, C80.1]    Surgeon(s):  Endy Marte MD    Anesthesia: General    Staff:   Circulator: Shad Collier RN; Law Covington RN; Claudia Jay RN  Scrub Person: Chris Villalobos; Divine Carrero; Shelly Edmonds; Myles Bower  Vendor Representative: Roberto Mercado; Hiram Osei  Orientee: Kristie Judd RN    Procedure(s):  T10-L3 instrumentation and LUMBAR 1 CORPECTOMY, right with Stealth and O-Arm    Arthrodesis -  - Arthrodesis, posterior or posterolateral technique, single level;T10/11  - Arthrodesis, posterior or posterolateral technique, each additional vertebral segment, T11/12, T12/L1, L1/2 and L2/3  - Arthrodesis, anterior interbody T12 thru L2 associated with corpectomy    Corpectomy -  L1 corpectomy, lateral extracavitary approach    Instrumentation -  - Posterior segmental instrumentation (eg, pedicle fixation, dual rods with multiple hooks and sublaminar wires); 3 to 6 vertebral segments, T10, T11, T12, L2, and L3  - L1 expandable intervertebral body cage    Decompression -  - Lumbar laminectomy with medial facetectomy and foraminotomy, open, L1    Graft -  - Autograft for spine surgery only (includes harvesting the graft); local (eg, ribs, spinous process, or laminar fragments) obtained from same incision  - Allograft, moreselized    Other -  - Stereotactic computer assisted procedure; spinal.   - Continuous neurophysiology monitoring, from outside the operating room (remote or nearby) or for monitoring of more than one case while in the operating room, per hour    Spinal Surgery Levels Completed:5 Levels    INDICATIONS FOR PROCEDURE:   Porfirio Gates is a 67 y.o. male with a  significant comorbidity of known lung mass, hypertension, type 2 diabetes, class I obesity, pulmonary emphysema. He presents with a new problem of 7 out of 10 back pain. Physical exam findings of bilateral Babinski and hyperreflexia in his lower extremities with increased tone in his lower extremities as well as marked cervical kyphosis.  His imaging shows lytic lesions to L1 and T11 with L1 suggesting canal compromise and violation of the posterior wall of the spinal column on noncontrast CT.     MRI of the lumbar spine and CT of the lumbar spine reveal involvement of the posterior elements of T11 which have been removed during a previous open biopsy.  Additionally there is involvement of approximately three quarters of the L1 vertebral body resulting in near vertebral body collapse and extensive epidural compression of the spinal cord and nerve roots at this level.    The risks and benefits of the procedure were discussed. The patient accepted and understood all potential risks and complications including infection, CSF leak, failure of hardware, hematoma, damage to nerve roots or spinal cord, bowel or bladder incontinence, difficulty walking or weakness.  After considering options, the patient requested that we proceed with the procedure.    DESCRIPTION OF OPERATION AND FINDINGS:   On the day of surgery, the patient was brought to the preoperative holding area where IV access was obtained. Prophylactic intravenous antibiotics were administered. The patient was then brought to the major operative suite.     While on the [facility], the patient underwent an uneventful induction of general anesthetic with placement of endotracheal tube. TEDs and SCD hoses were applied.  SSEP and EMG monitoring leads were placed. The patient was then placed in prone position on a Sin table.  All pressure points were inspected and appropriately padded, and the patient was secured to the table.  Post position monitoring was  "then confirmed to be stable.  The back was sterilely prepped with alcohol.  With the patient's relevant imaging dominantly displayed, lateral fluoroscopy was brought into the field and used to approximate T10 - L3. Chloraprep and DuraPrep was then applied and allowed to dry for 5 minutes, and the patient was draped in the usual fashion.  At this point a WHO surgical timeout was performed with all members of the surgical team.      Exposure  The skin was infiltrated with quarter percent Marcaine with epinephrine.  Skin was incised with a #10 scalpel.  Bovie was then used to clear away soft tissue and cut through the lumbodorsal fascia.      Self-retaining retractors were placed and subsequently readjusted as needed. Standard subperiosteal dissection was then carried out to expose the posterior and posterolateral elements from T10 to L3 with lateral exposure carried out to the lateral aspect of the transverse processes     Pedicle Screw Instrumentation  A Stealth Frame was then attached to the T9 spinous process and O-Arm was brought into the field.  CT image was obtained and used to determine optimal pedicle screw size and length.  The O-Arm was then broken and the patient unswaddled.  Accuracy of the frame-less stereotaxis was made by localization of a Stealth frame and spinous processes.     Pedicle screw fixation was carried out in the following manner. Screws were placed in systematic caudal and cranial fashion. The pedicle screw entry sites were chosen using standard dorsal landmarks and stealth guidance. Cortical openings and  holes were created at these sites using a 2mm navigated drill. The pedicular tracts were then tapped with a navigated pedicle tap. Each site was \"under tapped\" and reprobed with satisfactory findings noted as above. Screws in the following dimensions were placed.      Left  Right   T10 6.5 x 50 6.5 x 50  T11 7.5 x 45 7.5 x 50  T12 6.5 x 55 7.5 x 55  L1 skipped due to metastatic " involvement  L2 5.5 x 55 5.5 x 55  L3 7.5 x 60 5.5 x 55    Next we used a brittany template to estimate the length and then cut a temporary brittany to size.  This was then placed on the left for stabilization prior to decompression and corpectomy.  Neuromonitoring was run and all signals were found to be stable.    Laminectomy, Facetectomy and Foraminotomy  Using a matchstick sterling, the lamina at L1 was thinned to eggshell thickness and then removed with Kerrison rongeurs we then took care to remove bone out of the lateral gutters..      Corpectomy, lateral extracavitary approach  Once the laminectomy was performed attention was turned to the right facet pars and transverse processes.  Using a Bovie cautery we fully expose the transverse process.  We began by transecting and fully removing the transverse process.  Next we performed a resection of the inferior articulating facet of T12.  Next using Bovie cautery and gentle retraction we moved along the outside of the transverse process pars and facet fully freeing this from the soft tissue.  Due to tumor invasion the bone was found to be quite soft.  Therefore we were able to fracture the bone through the pedicle out laterally and remove this en bloc.  Pedicles found to be fully invested with tumor.  Using suction and pituitary we were able to resect the remaining portion of the pedicle.  Easily identifying the exiting nerve root.  This was protected and removed from investing tumor.  This was found to be intact.  Working both superior and inferior to the tumor we then entered the vertebral body and performed a rough corpectomy using pituitary and brittany chairs.  Next removed along the outside of the vertebral body.  Using blunt dissection to remove soft tissue from the vertebral body itself until we could clearly identify the anterior portion of the vertebral body.  A brain ribbon was placed along the outside of the vertebral body and anteriorly to protect the great vessels and  soft tissue.  Next attention was turned to the superior disc spaces.  At both the superior disc space T12/L1 and the inferior disc space L1/2, we used Stealth to identify the disc spaces.  Soft tissue was retracted and this was entered with a 15 blade.  We then used a pituitary to perform a rough discectomy at both these levels thus identifying the superior and inferior borders of the corpectomy.  Care was taken not to violate the endplates.  Next a 4 mm round cutting bur was brought into the field and a corpectomy was performed internally.  Next Kerrisons and pituitary and a large bite Leksell were used to remove any remaining tumor and remove the superior and inferior endplates.  Finally the dura was  from the posterior longitudinal ligament.  The posterior longitudinal ligament was transected using Metzenbaum.  And a down pushing curette was used to fracture the remaining shell of the vertebral body anteriorly.  A rim of bone was left for protection of the great vessels and small tissue surrounding.  We resected greater than 75% of the vertebral body.  Next a trial was brought into the field.  The exiting nerve root was retracted and the trial was found to fit easily in the superiorly and inferiorly.  Using this a expandable Medtronic stratosphere cage 20 mm was assembled on the back table and brought into the field.  This was then placed gently into the corpectomy defect.  This was expanded under fluoroscopy.  Neuromonitoring was run and found to be stable.  This was found to fit snugly into the defect.  Cage was then locked.  Hemostasis was achieved.  The corpectomy was fully washed.  And morselized autograft was packed around the cage.    Next A brittany template was used to estimate the brittany length.  Then a 170 mm x 6.0 mm cobalt chrome brittany was bent appropriately and secured to the tulip heads from T10 to L3. Compression across the T12/L1 and L1/2 interspace(s) was perfomed and cap screws were final  tightened.  A cross-link was then applied approximately midway down the construct.    Arthrodesis  The posterolateral elements on the left from T10 to L3 and on the right from T10-T12 and L2-L3 were arthrodesed using a high speed drill.  The wound was thoroughly irrigated with 1 L of bacitracin irrigant and dried with satisfactory hemostasis noted.  The posterolateral gutters were then packed with autograft, allograft, and demineralized bone matrix. Thorough hemostasis was ascertained after checking the construct closely and fluoroscopically.     A 7 Maltese flat drain was placed and tunneled out above the incision.  The deep paraspinal musculature was closed with 0-Vicryl sutures.  The muscle fascia was closed with 0-Vicryl sutures as well.  Subcutaneous tissues were closed with inverted 2-0 Vicryl sutures and the skin was closed with 4-0 Monocryl.  The incision was covered with xeroform and Ioban.  All counts were noted to be correct at the end of the case.  They were placed back on the [facility] and extubated. The patient was taken to the recovery room in stable condition.    OPERATIVE FINDINGS:   Cancer involvement at T11 and L1 as anticipated from preoperative imaging studies. Pedicle screw placement with computer assisted navigation appeared satisfactory with satisfactory purchase and positioning noted at all sites as well as satisfactory findings upon probing of the pedicular tracts at each site. In addition, spacer snugness and positioning appeared satisfactory on imaging. Electrophysiological monitoring was carried out throughout the procedure and remained stable with no undue changes reported.     Estimated Blood Loss: 1800 mL    Complications: Neuro monitoring remained stable.     Implants:   Implant Name Type Inv. Item Serial No.  Lot No. LRB No. Used Action   HEMOST ABS SURGIFOAM  8X12 10MM - [id number] Implant HEMOST ABS SURGIFOAM  8X12 10MM  [employer]  DIV OF J AND J [id number]  Right 1 Implanted   KT HEMOST ABS SURGIFOAM PORCN 1GRAM - [id number] Implant KT HEMOST ABS SURGIFOAM PORCN 1GRAM  [employer]  DIV OF J AND J [id number] Right 1 Implanted   KT HEMOST ABS SURGIFOAM PORCN 1GRAM - [id number] Implant KT HEMOST ABS SURGIFOAM PORCN 1GRAM  [employer]  DIV OF J AND J [id number] Right 1 Implanted   SCRW SOLERA MAS 5.5/6MM 6.5X50MM - [id number] Implant SCRW SOLERA MAS 5.5/6MM 6.5X50MM  [employer] NR Right 2 Implanted   SCRW SOLERA MAS COCR 7.5X45MM - [id number] Implant SCRW SOLERA MAS COCR 7.5X45MM  [employer] NR Right 1 Implanted   SCRW SOLERA MAS 7.5X50MM - [id number] Implant SCRW SOLERA MAS 7.5X50MM  [employer] NR Right 1 Implanted   SCRW SOLERA MAS 5.5/6MM 6.5X55MM - [id number] Implant SCRW SOLERA MAS 5.5/6MM 6.5X55MM  [employer] NR Right 1 Implanted   SCRW SOLERA MAS COCR 7.5X55MM - [id number] Implant SCRW SOLERA MAS COCR 7.5X55MM  [employer] NR Right 1 Implanted   SCRW SOLERA MAS 5.5/6MM 5.5X55MM - [id number] Implant SCRW SOLERA MAS 5.5/6MM 5.5X55MM  [employer] NR Right 3 Implanted   SCRW SOLERA MAS COCR 7.5X60MM - [id number] Implant SCRW SOLERA MAS COCR 7.5X60MM  [employer] NR Right 1 Implanted   RACIEL SOLERA CHROMALLOY PLS STR 4S298NE - [id number] Implant RACIEL SOLERA CHROMALLOY PLS STR [id number]  [employer] NA Right 1 Implanted   6.0CROSSLINK    [employer] NA Right 1 Implanted   SCRW ST SOLERA BRKOFF TI 5.5MM - [id number] Implant SCRW ST SOLERA BRKOFF TI 5.5MM  [employer] NA Right 10 Implanted   CTR/PC CERV [id number] SZEC 20MM - [id number] Implant CTR/PC CERV [id number] SZEC 20MM  [employer] NA Right 1 Implanted   ALLOGRFT DBM MAGNIFUSE 1X20CM - [id number] - [id number] Implant ALLOGRFT DBM MAGNIFUSE 1X20CM [id number] [employer] [place] NA Right 1 Implanted   PUTTY DBM VIVIANA 5CC - [id number] Implant PUTTY DBM VIVIANA 5CC  [employer] [id number] Right 1 Implanted       Specimens:                Specimens       ID Source Type Tests Collected By Collected At Frozen?    A  Back, Lower Tissue TISSUE PATHOLOGY EXAM   Endy Marte MD 7/29/25 1742 No    Description: THORACIC/LUMBAR SPINE TUMOR              Drains:   Closed/Suction Drain 1 Posterior Back Bulb 7 Fr. (Active)   Site Description Unable to view 07/30/25 2000   Dressing Status Clean;Intact;Dry 07/30/25 2000   Drainage Appearance Bloody 07/30/25 2000   Status To bulb suction 07/30/25 2000   Output (mL) 30 mL 07/31/25 0800       [REMOVED] Urethral Catheter Silicone 16 Fr. (Removed)   Daily Indications Required activity restriction from trauma, surgery, (e.g. unstable spine, fracture, hemodynamics) 07/30/25 0400   Site Assessment Skin intact;Clean 07/30/25 0400   Collection Container Standard drainage bag 07/30/25 0400   Securement Method Securing device 07/30/25 0400   Catheter care complete Yes 07/29/25 2009   Output (mL) 225 mL 07/30/25 0609         n/a

## (undated) DEVICE — RUBBER BANDS STERILE

## (undated) DEVICE — GLV 7 PROTEXIS (WHITE)

## (undated) DEVICE — TUBING SUCTION CONN 6FT STERILE

## (undated) DEVICE — DRSG TELFA .5 X 3

## (undated) DEVICE — BIPOLAR FORCEP CODMAN VERSATRU 8" X 0.5MM (BLUE)

## (undated) DEVICE — MIDAS REX MR8 TAPERED SM BORE 2.3MM X 7CM FOOTED

## (undated) DEVICE — NDL BIOPSY BRAIN TYPE A DISP

## (undated) DEVICE — VENODYNE/SCD SLEEVE CALF LARGE

## (undated) DEVICE — PREP BETADINE KIT

## (undated) DEVICE — CATH IV SAFE BC 20G X 1.16" (PINK)

## (undated) DEVICE — PACK IV START WITH CHG

## (undated) DEVICE — Device

## (undated) DEVICE — DRAPE MAYO STAND 30"

## (undated) DEVICE — PREP CHLORAPREP HI-LITE ORANGE 26ML

## (undated) DEVICE — SUT NUROLON 4-0 8-18" TF (POP-OFF)

## (undated) DEVICE — SOL IRR POUR NS 0.9% 500ML

## (undated) DEVICE — DRILL KIT AD TECH 3.2MM

## (undated) DEVICE — DRSG TELFA .25 X 3

## (undated) DEVICE — DRAPE SURGICAL #1010

## (undated) DEVICE — TUBING BIPOLAR IRRIGATOR AND CORD SET

## (undated) DEVICE — SUT ETHILON 3-0 18" FS-1

## (undated) DEVICE — BITE BLOCK ADULT 20 X 27MM (GREEN)

## (undated) DEVICE — POSITIONER FOAM EGG CRATE ULNAR 2PCS (PINK)

## (undated) DEVICE — MEDICATION LABELS W MARKER

## (undated) DEVICE — SUT SOFSILK 4-0 18" CV-23

## (undated) DEVICE — MARKING PEN W RULER

## (undated) DEVICE — ELCTR BOVIE TIP BLADE INSULATED 2.75" EDGE

## (undated) DEVICE — MIDAS REX LEGEND LUBRICANT DIFFUSER CARTRIDGE

## (undated) DEVICE — DRAPE 1/2 SHEET 40X57"

## (undated) DEVICE — ELCTR 4-DISC 20MM 49" (RED, BLUE, GREEN, BLACK)

## (undated) DEVICE — SUT VICRYL 3-0 18" CP-2 UNDYED (POP-OFF)

## (undated) DEVICE — SUT VICRYL 2-0 18" CP-2 UNDYED (POP-OFF)

## (undated) DEVICE — ELCTR SUBDERMAL NDL CLASSIC 1.5M X 59" (6 COLOR)

## (undated) DEVICE — ELCTR SUBDERMAL CORKSCREW NDL 1.2MM

## (undated) DEVICE — DRSG OPSITE 13.75 X 4"

## (undated) DEVICE — SOLIDIFIER ISOLYZER 2000 CC

## (undated) DEVICE — SUCTION YANKAUER NO CONTROL VENT

## (undated) DEVICE — ELCTR MONOPOLAR STIMULATOR PROBE FLUSH-TIP

## (undated) DEVICE — BIOPSY FORCEP RADIAL JAW 4 STANDARD WITH NEEDLE

## (undated) DEVICE — STAPLER SKIN VISI-STAT 35 WIDE

## (undated) DEVICE — SYR ALLIANCE II INFLATION 60ML

## (undated) DEVICE — SENSOR O2 FINGER ADULT

## (undated) DEVICE — TUBING IV SET GRAVITY 3Y 100" MACRO

## (undated) DEVICE — GLV 7.5 PROTEXIS (WHITE)

## (undated) DEVICE — BRUSH COLONOSCOPY CYTOLOGY

## (undated) DEVICE — GLV 6.5 PROTEXIS (WHITE)

## (undated) DEVICE — SOL INJ NS 0.9% 500ML 2 PORT

## (undated) DEVICE — KIT ACCESSORY VISUALASE

## (undated) DEVICE — DRSG TAPE HYPAFIX 4"

## (undated) DEVICE — IRRIGATOR BIO SHIELD

## (undated) DEVICE — TUBING SUCTION 20FT

## (undated) DEVICE — DRAPE NEUROLOGICAL

## (undated) DEVICE — ELCTR SUBDERMAL NDL 27G X 1/2" WITH TWISTED PAIR

## (undated) DEVICE — DRAPE TOWEL BLUE 17" X 24"

## (undated) DEVICE — DRSG TELFA 3 X 8

## (undated) DEVICE — CODMAN PERFORATOR 14MM (BLUE)

## (undated) DEVICE — FORCEP RADIAL JAW 4 JUMBO 2.8MM 3.2MM 240CM ORANGE DISP

## (undated) DEVICE — CATH IV SAFE BC 22G X 1" (BLUE)

## (undated) DEVICE — DRSG XEROFORM 1 X 8"

## (undated) DEVICE — WARMING BLANKET LOWER ADULT

## (undated) DEVICE — FOLEY HOLDER STATLOCK 2 WAY ADULT

## (undated) DEVICE — ELCTR BIPOLAR PROBE

## (undated) DEVICE — SOL IRR POUR H2O 250ML

## (undated) DEVICE — DRSG MASTISOL

## (undated) DEVICE — FOLEY TRAY 16FR LF URINE METER SURESTEP

## (undated) DEVICE — DRAIN JACKSON PRATT 3 SPRING RESERVOIR W 10FR PVC DRAIN

## (undated) DEVICE — ELCTR PEDICLE SCREW PROBE 3MM BALL 1.8MM X 100MM

## (undated) DEVICE — BALLOON US ENDO

## (undated) DEVICE — SYR LUER LOK 50CC

## (undated) DEVICE — POLY TRAP ETRAP

## (undated) DEVICE — MIDAS REX MR7 LUBRICANT DIFFUSER CARTRIDGE

## (undated) DEVICE — AESCULAP SCALPFIX 10 CLIPS

## (undated) DEVICE — GLV 8.5 PROTEXIS (WHITE)

## (undated) DEVICE — DRSG CURITY GAUZE SPONGE 4 X 4" 12-PLY

## (undated) DEVICE — ELCTR GROUNDING PAD ADULT COVIDIEN

## (undated) DEVICE — POSITIONER FOAM HEADREST (PINK)

## (undated) DEVICE — GOWN TRIMAX XXL

## (undated) DEVICE — DRAPE CAMERA VIDEO 7"X96"

## (undated) DEVICE — CLAMP BX HOT RAD JAW 3

## (undated) DEVICE — SUT SOFSILK 0 30" V-20

## (undated) DEVICE — GLV 8 PROTEXIS (WHITE)

## (undated) DEVICE — URETERAL CATH RED RUBBER 12FR (WHITE)

## (undated) DEVICE — BLADE SCALPEL SAFETYLOCK #15

## (undated) DEVICE — DRAPE 3/4 SHEET W REINFORCEMENT 56X77"

## (undated) DEVICE — FOLEY TRAY 16FR 5CC LTX UMETER CLOSED

## (undated) DEVICE — SPECIMEN CONTAINER 100ML

## (undated) DEVICE — NDL HYPO SAFE 22G X 1.5" (BLACK)